# Patient Record
Sex: MALE | Race: WHITE | Employment: OTHER | URBAN - METROPOLITAN AREA
[De-identification: names, ages, dates, MRNs, and addresses within clinical notes are randomized per-mention and may not be internally consistent; named-entity substitution may affect disease eponyms.]

---

## 2017-04-25 DIAGNOSIS — C02.9 TONGUE CANCER (H): ICD-10-CM

## 2017-04-25 LAB
ALBUMIN SERPL-MCNC: 3.9 G/DL (ref 3.4–5)
ALP SERPL-CCNC: 59 U/L (ref 40–150)
ALT SERPL W P-5'-P-CCNC: 15 U/L (ref 0–70)
ANION GAP SERPL CALCULATED.3IONS-SCNC: 7 MMOL/L (ref 3–14)
AST SERPL W P-5'-P-CCNC: 9 U/L (ref 0–45)
BASOPHILS # BLD AUTO: 0 10E9/L (ref 0–0.2)
BASOPHILS NFR BLD AUTO: 0.6 %
BILIRUB SERPL-MCNC: 0.7 MG/DL (ref 0.2–1.3)
BUN SERPL-MCNC: 10 MG/DL (ref 7–30)
CALCIUM SERPL-MCNC: 9 MG/DL (ref 8.5–10.1)
CHLORIDE SERPL-SCNC: 107 MMOL/L (ref 94–109)
CO2 SERPL-SCNC: 30 MMOL/L (ref 20–32)
CREAT SERPL-MCNC: 0.95 MG/DL (ref 0.66–1.25)
DIFFERENTIAL METHOD BLD: ABNORMAL
EOSINOPHIL # BLD AUTO: 0.2 10E9/L (ref 0–0.7)
EOSINOPHIL NFR BLD AUTO: 3.4 %
ERYTHROCYTE [DISTWIDTH] IN BLOOD BY AUTOMATED COUNT: 14.8 % (ref 10–15)
GFR SERPL CREATININE-BSD FRML MDRD: 76 ML/MIN/1.7M2
GLUCOSE SERPL-MCNC: 99 MG/DL (ref 70–99)
HCT VFR BLD AUTO: 43.3 % (ref 40–53)
HGB BLD-MCNC: 14.7 G/DL (ref 13.3–17.7)
LYMPHOCYTES # BLD AUTO: 0.9 10E9/L (ref 0.8–5.3)
LYMPHOCYTES NFR BLD AUTO: 13.3 %
MAGNESIUM SERPL-MCNC: 1.6 MG/DL (ref 1.6–2.3)
MCH RBC QN AUTO: 36.7 PG (ref 26.5–33)
MCHC RBC AUTO-ENTMCNC: 33.9 G/DL (ref 31.5–36.5)
MCV RBC AUTO: 108 FL (ref 78–100)
MONOCYTES # BLD AUTO: 0.7 10E9/L (ref 0–1.3)
MONOCYTES NFR BLD AUTO: 10.6 %
NEUTROPHILS # BLD AUTO: 5 10E9/L (ref 1.6–8.3)
NEUTROPHILS NFR BLD AUTO: 72.1 %
PLATELET # BLD AUTO: 148 10E9/L (ref 150–450)
POTASSIUM SERPL-SCNC: 4.5 MMOL/L (ref 3.4–5.3)
PROT SERPL-MCNC: 7.5 G/DL (ref 6.8–8.8)
RBC # BLD AUTO: 4.01 10E12/L (ref 4.4–5.9)
SODIUM SERPL-SCNC: 144 MMOL/L (ref 133–144)
TSH SERPL DL<=0.05 MIU/L-ACNC: 3.12 MU/L (ref 0.4–4)
WBC # BLD AUTO: 6.9 10E9/L (ref 4–11)

## 2017-04-25 PROCEDURE — 84443 ASSAY THYROID STIM HORMONE: CPT | Performed by: FAMILY MEDICINE

## 2017-04-25 PROCEDURE — 80053 COMPREHEN METABOLIC PANEL: CPT | Performed by: FAMILY MEDICINE

## 2017-04-25 PROCEDURE — 83735 ASSAY OF MAGNESIUM: CPT | Performed by: FAMILY MEDICINE

## 2017-04-25 PROCEDURE — 85025 COMPLETE CBC W/AUTO DIFF WBC: CPT | Performed by: FAMILY MEDICINE

## 2017-04-25 PROCEDURE — 36415 COLL VENOUS BLD VENIPUNCTURE: CPT | Performed by: FAMILY MEDICINE

## 2017-04-26 NOTE — PROGRESS NOTES
Please call.  TSH is normal indicating that thyroid function is normal.  The blood chemistries (Basic metabolic panel) are all normal including electrolytes (salt balances in the blood), blood glucose, liver and kidney tests.  Magnesium is normal.  Platelets in blood slightly low which has been present in the past, other blood counts are OK.

## 2017-04-28 ENCOUNTER — HOSPITAL ENCOUNTER (OUTPATIENT)
Dept: GENERAL RADIOLOGY | Facility: CLINIC | Age: 82
Discharge: HOME | End: 2017-04-28
Attending: INTERNAL MEDICINE | Admitting: INTERNAL MEDICINE
Payer: COMMERCIAL

## 2017-04-28 ENCOUNTER — ONCOLOGY VISIT (OUTPATIENT)
Dept: ONCOLOGY | Facility: CLINIC | Age: 82
End: 2017-04-28
Attending: INTERNAL MEDICINE
Payer: COMMERCIAL

## 2017-04-28 VITALS
WEIGHT: 178.3 LBS | HEART RATE: 86 BPM | SYSTOLIC BLOOD PRESSURE: 152 MMHG | BODY MASS INDEX: 27.51 KG/M2 | TEMPERATURE: 98.2 F | DIASTOLIC BLOOD PRESSURE: 87 MMHG

## 2017-04-28 DIAGNOSIS — I10 HYPERTENSION GOAL BP (BLOOD PRESSURE) < 140/90: ICD-10-CM

## 2017-04-28 DIAGNOSIS — E11.22 TYPE 2 DIABETES MELLITUS WITH STAGE 1 CHRONIC KIDNEY DISEASE, WITHOUT LONG-TERM CURRENT USE OF INSULIN (H): ICD-10-CM

## 2017-04-28 DIAGNOSIS — C02.9 TONGUE CANCER (H): ICD-10-CM

## 2017-04-28 DIAGNOSIS — N18.1 TYPE 2 DIABETES MELLITUS WITH STAGE 1 CHRONIC KIDNEY DISEASE, WITHOUT LONG-TERM CURRENT USE OF INSULIN (H): ICD-10-CM

## 2017-04-28 DIAGNOSIS — E78.5 HYPERLIPIDEMIA WITH TARGET LDL LESS THAN 100: ICD-10-CM

## 2017-04-28 DIAGNOSIS — N18.1 CKD (CHRONIC KIDNEY DISEASE) STAGE 1, GFR 90 ML/MIN OR GREATER: ICD-10-CM

## 2017-04-28 DIAGNOSIS — C18.9 MALIGNANT NEOPLASM OF COLON, UNSPECIFIED PART OF COLON (H): Primary | ICD-10-CM

## 2017-04-28 PROCEDURE — 71020 XR CHEST 2 VW: CPT

## 2017-04-28 PROCEDURE — 99211 OFF/OP EST MAY X REQ PHY/QHP: CPT

## 2017-04-28 PROCEDURE — 99214 OFFICE O/P EST MOD 30 MIN: CPT | Performed by: INTERNAL MEDICINE

## 2017-04-28 ASSESSMENT — PAIN SCALES - GENERAL: PAINLEVEL: NO PAIN (0)

## 2017-04-28 NOTE — PROGRESS NOTES
Hematology/ Oncology Follow-up Visit:  Apr 28, 2017    Reason for Visit:   Follow-up of tongue cancer.    Oncologic History:  Tongue cancer (H)  Patrick Doip was noted to have a mass in his right neck during routine physical exam. CT scanning was subsequently obtained which also showed a right-sided tongue base and oropharyngeal mass, which together are consistent with malignancy. The patient himself has denied any unexplained weight loss. He denies any odynophagia, dysphagia, hoarseness or otalgia. 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 PET scan done on October 21, 2015 showed a mass at the level of the tongue base located along the anterior wall of the oropharynx and extending to the right lateral wall near the tonsillar pillar. This demonstrates abnormal increased FDG activity and is consistent with known malignancy. Hypermetabolic lymph nodes in the right and left neck are also noted as described. These are consistent with metastatic lymph nodes. There is no evidence of metastatic disease in the chest, abdomen or pelvis. CT of the soft tissues of the neck on October 21, 2015 was done in conjunction with PET scanning showed a 2.8 cm mass at the base of the tongue on the right effacing the right vallecula. There is also a 2.3 cm lymph node in the right level II region that is strongly suspicious for metastatic disease. These lesions are PET positive. Additionally, there is a 1.3 cm lymph node in left level III that is PET positive. There is a lymph node in right level V, also PET positive. He started on concurrent Cisplatin with radiation therapy with cisplatin 100 mg/m to be given intravenously on days #1, #22 #43 of radiation therapy.    Interval History:  Patient is here today for follow-up. He has been feeling well without any recent complaints. He denies any weight loss or night sweats or fever or chills. He denies any nausea or vomiting or diarrhea. He denies  any shortness of breath or cough or wheezing.He still has some difficulty swallowing certain foods he denies any pain with swallowing.    Review Of Systems:  Constitutional: Negative for fever, chills, and night sweats.  Skin: negative.  Eyes: negative.  Ears/Nose/Throat: negative.  Respiratory: No shortness of breath, dyspnea on exertion, cough, or hemoptysis.  Cardiovascular: negative.  Gastrointestinal: negative.  Genitourinary: negative.  Musculoskeletal: negative.  Neurologic: negative.  Psychiatric: negative.  Hematologic/Lymphatic/Immunologic: negative.  Endocrine: negative.    All other ROS negative unless mentioned in interval history.    Past medical, social, surgical, and family histories reviewed.    Allergies:  Allergies as of 04/28/2017     (No Known Allergies)       Current Medications:  Current Outpatient Prescriptions   Medication Sig Dispense Refill     metFORMIN (GLUCOPHAGE) 1000 MG tablet Take 1,000 mg by mouth daily       lisinopril (PRINIVIL/ZESTRIL) 20 MG tablet Take 1 tablet (20 mg) by mouth daily 90 tablet 1     order for DME Equipment being ordered: neck compression with additional neck compression pad 1 each 0     multivitamin  with lutein (OCUVITE WITH LTEIN) CAPS Take 1 capsule by mouth daily       Cholecalciferol (VITAMIN D) 1000 UNITS capsule Take 1 capsule by mouth daily       lovastatin (MEVACOR) 40 MG tablet Take 1 tablet (40 mg) by mouth At Bedtime 90 tablet 3        Physical Exam:  /87 (BP Location: Right arm, Patient Position: Chair, Cuff Size: Adult Regular)  Pulse 86  Temp 98.2  F (36.8  C) (Tympanic)  Wt 80.9 kg (178 lb 4.8 oz)  BMI 27.51 kg/m2  Wt Readings from Last 12 Encounters:   04/28/17 80.9 kg (178 lb 4.8 oz)   11/28/16 76.7 kg (169 lb)   11/07/16 77.1 kg (170 lb)   10/28/16 76.2 kg (168 lb)   09/02/16 74.6 kg (164 lb 6.4 oz)   07/28/16 74 kg (163 lb 1.6 oz)   07/25/16 72.6 kg (160 lb 1.6 oz)   05/27/16 73.9 kg (163 lb)   04/27/16 75.3 kg (166 lb)   04/27/16  75.2 kg (165 lb 12.8 oz)   03/29/16 80.7 kg (178 lb)   02/25/16 79.1 kg (174 lb 6.4 oz)     ECOG performance status: 0  GENERAL APPEARANCE: Healthy, alert and in no acute distress.  HEENT: Sclerae anicteric. PERRLA. Oropharynx without ulcers, lesions, or thrush.  NECK: Supple. No asymmetry or masses.  LYMPHATICS: No palpable cervical, supraclavicular, axillary, or inguinal lymphadenopathy.  RESP: Lungs clear to auscultation bilaterally without rales, rhonchi or wheezes.  CARDIOVASCULAR: Regular rate and rhythm. Normal S1, S2; no S3 or S4. No murmur, gallop, or rub.  ABDOMEN: Soft, nontender. Bowel sounds normal. No palpable organomegaly or masses.  MUSCULOSKELETAL: Extremities without gross deformities noted. No edema of bilateral lower extremities.  SKIN: No suspicious lesions or rashes.  NEURO: Alert and oriented x 3. Cranial nerves II-XII grossly intact.  PSYCHIATRIC: Mentation and affect appear normal.    Laboratory/Imaging Studies:  Orders Only on 04/25/2017   Component Date Value Ref Range Status     WBC 04/25/2017 6.9  4.0 - 11.0 10e9/L Final     RBC Count 04/25/2017 4.01* 4.4 - 5.9 10e12/L Final     Hemoglobin 04/25/2017 14.7  13.3 - 17.7 g/dL Final     Hematocrit 04/25/2017 43.3  40.0 - 53.0 % Final     MCV 04/25/2017 108* 78 - 100 fl Final     MCH 04/25/2017 36.7* 26.5 - 33.0 pg Final     MCHC 04/25/2017 33.9  31.5 - 36.5 g/dL Final     RDW 04/25/2017 14.8  10.0 - 15.0 % Final     Platelet Count 04/25/2017 148* 150 - 450 10e9/L Final     Diff Method 04/25/2017 Automated Method   Final     % Neutrophils 04/25/2017 72.1  % Final     % Lymphocytes 04/25/2017 13.3  % Final     % Monocytes 04/25/2017 10.6  % Final     % Eosinophils 04/25/2017 3.4  % Final     % Basophils 04/25/2017 0.6  % Final     Absolute Neutrophil 04/25/2017 5.0  1.6 - 8.3 10e9/L Final     Absolute Lymphocytes 04/25/2017 0.9  0.8 - 5.3 10e9/L Final     Absolute Monocytes 04/25/2017 0.7  0.0 - 1.3 10e9/L Final     Absolute Eosinophils  04/25/2017 0.2  0.0 - 0.7 10e9/L Final     Absolute Basophils 04/25/2017 0.0  0.0 - 0.2 10e9/L Final     Sodium 04/25/2017 144  133 - 144 mmol/L Final     Potassium 04/25/2017 4.5  3.4 - 5.3 mmol/L Final     Chloride 04/25/2017 107  94 - 109 mmol/L Final     Carbon Dioxide 04/25/2017 30  20 - 32 mmol/L Final     Anion Gap 04/25/2017 7  3 - 14 mmol/L Final     Glucose 04/25/2017 99  70 - 99 mg/dL Final     Urea Nitrogen 04/25/2017 10  7 - 30 mg/dL Final     Creatinine 04/25/2017 0.95  0.66 - 1.25 mg/dL Final     GFR Estimate 04/25/2017 76  >60 mL/min/1.7m2 Final    Non  GFR Calc     GFR Estimate If Black 04/25/2017   >60 mL/min/1.7m2 Final                    Value:>90   GFR Calc       Calcium 04/25/2017 9.0  8.5 - 10.1 mg/dL Final     Bilirubin Total 04/25/2017 0.7  0.2 - 1.3 mg/dL Final     Albumin 04/25/2017 3.9  3.4 - 5.0 g/dL Final     Protein Total 04/25/2017 7.5  6.8 - 8.8 g/dL Final     Alkaline Phosphatase 04/25/2017 59  40 - 150 U/L Final     ALT 04/25/2017 15  0 - 70 U/L Final     AST 04/25/2017 9  0 - 45 U/L Final     Magnesium 04/25/2017 1.6  1.6 - 2.3 mg/dL Final     TSH 04/25/2017 3.12  0.40 - 4.00 mU/L Final        Recent Results (from the past 744 hour(s))   XR Chest 2 Views    Narrative    XR CHEST 2 VW 4/28/2017 9:17 AM    HISTORY: Malignant neoplasm of the tongue. Cough.    COMPARISON: None.      Impression    IMPRESSION: 2 views of the chest show no acute cardiopulmonary disease  or evidence of metastatic disease.     MARYJO HERRERA MD       Assessment and plan:    (C02.9) Tongue cancer (H)  i reviewed with the patient today most recent blood work, imaging studies was a chest x-ray which was normal. There is no clinical evidence of disease recurrence. I advised the patient to continue to follow up with  ENT. I will see the patient again in 6 months or sooner if there's new developments or concerns.    (C18.9) Malignant neoplasm of colon, unspecified part of colon (H)     Previous history of colon cancer. The patient is due for colonoscopy. He will talk to her primary care physician  To schedule his colonoscopy.    (I10) Hypertension goal BP (blood pressure) < 140/90  Patient blood pressure today is mildly elevated. He continues on lisinopril 20 mg daily..  Continue to monitor blood pressure through the primary care physician.    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    (E78.5) Hyperlipidemia with target LDL less than 100  Patient currently on lovastatin 40 mg daily..    (N18.1) CKD (chronic kidney disease) stage 1, GFR 90 ml/min or greater  Patient kidney function has been normal.    The patient is ready to learn, no apparent learning barriers were identified.  Diagnosis and treatment plans were explained to the patient. The patient expressed understanding of the content. The patient asked appropriate questions. The patient questions were answered to his satisfaction.    Chart documentation with Dragon Voice recognition Software. Although reviewed after completion, some words and grammatical errors may remain.

## 2017-04-28 NOTE — ASSESSMENT & PLAN NOTE
Patrick Diop was noted to have a mass in his right neck during routine physical exam. CT scanning was subsequently obtained which also showed a right-sided tongue base and oropharyngeal mass, which together are consistent with malignancy. The patient himself has denied any unexplained weight loss. He denies any odynophagia, dysphagia, hoarseness or otalgia. 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 PET scan done on October 21, 2015 showed a mass at the level of the tongue base located along the anterior wall of the oropharynx and extending to the right lateral wall near the tonsillar pillar. This demonstrates abnormal increased FDG activity and is consistent with known malignancy. Hypermetabolic lymph nodes in the right and left neck are also noted as described. These are consistent with metastatic lymph nodes. There is no evidence of metastatic disease in the chest, abdomen or pelvis. CT of the soft tissues of the neck on October 21, 2015 was done in conjunction with PET scanning showed a 2.8 cm mass at the base of the tongue on the right effacing the right vallecula. There is also a 2.3 cm lymph node in the right level II region that is strongly suspicious for metastatic disease. These lesions are PET positive. Additionally, there is a 1.3 cm lymph node in left level III that is PET positive. There is a lymph node in right level V, also PET positive. He started on concurrent Cisplatin with radiation therapy with cisplatin 100 mg/m to be given intravenously on days #1, #22 #43 of radiation therapy.

## 2017-04-28 NOTE — MR AVS SNAPSHOT
After Visit Summary   4/28/2017    Patrick Diop    MRN: [id number]           Patient Information     Date Of Birth          1935        Visit Information        Provider Department      4/28/2017 10:00 AM Lanny Neil MD [facility] ONCOLOGY      Today's Diagnoses     Malignant neoplasm of colon, unspecified part of colon (H)    -  1    Tongue cancer (H)        Hypertension goal BP (blood pressure) < 140/90        Type 2 diabetes mellitus with stage 1 chronic kidney disease, without long-term current use of insulin (H)        Hyperlipidemia with target LDL less than 100        CKD (chronic kidney disease) stage 1, GFR 90 ml/min or greater          Care Instructions    We would like to see you back in clinic with Dr. Neil in 6 months with labs prior.    Copy of appointments, and after visit summary (AVS) given to patient.    If you have any questions during business hours (M-F 8 AM- 4PM), please call Yolanda Eduardo RN, BSN, OCN Oncology Hematology /Breast Cancer Navigator at [facility] [phone].     For questions after business hours, or on holidays/weekends, please call our after hours Nurse Triage line [phone]. Thank you.          Follow-ups after your visit        Follow-up notes from your care team     Return in about 6 months (around 10/28/2017) for Blood work before next appointment.      Your next 10 appointments already scheduled     Sep 01, 2017  8:30 AM CDT   Return Visit with Rosa Paul MD   [facility])    [address]  [facility] [address]   [phone]            Oct 25, 2017  9:30 AM CDT   LAB with NB LAB   [facility]    [address] [place] [id number] [phone]           Patient must bring picture ID.  Patient should be prepared to give a urine specimen  Please do not eat 10-12 hours  "before your appointment if you are coming in fasting for labs on lipids, cholesterol, or glucose (sugar).  Pregnant women should follow their Care Team instructions. Water with medications is okay. Do not drink coffee or other fluids.   If you have concerns about taking  your medications, please ask at office or if scheduling via [employer], send a message by clicking on Secure Messaging, Message Your Care Team.            Nov 01, 2017 10:00 AM CDT   Return Visit with Lanny Neil MD   [facility] ([facility])    [facility]  [address]  [facility] [address]   [phone]              Future tests that were ordered for you today     Open Future Orders        Priority Expected Expires Ordered    CBC with platelets differential Routine 10/28/2017 4/28/2018 4/28/2017    Comprehensive metabolic panel Routine 10/28/2017 4/28/2018 4/28/2017    TSH Routine 10/28/2017 4/28/2018 4/28/2017            Who to contact     If you have questions or need follow up information about today's clinic visit or your schedule please contact [facility] directly at [phone].  Normal or non-critical lab and imaging results will be communicated to you by MyChart, letter or phone within 4 business days after the clinic has received the results. If you do not hear from us within 7 days, please contact the clinic through [facility] or phone. If you have a critical or abnormal lab result, we will notify you by phone as soon as possible.  Submit refill requests through [employer] or call your pharmacy and they will forward the refill request to us. Please allow 3 business days for your refill to be completed.          Additional Information About Your Visit        [facility] Information     [employer] lets you send messages to your doctor, view your test results, renew your prescriptions, schedule appointments and more. To sign up, go to [email or web address] . Click on \"Log in\" " "on the left side of the screen, which will take you to the Welcome page. Then click on \"Sign up Now\" on the right side of the page.     You will be asked to enter the access code listed below, as well as some personal information. Please follow the directions to create your username and password.     Your access code is: [email or web address]  Expires: 2017 10:02 AM     Your access code will  in 90 days. If you need help or a new code, please call your [facility] or [phone].        Care EveryWhere ID     This is your Care EveryWhere ID. This could be used by other organizations to access your [place] medical records  [id number]        Your Vitals Were     Pulse Temperature BMI (Body Mass Index)             86 98.2  F (36.8  C) (Tympanic) 27.51 kg/m2          Blood Pressure from Last 3 Encounters:   17 152/87   16 138/78   16 160/82    Weight from Last 3 Encounters:   17 80.9 kg (178 lb 4.8 oz)   16 76.7 kg (169 lb)   16 77.1 kg (170 lb)               Primary Care Provider Office Phone # Fax #    Joe Mandel [phone]       [address] [address]        Thank you!     Thank you for choosing [facility]  for your care. Our goal is always to provide you with excellent care. Hearing back from our patients is one way we can continue to improve our services. Please take a few minutes to complete the written survey that you may receive in the mail after your visit with us. Thank you!             Your Updated Medication List - Protect others around you: Learn how to safely use, store and throw away your medicines at www.disposemymeds.org.          This list is accurate as of: 17 10:02 AM.  Always use your most recent med list.                   Brand Name Dispense Instructions for use    lisinopril 20 MG tablet    PRINIVIL/ZESTRIL    90 tablet    Take 1 tablet (20 mg) by mouth daily       lovastatin " 40 MG tablet    MEVACOR    90 tablet    Take 1 tablet (40 mg) by mouth At Bedtime       metFORMIN 1000 MG tablet    GLUCOPHAGE     Take 1,000 mg by mouth daily       multivitamin  with lutein Caps per capsule      Take 1 capsule by mouth daily       order for DME     1 each    Equipment being ordered: neck compression with additional neck compression pad       vitamin D 1000 UNITS capsule      Take 1 capsule by mouth daily

## 2017-04-28 NOTE — PATIENT INSTRUCTIONS
We would like to see you back in clinic with Dr. Neil in 6 months with labs prior.    Copy of appointments, and after visit summary (AVS) given to patient.    If you have any questions during business hours (M-F 8 AM- 4PM), please call Yolanda Eduardo RN, BSN, OCN Oncology Hematology /Breast Cancer Navigator at [facility] [phone].     For questions after business hours, or on holidays/weekends, please call our after hours Nurse Triage line [phone]. Thank you.

## 2017-04-28 NOTE — NURSING NOTE
"Oncology Rooming Note    April 28, 2017 9:46 AM   Patrick Diop is a 81 year old male who presents for: No chief complaint on file.    Initial Vitals: /87 (BP Location: Right arm, Patient Position: Chair, Cuff Size: Adult Regular)  Pulse 86  Temp 98.2  F (36.8  C) (Tympanic)  Wt 80.9 kg (178 lb 4.8 oz)  BMI 27.51 kg/m2 Estimated body mass index is 27.51 kg/(m^2) as calculated from the following:    Height as of 11/28/16: 1.715 m (5' 7.5\").    Weight as of this encounter: 80.9 kg (178 lb 4.8 oz). Body surface area is 1.96 meters squared.  No Pain (0) Comment: Data Unavailable   No LMP for male patient.  Allergies reviewed: Yes  Medications reviewed: Yes    Medications: Medication refills not needed today.  Pharmacy name entered into [employer]:    [id number] [address] [facility]    Clinical concerns: No new concerns.       6 minutes for nursing intake (face to face time)     Oliva Wyatt RN                "

## 2017-06-08 DIAGNOSIS — I10 HYPERTENSION GOAL BP (BLOOD PRESSURE) < 140/90: ICD-10-CM

## 2017-06-08 RX ORDER — LISINOPRIL 20 MG/1
20 TABLET ORAL DAILY
Qty: 30 TABLET | Refills: 0 | Status: SHIPPED | OUTPATIENT
Start: 2017-06-08 | End: 2017-06-27

## 2017-06-08 NOTE — TELEPHONE ENCOUNTER
Lisinopril 20 mg      Last Written Prescription Date: 12/27/16  Last Fill Quantity: 90, # refills: 1  Last Office Visit with G, [facility] or [facility] prescribing provider: 11/28/16       Potassium   Date Value Ref Range Status   04/25/2017 4.5 3.4 - 5.3 mmol/L Final     Creatinine   Date Value Ref Range Status   04/25/2017 0.95 0.66 - 1.25 mg/dL Final     BP Readings from Last 3 Encounters:   04/28/17 152/87   12/27/16 138/78   12/13/16 160/82

## 2017-06-27 DIAGNOSIS — I10 HYPERTENSION GOAL BP (BLOOD PRESSURE) < 140/90: ICD-10-CM

## 2017-06-27 RX ORDER — LISINOPRIL 20 MG/1
20 TABLET ORAL DAILY
Qty: 30 TABLET | Refills: 0 | Status: SHIPPED | OUTPATIENT
Start: 2017-06-27 | End: 2017-07-03

## 2017-06-27 NOTE — TELEPHONE ENCOUNTER
Lisinopril 20 mg      Last Written Prescription Date: 6/8/17  Last Fill Quantity: 30, # refills: 0  Last Office Visit with G, P or [facility] prescribing provider:11 /28/16  Next 5 appointments (look out 90 days)     Jul 03, 2017  9:40 AM CDT   [facility]    [address] [place] [id number] [phone]                   Potassium   Date Value Ref Range Status   04/25/2017 4.5 3.4 - 5.3 mmol/L Final     Creatinine   Date Value Ref Range Status   04/25/2017 0.95 0.66 - 1.25 mg/dL Final     BP Readings from Last 3 Encounters:   04/28/17 152/87   12/27/16 138/78   12/13/16 160/82

## 2017-07-03 ENCOUNTER — OFFICE VISIT (OUTPATIENT)
Dept: FAMILY MEDICINE | Facility: CLINIC | Age: 82
End: 2017-07-03
Payer: COMMERCIAL

## 2017-07-03 VITALS
HEART RATE: 76 BPM | TEMPERATURE: 97.4 F | SYSTOLIC BLOOD PRESSURE: 125 MMHG | BODY MASS INDEX: 27 KG/M2 | RESPIRATION RATE: 21 BRPM | DIASTOLIC BLOOD PRESSURE: 74 MMHG | WEIGHT: 175 LBS

## 2017-07-03 DIAGNOSIS — C18.9 MALIGNANT NEOPLASM OF COLON, UNSPECIFIED PART OF COLON (H): ICD-10-CM

## 2017-07-03 DIAGNOSIS — N18.1 TYPE 2 DIABETES MELLITUS WITH STAGE 1 CHRONIC KIDNEY DISEASE, WITHOUT LONG-TERM CURRENT USE OF INSULIN (H): Primary | ICD-10-CM

## 2017-07-03 DIAGNOSIS — E11.22 TYPE 2 DIABETES MELLITUS WITH STAGE 1 CHRONIC KIDNEY DISEASE, WITHOUT LONG-TERM CURRENT USE OF INSULIN (H): Primary | ICD-10-CM

## 2017-07-03 DIAGNOSIS — I10 HYPERTENSION GOAL BP (BLOOD PRESSURE) < 140/90: ICD-10-CM

## 2017-07-03 LAB
CREAT UR-MCNC: 120 MG/DL
HBA1C MFR BLD: 5.4 % (ref 4.3–6)
MICROALBUMIN UR-MCNC: 19 MG/L
MICROALBUMIN/CREAT UR: 16 MG/G CR (ref 0–17)

## 2017-07-03 PROCEDURE — 99207 C FOOT EXAM  NO CHARGE: CPT | Performed by: FAMILY MEDICINE

## 2017-07-03 PROCEDURE — 83036 HEMOGLOBIN GLYCOSYLATED A1C: CPT | Performed by: FAMILY MEDICINE

## 2017-07-03 PROCEDURE — 36415 COLL VENOUS BLD VENIPUNCTURE: CPT | Performed by: FAMILY MEDICINE

## 2017-07-03 PROCEDURE — 82043 UR ALBUMIN QUANTITATIVE: CPT | Performed by: FAMILY MEDICINE

## 2017-07-03 PROCEDURE — 99214 OFFICE O/P EST MOD 30 MIN: CPT | Performed by: FAMILY MEDICINE

## 2017-07-03 RX ORDER — LISINOPRIL 20 MG/1
20 TABLET ORAL DAILY
Qty: 90 TABLET | Refills: 3 | Status: SHIPPED | OUTPATIENT
Start: 2017-07-03 | End: 2018-05-29

## 2017-07-03 NOTE — PATIENT INSTRUCTIONS
ASSESSMENT/PLAN:                                                      (E11.22,  N18.1) Type 2 diabetes mellitus with stage 1 chronic kidney disease, without long-term current use of insulin (H)  (primary encounter diagnosis)  Comment: doing great  Plan: Hemoglobin A1c, Albumin Random Urine         Quantitative, FOOT EXAM, EYE EXAM         (SIMPLE-NONBILLABLE), metFORMIN (GLUCOPHAGE)         1000 MG tablet        Cut back on metformin to 500mg (1/2 pill)_ with supper.   Recheck in 6 months.     (I10) Hypertension goal BP (blood pressure) < 140/90  Comment: doing well  Plan: lisinopril (PRINIVIL/ZESTRIL) 20 MG tablet        No change in current treatment plan.     (C18.9) Malignant neoplasm of colon, unspecified part of colon (H)  Comment:   Plan: GASTROENTEROLOGY ADULT REF PROCEDURE ONLY        Schedule an appointment for colonoscopies and gastroscopies with surgery scheduling.  Call [phone] to schedule the procedures.

## 2017-07-03 NOTE — PROGRESS NOTES
SUBJECTIVE:                                                    Patrick Diop is a 81 year old male who presents to clinic today for the following health issues:  Chief Complaint   Patient presents with     Diabetes      Last colonoscopy in [place] he thinks 5 years ago.   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  ?Some side effects from radiation.  Dry throat, phlegm in AM.  Some lymphedema in neck.      Diabetes Follow-up      Patient is checking blood sugars: No- only checked maybe three times since last visit on 11/28/2016    Diabetic concerns: None     Symptoms of hypoglycemia (low blood sugar): none     Paresthesias (numbness or burning in feet) or sores: No     Date of last diabetic eye exam: 06/26/2017 at [facility]     Hypertension Follow-up      Outpatient blood pressures are being checked at home.  Results are average 125/70. Checking 2-3 times a week.    Low Salt Diet: not monitoring salt      Amount of exercise or physical activity: 6-7 days/week for an average of 15-30 minutes    Problems taking medications regularly: No    Medication side effects: none    Diet: regular (no restrictions)    Last clinic visit:11/28/2017  Medication or other changes at last visit:none  Weight since last visit?  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  He had cancer at the base of his tongue.  Treated with radiation for 7 weeks and three chemo treatments.   ENT notes 4/4/2016:now three months out from  completion of chemoradiation therapy for a T3, N2c right-sided tongue base tumor.  He had a PET scan done over the weekend, and it was essentially negative so he has had a complete response.    5/27/2016:Specialists treating his cancer; Dr. Hernandez, ENT.    Dr. Neil, Hematology/Oncology.   Dr. Paul, Oncology radiation.   He had lymphedema in neck as complication.       CKD (chronic kidney disease) stage 1, GFR 90 ml/min or greater 09/05/2014     Priority: Medium     9/5/2014:MAC positive twice.        Colon cancer (H) 03/14/2014     Priority: Medium     9/5/2014:2001 HAD SURGERY AND CURE-treated in [place].   Transverse colectomy.  He had chemo for 6 months.  Cancer treated in 2001.  Last colonoscopy 3 years ago.  He was told to recheck in 4 years-2015.         Type 2 diabetes mellitus with stage 1 chronic kidney disease (H) 11/06/2013     Priority: Medium     Diagnosed about 2008       Hyperlipidemia with target LDL less than 100 11/06/2013     Priority: Medium     Diagnosis updated by automated process. Provider to review and confirm.       Hypertension goal BP (blood pressure) < 140/90 11/06/2013     Priority: Medium     Current Outpatient Prescriptions   Medication Sig Dispense Refill     lisinopril (PRINIVIL/ZESTRIL) 20 MG tablet Take 1 tablet (20 mg) by mouth daily 30 tablet 0     metFORMIN (GLUCOPHAGE) 1000 MG tablet Take 1,000 mg by mouth daily       lovastatin (MEVACOR) 40 MG tablet Take 1 tablet (40 mg) by mouth At Bedtime 90 tablet 3     multivitamin  with lutein (OCUVITE WITH LTEIN) CAPS Take 1 capsule by mouth daily       Cholecalciferol (VITAMIN D) 1000 UNITS capsule Take 1 capsule by mouth daily       order for DME Equipment being ordered: neck compression with additional neck compression pad (Patient not taking: Reported on 7/3/2017) 1 each 0     ROS:General: POSITIVE for:, poor appetite  Resp: No coughing, wheezing or shortness of breath  CV: No chest pains or palpitations  GI: No nausea,  vomiting,  heartburn, abdominal pain, diarrhea, constipation or change in bowel habits  Some diarrhea after tongue cancer treatment.   : POSITIVE for:, nocturia x 4-5.  NO daytime frequency.  Musculoskeletal: No significant muscle or joint pains  Psychiatric: No problems with anxiety, depression or mental health    OBJECTIVE:Blood pressure 125/74, pulse 76, temperature 97.4  F (36.3  C), temperature source Tympanic, resp. rate 21, weight 175 lb (79.4 kg). BMI=Body mass index is 27 kg/(m^2).  GENERAL APPEARANCE ADULT: Alert, no acute distress  HENT: oral mucous membranes moist, oropharynx clear  NECK: No adenopathy,masses or thyromegaly.  He has swelling around neck.   RESP: lungs clear to auscultation   CV: normal rate, regular rhythm, no murmur or gallop  ABDOMEN: soft, no organomegaly, masses or tenderness  MS: extremities normal, no peripheral edema   Foot exam:not done.     ASSESSMENT/PLAN:                                                      (E11.22,  N18.1) Type 2 diabetes mellitus with stage 1 chronic kidney disease, without long-term current use of insulin (H)  (primary encounter diagnosis)  Comment: doing great  Plan: Hemoglobin A1c, Albumin Random Urine         Quantitative, FOOT EXAM, EYE EXAM         (SIMPLE-NONBILLABLE), metFORMIN (GLUCOPHAGE)         1000 MG tablet        Cut back on metformin to 500mg (1/2 pill)_ with supper.   Recheck in 6 months.     (I10) Hypertension goal BP (blood pressure) < 140/90  Comment: doing well  Plan: lisinopril (PRINIVIL/ZESTRIL) 20 MG tablet        No change in current treatment plan.     (C18.9) Malignant neoplasm of colon, unspecified part of colon (H)  Comment:   Plan: GASTROENTEROLOGY ADULT REF PROCEDURE ONLY        Schedule an appointment for colonoscopies and gastroscopies with surgery scheduling.  Call [phone] to schedule the procedures.         Diabetes Type II, A1c Controlled, non-insulin dependent   Associated with the following complications    Renal  Complications:  CKD

## 2017-07-03 NOTE — MR AVS SNAPSHOT
After Visit Summary   7/3/2017    Patrick Diop    MRN: [id number]           Patient Information     Date Of Birth          1935        Visit Information        Provider Department      7/3/2017 9:40 AM Joe Mandel MD [facility]        Today's Diagnoses     Type 2 diabetes mellitus with stage 1 chronic kidney disease, without long-term current use of insulin (H)    -  1    Hypertension goal BP (blood pressure) < 140/90        Malignant neoplasm of colon, unspecified part of colon (H)          Care Instructions    ASSESSMENT/PLAN:                                                      (E11.22,  N18.1) Type 2 diabetes mellitus with stage 1 chronic kidney disease, without long-term current use of insulin (H)  (primary encounter diagnosis)  Comment: doing great  Plan: Hemoglobin A1c, Albumin Random Urine         Quantitative, FOOT EXAM, EYE EXAM         (SIMPLE-NONBILLABLE), metFORMIN (GLUCOPHAGE)         1000 MG tablet        Cut back on metformin to 500mg (1/2 pill)_ with supper.   Recheck in 6 months.     (I10) Hypertension goal BP (blood pressure) < 140/90  Comment: doing well  Plan: lisinopril (PRINIVIL/ZESTRIL) 20 MG tablet        No change in current treatment plan.     (C18.9) Malignant neoplasm of colon, unspecified part of colon (H)  Comment:   Plan: GASTROENTEROLOGY ADULT REF PROCEDURE ONLY        Schedule an appointment for colonoscopies and gastroscopies with surgery scheduling.  Call [phone] to schedule the procedures.          Follow-ups after your visit        Additional Services     GASTROENTEROLOGY ADULT REF PROCEDURE ONLY       Last Lab Result: Creatinine (mg/dL)       Date                     Value                 04/25/2017               0.95             ----------  Body mass index is 27 kg/(m^2).      Patient will be contacted to schedule procedure.     Please be aware that coverage of these services is subject to the terms and limitations of your  health insurance plan.  Call member services at your health plan with any benefit or coverage questions.  Any procedures must be performed at a [facility] facility OR coordinated by your clinic's referral office.    Please bring the following with you to your appointment:    (1) Any X-Rays, CTs or MRIs which have been performed.  Contact the facility where they were done to arrange for  prior to your scheduled appointment.    (2) List of current medications   (3) This referral request   (4) Any documents/labs given to you for this referral                  Your next 10 appointments already scheduled     Sep 01, 2017  8:30 AM CDT   Return Visit with Rosa Paul MD   [facility]    [address], [address]  [place] [address]   [phone]            Oct 25, 2017  9:30 AM CDT   LAB with [facility] LAB   [facility]    [address] [place] [id number] [phone]           Patient must bring picture ID.  Patient should be prepared to give a urine specimen  Please do not eat 10-12 hours before your appointment if you are coming in fasting for labs on lipids, cholesterol, or glucose (sugar).  Pregnant women should follow their Care Team instructions. Water with medications is okay. Do not drink coffee or other fluids.   If you have concerns about taking  your medications, please ask at office or if scheduling via Reqluthart, send a message by clicking on Secure Messaging, Message Your Care Team.            Nov 01, 2017 10:00 AM CDT   Return Visit with Lanny Neil MD   [facility] ([facility])    [facility]  [address]  [place] [address] [phone]              Who to contact     If you have questions or need follow up information about today's clinic visit or your schedule please contact [facility] directly at  "[phone].  Normal or non-critical lab and imaging results will be communicated to you by MyChart, letter or phone within 4 business days after the clinic has received the results. If you do not hear from us within 7 days, please contact the clinic through Acompl[employer] or phone. If you have a critical or abnormal lab result, we will notify you by phone as soon as possible.  Submit refill requests through [email or web address] or call your pharmacy and they will forward the refill request to us. Please allow 3 business days for your refill to be completed.          Additional Information About Your Visit        Acomplihart Information     [email or web address] lets you send messages to your doctor, view your test results, renew your prescriptions, schedule appointments and more. To sign up, go to [email or web address] . Click on \"Log in\" on the left side of the screen, which will take you to the Welcome page. Then click on \"Sign up Now\" on the right side of the page.     You will be asked to enter the access code listed below, as well as some personal information. Please follow the directions to create your username and password.     Your access code is: [email or web address]  Expires: 2017 10:02 AM     Your access code will  in 90 days. If you need help or a new code, please call your [facility] clinic or [phone].        Care EveryWhere ID     This is your Care EveryWhere ID. This could be used by other organizations to access your [facility] medical records  [id number]        Your Vitals Were     Pulse Temperature Respirations BMI (Body Mass Index)          76 97.4  F (36.3  C) (Tympanic) 21 27 kg/m2         Blood Pressure from Last 3 Encounters:   17 125/74   17 152/87   16 138/78    Weight from Last 3 Encounters:   17 175 lb (79.4 kg)   17 178 lb 4.8 oz (80.9 kg)   16 169 lb (76.7 kg)              We Performed the Following     Albumin Random Urine Quantitative     EYE EXAM (SIMPLE-NONBILLABLE)     FOOT " EXAM     GASTROENTEROLOGY ADULT REF PROCEDURE ONLY     Hemoglobin A1c          Today's Medication Changes          These changes are accurate as of: 7/3/17 10:27 AM.  If you have any questions, ask your nurse or doctor.               These medicines have changed or have updated prescriptions.        Dose/Directions    metFORMIN 1000 MG tablet   Commonly known as:  GLUCOPHAGE   This may have changed:    - how much to take  - when to take this   Used for:  Type 2 diabetes mellitus with stage 1 chronic kidney disease, without long-term current use of insulin (H)   Changed by:  Joe Mandel MD        Dose:  500 mg   Take 0.5 tablets (500 mg) by mouth daily (with dinner)   Quantity:  45 tablet   Refills:  3            Where to get your medicines      These medications were sent to [id number] [address]  [address] [address]    Hours:  Closed 10-16-08 business to [facility] Phone:  [phone]     lisinopril 20 MG tablet         Some of these will need a paper prescription and others can be bought over the counter.  Ask your nurse if you have questions.     You don't need a prescription for these medications     metFORMIN 1000 MG tablet                Primary Care Provider Office Phone # Fax #    Joe Mandel [phone]       [facility] [address] [facility] [address]        Equal Access to Services     RAINA SOLANO AH: Norma luzo Soomaali, waaxda luqadaha, qaybta kaalmada adeegyada, elizabeth dias. So [facility] [phone].    ATENCIÓN: Si habla español, tiene a taylor disposición servicios gratuitos de asistencia lingüística. Llame al [phone].    We comply with applicable federal civil rights laws and [place] laws. We do not discriminate on the basis of race, color, national origin, age, disability sex, sexual orientation or gender identity.            Thank you!     Thank you  for choosing [facility]  for your care. Our goal is always to provide you with excellent care. Hearing back from our patients is one way we can continue to improve our services. Please take a few minutes to complete the written survey that you may receive in the mail after your visit with us. Thank you!             Your Updated Medication List - Protect others around you: Learn how to safely use, store and throw away your medicines at www.disposemymeds.org.          This list is accurate as of: 7/3/17 10:27 AM.  Always use your most recent med list.                   Brand Name Dispense Instructions for use Diagnosis    lisinopril 20 MG tablet    PRINIVIL/ZESTRIL    90 tablet    Take 1 tablet (20 mg) by mouth daily    Hypertension goal BP (blood pressure) < 140/90       lovastatin 40 MG tablet    MEVACOR    90 tablet    Take 1 tablet (40 mg) by mouth At Bedtime    Hyperlipidemia with target LDL less than 100       metFORMIN 1000 MG tablet    GLUCOPHAGE    45 tablet    Take 0.5 tablets (500 mg) by mouth daily (with dinner)    Type 2 diabetes mellitus with stage 1 chronic kidney disease, without long-term current use of insulin (H)       multivitamin  with lutein Caps per capsule      Take 1 capsule by mouth daily        order for DME     1 each    Equipment being ordered: neck compression with additional neck compression pad    Lymphedema, Lymphedema of face       vitamin D 1000 UNITS capsule      Take 1 capsule by mouth daily

## 2017-07-03 NOTE — NURSING NOTE
"Chief Complaint   Patient presents with     Diabetes       Initial /74 (BP Location: Right arm, Patient Position: Chair, Cuff Size: Adult Regular)  Pulse 76  Temp 97.4  F (36.3  C) (Tympanic)  Resp 21  Wt 175 lb (79.4 kg)  BMI 27 kg/m2 Estimated body mass index is 27 kg/(m^2) as calculated from the following:    Height as of 11/28/16: 5' 7.5\" (1.715 m).    Weight as of this encounter: 175 lb (79.4 kg).  Medication Reconciliation: complete    Health Maintenance that is potentially due pending provider review:  NONE    N/a    Shell Hernandez sma        "

## 2017-07-04 NOTE — PROGRESS NOTES
Please call.  Microalbumin urine test is normal indicating no increased amount of protein in urine. This is a measure of kidney function.  We have discussed good Hgb A1C earlier.

## 2017-10-24 ENCOUNTER — DOCUMENTATION ONLY (OUTPATIENT)
Dept: LAB | Facility: CLINIC | Age: 82
End: 2017-10-24

## 2017-10-24 DIAGNOSIS — E78.5 HYPERLIPIDEMIA WITH TARGET LDL LESS THAN 100: Primary | ICD-10-CM

## 2017-10-30 ENCOUNTER — TELEPHONE (OUTPATIENT)
Dept: ONCOLOGY | Facility: CLINIC | Age: 82
End: 2017-10-30

## 2017-10-30 NOTE — TELEPHONE ENCOUNTER
----- Message from Dalila Olivera sent at 10/30/2017  8:25 AM CDT -----  Regarding: Out of state  Good morning,    Patrick is currently in [place] and will call us to r/s labs and MD visit when he gets back. He was originally scheduled to see Rashaad on 11/01/17.      Yuko

## 2017-11-24 DIAGNOSIS — E78.5 HYPERLIPIDEMIA WITH TARGET LDL LESS THAN 100: ICD-10-CM

## 2017-11-25 NOTE — TELEPHONE ENCOUNTER
Requested Prescriptions   Pending Prescriptions Disp Refills     lovastatin (MEVACOR) 40 MG tablet   Last Written Prescription Date: 11/28/16  Last Fill Quantity: 90,  # refills: 3   Last Office Visit with G, P or [facility] prescribing provider: 7/3/17                                         Next 5 appointments (look out 90 days)     Nov 29, 2017  8:40 AM CST   SHORT with Joe Mandel MD   [facility]    [address] [place] [id number]   [phone]                 90 tablet 2     Sig: TAKE ONE TABLET BY MOUTH NIGHTLY AT BEDTIME    Statins Protocol Failed    11/24/2017  5:56 PM       Failed - LDL on file in past 12 months    Recent Labs   Lab Test  10/21/16   0821   LDL  59            Passed - No abnormal creatine kinase in past 12 months    No lab results found.         Passed - Recent or future visit with authorizing provider    Patient had office visit in the last year or has a visit in the next 30 days with authorizing provider.  See chart review.              Passed - Patient is age 18 or older

## 2017-11-27 RX ORDER — LOVASTATIN 40 MG
TABLET ORAL
Qty: 90 TABLET | Refills: 0 | Status: SHIPPED | OUTPATIENT
Start: 2017-11-27 | End: 2017-11-29

## 2017-11-29 ENCOUNTER — OFFICE VISIT (OUTPATIENT)
Dept: FAMILY MEDICINE | Facility: CLINIC | Age: 82
End: 2017-11-29
Payer: COMMERCIAL

## 2017-11-29 VITALS
WEIGHT: 172.2 LBS | HEART RATE: 82 BPM | RESPIRATION RATE: 18 BRPM | BODY MASS INDEX: 26.1 KG/M2 | TEMPERATURE: 97.7 F | HEIGHT: 68 IN | DIASTOLIC BLOOD PRESSURE: 80 MMHG | SYSTOLIC BLOOD PRESSURE: 138 MMHG

## 2017-11-29 DIAGNOSIS — N18.1 TYPE 2 DIABETES MELLITUS WITH STAGE 1 CHRONIC KIDNEY DISEASE, WITHOUT LONG-TERM CURRENT USE OF INSULIN (H): Primary | ICD-10-CM

## 2017-11-29 DIAGNOSIS — Z23 NEED FOR PROPHYLACTIC VACCINATION AND INOCULATION AGAINST INFLUENZA: ICD-10-CM

## 2017-11-29 DIAGNOSIS — E78.5 HYPERLIPIDEMIA WITH TARGET LDL LESS THAN 100: ICD-10-CM

## 2017-11-29 DIAGNOSIS — E11.22 TYPE 2 DIABETES MELLITUS WITH STAGE 1 CHRONIC KIDNEY DISEASE, WITHOUT LONG-TERM CURRENT USE OF INSULIN (H): Primary | ICD-10-CM

## 2017-11-29 LAB — HBA1C MFR BLD: 5.4 % (ref 4.3–6)

## 2017-11-29 PROCEDURE — G0008 ADMIN INFLUENZA VIRUS VAC: HCPCS | Performed by: FAMILY MEDICINE

## 2017-11-29 PROCEDURE — 83036 HEMOGLOBIN GLYCOSYLATED A1C: CPT | Performed by: FAMILY MEDICINE

## 2017-11-29 PROCEDURE — 99214 OFFICE O/P EST MOD 30 MIN: CPT | Mod: 25 | Performed by: FAMILY MEDICINE

## 2017-11-29 PROCEDURE — 90662 IIV NO PRSV INCREASED AG IM: CPT | Performed by: FAMILY MEDICINE

## 2017-11-29 PROCEDURE — 36415 COLL VENOUS BLD VENIPUNCTURE: CPT | Performed by: FAMILY MEDICINE

## 2017-11-29 RX ORDER — LOVASTATIN 40 MG
TABLET ORAL
Qty: 90 TABLET | Refills: 3 | Status: SHIPPED | OUTPATIENT
Start: 2017-11-29 | End: 2018-01-01

## 2017-11-29 ASSESSMENT — PAIN SCALES - GENERAL: PAINLEVEL: NO PAIN (0)

## 2017-11-29 NOTE — MR AVS SNAPSHOT
After Visit Summary   11/29/2017    Patrick Diop    MRN: [id number]           Patient Information     Date Of Birth          1935        Visit Information        Provider Department      11/29/2017 8:40 AM Joe Mandel MD [facility]        Today's Diagnoses     Type 2 diabetes mellitus with stage 1 chronic kidney disease, without long-term current use of insulin (H)    -  1    Hyperlipidemia with target LDL less than 100        Need for prophylactic vaccination and inoculation against influenza          Care Instructions    ASSESSMENT/PLAN:                                                    Lifestyle recommendations:regular exercise, at least 150 minutes per week (average 30 minutes 5 times a week)  good job on losing weight!  Diabetic recommendations:-return for follow-up visit in 3-4 month(s)    (E11.22,  N18.1) Type 2 diabetes mellitus with stage 1 chronic kidney disease, without long-term current use of insulin (H)  (primary encounter diagnosis)  Comment: doing very well.  Hgb A1C in range of a person without diabetes mellitus.   Plan: Hemoglobin A1c        Try stopping the metformin.  You may be able to get by without this medication as your Hgb A1C is low.    Check Hgb A1C in 3-4 months.  You can do a lab only visit or see me if any concerns.      (E78.5) Hyperlipidemia with target LDL less than 100  Comment: due for recheck  Plan: lovastatin (MEVACOR) 40 MG tablet        Fasting blood tests today.  REfills    (Z23) Need for prophylactic vaccination and inoculation against influenza  Comment:   Plan: FLU VACCINE, INCREASED ANTIGEN, PRESV FREE, AGE        65+ [[address]32], ADMIN INFLUENZA (For MEDICARE         Patients ONLY) []        Flu shot today.     You are due to see Dr. Neil for cancer follow-up.           Follow-ups after your visit        Who to contact     If you have questions or need follow up information about today's clinic visit or your schedule  "please contact [facility] directly at [phone].  Normal or non-critical lab and imaging results will be communicated to you by MyChart, letter or phone within 4 business days after the clinic has received the results. If you do not hear from us within 7 days, please contact the clinic through MyChart or phone. If you have a critical or abnormal lab result, we will notify you by phone as soon as possible.  Submit refill requests through [employer] or call your pharmacy and they will forward the refill request to us. Please allow 3 business days for your refill to be completed.          Additional Information About Your Visit        Care EveryWhere ID     This is your Care EveryWhere ID. This could be used by other organizations to access your [facility] medical records  [id number]        Your Vitals Were     Pulse Temperature Respirations Height BMI (Body Mass Index)       82 97.7  F (36.5  C) (Tympanic) 18 5' 7.5\" (1.715 m) 26.57 kg/m2        Blood Pressure from Last 3 Encounters:   11/29/17 138/80   07/03/17 125/74   04/28/17 152/87    Weight from Last 3 Encounters:   11/29/17 172 lb 3.2 oz (78.1 kg)   07/03/17 175 lb (79.4 kg)   04/28/17 178 lb 4.8 oz (80.9 kg)              We Performed the Following     ADMIN INFLUENZA (For MEDICARE Patients ONLY) []     FLU VACCINE, INCREASED ANTIGEN, PRESV FREE, AGE 65+ [[address]]     Hemoglobin A1c          Today's Medication Changes          These changes are accurate as of: 11/29/17  9:38 AM.  If you have any questions, ask your nurse or doctor.               These medicines have changed or have updated prescriptions.        Dose/Directions    lovastatin 40 MG tablet   Commonly known as:  MEVACOR   This may have changed:  See the new instructions.   Used for:  Hyperlipidemia with target LDL less than 100   Changed by:  Joe Mandel MD        TAKE ONE TABLET BY MOUTH NIGHTLY AT BEDTIME   Quantity:  90 tablet   Refills:  3         Stop taking these " medicines if you haven't already. Please contact your care team if you have questions.     metFORMIN 1000 MG tablet   Commonly known as:  GLUCOPHAGE   Stopped by:  Joe Mandel MD                Where to get your medicines      These medications were sent to [id number] - [place], [place] - [address]  [address] [address]    Hours:  Closed 10-16-08 business to [facility] Phone:  [phone]     lovastatin 40 MG tablet                Primary Care Provider Office Phone # Fax #    Joe Mandel [phone] [address] [facility] [address]        Equal Access to Services     : Hadii aad aldair hadasho Soomaali, waaxda luqadaha, qaybta kaalmada adeegyada, waxkyle gruber . So [facility] [phone].    ATENCIÓN: Si habla español, tiene a taylor disposición servicios gratuitos de asistencia lingüística. LlSt. [facility] [phone].    We comply with applicable federal civil rights laws and [place] laws. We do not discriminate on the basis of race, color, national origin, age, disability, sex, sexual orientation, or gender identity.            Thank you!     Thank you for choosing [facility]  for your care. Our goal is always to provide you with excellent care. Hearing back from our patients is one way we can continue to improve our services. Please take a few minutes to complete the written survey that you may receive in the mail after your visit with us. Thank you!             Your Updated Medication List - Protect others around you: Learn how to safely use, store and throw away your medicines at www.disposemymeds.org.          This list is accurate as of: 11/29/17  9:38 AM.  Always use your most recent med list.                   Brand Name Dispense Instructions for use Diagnosis    lisinopril 20 MG tablet    PRINIVIL/ZESTRIL    90 tablet    Take 1 tablet (20 mg) by mouth daily    Hypertension goal BP  (blood pressure) < 140/90       lovastatin 40 MG tablet    MEVACOR    90 tablet    TAKE ONE TABLET BY MOUTH NIGHTLY AT BEDTIME    Hyperlipidemia with target LDL less than 100       multivitamin  with lutein Caps per capsule      Take 1 capsule by mouth daily        vitamin D 1000 UNITS capsule      Take 1 capsule by mouth daily

## 2017-11-29 NOTE — PROGRESS NOTES
SUBJECTIVE:   Patrick Diop is a 81 year old male who presents to clinic today for the following health issues:  Chief Complaint   Patient presents with     Diabetes     Flu Shot      He has been feeling well.   Taking Vit D and MVI.   Tongue cancer has been doing well.   He does have decreased appetite.  Some lymphedema in neck.   Saw radiation oncology in September.  Hematology/Oncology in April.  Was advised 6 month follow-up.     Diabetes Follow-up      Patient is checking blood sugars: not at all     He has been taking 1/2 of a 1000mg metformin (500mg) twice daily.     Diabetic concerns: None     Symptoms of hypoglycemia (low blood sugar): none     Paresthesias (numbness or burning in feet) or sores: No     Date of last diabetic eye exam: 6/2017- macular degeneration stable    Hyperlipidemia Follow-Up      Rate your low fat/cholesterol diet?: limited because of radiation to throat- states eats soft foods    Taking statin?  Yes, no muscle aches from statin    Other lipid medications/supplements?:  none    Hypertension Follow-up      Outpatient blood pressures are being checked at home.  Results are usually less than 140/90.    Low Salt Diet: no added salt        Amount of exercise or physical activity: 6-7 days/week for an average of 15-30 minutes    Problems taking medications regularly: No    Medication side effects: none    Diet: soft foods because of hx of tongue cancer/radiation  Last clinic visit:7/3/2017  Medication or other changes at last visit:cut back on metformin to 500mg once daily.   Weight since last visit?   Wt Readings from Last 5 Encounters:   11/29/17 172 lb 3.2 oz (78.1 kg)   07/03/17 175 lb (79.4 kg)   04/28/17 178 lb 4.8 oz (80.9 kg)   11/28/16 169 lb (76.7 kg)   11/07/16 170 lb (77.1 kg)      History   Smoking Status     Former Smoker     Packs/day: 0.00     Years: 48.00     Types: Cigarettes     Quit date: 11/6/2001   Smokeless Tobacco     Never Used     Comment: started at age 17        Past medical history reviewed and updated    Patient Active Problem List    Diagnosis Date Noted     Tongue cancer (H) 10/28/2015     Priority: Medium     Neck mass found on 9/28/2015.  He had cancer at the base of his tongue.  Treated with radiation for 7 weeks and three chemo treatments.   ENT notes 4/4/2016:now three months out from completion of chemoradiation therapy for a T3, N2c right-sided tongue base tumor.  He had a PET scan done over the weekend, and it was essentially negative so he has had a complete response.    5/27/2016:Specialists treating his cancer; Dr. Hernandez, ENT.    Dr. Neil, Hematology/Oncology.   Dr. Paul, Oncology radiation.   He had lymphedema in neck as complication.       CKD (chronic kidney disease) stage 1, GFR 90 ml/min or greater 09/05/2014     Priority: Medium     9/5/2014:MAC positive twice.        Colon cancer (H) 03/14/2014     Priority: Medium     9/5/2014:2001 HAD SURGERY AND CURE-treated in [place].   Transverse colectomy.  He had chemo for 6 months.  Cancer treated in 2001.  Last colonoscopy 3 years ago.  He was told to recheck in 4 years-2015.         Type 2 diabetes mellitus with stage 1 chronic kidney disease (H) 11/06/2013     Priority: Medium     Diagnosed about 2008       Hyperlipidemia with target LDL less than 100 11/06/2013     Priority: Medium     Diagnosis updated by automated process. Provider to review and confirm.       Hypertension goal BP (blood pressure) < 140/90 11/06/2013     Priority: Medium     Current Outpatient Prescriptions   Medication Sig Dispense Refill     lovastatin (MEVACOR) 40 MG tablet TAKE ONE TABLET BY MOUTH NIGHTLY AT BEDTIME 90 tablet 0     lisinopril (PRINIVIL/ZESTRIL) 20 MG tablet Take 1 tablet (20 mg) by mouth daily 90 tablet 3     metFORMIN (GLUCOPHAGE) 1000 MG tablet Take 0.5 tablets (500 mg) by mouth daily (with dinner) 45 tablet 3     multivitamin  with lutein (OCUVITE WITH LTEIN) CAPS Take 1 capsule by mouth daily        "Cholecalciferol (VITAMIN D) 1000 UNITS capsule Take 1 capsule by mouth daily       ROS:General: POSITIVE for:, decreased appetite.  Energy OK.   Resp: No coughing, wheezing or shortness of breath  CV: POSITIVE for:, chest pain, at night sometimes which has been positional.  No exertional chest pains.   GI: No nausea, vomiting,  heartburn, abdominal pain, diarrhea, constipation or change in bowel habits  : No urinary frequency or dysuria, bladder or kidney problems  Musculoskeletal: No significant muscle or joint pains  Psychiatric: No problems with anxiety, depression or mental health    OBJECTIVE:Blood pressure 138/80, pulse 82, temperature 97.7  F (36.5  C), temperature source Tympanic, resp. rate 18, height 5' 7.5\" (1.715 m), weight 172 lb 3.2 oz (78.1 kg). BMI=Body mass index is 26.57 kg/(m^2).  GENERAL APPEARANCE ADULT: Alert, no acute distress  HENT: oral mucous membranes moist, oropharynx clear, edentulous, one broken tooth remains on mandible  NECK: No adenopathy,masses or thyromegaly, fullness anterior neck without adenopathy or masses.   RESP: lungs clear to auscultation   CV: normal rate, regular rhythm, no murmur or gallop  ABDOMEN: soft, no organomegaly, masses or tenderness  MS: 1+ edema left leg, no edema right leg.    Foot exam:not done    ASSESSMENT/PLAN:                                                    Lifestyle recommendations:regular exercise, at least 150 minutes per week (average 30 minutes 5 times a week)  good job on losing weight!  Diabetic recommendations:-return for follow-up visit in 3-4 month(s)    (E11.22,  N18.1) Type 2 diabetes mellitus with stage 1 chronic kidney disease, without long-term current use of insulin (H)  (primary encounter diagnosis)  Comment: doing very well.  Hgb A1C in range of a person without diabetes mellitus.   Plan: Hemoglobin A1c        Try stopping the metformin.  You may be able to get by without this medication as your Hgb A1C is low.    Check Hgb A1C in " 3-4 months.  You can do a lab only visit or see me if any concerns.      (E78.5) Hyperlipidemia with target LDL less than 100  Comment: due for recheck  Plan: lovastatin (MEVACOR) 40 MG tablet        Fasting blood tests today.  REfills    (Z23) Need for prophylactic vaccination and inoculation against influenza  Comment:   Plan: FLU VACCINE, INCREASED ANTIGEN, PRESV FREE, AGE        65+ [67773], ADMIN INFLUENZA (For MEDICARE         Patients ONLY) []        Flu shot today.     You are due to see Dr. Neil for cancer follow-up.        Diabetes Type II, A1c Controlled, non-insulin dependent   Associated with the following complications    Renal Complications:  CKD       ==================================================  Injectable Influenza Immunization Documentation    1.  Is the person to be vaccinated sick today?   No    2. Does the person to be vaccinated have an allergy to a component   of the vaccine?   No  Egg Allergy Algorithm Link    3. Has the person to be vaccinated ever had a serious reaction   to influenza vaccine in the past?   No    4. Has the person to be vaccinated ever had Guillain-Barré syndrome?   No    Form completed by France Gonzales CMA  Prior to injection verified patient identity using patient's name and date of birth.  Per orders of Dr. Mandel, injection of flu given by France Gonzales CMA. Patient instructed to remain in clinic for 15 minutes afterwards, and to report any adverse reaction to me immediately.

## 2017-11-29 NOTE — PATIENT INSTRUCTIONS
ASSESSMENT/PLAN:                                                    Lifestyle recommendations:regular exercise, at least 150 minutes per week (average 30 minutes 5 times a week)  good job on losing weight!  Diabetic recommendations:-return for follow-up visit in 3-4 month(s)    (E11.22,  N18.1) Type 2 diabetes mellitus with stage 1 chronic kidney disease, without long-term current use of insulin (H)  (primary encounter diagnosis)  Comment: doing very well.  Hgb A1C in range of a person without diabetes mellitus.   Plan: Hemoglobin A1c        Try stopping the metformin.  You may be able to get by without this medication as your Hgb A1C is low.    Check Hgb A1C in 3-4 months.  You can do a lab only visit or see me if any concerns.      (E78.5) Hyperlipidemia with target LDL less than 100  Comment: due for recheck  Plan: lovastatin (MEVACOR) 40 MG tablet        Fasting blood tests today.  REfills    (Z23) Need for prophylactic vaccination and inoculation against influenza  Comment:   Plan: FLU VACCINE, INCREASED ANTIGEN, PRESV FREE, AGE        65+ [97444], ADMIN INFLUENZA (For MEDICARE         Patients ONLY) []        Flu shot today.     You are due to see Dr. Neil for cancer follow-up.

## 2017-12-04 DIAGNOSIS — N18.1 TYPE 2 DIABETES MELLITUS WITH STAGE 1 CHRONIC KIDNEY DISEASE, WITHOUT LONG-TERM CURRENT USE OF INSULIN (H): Primary | ICD-10-CM

## 2017-12-04 DIAGNOSIS — E11.22 TYPE 2 DIABETES MELLITUS WITH STAGE 1 CHRONIC KIDNEY DISEASE, WITHOUT LONG-TERM CURRENT USE OF INSULIN (H): Primary | ICD-10-CM

## 2018-01-01 ENCOUNTER — INFUSION THERAPY VISIT (OUTPATIENT)
Dept: INFUSION THERAPY | Facility: CLINIC | Age: 83
End: 2018-01-01
Attending: INTERNAL MEDICINE
Payer: MEDICARE

## 2018-01-01 ENCOUNTER — HOSPITAL ENCOUNTER (OUTPATIENT)
Dept: LAB | Facility: CLINIC | Age: 83
Discharge: HOME | End: 2018-09-13
Attending: INTERNAL MEDICINE | Admitting: INTERNAL MEDICINE
Payer: MEDICARE

## 2018-01-01 ENCOUNTER — HOSPITAL ENCOUNTER (OUTPATIENT)
Dept: LAB | Facility: CLINIC | Age: 83
Discharge: HOME | End: 2018-10-04
Attending: INTERNAL MEDICINE | Admitting: INTERNAL MEDICINE
Payer: MEDICARE

## 2018-01-01 ENCOUNTER — ONCOLOGY VISIT (OUTPATIENT)
Dept: ONCOLOGY | Facility: CLINIC | Age: 83
End: 2018-01-01
Attending: INTERNAL MEDICINE
Payer: MEDICARE

## 2018-01-01 ENCOUNTER — HOSPITAL ENCOUNTER (OUTPATIENT)
Dept: CT IMAGING | Facility: CLINIC | Age: 83
Discharge: HOME | End: 2018-07-30
Attending: INTERNAL MEDICINE | Admitting: INTERNAL MEDICINE
Payer: MEDICARE

## 2018-01-01 ENCOUNTER — ONCOLOGY VISIT (OUTPATIENT)
Dept: ONCOLOGY | Facility: CLINIC | Age: 83
End: 2018-01-01
Attending: INTERNAL MEDICINE
Payer: COMMERCIAL

## 2018-01-01 ENCOUNTER — HOSPITAL ENCOUNTER (OUTPATIENT)
Dept: CARDIOLOGY | Facility: CLINIC | Age: 83
Discharge: HOME | End: 2018-12-26
Attending: INTERNAL MEDICINE | Admitting: INTERNAL MEDICINE
Payer: MEDICARE

## 2018-01-01 ENCOUNTER — OFFICE VISIT (OUTPATIENT)
Dept: FAMILY MEDICINE | Facility: CLINIC | Age: 83
End: 2018-01-01
Payer: COMMERCIAL

## 2018-01-01 ENCOUNTER — HOSPITAL ENCOUNTER (OUTPATIENT)
Dept: CT IMAGING | Facility: CLINIC | Age: 83
Discharge: HOME | End: 2018-11-13
Attending: INTERNAL MEDICINE | Admitting: INTERNAL MEDICINE
Payer: MEDICARE

## 2018-01-01 ENCOUNTER — HOSPITAL ENCOUNTER (OUTPATIENT)
Dept: CARDIOLOGY | Facility: CLINIC | Age: 83
Discharge: HOME | End: 2018-12-11
Attending: INTERNAL MEDICINE | Admitting: INTERNAL MEDICINE
Payer: MEDICARE

## 2018-01-01 ENCOUNTER — HOSPITAL ENCOUNTER (OUTPATIENT)
Dept: CARDIOLOGY | Facility: CLINIC | Age: 83
Discharge: HOME | End: 2018-12-17
Attending: INTERNAL MEDICINE | Admitting: INTERNAL MEDICINE
Payer: MEDICARE

## 2018-01-01 ENCOUNTER — HOSPITAL ENCOUNTER (OUTPATIENT)
Dept: LAB | Facility: CLINIC | Age: 83
Discharge: HOME | End: 2018-07-11
Attending: INTERNAL MEDICINE | Admitting: INTERNAL MEDICINE
Payer: MEDICARE

## 2018-01-01 ENCOUNTER — CARE COORDINATION (OUTPATIENT)
Dept: CARDIOLOGY | Facility: CLINIC | Age: 83
End: 2018-01-01

## 2018-01-01 ENCOUNTER — HOSPITAL ENCOUNTER (OUTPATIENT)
Dept: LAB | Facility: CLINIC | Age: 83
Discharge: HOME | End: 2018-10-25
Attending: INTERNAL MEDICINE | Admitting: INTERNAL MEDICINE
Payer: MEDICARE

## 2018-01-01 ENCOUNTER — OFFICE VISIT (OUTPATIENT)
Dept: CARDIOLOGY | Facility: CLINIC | Age: 83
End: 2018-01-01
Payer: COMMERCIAL

## 2018-01-01 ENCOUNTER — HOSPITAL ENCOUNTER (OUTPATIENT)
Dept: LAB | Facility: CLINIC | Age: 83
Discharge: HOME | End: 2018-08-23
Attending: INTERNAL MEDICINE | Admitting: INTERNAL MEDICINE
Payer: MEDICARE

## 2018-01-01 ENCOUNTER — HOSPITAL ENCOUNTER (OUTPATIENT)
Dept: LAB | Facility: CLINIC | Age: 83
Discharge: HOME | End: 2018-11-15
Attending: INTERNAL MEDICINE | Admitting: INTERNAL MEDICINE
Payer: MEDICARE

## 2018-01-01 ENCOUNTER — HOSPITAL ENCOUNTER (OUTPATIENT)
Dept: LAB | Facility: CLINIC | Age: 83
Discharge: HOME | End: 2018-08-01
Attending: INTERNAL MEDICINE | Admitting: INTERNAL MEDICINE
Payer: MEDICARE

## 2018-01-01 VITALS
WEIGHT: 180.8 LBS | BODY MASS INDEX: 27.92 KG/M2 | SYSTOLIC BLOOD PRESSURE: 124 MMHG | DIASTOLIC BLOOD PRESSURE: 88 MMHG | OXYGEN SATURATION: 93 % | TEMPERATURE: 96.9 F | HEART RATE: 96 BPM

## 2018-01-01 VITALS
BODY MASS INDEX: 27.42 KG/M2 | OXYGEN SATURATION: 93 % | WEIGHT: 177.6 LBS | TEMPERATURE: 97.5 F | DIASTOLIC BLOOD PRESSURE: 74 MMHG | RESPIRATION RATE: 20 BRPM | SYSTOLIC BLOOD PRESSURE: 110 MMHG | HEART RATE: 98 BPM

## 2018-01-01 VITALS
RESPIRATION RATE: 18 BRPM | WEIGHT: 183.1 LBS | DIASTOLIC BLOOD PRESSURE: 77 MMHG | TEMPERATURE: 97.5 F | HEIGHT: 67 IN | OXYGEN SATURATION: 91 % | BODY MASS INDEX: 28.74 KG/M2 | SYSTOLIC BLOOD PRESSURE: 115 MMHG | HEART RATE: 100 BPM

## 2018-01-01 VITALS
DIASTOLIC BLOOD PRESSURE: 71 MMHG | HEART RATE: 86 BPM | TEMPERATURE: 97.7 F | HEART RATE: 91 BPM | BODY MASS INDEX: 25.71 KG/M2 | RESPIRATION RATE: 16 BRPM | DIASTOLIC BLOOD PRESSURE: 76 MMHG | WEIGHT: 163.8 LBS | HEIGHT: 67 IN | SYSTOLIC BLOOD PRESSURE: 95 MMHG | OXYGEN SATURATION: 95 % | SYSTOLIC BLOOD PRESSURE: 139 MMHG

## 2018-01-01 VITALS
SYSTOLIC BLOOD PRESSURE: 110 MMHG | OXYGEN SATURATION: 94 % | HEART RATE: 105 BPM | WEIGHT: 176.8 LBS | BODY MASS INDEX: 27.3 KG/M2 | DIASTOLIC BLOOD PRESSURE: 79 MMHG

## 2018-01-01 VITALS
HEIGHT: 67 IN | WEIGHT: 178.6 LBS | HEART RATE: 90 BPM | TEMPERATURE: 98.1 F | DIASTOLIC BLOOD PRESSURE: 89 MMHG | OXYGEN SATURATION: 91 % | RESPIRATION RATE: 16 BRPM | BODY MASS INDEX: 28.03 KG/M2 | SYSTOLIC BLOOD PRESSURE: 142 MMHG

## 2018-01-01 VITALS — HEART RATE: 107 BPM | DIASTOLIC BLOOD PRESSURE: 84 MMHG | SYSTOLIC BLOOD PRESSURE: 132 MMHG

## 2018-01-01 VITALS — HEART RATE: 95 BPM | DIASTOLIC BLOOD PRESSURE: 78 MMHG | SYSTOLIC BLOOD PRESSURE: 120 MMHG

## 2018-01-01 VITALS
RESPIRATION RATE: 18 BRPM | BODY MASS INDEX: 28.17 KG/M2 | HEIGHT: 67 IN | DIASTOLIC BLOOD PRESSURE: 85 MMHG | TEMPERATURE: 97.7 F | HEART RATE: 92 BPM | WEIGHT: 179.5 LBS | OXYGEN SATURATION: 91 % | SYSTOLIC BLOOD PRESSURE: 121 MMHG

## 2018-01-01 VITALS
RESPIRATION RATE: 20 BRPM | DIASTOLIC BLOOD PRESSURE: 76 MMHG | HEIGHT: 67 IN | WEIGHT: 179.2 LBS | HEART RATE: 91 BPM | OXYGEN SATURATION: 93 % | TEMPERATURE: 97.9 F | BODY MASS INDEX: 28.12 KG/M2 | SYSTOLIC BLOOD PRESSURE: 139 MMHG

## 2018-01-01 VITALS
WEIGHT: 182.7 LBS | TEMPERATURE: 97.9 F | SYSTOLIC BLOOD PRESSURE: 132 MMHG | BODY MASS INDEX: 28.67 KG/M2 | DIASTOLIC BLOOD PRESSURE: 79 MMHG | RESPIRATION RATE: 16 BRPM | HEART RATE: 102 BPM | HEIGHT: 67 IN | OXYGEN SATURATION: 91 %

## 2018-01-01 VITALS
TEMPERATURE: 98.5 F | HEIGHT: 67 IN | DIASTOLIC BLOOD PRESSURE: 93 MMHG | BODY MASS INDEX: 27.29 KG/M2 | RESPIRATION RATE: 18 BRPM | WEIGHT: 173.9 LBS | HEART RATE: 105 BPM | OXYGEN SATURATION: 91 % | SYSTOLIC BLOOD PRESSURE: 144 MMHG

## 2018-01-01 VITALS
OXYGEN SATURATION: 96 % | SYSTOLIC BLOOD PRESSURE: 122 MMHG | HEIGHT: 67 IN | WEIGHT: 172.5 LBS | HEART RATE: 82 BPM | RESPIRATION RATE: 16 BRPM | TEMPERATURE: 98.1 F | BODY MASS INDEX: 27.07 KG/M2 | DIASTOLIC BLOOD PRESSURE: 75 MMHG

## 2018-01-01 VITALS — DIASTOLIC BLOOD PRESSURE: 85 MMHG | SYSTOLIC BLOOD PRESSURE: 130 MMHG | HEART RATE: 88 BPM

## 2018-01-01 VITALS — DIASTOLIC BLOOD PRESSURE: 75 MMHG | HEART RATE: 96 BPM | SYSTOLIC BLOOD PRESSURE: 113 MMHG

## 2018-01-01 DIAGNOSIS — C02.9 RECURRENT TONGUE CANCER (H): Primary | ICD-10-CM

## 2018-01-01 DIAGNOSIS — C18.9 MALIGNANT NEOPLASM OF COLON, UNSPECIFIED PART OF COLON (H): ICD-10-CM

## 2018-01-01 DIAGNOSIS — E78.5 HYPERLIPIDEMIA WITH TARGET LDL LESS THAN 100: ICD-10-CM

## 2018-01-01 DIAGNOSIS — E03.9 ACQUIRED HYPOTHYROIDISM: ICD-10-CM

## 2018-01-01 DIAGNOSIS — I42.9 SECONDARY CARDIOMYOPATHY (H): ICD-10-CM

## 2018-01-01 DIAGNOSIS — I10 HYPERTENSION GOAL BP (BLOOD PRESSURE) < 140/90: ICD-10-CM

## 2018-01-01 DIAGNOSIS — T45.1X5A CHEMOTHERAPY INDUCED NAUSEA AND VOMITING: ICD-10-CM

## 2018-01-01 DIAGNOSIS — N18.1 CKD (CHRONIC KIDNEY DISEASE) STAGE 1, GFR 90 ML/MIN OR GREATER: ICD-10-CM

## 2018-01-01 DIAGNOSIS — E83.42 HYPOMAGNESEMIA: ICD-10-CM

## 2018-01-01 DIAGNOSIS — I50.23 ACUTE ON CHRONIC SYSTOLIC CONGESTIVE HEART FAILURE (H): ICD-10-CM

## 2018-01-01 DIAGNOSIS — E11.22 TYPE 2 DIABETES MELLITUS WITH STAGE 1 CHRONIC KIDNEY DISEASE, WITHOUT LONG-TERM CURRENT USE OF INSULIN (H): ICD-10-CM

## 2018-01-01 DIAGNOSIS — I42.9 SECONDARY CARDIOMYOPATHY (H): Primary | ICD-10-CM

## 2018-01-01 DIAGNOSIS — N18.30 CKD (CHRONIC KIDNEY DISEASE) STAGE 3, GFR 30-59 ML/MIN (H): ICD-10-CM

## 2018-01-01 DIAGNOSIS — E03.9 ACQUIRED HYPOTHYROIDISM: Primary | ICD-10-CM

## 2018-01-01 DIAGNOSIS — C02.9 RECURRENT TONGUE CANCER (H): ICD-10-CM

## 2018-01-01 DIAGNOSIS — N18.1 TYPE 2 DIABETES MELLITUS WITH STAGE 1 CHRONIC KIDNEY DISEASE, WITHOUT LONG-TERM CURRENT USE OF INSULIN (H): ICD-10-CM

## 2018-01-01 DIAGNOSIS — D75.89 MACROCYTOSIS WITHOUT ANEMIA: ICD-10-CM

## 2018-01-01 DIAGNOSIS — I50.23 ACUTE ON CHRONIC SYSTOLIC HEART FAILURE (H): Primary | ICD-10-CM

## 2018-01-01 DIAGNOSIS — I50.32 CHRONIC DIASTOLIC CONGESTIVE HEART FAILURE (H): ICD-10-CM

## 2018-01-01 DIAGNOSIS — R11.2 CHEMOTHERAPY INDUCED NAUSEA AND VOMITING: ICD-10-CM

## 2018-01-01 DIAGNOSIS — E11.22 TYPE 2 DIABETES MELLITUS WITH STAGE 1 CHRONIC KIDNEY DISEASE, WITHOUT LONG-TERM CURRENT USE OF INSULIN (H): Primary | ICD-10-CM

## 2018-01-01 DIAGNOSIS — Z23 NEED FOR PROPHYLACTIC VACCINATION AND INOCULATION AGAINST INFLUENZA: ICD-10-CM

## 2018-01-01 DIAGNOSIS — C18.9 MALIGNANT NEOPLASM OF COLON, UNSPECIFIED PART OF COLON (H): Primary | ICD-10-CM

## 2018-01-01 DIAGNOSIS — N18.1 TYPE 2 DIABETES MELLITUS WITH STAGE 1 CHRONIC KIDNEY DISEASE, WITHOUT LONG-TERM CURRENT USE OF INSULIN (H): Primary | ICD-10-CM

## 2018-01-01 DIAGNOSIS — J90 PLEURAL EFFUSION: ICD-10-CM

## 2018-01-01 DIAGNOSIS — R60.0 PERIPHERAL EDEMA: ICD-10-CM

## 2018-01-01 LAB
ALBUMIN SERPL-MCNC: 3.6 G/DL (ref 3.4–5)
ALBUMIN SERPL-MCNC: 3.6 G/DL (ref 3.4–5)
ALBUMIN SERPL-MCNC: 3.7 G/DL (ref 3.4–5)
ALBUMIN SERPL-MCNC: 3.8 G/DL (ref 3.4–5)
ALBUMIN SERPL-MCNC: 3.9 G/DL (ref 3.4–5)
ALP SERPL-CCNC: 74 U/L (ref 40–150)
ALP SERPL-CCNC: 74 U/L (ref 40–150)
ALP SERPL-CCNC: 80 U/L (ref 40–150)
ALP SERPL-CCNC: 82 U/L (ref 40–150)
ALP SERPL-CCNC: 82 U/L (ref 40–150)
ALP SERPL-CCNC: 85 U/L (ref 40–150)
ALP SERPL-CCNC: 97 U/L (ref 40–150)
ALT SERPL W P-5'-P-CCNC: 10 U/L (ref 0–70)
ALT SERPL W P-5'-P-CCNC: 10 U/L (ref 0–70)
ALT SERPL W P-5'-P-CCNC: 15 U/L (ref 0–70)
ALT SERPL W P-5'-P-CCNC: 16 U/L (ref 0–70)
ALT SERPL W P-5'-P-CCNC: 17 U/L (ref 0–70)
ALT SERPL W P-5'-P-CCNC: 18 U/L (ref 0–70)
ALT SERPL W P-5'-P-CCNC: 21 U/L (ref 0–70)
ANION GAP SERPL CALCULATED.3IONS-SCNC: 11 MMOL/L (ref 3–14)
ANION GAP SERPL CALCULATED.3IONS-SCNC: 6 MMOL/L (ref 3–14)
ANION GAP SERPL CALCULATED.3IONS-SCNC: 7 MMOL/L (ref 3–14)
ANION GAP SERPL CALCULATED.3IONS-SCNC: 8 MMOL/L (ref 3–14)
ANION GAP SERPL CALCULATED.3IONS-SCNC: 9 MMOL/L (ref 3–14)
ANION GAP SERPL CALCULATED.3IONS-SCNC: 9 MMOL/L (ref 3–14)
AST SERPL W P-5'-P-CCNC: 10 U/L (ref 0–45)
AST SERPL W P-5'-P-CCNC: 10 U/L (ref 0–45)
AST SERPL W P-5'-P-CCNC: 12 U/L (ref 0–45)
AST SERPL W P-5'-P-CCNC: 13 U/L (ref 0–45)
AST SERPL W P-5'-P-CCNC: 15 U/L (ref 0–45)
AST SERPL W P-5'-P-CCNC: 16 U/L (ref 0–45)
AST SERPL W P-5'-P-CCNC: 16 U/L (ref 0–45)
BASOPHILS # BLD AUTO: 0.1 10E9/L (ref 0–0.2)
BASOPHILS NFR BLD AUTO: 1.2 %
BILIRUB SERPL-MCNC: 0.7 MG/DL (ref 0.2–1.3)
BILIRUB SERPL-MCNC: 0.8 MG/DL (ref 0.2–1.3)
BILIRUB SERPL-MCNC: 0.9 MG/DL (ref 0.2–1.3)
BILIRUB SERPL-MCNC: 1 MG/DL (ref 0.2–1.3)
BILIRUB SERPL-MCNC: 1 MG/DL (ref 0.2–1.3)
BUN SERPL-MCNC: 11 MG/DL (ref 7–30)
BUN SERPL-MCNC: 12 MG/DL (ref 7–30)
BUN SERPL-MCNC: 14 MG/DL (ref 7–30)
BUN SERPL-MCNC: 15 MG/DL (ref 7–30)
BUN SERPL-MCNC: 16 MG/DL (ref 7–30)
BUN SERPL-MCNC: 18 MG/DL (ref 7–30)
BUN SERPL-MCNC: 20 MG/DL (ref 7–30)
BUN SERPL-MCNC: 22 MG/DL (ref 7–30)
BUN SERPL-MCNC: 33 MG/DL (ref 7–30)
CALCIUM SERPL-MCNC: 8.6 MG/DL (ref 8.5–10.1)
CALCIUM SERPL-MCNC: 8.8 MG/DL (ref 8.5–10.1)
CALCIUM SERPL-MCNC: 8.9 MG/DL (ref 8.5–10.1)
CALCIUM SERPL-MCNC: 8.9 MG/DL (ref 8.5–10.1)
CALCIUM SERPL-MCNC: 9 MG/DL (ref 8.5–10.1)
CALCIUM SERPL-MCNC: 9.1 MG/DL (ref 8.5–10.1)
CALCIUM SERPL-MCNC: 9.1 MG/DL (ref 8.5–10.1)
CALCIUM SERPL-MCNC: 9.3 MG/DL (ref 8.5–10.1)
CALCIUM SERPL-MCNC: 9.3 MG/DL (ref 8.5–10.1)
CHLORIDE SERPL-SCNC: 103 MMOL/L (ref 94–109)
CHLORIDE SERPL-SCNC: 104 MMOL/L (ref 94–109)
CHLORIDE SERPL-SCNC: 105 MMOL/L (ref 94–109)
CHLORIDE SERPL-SCNC: 106 MMOL/L (ref 94–109)
CHLORIDE SERPL-SCNC: 107 MMOL/L (ref 94–109)
CHLORIDE SERPL-SCNC: 107 MMOL/L (ref 94–109)
CHLORIDE SERPL-SCNC: 110 MMOL/L (ref 94–109)
CHLORIDE SERPL-SCNC: 110 MMOL/L (ref 94–109)
CHLORIDE SERPL-SCNC: 97 MMOL/L (ref 94–109)
CO2 SERPL-SCNC: 26 MMOL/L (ref 20–32)
CO2 SERPL-SCNC: 27 MMOL/L (ref 20–32)
CO2 SERPL-SCNC: 27 MMOL/L (ref 20–32)
CO2 SERPL-SCNC: 28 MMOL/L (ref 20–32)
CREAT SERPL-MCNC: 1.03 MG/DL (ref 0.66–1.25)
CREAT SERPL-MCNC: 1.09 MG/DL (ref 0.66–1.25)
CREAT SERPL-MCNC: 1.12 MG/DL (ref 0.66–1.25)
CREAT SERPL-MCNC: 1.19 MG/DL (ref 0.66–1.25)
CREAT SERPL-MCNC: 1.2 MG/DL (ref 0.66–1.25)
CREAT SERPL-MCNC: 1.22 MG/DL (ref 0.66–1.25)
CREAT SERPL-MCNC: 1.3 MG/DL (ref 0.66–1.25)
CREAT SERPL-MCNC: 1.31 MG/DL (ref 0.66–1.25)
CREAT SERPL-MCNC: 1.53 MG/DL (ref 0.66–1.25)
CREAT UR-MCNC: 135 MG/DL
DIFFERENTIAL METHOD BLD: ABNORMAL
EOSINOPHIL # BLD AUTO: 0.1 10E9/L (ref 0–0.7)
EOSINOPHIL NFR BLD AUTO: 2.1 %
ERYTHROCYTE [DISTWIDTH] IN BLOOD BY AUTOMATED COUNT: 14.7 % (ref 10–15)
GFR SERPL CREATININE-BSD FRML MDRD: 42 ML/MIN/{1.73_M2}
GFR SERPL CREATININE-BSD FRML MDRD: 53 ML/MIN/1.7M2
GFR SERPL CREATININE-BSD FRML MDRD: 53 ML/MIN/1.7M2
GFR SERPL CREATININE-BSD FRML MDRD: 57 ML/MIN/1.7M2
GFR SERPL CREATININE-BSD FRML MDRD: 58 ML/MIN/1.7M2
GFR SERPL CREATININE-BSD FRML MDRD: 59 ML/MIN/1.7M2
GFR SERPL CREATININE-BSD FRML MDRD: 63 ML/MIN/1.7M2
GFR SERPL CREATININE-BSD FRML MDRD: 65 ML/MIN/1.7M2
GFR SERPL CREATININE-BSD FRML MDRD: 69 ML/MIN/1.7M2
GLUCOSE SERPL-MCNC: 101 MG/DL (ref 70–99)
GLUCOSE SERPL-MCNC: 104 MG/DL (ref 70–99)
GLUCOSE SERPL-MCNC: 105 MG/DL (ref 70–99)
GLUCOSE SERPL-MCNC: 108 MG/DL (ref 70–99)
GLUCOSE SERPL-MCNC: 112 MG/DL (ref 70–99)
GLUCOSE SERPL-MCNC: 120 MG/DL (ref 70–99)
GLUCOSE SERPL-MCNC: 89 MG/DL (ref 70–99)
GLUCOSE SERPL-MCNC: 97 MG/DL (ref 70–99)
GLUCOSE SERPL-MCNC: 98 MG/DL (ref 70–99)
HBA1C MFR BLD: 5.6 % (ref 0–5.6)
HCT VFR BLD AUTO: 39.7 % (ref 40–53)
HGB BLD-MCNC: 13.4 G/DL (ref 13.3–17.7)
LYMPHOCYTES # BLD AUTO: 0.7 10E9/L (ref 0.8–5.3)
LYMPHOCYTES NFR BLD AUTO: 11.2 %
MCH RBC QN AUTO: 34.6 PG (ref 26.5–33)
MCHC RBC AUTO-ENTMCNC: 33.8 G/DL (ref 31.5–36.5)
MCV RBC AUTO: 103 FL (ref 78–100)
MICROALBUMIN UR-MCNC: 370 MG/L
MICROALBUMIN/CREAT UR: 274.07 MG/G CR (ref 0–17)
MONOCYTES # BLD AUTO: 0.7 10E9/L (ref 0–1.3)
MONOCYTES NFR BLD AUTO: 10.1 %
NEUTROPHILS # BLD AUTO: 5 10E9/L (ref 1.6–8.3)
NEUTROPHILS NFR BLD AUTO: 75.4 %
NT-PROBNP SERPL-MCNC: ABNORMAL PG/ML (ref 0–450)
PLATELET # BLD AUTO: 148 10E9/L (ref 150–450)
POTASSIUM SERPL-SCNC: 3.9 MMOL/L (ref 3.4–5.3)
POTASSIUM SERPL-SCNC: 3.9 MMOL/L (ref 3.4–5.3)
POTASSIUM SERPL-SCNC: 4 MMOL/L (ref 3.4–5.3)
POTASSIUM SERPL-SCNC: 4.1 MMOL/L (ref 3.4–5.3)
POTASSIUM SERPL-SCNC: 4.1 MMOL/L (ref 3.4–5.3)
POTASSIUM SERPL-SCNC: 4.2 MMOL/L (ref 3.4–5.3)
POTASSIUM SERPL-SCNC: 4.6 MMOL/L (ref 3.4–5.3)
POTASSIUM SERPL-SCNC: 4.7 MMOL/L (ref 3.4–5.3)
POTASSIUM SERPL-SCNC: 4.8 MMOL/L (ref 3.4–5.3)
PROT SERPL-MCNC: 7.4 G/DL (ref 6.8–8.8)
PROT SERPL-MCNC: 7.7 G/DL (ref 6.8–8.8)
PROT SERPL-MCNC: 7.8 G/DL (ref 6.8–8.8)
PROT SERPL-MCNC: 7.9 G/DL (ref 6.8–8.8)
PROT SERPL-MCNC: 8 G/DL (ref 6.8–8.8)
RBC # BLD AUTO: 3.87 10E12/L (ref 4.4–5.9)
SODIUM SERPL-SCNC: 132 MMOL/L (ref 133–144)
SODIUM SERPL-SCNC: 140 MMOL/L (ref 133–144)
SODIUM SERPL-SCNC: 141 MMOL/L (ref 133–144)
SODIUM SERPL-SCNC: 141 MMOL/L (ref 133–144)
SODIUM SERPL-SCNC: 142 MMOL/L (ref 133–144)
SODIUM SERPL-SCNC: 145 MMOL/L (ref 133–144)
T4 FREE SERPL-MCNC: 0.64 NG/DL (ref 0.76–1.46)
T4 FREE SERPL-MCNC: 0.75 NG/DL (ref 0.76–1.46)
T4 FREE SERPL-MCNC: 0.76 NG/DL (ref 0.76–1.46)
T4 FREE SERPL-MCNC: 0.79 NG/DL (ref 0.76–1.46)
T4 FREE SERPL-MCNC: 1.28 NG/DL (ref 0.76–1.46)
TSH SERPL DL<=0.005 MIU/L-ACNC: 10.75 MU/L (ref 0.4–4)
TSH SERPL DL<=0.005 MIU/L-ACNC: 11.37 MU/L (ref 0.4–4)
TSH SERPL DL<=0.005 MIU/L-ACNC: 2.18 MU/L (ref 0.4–4)
TSH SERPL DL<=0.005 MIU/L-ACNC: 22.12 MU/L (ref 0.4–4)
TSH SERPL DL<=0.005 MIU/L-ACNC: 4.1 MU/L (ref 0.4–4)
TSH SERPL DL<=0.005 MIU/L-ACNC: 6.01 MU/L (ref 0.4–4)
WBC # BLD AUTO: 6.6 10E9/L (ref 4–11)

## 2018-01-01 PROCEDURE — 25000128 H RX IP 250 OP 636: Performed by: INTERNAL MEDICINE

## 2018-01-01 PROCEDURE — 80053 COMPREHEN METABOLIC PANEL: CPT | Performed by: INTERNAL MEDICINE

## 2018-01-01 PROCEDURE — 84439 ASSAY OF FREE THYROXINE: CPT | Performed by: INTERNAL MEDICINE

## 2018-01-01 PROCEDURE — 36415 COLL VENOUS BLD VENIPUNCTURE: CPT | Performed by: INTERNAL MEDICINE

## 2018-01-01 PROCEDURE — 84443 ASSAY THYROID STIM HORMONE: CPT | Performed by: INTERNAL MEDICINE

## 2018-01-01 PROCEDURE — 99214 OFFICE O/P EST MOD 30 MIN: CPT | Performed by: INTERNAL MEDICINE

## 2018-01-01 PROCEDURE — 90662 IIV NO PRSV INCREASED AG IM: CPT | Performed by: FAMILY MEDICINE

## 2018-01-01 PROCEDURE — 96413 CHEMO IV INFUSION 1 HR: CPT

## 2018-01-01 PROCEDURE — 25500064 ZZH RX 255 OP 636: Performed by: INTERNAL MEDICINE

## 2018-01-01 PROCEDURE — 99214 OFFICE O/P EST MOD 30 MIN: CPT | Performed by: FAMILY MEDICINE

## 2018-01-01 PROCEDURE — G0463 HOSPITAL OUTPT CLINIC VISIT: HCPCS | Mod: 25

## 2018-01-01 PROCEDURE — 25000125 ZZHC RX 250: Performed by: RADIOLOGY

## 2018-01-01 PROCEDURE — 99207 C FOOT EXAM  NO CHARGE: CPT | Performed by: FAMILY MEDICINE

## 2018-01-01 PROCEDURE — 85025 COMPLETE CBC W/AUTO DIFF WBC: CPT | Performed by: INTERNAL MEDICINE

## 2018-01-01 PROCEDURE — 36415 COLL VENOUS BLD VENIPUNCTURE: CPT | Performed by: FAMILY MEDICINE

## 2018-01-01 PROCEDURE — 93225 XTRNL ECG REC<48 HRS REC: CPT

## 2018-01-01 PROCEDURE — 71260 CT THORAX DX C+: CPT

## 2018-01-01 PROCEDURE — 99205 OFFICE O/P NEW HI 60 MIN: CPT | Mod: 25 | Performed by: INTERNAL MEDICINE

## 2018-01-01 PROCEDURE — 93227 XTRNL ECG REC<48 HR R&I: CPT | Performed by: INTERNAL MEDICINE

## 2018-01-01 PROCEDURE — G0008 ADMIN INFLUENZA VIRUS VAC: HCPCS | Performed by: FAMILY MEDICINE

## 2018-01-01 PROCEDURE — 25000128 H RX IP 250 OP 636: Performed by: RADIOLOGY

## 2018-01-01 PROCEDURE — 83036 HEMOGLOBIN GLYCOSYLATED A1C: CPT | Performed by: FAMILY MEDICINE

## 2018-01-01 PROCEDURE — G0463 HOSPITAL OUTPT CLINIC VISIT: HCPCS

## 2018-01-01 PROCEDURE — 93005 ELECTROCARDIOGRAM TRACING: CPT

## 2018-01-01 PROCEDURE — 99214 OFFICE O/P EST MOD 30 MIN: CPT | Mod: 25 | Performed by: FAMILY MEDICINE

## 2018-01-01 PROCEDURE — 80048 BASIC METABOLIC PNL TOTAL CA: CPT | Performed by: FAMILY MEDICINE

## 2018-01-01 PROCEDURE — 93306 TTE W/DOPPLER COMPLETE: CPT | Mod: 26 | Performed by: INTERNAL MEDICINE

## 2018-01-01 PROCEDURE — 80048 BASIC METABOLIC PNL TOTAL CA: CPT | Performed by: INTERNAL MEDICINE

## 2018-01-01 PROCEDURE — 83880 ASSAY OF NATRIURETIC PEPTIDE: CPT | Performed by: FAMILY MEDICINE

## 2018-01-01 PROCEDURE — 82043 UR ALBUMIN QUANTITATIVE: CPT | Performed by: FAMILY MEDICINE

## 2018-01-01 PROCEDURE — 93010 ELECTROCARDIOGRAM REPORT: CPT | Performed by: INTERNAL MEDICINE

## 2018-01-01 RX ORDER — MEPERIDINE HYDROCHLORIDE 50 MG/ML
25 INJECTION INTRAMUSCULAR; INTRAVENOUS; SUBCUTANEOUS EVERY 30 MIN PRN
Status: CANCELLED | OUTPATIENT
Start: 2018-01-01

## 2018-01-01 RX ORDER — DIPHENHYDRAMINE HYDROCHLORIDE 50 MG/ML
50 INJECTION INTRAMUSCULAR; INTRAVENOUS
Status: CANCELLED
Start: 2018-01-01

## 2018-01-01 RX ORDER — METHYLPREDNISOLONE SODIUM SUCCINATE 125 MG/2ML
125 INJECTION, POWDER, LYOPHILIZED, FOR SOLUTION INTRAMUSCULAR; INTRAVENOUS
Status: CANCELLED
Start: 2018-01-01

## 2018-01-01 RX ORDER — SODIUM CHLORIDE 9 MG/ML
1000 INJECTION, SOLUTION INTRAVENOUS CONTINUOUS PRN
Status: CANCELLED
Start: 2018-01-01

## 2018-01-01 RX ORDER — LORAZEPAM 2 MG/ML
0.5 INJECTION INTRAMUSCULAR EVERY 4 HOURS PRN
Status: CANCELLED
Start: 2018-01-01

## 2018-01-01 RX ORDER — EPINEPHRINE 0.3 MG/.3ML
0.3 INJECTION SUBCUTANEOUS EVERY 5 MIN PRN
Status: CANCELLED | OUTPATIENT
Start: 2018-01-01

## 2018-01-01 RX ORDER — IOPAMIDOL 755 MG/ML
80 INJECTION, SOLUTION INTRAVASCULAR ONCE
Status: COMPLETED | OUTPATIENT
Start: 2018-01-01 | End: 2018-01-01

## 2018-01-01 RX ORDER — ALBUTEROL SULFATE 0.83 MG/ML
2.5 SOLUTION RESPIRATORY (INHALATION)
Status: CANCELLED | OUTPATIENT
Start: 2018-01-01

## 2018-01-01 RX ORDER — POTASSIUM CHLORIDE 1500 MG/1
40 TABLET, EXTENDED RELEASE ORAL DAILY
Qty: 180 TABLET | Refills: 1 | Status: SHIPPED | OUTPATIENT
Start: 2018-01-01 | End: 2018-01-01

## 2018-01-01 RX ORDER — LOVASTATIN 40 MG
TABLET ORAL
Qty: 90 TABLET | Refills: 2 | Status: ON HOLD | OUTPATIENT
Start: 2018-01-01 | End: 2019-01-01

## 2018-01-01 RX ORDER — EPINEPHRINE 1 MG/ML
0.3 INJECTION, SOLUTION, CONCENTRATE INTRAVENOUS EVERY 5 MIN PRN
Status: CANCELLED | OUTPATIENT
Start: 2018-01-01

## 2018-01-01 RX ORDER — ALBUTEROL SULFATE 90 UG/1
1-2 AEROSOL, METERED RESPIRATORY (INHALATION)
Status: CANCELLED
Start: 2018-01-01

## 2018-01-01 RX ORDER — MEPERIDINE HYDROCHLORIDE 25 MG/ML
25 INJECTION INTRAMUSCULAR; INTRAVENOUS; SUBCUTANEOUS EVERY 30 MIN PRN
Status: CANCELLED | OUTPATIENT
Start: 2018-01-01

## 2018-01-01 RX ORDER — IOPAMIDOL 755 MG/ML
90 INJECTION, SOLUTION INTRAVASCULAR ONCE
Status: COMPLETED | OUTPATIENT
Start: 2018-01-01 | End: 2018-01-01

## 2018-01-01 RX ORDER — FUROSEMIDE 20 MG
40 TABLET ORAL 2 TIMES DAILY
Refills: 0 | COMMUNITY
Start: 2018-01-01 | End: 2018-01-01

## 2018-01-01 RX ORDER — LISINOPRIL 20 MG/1
TABLET ORAL
Qty: 90 TABLET | Refills: 1 | OUTPATIENT
Start: 2018-01-01

## 2018-01-01 RX ORDER — LEVOTHYROXINE SODIUM 125 UG/1
125 TABLET ORAL DAILY
Qty: 30 TABLET | Refills: 2 | Status: CANCELLED | OUTPATIENT
Start: 2018-01-01

## 2018-01-01 RX ORDER — LEVOTHYROXINE SODIUM 100 UG/1
100 TABLET ORAL DAILY
Qty: 30 TABLET | Refills: 1 | Status: SHIPPED | OUTPATIENT
Start: 2018-01-01 | End: 2018-01-01

## 2018-01-01 RX ORDER — LEVOTHYROXINE SODIUM 100 UG/1
100 TABLET ORAL DAILY
Qty: 90 TABLET | Refills: 1 | Status: SHIPPED | OUTPATIENT
Start: 2018-01-01 | End: 2019-01-01

## 2018-01-01 RX ORDER — FUROSEMIDE 20 MG
20 TABLET ORAL DAILY
Qty: 90 TABLET | Refills: 1 | Status: SHIPPED | OUTPATIENT
Start: 2018-01-01 | End: 2018-01-01

## 2018-01-01 RX ORDER — LEVOTHYROXINE SODIUM 25 UG/1
TABLET ORAL
Qty: 42 TABLET | Refills: 0 | Status: SHIPPED | OUTPATIENT
Start: 2018-01-01 | End: 2018-01-01

## 2018-01-01 RX ORDER — FUROSEMIDE 20 MG
40 TABLET ORAL DAILY
Refills: 0 | COMMUNITY
Start: 2018-01-01 | End: 2019-01-01

## 2018-01-01 RX ORDER — METOPROLOL SUCCINATE 25 MG/1
12.5 TABLET, EXTENDED RELEASE ORAL DAILY
Qty: 45 TABLET | Refills: 3 | Status: SHIPPED | OUTPATIENT
Start: 2018-01-01 | End: 2019-01-01

## 2018-01-01 RX ORDER — FUROSEMIDE 20 MG
20 TABLET ORAL DAILY
Qty: 30 TABLET | Refills: 1 | Status: SHIPPED | OUTPATIENT
Start: 2018-01-01 | End: 2018-01-01

## 2018-01-01 RX ADMIN — SODIUM CHLORIDE 250 ML: 9 INJECTION, SOLUTION INTRAVENOUS at 10:47

## 2018-01-01 RX ADMIN — SODIUM CHLORIDE 200 MG: 9 INJECTION, SOLUTION INTRAVENOUS at 10:33

## 2018-01-01 RX ADMIN — SODIUM CHLORIDE 250 ML: 9 INJECTION, SOLUTION INTRAVENOUS at 10:08

## 2018-01-01 RX ADMIN — SODIUM CHLORIDE 200 MG: 9 INJECTION, SOLUTION INTRAVENOUS at 10:48

## 2018-01-01 RX ADMIN — IOPAMIDOL 80 ML: 755 INJECTION, SOLUTION INTRAVENOUS at 09:41

## 2018-01-01 RX ADMIN — SODIUM CHLORIDE 200 MG: 9 INJECTION, SOLUTION INTRAVENOUS at 10:47

## 2018-01-01 RX ADMIN — SODIUM CHLORIDE 200 MG: 9 INJECTION, SOLUTION INTRAVENOUS at 11:31

## 2018-01-01 RX ADMIN — HUMAN ALBUMIN MICROSPHERES AND PERFLUTREN 1 ML: 10; .22 INJECTION, SOLUTION INTRAVENOUS at 15:38

## 2018-01-01 RX ADMIN — SODIUM CHLORIDE 250 ML: 9 INJECTION, SOLUTION INTRAVENOUS at 10:01

## 2018-01-01 RX ADMIN — SODIUM CHLORIDE 250 ML: 9 INJECTION, SOLUTION INTRAVENOUS at 10:51

## 2018-01-01 RX ADMIN — SODIUM CHLORIDE 200 MG: 9 INJECTION, SOLUTION INTRAVENOUS at 10:51

## 2018-01-01 RX ADMIN — SODIUM CHLORIDE 80 ML: 9 INJECTION, SOLUTION INTRAVENOUS at 09:42

## 2018-01-01 RX ADMIN — IOPAMIDOL 90 ML: 755 INJECTION, SOLUTION INTRAVENOUS at 09:31

## 2018-01-01 RX ADMIN — SODIUM CHLORIDE 200 MG: 9 INJECTION, SOLUTION INTRAVENOUS at 11:01

## 2018-01-01 RX ADMIN — SODIUM CHLORIDE 62 ML: 9 INJECTION, SOLUTION INTRAVENOUS at 09:31

## 2018-01-01 RX ADMIN — SODIUM CHLORIDE 250 ML: 9 INJECTION, SOLUTION INTRAVENOUS at 10:33

## 2018-01-01 ASSESSMENT — PAIN SCALES - GENERAL
PAINLEVEL: NO PAIN (0)

## 2018-01-01 ASSESSMENT — MIFFLIN-ST. JEOR: SCORE: 1414.24

## 2018-03-05 ENCOUNTER — OFFICE VISIT (OUTPATIENT)
Dept: RADIATION THERAPY | Facility: OUTPATIENT CENTER | Age: 83
End: 2018-03-05
Payer: COMMERCIAL

## 2018-03-05 VITALS
BODY MASS INDEX: 25.28 KG/M2 | SYSTOLIC BLOOD PRESSURE: 167 MMHG | WEIGHT: 163.8 LBS | RESPIRATION RATE: 16 BRPM | DIASTOLIC BLOOD PRESSURE: 79 MMHG | HEART RATE: 91 BPM

## 2018-03-05 DIAGNOSIS — C02.9 TONGUE CANCER (H): Primary | ICD-10-CM

## 2018-03-05 ASSESSMENT — PAIN SCALES - GENERAL: PAINLEVEL: NO PAIN (0)

## 2018-03-05 NOTE — NURSING NOTE
FOLLOW-UP VISIT    Patient Name: Patrick Diop      : 1935     Age: 82 year old        ______________________________________________________________________________     Chief Complaint   Patient presents with     Radiation Therapy     Follow up appointment, radiation therapy, head and neck cancer     /79 (Cuff Size: Adult Large)  Pulse 91  Resp 16  Wt 74.3 kg (163 lb 12.8 oz)  BMI 25.28 kg/m2     Pain  Denies    Meds  Current Med List Reviewed: Yes  Medication Note: working with PCP to change meds    Labs  See epic    Imaging  None recent    Skin:Warm  Dry  Oral Products: OTC  Mucositis: No  Dry mouth: at times  Dental evaluation: No teeth, does not see dentist, does not have dentures   PEG tube: No  Speech therapy: No  Lymphedema: Having ongoing issues, does do exercises. Has a sleeve to wear.  May need to have a f/u visit. Swelling no worse.   Energy Level:normal  Appetite: not great, but eats all day. Weight stable  Intake: can eat most things, except meat is difficult  Weight:  Wt Readings from Last 5 Encounters:   18 74.3 kg (163 lb 12.8 oz)   17 78.1 kg (172 lb 3.2 oz)   17 79.4 kg (175 lb)   17 80.9 kg (178 lb 4.8 oz)   16 76.7 kg (169 lb)       Appointments:     DATE  Oncologist: needs appointment    ENT:  Needs appointment    Primary:      Other Notes:

## 2018-03-05 NOTE — MR AVS SNAPSHOT
After Visit Summary   3/5/2018    Ptarick Diop    MRN: [id number]           Patient Information     Date Of Birth          1935        Visit Information        Provider Department      3/5/2018 8:30 AM Bharti Vogel APRN CNP [facility]        Today's Diagnoses     Tongue cancer (H)    -  1       Follow-ups after your visit        Your next 10 appointments already scheduled     Mar 08, 2018  9:45 AM CST   (Arrive by 9:30 AM)   CT SOFT TISSUE NECK W/O CONTRAST with WYCT1   [facility] ([facility])    [address] [facility] [address]   [phone]           Please bring any scans or X-rays taken at other hospitals, if similar tests were done. Also bring a list of your medicines, including vitamins, minerals and over-the-counter drugs. It is safest to leave personal items at home.  Be sure to tell your doctor:   If you have any allergies.   If there s any chance you are pregnant.   If you are breastfeeding.  You do not need to do anything special to prepare for this exam.  Please wear loose clothing, such as a sweat suit or jogging clothes. Avoid snaps, zippers and other metal. We may ask you to undress and put on a hospital gown.            Mar 08, 2018 10:00 AM CST   (Arrive by 9:45 AM)   CT CHEST W/O CONTRAST with WYCT1   [facility] CT ([facility])    [address] [facility] [address]   [phone]           Please bring any scans or X-rays taken at other hospitals, if similar tests were done. Also bring a list of your medicines, including vitamins, minerals and over-the-counter drugs. It is safest to leave personal items at home.  Be sure to tell your doctor:   If you have any allergies.   If there s any chance you are pregnant.   If you are breastfeeding.  You do not need to do anything special to prepare for this exam.  Please wear loose clothing, such as a sweat suit or jogging clothes. Avoid snaps,  zippers and other metal. We may ask you to undress and put on a hospital gown.            Sep 07, 2018  8:30 AM CDT   Return Visit with [facility] Provider   [facility])    [address], [address]  [facility] [address]   [phone]              Future tests that were ordered for you today     Open Future Orders        Priority Expected Expires Ordered    CT Soft Tissue Neck w/o Contrast Routine  3/5/2019 3/5/2018    CT CHEST W/O CONTRAST Routine  3/5/2019 3/5/2018            Who to contact     Please call your clinic at [phone] to:    Ask questions about your health    Make or cancel appointments    Discuss your medicines    Learn about your test results    Speak to your doctor            Additional Information About Your Visit        Care EveryWhere ID     This is your Care EveryWhere ID. This could be used by other organizations to access your [place] medical records  [id number]        Your Vitals Were     Pulse Respirations BMI (Body Mass Index)             91 16 25.28 kg/m2          Blood Pressure from Last 3 Encounters:   03/05/18 167/79   11/29/17 138/80   07/03/17 125/74    Weight from Last 3 Encounters:   03/05/18 74.3 kg (163 lb 12.8 oz)   11/29/17 78.1 kg (172 lb 3.2 oz)   07/03/17 79.4 kg (175 lb)               Primary Care Provider Office Phone # Fax #    Joe Mandel [phone] [address] [place] [address]        Equal Access to Services     RAINA SOLANO AH: Hadii aad ku hadasho Soomaali, waaxda luqadaha, qaybta kaalmada adeegyada, elizabeth gruber . So [facility] [phone].    ATENCIÓN: Si habla español, tiene a taylor disposición servicios gratuitos de asistencia lingüística. Llame al [phone].    We comply with applicable federal civil rights laws and [place] laws. We do not discriminate on the basis of race, color, national origin, age, disability, sex, sexual orientation, or gender  identity.            Thank you!     Thank you for choosing [facility]  for your care. Our goal is always to provide you with excellent care. Hearing back from our patients is one way we can continue to improve our services. Please take a few minutes to complete the written survey that you may receive in the mail after your visit with us. Thank you!             Your Updated Medication List - Protect others around you: Learn how to safely use, store and throw away your medicines at www.disposemymeds.org.          This list is accurate as of 3/5/18 11:53 AM.  Always use your most recent med list.                   Brand Name Dispense Instructions for use Diagnosis    lisinopril 20 MG tablet    PRINIVIL/ZESTRIL    90 tablet    Take 1 tablet (20 mg) by mouth daily    Hypertension goal BP (blood pressure) < 140/90       lovastatin 40 MG tablet    MEVACOR    90 tablet    TAKE ONE TABLET BY MOUTH NIGHTLY AT BEDTIME    Hyperlipidemia with target LDL less than 100       metFORMIN 1000 MG tablet    GLUCOPHAGE    90 tablet    TAKE 1/2 TABLET BY MOUTH TWICE DAILY WITH MEALS    Type 2 diabetes mellitus with stage 1 chronic kidney disease, without long-term current use of insulin (H)       multivitamin  with lutein Caps per capsule      Take 1 capsule by mouth daily        vitamin D 1000 UNITS capsule      Take 1 capsule by mouth daily

## 2018-03-05 NOTE — PROGRESS NOTES
RADIATION ONCOLOGY FOLLOW-UP VISIT  DATE: Mar 5, 2018    NAME: Patrick Diop  MRN: [id number]      DISEASE TREATED: Squamous Cell carcinoma of the base of tongue, [id number]    RADIATION THERAPY DELIVERED: 7000 cGy in 35 fractions    INTERVAL SINCE COMPLETION OF RT:  2+years since completion on 12/31/2015    SUBJECTIVE:  Mr. 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 He initially presented with a right-sided neck mass; CT showed a 3.6cm mass in the right base f tongue as well as bilateral cervical lymphadenopathy. He saw Dr. Hernandez, and biopsy of the right neck mass showed a p16 positive SCCa, consistent with base of tongue. PET/CT scan 10/21/15 showed SUVMax 23.4 in the base of tongue, and SUVMax of 11-23 in the bilateral cervical lymph nodes (3 right-sided, 1 left-sided).    He was referred for consideration of definitive chemoradiation, and we saw him on 11/3/15. We recommended concomitant chemotherapy to a dose of 7,000 cGy in 35 fractions, which he received along with high-dose cisplatin as detailed above.    He tolerated therapy quite well. His toxicities included grade 1 radiation dermatitis, grade 3 dysphagia requiring G-tube feedings, grade 2 dry mouth, grade 1 esophagitis, moderate-severe changes in taste, and grade 1 odynophagia. He reports that he never required any pain medication.    He now presents for routine followup exam. Since his last visit with us over 1 year ago (9/2/2016), he reports that he has continued to do quite well. His taste has slowly returned and he is able to swallow fairly well. His feeding tube has been removed. His energy level has improved as well. He still has some weight loss and therefore supplements with ensure. Denies any odynophagia/dysphagia although he can not eat some meats. He does note residual xerostomia, which is manageable with hydration throughout the day. He has not seen   "Mary in ENT in over a year and does not want to go back to see him as he doesn't like to drive downtown and \"they don't do anything anyway\".  He is agreeable to see ENT here although we discussed that ENT here is not a cancer specialist.  He also is not agreeable to see Dr. Neil in medical oncology and doesn't want to see anyone in medical oncology at this point.  He is agreeable to follow up here and will do scans.  Last restaging CT and PET were negative for evidence of residual disease but he has not had a scan for sometime.  His last scan was a chest x ray 4/2017 and was clear. He did see lymphedema therapy and has continued issues with lymphedema.  He has compression garments, but does not wear and he has also stopped doing any lymphedema massage therapy at home. He will be seeing a dentist at some point in the future for dentures.   He has not had a colonoscopy for sometime  We did discuss that a colonoscopy would be recommended and he will think about that.  He has had a previous colon cancer.  He doesn't like the prep that was used the last time as it was too expensive and more difficult to take.    His remaining ROS are otherwise unremarkable.     PHYSICAL EXAM:  VITALS: /79 (Cuff Size: Adult Large)  Pulse 91  Resp 16  Wt 74.3 kg (163 lb 12.8 oz)  BMI 25.28 kg/m2  GEN: Appears well, alert, oriented, and in NAD  HEENT: NCAT, EOMI, normal conjunctiva, tacky membranes, no thrush or mucositis, no lesions or masses visualized  NECK: Supple, full ROM, no cervical or clavicular lymphadenopathy, mild dewlap from lymphedema.  Mild fibrosis. Submental fullness and firm but can not appreciate tumor.  CV: RRR, no murmurs/rubs/gallops, warm and well-perfused  RESP: CTAB, no wheezes/rales/rhonchi, breathing comfortably on room air  SKIN: Normal color and turgor  NEURO: No focal deficits, CN 3-12 grossly intact, normal and symmetric motor and sensory exam in bilateral upper and lower extremities, normal " gait  PSYCH: Appropriate mood and affect    LABS AND IMAGING: Reviewed    Neck CT 03/30/2016  IMPRESSION:    1. Marked decrease in the size of the previously described right  tongue base mass. The mass is no longer identified.  2. Significant decrease in the cervical adenopathy. No enlarged  cervical lymph nodes are seen on the current study.  3. Post radiation changes.    PET 03/30/2016  IMPRESSION:  1. Resolution of the hypermetabolic mass at the base of the tongue and  hypermetabolic lymph nodes 10/21/2015.  2. Currently there is no evidence for hypermetabolic metastatic  disease in the chest, abdomen or pelvis.  3. There is a new very subtle groundglass nodule in the lateral right  apex of the lung approximately 0.4 cm in size. Recommend short-term  followup CT in 3-6 months with thin section imaging through this  nodule if it persists.    4. Extensive atherosclerotic vascular calcifications.  5. Since the prior CT, there are findings suggesting interval  percutaneous gastrostomy tube that has been removed. A couple surgical  clips in the left upper quadrant of the abdomen are redemonstrated.    CT CHEST W CONTRAST 7/25/2016  IMPRESSION:    1. No new or enlarging pulmonary nodules. Previously seen right apical  groundglass nodularity is smaller.  2. Sequela of chronic granulomatous disease.    IMPRESSION:   Mr. 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 He continues to have some symptom mild long term radiation side effects and has no evidence of recurrence of disease.     PLAN:  1. RTC in 6 months for routine follow up.  I will order CT scans of neck and chest for surveillance and call him with results.  He can also discuss results at ENT follow up.  2. He is refusing to return to medical oncology.    3. Would recommend f/u with Dr. Hernandez, but he refuses to make the  drive.  He will see ENT here.  4. Resume lymphedema treatments at home.  5.  Colonoscopy also recommended as he has had previous colon cancer.      3/8/18-CT neck shows likely recurrence at BOT.  CT chest with mets.  Will try to discuss at tumor board tomorrow.  PET scan ordered.  I tried to call Patrick today to notify him but did not get any answer.  Will need to see Dr. Hernandez again and have biopsy.  Will try to notify patient tomorrow.    Bharti Vogel NP  Radiation Oncology  [facility]   [facility]   Phone: [phone]

## 2018-03-07 RX ORDER — IOPAMIDOL 755 MG/ML
80 INJECTION, SOLUTION INTRAVASCULAR ONCE
Status: DISCONTINUED | OUTPATIENT
Start: 2018-03-08 | End: 2018-03-07

## 2018-03-07 RX ORDER — IOPAMIDOL 755 MG/ML
125 INJECTION, SOLUTION INTRAVASCULAR ONCE
Status: COMPLETED | OUTPATIENT
Start: 2018-03-08 | End: 2018-03-08

## 2018-03-08 ENCOUNTER — HOSPITAL ENCOUNTER (OUTPATIENT)
Dept: CT IMAGING | Facility: CLINIC | Age: 83
End: 2018-03-08
Attending: NURSE PRACTITIONER
Payer: MEDICARE

## 2018-03-08 ENCOUNTER — HOSPITAL ENCOUNTER (OUTPATIENT)
Dept: CT IMAGING | Facility: CLINIC | Age: 83
Discharge: HOME | End: 2018-03-08
Attending: NURSE PRACTITIONER | Admitting: NURSE PRACTITIONER
Payer: MEDICARE

## 2018-03-08 DIAGNOSIS — C02.9 TONGUE CANCER (H): ICD-10-CM

## 2018-03-08 LAB
CREAT BLD-MCNC: 1.3 MG/DL (ref 0.66–1.25)
GFR SERPL CREATININE-BSD FRML MDRD: 53 ML/MIN/1.7M2

## 2018-03-08 PROCEDURE — 71260 CT THORAX DX C+: CPT

## 2018-03-08 PROCEDURE — 25000128 H RX IP 250 OP 636: Performed by: RADIOLOGY

## 2018-03-08 PROCEDURE — 82565 ASSAY OF CREATININE: CPT

## 2018-03-08 PROCEDURE — 25000125 ZZHC RX 250: Performed by: RADIOLOGY

## 2018-03-08 PROCEDURE — 70491 CT SOFT TISSUE NECK W/DYE: CPT

## 2018-03-08 RX ADMIN — SODIUM CHLORIDE 100 ML: 9 INJECTION, SOLUTION INTRAVENOUS at 09:43

## 2018-03-08 RX ADMIN — IOPAMIDOL 125 ML: 755 INJECTION, SOLUTION INTRAVENOUS at 09:43

## 2018-03-09 ENCOUNTER — CARE COORDINATION (OUTPATIENT)
Dept: OTOLARYNGOLOGY | Facility: CLINIC | Age: 83
End: 2018-03-09

## 2018-03-09 NOTE — PROGRESS NOTES
I called Patrick to make an appointment with Dr. Hernandez.  He did not answer the phone so I left him a message and told him I scheduled him an appointment on Monday 3/12/18 at 12:30.  He is to call me back if that does not work

## 2018-03-14 ENCOUNTER — HOSPITAL ENCOUNTER (OUTPATIENT)
Dept: PET IMAGING | Facility: CLINIC | Age: 83
Discharge: HOME | End: 2018-03-14
Attending: NURSE PRACTITIONER | Admitting: NURSE PRACTITIONER
Payer: MEDICARE

## 2018-03-14 ENCOUNTER — HOSPITAL ENCOUNTER (OUTPATIENT)
Dept: PET IMAGING | Facility: CLINIC | Age: 83
End: 2018-03-14
Attending: NURSE PRACTITIONER
Payer: MEDICARE

## 2018-03-14 DIAGNOSIS — C02.9 TONGUE CANCER (H): ICD-10-CM

## 2018-03-14 PROCEDURE — 34300033 ZZH RX 343: Performed by: NURSE PRACTITIONER

## 2018-03-14 PROCEDURE — 70491 CT SOFT TISSUE NECK W/DYE: CPT

## 2018-03-14 PROCEDURE — 25000128 H RX IP 250 OP 636: Performed by: NURSE PRACTITIONER

## 2018-03-14 PROCEDURE — A9552 F18 FDG: HCPCS | Performed by: NURSE PRACTITIONER

## 2018-03-14 PROCEDURE — 78815 PET IMAGE W/CT SKULL-THIGH: CPT | Mod: PS

## 2018-03-14 RX ORDER — IOPAMIDOL 755 MG/ML
1-135 INJECTION, SOLUTION INTRAVASCULAR ONCE
Status: COMPLETED | OUTPATIENT
Start: 2018-03-14 | End: 2018-03-14

## 2018-03-14 RX ADMIN — FLUDEOXYGLUCOSE F-18 14.2 MCI.: 500 INJECTION, SOLUTION INTRAVENOUS at 13:32

## 2018-03-14 RX ADMIN — IOPAMIDOL 100 ML: 755 INJECTION, SOLUTION INTRAVENOUS at 13:32

## 2018-03-16 DIAGNOSIS — C02.9 TONGUE CANCER (H): Primary | ICD-10-CM

## 2018-03-19 ENCOUNTER — OFFICE VISIT (OUTPATIENT)
Dept: OTOLARYNGOLOGY | Facility: CLINIC | Age: 83
End: 2018-03-19
Payer: COMMERCIAL

## 2018-03-19 ENCOUNTER — DOCUMENTATION ONLY (OUTPATIENT)
Dept: OTOLARYNGOLOGY | Facility: CLINIC | Age: 83
End: 2018-03-19

## 2018-03-19 VITALS — WEIGHT: 170 LBS | HEIGHT: 68 IN | BODY MASS INDEX: 25.76 KG/M2

## 2018-03-19 DIAGNOSIS — C02.9 TONGUE CANCER (H): Primary | ICD-10-CM

## 2018-03-19 ASSESSMENT — PAIN SCALES - GENERAL: PAINLEVEL: NO PAIN (0)

## 2018-03-19 NOTE — NURSING NOTE
Relevant Diagnosis: hx tongue base cancer  ?recurrence  Teaching Topic:     Person(s) involved in teaching:  Patient     Teaching Concerns Addressed:  Pre op teaching included the need for an H&P, NPO status pre op, hospital routines, expected recovery, activity  restrictions, antimicrobial scrub, s/s of infection, pain control methods and the importance of follow up appointments.  The patient voiced an understanding of all instructions and will call with questions.     Motivation Level:  Asks Questions:   Yes  Eager to Learn:   Yes  Cooperative:   Yes  Receptive (willing/able to accept information):   Yes     Patient  demonstrates understanding of the following:  Reason for the appointment, diagnosis and treatment plan:   Yes  Knowledge of proper use of medications and conditions for which they are ordered (with special attention to potential side effects or drug interactions):   Yes  Which situations necessitate calling provider and whom to contact:   Yes        Proper use and care of  (medical equip, care aids, etc.):   NA  Nutritional needs and diet plan:   Yes  Pain management techniques:   Yes  Patient instructed on hand hygiene:  Yes  How and/when to access community resources:   NA     Infection Prevention:  Patient   demonstrates understanding of the following:  Surgical procedure site care taught   Signs and symptoms of infection taught Yes  Wound care taught Yes     Instructional Materials Used/Given: Pre op booklet.

## 2018-03-19 NOTE — PROGRESS NOTES
HISTORY OF PRESENT ILLNESS:  Mr. Diop returns. 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 Our last visit was 11/7/2016 as he is having oncologic surveillance closer to home in [place]. Dr. Rosa Paul MD and Bharti Vogel CNP have been managing his care. During a surveillance CT neck on 3/8/2018, a 2.5 cm soft tissue mass was noted at the right tongue base. Patient had a subsequent PET ordered which revealed uptake at the right tongue base measuring 2.6 cm. He is here today for evaluation of possible recurrence. He has had morning hoarseness for the past few days. He denies any change in swallowing, taste, and xerostomia from baseline. Patient eats most things without difficulty. Denies odynophagia, otalgia, bleeding, or significant change in weight.    PHYSICAL EXAMINATION:    His weight is 170 lbs today, 8 lbs down since his last visit with on 11/7/2016.  Constitutional:  The patient was accompanied, well-groomed, and in no acute distress.     Neurologic: Alert and oriented.  CN's III-XII grossly in tact.    Psychiatric: The patient's affect was calm, cooperative, and appropriate.     Communication:  Normal; communicates verbally, normal voice quality.   Respiratory: Breathing comfortably without stridor or exertion of accessory muscles.    Head/Face:  Normocephalic and atraumatic.  No lesions or scars. No sinus tenderness.     Oral Cavity: Normal floor of mouth, buccal mucosa, and palate.  No lesions on inspection. No uvula and tonsils +0/4. Palpation of the posterior tongue revealed possible induration.   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     PROCEDURE:   FIBEROPTIC ENDOSCOPY:  Consent for fiberoptic laryngoscopy was obtained, and we confirmed correctness of procedure and identity of patient.  Fiberoptic laryngoscopy was indicated due to uptake shown at right tongue base on  PET scan.  The nose was topically decongested and anesthetized.  The fiberoptic laryngoscope was passed under endoscopic vision.  The turbinates were normal.  The inferior and middle meati were clear bilaterally without purulence, masses, or polyps.  The nasopharynx was clear. The soft palate appeared normal with good mobility.  Typical radiation changes noted in the oropharynx. The epiglottis was edematous.  The larynx had mobile cords. The arytenoids were clear. White exudative growth noted at the right tongue base that is suspicious for harboring a tumor.      IMPRESSION AND PLAN:  1) Due to the patient's prior oncologic history, mass found on imaging, and what was seen with fiberoptic endoscopy, we will have the patient scheduled for a right tongue base biopsy.  2) Dr. Hernandez had a 20 minute discussion with Mr. Diop about his 2 options if the biopsy were to come back positive. First is palliative chemoradiation therapy, or second being a tongue base resection and possible laryngectomy. Dr. Hernandez informed the patient of what the procedure entailed and the possible risks and side effects such as difficulty swallowing, gross anatomical changes, possible trach placement, and change with phonation. Due to the possibility of changes to his activities of daily living as well as his quality of life, Dr. Diop would like to think about his options at this time. He has agreed to the biopsy and he will follow-up with us to discuss his further plan of care once we have the results of the biopsy.     Claudette Nunez PA-C  Otolaryngology - Head & Neck Surgery  [phone]    I saw the patient with Claudette Nunez PA-C. I also examined and interviewed the patient independently. I have edited Claudette's note to reflect my perspective on the findings, assessment, and plan.    I was present with the patient for the entire viewing portion of the endoscopy procedure (including scope insertion and withdrawal).      Jj  ALEX Hernandez M.D.        cc:                 Joe Mandel MD                         [facility]                         [address] [place] [address]   [id number]                             Rosa Paul MD                          [facility]                         [address] [facility], [address]                         [place]   [address]                           Bharti Vogel NP                        Radiation Oncology - [facility]                        [phone]

## 2018-03-19 NOTE — PROGRESS NOTES
Scheduled patient for surgery on 4/3/18 while in ENT Clinic.  Preop teaching done by nurse coordinator.

## 2018-03-19 NOTE — NURSING NOTE
Chief Complaint   Patient presents with     RECHECK     follow up per Kayla Mejia Medical Assistant

## 2018-03-19 NOTE — PATIENT INSTRUCTIONS
Surgery scheduled for 4/3/18  Follow instructions given  Call me if ?'s arise [phone]  Kayla Tran RN

## 2018-03-19 NOTE — MR AVS SNAPSHOT
"              After Visit Summary   3/19/2018    Patrick Diop    MRN: [id number]           Patient Information     Date Of Birth          1935        Visit Information        Provider Department      3/19/2018 10:15 AM Jj Hernandez MD M [facility]        Today's Diagnoses     Tongue cancer (H)    -  1      Care Instructions    Surgery scheduled for 4/3/18  Follow instructions given  Call me if ?'s arise [phone]  Kayla Tran RN           Follow-ups after your visit        Your next 10 appointments already scheduled     Apr 03, 2018   Procedure with Jj Hernandez MD   [facility], [place], Same Day Surgery (--)    [address] [facility] [id number] [phone]            Apr 09, 2018  1:00 PM CDT   (Arrive by 12:45 PM)   Return Visit with MD EMERY Cuenca [facility] ([facility])    [address] [facility]  [address] [facility] [address] [phone]            Sep 07, 2018  8:30 AM CDT   Return Visit with [facility] Provider   Radiation Therapy Center ([facility])    [address] [facility] [facility], [address]  [facility] [id number] [phone]              Who to contact     Please call your clinic at [phone] to:    Ask questions about your health    Make or cancel appointments    Discuss your medicines    Learn about your test results    Speak to your doctor            Additional Information About Your Visit        Care EveryWhere ID     This is your Care EveryWhere ID. This could be used by other organizations to access your [place] medical records  [id number]        Your Vitals Were     Height BMI (Body Mass Index)                1.715 m (5' 7.52\") 26.22 kg/m2           Blood Pressure from Last 3 Encounters:   03/23/18 124/70   03/05/18 167/79   11/29/17 138/80    Weight from Last 3 Encounters:   03/23/18 76.8 kg (169 lb 6.4 oz)   03/19/18 77.1 kg (170 lb)   03/05/18 74.3 kg (163 lb 12.8 oz)              We Performed " the Following     LARYNGOSCOPY FLEX FIBEROPTIC, DIAGNOSTIC        Primary Care Provider Office Phone # Fax #    Joe Mandel [phone] [address] [place] [address]        Equal Access to Services     RAINA SOLANO : Norma frances quinteros celso Brock, waaxda luqadaha, qaybta kaalmada aderohanda, elizabeth farah laWandadawson dias. So [facility] [phone].    ATENCIÓN: Si habla español, tiene a taylor disposición servicios gratuitos de asistencia lingüística. Llame al [phone].    We comply with applicable federal civil rights laws and [place] laws. We do not discriminate on the basis of race, color, national origin, age, disability, sex, sexual orientation, or gender identity.            Thank you!     Thank you for choosing [facility]  for your care. Our goal is always to provide you with excellent care. Hearing back from our patients is one way we can continue to improve our services. Please take a few minutes to complete the written survey that you may receive in the mail after your visit with us. Thank you!             Your Updated Medication List - Protect others around you: Learn how to safely use, store and throw away your medicines at www.disposemymeds.org.          This list is accurate as of 3/19/18 11:59 PM.  Always use your most recent med list.                   Brand Name Dispense Instructions for use Diagnosis    lisinopril 20 MG tablet    PRINIVIL/ZESTRIL    90 tablet    Take 1 tablet (20 mg) by mouth daily    Hypertension goal BP (blood pressure) < 140/90       lovastatin 40 MG tablet    MEVACOR    90 tablet    TAKE ONE TABLET BY MOUTH NIGHTLY AT BEDTIME    Hyperlipidemia with target LDL less than 100       metFORMIN 1000 MG tablet    GLUCOPHAGE    90 tablet    TAKE 1/2 TABLET BY MOUTH TWICE DAILY WITH MEALS    Type 2 diabetes mellitus with stage 1 chronic kidney disease, without long-term current use of insulin (H)       multivitamin  with  lutein Caps per capsule      Take 1 capsule by mouth daily        vitamin D 1000 UNITS capsule      Take 1 capsule by mouth daily

## 2018-03-19 NOTE — LETTER
3/19/2018       RE: Patrick Diop  [address]   [facility] [address]     Dear Colleague,    Thank you for referring your patient, Patrick Diop, to the [facility] at [facility]. Please see a copy of my visit note below.    HISTORY OF PRESENT ILLNESS:  Mr. Diop returns. 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 Our last visit was 11/7/2016 as he is having oncologic surveillance closer to home in [place]. Dr. Rosa Paul MD and Bharti Vogel CNP have been managing his care. During a surveillance CT neck on 3/8/2018, a 2.5 cm soft tissue mass was noted at the right tongue base. Patient had a subsequent PET ordered which revealed uptake at the right tongue base measuring 2.6 cm. He is here today for evaluation of possible recurrence. He has had morning hoarseness for the past few days. He denies any change in swallowing, taste, and xerostomia from baseline. Patient eats most things without difficulty. Denies odynophagia, otalgia, bleeding, or significant change in weight.    PHYSICAL EXAMINATION:    His weight is 170 lbs today, 8 lbs down since his last visit with on 11/7/2016.  Constitutional:  The patient was accompanied, well-groomed, and in no acute distress.     Neurologic: Alert and oriented.  CN's III-XII grossly in tact.    Psychiatric: The patient's affect was calm, cooperative, and appropriate.     Communication:  Normal; communicates verbally, normal voice quality.   Respiratory: Breathing comfortably without stridor or exertion of accessory muscles.    Head/Face:  Normocephalic and atraumatic.  No lesions or scars. No sinus tenderness.     Oral Cavity: Normal floor of mouth, buccal mucosa, and palate.  No lesions on inspection. No uvula and tonsils +0/4. Palpation of the posterior tongue revealed possible induration.   Neck: Lymphedema due  to radiation changes but no mass or lymphadenopathy noted.     PROCEDURE:   FIBEROPTIC ENDOSCOPY:  Consent for fiberoptic laryngoscopy was obtained, and we confirmed correctness of procedure and identity of patient.  Fiberoptic laryngoscopy was indicated due to uptake shown at right tongue base on PET scan.  The nose was topically decongested and anesthetized.  The fiberoptic laryngoscope was passed under endoscopic vision.  The turbinates were normal.  The inferior and middle meati were clear bilaterally without purulence, masses, or polyps.  The nasopharynx was clear. The soft palate appeared normal with good mobility.  Typical radiation changes noted in the oropharynx. The epiglottis was edematous.  The larynx had mobile cords. The arytenoids were clear. White exudative growth noted at the right tongue base that is suspicious for harboring a tumor.      IMPRESSION AND PLAN:  1) Due to the patient's prior oncologic history, mass found on imaging, and what was seen with fiberoptic endoscopy, we will have the patient scheduled for a right tongue base biopsy.  2) Dr. Hernandez had a 20 minute discussion with Mr. Diop about his 2 options if the biopsy were to come back positive. First is palliative chemoradiation therapy, or second being a tongue base resection and possible laryngectomy. Dr. Hernandez informed the patient of what the procedure entailed and the possible risks and side effects such as difficulty swallowing, gross anatomical changes, possible trach placement, and change with phonation. Due to the possibility of changes to his activities of daily living as well as his quality of life, Dr. Diop would like to think about his options at this time. He has agreed to the biopsy and he will follow-up with us to discuss his further plan of care once we have the results of the biopsy.     Claudette Nunez PA-C  Otolaryngology - Head & Neck Surgery  [phone]    I saw the patient with Claudette Nunez PA-C.  I also examined and interviewed the patient independently. I have edited Claudette's note to reflect my perspective on the findings, assessment, and plan.    I was present with the patient for the entire viewing portion of the endoscopy procedure (including scope insertion and withdrawal).      Jj Hernandez M.D.    cc:                 Joe Mandel MD                         [facility]                         [address] [place] [address]   [id number]                             Rosa Paul MD                          [facility]                         [address] [facility], [address]                         [place]   [address]                            Bharti Vogel NP                        Radiation Oncology - [facility]                        [phone]

## 2018-03-23 ENCOUNTER — OFFICE VISIT (OUTPATIENT)
Dept: FAMILY MEDICINE | Facility: CLINIC | Age: 83
End: 2018-03-23
Payer: COMMERCIAL

## 2018-03-23 VITALS
TEMPERATURE: 97.2 F | OXYGEN SATURATION: 99 % | BODY MASS INDEX: 25.67 KG/M2 | HEIGHT: 68 IN | WEIGHT: 169.4 LBS | DIASTOLIC BLOOD PRESSURE: 70 MMHG | HEART RATE: 83 BPM | RESPIRATION RATE: 20 BRPM | SYSTOLIC BLOOD PRESSURE: 124 MMHG

## 2018-03-23 DIAGNOSIS — E11.22 TYPE 2 DIABETES MELLITUS WITH STAGE 1 CHRONIC KIDNEY DISEASE, WITHOUT LONG-TERM CURRENT USE OF INSULIN (H): ICD-10-CM

## 2018-03-23 DIAGNOSIS — E78.5 HYPERLIPIDEMIA WITH TARGET LDL LESS THAN 100: ICD-10-CM

## 2018-03-23 DIAGNOSIS — C02.9 TONGUE CANCER (H): ICD-10-CM

## 2018-03-23 DIAGNOSIS — N18.1 CKD (CHRONIC KIDNEY DISEASE) STAGE 1, GFR 90 ML/MIN OR GREATER: ICD-10-CM

## 2018-03-23 DIAGNOSIS — N18.1 TYPE 2 DIABETES MELLITUS WITH STAGE 1 CHRONIC KIDNEY DISEASE, WITHOUT LONG-TERM CURRENT USE OF INSULIN (H): ICD-10-CM

## 2018-03-23 DIAGNOSIS — Z01.818 PREOP GENERAL PHYSICAL EXAM: Primary | ICD-10-CM

## 2018-03-23 LAB
ANION GAP SERPL CALCULATED.3IONS-SCNC: 12 MMOL/L (ref 3–14)
BUN SERPL-MCNC: 16 MG/DL (ref 7–30)
CALCIUM SERPL-MCNC: 9.3 MG/DL (ref 8.5–10.1)
CHLORIDE SERPL-SCNC: 106 MMOL/L (ref 94–109)
CHOLEST SERPL-MCNC: 145 MG/DL
CO2 SERPL-SCNC: 21 MMOL/L (ref 20–32)
CREAT SERPL-MCNC: 1.3 MG/DL (ref 0.66–1.25)
GFR SERPL CREATININE-BSD FRML MDRD: 53 ML/MIN/1.7M2
GLUCOSE SERPL-MCNC: 99 MG/DL (ref 70–99)
HBA1C MFR BLD: 5.2 % (ref 4.3–6)
HDLC SERPL-MCNC: 56 MG/DL
LDLC SERPL CALC-MCNC: 47 MG/DL
NONHDLC SERPL-MCNC: 89 MG/DL
POTASSIUM SERPL-SCNC: 4.9 MMOL/L (ref 3.4–5.3)
SODIUM SERPL-SCNC: 139 MMOL/L (ref 133–144)
TRIGL SERPL-MCNC: 212 MG/DL

## 2018-03-23 PROCEDURE — 93000 ELECTROCARDIOGRAM COMPLETE: CPT | Performed by: FAMILY MEDICINE

## 2018-03-23 PROCEDURE — 80061 LIPID PANEL: CPT | Performed by: FAMILY MEDICINE

## 2018-03-23 PROCEDURE — 99215 OFFICE O/P EST HI 40 MIN: CPT | Performed by: FAMILY MEDICINE

## 2018-03-23 PROCEDURE — 80048 BASIC METABOLIC PNL TOTAL CA: CPT | Performed by: FAMILY MEDICINE

## 2018-03-23 PROCEDURE — 83036 HEMOGLOBIN GLYCOSYLATED A1C: CPT | Performed by: FAMILY MEDICINE

## 2018-03-23 PROCEDURE — 36415 COLL VENOUS BLD VENIPUNCTURE: CPT | Performed by: FAMILY MEDICINE

## 2018-03-23 ASSESSMENT — PAIN SCALES - GENERAL: PAINLEVEL: NO PAIN (0)

## 2018-03-23 NOTE — PROGRESS NOTES
[facility]  [address] [place] [address]  [phone]  Dept: [phone]    PRE-OP EVALUATION:  Today's date: 3/23/2018    Patrick Diop (: 1935) presents for pre-operative evaluation assessment as requested by Dr. Tyler.  He requires evaluation and anesthesia risk assessment prior to undergoing surgery/procedure for treatment of laryngoscopy with biopsies .    Primary Physician: Joe Mandel  Type of Anesthesia Anticipated: General    Patient has a Health Care Directive or Living Will:  YES will bring in    Preop Questions 3/23/2018   Who is doing your surgery? Lainey   What are you having done? Biopsy   Date of Surgery/Procedure: [id number]   Facility or Hospital where procedure/surgery will be performed: [facility]    1.  Do you have a history of Heart attack, stroke, stent, coronary bypass surgery, or other heart surgery? No   2.  Do you ever have any pain or discomfort in your chest? No   3.  Do you have a history of  Heart Failure? No   4.   Are you troubled by shortness of breath when:  walking on a level surface, or up a slight hill, or at night? No   5.  Do you currently have a cold, bronchitis or other respiratory infection? No   6.  Do you have a cough, shortness of breath, or wheezing? No   7.  Do you sometimes get pains in the calves of your legs when you walk? No   8. Do you or anyone in your family have previous history of blood clots? No   9.  Do you or does anyone in your family have a serious bleeding problem such as prolonged bleeding following surgeries or cuts? No   10. Have you ever had problems with anemia or been told to take iron pills? No   11. Have you had any abnormal blood loss such as black, tarry or bloody stools? No   12. Have you ever had a blood transfusion? No   13. Have you or any of your relatives ever had problems with anesthesia? No   14. Do you have sleep apnea, excessive snoring or daytime drowsiness? No   15.  Do you have any prosthetic heart valves? No   16. Do you have prosthetic joints? No       HPI:   He has a history of tongue cancer treated with radiation in fall, 2015.    A recent CT scan suggested recurrence.  He has had a PET scan.  He saw ENT physician Dr. Retana who did endoscopy earlier this week.    He is now scheduled for a laryngoscopy with biopsies.     MEDICAL HISTORY:     Patient Active Problem List    Diagnosis Date Noted     Tongue cancer (H) 10/28/2015     Priority: Medium     Neck mass found on 9/28/2015.  He had cancer at the base of his tongue.  Treated with radiation for 7 weeks and three chemo treatments.   ENT notes 4/4/2016:now three months out from completion of chemoradiation therapy for a T3, N2c right-sided tongue base tumor.  He had a PET scan done over the weekend, and it was essentially negative so he has had a complete response.    5/27/2016:Specialists treating his cancer; Dr. Hernandez, ENT.    Dr. Neil, Hematology/Oncology.   Dr. Paul, Oncology radiation.   He had lymphedema in neck as complication.       CKD (chronic kidney disease) stage 1, GFR 90 ml/min or greater 09/05/2014     Priority: Medium     9/5/2014:MAC positive twice.        Colon cancer (H) 03/14/2014     Priority: Medium     9/5/2014:2001 HAD SURGERY AND CURE-treated in [place].   Transverse colectomy.  He had chemo for 6 months.  Cancer treated in 2001.  Last colonoscopy 3 years ago.  He was told to recheck in 4 years-2015.         Type 2 diabetes mellitus with stage 1 chronic kidney disease (H) 11/06/2013     Priority: Medium     Diagnosed about 2008       Hyperlipidemia with target LDL less than 100 11/06/2013     Priority: Medium     Hypertension goal BP (blood pressure) < 140/90 11/06/2013     Priority: Medium      Past Medical History:   Diagnosis Date     Hearing problem      History of radiation therapy      Past Surgical History:   Procedure Laterality Date     APPENDECTOMY  2001     COLECTOMY  Jan 2002     malignant polyp.  Colectomy and colostomy     COLONOSCOPY  Jan 2002    polyps     EYE SURGERY      B cataract     HC UGI ENDOSCOPY W PLACEMENT GASTROSTOMY TUBE PERCUT N/A 11/19/2015    Procedure: COMBINED ESOPHAGOSCOPY, GASTROSCOPY, DUODENOSCOPY (EGD), PLACE PERCUTANEOUS ENDOSCOPIC GASTROSTOMY TUBE;  Surgeon: Sergio Buenrostro MD;  Location: [facility]     TAKEDOWN COLOSTOMY       Current Outpatient Prescriptions   Medication Sig Dispense Refill     lovastatin (MEVACOR) 40 MG tablet TAKE ONE TABLET BY MOUTH NIGHTLY AT BEDTIME 90 tablet 3     lisinopril (PRINIVIL/ZESTRIL) 20 MG tablet Take 1 tablet (20 mg) by mouth daily 90 tablet 3     multivitamin  with lutein (OCUVITE WITH LTEIN) CAPS Take 1 capsule by mouth daily       Cholecalciferol (VITAMIN D) 1000 UNITS capsule Take 1 capsule by mouth daily       OTC products: None, except as noted above    No Known Allergies   Latex Allergy: NO    Social History   Substance Use Topics     Smoking status: Former Smoker     Packs/day: 0.00     Years: 48.00     Types: Cigarettes     Quit date: 11/6/2001     Smokeless tobacco: Never Used      Comment: started at age 17     Alcohol use No     History   Drug Use No     Family History :  ============  No family history of bleeding or anesthesia problems.     REVIEW OF SYSTEMS:   ROS:  General: POSITIVE for:, decreased appetite since radiation.   Eyes: POSITIVE for:, vision changes macular degeneration.    ENT: POSITIVE for:, dysphagia for certain foods.   Resp: No coughing, wheezing or shortness of breath  CV: No chest pains or palpitations  GI: No nausea, vomiting,  heartburn, abdominal pain, diarrhea, constipation or change in bowel habits  : No urinary frequency or dysuria, bladder or kidney problems  Musculoskeletal: No significant muscle or joint pains  Neurologic: No headaches, numbness, tingling, weakness, problems with balance or coordination  Psychiatric: No problems with anxiety, depression or mental health, POSITIVE  "for:, upset with cancer news.   Heme/immune/allergy: No history of bleeding or clotting problems or anemia.  No allergies or immune system problems  Endocrine: POSITIVE for:, diabetes mellitus, not currently on medication.   Lab Results   Component Value Date    A1C 5.2 03/23/2018     Skin: No rashes,worrisome lesions or skin problems     EXAM:   OBJECTIVE:Blood pressure 124/70, pulse 83, temperature 97.2  F (36.2  C), temperature source Tympanic, resp. rate 20, height 5' 7.52\" (1.715 m), weight 169 lb 6.4 oz (76.8 kg), SpO2 99 %. BMI=Body mass index is 26.12 kg/(m^2).  GENERAL APPEARANCE ADULT: Alert, no acute distress  EYES: PERRL, EOM normal, conjunctiva and lids normal, lens implant bilateral  HENT: Ears and TMs normal, oral mucosa and posterior oropharynx normal, edentulous, one partial tooth remains on left mandible.   NECK: No adenopathy,masses or thyromegaly, he has thickening of neck tissues both right and left side anterior neck from previous radiation.  No distinct nodules.   RESP: lungs clear to auscultation   CV: regular rhythm, rate-normal, grade 2/6 systolic murmur heard best over the apex  ABDOMEN: soft, no organomegaly, masses or tenderness, scar from previous surgery.    MS: extremities normal, no peripheral edema   He has resting tremor hands and head.     DIAGNOSTICS:   EKG: appears normal, NSR, normal axis, normal intervals, no acute ST/T changes c/w ischemia, no LVH by voltage criteria, Q waves in inferior leads, nonspecific ST-T changes.  NO previous readings for comparison.     Chem 8 pending.     Recent Labs   Lab Test  11/29/17   0847  07/03/17   0921  04/25/17   0827  12/27/16   0826   10/21/16   0821   HGB   --    --   14.7   --    --   16.6   PLT   --    --   148*   --    --   130*   NA   --    --   144   --    --   140   POTASSIUM   --    --   4.5  3.9   < >  4.0   CR   --    --   0.95  0.91   < >  0.87   A1C  5.4  5.4   --    --    < >   --     < > = values in this interval not " displayed.        IMPRESSION:   Reason for surgery/procedure: mass at base of tongue, history of tongue cancer    The proposed surgical procedure is considered INTERMEDIATE risk.    REVISED CARDIAC RISK INDEX  The patient has the following serious cardiovascular risks for perioperative complications such as (MI, PE, VFib and 3  AV Block):  No serious cardiac risks  INTERPRETATION: 0 risks: Class I (very low risk - 0.4% complication rate)    The patient has the following additional risks for perioperative complications:  No identified additional risks      ICD-10-CM    1. Preop general physical exam Z01.818    2. Tongue cancer (H) C02.9    3. Hyperlipidemia with target LDL less than 100 E78.5 Lipid panel reflex to direct LDL Fasting   4. Type 2 diabetes mellitus with stage 1 chronic kidney disease, without long-term current use of insulin (H) E11.22 Hemoglobin A1c    N18.1    5. CKD (chronic kidney disease) stage 1, GFR 90 ml/min or greater N18.1 Basic metabolic panel       RECOMMENDATIONS:     --Patient is to take all scheduled medications on the day of surgery EXCEPT for modifications listed below.    ACE Inhibitor or Angiotensin Receptor Blocker (ARB) Use  Ace inhibitor or Angiotensin Receptor Blocker (ARB) and should HOLD this medication for the 24 hours prior to surgery.  No lisinopril the morning of surgery.       APPROVAL GIVEN to proceed with proposed procedure, without further diagnostic evaluation       Signed Electronically by: Joe Mandel MD    Copy of this evaluation report is provided to requesting physician.    [facility] Preop Guidelines

## 2018-03-23 NOTE — PATIENT INSTRUCTIONS
Before Your Surgery      Call your surgeon if there is any change in your health. This includes signs of a cold or flu (such as a sore throat, runny nose, cough, rash or fever).    Do not smoke, drink alcohol or take over the counter medicine (unless your surgeon or primary care doctor tells you to) for the 24 hours before and after surgery.    If you take prescribed drugs: Follow your doctor s orders about which medicines to take and which to stop until after surgery.    Eating and drinking prior to surgery: follow the instructions from your surgeon    Take a shower or bath the night before surgery. Use the soap your surgeon gave you to gently clean your skin. If you do not have soap from your surgeon, use your regular soap. Do not shave or scrub the surgery site.  Wear clean pajamas and have clean sheets on your bed.     RECOMMENDATIONS:     --Patient is to take all scheduled medications on the day of surgery EXCEPT for modifications listed below.    ACE Inhibitor or Angiotensin Receptor Blocker (ARB) Use  Ace inhibitor or Angiotensin Receptor Blocker (ARB) and should HOLD this medication for the 24 hours prior to surgery.  No lisinopril the morning of surgery.       APPROVAL GIVEN to proceed with proposed procedure, without further diagnostic evaluation

## 2018-03-23 NOTE — NURSING NOTE
"Chief Complaint   Patient presents with     Pre-Op Exam     Diabetes     Metformin has been on hold since November 2017- A1C repeated today.       Initial /70 (BP Location: Right arm, Patient Position: Right side, Cuff Size: Adult Large)  Pulse 83  Temp 97.2  F (36.2  C) (Tympanic)  Resp 20  Ht 5' 7.52\" (1.715 m)  Wt 169 lb 6.4 oz (76.8 kg)  SpO2 99%  BMI 26.12 kg/m2 Estimated body mass index is 26.12 kg/(m^2) as calculated from the following:    Height as of this encounter: 5' 7.52\" (1.715 m).    Weight as of this encounter: 169 lb 6.4 oz (76.8 kg).      Health Maintenance that is potentially due pending provider review:  NONE    n/a    Is there anyone who you would like to be able to receive your results? Yes- daughter Florecita  If yes have patient fill out NABIL  France Gonzales CMA    "

## 2018-03-25 NOTE — PROGRESS NOTES
Please call. The creatinine, a blood test to measure kidney function is elevated indicating some decrease in kidney function..  This is new this month.  Other chemistries OK.  His triglycerides are high but other lipids all good.   Hgb A1C is good suggesting great control of diabetes mellitus.   PLAN: OK for surgery.  Drink lots of fluids.

## 2018-04-03 ENCOUNTER — ANESTHESIA EVENT (OUTPATIENT)
Dept: SURGERY | Facility: CLINIC | Age: 83
End: 2018-04-03
Payer: MEDICARE

## 2018-04-03 ENCOUNTER — HOSPITAL ENCOUNTER (OUTPATIENT)
Facility: CLINIC | Age: 83
Discharge: HOME | End: 2018-04-03
Attending: OTOLARYNGOLOGY | Admitting: OTOLARYNGOLOGY
Payer: MEDICARE

## 2018-04-03 ENCOUNTER — ANESTHESIA (OUTPATIENT)
Dept: SURGERY | Facility: CLINIC | Age: 83
End: 2018-04-03
Payer: MEDICARE

## 2018-04-03 VITALS
WEIGHT: 168.21 LBS | HEIGHT: 68 IN | SYSTOLIC BLOOD PRESSURE: 134 MMHG | DIASTOLIC BLOOD PRESSURE: 81 MMHG | RESPIRATION RATE: 16 BRPM | BODY MASS INDEX: 25.49 KG/M2 | OXYGEN SATURATION: 95 % | TEMPERATURE: 98.6 F

## 2018-04-03 DIAGNOSIS — C02.9 TONGUE CANCER (H): Primary | ICD-10-CM

## 2018-04-03 LAB — GLUCOSE BLDC GLUCOMTR-MCNC: 121 MG/DL (ref 70–99)

## 2018-04-03 PROCEDURE — 27210794 ZZH OR GENERAL SUPPLY STERILE: Performed by: OTOLARYNGOLOGY

## 2018-04-03 PROCEDURE — 71000014 ZZH RECOVERY PHASE 1 LEVEL 2 FIRST HR: Performed by: OTOLARYNGOLOGY

## 2018-04-03 PROCEDURE — 88305 TISSUE EXAM BY PATHOLOGIST: CPT | Performed by: OTOLARYNGOLOGY

## 2018-04-03 PROCEDURE — 40000170 ZZH STATISTIC PRE-PROCEDURE ASSESSMENT II: Performed by: OTOLARYNGOLOGY

## 2018-04-03 PROCEDURE — A9270 NON-COVERED ITEM OR SERVICE: HCPCS | Performed by: OTOLARYNGOLOGY

## 2018-04-03 PROCEDURE — 37000009 ZZH ANESTHESIA TECHNICAL FEE, EACH ADDTL 15 MIN: Performed by: OTOLARYNGOLOGY

## 2018-04-03 PROCEDURE — 25000128 H RX IP 250 OP 636: Performed by: NURSE ANESTHETIST, CERTIFIED REGISTERED

## 2018-04-03 PROCEDURE — 25000566 ZZH SEVOFLURANE, EA 15 MIN: Performed by: OTOLARYNGOLOGY

## 2018-04-03 PROCEDURE — 37000008 ZZH ANESTHESIA TECHNICAL FEE, 1ST 30 MIN: Performed by: OTOLARYNGOLOGY

## 2018-04-03 PROCEDURE — 82962 GLUCOSE BLOOD TEST: CPT

## 2018-04-03 PROCEDURE — 36000057 ZZH SURGERY LEVEL 3 1ST 30 MIN - UMMC: Performed by: OTOLARYNGOLOGY

## 2018-04-03 PROCEDURE — 25000125 ZZHC RX 250: Performed by: NURSE ANESTHETIST, CERTIFIED REGISTERED

## 2018-04-03 PROCEDURE — C9399 UNCLASSIFIED DRUGS OR BIOLOG: HCPCS | Performed by: NURSE ANESTHETIST, CERTIFIED REGISTERED

## 2018-04-03 PROCEDURE — 88331 PATH CONSLTJ SURG 1 BLK 1SPC: CPT | Performed by: OTOLARYNGOLOGY

## 2018-04-03 PROCEDURE — 25000125 ZZHC RX 250: Performed by: ANESTHESIOLOGY

## 2018-04-03 PROCEDURE — 25000125 ZZHC RX 250: Performed by: OTOLARYNGOLOGY

## 2018-04-03 PROCEDURE — 71000027 ZZH RECOVERY PHASE 2 EACH 15 MINS: Performed by: OTOLARYNGOLOGY

## 2018-04-03 PROCEDURE — 36000059 ZZH SURGERY LEVEL 3 EA 15 ADDTL MIN UMMC: Performed by: OTOLARYNGOLOGY

## 2018-04-03 PROCEDURE — 88342 IMHCHEM/IMCYTCHM 1ST ANTB: CPT | Performed by: OTOLARYNGOLOGY

## 2018-04-03 RX ORDER — NALOXONE HYDROCHLORIDE 0.4 MG/ML
.1-.4 INJECTION, SOLUTION INTRAMUSCULAR; INTRAVENOUS; SUBCUTANEOUS
Status: DISCONTINUED | OUTPATIENT
Start: 2018-04-03 | End: 2018-04-03 | Stop reason: HOSPADM

## 2018-04-03 RX ORDER — SODIUM CHLORIDE, SODIUM LACTATE, POTASSIUM CHLORIDE, CALCIUM CHLORIDE 600; 310; 30; 20 MG/100ML; MG/100ML; MG/100ML; MG/100ML
INJECTION, SOLUTION INTRAVENOUS CONTINUOUS
Status: DISCONTINUED | OUTPATIENT
Start: 2018-04-03 | End: 2018-04-03 | Stop reason: HOSPADM

## 2018-04-03 RX ORDER — SODIUM CHLORIDE 9 MG/ML
INJECTION, SOLUTION INTRAVENOUS CONTINUOUS PRN
Status: DISCONTINUED | OUTPATIENT
Start: 2018-04-03 | End: 2018-04-03

## 2018-04-03 RX ORDER — LIDOCAINE 40 MG/G
CREAM TOPICAL
Status: DISCONTINUED | OUTPATIENT
Start: 2018-04-03 | End: 2018-04-03 | Stop reason: HOSPADM

## 2018-04-03 RX ORDER — OXYMETAZOLINE HYDROCHLORIDE 0.05 G/100ML
SPRAY NASAL PRN
Status: DISCONTINUED | OUTPATIENT
Start: 2018-04-03 | End: 2018-04-03 | Stop reason: HOSPADM

## 2018-04-03 RX ORDER — LIDOCAINE HYDROCHLORIDE 20 MG/ML
INJECTION, SOLUTION INFILTRATION; PERINEURAL PRN
Status: DISCONTINUED | OUTPATIENT
Start: 2018-04-03 | End: 2018-04-03

## 2018-04-03 RX ORDER — OXYCODONE HCL 5 MG/5 ML
5 SOLUTION, ORAL ORAL EVERY 6 HOURS PRN
Qty: 100 ML | Refills: 0 | Status: SHIPPED | OUTPATIENT
Start: 2018-04-03 | End: 2018-05-09

## 2018-04-03 RX ORDER — ONDANSETRON 4 MG/1
4 TABLET, ORALLY DISINTEGRATING ORAL EVERY 30 MIN PRN
Status: DISCONTINUED | OUTPATIENT
Start: 2018-04-03 | End: 2018-04-03 | Stop reason: HOSPADM

## 2018-04-03 RX ORDER — ONDANSETRON 2 MG/ML
4 INJECTION INTRAMUSCULAR; INTRAVENOUS EVERY 30 MIN PRN
Status: DISCONTINUED | OUTPATIENT
Start: 2018-04-03 | End: 2018-04-03 | Stop reason: HOSPADM

## 2018-04-03 RX ORDER — ONDANSETRON 2 MG/ML
INJECTION INTRAMUSCULAR; INTRAVENOUS PRN
Status: DISCONTINUED | OUTPATIENT
Start: 2018-04-03 | End: 2018-04-03

## 2018-04-03 RX ORDER — FENTANYL CITRATE 50 UG/ML
25-50 INJECTION, SOLUTION INTRAMUSCULAR; INTRAVENOUS
Status: DISCONTINUED | OUTPATIENT
Start: 2018-04-03 | End: 2018-04-03 | Stop reason: HOSPADM

## 2018-04-03 RX ORDER — DEXAMETHASONE SODIUM PHOSPHATE 4 MG/ML
INJECTION, SOLUTION INTRA-ARTICULAR; INTRALESIONAL; INTRAMUSCULAR; INTRAVENOUS; SOFT TISSUE PRN
Status: DISCONTINUED | OUTPATIENT
Start: 2018-04-03 | End: 2018-04-03

## 2018-04-03 RX ORDER — FENTANYL CITRATE 50 UG/ML
INJECTION, SOLUTION INTRAMUSCULAR; INTRAVENOUS PRN
Status: DISCONTINUED | OUTPATIENT
Start: 2018-04-03 | End: 2018-04-03

## 2018-04-03 RX ORDER — PROPOFOL 10 MG/ML
INJECTION, EMULSION INTRAVENOUS PRN
Status: DISCONTINUED | OUTPATIENT
Start: 2018-04-03 | End: 2018-04-03

## 2018-04-03 RX ORDER — MEPERIDINE HYDROCHLORIDE 50 MG/ML
12.5 INJECTION INTRAMUSCULAR; INTRAVENOUS; SUBCUTANEOUS
Status: DISCONTINUED | OUTPATIENT
Start: 2018-04-03 | End: 2018-04-03 | Stop reason: HOSPADM

## 2018-04-03 RX ADMIN — ONDANSETRON 4 MG: 2 INJECTION INTRAMUSCULAR; INTRAVENOUS at 09:38

## 2018-04-03 RX ADMIN — PHENYLEPHRINE HYDROCHLORIDE 150 MCG: 10 INJECTION, SOLUTION INTRAMUSCULAR; INTRAVENOUS; SUBCUTANEOUS at 09:38

## 2018-04-03 RX ADMIN — SODIUM CHLORIDE: 9 INJECTION, SOLUTION INTRAVENOUS at 08:24

## 2018-04-03 RX ADMIN — LIDOCAINE HYDROCHLORIDE 100 MG: 20 INJECTION, SOLUTION INFILTRATION; PERINEURAL at 08:58

## 2018-04-03 RX ADMIN — ROCURONIUM BROMIDE 30 MG: 10 INJECTION INTRAVENOUS at 09:00

## 2018-04-03 RX ADMIN — DEXAMETHASONE SODIUM PHOSPHATE 10 MG: 4 INJECTION, SOLUTION INTRA-ARTICULAR; INTRALESIONAL; INTRAMUSCULAR; INTRAVENOUS; SOFT TISSUE at 09:05

## 2018-04-03 RX ADMIN — FENTANYL CITRATE 50 MCG: 50 INJECTION, SOLUTION INTRAMUSCULAR; INTRAVENOUS at 09:03

## 2018-04-03 RX ADMIN — LIDOCAINE 1 EACH: 40 CREAM TOPICAL at 08:58

## 2018-04-03 RX ADMIN — PHENYLEPHRINE HYDROCHLORIDE 100 MCG: 10 INJECTION, SOLUTION INTRAMUSCULAR; INTRAVENOUS; SUBCUTANEOUS at 09:09

## 2018-04-03 RX ADMIN — PHENYLEPHRINE HYDROCHLORIDE 150 MCG: 10 INJECTION, SOLUTION INTRAMUSCULAR; INTRAVENOUS; SUBCUTANEOUS at 09:35

## 2018-04-03 RX ADMIN — SUGAMMADEX 150 MG: 100 INJECTION, SOLUTION INTRAVENOUS at 09:38

## 2018-04-03 RX ADMIN — DEXMEDETOMIDINE HYDROCHLORIDE 0.4 MCG/KG/HR: 100 INJECTION, SOLUTION INTRAVENOUS at 08:41

## 2018-04-03 RX ADMIN — PROPOFOL 160 MG: 10 INJECTION, EMULSION INTRAVENOUS at 09:00

## 2018-04-03 ASSESSMENT — PAIN DESCRIPTION - DESCRIPTORS: DESCRIPTORS: SORE

## 2018-04-03 NOTE — OR NURSING
Pt and daughter are comfortable with discharge instructions. Daughter states that Dr. Hernandez did come to talk with her post op.  Pt. And daughter do not feel they have any need to speak with Dr. Wetzel prior to discharge.  Pt. And Daughter are declining to  ordered script for today.

## 2018-04-03 NOTE — IP AVS SNAPSHOT
MRN:[id number]                      After Visit Summary   4/3/2018    Patrick Diop    MRN: [id number]           Thank you!     Thank you for choosing [facility] for your care. Our goal is always to provide you with excellent care. Hearing back from our patients is one way we can continue to improve our services. Please take a few minutes to complete the written survey that you may receive in the mail after you visit with us. Thank you!        Patient Information     Date Of Birth          1935        About your hospital stay     You were admitted on:  April 3, 2018 You last received care in the:  Same Day [facility]    You were discharged on:  April 3, 2018       Who to Call     For medical emergencies, please call 911.  For non-urgent questions about your medical care, please call your primary care provider or clinic, [phone]  For questions related to your surgery, please call your surgery clinic        Attending Provider     Provider Jj Alfonso MD Otolaryngology       Primary Care Provider Office Phone # Fax #    Joe Mandel [phone]      After Care Instructions     Diet Instructions       No sharp foods for 1 week. Soft diet for comfort.            Discharge Instructions       Patient to follow up with surgeon on 4/9/2018 (already scheduled).            Discharge Instructions - Lifting restrictions       Do not lift >10 pounds for 1 week            No driving or operating machinery       While taking narcotic pain medication                  Your next 10 appointments already scheduled     Apr 09, 2018  1:00 PM CDT   (Arrive by 12:45 PM)   Return Visit with Jj Hernandez MD   [facility] ([facility])    [address] [place] [id number]   [phone]            Sep 07, 2018  8:30 AM CDT   Return Visit with [facility] Provider   [facility] ([facility]  [facility])    [address], [address]  [facility] [address]   [phone]              Further instructions from your care team       Same-Day Surgery   Adult Discharge Orders & Instructions     For 24 hours after surgery    1. Get plenty of rest.  A responsible adult must stay with you for at least 24 hours after you leave the hospital.   2. Do not drive or use heavy equipment.  If you have weakness or tingling, don't drive or use heavy equipment until this feeling goes away.  3. Do not drink alcohol.  4. Avoid strenuous or risky activities.  Ask for help when climbing stairs.   5. You may feel lightheaded.  IF so, sit for a few minutes before standing.  Have someone help you get up.   6. If you have nausea (feel sick to your stomach): Drink only clear liquids such as apple juice, ginger ale, broth or 7-Up.  Rest may also help.  Be sure to drink enough fluids.  Move to a regular diet as you feel able.  7. You may have a slight fever. Call the doctor if your fever is over 100 F (37.7 C) (taken under the tongue) or lasts longer than 24 hours.  8. You may have a dry mouth, a sore throat, muscle aches or trouble sleeping.  These should go away after 24 hours.  9. Do not make important or legal decisions.   Call your doctor for any of the followin.  Signs of infection (fever, growing tenderness at the surgery site, a large amount of drainage or bleeding, severe pain, foul-smelling drainage, redness, swelling).    2. It has been over 8 to 10 hours since surgery and you are still not able to urinate (pass water).    3.  Headache for over 24 hours.      To contact a doctor, call Dr Hernandez's office at [phone] or:    X   [phone] and ask for the resident on call for Otolaryngology (answered 24 hours a day)  X   Emergency Department:    [facility]: [phone]       (TTY for hearing impaired: [phone])         Tips for taking pain medications  To get the best pain relief  "possible , remember these points:      Take pain medications as directed, before pain becomes severe      Pain medication can upset your stomach: taking it with food may help      Constipation is a common side effect of pain medication. Drink plenty of  Fluids      Eat foods high in fiber. Take a stool softener  if recommended by your doctor or  Pharmacist.        Do not drink alcohol, drive or operate machinery while taking pain medications.      Ask about other ways to control pain, such as with heat, ice or relaxation.           Pending Results     Date and Time Order Name Status Description    4/3/2018 0913 Surgical pathology exam Preliminary             Admission Information     Date & Time Provider Department Dept. Phone    4/3/2018 Jj Hernandze MD Same Day Surgery [facility] [phone]      Your Vitals Were     Blood Pressure Temperature Respirations Height Weight Pulse Oximetry    119/77 98.2  F (36.8  C) (Oral) 14 1.715 m (5' 7.5\") 76.3 kg (168 lb 3.4 oz) 93%    BMI (Body Mass Index)                   25.96 kg/m2           Care EveryWhere ID     This is your Care EveryWhere ID. This could be used by other organizations to access your [facility] medical records  [id number]        Equal Access to Services     RAINA SOLANO AH: Hadii frances Brock, waaileenda sony, qaybta kaaldacia buckley, elizabeth dias. So [facility] [phone].    ATENCIÓN: Si habla español, tiene a taylor disposición servicios gratuitos de asistencia lingüística. Llame al [phone].    We comply with applicable federal civil rights laws and [place] laws. We do not discriminate on the basis of race, color, national origin, age, disability, sex, sexual orientation, or gender identity.               Review of your medicines      START taking        Dose / Directions    oxyCODONE 5 MG/5ML solution   Commonly known as:  ROXICODONE   Used for:  Tongue cancer (H)        Dose:  5 mg   Take 5 mLs (5 mg) " by mouth every 6 hours as needed for severe pain maximum 20 mL per day   Quantity:  100 mL   Refills:  0         CONTINUE these medicines which have NOT CHANGED        Dose / Directions    lisinopril 20 MG tablet   Commonly known as:  PRINIVIL/ZESTRIL   Used for:  Hypertension goal BP (blood pressure) < 140/90        Dose:  20 mg   Take 1 tablet (20 mg) by mouth daily   Quantity:  90 tablet   Refills:  3       lovastatin 40 MG tablet   Commonly known as:  MEVACOR   Used for:  Hyperlipidemia with target LDL less than 100        TAKE ONE TABLET BY MOUTH NIGHTLY AT BEDTIME   Quantity:  90 tablet   Refills:  3       multivitamin  with lutein Caps per capsule        Dose:  1 capsule   Take 1 capsule by mouth daily   Refills:  0       vitamin D 1000 UNITS capsule        Dose:  1 capsule   Take 1 capsule by mouth daily   Refills:  0            Where to get your medicines      Some of these will need a paper prescription and others can be bought over the counter. Ask your nurse if you have questions.     Bring a paper prescription for each of these medications     oxyCODONE 5 MG/5ML solution                Protect others around you: Learn how to safely use, store and throw away your medicines at www.disposemymeds.org.        Information about OPIOIDS     PRESCRIPTION OPIOIDS: WHAT YOU NEED TO KNOW    Prescription opioids can be used to help relieve moderate to severe pain and are often prescribed following a surgery or injury, or for certain health conditions. These medications can be an important part of treatment but also come with serious risks. It is important to work with your health care provider to make sure you are getting the safest, most effective care.    WHAT ARE THE RISKS AND SIDE EFFECTS OF OPIOID USE?  Prescription opioids carry serious risks of addiction and overdose, especially with prolonged use. An opioid overdose, often marked by slowed breathing can cause sudden death. The use of prescription opioids  can have a number of side effects as well, even when taken as directed:      Tolerance - meaning you might need to take more of a medication for the same pain relief    Physical dependence - meaning you have symptoms of withdrawal when a medication is stopped    Increased sensitivity to pain    Constipation    Nausea, vomiting, and dry mouth    Sleepiness and dizziness    Confusion    Depression    Low levels of testosterone that can result in lower sex drive, energy, and strength    Itching and sweating    RISKS ARE GREATER WITH:    History of drug misuse, substance use disorder, or overdose    Mental health conditions (such as depression or anxiety)    Sleep apnea    Older age (65 years or older)    Pregnancy    Avoid alcohol while taking prescription opioids.   Also, unless specifically advised by your health care provider, medications to avoid include:    Benzodiazepines (such as Xanax or Valium)    Muscle relaxants (such as Soma or Flexeril)    Hypnotics (such as Ambien or Lunesta)    Other prescription opioids    KNOW YOUR OPTIONS:  Talk to your health care provider about ways to manage your pain that do not involve prescription opioids. Some of these options may actually work better and have fewer risks and side effects:    Pain relievers such as acetaminophen, ibuprofen, and naproxen    Some medications that are also used for depression or seizures    Physical therapy and exercise    Cognitive behavioral therapy, a psychological, goal-directed approach, in which patients learn how to modify physical, behavioral, and emotional triggers of pain and stress    IF YOU ARE PRESCRIBED OPIOIDS FOR PAIN:    Never take opioids in greater amounts or more often than prescribed    Follow up with your primary health care provider and work together to create a plan on how to manage your pain.    Talk about ways to help manage your pain that do not involve prescription opioids    Talk about all concerns and side  effects    Help prevent misuse and abuse    Never sell or share prescription opioids    Never use another person's prescription opioids    Store prescription opioids in a secure place and out of reach of others (this may include visitors, children, friends, and family)    Visit www.cdc.gov/drugoverdose to learn about risks of opioid abuse and overdose    If you believe you may be struggling with addiction, tell your health care provider and ask for guidance or call [facility]'s National Helpline at 5-296-016-HELP    LEARN MORE / www.cdc.gov/drugoverdose/prescribing/guideline.html    Safely dispose of unused prescription opioids: Find your local drug take-back programs and more information about the importance of safe disposal at www.doseofreality.mn.gov             Medication List: This is a list of all your medications and when to take them. Check marks below indicate your daily home schedule. Keep this list as a reference.      Medications           Morning Afternoon Evening Bedtime As Needed    lisinopril 20 MG tablet   Commonly known as:  PRINIVIL/ZESTRIL   Take 1 tablet (20 mg) by mouth daily                                lovastatin 40 MG tablet   Commonly known as:  MEVACOR   TAKE ONE TABLET BY MOUTH NIGHTLY AT BEDTIME                                multivitamin  with lutein Caps per capsule   Take 1 capsule by mouth daily                                oxyCODONE 5 MG/5ML solution   Commonly known as:  ROXICODONE   Take 5 mLs (5 mg) by mouth every 6 hours as needed for severe pain maximum 20 mL per day                                vitamin D 1000 UNITS capsule   Take 1 capsule by mouth daily

## 2018-04-03 NOTE — OR NURSING
Dr. Gregoria Wetzel (Otolarynology) text paged to talk to patient and his daughter before discharge.

## 2018-04-03 NOTE — ANESTHESIA PREPROCEDURE EVALUATION
Anesthesia Evaluation     . Pt has had prior anesthetic.     No history of anesthetic complications          ROS/MED HX    ENT/Pulmonary:       Neurologic:  - neg neurologic ROS     Cardiovascular:     (+) hypertension----. : . . . :. . Previous cardiac testing Echodate:results:date: results:ECG reviewed date: results: date: results:          METS/Exercise Tolerance:     Hematologic:         Musculoskeletal:         GI/Hepatic:         Renal/Genitourinary:     (+) chronic renal disease, type: CRI,       Endo:     (+) type II DM .      Psychiatric:         Infectious Disease:         Malignancy:   (+) Malignancy           Other:                     Physical Exam      Airway   Mallampati: III  TM distance: >3 FB  Neck ROM: limited  Comment: Previous radiation to face/neck. Neck lymphadema    Dental   (+) upper dentures and lower dentures    Cardiovascular   Rhythm and rate: regular      Pulmonary    breath sounds clear to auscultation                    Anesthesia Plan      History & Physical Review  History and physical reviewed and following examination; no interval change.    ASA Status:  3 .    NPO Status:  > 8 hours    Plan for Awake Technique with Intravenous induction. Maintenance will be Balanced.    PONV prophylaxis:  Ondansetron (or other 5HT-3)       Postoperative Care  Postoperative pain management:  Multi-modal analgesia.      Consents  Anesthetic plan, risks, benefits and alternatives discussed with:  Patient..                          .

## 2018-04-03 NOTE — OR NURSING
Pt and daughter comfortable with status and discharge instructions.  Pt. Transported to [facility] in wheel chair by Pt. Transport staff.

## 2018-04-03 NOTE — OP NOTE
DATE OF PROCEDURE: 04/03/18  ?  SURGEON: Jj Hernandez MD  ?  ASSISTANT: Christianne Wetzel MD  ?  PREOPERATIVE DIAGNOSIS:  Right tongue base mass   ?  POSTOPERATIVE DIAGNOSIS:  Same  ?  NAME OF PROCEDURE:  1. Direct laryngoscopy  2. Biopsy of right tongue base  ?  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  He was recently found to have a concerning mass on the right tongue base on surveillance CT.  This was also seen on PET/CT and laryngoscopy. It was recommended he proceed to the operating room for the above procedure. After discussion of risks and benefits, the parents consented to the above procedure.  ?  ANESTHESIA: General.  ?  FINDINGS: The oral cavity was normal to inspection and palpation. This patient is edentulous.  A firm, indurated lesion was palpated in the right tongue base.  The right base of tongue had an ~ 2 cm indurated, friable lesion that appeared abnormal to inspection. The oral cavity, supraglottis, glottis, and hypopharynx showed normal post-radiation changes on inspection.  The airway was able to be exposed with the Dedo laryngoscope. Multiple biopsies were obtained of the right base of tongue and sent for frozen pathology.     DESCRIPTION OF PROCEDURE: After informed consent was obtained, the patient was brought down to the operating room, transferred to the operating table and laid in a supine position. General anesthesia was induced and the patient was orotracheally intubated. Monitoring lines were placed as appropriate. The head of the bed was then rotated 90 degrees counterclockwise, and a shoulder roll was placed. The patient was prepped and draped in normal sterile fashion. A time-out was performed to verify the correct patient, procedure and site. All present were in agreement. We began with palpation of the oral cavity, the oropharynx, the nasopharynx, and the supraglottis.  We noted induration in the right  base of tongue.  Next, we protected the upper and lower teeth with a thermal tooth protector.  We then introduced the Dedo laryngoscope and did a thorough upper aerodigestive examination. Findings as above. Biopsies of the right base of tongue were taken and sent for frozen pathology.  Hemostasis was achieved through application of afrin-soaked cotton swabs.  This concluded our procedure and the patient was turned back over to the care of the Anesthesia service.    The patient tolerated the procedure well.  No complications were noted. Estimated blood loss for the procedure was 5 mL. The patient tolerated the procedure and the anesthesia without difficulty.  Dr. Hernandez was present and available for all critical portions of this procedure.    Christianne Wetzel MD PGY-3  Otolaryngology-Head & Neck Surgery

## 2018-04-03 NOTE — ANESTHESIA CARE TRANSFER NOTE
Patient: Patrick Diop    Procedure(s):  Direct Laryngoscopy, Biopsy Base Of Tongue - Wound Class: II-Clean Contaminated    Diagnosis: Tongue Cancer   Diagnosis Additional Information: No value filed.    Anesthesia Type:   Awake Technique     Note:  Airway :Face Mask  Patient transferred to:PACU  Handoff Report: Identifed the Patient, Identified the Reponsible Provider, Reviewed the pertinent medical history, Discussed the surgical course, Reviewed Intra-OP anesthesia mangement and issues during anesthesia, Set expectations for post-procedure period and Allowed opportunity for questions and acknowledgement of understanding      Vitals: (Last set prior to Anesthesia Care Transfer)    CRNA VITALS  4/3/2018 0915 - 4/3/2018 0950      4/3/2018             Pulse: 90    SpO2: 100 %    Resp Rate (observed): 11    Resp Rate (set): 10                Electronically Signed By: Charles Patrick Schlatter, APRN CRNA  April 3, 2018  9:50 AM

## 2018-04-03 NOTE — OR NURSING
Dr. CHAITANYA De Leon here to check on Pt. In phase 2.  Daughter with Pt.  Pt. Continues to deny sore throat.  Waiting for Pt. Transport.

## 2018-04-03 NOTE — IP AVS SNAPSHOT
Same Day Surgery [address] [address]    Phone:  [phone]                                       After Visit Summary   4/3/2018    Patrick Diop    MRN: [id number]           After Visit Summary Signature Page     I have received my discharge instructions, and my questions have been answered. I have discussed any challenges I see with this plan with the nurse or doctor.    ..........................................................................................................................................  Patient/Patient Representative Signature      ..........................................................................................................................................  Patient Representative Print Name and Relationship to Patient    ..................................................               ................................................  Date                                            Time    ..........................................................................................................................................  Reviewed by Signature/Title    ...................................................              ..............................................  Date                                                            Time

## 2018-04-03 NOTE — ANESTHESIA POSTPROCEDURE EVALUATION
Patient: Patrick Diop    Procedure(s):  Direct Laryngoscopy, Biopsy Base Of Tongue - Wound Class: II-Clean Contaminated    Diagnosis:Tongue Cancer   Diagnosis Additional Information: No value filed.    Anesthesia Type:  Awake Technique    Note:  Anesthesia Post Evaluation    Patient location during evaluation: Phase 2  Patient participation: Able to fully participate in evaluation  Level of consciousness: awake  Pain management: adequate  Airway patency: patent  Cardiovascular status: acceptable  Respiratory status: acceptable  Hydration status: acceptable  PONV: none     Anesthetic complications: None          Last vitals:  Vitals:    04/03/18 1100 04/03/18 1115 04/03/18 1130   BP: 137/79 130/80 134/81   Resp: 16 16 16   Temp:   37  C (98.6  F)   SpO2: 95% 98% 95%         Electronically Signed By: Addison De Leon MD  April 3, 2018  12:45 PM

## 2018-04-03 NOTE — DISCHARGE INSTRUCTIONS
Same-Day Surgery   Adult Discharge Orders & Instructions     For 24 hours after surgery    1. Get plenty of rest.  A responsible adult must stay with you for at least 24 hours after you leave the hospital.   2. Do not drive or use heavy equipment.  If you have weakness or tingling, don't drive or use heavy equipment until this feeling goes away.  3. Do not drink alcohol.  4. Avoid strenuous or risky activities.  Ask for help when climbing stairs.   5. You may feel lightheaded.  IF so, sit for a few minutes before standing.  Have someone help you get up.   6. If you have nausea (feel sick to your stomach): Drink only clear liquids such as apple juice, ginger ale, broth or 7-Up.  Rest may also help.  Be sure to drink enough fluids.  Move to a regular diet as you feel able.  7. You may have a slight fever. Call the doctor if your fever is over 100 F (37.7 C) (taken under the tongue) or lasts longer than 24 hours.  8. You may have a dry mouth, a sore throat, muscle aches or trouble sleeping.  These should go away after 24 hours.  9. Do not make important or legal decisions.   Call your doctor for any of the followin.  Signs of infection (fever, growing tenderness at the surgery site, a large amount of drainage or bleeding, severe pain, foul-smelling drainage, redness, swelling).    2. It has been over 8 to 10 hours since surgery and you are still not able to urinate (pass water).    3.  Headache for over 24 hours.      To contact a doctor, call Dr Hernandez's office at [phone] or:    X   [phone] and ask for the resident on call for Otolaryngology (answered 24 hours a day)  X   Emergency Department:    [facility]: [phone]       (TTY for hearing impaired: [phone])         Tips for taking pain medications  To get the best pain relief possible , remember these points:      Take pain medications as directed, before pain becomes severe      Pain medication can upset your stomach: taking it  with food may help      Constipation is a common side effect of pain medication. Drink plenty of  Fluids      Eat foods high in fiber. Take a stool softener  if recommended by your doctor or  Pharmacist.        Do not drink alcohol, drive or operate machinery while taking pain medications.      Ask about other ways to control pain, such as with heat, ice or relaxation.

## 2018-04-04 LAB — COPATH REPORT: NORMAL

## 2018-04-06 ENCOUNTER — CARE COORDINATION (OUTPATIENT)
Dept: OTOLARYNGOLOGY | Facility: CLINIC | Age: 83
End: 2018-04-06

## 2018-04-06 NOTE — PROGRESS NOTES
Dr. Hernandez reviewed [facility] final pathology results.  Patrick was called and notified of the results  He and his family had discussed findings with Dr. Hernandez after surgery last week and Patrick was not interested in surgery . I asked Patrick if he wanted to come in on Monday to discuss surgery again with Dr. Hernandez and he decided he is not interested.  He will be set up for palliative chemotherapy instead.

## 2018-04-12 ENCOUNTER — ONCOLOGY VISIT (OUTPATIENT)
Dept: ONCOLOGY | Facility: CLINIC | Age: 83
End: 2018-04-12
Attending: INTERNAL MEDICINE
Payer: COMMERCIAL

## 2018-04-12 VITALS
HEIGHT: 68 IN | BODY MASS INDEX: 25.6 KG/M2 | HEART RATE: 86 BPM | TEMPERATURE: 97.6 F | WEIGHT: 168.9 LBS | OXYGEN SATURATION: 97 % | RESPIRATION RATE: 16 BRPM | SYSTOLIC BLOOD PRESSURE: 137 MMHG | DIASTOLIC BLOOD PRESSURE: 79 MMHG

## 2018-04-12 DIAGNOSIS — E78.5 HYPERLIPIDEMIA WITH TARGET LDL LESS THAN 100: ICD-10-CM

## 2018-04-12 DIAGNOSIS — E11.22 TYPE 2 DIABETES MELLITUS WITH STAGE 1 CHRONIC KIDNEY DISEASE, WITHOUT LONG-TERM CURRENT USE OF INSULIN (H): ICD-10-CM

## 2018-04-12 DIAGNOSIS — C02.9 RECURRENT TONGUE CANCER (H): ICD-10-CM

## 2018-04-12 DIAGNOSIS — I10 HYPERTENSION GOAL BP (BLOOD PRESSURE) < 140/90: ICD-10-CM

## 2018-04-12 DIAGNOSIS — C18.9 MALIGNANT NEOPLASM OF COLON, UNSPECIFIED PART OF COLON (H): ICD-10-CM

## 2018-04-12 DIAGNOSIS — C02.9 TONGUE CANCER (H): Primary | ICD-10-CM

## 2018-04-12 DIAGNOSIS — N18.1 TYPE 2 DIABETES MELLITUS WITH STAGE 1 CHRONIC KIDNEY DISEASE, WITHOUT LONG-TERM CURRENT USE OF INSULIN (H): ICD-10-CM

## 2018-04-12 DIAGNOSIS — N18.1 CKD (CHRONIC KIDNEY DISEASE) STAGE 1, GFR 90 ML/MIN OR GREATER: ICD-10-CM

## 2018-04-12 PROCEDURE — 99215 OFFICE O/P EST HI 40 MIN: CPT | Performed by: INTERNAL MEDICINE

## 2018-04-12 PROCEDURE — G0463 HOSPITAL OUTPT CLINIC VISIT: HCPCS

## 2018-04-12 RX ORDER — PROCHLORPERAZINE MALEATE 10 MG
10 TABLET ORAL EVERY 6 HOURS PRN
Qty: 30 TABLET | Refills: 2 | Status: SHIPPED | OUTPATIENT
Start: 2018-04-12 | End: 2019-01-01

## 2018-04-12 RX ORDER — LORAZEPAM 0.5 MG/1
0.5 TABLET ORAL EVERY 4 HOURS PRN
Qty: 30 TABLET | Refills: 2 | Status: ON HOLD | OUTPATIENT
Start: 2018-04-12 | End: 2019-01-01

## 2018-04-12 ASSESSMENT — PAIN SCALES - GENERAL: PAINLEVEL: NO PAIN (0)

## 2018-04-12 NOTE — MR AVS SNAPSHOT
After Visit Summary   4/12/2018    Patrick Diop    MRN: [id number]           Patient Information     Date Of Birth          1935        Visit Information        Provider Department      4/12/2018 2:15 PM Lanny Neil MD [facility] ONCOL[facility]Y      Today's Diagnoses     Tongue cancer (H)    -  1    Hypertension goal BP (blood pressure) < 140/90        Hyperlipidemia with target LDL less than 100        Type 2 diabetes mellitus with stage 1 chronic kidney disease, without long-term current use of insulin (H)        CKD (chronic kidney disease) stage 1, GFR 90 ml/min or greater           Follow-ups after your visit        Your next 10 appointments already scheduled     Apr 18, 2018  9:30 AM CDT   LAB with [facility] ([facility])    [address] [address]   [phone]           Please do not eat 10-12 hours before your appointment if you are coming in fasting for labs on lipids, cholesterol, or glucose (sugar). This does not apply to pregnant women. Water, hot tea and black coffee (with nothing added) are okay. Do not drink other fluids, diet soda or chew gum.            Apr 18, 2018 10:30 AM CDT   Level 2 with ROOM 8 [facility] ([facility])    [facility]  [address]  [facility] [address]   [phone]            May 09, 2018  9:30 AM CDT   LAB with [facility] ([facility])    [address] [address]   [phone]           Please do not eat 10-12 hours before your appointment if you are coming in fasting for labs on lipids, cholesterol, or glucose (sugar). This does not apply to pregnant women. Water, hot tea and black coffee (with nothing added) are okay. Do not drink other fluids, diet soda or chew gum.            May 09, 2018 10:30 AM CDT   Return Visit with Lanny Neil MD  "  [facility] ([facility])    [facility]  [address]  [facility] [address]   [phone]            May 09, 2018 11:00 AM CDT   Level 2 with ROOM 2 [facility] ([facility])    [facility]  [address]  [facility] [address]   [phone]            Sep 07, 2018  8:30 AM CDT   Return Visit with [facility] Provider   [facility] ([facility])    [address]  [facility] [place] [address]   [phone]              Who to contact     If you have questions or need follow up information about today's clinic visit or your schedule please contact [facility] directly at [phone].  Normal or non-critical lab and imaging results will be communicated to you by MyChart, letter or phone within 4 business days after the clinic has received the results. If you do not hear from us within 7 days, please contact the clinic through MyChart or phone. If you have a critical or abnormal lab result, we will notify you by phone as soon as possible.  Submit refill requests through [employer] or call your pharmacy and they will forward the refill request to us. Please allow 3 business days for your refill to be completed.          Additional Information About Your Visit        Care EveryWhere ID     This is your Care EveryWhere ID. This could be used by other organizations to access your [facility] medical records  [id number]        Your Vitals Were     Pulse Temperature Respirations Height Pulse Oximetry BMI (Body Mass Index)    86 97.6  F (36.4  C) (Tympanic) 16 1.715 m (5' 7.5\") 97% 26.06 kg/m2       Blood Pressure from Last 3 Encounters:   04/12/18 137/79   04/03/18 134/81   03/23/18 124/70    Weight from Last 3 Encounters:   04/12/18 76.6 kg (168 lb 14.4 oz)   04/03/18 76.3 kg (168 lb 3.4 oz)   03/23/18 76.8 kg (169 lb 6.4 oz)              Today, you had the following  "    No orders found for display       Primary Care Provider Office Phone # Fax #    Joe Mandel [phone] [address] [place] [id number]        Equal Access to Services     RAINA SOLANO : Norma frances quinteros nattyo Sozoë, waaileenda luqadaha, qaybta kaalmada marcio, elizabeth farah laWandadawson dias. So [facility] [phone].    ATENCIÓN: Si habla español, tiene a taylor disposición servicios gratuitos de asistencia lingüística. Llame al [phone].    We comply with applicable federal civil rights laws and [place] laws. We do not discriminate on the basis of race, color, national origin, age, disability, sex, sexual orientation, or gender identity.            Thank you!     Thank you for choosing [facility]  for your care. Our goal is always to provide you with excellent care. Hearing back from our patients is one way we can continue to improve our services. Please take a few minutes to complete the written survey that you may receive in the mail after your visit with us. Thank you!             Your Updated Medication List - Protect others around you: Learn how to safely use, store and throw away your medicines at www.disposemymeds.org.          This list is accurate as of 4/12/18  3:28 PM.  Always use your most recent med list.                   Brand Name Dispense Instructions for use Diagnosis    lisinopril 20 MG tablet    PRINIVIL/ZESTRIL    90 tablet    Take 1 tablet (20 mg) by mouth daily    Hypertension goal BP (blood pressure) < 140/90       lovastatin 40 MG tablet    MEVACOR    90 tablet    TAKE ONE TABLET BY MOUTH NIGHTLY AT BEDTIME    Hyperlipidemia with target LDL less than 100       multivitamin  with lutein Caps per capsule      Take 1 capsule by mouth daily        oxyCODONE 5 MG/5ML solution    ROXICODONE    100 mL    Take 5 mLs (5 mg) by mouth every 6 hours as needed for severe pain maximum 20 mL per day    Tongue cancer (H)       vitamin D 1000 units  capsule      Take 1 capsule by mouth daily

## 2018-04-12 NOTE — LETTER
"    4/12/2018         RE: Patrick Diop  [address]   [facility] [address]        Dear Colleague,    Thank you for referring your patient, Patrick Diop, to the [facility]. Please see a copy of my visit note below.    Chemotherapy Education    Patient is a 82 year old male here today for chemotherapy education, accompanied by daughter.  Pt has a cancer diagnosis of   Encounter Diagnoses   Name Primary?     Tongue cancer (H) Yes     Hypertension goal BP (blood pressure) < 140/90      Hyperlipidemia with target LDL less than 100      Type 2 diabetes mellitus with stage 1 chronic kidney disease, without long-term current use of insulin (H)      CKD (chronic kidney disease) stage 1, GFR 90 ml/min or greater      and their main concern is none at this time.  Their Oncologist is Dr. CONY Neil, and PCP is Joe Mandel.  Reviewed the following with the patient and their support person:  Treatment goal: Palliative  Treatment regimen & duration: Keytruda day 1 every 21 days until progression; and rationale for strict adherence to this schedule, including specific medication names including pre-treatment medications and at home scheduled or as needed medications, delivery methods,.  Potential side effects: Side effects and management; including skin changes/hand-foot syndrome, anemia, neutropenia, thrombocytopenia, diarrhea/constipation, hair loss syndrome, memory changes/ \"chemobrain\", mouth sores, taste changes, neuropathy, fatigue, myelosuppression, sexuality/infertility, and risk of extravasation or infiltration.  Infection prevention, and monitoring of lab values, what lab tests and what changes of these values meant, along with the possibility of hydration or blood product transfusion, or the need to defer or hold treatment.    Chemotherapy information, including ways it is excreted from the body and cleaning and containment of vomitus or other bodily fluid, use of the bathroom, " "sexual health and intimacy, what to do if needing to miss a treatment, when to call a provider and the need for staff to wear protective equipment.  Importance of Central line care (port) or IV site care.  Written information: Written information including the \"Getting Ready for Chemotherapy: What to Expect, Before, During, and After your Treatment\" booklet, specific drug information guides printed from [employer], [employer] booklets \"Eating Hints: Before, During, and After Cancer Treatment\" and \"Chemotherapy and You\".  Also, a folder with information on when to contact the provider, various programs offered at [facility], and our business card with contact information given; Oncology Clinic, RN Case Manager, and the after hours Nurse Advise Line.  General orientation to the Medical Oncology department, Infusion Services department, Huc/scheduling, bathrooms and usual flow of the treatment day provided as well as introduction to the Infusion nurses.  No barriers to learning identified. Patient and family verbalized understanding of all written and verbal information. All questions answered to patients satisfaction.   Other concerns: Reviewed take home medications  Pt instructed to call with further questions or concerns.  Patient states understanding and is in agreement with this plan.  Copy of appointments, and after visit summary (AVS) given to patient. Patient discharged ambulatory.    Face to Face time with patient: 20 min    Yolanda Eduardo RN, BSN, OCN  Oncology Hematology   Breast Cancer Navigator  [facility]  [email or web address]  Phone [phone]      Hematology/ Oncology Follow-up Visit:  Apr 12, 2018    Reason for Visit:   Chief Complaint   Patient presents with     Oncology Clinic Visit     Recheck due to progression of  Malignant neoplasm of colon, unspecified part of colon, review Pathology & CT's       Oncologic History:  Recurrent tongue cancer (H)  Patrick " BILL Diop was noted to have a mass in his right neck during routine physical exam. CT scanning was subsequently obtained which also showed a right-sided tongue base and oropharyngeal mass, which together are consistent with malignancy. The patient himself has denied any unexplained weight loss. He denies any odynophagia, dysphagia, hoarseness or otalgia. 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 PET scan done on October 21, 2015 showed a mass at the level of the tongue base located along the anterior wall of the oropharynx and extending to the right lateral wall near the tonsillar pillar. This demonstrates abnormal increased FDG activity and is consistent with known malignancy. Hypermetabolic lymph nodes in the right and left neck are also noted as described. These are consistent with metastatic lymph nodes. There is no evidence of metastatic disease in the chest, abdomen or pelvis. CT of the soft tissues of the neck on October 21, 2015 was done in conjunction with PET scanning showed a 2.8 cm mass at the base of the tongue on the right effacing the right vallecula. There is also a 2.3 cm lymph node in the right level II region that is strongly suspicious for metastatic disease. These lesions are PET positive. Additionally, there is a 1.3 cm lymph node in left level III that is PET positive. There is a lymph node in right level V, also PET positive. He started on concurrent Cisplatin with radiation therapy with cisplatin 100 mg/m to be given intravenously on days #1, #22 #43 of radiation therapy.      Interval History:  Patient returning today for follow-up.  He was recently diagnosed with a new recurrence of tongue cancer.  Biopsy came back positive.  Patient was offered surgery.  The patient declined surgery because of potential complications.  He is here today to discuss palliative therapy.    Review Of Systems:  Constitutional: Negative for fever, chills, and night  "sweats.  Skin: negative.  Eyes: negative.  Ears/Nose/Throat: negative.  Respiratory: No shortness of breath, dyspnea on exertion, cough, or hemoptysis.  Cardiovascular: negative.  Gastrointestinal: negative.  Genitourinary: negative.  Musculoskeletal: negative.  Neurologic: negative.  Psychiatric: negative.  Hematologic/Lymphatic/Immunologic: negative.  Endocrine: negative.    All other ROS negative unless mentioned in interval history.    Past medical, social, surgical, and family histories reviewed.    Allergies:  Allergies as of 04/12/2018     (No Known Allergies)       Current Medications:  Current Outpatient Prescriptions   Medication Sig Dispense Refill     oxyCODONE (ROXICODONE) 5 MG/5ML solution Take 5 mLs (5 mg) by mouth every 6 hours as needed for severe pain maximum 20 mL per day 100 mL 0     lovastatin (MEVACOR) 40 MG tablet TAKE ONE TABLET BY MOUTH NIGHTLY AT BEDTIME 90 tablet 3     lisinopril (PRINIVIL/ZESTRIL) 20 MG tablet Take 1 tablet (20 mg) by mouth daily 90 tablet 3     multivitamin  with lutein (OCUVITE WITH LTEIN) CAPS Take 1 capsule by mouth daily       Cholecalciferol (VITAMIN D) 1000 UNITS capsule Take 1 capsule by mouth daily       LORazepam (ATIVAN) 0.5 MG tablet Take 1 tablet (0.5 mg) by mouth every 4 hours as needed (Anxiety, Nausea/Vomiting or Sleep) 30 tablet 2     prochlorperazine (COMPAZINE) 10 MG tablet Take 1 tablet (10 mg) by mouth every 6 hours as needed (Nausea/Vomiting) 30 tablet 2        Physical Exam:  /79 (BP Location: Right arm, Patient Position: Chair, Cuff Size: Adult Regular)  Pulse 86  Temp 97.6  F (36.4  C) (Tympanic)  Resp 16  Ht 1.715 m (5' 7.5\")  Wt 76.6 kg (168 lb 14.4 oz)  SpO2 97%  BMI 26.06 kg/m2  Wt Readings from Last 12 Encounters:   04/12/18 76.6 kg (168 lb 14.4 oz)   04/03/18 76.3 kg (168 lb 3.4 oz)   03/23/18 76.8 kg (169 lb 6.4 oz)   03/19/18 77.1 kg (170 lb)   03/05/18 74.3 kg (163 lb 12.8 oz)   11/29/17 78.1 kg (172 lb 3.2 oz)   07/03/17 " 79.4 kg (175 lb)   04/28/17 80.9 kg (178 lb 4.8 oz)   11/28/16 76.7 kg (169 lb)   11/07/16 77.1 kg (170 lb)   10/28/16 76.2 kg (168 lb)   09/02/16 74.6 kg (164 lb 6.4 oz)     ECOG performance status: 1  GENERAL APPEARANCE: Healthy, alert and in no acute distress.  HEENT: Sclerae anicteric. PERRLA. Oropharynx without ulcers, lesions, or thrush.  NECK: Supple. No asymmetry or masses.  LYMPHATICS: No palpable cervical, supraclavicular, axillary, or inguinal lymphadenopathy.  RESP: Lungs clear to auscultation bilaterally without rales, rhonchi or wheezes.  CARDIOVASCULAR: Regular rate and rhythm. Normal S1, S2; no S3 or S4. No murmur, gallop, or rub.  ABDOMEN: Soft, nontender. Bowel sounds normal. No palpable organomegaly or masses.  MUSCULOSKELETAL: Extremities without gross deformities noted. No edema of bilateral lower extremities.  SKIN: No suspicious lesions or rashes.  NEURO: Alert and oriented x 3. Cranial nerves II-XII grossly intact.  PSYCHIATRIC: Mentation and affect appear normal.    Laboratory/Imaging Studies:  No visits with results within 2 Week(s) from this visit.  Latest known visit with results is:    Office Visit on 03/23/2018   Component Date Value Ref Range Status     Cholesterol 03/23/2018 145  <200 mg/dL Final     Triglycerides 03/23/2018 212* <150 mg/dL Final    Comment: Borderline high:  150-199 mg/dl  High:             200-499 mg/dl  Very high:       >499 mg/dl       HDL Cholesterol 03/23/2018 56  >39 mg/dL Final     LDL Cholesterol Calculated 03/23/2018 47  <100 mg/dL Final    Desirable:       <100 mg/dl     Non HDL Cholesterol 03/23/2018 89  <130 mg/dL Final     Hemoglobin A1C 03/23/2018 5.2  4.3 - 6.0 % Final     Sodium 03/23/2018 139  133 - 144 mmol/L Final     Potassium 03/23/2018 4.9  3.4 - 5.3 mmol/L Final     Chloride 03/23/2018 106  94 - 109 mmol/L Final     Carbon Dioxide 03/23/2018 21  20 - 32 mmol/L Final     Anion Gap 03/23/2018 12  3 - 14 mmol/L Final     Glucose 03/23/2018 99  70  - 99 mg/dL Final     Urea Nitrogen 03/23/2018 16  7 - 30 mg/dL Final     Creatinine 03/23/2018 1.30* 0.66 - 1.25 mg/dL Final     GFR Estimate 03/23/2018 53* >60 mL/min/1.7m2 Final    Non  GFR Calc     GFR Estimate If Black 03/23/2018 64  >60 mL/min/1.7m2 Final    African American GFR Calc     Calcium 03/23/2018 9.3  8.5 - 10.1 mg/dL Final        Recent Results (from the past 744 hour(s))   CT Soft Tissue Neck w Contrast    Narrative    CT SCAN OF THE NECK WITH CONTRAST  3/14/2018 3:29 PM     HISTORY: Tongue cancer (H).    TECHNIQUE: Axial images and coronal reformations. Radiation dose for  this scan was reduced using automated exposure control, adjustment of  the mA and/or kV according to patient size, or iterative  reconstruction technique. Dx CT w/, oral contrast: 50mL of Omnipaque,  IV contrast: 100mL of isovue-370, 14.2mCi FDG inj RAC, Glucose: 106,  Ht: 172cm, Wt: 74.3kg, uptake: 62 min IV.    COMPARISON: CT soft tissue neck 3/8/2018.    FINDINGS:  Visualized sinuses, nasopharynx and orbits: Normal.      Tongue, oral cavity and oropharynx: As shown on 3/8/2018, there is a  2.5 cm mass in the right tongue base that is shown to be metabolically  active on the PET portion of the exam. This is consistent with  carcinoma.      Hypopharynx: Normal.      Larynx and trachea: Normal.      Thyroid: Normal.    Submandibular glands: Normal.      Parotid glands: Normal.        Lymph nodes: Normal.      Vasculature: Normal.      Upper mediastinum and lungs: Normal.      Bones: Normal.      Impression    IMPRESSION: 2.5 cm right tongue base mass consistent with malignancy.  No interval change.    MARYJO GONZALEZ MD   PET Oncology (Eyes to Thighs)    Narrative    PET / CT WHOLE BODY 3/14/2018 3:30 PM   WITH DIAGNOSTIC CT OF CHEST, ABDOMEN AND PELVIS AND HIGH-RESOLUTION  PET/CT OF THE HEAD AND NECK    HISTORY: Tongue cancer.    COMPARISON EXAMS:  SUBURBAN IMAGING: None.  FA[place]: PET/CT performed 3/30/2016.  CT of the neck performed  3/8/2018.  OTHER: None.    TECHNIQUE: 14.2 mCi of F-18 FDG were administered intravenously.  Following the administration of 100 mL Isovue-370 intravenous contrast  and oral contrast, a PET/CT scan was performed from the skull base  through the mid thighs. A diagnostic contrast-enhanced CT scan was  performed of the chest, abdomen and pelvis. Coronal, sagittal and  axial images were created and fused with the contrast enhanced CT  examination. High-resolution PET/CT of the head and neck was also  performed. Blood glucose: 106 mg/dL. The CT, PET and fusion images are  then evaluated on a [employer] workstation. Radiation dose for this scan  was reduced using automated exposure control, adjustment of the mA  and/or kV according to patient size, or iterative reconstruction  technique.    FINDINGS: Normal physiologic uptake is identified within the salivary  glands, myocardium, kidneys, ureters and bladder. Scattered areas of  physiologic bowel uptake are also present.    NECK:  Lymph nodes: No pathologic activity.    Additional findings: There is a hypermetabolic right base of tongue  mass measuring 2.6 x 2.1 cm, SUV max 23.6, consistent with malignancy.    CHEST:  Lungs: No pathologic activity. A 1.8 cm partially calcified nodule in  the lingula is unchanged, and demonstrates no significant associated  hypermetabolic activity.    Lymph nodes: No pathologic activity.    Additional findings: Atherosclerotic calcification of the thoracic  aorta and coronary arteries. Mild bilateral gynecomastia.    ABDOMEN/PELVIS:  Hepatobiliary: No pathologic activity. Unremarkable.    Spleen: No pathologic activity. Unremarkable.    Pancreas: No pathologic activity. Unremarkable.    Kidneys: No pathologic activity. Unremarkable.    Adrenals: No pathologic activity. Unremarkable.    Reproductive: No pathologic activity. There is moderate prostatic  enlargement.    Gastrointestinal: No pathologic activity. There  is mild hazy stranding  about several nondilated loops of small bowel in the right lower  quadrant (for example series 3 image 300), with no significant  associated hypermetabolic activity.    Lymph nodes: No pathologic activity.    Additional findings: Moderate atherosclerotic aortoiliac  calcification. A 5.1 cm cystic lesion in the subcutaneous fat  posterior to the right gluteal region has increased in size since the  previous exam, with no significant associated hypermetabolic activity.      SKELETON:   No pathologic activity.      Impression    IMPRESSION:  1. There is a hypermetabolic base of tongue mass on the right  measuring 2.6 cm, consistent with malignancy.  2. No other convincing evidence for hypermetabolic malignancy in the  neck, chest, abdomen, or pelvis.  3. Mild hazy stranding about several loops of nondilated small bowel  in the left lower quadrant, new since the previous exam of 3/30/2016.  An infectious or inflammatory enteritis could have this appearance,  and clinical correlation is recommended.           FAUSTION DE JESUS MD       Assessment and plan:    (C02.9) Recurrent tongue cancer (H)  I reviewed with patient today the results from the PET scan as well as the pathology report.  We reviewed different treatment options.  Because of the patient age and comorbidities I would recommend to proceed with immunotherapy with Keytruda 200 mg intravenously every 3 weeks.  We give the patient an overview about the potential side effects of immunotherapy.  Patient will be starting his first dose of treatment next week or so.    (I10) Hypertension goal BP (blood pressure) < 140/90  Patient currently on lisinopril 20 mg orally daily.    (E78.5) Hyperlipidemia with target LDL less than 100  Patient currently on Mevacor 40 mg orally daily.    (E11.22,  N18.1) Type 2 diabetes mellitus with stage 1 chronic kidney disease, without long-term current use of insulin (H)  Patient diabetes has been under control  on diet    (N18.1) CKD (chronic kidney disease) stage 1, GFR 90 ml/min or greater  Patient creatinine has been stable.    (C18.9) Malignant neoplasm of colon, unspecified part of colon (H)  There is no evidence of disease recurrence.    The patient is ready to learn, no apparent learning barriers were identified.  Diagnosis and treatment plans were explained to the patient. The patient expressed understanding of the content. The patient asked appropriate questions. The patient questions were answered to his satisfaction.      Time spent 45 minutes more than 50% of the time in counseling coordination of care including discussion of management of recurrent tongue cancer, immune therapy potential side effects follow-up and prognosis    Chart documentation with Dragon Voice recognition Software. Although reviewed after completion, some words and grammatical errors may remain.      Again, thank you for allowing me to participate in the care of your patient.        Sincerely,        Lanny Neil MD

## 2018-04-12 NOTE — NURSING NOTE
"Oncology Rooming Note    April 12, 2018 2:34 PM   Patrick Diop is a 82 year old male who presents for:    Chief Complaint   Patient presents with     Oncology Clinic Visit     Recheck due to progression of  Malignant neoplasm of colon, unspecified part of colon, review Pathology & CT's     Initial Vitals: /79 (BP Location: Right arm, Patient Position: Chair, Cuff Size: Adult Regular)  Pulse 86  Temp 97.6  F (36.4  C) (Tympanic)  Resp 16  Ht 1.715 m (5' 7.5\")  Wt 76.6 kg (168 lb 14.4 oz)  SpO2 97%  BMI 26.06 kg/m2 Estimated body mass index is 26.06 kg/(m^2) as calculated from the following:    Height as of this encounter: 1.715 m (5' 7.5\").    Weight as of this encounter: 76.6 kg (168 lb 14.4 oz). Body surface area is 1.91 meters squared.  No Pain (0) Comment: Data Unavailable   No LMP for male patient.  Allergies reviewed: Yes  Medications reviewed: Yes    Medications: Medication refills not needed today.  Pharmacy name entered into Command Information:    [facility] - [place], [place] - [address] [id number] - [place], [place] - [address]    Clinical concerns: Patient presents today to discuss new findings, pt states, \"possible recurrence of tongue cancer\". Denies any pain nor discomfort. Is saddened to be back at our facility, but happy that Dr. Neil is still here to \"take care of me\".  Dr. CONY Neil was notified.    7 minutes for nursing intake (face to face time)     Yolanda Eduardo RN              "

## 2018-04-12 NOTE — PROGRESS NOTES
"Chemotherapy Education    Patient is a 82 year old male here today for chemotherapy education, accompanied by daughter.  Pt has a cancer diagnosis of   Encounter Diagnoses   Name Primary?     Tongue cancer (H) Yes     Hypertension goal BP (blood pressure) < 140/90      Hyperlipidemia with target LDL less than 100      Type 2 diabetes mellitus with stage 1 chronic kidney disease, without long-term current use of insulin (H)      CKD (chronic kidney disease) stage 1, GFR 90 ml/min or greater      and their main concern is none at this time.  Their Oncologist is Dr. CONY Neil, and PCP is Joe Mandel.  Reviewed the following with the patient and their support person:  Treatment goal: Palliative  Treatment regimen & duration: Keytruda day 1 every 21 days until progression; and rationale for strict adherence to this schedule, including specific medication names including pre-treatment medications and at home scheduled or as needed medications, delivery methods,.  Potential side effects: Side effects and management; including skin changes/hand-foot syndrome, anemia, neutropenia, thrombocytopenia, diarrhea/constipation, hair loss syndrome, memory changes/ \"chemobrain\", mouth sores, taste changes, neuropathy, fatigue, myelosuppression, sexuality/infertility, and risk of extravasation or infiltration.  Infection prevention, and monitoring of lab values, what lab tests and what changes of these values meant, along with the possibility of hydration or blood product transfusion, or the need to defer or hold treatment.    Chemotherapy information, including ways it is excreted from the body and cleaning and containment of vomitus or other bodily fluid, use of the bathroom, sexual health and intimacy, what to do if needing to miss a treatment, when to call a provider and the need for staff to wear protective equipment.  Importance of Central line care (port) or IV site care.  Written information: Written information " "including the \"Getting Ready for Chemotherapy: What to Expect, Before, During, and After your Treatment\" booklet, specific drug information guides printed from [employer], [employer] booklets \"Eating Hints: Before, During, and After Cancer Treatment\" and \"Chemotherapy and You\".  Also, a folder with information on when to contact the provider, various programs offered at [facility], and our business card with contact information given; Oncology Clinic, RN Case Manager, and the after hours Nurse Advise Line.  General orientation to the Medical Oncology department, Infusion Services department, Huc/scheduling, bathrooms and usual flow of the treatment day provided as well as introduction to the Infusion nurses.  No barriers to learning identified. Patient and family verbalized understanding of all written and verbal information. All questions answered to patients satisfaction.   Other concerns: Reviewed take home medications  Pt instructed to call with further questions or concerns.  Patient states understanding and is in agreement with this plan.  Copy of appointments, and after visit summary (AVS) given to patient. Patient discharged ambulatory.    Face to Face time with patient: 20 min    Yolanda Eduardo RN, BSN, OCN  Oncology Hematology   Breast Cancer Navigator  [facility]  [email or web address]  Phone [phone]    "

## 2018-04-13 NOTE — PROGRESS NOTES
Hematology/ Oncology Follow-up Visit:  Apr 12, 2018    Reason for Visit:   Chief Complaint   Patient presents with     Oncology Clinic Visit     Recheck due to progression of  Malignant neoplasm of colon, unspecified part of colon, review Pathology & CT's       Oncologic History:  Recurrent tongue cancer (H)  Patrick Diop was noted to have a mass in his right neck during routine physical exam. CT scanning was subsequently obtained which also showed a right-sided tongue base and oropharyngeal mass, which together are consistent with malignancy. The patient himself has denied any unexplained weight loss. He denies any odynophagia, dysphagia, hoarseness or otalgia. 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 PET scan done on October 21, 2015 showed a mass at the level of the tongue base located along the anterior wall of the oropharynx and extending to the right lateral wall near the tonsillar pillar. This demonstrates abnormal increased FDG activity and is consistent with known malignancy. Hypermetabolic lymph nodes in the right and left neck are also noted as described. These are consistent with metastatic lymph nodes. There is no evidence of metastatic disease in the chest, abdomen or pelvis. CT of the soft tissues of the neck on October 21, 2015 was done in conjunction with PET scanning showed a 2.8 cm mass at the base of the tongue on the right effacing the right vallecula. There is also a 2.3 cm lymph node in the right level II region that is strongly suspicious for metastatic disease. These lesions are PET positive. Additionally, there is a 1.3 cm lymph node in left level III that is PET positive. There is a lymph node in right level V, also PET positive. He started on concurrent Cisplatin with radiation therapy with cisplatin 100 mg/m to be given intravenously on days #1, #22 #43 of radiation therapy.      Interval History:  Patient returning today for follow-up.  He  "was recently diagnosed with a new recurrence of tongue cancer.  Biopsy came back positive.  Patient was offered surgery.  The patient declined surgery because of potential complications.  He is here today to discuss palliative therapy.    Review Of Systems:  Constitutional: Negative for fever, chills, and night sweats.  Skin: negative.  Eyes: negative.  Ears/Nose/Throat: negative.  Respiratory: No shortness of breath, dyspnea on exertion, cough, or hemoptysis.  Cardiovascular: negative.  Gastrointestinal: negative.  Genitourinary: negative.  Musculoskeletal: negative.  Neurologic: negative.  Psychiatric: negative.  Hematologic/Lymphatic/Immunologic: negative.  Endocrine: negative.    All other ROS negative unless mentioned in interval history.    Past medical, social, surgical, and family histories reviewed.    Allergies:  Allergies as of 04/12/2018     (No Known Allergies)       Current Medications:  Current Outpatient Prescriptions   Medication Sig Dispense Refill     oxyCODONE (ROXICODONE) 5 MG/5ML solution Take 5 mLs (5 mg) by mouth every 6 hours as needed for severe pain maximum 20 mL per day 100 mL 0     lovastatin (MEVACOR) 40 MG tablet TAKE ONE TABLET BY MOUTH NIGHTLY AT BEDTIME 90 tablet 3     lisinopril (PRINIVIL/ZESTRIL) 20 MG tablet Take 1 tablet (20 mg) by mouth daily 90 tablet 3     multivitamin  with lutein (OCUVITE WITH LTEIN) CAPS Take 1 capsule by mouth daily       Cholecalciferol (VITAMIN D) 1000 UNITS capsule Take 1 capsule by mouth daily       LORazepam (ATIVAN) 0.5 MG tablet Take 1 tablet (0.5 mg) by mouth every 4 hours as needed (Anxiety, Nausea/Vomiting or Sleep) 30 tablet 2     prochlorperazine (COMPAZINE) 10 MG tablet Take 1 tablet (10 mg) by mouth every 6 hours as needed (Nausea/Vomiting) 30 tablet 2        Physical Exam:  /79 (BP Location: Right arm, Patient Position: Chair, Cuff Size: Adult Regular)  Pulse 86  Temp 97.6  F (36.4  C) (Tympanic)  Resp 16  Ht 1.715 m (5' 7.5\")  " Wt 76.6 kg (168 lb 14.4 oz)  SpO2 97%  BMI 26.06 kg/m2  Wt Readings from Last 12 Encounters:   04/12/18 76.6 kg (168 lb 14.4 oz)   04/03/18 76.3 kg (168 lb 3.4 oz)   03/23/18 76.8 kg (169 lb 6.4 oz)   03/19/18 77.1 kg (170 lb)   03/05/18 74.3 kg (163 lb 12.8 oz)   11/29/17 78.1 kg (172 lb 3.2 oz)   07/03/17 79.4 kg (175 lb)   04/28/17 80.9 kg (178 lb 4.8 oz)   11/28/16 76.7 kg (169 lb)   11/07/16 77.1 kg (170 lb)   10/28/16 76.2 kg (168 lb)   09/02/16 74.6 kg (164 lb 6.4 oz)     ECOG performance status: 1  GENERAL APPEARANCE: Healthy, alert and in no acute distress.  HEENT: Sclerae anicteric. PERRLA. Oropharynx without ulcers, lesions, or thrush.  NECK: Supple. No asymmetry or masses.  LYMPHATICS: No palpable cervical, supraclavicular, axillary, or inguinal lymphadenopathy.  RESP: Lungs clear to auscultation bilaterally without rales, rhonchi or wheezes.  CARDIOVASCULAR: Regular rate and rhythm. Normal S1, S2; no S3 or S4. No murmur, gallop, or rub.  ABDOMEN: Soft, nontender. Bowel sounds normal. No palpable organomegaly or masses.  MUSCULOSKELETAL: Extremities without gross deformities noted. No edema of bilateral lower extremities.  SKIN: No suspicious lesions or rashes.  NEURO: Alert and oriented x 3. Cranial nerves II-XII grossly intact.  PSYCHIATRIC: Mentation and affect appear normal.    Laboratory/Imaging Studies:  No visits with results within 2 Week(s) from this visit.  Latest known visit with results is:    Office Visit on 03/23/2018   Component Date Value Ref Range Status     Cholesterol 03/23/2018 145  <200 mg/dL Final     Triglycerides 03/23/2018 212* <150 mg/dL Final    Comment: Borderline high:  150-199 mg/dl  High:             200-499 mg/dl  Very high:       >499 mg/dl       HDL Cholesterol 03/23/2018 56  >39 mg/dL Final     LDL Cholesterol Calculated 03/23/2018 47  <100 mg/dL Final    Desirable:       <100 mg/dl     Non HDL Cholesterol 03/23/2018 89  <130 mg/dL Final     Hemoglobin A1C 03/23/2018  5.2  4.3 - 6.0 % Final     Sodium 03/23/2018 139  133 - 144 mmol/L Final     Potassium 03/23/2018 4.9  3.4 - 5.3 mmol/L Final     Chloride 03/23/2018 106  94 - 109 mmol/L Final     Carbon Dioxide 03/23/2018 21  20 - 32 mmol/L Final     Anion Gap 03/23/2018 12  3 - 14 mmol/L Final     Glucose 03/23/2018 99  70 - 99 mg/dL Final     Urea Nitrogen 03/23/2018 16  7 - 30 mg/dL Final     Creatinine 03/23/2018 1.30* 0.66 - 1.25 mg/dL Final     GFR Estimate 03/23/2018 53* >60 mL/min/1.7m2 Final    Non  GFR Calc     GFR Estimate If Black 03/23/2018 64  >60 mL/min/1.7m2 Final    African American GFR Calc     Calcium 03/23/2018 9.3  8.5 - 10.1 mg/dL Final        Recent Results (from the past 744 hour(s))   CT Soft Tissue Neck w Contrast    Narrative    CT SCAN OF THE NECK WITH CONTRAST  3/14/2018 3:29 PM     HISTORY: Tongue cancer (H).    TECHNIQUE: Axial images and coronal reformations. Radiation dose for  this scan was reduced using automated exposure control, adjustment of  the mA and/or kV according to patient size, or iterative  reconstruction technique. Dx CT w/, oral contrast: 50mL of Omnipaque,  IV contrast: 100mL of isovue-370, 14.2mCi FDG inj RAC, Glucose: 106,  Ht: 172cm, Wt: 74.3kg, uptake: 62 min IV.    COMPARISON: CT soft tissue neck 3/8/2018.    FINDINGS:  Visualized sinuses, nasopharynx and orbits: Normal.      Tongue, oral cavity and oropharynx: As shown on 3/8/2018, there is a  2.5 cm mass in the right tongue base that is shown to be metabolically  active on the PET portion of the exam. This is consistent with  carcinoma.      Hypopharynx: Normal.      Larynx and trachea: Normal.      Thyroid: Normal.    Submandibular glands: Normal.      Parotid glands: Normal.        Lymph nodes: Normal.      Vasculature: Normal.      Upper mediastinum and lungs: Normal.      Bones: Normal.      Impression    IMPRESSION: 2.5 cm right tongue base mass consistent with malignancy.  No interval change.    MARYJO  MD CARLOS   PET Oncology (Eyes to Thighs)    Narrative    PET / CT WHOLE BODY 3/14/2018 3:30 PM   WITH DIAGNOSTIC CT OF CHEST, ABDOMEN AND PELVIS AND HIGH-RESOLUTION  PET/CT OF THE HEAD AND NECK    HISTORY: Tongue cancer.    COMPARISON EXAMS:  [facility]: None.  [facility]. CT of the neck performed  3/8/2018.  OTHER: None.    TECHNIQUE: 14.2 mCi of F-18 FDG were administered intravenously.  Following the administration of 100 mL Isovue-370 intravenous contrast  and oral contrast, a PET/CT scan was performed from the skull base  through the mid thighs. A diagnostic contrast-enhanced CT scan was  performed of the chest, abdomen and pelvis. Coronal, sagittal and  axial images were created and fused with the contrast enhanced CT  examination. High-resolution PET/CT of the head and neck was also  performed. Blood glucose: 106 mg/dL. The CT, PET and fusion images are  then evaluated on a [employer] workstation. Radiation dose for this scan  was reduced using automated exposure control, adjustment of the mA  and/or kV according to patient size, or iterative reconstruction  technique.    FINDINGS: Normal physiologic uptake is identified within the salivary  glands, myocardium, kidneys, ureters and bladder. Scattered areas of  physiologic bowel uptake are also present.    NECK:  Lymph nodes: No pathologic activity.    Additional findings: There is a hypermetabolic right base of tongue  mass measuring 2.6 x 2.1 cm, SUV max 23.6, consistent with malignancy.    CHEST:  Lungs: No pathologic activity. A 1.8 cm partially calcified nodule in  the lingula is unchanged, and demonstrates no significant associated  hypermetabolic activity.    Lymph nodes: No pathologic activity.    Additional findings: Atherosclerotic calcification of the thoracic  aorta and coronary arteries. Mild bilateral gynecomastia.    ABDOMEN/PELVIS:  Hepatobiliary: No pathologic activity. Unremarkable.    Spleen: No pathologic  activity. Unremarkable.    Pancreas: No pathologic activity. Unremarkable.    Kidneys: No pathologic activity. Unremarkable.    Adrenals: No pathologic activity. Unremarkable.    Reproductive: No pathologic activity. There is moderate prostatic  enlargement.    Gastrointestinal: No pathologic activity. There is mild hazy stranding  about several nondilated loops of small bowel in the right lower  quadrant (for example series 3 image 300), with no significant  associated hypermetabolic activity.    Lymph nodes: No pathologic activity.    Additional findings: Moderate atherosclerotic aortoiliac  calcification. A 5.1 cm cystic lesion in the subcutaneous fat  posterior to the right gluteal region has increased in size since the  previous exam, with no significant associated hypermetabolic activity.      SKELETON:   No pathologic activity.      Impression    IMPRESSION:  1. There is a hypermetabolic base of tongue mass on the right  measuring 2.6 cm, consistent with malignancy.  2. No other convincing evidence for hypermetabolic malignancy in the  neck, chest, abdomen, or pelvis.  3. Mild hazy stranding about several loops of nondilated small bowel  in the left lower quadrant, new since the previous exam of 3/30/2016.  An infectious or inflammatory enteritis could have this appearance,  and clinical correlation is recommended.           FAUSTINO DE JESUS MD       Assessment and plan:    (C02.9) Recurrent tongue cancer (H)  I reviewed with patient today the results from the PET scan as well as the pathology report.  We reviewed different treatment options.  Because of the patient age and comorbidities I would recommend to proceed with immunotherapy with Keytruda 200 mg intravenously every 3 weeks.  We give the patient an overview about the potential side effects of immunotherapy.  Patient will be starting his first dose of treatment next week or so.    (I10) Hypertension goal BP (blood pressure) < 140/90  Patient currently  on lisinopril 20 mg orally daily.    (E78.5) Hyperlipidemia with target LDL less than 100  Patient currently on Mevacor 40 mg orally daily.    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    (C18.9) Malignant neoplasm of colon, unspecified part of colon (H)  There is no evidence of disease recurrence.    The patient is ready to learn, no apparent learning barriers were identified.  Diagnosis and treatment plans were explained to the patient. The patient expressed understanding of the content. The patient asked appropriate questions. The patient questions were answered to his satisfaction.      Time spent 45 minutes more than 50% of the time in counseling coordination of care including discussion of management of recurrent tongue cancer, immune therapy potential side effects follow-up and prognosis    Chart documentation with Dragon Voice recognition Software. Although reviewed after completion, some words and grammatical errors may remain.

## 2018-04-18 ENCOUNTER — HOSPITAL ENCOUNTER (OUTPATIENT)
Dept: LAB | Facility: CLINIC | Age: 83
Discharge: HOME | End: 2018-04-18
Attending: INTERNAL MEDICINE | Admitting: INTERNAL MEDICINE
Payer: MEDICARE

## 2018-04-18 ENCOUNTER — INFUSION THERAPY VISIT (OUTPATIENT)
Dept: INFUSION THERAPY | Facility: CLINIC | Age: 83
End: 2018-04-18
Attending: INTERNAL MEDICINE
Payer: MEDICARE

## 2018-04-18 VITALS
HEART RATE: 84 BPM | DIASTOLIC BLOOD PRESSURE: 66 MMHG | SYSTOLIC BLOOD PRESSURE: 128 MMHG | TEMPERATURE: 98.1 F | RESPIRATION RATE: 16 BRPM

## 2018-04-18 DIAGNOSIS — C02.9 RECURRENT TONGUE CANCER (H): Primary | ICD-10-CM

## 2018-04-18 LAB
ALBUMIN SERPL-MCNC: 3.6 G/DL (ref 3.4–5)
ALP SERPL-CCNC: 80 U/L (ref 40–150)
ALT SERPL W P-5'-P-CCNC: 20 U/L (ref 0–70)
ANION GAP SERPL CALCULATED.3IONS-SCNC: 3 MMOL/L (ref 3–14)
AST SERPL W P-5'-P-CCNC: 18 U/L (ref 0–45)
BILIRUB SERPL-MCNC: 0.4 MG/DL (ref 0.2–1.3)
BUN SERPL-MCNC: 13 MG/DL (ref 7–30)
CALCIUM SERPL-MCNC: 8.6 MG/DL (ref 8.5–10.1)
CHLORIDE SERPL-SCNC: 107 MMOL/L (ref 94–109)
CO2 SERPL-SCNC: 30 MMOL/L (ref 20–32)
CREAT SERPL-MCNC: 1.17 MG/DL (ref 0.66–1.25)
GFR SERPL CREATININE-BSD FRML MDRD: 60 ML/MIN/1.7M2
GLUCOSE SERPL-MCNC: 103 MG/DL (ref 70–99)
POTASSIUM SERPL-SCNC: 4.7 MMOL/L (ref 3.4–5.3)
PROT SERPL-MCNC: 7.5 G/DL (ref 6.8–8.8)
SODIUM SERPL-SCNC: 140 MMOL/L (ref 133–144)
TSH SERPL DL<=0.005 MIU/L-ACNC: 2.66 MU/L (ref 0.4–4)

## 2018-04-18 PROCEDURE — 84443 ASSAY THYROID STIM HORMONE: CPT | Performed by: INTERNAL MEDICINE

## 2018-04-18 PROCEDURE — 80053 COMPREHEN METABOLIC PANEL: CPT | Performed by: INTERNAL MEDICINE

## 2018-04-18 PROCEDURE — 25000128 H RX IP 250 OP 636: Performed by: INTERNAL MEDICINE

## 2018-04-18 PROCEDURE — 36415 COLL VENOUS BLD VENIPUNCTURE: CPT | Performed by: INTERNAL MEDICINE

## 2018-04-18 PROCEDURE — 25000125 ZZHC RX 250: Performed by: INTERNAL MEDICINE

## 2018-04-18 PROCEDURE — 96413 CHEMO IV INFUSION 1 HR: CPT

## 2018-04-18 PROCEDURE — 96375 TX/PRO/DX INJ NEW DRUG ADDON: CPT

## 2018-04-18 RX ORDER — SODIUM CHLORIDE 9 MG/ML
1000 INJECTION, SOLUTION INTRAVENOUS CONTINUOUS PRN
Status: CANCELLED
Start: 2018-04-18

## 2018-04-18 RX ORDER — MEPERIDINE HYDROCHLORIDE 25 MG/ML
25 INJECTION INTRAMUSCULAR; INTRAVENOUS; SUBCUTANEOUS EVERY 30 MIN PRN
Status: CANCELLED | OUTPATIENT
Start: 2018-04-18

## 2018-04-18 RX ORDER — DIPHENHYDRAMINE HYDROCHLORIDE 50 MG/ML
50 INJECTION INTRAMUSCULAR; INTRAVENOUS
Status: CANCELLED
Start: 2018-04-18

## 2018-04-18 RX ORDER — LORAZEPAM 2 MG/ML
0.5 INJECTION INTRAMUSCULAR EVERY 4 HOURS PRN
Status: CANCELLED
Start: 2018-04-18

## 2018-04-18 RX ORDER — METHYLPREDNISOLONE SODIUM SUCCINATE 125 MG/2ML
125 INJECTION, POWDER, LYOPHILIZED, FOR SOLUTION INTRAMUSCULAR; INTRAVENOUS
Status: CANCELLED
Start: 2018-04-18

## 2018-04-18 RX ORDER — EPINEPHRINE 0.3 MG/.3ML
0.3 INJECTION SUBCUTANEOUS EVERY 5 MIN PRN
Status: CANCELLED | OUTPATIENT
Start: 2018-04-18

## 2018-04-18 RX ORDER — ALBUTEROL SULFATE 0.83 MG/ML
2.5 SOLUTION RESPIRATORY (INHALATION)
Status: CANCELLED | OUTPATIENT
Start: 2018-04-18

## 2018-04-18 RX ORDER — EPINEPHRINE 1 MG/ML
0.3 INJECTION, SOLUTION, CONCENTRATE INTRAVENOUS EVERY 5 MIN PRN
Status: CANCELLED | OUTPATIENT
Start: 2018-04-18

## 2018-04-18 RX ORDER — ALBUTEROL SULFATE 90 UG/1
1-2 AEROSOL, METERED RESPIRATORY (INHALATION)
Status: CANCELLED
Start: 2018-04-18

## 2018-04-18 RX ADMIN — FAMOTIDINE 20 MG: 20 INJECTION, SOLUTION INTRAVENOUS at 10:45

## 2018-04-18 RX ADMIN — SODIUM CHLORIDE 250 ML: 9 INJECTION, SOLUTION INTRAVENOUS at 10:43

## 2018-04-18 RX ADMIN — SODIUM CHLORIDE 200 MG: 9 INJECTION, SOLUTION INTRAVENOUS at 11:07

## 2018-04-18 NOTE — PROGRESS NOTES
Infusion Nursing Note:  Patrick Diop presents today for Keytruda.    Patient seen by provider today: No   present during visit today: Not Applicable.    Note: N/A.    Intravenous Access:  Peripheral IV placed.    Treatment Conditions:  Results reviewed, labs MET treatment parameters, ok to proceed with treatment.      Post Infusion Assessment:  Patient tolerated infusion without incident.    Discharge Plan:   Patient discharged in stable condition accompanied by: self. Scheduled to return in 3wks.    Jean-Pierre Costa RN

## 2018-04-18 NOTE — MR AVS SNAPSHOT
After Visit Summary   4/18/2018    Patrick Diop    MRN: [id number]           Patient Information     Date Of Birth          1935        Visit Information        Provider Department      4/18/2018 10:30 AM ROOM 8 [facility]        Today's Diagnoses     Recurrent tongue cancer (H)    -  1       Follow-ups after your visit        Your next 10 appointments already scheduled     May 09, 2018  9:30 AM CDT   LAB with [facility] ([facility])    [address] [address]   [phone]           Please do not eat 10-12 hours before your appointment if you are coming in fasting for labs on lipids, cholesterol, or glucose (sugar). This does not apply to pregnant women. Water, hot tea and black coffee (with nothing added) are okay. Do not drink other fluids, diet soda or chew gum.            May 09, 2018 10:30 AM CDT   Return Visit with Lanny Neil MD   [facility] ([facility])    [facility]  [address]  [facility] [address]   [phone]            May 09, 2018 11:00 AM CDT   Level 2 with ROOM 2 [facility] ([facility])    [facility]  [address]  [facility] [address]   [phone]            Sep 07, 2018  8:30 AM CDT   Return Visit with [facility] Provider   [facility] ([facility])    [address], [address]  [facility] [address]   [phone]              Who to contact     If you have questions or need follow up information about today's clinic visit or your schedule please contact [facility] directly at [phone].  Normal or non-critical lab and imaging results will be communicated to you by MyChart, letter or phone within 4 business days after the clinic has received the results. If you do not hear from us within 7 days, please contact the clinic  through [employer] or phone. If you have a critical or abnormal lab result, we will notify you by phone as soon as possible.  Submit refill requests through [employer] or call your pharmacy and they will forward the refill request to us. Please allow 3 business days for your refill to be completed.          Additional Information About Your Visit        Care EveryWhere ID     This is your Care EveryWhere ID. This could be used by other organizations to access your [facility] medical records  [id number]        Your Vitals Were     Pulse Temperature Respirations             84 98.1  F (36.7  C) 16          Blood Pressure from Last 3 Encounters:   04/18/18 128/66   04/12/18 137/79   04/03/18 134/81    Weight from Last 3 Encounters:   04/12/18 76.6 kg (168 lb 14.4 oz)   04/03/18 76.3 kg (168 lb 3.4 oz)   03/23/18 76.8 kg (169 lb 6.4 oz)              We Performed the Following     Comprehensive metabolic panel     TSH with free T4 reflex        Primary Care Provider Office Phone # Fax #    Joe Mandel [phone] [address] [place], [place] [id number]        Equal Access to Services     [facility] : Hadii frances Brock, waaxda sony, qaybta kaalmada marcio, elizabeth gruber . So [facility] [phone].    ATENCIÓN: Si habla español, tiene a taylor disposición servicios gratuitos de asistencia lingüística. Llame al [phone].    We comply with applicable federal civil rights laws and [place] laws. We do not discriminate on the basis of race, color, national origin, age, disability, sex, sexual orientation, or gender identity.            Thank you!     Thank you for choosing [facility]  for your care. Our goal is always to provide you with excellent care. Hearing back from our patients is one way we can continue to improve our services. Please take a few minutes to complete the written survey that you may receive in the mail after your visit with us.  Thank you!             Your Updated Medication List - Protect others around you: Learn how to safely use, store and throw away your medicines at www.disposemymeds.org.          This list is accurate as of 4/18/18 12:31 PM.  Always use your most recent med list.                   Brand Name Dispense Instructions for use Diagnosis    lisinopril 20 MG tablet    PRINIVIL/ZESTRIL    90 tablet    Take 1 tablet (20 mg) by mouth daily    Hypertension goal BP (blood pressure) < 140/90       LORazepam 0.5 MG tablet    ATIVAN    30 tablet    Take 1 tablet (0.5 mg) by mouth every 4 hours as needed (Anxiety, Nausea/Vomiting or Sleep)    Tongue cancer (H)       lovastatin 40 MG tablet    MEVACOR    90 tablet    TAKE ONE TABLET BY MOUTH NIGHTLY AT BEDTIME    Hyperlipidemia with target LDL less than 100       multivitamin  with lutein Caps per capsule      Take 1 capsule by mouth daily        oxyCODONE 5 MG/5ML solution    ROXICODONE    100 mL    Take 5 mLs (5 mg) by mouth every 6 hours as needed for severe pain maximum 20 mL per day    Tongue cancer (H)       prochlorperazine 10 MG tablet    COMPAZINE    30 tablet    Take 1 tablet (10 mg) by mouth every 6 hours as needed (Nausea/Vomiting)    Tongue cancer (H)       vitamin D 1000 units capsule      Take 1 capsule by mouth daily

## 2018-04-19 NOTE — PROGRESS NOTES
Please call.  TSH is normal indicating that thyroid function is normal.   Chemistries show borderline increased glucose and mildly decreased kidney function.  These have been present in the past.   PLAN: No new changes in treatment recommended.

## 2018-04-26 ENCOUNTER — TELEPHONE (OUTPATIENT)
Dept: ONCOLOGY | Facility: CLINIC | Age: 83
End: 2018-04-26

## 2018-04-26 DIAGNOSIS — C02.9 RECURRENT TONGUE CANCER (H): Primary | ICD-10-CM

## 2018-04-26 NOTE — TELEPHONE ENCOUNTER
Status Check:    Pt is a 82 year old male, recently in clinic for C1D1 Keytruda 04.18.18.  Call placed for status check. No answer, message left.    Primary care provider is: Joe Mandel    Diagnosis:   Encounter Diagnosis   Name Primary?     Recurrent tongue cancer (H) Yes     Oncology provider is:  Dr. CONY Neil    How are you doing/feeling?: unknown.  Any further issues?: unknown  Next Follow up/Recommendations: Message left requesting patient return call to clinic for status check.  Direct line provided.    Patient instructed to call with any questions or concerns.  Patient states understanding and is in agreement with this plan.    Approximately 0 minutes spent on telephone with patient reviewing assessment and symptom(s) and providing symptom management.    Yolanda Eduardo RN, BSN, OCN  Oncology Hematology   Breast Cancer Navigator  [facility]  [email or web address]  [phone]

## 2018-05-09 ENCOUNTER — INFUSION THERAPY VISIT (OUTPATIENT)
Dept: INFUSION THERAPY | Facility: CLINIC | Age: 83
End: 2018-05-09
Attending: INTERNAL MEDICINE
Payer: MEDICARE

## 2018-05-09 ENCOUNTER — ONCOLOGY VISIT (OUTPATIENT)
Dept: ONCOLOGY | Facility: CLINIC | Age: 83
End: 2018-05-09
Attending: INTERNAL MEDICINE
Payer: MEDICARE

## 2018-05-09 ENCOUNTER — HOSPITAL ENCOUNTER (OUTPATIENT)
Dept: LAB | Facility: CLINIC | Age: 83
Discharge: HOME | End: 2018-05-09
Attending: INTERNAL MEDICINE | Admitting: INTERNAL MEDICINE
Payer: MEDICARE

## 2018-05-09 VITALS
SYSTOLIC BLOOD PRESSURE: 150 MMHG | DIASTOLIC BLOOD PRESSURE: 80 MMHG | RESPIRATION RATE: 20 BRPM | HEART RATE: 79 BPM | HEIGHT: 67 IN | TEMPERATURE: 97.4 F | BODY MASS INDEX: 26.09 KG/M2 | OXYGEN SATURATION: 97 % | WEIGHT: 166.2 LBS

## 2018-05-09 VITALS — HEART RATE: 77 BPM | DIASTOLIC BLOOD PRESSURE: 83 MMHG | SYSTOLIC BLOOD PRESSURE: 149 MMHG

## 2018-05-09 DIAGNOSIS — C02.9 RECURRENT TONGUE CANCER (H): Primary | ICD-10-CM

## 2018-05-09 DIAGNOSIS — N18.1 CKD (CHRONIC KIDNEY DISEASE) STAGE 1, GFR 90 ML/MIN OR GREATER: ICD-10-CM

## 2018-05-09 DIAGNOSIS — C18.9 MALIGNANT NEOPLASM OF COLON, UNSPECIFIED PART OF COLON (H): ICD-10-CM

## 2018-05-09 DIAGNOSIS — E11.22 TYPE 2 DIABETES MELLITUS WITH STAGE 1 CHRONIC KIDNEY DISEASE, WITHOUT LONG-TERM CURRENT USE OF INSULIN (H): ICD-10-CM

## 2018-05-09 DIAGNOSIS — E78.5 HYPERLIPIDEMIA WITH TARGET LDL LESS THAN 100: ICD-10-CM

## 2018-05-09 DIAGNOSIS — N18.1 TYPE 2 DIABETES MELLITUS WITH STAGE 1 CHRONIC KIDNEY DISEASE, WITHOUT LONG-TERM CURRENT USE OF INSULIN (H): ICD-10-CM

## 2018-05-09 DIAGNOSIS — I10 HYPERTENSION GOAL BP (BLOOD PRESSURE) < 140/90: ICD-10-CM

## 2018-05-09 LAB
ALBUMIN SERPL-MCNC: 3.8 G/DL (ref 3.4–5)
ALP SERPL-CCNC: 79 U/L (ref 40–150)
ALT SERPL W P-5'-P-CCNC: 19 U/L (ref 0–70)
ANION GAP SERPL CALCULATED.3IONS-SCNC: 6 MMOL/L (ref 3–14)
AST SERPL W P-5'-P-CCNC: 12 U/L (ref 0–45)
BILIRUB SERPL-MCNC: 0.6 MG/DL (ref 0.2–1.3)
BUN SERPL-MCNC: 20 MG/DL (ref 7–30)
CALCIUM SERPL-MCNC: 8.9 MG/DL (ref 8.5–10.1)
CHLORIDE SERPL-SCNC: 109 MMOL/L (ref 94–109)
CO2 SERPL-SCNC: 26 MMOL/L (ref 20–32)
CREAT SERPL-MCNC: 1.16 MG/DL (ref 0.66–1.25)
GFR SERPL CREATININE-BSD FRML MDRD: 61 ML/MIN/1.7M2
GLUCOSE SERPL-MCNC: 96 MG/DL (ref 70–99)
POTASSIUM SERPL-SCNC: 4.7 MMOL/L (ref 3.4–5.3)
PROT SERPL-MCNC: 8.1 G/DL (ref 6.8–8.8)
SODIUM SERPL-SCNC: 141 MMOL/L (ref 133–144)
TSH SERPL DL<=0.005 MIU/L-ACNC: 3.06 MU/L (ref 0.4–4)

## 2018-05-09 PROCEDURE — G0463 HOSPITAL OUTPT CLINIC VISIT: HCPCS | Mod: 25

## 2018-05-09 PROCEDURE — 96413 CHEMO IV INFUSION 1 HR: CPT

## 2018-05-09 PROCEDURE — 36415 COLL VENOUS BLD VENIPUNCTURE: CPT | Performed by: INTERNAL MEDICINE

## 2018-05-09 PROCEDURE — 96375 TX/PRO/DX INJ NEW DRUG ADDON: CPT

## 2018-05-09 PROCEDURE — 25000125 ZZHC RX 250: Performed by: INTERNAL MEDICINE

## 2018-05-09 PROCEDURE — 25000128 H RX IP 250 OP 636: Performed by: INTERNAL MEDICINE

## 2018-05-09 PROCEDURE — 80053 COMPREHEN METABOLIC PANEL: CPT | Performed by: INTERNAL MEDICINE

## 2018-05-09 PROCEDURE — 84443 ASSAY THYROID STIM HORMONE: CPT | Performed by: INTERNAL MEDICINE

## 2018-05-09 PROCEDURE — 99215 OFFICE O/P EST HI 40 MIN: CPT | Performed by: INTERNAL MEDICINE

## 2018-05-09 RX ORDER — MEPERIDINE HYDROCHLORIDE 25 MG/ML
25 INJECTION INTRAMUSCULAR; INTRAVENOUS; SUBCUTANEOUS EVERY 30 MIN PRN
Status: CANCELLED | OUTPATIENT
Start: 2018-05-09

## 2018-05-09 RX ORDER — METHYLPREDNISOLONE SODIUM SUCCINATE 125 MG/2ML
125 INJECTION, POWDER, LYOPHILIZED, FOR SOLUTION INTRAMUSCULAR; INTRAVENOUS
Status: CANCELLED
Start: 2018-05-09

## 2018-05-09 RX ORDER — SODIUM CHLORIDE 9 MG/ML
1000 INJECTION, SOLUTION INTRAVENOUS CONTINUOUS PRN
Status: CANCELLED
Start: 2018-05-09

## 2018-05-09 RX ORDER — ALBUTEROL SULFATE 0.83 MG/ML
2.5 SOLUTION RESPIRATORY (INHALATION)
Status: CANCELLED | OUTPATIENT
Start: 2018-05-09

## 2018-05-09 RX ORDER — LORAZEPAM 2 MG/ML
0.5 INJECTION INTRAMUSCULAR EVERY 4 HOURS PRN
Status: CANCELLED
Start: 2018-05-09

## 2018-05-09 RX ORDER — DIPHENHYDRAMINE HYDROCHLORIDE 50 MG/ML
50 INJECTION INTRAMUSCULAR; INTRAVENOUS
Status: CANCELLED
Start: 2018-05-09

## 2018-05-09 RX ORDER — ALBUTEROL SULFATE 90 UG/1
1-2 AEROSOL, METERED RESPIRATORY (INHALATION)
Status: CANCELLED
Start: 2018-05-09

## 2018-05-09 RX ORDER — EPINEPHRINE 0.3 MG/.3ML
0.3 INJECTION SUBCUTANEOUS EVERY 5 MIN PRN
Status: CANCELLED | OUTPATIENT
Start: 2018-05-09

## 2018-05-09 RX ORDER — EPINEPHRINE 1 MG/ML
0.3 INJECTION, SOLUTION, CONCENTRATE INTRAVENOUS EVERY 5 MIN PRN
Status: CANCELLED | OUTPATIENT
Start: 2018-05-09

## 2018-05-09 RX ADMIN — FAMOTIDINE 20 MG: 20 INJECTION, SOLUTION INTRAVENOUS at 11:43

## 2018-05-09 RX ADMIN — SODIUM CHLORIDE 200 MG: 9 INJECTION, SOLUTION INTRAVENOUS at 12:03

## 2018-05-09 RX ADMIN — SODIUM CHLORIDE 250 ML: 9 INJECTION, SOLUTION INTRAVENOUS at 11:40

## 2018-05-09 ASSESSMENT — PAIN SCALES - GENERAL: PAINLEVEL: NO PAIN (0)

## 2018-05-09 NOTE — PATIENT INSTRUCTIONS
We would like to see you back in clinic with Dr. Neil in 3 weeks with infusion to be scheduled per treatment plan.      When you are in need of a refill, please call your pharmacy and they will send us a request.      Copy of appointments, and after visit summary (AVS) given to patient.      If you have any questions during business hours (M-F 8 AM- 4PM), please call Yolanda Eduardo RN, BSN, OCN Oncology Hematology /Breast Cancer Navigator at [facility] [phone].       For questions after business hours, or on holidays/weekends, please call our after hours Nurse Triage line [phone]. Thank you.

## 2018-05-09 NOTE — LETTER
5/9/2018         RE: Patrick Diop  [address]   [facility] [address]        Dear Colleague,    Thank you for referring your patient, Patrick Diop, to the [facility]. Please see a copy of my visit note below.    Hematology/ Oncology Follow-up Visit:  May 9, 2018    Reason for Visit:   Chief Complaint   Patient presents with     Oncology Clinic Visit     4 week f/u cancer of tongue post 1 cycle Keytruda       Oncologic History:  Recurrent tongue cancer (H)  Patrick Diop was noted to have a mass in his right neck during routine physical exam. CT scanning was subsequently obtained which also showed a right-sided tongue base and oropharyngeal mass, which together are consistent with malignancy. The patient himself has denied any unexplained weight loss. He denies any odynophagia, dysphagia, hoarseness or otalgia. 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 PET scan done on October 21, 2015 showed a mass at the level of the tongue base located along the anterior wall of the oropharynx and extending to the right lateral wall near the tonsillar pillar. This demonstrates abnormal increased FDG activity and is consistent with known malignancy. Hypermetabolic lymph nodes in the right and left neck are also noted as described. These are consistent with metastatic lymph nodes. There is no evidence of metastatic disease in the chest, abdomen or pelvis. CT of the soft tissues of the neck on October 21, 2015 was done in conjunction with PET scanning showed a 2.8 cm mass at the base of the tongue on the right effacing the right vallecula. There is also a 2.3 cm lymph node in the right level II region that is strongly suspicious for metastatic disease. These lesions are PET positive. Additionally, there is a 1.3 cm lymph node in left level III that is PET positive. There is a lymph node in right level V, also PET positive. He started on concurrent  Cisplatin with radiation therapy with cisplatin 100 mg/m to be given intravenously on days #1, #22 #43 of radiation therapy.      Interval History:  Patient is here today for follow-up.  He had 1 cycle of immunotherapy with Keytruda.  Tolerated treatment very well without any nausea or vomiting or diarrhea.  He notes fatigue for 3 days after his infusion.  He denies any fever or chills.  Denies any shortness of breath or cough or wheezing.    Review Of Systems:  Constitutional: Negative for fever, chills, and night sweats.  Skin: negative.  Eyes: negative.  Ears/Nose/Throat: negative.  Respiratory: No shortness of breath, dyspnea on exertion, cough, or hemoptysis.  Cardiovascular: negative.  Gastrointestinal: negative.  Genitourinary: negative.  Musculoskeletal: negative.  Neurologic: negative.  Psychiatric: negative.  Hematologic/Lymphatic/Immunologic: negative.  Endocrine: negative.    All other ROS negative unless mentioned in interval history.    Past medical, social, surgical, and family histories reviewed.    Allergies:  Allergies as of 05/09/2018     (No Known Allergies)       Current Medications:  Current Outpatient Prescriptions   Medication Sig Dispense Refill     Cholecalciferol (VITAMIN D) 1000 UNITS capsule Take 1 capsule by mouth daily       lisinopril (PRINIVIL/ZESTRIL) 20 MG tablet Take 1 tablet (20 mg) by mouth daily 90 tablet 3     lovastatin (MEVACOR) 40 MG tablet TAKE ONE TABLET BY MOUTH NIGHTLY AT BEDTIME 90 tablet 3     multivitamin  with lutein (OCUVITE WITH LTEIN) CAPS Take 1 capsule by mouth daily       LORazepam (ATIVAN) 0.5 MG tablet Take 1 tablet (0.5 mg) by mouth every 4 hours as needed (Anxiety, Nausea/Vomiting or Sleep) (Patient not taking: Reported on 5/9/2018) 30 tablet 2     prochlorperazine (COMPAZINE) 10 MG tablet Take 1 tablet (10 mg) by mouth every 6 hours as needed (Nausea/Vomiting) (Patient not taking: Reported on 5/9/2018) 30 tablet 2        Physical Exam:  /80 (BP  "Location: Right arm, Patient Position: Sitting, Cuff Size: Adult Regular)  Pulse 79  Temp 97.4  F (36.3  C) (Tympanic)  Resp 20  Ht 1.714 m (5' 7.48\")  Wt 75.4 kg (166 lb 3.2 oz)  SpO2 97%  BMI 25.66 kg/m2  Wt Readings from Last 12 Encounters:   05/09/18 75.4 kg (166 lb 3.2 oz)   04/12/18 76.6 kg (168 lb 14.4 oz)   04/03/18 76.3 kg (168 lb 3.4 oz)   03/23/18 76.8 kg (169 lb 6.4 oz)   03/19/18 77.1 kg (170 lb)   03/05/18 74.3 kg (163 lb 12.8 oz)   11/29/17 78.1 kg (172 lb 3.2 oz)   07/03/17 79.4 kg (175 lb)   04/28/17 80.9 kg (178 lb 4.8 oz)   11/28/16 76.7 kg (169 lb)   11/07/16 77.1 kg (170 lb)   10/28/16 76.2 kg (168 lb)     ECOG performance status: 1  GENERAL APPEARANCE: Healthy, alert and in no acute distress.  HEENT: Sclerae anicteric. PERRLA. Oropharynx without ulcers, lesions, or thrush.  NECK: Supple. No asymmetry or masses.  LYMPHATICS: No palpable cervical, supraclavicular, axillary, or inguinal lymphadenopathy.  RESP: Lungs clear to auscultation bilaterally without rales, rhonchi or wheezes.  CARDIOVASCULAR: Regular rate and rhythm. Normal S1, S2; no S3 or S4. No murmur, gallop, or rub.  ABDOMEN: Soft, nontender. Bowel sounds normal. No palpable organomegaly or masses.  MUSCULOSKELETAL: Extremities without gross deformities noted. No edema of bilateral lower extremities.  SKIN: No suspicious lesions or rashes.  NEURO: Alert and oriented x 3. Cranial nerves II-XII grossly intact.  PSYCHIATRIC: Mentation and affect appear normal.    Laboratory/Imaging Studies:  No visits with results within 2 Week(s) from this visit.  Latest known visit with results is:    Infusion Therapy Visit on 04/18/2018   Component Date Value Ref Range Status     Sodium 04/18/2018 140  133 - 144 mmol/L Final     Potassium 04/18/2018 4.7  3.4 - 5.3 mmol/L Final     Chloride 04/18/2018 107  94 - 109 mmol/L Final     Carbon Dioxide 04/18/2018 30  20 - 32 mmol/L Final     Anion Gap 04/18/2018 3  3 - 14 mmol/L Final     Glucose " 04/18/2018 103* 70 - 99 mg/dL Final     Urea Nitrogen 04/18/2018 13  7 - 30 mg/dL Final     Creatinine 04/18/2018 1.17  0.66 - 1.25 mg/dL Final     GFR Estimate 04/18/2018 60* >60 mL/min/1.7m2 Final    Non  GFR Calc     GFR Estimate If Black 04/18/2018 73  >60 mL/min/1.7m2 Final    African American GFR Calc     Calcium 04/18/2018 8.6  8.5 - 10.1 mg/dL Final     Bilirubin Total 04/18/2018 0.4  0.2 - 1.3 mg/dL Final     Albumin 04/18/2018 3.6  3.4 - 5.0 g/dL Final     Protein Total 04/18/2018 7.5  6.8 - 8.8 g/dL Final     Alkaline Phosphatase 04/18/2018 80  40 - 150 U/L Final     ALT 04/18/2018 20  0 - 70 U/L Final     AST 04/18/2018 18  0 - 45 U/L Final     TSH 04/18/2018 2.66  0.40 - 4.00 mU/L Final            Assessment and plan:    (C02.9) Recurrent tongue cancer (H)  (primary encounter diagnosis)  Patient tolerated his first cycle of immunotherapy.  We will proceed today with cycle #2 of Keytruda.  I will see the patient again in 3 weeks time or sooner if there are new developments or concerns.    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    (I10) Hypertension goal BP (blood pressure) < 140/90  Patient currently on lisinopril 20 mg orally daily.    (N18.1) CKD (chronic kidney disease) stage 1, GFR 90 ml/min or greater  .  Patient creatinine has been stable.  We will continue to monitor.    The patient is ready to learn, no apparent learning barriers were identified.  Diagnosis and treatment plans were explained to the patient. The patient expressed understanding of the content. The patient asked appropriate questions. The patient questions were  answered to his satisfaction.    Chart documentation with Dragon Voice recognition Software. Although reviewed after completion, some words and grammatical errors may remain.    Again, thank you for allowing me to participate in the care of your patient.        Sincerely,        Lanny Neil MD

## 2018-05-09 NOTE — NURSING NOTE
"Oncology Rooming Note    May 9, 2018 11:02 AM   Patrick Diop is a 82 year old male who presents for:    Chief Complaint   Patient presents with     Oncology Clinic Visit     4 week f/u cancer of tongue post 1 cycle Keytruda     Initial Vitals: /80 (BP Location: Right arm, Patient Position: Sitting, Cuff Size: Adult Regular)  Pulse 79  Temp 97.4  F (36.3  C) (Tympanic)  Resp 20  Ht 1.714 m (5' 7.48\")  Wt 75.4 kg (166 lb 3.2 oz)  SpO2 97%  BMI 25.66 kg/m2 Estimated body mass index is 25.66 kg/(m^2) as calculated from the following:    Height as of this encounter: 1.714 m (5' 7.48\").    Weight as of this encounter: 75.4 kg (166 lb 3.2 oz). Body surface area is 1.89 meters squared.  No Pain (0) Comment: Data Unavailable   No LMP for male patient.  Allergies reviewed: Yes  Medications reviewed: Yes    Medications: Medication refills not needed today.  Pharmacy name entered into [employer]:    [id number] - [facility] [address]    Clinical concerns: 4 week f/u cancer of tongue post 1 cycle Keytruda.     Patient reports a couple of times he was very tired.   Appetite is low.   Normal Bowel movements.     7 minutes for nursing intake (face to face time)     Glenys White CMA              "

## 2018-05-09 NOTE — MR AVS SNAPSHOT
After Visit Summary   5/9/2018    Patrick Diop    MRN: [id number]           Patient Information     Date Of Birth          1935        Visit Information        Provider Department      5/9/2018 11:00 AM ROOM 2 [facility]        Today's Diagnoses     Recurrent tongue cancer (H)    -  1       Follow-ups after your visit        Your next 10 appointments already scheduled     May 31, 2018  8:20 AM CDT   LAB with [facility] ([facility])    [address] [facility] [address]   [phone]           Please do not eat 10-12 hours before your appointment if you are coming in fasting for labs on lipids, cholesterol, or glucose (sugar). This does not apply to pregnant women. Water, hot tea and black coffee (with nothing added) are okay. Do not drink other fluids, diet soda or chew gum.            May 31, 2018  9:00 AM CDT   Return Visit with Lanny Neil MD   [facility] ([facility])    [facility]  [address]  [facility] [address]   [phone]            May 31, 2018  9:30 AM CDT   Level 2 with ROOM 5 [facility] ([facility])    [facility]  [address]  [facility] [address]   [phone]            Sep 07, 2018  8:30 AM CDT   Return Visit with [facility] Provider   [facility] ([facility])    [address], [address]  [facility] [address]   [phone]              Who to contact     If you have questions or need follow up information about today's clinic visit or your schedule please contact [facility] directly at [phone].  Normal or non-critical lab and imaging results will be communicated to you by MyChart, letter or phone within 4 business days after the clinic has received the results. If you do not hear from us within 7 days, please contact the clinic  through [employer] or phone. If you have a critical or abnormal lab result, we will notify you by phone as soon as possible.  Submit refill requests through [employer] or call your pharmacy and they will forward the refill request to us. Please allow 3 business days for your refill to be completed.          Additional Information About Your Visit        Care EveryWhere ID     This is your Care EveryWhere ID. This could be used by other organizations to access your [facility] medical records  [id number]        Your Vitals Were     Pulse                   77            Blood Pressure from Last 3 Encounters:   05/09/18 149/83   05/09/18 150/80   04/18/18 128/66    Weight from Last 3 Encounters:   05/09/18 75.4 kg (166 lb 3.2 oz)   04/12/18 76.6 kg (168 lb 14.4 oz)   04/03/18 76.3 kg (168 lb 3.4 oz)              We Performed the Following     Comprehensive metabolic panel     TSH with free T4 reflex        Primary Care Provider Office Phone # Fax #    Joe Mandel [phone] [address] [place], [place] [id number]        Equal Access to Services     [facility]: Hadii aad ku hadashmatthias Sozoë, waaxda luqviridiana, qaybta kaalmalala buckley, elizabeth dias. So [facility] [phone].    ATENCIÓN: Si habla español, tiene a taylor disposición servicios gratuitos de asistencia lingüística. [facility] [phone].    We comply with applicable federal civil rights laws and [place] laws. We do not discriminate on the basis of race, color, national origin, age, disability, sex, sexual orientation, or gender identity.            Thank you!     Thank you for choosing [facility]  for your care. Our goal is always to provide you with excellent care. Hearing back from our patients is one way we can continue to improve our services. Please take a few minutes to complete the written survey that you may receive in the mail after your visit with us. Thank you!             Your Updated  Medication List - Protect others around you: Learn how to safely use, store and throw away your medicines at www.disposemymeds.org.          This list is accurate as of 5/9/18  1:07 PM.  Always use your most recent med list.                   Brand Name Dispense Instructions for use Diagnosis    lisinopril 20 MG tablet    PRINIVIL/ZESTRIL    90 tablet    Take 1 tablet (20 mg) by mouth daily    Hypertension goal BP (blood pressure) < 140/90       LORazepam 0.5 MG tablet    ATIVAN    30 tablet    Take 1 tablet (0.5 mg) by mouth every 4 hours as needed (Anxiety, Nausea/Vomiting or Sleep)    Tongue cancer (H)       lovastatin 40 MG tablet    MEVACOR    90 tablet    TAKE ONE TABLET BY MOUTH NIGHTLY AT BEDTIME    Hyperlipidemia with target LDL less than 100       multivitamin  with lutein Caps per capsule      Take 1 capsule by mouth daily        prochlorperazine 10 MG tablet    COMPAZINE    30 tablet    Take 1 tablet (10 mg) by mouth every 6 hours as needed (Nausea/Vomiting)    Tongue cancer (H)       vitamin D 1000 units capsule      Take 1 capsule by mouth daily

## 2018-05-09 NOTE — PROGRESS NOTES
Infusion Nursing Note:  Patrick Diop presents today for peripheral labs, MD appt., and IV Keytruda.    Patient seen by provider today: Yes: Dr. Neil.   present during visit today: Not Applicable.    Note: Labs WNL's. Premed and IV Keytruda infused via a peripheral IV without complications. Pt. To return on 5/31/18 for labs, MD appt., and Keytruda.    Intravenous Access:  Peripheral IV placed.    Treatment Conditions:  Lab Results   Component Value Date    HGB 14.7 04/25/2017     Lab Results   Component Value Date    WBC 6.9 04/25/2017      Lab Results   Component Value Date    ANEU 5.0 04/25/2017     Lab Results   Component Value Date     04/25/2017      Lab Results   Component Value Date     05/09/2018                   Lab Results   Component Value Date    POTASSIUM 4.7 05/09/2018           Lab Results   Component Value Date    MAG 1.6 04/25/2017            Lab Results   Component Value Date    CR 1.16 05/09/2018                   Lab Results   Component Value Date    AMY 8.9 05/09/2018                Lab Results   Component Value Date    BILITOTAL 0.6 05/09/2018           Lab Results   Component Value Date    ALBUMIN 3.8 05/09/2018                    Lab Results   Component Value Date    ALT 19 05/09/2018           Lab Results   Component Value Date    AST 12 05/09/2018       Results reviewed, labs MET treatment parameters, ok to proceed with treatment.      Post Infusion Assessment:  Patient tolerated infusion without incident.  Blood return noted pre and post infusion.  Site patent and intact, free from redness, edema or discomfort.  No evidence of extravasations.  Access discontinued per protocol.    Discharge Plan:   Patient and/or family verbalized understanding of discharge instructions and all questions answered.  Patient discharged in stable condition accompanied by: wife.  Departure Mode: Ambulatory.    Naya Peters RN

## 2018-05-09 NOTE — PROGRESS NOTES
Hematology/ Oncology Follow-up Visit:  May 9, 2018    Reason for Visit:   Chief Complaint   Patient presents with     Oncology Clinic Visit     4 week f/u cancer of tongue post 1 cycle Keytruda       Oncologic History:  Recurrent tongue cancer (H)  Patrick Diop was noted to have a mass in his right neck during routine physical exam. CT scanning was subsequently obtained which also showed a right-sided tongue base and oropharyngeal mass, which together are consistent with malignancy. The patient himself has denied any unexplained weight loss. He denies any odynophagia, dysphagia, hoarseness or otalgia. 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 PET scan done on October 21, 2015 showed a mass at the level of the tongue base located along the anterior wall of the oropharynx and extending to the right lateral wall near the tonsillar pillar. This demonstrates abnormal increased FDG activity and is consistent with known malignancy. Hypermetabolic lymph nodes in the right and left neck are also noted as described. These are consistent with metastatic lymph nodes. There is no evidence of metastatic disease in the chest, abdomen or pelvis. CT of the soft tissues of the neck on October 21, 2015 was done in conjunction with PET scanning showed a 2.8 cm mass at the base of the tongue on the right effacing the right vallecula. There is also a 2.3 cm lymph node in the right level II region that is strongly suspicious for metastatic disease. These lesions are PET positive. Additionally, there is a 1.3 cm lymph node in left level III that is PET positive. There is a lymph node in right level V, also PET positive. He started on concurrent Cisplatin with radiation therapy with cisplatin 100 mg/m to be given intravenously on days #1, #22 #43 of radiation therapy.      Interval History:  Patient is here today for follow-up.  He had 1 cycle of immunotherapy with Keytruda.  Tolerated  "treatment very well without any nausea or vomiting or diarrhea.  He notes fatigue for 3 days after his infusion.  He denies any fever or chills.  Denies any shortness of breath or cough or wheezing.    Review Of Systems:  Constitutional: Negative for fever, chills, and night sweats.  Skin: negative.  Eyes: negative.  Ears/Nose/Throat: negative.  Respiratory: No shortness of breath, dyspnea on exertion, cough, or hemoptysis.  Cardiovascular: negative.  Gastrointestinal: negative.  Genitourinary: negative.  Musculoskeletal: negative.  Neurologic: negative.  Psychiatric: negative.  Hematologic/Lymphatic/Immunologic: negative.  Endocrine: negative.    All other ROS negative unless mentioned in interval history.    Past medical, social, surgical, and family histories reviewed.    Allergies:  Allergies as of 05/09/2018     (No Known Allergies)       Current Medications:  Current Outpatient Prescriptions   Medication Sig Dispense Refill     Cholecalciferol (VITAMIN D) 1000 UNITS capsule Take 1 capsule by mouth daily       lisinopril (PRINIVIL/ZESTRIL) 20 MG tablet Take 1 tablet (20 mg) by mouth daily 90 tablet 3     lovastatin (MEVACOR) 40 MG tablet TAKE ONE TABLET BY MOUTH NIGHTLY AT BEDTIME 90 tablet 3     multivitamin  with lutein (OCUVITE WITH LTEIN) CAPS Take 1 capsule by mouth daily       LORazepam (ATIVAN) 0.5 MG tablet Take 1 tablet (0.5 mg) by mouth every 4 hours as needed (Anxiety, Nausea/Vomiting or Sleep) (Patient not taking: Reported on 5/9/2018) 30 tablet 2     prochlorperazine (COMPAZINE) 10 MG tablet Take 1 tablet (10 mg) by mouth every 6 hours as needed (Nausea/Vomiting) (Patient not taking: Reported on 5/9/2018) 30 tablet 2        Physical Exam:  /80 (BP Location: Right arm, Patient Position: Sitting, Cuff Size: Adult Regular)  Pulse 79  Temp 97.4  F (36.3  C) (Tympanic)  Resp 20  Ht 1.714 m (5' 7.48\")  Wt 75.4 kg (166 lb 3.2 oz)  SpO2 97%  BMI 25.66 kg/m2  Wt Readings from Last 12 " Encounters:   05/09/18 75.4 kg (166 lb 3.2 oz)   04/12/18 76.6 kg (168 lb 14.4 oz)   04/03/18 76.3 kg (168 lb 3.4 oz)   03/23/18 76.8 kg (169 lb 6.4 oz)   03/19/18 77.1 kg (170 lb)   03/05/18 74.3 kg (163 lb 12.8 oz)   11/29/17 78.1 kg (172 lb 3.2 oz)   07/03/17 79.4 kg (175 lb)   04/28/17 80.9 kg (178 lb 4.8 oz)   11/28/16 76.7 kg (169 lb)   11/07/16 77.1 kg (170 lb)   10/28/16 76.2 kg (168 lb)     ECOG performance status: 1  GENERAL APPEARANCE: Healthy, alert and in no acute distress.  HEENT: Sclerae anicteric. PERRLA. Oropharynx without ulcers, lesions, or thrush.  NECK: Supple. No asymmetry or masses.  LYMPHATICS: No palpable cervical, supraclavicular, axillary, or inguinal lymphadenopathy.  RESP: Lungs clear to auscultation bilaterally without rales, rhonchi or wheezes.  CARDIOVASCULAR: Regular rate and rhythm. Normal S1, S2; no S3 or S4. No murmur, gallop, or rub.  ABDOMEN: Soft, nontender. Bowel sounds normal. No palpable organomegaly or masses.  MUSCULOSKELETAL: Extremities without gross deformities noted. No edema of bilateral lower extremities.  SKIN: No suspicious lesions or rashes.  NEURO: Alert and oriented x 3. Cranial nerves II-XII grossly intact.  PSYCHIATRIC: Mentation and affect appear normal.    Laboratory/Imaging Studies:  No visits with results within 2 Week(s) from this visit.  Latest known visit with results is:    Infusion Therapy Visit on 04/18/2018   Component Date Value Ref Range Status     Sodium 04/18/2018 140  133 - 144 mmol/L Final     Potassium 04/18/2018 4.7  3.4 - 5.3 mmol/L Final     Chloride 04/18/2018 107  94 - 109 mmol/L Final     Carbon Dioxide 04/18/2018 30  20 - 32 mmol/L Final     Anion Gap 04/18/2018 3  3 - 14 mmol/L Final     Glucose 04/18/2018 103* 70 - 99 mg/dL Final     Urea Nitrogen 04/18/2018 13  7 - 30 mg/dL Final     Creatinine 04/18/2018 1.17  0.66 - 1.25 mg/dL Final     GFR Estimate 04/18/2018 60* >60 mL/min/1.7m2 Final    Non  GFR Calc     GFR  Estimate If Black 04/18/2018 73  >60 mL/min/1.7m2 Final    African American GFR Calc     Calcium 04/18/2018 8.6  8.5 - 10.1 mg/dL Final     Bilirubin Total 04/18/2018 0.4  0.2 - 1.3 mg/dL Final     Albumin 04/18/2018 3.6  3.4 - 5.0 g/dL Final     Protein Total 04/18/2018 7.5  6.8 - 8.8 g/dL Final     Alkaline Phosphatase 04/18/2018 80  40 - 150 U/L Final     ALT 04/18/2018 20  0 - 70 U/L Final     AST 04/18/2018 18  0 - 45 U/L Final     TSH 04/18/2018 2.66  0.40 - 4.00 mU/L Final            Assessment and plan:    (C02.9) Recurrent tongue cancer (H)  (primary encounter diagnosis)  Patient tolerated his first cycle of immunotherapy.  We will proceed today with cycle #2 of Keytruda.  I will see the patient again in 3 weeks time or sooner if there are new developments or concerns.    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    (I10) Hypertension goal BP (blood pressure) < 140/90  Patient currently on lisinopril 20 mg orally daily.    (N18.1) CKD (chronic kidney disease) stage 1, GFR 90 ml/min or greater  .  Patient creatinine has been stable.  We will continue to monitor.    The patient is ready to learn, no apparent learning barriers were identified.  Diagnosis and treatment plans were explained to the patient. The patient expressed understanding of the content. The patient asked appropriate questions. The patient questions were answered to his satisfaction.    Chart documentation with Dragon Voice recognition Software. Although reviewed after completion, some words and grammatical errors may remain.

## 2018-05-09 NOTE — MR AVS SNAPSHOT
After Visit Summary   5/9/2018    Patrick Diop    MRN: [id number]           Patient Information     Date Of Birth          1935        Visit Information        Provider Department      5/9/2018 10:30 AM Lanny Neil MD [facility] ONCOLOGY      Today's Diagnoses     Recurrent tongue cancer (H)    -  1    Malignant neoplasm of colon, unspecified part of colon (H)        Type 2 diabetes mellitus with stage 1 chronic kidney disease, without long-term current use of insulin (H)        Hyperlipidemia with target LDL less than 100        Hypertension goal BP (blood pressure) < 140/90        CKD (chronic kidney disease) stage 1, GFR 90 ml/min or greater          Care Instructions    We would like to see you back in clinic with Dr. Neil in 3 weeks with infusion to be scheduled per treatment plan.      When you are in need of a refill, please call your pharmacy and they will send us a request.      Copy of appointments, and after visit summary (AVS) given to patient.      If you have any questions during business hours (M-F 8 AM- 4PM), please call Yolanda Eduardo RN, BSN, OCN Oncology Hematology /Breast Cancer Navigator at [facility] [phone].       For questions after business hours, or on holidays/weekends, please call our after hours Nurse Triage line [phone]. Thank you.            Follow-ups after your visit        Follow-up notes from your care team     Return in about 3 weeks (around 5/30/2018) for Schedule for chemotherapy as per treatment plan.      Your next 10 appointments already scheduled     May 31, 2018  8:20 AM CDT   LAB with [facility] ([facility])    [address] [address]   [phone]           Please do not eat 10-12 hours before your appointment if you are coming in fasting for labs on lipids, cholesterol, or glucose (sugar). This does not apply  "to pregnant women. Water, hot tea and black coffee (with nothing added) are okay. Do not drink other fluids, diet soda or chew gum.            May 31, 2018  9:00 AM CDT   Return Visit with Lanny Neil MD   [facility] ([facility])    [facility]  [address]  [facility] [address]   [phone]            May 31, 2018  9:30 AM CDT   Level 2 with ROOM 5 [facility] ([facility])    [facility]  [address]  [facility] [address]   [phone]            Sep 07, 2018  8:30 AM CDT   Return Visit with [facility] Provider   [facility] ([facility])    [address], [address]  [facility] [address]   [phone]              Who to contact     If you have questions or need follow up information about today's clinic visit or your schedule please contact [facility] directly at [phone].  Normal or non-critical lab and imaging results will be communicated to you by MyChart, letter or phone within 4 business days after the clinic has received the results. If you do not hear from us within 7 days, please contact the clinic through MyChart or phone. If you have a critical or abnormal lab result, we will notify you by phone as soon as possible.  Submit refill requests through [employer] or call your pharmacy and they will forward the refill request to us. Please allow 3 business days for your refill to be completed.          Additional Information About Your Visit        Care EveryWhere ID     This is your Care EveryWhere ID. This could be used by other organizations to access your [facility] medical records  [id number]        Your Vitals Were     Pulse Temperature Respirations Height Pulse Oximetry BMI (Body Mass Index)    79 97.4  F (36.3  C) (Tympanic) 20 1.714 m (5' 7.48\") 97% 25.66 kg/m2       Blood Pressure from Last 3 Encounters:   05/09/18 150/80 "   04/18/18 128/66   04/12/18 137/79    Weight from Last 3 Encounters:   05/09/18 75.4 kg (166 lb 3.2 oz)   04/12/18 76.6 kg (168 lb 14.4 oz)   04/03/18 76.3 kg (168 lb 3.4 oz)              Today, you had the following     No orders found for display       Primary Care Provider Office Phone # Fax #    Joe Mandel [phone] [address] [place] [address]        Equal Access to Services     [facility]: Hadii frances quinteros hadasho Soomaali, waaxda luqadaha, qaybta kaalmada marcio, elizabeth gruber . So [facility] [phone].    ATENCIÓN: Si habla español, tiene a taylor disposición servicios gratuitos de asistencia lingüística. [facility] [phone].    We comply with applicable federal civil rights laws and [place] laws. We do not discriminate on the basis of race, color, national origin, age, disability, sex, sexual orientation, or gender identity.            Thank you!     Thank you for choosing [facility]  for your care. Our goal is always to provide you with excellent care. Hearing back from our patients is one way we can continue to improve our services. Please take a few minutes to complete the written survey that you may receive in the mail after your visit with us. Thank you!             Your Updated Medication List - Protect others around you: Learn how to safely use, store and throw away your medicines at www.disposemymeds.org.          This list is accurate as of 5/9/18 12:13 PM.  Always use your most recent med list.                   Brand Name Dispense Instructions for use Diagnosis    lisinopril 20 MG tablet    PRINIVIL/ZESTRIL    90 tablet    Take 1 tablet (20 mg) by mouth daily    Hypertension goal BP (blood pressure) < 140/90       LORazepam 0.5 MG tablet    ATIVAN    30 tablet    Take 1 tablet (0.5 mg) by mouth every 4 hours as needed (Anxiety, Nausea/Vomiting or Sleep)    Tongue cancer (H)       lovastatin 40 MG tablet    MEVACOR    90  tablet    TAKE ONE TABLET BY MOUTH NIGHTLY AT BEDTIME    Hyperlipidemia with target LDL less than 100       multivitamin  with lutein Caps per capsule      Take 1 capsule by mouth daily        prochlorperazine 10 MG tablet    COMPAZINE    30 tablet    Take 1 tablet (10 mg) by mouth every 6 hours as needed (Nausea/Vomiting)    Tongue cancer (H)       vitamin D 1000 units capsule      Take 1 capsule by mouth daily

## 2018-05-29 DIAGNOSIS — I10 HYPERTENSION GOAL BP (BLOOD PRESSURE) < 140/90: ICD-10-CM

## 2018-05-29 RX ORDER — LISINOPRIL 20 MG/1
TABLET ORAL
Qty: 30 TABLET | Refills: 2 | Status: SHIPPED | OUTPATIENT
Start: 2018-05-29 | End: 2018-06-07

## 2018-05-29 NOTE — TELEPHONE ENCOUNTER
"Requested Prescriptions   Pending Prescriptions Disp Refills     lisinopril (PRINIVIL/ZESTRIL) 20 MG tablet [Pharmacy Med Name: LISINOPRIL 20 MG    TAB SOLC] 30 tablet 2     Sig: TAKE ONE TABLET BY MOUTH DAILY    ACE Inhibitors (Including Combos) Protocol Failed    5/29/2018  1:15 PM       Failed - Blood pressure under 140/90 in past 12 months    BP Readings from Last 3 Encounters:   05/09/18 149/83   05/09/18 150/80   04/18/18 128/66                Passed - Recent (12 mo) or future (30 days) visit within the authorizing provider's specialty    Patient had office visit in the last 12 months or has a visit in the next 30 days with authorizing provider or within the authorizing provider's specialty.  See \"Patient Info\" tab in inbasket, or \"Choose Columns\" in Meds & Orders section of the refill encounter.           Passed - Patient is age 18 or older       Passed - Normal serum creatinine on file in past 12 months    Recent Labs   Lab Test  05/09/18   0919   CR  1.16            Passed - Normal serum potassium on file in past 12 months    Recent Labs   Lab Test  05/09/18   0919   POTASSIUM  4.7             Last Written Prescription Date:  07/03/17  Last Fill Quantity: 90,  # refills: 3   Last office visit: 3/23/2018 with prescribing provider:  03/23/18   Future Office Visit:   Next 5 appointments (look out 90 days)     May 31, 2018  9:00 AM CDT   Return Visit with Lanny Neil MD   [facility] ([facility])    [facility]  [address] [address]   [phone]                   "

## 2018-05-31 ENCOUNTER — HOSPITAL ENCOUNTER (OUTPATIENT)
Dept: LAB | Facility: CLINIC | Age: 83
Discharge: HOME | End: 2018-05-31
Attending: INTERNAL MEDICINE | Admitting: INTERNAL MEDICINE
Payer: MEDICARE

## 2018-05-31 ENCOUNTER — ONCOLOGY VISIT (OUTPATIENT)
Dept: ONCOLOGY | Facility: CLINIC | Age: 83
End: 2018-05-31
Attending: INTERNAL MEDICINE
Payer: MEDICARE

## 2018-05-31 ENCOUNTER — INFUSION THERAPY VISIT (OUTPATIENT)
Dept: INFUSION THERAPY | Facility: CLINIC | Age: 83
End: 2018-05-31
Attending: INTERNAL MEDICINE
Payer: MEDICARE

## 2018-05-31 VITALS
OXYGEN SATURATION: 96 % | HEIGHT: 67 IN | SYSTOLIC BLOOD PRESSURE: 129 MMHG | HEART RATE: 83 BPM | RESPIRATION RATE: 20 BRPM | DIASTOLIC BLOOD PRESSURE: 65 MMHG | TEMPERATURE: 97.3 F | BODY MASS INDEX: 26.42 KG/M2 | WEIGHT: 168.3 LBS

## 2018-05-31 VITALS — DIASTOLIC BLOOD PRESSURE: 70 MMHG | SYSTOLIC BLOOD PRESSURE: 152 MMHG | HEART RATE: 83 BPM

## 2018-05-31 DIAGNOSIS — C18.9 MALIGNANT NEOPLASM OF COLON, UNSPECIFIED PART OF COLON (H): ICD-10-CM

## 2018-05-31 DIAGNOSIS — N18.1 TYPE 2 DIABETES MELLITUS WITH STAGE 1 CHRONIC KIDNEY DISEASE, WITHOUT LONG-TERM CURRENT USE OF INSULIN (H): ICD-10-CM

## 2018-05-31 DIAGNOSIS — E78.5 HYPERLIPIDEMIA WITH TARGET LDL LESS THAN 100: ICD-10-CM

## 2018-05-31 DIAGNOSIS — N18.1 CKD (CHRONIC KIDNEY DISEASE) STAGE 1, GFR 90 ML/MIN OR GREATER: ICD-10-CM

## 2018-05-31 DIAGNOSIS — E11.22 TYPE 2 DIABETES MELLITUS WITH STAGE 1 CHRONIC KIDNEY DISEASE, WITHOUT LONG-TERM CURRENT USE OF INSULIN (H): ICD-10-CM

## 2018-05-31 DIAGNOSIS — T45.1X5A CHEMOTHERAPY INDUCED NAUSEA AND VOMITING: ICD-10-CM

## 2018-05-31 DIAGNOSIS — C02.9 RECURRENT TONGUE CANCER (H): Primary | ICD-10-CM

## 2018-05-31 DIAGNOSIS — R11.2 CHEMOTHERAPY INDUCED NAUSEA AND VOMITING: ICD-10-CM

## 2018-05-31 DIAGNOSIS — I10 HYPERTENSION GOAL BP (BLOOD PRESSURE) < 140/90: ICD-10-CM

## 2018-05-31 PROBLEM — E83.42 HYPOMAGNESEMIA: Status: ACTIVE | Noted: 2018-05-31

## 2018-05-31 PROBLEM — E87.6 HYPOKALEMIA: Status: ACTIVE | Noted: 2018-05-31

## 2018-05-31 LAB
ALBUMIN SERPL-MCNC: 3.7 G/DL (ref 3.4–5)
ALP SERPL-CCNC: 73 U/L (ref 40–150)
ALT SERPL W P-5'-P-CCNC: 20 U/L (ref 0–70)
ANION GAP SERPL CALCULATED.3IONS-SCNC: 5 MMOL/L (ref 3–14)
AST SERPL W P-5'-P-CCNC: 13 U/L (ref 0–45)
BILIRUB SERPL-MCNC: 0.6 MG/DL (ref 0.2–1.3)
BUN SERPL-MCNC: 27 MG/DL (ref 7–30)
CALCIUM SERPL-MCNC: 8.9 MG/DL (ref 8.5–10.1)
CHLORIDE SERPL-SCNC: 110 MMOL/L (ref 94–109)
CO2 SERPL-SCNC: 27 MMOL/L (ref 20–32)
CREAT SERPL-MCNC: 1.26 MG/DL (ref 0.66–1.25)
GFR SERPL CREATININE-BSD FRML MDRD: 55 ML/MIN/1.7M2
GLUCOSE SERPL-MCNC: 107 MG/DL (ref 70–99)
POTASSIUM SERPL-SCNC: 5 MMOL/L (ref 3.4–5.3)
PROT SERPL-MCNC: 7.5 G/DL (ref 6.8–8.8)
SODIUM SERPL-SCNC: 142 MMOL/L (ref 133–144)
TSH SERPL DL<=0.005 MIU/L-ACNC: 3.25 MU/L (ref 0.4–4)

## 2018-05-31 PROCEDURE — 96413 CHEMO IV INFUSION 1 HR: CPT

## 2018-05-31 PROCEDURE — 99215 OFFICE O/P EST HI 40 MIN: CPT | Performed by: INTERNAL MEDICINE

## 2018-05-31 PROCEDURE — G0463 HOSPITAL OUTPT CLINIC VISIT: HCPCS | Mod: 25

## 2018-05-31 PROCEDURE — 84443 ASSAY THYROID STIM HORMONE: CPT | Performed by: INTERNAL MEDICINE

## 2018-05-31 PROCEDURE — 36415 COLL VENOUS BLD VENIPUNCTURE: CPT | Performed by: INTERNAL MEDICINE

## 2018-05-31 PROCEDURE — 25000128 H RX IP 250 OP 636: Performed by: INTERNAL MEDICINE

## 2018-05-31 PROCEDURE — 80053 COMPREHEN METABOLIC PANEL: CPT | Performed by: INTERNAL MEDICINE

## 2018-05-31 RX ORDER — MEPERIDINE HYDROCHLORIDE 25 MG/ML
25 INJECTION INTRAMUSCULAR; INTRAVENOUS; SUBCUTANEOUS EVERY 30 MIN PRN
Status: CANCELLED | OUTPATIENT
Start: 2018-05-31

## 2018-05-31 RX ORDER — METHYLPREDNISOLONE SODIUM SUCCINATE 125 MG/2ML
125 INJECTION, POWDER, LYOPHILIZED, FOR SOLUTION INTRAMUSCULAR; INTRAVENOUS
Status: CANCELLED
Start: 2018-05-31

## 2018-05-31 RX ORDER — DIPHENHYDRAMINE HYDROCHLORIDE 50 MG/ML
50 INJECTION INTRAMUSCULAR; INTRAVENOUS
Status: CANCELLED
Start: 2018-05-31

## 2018-05-31 RX ORDER — LORAZEPAM 2 MG/ML
0.5 INJECTION INTRAMUSCULAR EVERY 4 HOURS PRN
Status: CANCELLED
Start: 2018-05-31

## 2018-05-31 RX ORDER — ALBUTEROL SULFATE 90 UG/1
1-2 AEROSOL, METERED RESPIRATORY (INHALATION)
Status: CANCELLED
Start: 2018-05-31

## 2018-05-31 RX ORDER — EPINEPHRINE 1 MG/ML
0.3 INJECTION, SOLUTION, CONCENTRATE INTRAVENOUS EVERY 5 MIN PRN
Status: CANCELLED | OUTPATIENT
Start: 2018-05-31

## 2018-05-31 RX ORDER — SODIUM CHLORIDE 9 MG/ML
1000 INJECTION, SOLUTION INTRAVENOUS CONTINUOUS PRN
Status: CANCELLED
Start: 2018-05-31

## 2018-05-31 RX ORDER — EPINEPHRINE 0.3 MG/.3ML
0.3 INJECTION SUBCUTANEOUS EVERY 5 MIN PRN
Status: CANCELLED | OUTPATIENT
Start: 2018-05-31

## 2018-05-31 RX ORDER — ALBUTEROL SULFATE 0.83 MG/ML
2.5 SOLUTION RESPIRATORY (INHALATION)
Status: CANCELLED | OUTPATIENT
Start: 2018-05-31

## 2018-05-31 RX ADMIN — SODIUM CHLORIDE 200 MG: 9 INJECTION, SOLUTION INTRAVENOUS at 10:38

## 2018-05-31 ASSESSMENT — PAIN SCALES - GENERAL: PAINLEVEL: NO PAIN (0)

## 2018-05-31 NOTE — MR AVS SNAPSHOT
After Visit Summary   5/31/2018    Patrick Diop    MRN: [id number]           Patient Information     Date Of Birth          1935        Visit Information        Provider Department      5/31/2018 9:30 AM ROOM 5 [facility]        Today's Diagnoses     Recurrent tongue cancer (H)    -  1       Follow-ups after your visit        Your next 10 appointments already scheduled     Jun 21, 2018  8:30 AM CDT   LAB with [facility] ([facility])    [address] [address]   [phone]           Please do not eat 10-12 hours before your appointment if you are coming in fasting for labs on lipids, cholesterol, or glucose (sugar). This does not apply to pregnant women. Water, hot tea and black coffee (with nothing added) are okay. Do not drink other fluids, diet soda or chew gum.            Jun 21, 2018  9:00 AM CDT   Return Visit with Lanny Neil MD   [facility] ([facility])    [facility]  [address]  [facility] [address]   [phone]            Jun 21, 2018  9:30 AM CDT   Level 2 with ROOM 1 [facility] ([facility])    [facility]  [address]  [facility] [address]   [phone]            Jul 11, 2018  9:40 AM CDT   (Arrive by 9:30 AM)   LAB with [facility] ([facility])    [address] [address]   [phone]           Please do not eat 10-12 hours before your appointment if you are coming in fasting for labs on lipids, cholesterol, or glucose (sugar). This does not apply to pregnant women. Water, hot tea and black coffee (with nothing added) are okay. Do not drink other fluids, diet soda or chew gum.            Jul 11, 2018 10:00 AM CDT   Level 2 with ROOM 8 [facility] ([facility])    [facility]  [address]  [facility] [address]   [phone]            Sep 07, 2018  8:30 AM CDT   Return Visit with [facility] Provider   [facility] Center ([facility])    [address]  [facility] [address]   [phone]              Who to contact     If you have questions or need follow up information about today's clinic visit or your schedule please contact [facility] directly at [phone].  Normal or non-critical lab and imaging results will be communicated to you by MyChart, letter or phone within 4 business days after the clinic has received the results. If you do not hear from us within 7 days, please contact the clinic through MyChart or phone. If you have a critical or abnormal lab result, we will notify you by phone as soon as possible.  Submit refill requests through [employer] or call your pharmacy and they will forward the refill request to us. Please allow 3 business days for your refill to be completed.          Additional Information About Your Visit        Care EveryWhere ID     This is your Care EveryWhere ID. This could be used by other organizations to access your [facility] medical records  [id number]        Your Vitals Were     Pulse                   83            Blood Pressure from Last 3 Encounters:   05/31/18 152/70   05/31/18 129/65   05/09/18 149/83    Weight from Last 3 Encounters:   05/31/18 76.3 kg (168 lb 4.8 oz)   05/09/18 75.4 kg (166 lb 3.2 oz)   04/12/18 76.6 kg (168 lb 14.4 oz)              We Performed the Following     Comprehensive metabolic panel     TSH with free T4 reflex        Primary Care Provider Office Phone # Fax #    Joe Mandel [phone] [address] [place] [address]        Equal Access to Services     RAINA SOLANO : Norma Brock, daniel cardoza, elizabeth méndez . So [facility]  [phone].    ATENCIÓN: Si sharmila carrillo, tiene a taylor disposición servicios gratuitos de asistencia lingüística. Toribio pelayo [phone].    We comply with applicable federal civil rights laws and [place] laws. We do not discriminate on the basis of race, color, national origin, age, disability, sex, sexual orientation, or gender identity.            Thank you!     Thank you for choosing [facility]  for your care. Our goal is always to provide you with excellent care. Hearing back from our patients is one way we can continue to improve our services. Please take a few minutes to complete the written survey that you may receive in the mail after your visit with us. Thank you!             Your Updated Medication List - Protect others around you: Learn how to safely use, store and throw away your medicines at www.disposemymeds.org.          This list is accurate as of 5/31/18 11:52 AM.  Always use your most recent med list.                   Brand Name Dispense Instructions for use Diagnosis    lisinopril 20 MG tablet    PRINIVIL/ZESTRIL    30 tablet    TAKE ONE TABLET BY MOUTH DAILY    Hypertension goal BP (blood pressure) < 140/90       LORazepam 0.5 MG tablet    ATIVAN    30 tablet    Take 1 tablet (0.5 mg) by mouth every 4 hours as needed (Anxiety, Nausea/Vomiting or Sleep)    Tongue cancer (H)       lovastatin 40 MG tablet    MEVACOR    90 tablet    TAKE ONE TABLET BY MOUTH NIGHTLY AT BEDTIME    Hyperlipidemia with target LDL less than 100       multivitamin  with lutein Caps per capsule      Take 1 capsule by mouth daily        prochlorperazine 10 MG tablet    COMPAZINE    30 tablet    Take 1 tablet (10 mg) by mouth every 6 hours as needed (Nausea/Vomiting)    Tongue cancer (H)       vitamin D 1000 units capsule      Take 1 capsule by mouth daily

## 2018-05-31 NOTE — PATIENT INSTRUCTIONS
We would like to see you back in clinic with Dr. Neil in 3 weeks with infusion to be scheduled per treatment plan.      Copy of appointments, and after visit summary (AVS) given to patient.      If you have any questions during business hours (M-F 8 AM- 4PM), please call Yolanda Eduardo RN, BSN, OCN Oncology Hematology /Breast Cancer Navigator at [facility] [phone].       For questions after business hours, or on holidays/weekends, please call our after hours Nurse Triage line [phone]. Thank you.

## 2018-05-31 NOTE — PROGRESS NOTES
Hematology/ Oncology Follow-up Visit:  May 31, 2018    Reason for Visit:   Chief Complaint   Patient presents with     Oncology Clinic Visit     3 week return Recurrent tongue cancer, Labs & Keytruda today        Oncologic History:    Recurrent tongue cancer (H)  Patrick Diop was noted to have a mass in his right neck during routine physical exam. CT scanning was subsequently obtained which also showed a right-sided tongue base and oropharyngeal mass, which together are consistent with malignancy. The patient himself has denied any unexplained weight loss. He denies any odynophagia, dysphagia, hoarseness or otalgia. 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 PET scan done on October 21, 2015 showed a mass at the level of the tongue base located along the anterior wall of the oropharynx and extending to the right lateral wall near the tonsillar pillar. This demonstrates abnormal increased FDG activity and is consistent with known malignancy. Hypermetabolic lymph nodes in the right and left neck are also noted as described. These are consistent with metastatic lymph nodes. There is no evidence of metastatic disease in the chest, abdomen or pelvis. CT of the soft tissues of the neck on October 21, 2015 was done in conjunction with PET scanning showed a 2.8 cm mass at the base of the tongue on the right effacing the right vallecula. There is also a 2.3 cm lymph node in the right level II region that is strongly suspicious for metastatic disease. These lesions are PET positive. Additionally, there is a 1.3 cm lymph node in left level III that is PET positive. There is a lymph node in right level V, also PET positive. He started on concurrent Cisplatin with radiation therapy with cisplatin 100 mg/m to be given intravenously on days #1, #22 #43 of radiation therapy.    Interval History:  Patient is here today for follow-up.  Patient has been on immunotherapy was Keytruda.  He  "has been tolerating treatment very well without any significant side effects.  He is able to eat and drink without any dysphagia.  Denies any shortness of breath or cough or wheezing.  Denies any nausea vomiting or diarrhea.      Review Of Systems:  Constitutional: Negative for fever, chills, and night sweats.  Skin: negative.  Eyes: negative.  Ears/Nose/Throat: negative.  Respiratory: No shortness of breath, dyspnea on exertion, cough, or hemoptysis.  Cardiovascular: negative.  Gastrointestinal: negative.  Genitourinary: negative.  Musculoskeletal: negative.  Neurologic: negative.  Psychiatric: negative.  Hematologic/Lymphatic/Immunologic: negative.  Endocrine: negative.    All other ROS negative unless mentioned in interval history.    Past medical, social, surgical, and family histories reviewed.    Allergies:  Allergies as of 05/31/2018     (No Known Allergies)       Current Medications:  Current Outpatient Prescriptions   Medication Sig Dispense Refill     Cholecalciferol (VITAMIN D) 1000 UNITS capsule Take 1 capsule by mouth daily       lisinopril (PRINIVIL/ZESTRIL) 20 MG tablet TAKE ONE TABLET BY MOUTH DAILY 30 tablet 2     lovastatin (MEVACOR) 40 MG tablet TAKE ONE TABLET BY MOUTH NIGHTLY AT BEDTIME 90 tablet 3     multivitamin  with lutein (OCUVITE WITH LTEIN) CAPS Take 1 capsule by mouth daily       LORazepam (ATIVAN) 0.5 MG tablet Take 1 tablet (0.5 mg) by mouth every 4 hours as needed (Anxiety, Nausea/Vomiting or Sleep) (Patient not taking: Reported on 5/9/2018) 30 tablet 2     prochlorperazine (COMPAZINE) 10 MG tablet Take 1 tablet (10 mg) by mouth every 6 hours as needed (Nausea/Vomiting) (Patient not taking: Reported on 5/9/2018) 30 tablet 2        Physical Exam:  /65 (BP Location: Right arm, Patient Position: Sitting, Cuff Size: Adult Regular)  Pulse 83  Temp 97.3  F (36.3  C) (Tympanic)  Resp 20  Ht 1.714 m (5' 7.48\")  Wt 76.3 kg (168 lb 4.8 oz)  SpO2 96%  BMI 25.99 kg/m2  Wt Readings " from Last 12 Encounters:   05/31/18 76.3 kg (168 lb 4.8 oz)   05/09/18 75.4 kg (166 lb 3.2 oz)   04/12/18 76.6 kg (168 lb 14.4 oz)   04/03/18 76.3 kg (168 lb 3.4 oz)   03/23/18 76.8 kg (169 lb 6.4 oz)   03/19/18 77.1 kg (170 lb)   03/05/18 74.3 kg (163 lb 12.8 oz)   11/29/17 78.1 kg (172 lb 3.2 oz)   07/03/17 79.4 kg (175 lb)   04/28/17 80.9 kg (178 lb 4.8 oz)   11/28/16 76.7 kg (169 lb)   11/07/16 77.1 kg (170 lb)     ECOG performance status: 0  GENERAL APPEARANCE: Healthy, alert and in no acute distress.  HEENT: Sclerae anicteric. PERRLA. Oropharynx without ulcers, lesions, or thrush.  NECK: Supple. No asymmetry or masses.  LYMPHATICS: No palpable cervical, supraclavicular, axillary, or inguinal lymphadenopathy.  RESP: Lungs clear to auscultation bilaterally without rales, rhonchi or wheezes.  CARDIOVASCULAR: Regular rate and rhythm. Normal S1, S2; no S3 or S4. No murmur, gallop, or rub.  ABDOMEN: Soft, nontender. Bowel sounds normal. No palpable organomegaly or masses.  MUSCULOSKELETAL: Extremities without gross deformities noted. No edema of bilateral lower extremities.  SKIN: No suspicious lesions or rashes.  NEURO: Alert and oriented x 3. Cranial nerves II-XII grossly intact.  PSYCHIATRIC: Mentation and affect appear normal.    Laboratory/Imaging Studies:  No visits with results within 2 Week(s) from this visit.  Latest known visit with results is:    Infusion Therapy Visit on 05/09/2018   Component Date Value Ref Range Status     Sodium 05/09/2018 141  133 - 144 mmol/L Final     Potassium 05/09/2018 4.7  3.4 - 5.3 mmol/L Final     Chloride 05/09/2018 109  94 - 109 mmol/L Final     Carbon Dioxide 05/09/2018 26  20 - 32 mmol/L Final     Anion Gap 05/09/2018 6  3 - 14 mmol/L Final     Glucose 05/09/2018 96  70 - 99 mg/dL Final     Urea Nitrogen 05/09/2018 20  7 - 30 mg/dL Final     Creatinine 05/09/2018 1.16  0.66 - 1.25 mg/dL Final     GFR Estimate 05/09/2018 61  >60 mL/min/1.7m2 Final    Non   GFR Calc     GFR Estimate If Black 05/09/2018 73  >60 mL/min/1.7m2 Final    African American GFR Calc     Calcium 05/09/2018 8.9  8.5 - 10.1 mg/dL Final     Bilirubin Total 05/09/2018 0.6  0.2 - 1.3 mg/dL Final     Albumin 05/09/2018 3.8  3.4 - 5.0 g/dL Final     Protein Total 05/09/2018 8.1  6.8 - 8.8 g/dL Final     Alkaline Phosphatase 05/09/2018 79  40 - 150 U/L Final     ALT 05/09/2018 19  0 - 70 U/L Final     AST 05/09/2018 12  0 - 45 U/L Final     TSH 05/09/2018 3.06  0.40 - 4.00 mU/L Final          Assessment and plan:    (C02.9) Recurrent tongue cancer (H)  (primary encounter diagnosis)  We will continue with immunotherapy was Keytruda.  I will see the patient again in 3 weeks or sooner if there are new developments or concerns.    (N18.1) CKD (chronic kidney disease) stage 1, GFR 90 ml/min or greater  Phoebe has been stable    (E11.22,  N18.1) Type 2 diabetes mellitus with stage 1 chronic kidney disease, without long-term current use of insulin (H)  Diabetes has been well controlled with diet    (E78.5) Hyperlipidemia with target LDL less than 100  Currently on lovastatin 40 mg orally daily.    (I10) Hypertension goal BP (blood pressure) < 140/90  The patient currently on lisinopril 20 mg orally daily.    (C18.9) Malignant neoplasm of colon, unspecified part of colon (H)  There is no evidence of disease recurrence.    (R11.2,  T45.1X5A) Chemotherapy induced nausea and vomiting  Nausea has been well controlled.    The patient is ready to learn, no apparent learning barriers were identified.  Diagnosis and treatment plans were explained to the patient. The patient expressed understanding of the content. The patient asked appropriate questions. The patient questions were answered to his satisfaction.    Chart documentation with Dragon Voice recognition Software. Although reviewed after completion, some words and grammatical errors may remain.

## 2018-05-31 NOTE — PROGRESS NOTES
Infusion Nursing Note:  Patrick Diop presents today for Keytruda .    Patient seen by provider today: Yes, Dr. Neil.   present during visit today: Not Applicable.    Note: N/A.    Intravenous Access:  Peripheral IV placed.    Treatment Conditions:  Lab Results   Component Value Date     05/31/2018                   Lab Results   Component Value Date    POTASSIUM 5.0 05/31/2018           Lab Results   Component Value Date    MAG 1.6 04/25/2017            Lab Results   Component Value Date    CR 1.26 05/31/2018                   Lab Results   Component Value Date    AMY 8.9 05/31/2018                Lab Results   Component Value Date    BILITOTAL 0.6 05/31/2018           Lab Results   Component Value Date    ALBUMIN 3.7 05/31/2018                    Lab Results   Component Value Date    ALT 20 05/31/2018           Lab Results   Component Value Date    AST 13 05/31/2018       Results reviewed, labs MET treatment parameters, ok to proceed with treatment.      Post Infusion Assessment:  Patient tolerated infusion without incident.  Access discontinued per protocol.    Discharge Plan:   Patient discharged in stable condition accompanied by: self.  Departure Mode: Ambulatory.    Brenna Garcia RN

## 2018-05-31 NOTE — MR AVS SNAPSHOT
After Visit Summary   5/31/2018    Patrick Diop    MRN: [id number]           Patient Information     Date Of Birth          1935        Visit Information        Provider Department      5/31/2018 9:00 AM Lanny Neil MD [facility] ONCOLOGY      Today's Diagnoses     Recurrent tongue cancer (H)    -  1    CKD (chronic kidney disease) stage 1, GFR 90 ml/min or greater        Type 2 diabetes mellitus with stage 1 chronic kidney disease, without long-term current use of insulin (H)        Hyperlipidemia with target LDL less than 100        Hypertension goal BP (blood pressure) < 140/90        Malignant neoplasm of colon, unspecified part of colon (H)        Hypokalemia        Hypomagnesemia        Chemotherapy induced nausea and vomiting          Care Instructions    We would like to see you back in clinic with Dr. Neil in 3 weeks with infusion to be scheduled per treatment plan.      Copy of appointments, and after visit summary (AVS) given to patient.      If you have any questions during business hours (M-F 8 AM- 4PM), please call Yolanda Eduardo RN, BSN, OCN Oncology Hematology /Breast Cancer Navigator at [facility] [phone].       For questions after business hours, or on holidays/weekends, please call our after hours Nurse Triage line [phone]. Thank you.            Follow-ups after your visit        Follow-up notes from your care team     Return in about 3 weeks (around 6/21/2018) for Schedule for chemotherapy as per treatment plan.      Your next 10 appointments already scheduled     Jun 21, 2018  8:30 AM CDT   LAB with [facility] ([facility])    [address] [address]   [phone]           Please do not eat 10-12 hours before your appointment if you are coming in fasting for labs on lipids, cholesterol, or glucose (sugar). This does not apply to  pregnant women. Water, hot tea and black coffee (with nothing added) are okay. Do not drink other fluids, diet soda or chew gum.            Jun 21, 2018  9:00 AM CDT   Return Visit with Lanny Neil MD   [facility] ([facility])    [facility]  [address]  [facility] [address]   [phone]            Jun 21, 2018  9:30 AM CDT   Level 2 with ROOM 1 [facility] ([facility])    [facility]  [address]  [place] [place] [address]   [phone]            Jul 11, 2018  9:40 AM CDT   (Arrive by 9:30 AM)   LAB with [facility] ([facility])    [address] [address]   [phone]           Please do not eat 10-12 hours before your appointment if you are coming in fasting for labs on lipids, cholesterol, or glucose (sugar). This does not apply to pregnant women. Water, hot tea and black coffee (with nothing added) are okay. Do not drink other fluids, diet soda or chew gum.            Jul 11, 2018 10:00 AM CDT   Level 2 with ROOM 8 [facility] ([facility])    [facility]  [address]  [place] [place] [address]   [phone]            Sep 07, 2018  8:30 AM CDT   Return Visit with [facility] Provider   [facility] ([facility])    [address]  [place] [place] [facility] [place] [address]   [phone]              Who to contact     If you have questions or need follow up information about today's clinic visit or your schedule please contact [facility] directly at [phone].  Normal or non-critical lab and imaging results will be communicated to you by MyChart, letter or phone within 4 business days after the clinic has received the results. If you do not hear from us within 7 days, please contact the clinic through MyChart or phone. If you have a  "critical or abnormal lab result, we will notify you by phone as soon as possible.  Submit refill requests through [employer] or call your pharmacy and they will forward the refill request to us. Please allow 3 business days for your refill to be completed.          Additional Information About Your Visit        Care EveryWhere ID     This is your Care EveryWhere ID. This could be used by other organizations to access your [place] medical records  [id number]        Your Vitals Were     Pulse Temperature Respirations Height Pulse Oximetry BMI (Body Mass Index)    83 97.3  F (36.3  C) (Tympanic) 20 1.714 m (5' 7.48\") 96% 25.99 kg/m2       Blood Pressure from Last 3 Encounters:   05/31/18 129/65   05/09/18 149/83   05/09/18 150/80    Weight from Last 3 Encounters:   05/31/18 76.3 kg (168 lb 4.8 oz)   05/09/18 75.4 kg (166 lb 3.2 oz)   04/12/18 76.6 kg (168 lb 14.4 oz)              Today, you had the following     No orders found for display       Primary Care Provider Office Phone # Fax #    Joe Mandel [phone] [address] [place], [place] [address]        Equal Access to Services     RAINA SOLANO : Hadii frances ku hadasho Soomaali, waaxda luqadaha, qaybta kaalmada adeegyada, elizabeth gruber . So [facility] [phone].    ATENCIÓN: Si habla español, tiene a taylor disposición servicios gratuitos de asistencia lingüística. ame al [phone].    We comply with applicable federal civil rights laws and [place] laws. We do not discriminate on the basis of race, color, national origin, age, disability, sex, sexual orientation, or gender identity.            Thank you!     Thank you for choosing [facility]  for your care. Our goal is always to provide you with excellent care. Hearing back from our patients is one way we can continue to improve our services. Please take a few minutes to complete the written survey that you may receive in the mail after your visit with " us. Thank you!             Your Updated Medication List - Protect others around you: Learn how to safely use, store and throw away your medicines at www.disposemymeds.org.          This list is accurate as of 5/31/18 10:10 AM.  Always use your most recent med list.                   Brand Name Dispense Instructions for use Diagnosis    lisinopril 20 MG tablet    PRINIVIL/ZESTRIL    30 tablet    TAKE ONE TABLET BY MOUTH DAILY    Hypertension goal BP (blood pressure) < 140/90       LORazepam 0.5 MG tablet    ATIVAN    30 tablet    Take 1 tablet (0.5 mg) by mouth every 4 hours as needed (Anxiety, Nausea/Vomiting or Sleep)    Tongue cancer (H)       lovastatin 40 MG tablet    MEVACOR    90 tablet    TAKE ONE TABLET BY MOUTH NIGHTLY AT BEDTIME    Hyperlipidemia with target LDL less than 100       multivitamin  with lutein Caps per capsule      Take 1 capsule by mouth daily        prochlorperazine 10 MG tablet    COMPAZINE    30 tablet    Take 1 tablet (10 mg) by mouth every 6 hours as needed (Nausea/Vomiting)    Tongue cancer (H)       vitamin D 1000 units capsule      Take 1 capsule by mouth daily

## 2018-05-31 NOTE — NURSING NOTE
"Oncology Rooming Note    May 31, 2018 9:45 AM   Patrick Diop is a 82 year old male who presents for:    Chief Complaint   Patient presents with     Oncology Clinic Visit     3 week return Recurrent tongue cancer, Labs & Keytruda today      Initial Vitals: /65 (BP Location: Right arm, Patient Position: Sitting, Cuff Size: Adult Regular)  Pulse 83  Temp 97.3  F (36.3  C) (Tympanic)  Resp 20  Ht 1.714 m (5' 7.48\")  Wt 76.3 kg (168 lb 4.8 oz)  SpO2 96%  BMI 25.99 kg/m2 Estimated body mass index is 25.99 kg/(m^2) as calculated from the following:    Height as of this encounter: 1.714 m (5' 7.48\").    Weight as of this encounter: 76.3 kg (168 lb 4.8 oz). Body surface area is 1.91 meters squared.  No Pain (0) Comment: Data Unavailable   No LMP for male patient.  Allergies reviewed: Yes  Medications reviewed: Yes    Medications: Medication refills not needed today.  Pharmacy name entered into [employer]:    [facility] - [place], [place] - [address] [facility] [id number] - [place], [place] - [address]    Clinical concerns: 3 week return Recurrent tongue cancer, Labs & Keytruda today. C/o hearing has been down last few days. Since he started new treatment noticing an discomfort when pressing down on his left hand feels this intermittently.     8 minutes for nursing intake (face to face time)     Ivonne Del Rio, [facility]            "

## 2018-05-31 NOTE — LETTER
5/31/2018         RE: Patrick Diop  [address]  [facility] [address]        Dear Colleague,    Thank you for referring your patient, Patrick Diop, to the [facility]. Please see a copy of my visit note below.    Hematology/ Oncology Follow-up Visit:  May 31, 2018    Reason for Visit:   Chief Complaint   Patient presents with     Oncology Clinic Visit     3 week return Recurrent tongue cancer, Labs & Keytruda today        Oncologic History:    Recurrent tongue cancer (H)  Patrick Diop was noted to have a mass in his right neck during routine physical exam. CT scanning was subsequently obtained which also showed a right-sided tongue base and oropharyngeal mass, which together are consistent with malignancy. The patient himself has denied any unexplained weight loss. He denies any odynophagia, dysphagia, hoarseness or otalgia. 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 PET scan done on October 21, 2015 showed a mass at the level of the tongue base located along the anterior wall of the oropharynx and extending to the right lateral wall near the tonsillar pillar. This demonstrates abnormal increased FDG activity and is consistent with known malignancy. Hypermetabolic lymph nodes in the right and left neck are also noted as described. These are consistent with metastatic lymph nodes. There is no evidence of metastatic disease in the chest, abdomen or pelvis. CT of the soft tissues of the neck on October 21, 2015 was done in conjunction with PET scanning showed a 2.8 cm mass at the base of the tongue on the right effacing the right vallecula. There is also a 2.3 cm lymph node in the right level II region that is strongly suspicious for metastatic disease. These lesions are PET positive. Additionally, there is a 1.3 cm lymph node in left level III that is PET positive. There is a lymph node in right level V, also PET positive. He started on  concurrent Cisplatin with radiation therapy with cisplatin 100 mg/m to be given intravenously on days #1, #22 #43 of radiation therapy.    Interval History:  Patient is here today for follow-up.  Patient has been on immunotherapy was Keytruda.  He has been tolerating treatment very well without any significant side effects.  He is able to eat and drink without any dysphagia.  Denies any shortness of breath or cough or wheezing.  Denies any nausea vomiting or diarrhea.      Review Of Systems:  Constitutional: Negative for fever, chills, and night sweats.  Skin: negative.  Eyes: negative.  Ears/Nose/Throat: negative.  Respiratory: No shortness of breath, dyspnea on exertion, cough, or hemoptysis.  Cardiovascular: negative.  Gastrointestinal: negative.  Genitourinary: negative.  Musculoskeletal: negative.  Neurologic: negative.  Psychiatric: negative.  Hematologic/Lymphatic/Immunologic: negative.  Endocrine: negative.    All other ROS negative unless mentioned in interval history.    Past medical, social, surgical, and family histories reviewed.    Allergies:  Allergies as of 05/31/2018     (No Known Allergies)       Current Medications:  Current Outpatient Prescriptions   Medication Sig Dispense Refill     Cholecalciferol (VITAMIN D) 1000 UNITS capsule Take 1 capsule by mouth daily       lisinopril (PRINIVIL/ZESTRIL) 20 MG tablet TAKE ONE TABLET BY MOUTH DAILY 30 tablet 2     lovastatin (MEVACOR) 40 MG tablet TAKE ONE TABLET BY MOUTH NIGHTLY AT BEDTIME 90 tablet 3     multivitamin  with lutein (OCUVITE WITH LTEIN) CAPS Take 1 capsule by mouth daily       LORazepam (ATIVAN) 0.5 MG tablet Take 1 tablet (0.5 mg) by mouth every 4 hours as needed (Anxiety, Nausea/Vomiting or Sleep) (Patient not taking: Reported on 5/9/2018) 30 tablet 2     prochlorperazine (COMPAZINE) 10 MG tablet Take 1 tablet (10 mg) by mouth every 6 hours as needed (Nausea/Vomiting) (Patient not taking: Reported on 5/9/2018) 30 tablet 2        Physical  "Exam:  /65 (BP Location: Right arm, Patient Position: Sitting, Cuff Size: Adult Regular)  Pulse 83  Temp 97.3  F (36.3  C) (Tympanic)  Resp 20  Ht 1.714 m (5' 7.48\")  Wt 76.3 kg (168 lb 4.8 oz)  SpO2 96%  BMI 25.99 kg/m2  Wt Readings from Last 12 Encounters:   05/31/18 76.3 kg (168 lb 4.8 oz)   05/09/18 75.4 kg (166 lb 3.2 oz)   04/12/18 76.6 kg (168 lb 14.4 oz)   04/03/18 76.3 kg (168 lb 3.4 oz)   03/23/18 76.8 kg (169 lb 6.4 oz)   03/19/18 77.1 kg (170 lb)   03/05/18 74.3 kg (163 lb 12.8 oz)   11/29/17 78.1 kg (172 lb 3.2 oz)   07/03/17 79.4 kg (175 lb)   04/28/17 80.9 kg (178 lb 4.8 oz)   11/28/16 76.7 kg (169 lb)   11/07/16 77.1 kg (170 lb)     ECOG performance status: 0  GENERAL APPEARANCE: Healthy, alert and in no acute distress.  HEENT: Sclerae anicteric. PERRLA. Oropharynx without ulcers, lesions, or thrush.  NECK: Supple. No asymmetry or masses.  LYMPHATICS: No palpable cervical, supraclavicular, axillary, or inguinal lymphadenopathy.  RESP: Lungs clear to auscultation bilaterally without rales, rhonchi or wheezes.  CARDIOVASCULAR: Regular rate and rhythm. Normal S1, S2; no S3 or S4. No murmur, gallop, or rub.  ABDOMEN: Soft, nontender. Bowel sounds normal. No palpable organomegaly or masses.  MUSCULOSKELETAL: Extremities without gross deformities noted. No edema of bilateral lower extremities.  SKIN: No suspicious lesions or rashes.  NEURO: Alert and oriented x 3. Cranial nerves II-XII grossly intact.  PSYCHIATRIC: Mentation and affect appear normal.    Laboratory/Imaging Studies:  No visits with results within 2 Week(s) from this visit.  Latest known visit with results is:    Infusion Therapy Visit on 05/09/2018   Component Date Value Ref Range Status     Sodium 05/09/2018 141  133 - 144 mmol/L Final     Potassium 05/09/2018 4.7  3.4 - 5.3 mmol/L Final     Chloride 05/09/2018 109  94 - 109 mmol/L Final     Carbon Dioxide 05/09/2018 26  20 - 32 mmol/L Final     Anion Gap 05/09/2018 6  3 - 14 " mmol/L Final     Glucose 05/09/2018 96  70 - 99 mg/dL Final     Urea Nitrogen 05/09/2018 20  7 - 30 mg/dL Final     Creatinine 05/09/2018 1.16  0.66 - 1.25 mg/dL Final     GFR Estimate 05/09/2018 61  >60 mL/min/1.7m2 Final    Non  GFR Calc     GFR Estimate If Black 05/09/2018 73  >60 mL/min/1.7m2 Final    African American GFR Calc     Calcium 05/09/2018 8.9  8.5 - 10.1 mg/dL Final     Bilirubin Total 05/09/2018 0.6  0.2 - 1.3 mg/dL Final     Albumin 05/09/2018 3.8  3.4 - 5.0 g/dL Final     Protein Total 05/09/2018 8.1  6.8 - 8.8 g/dL Final     Alkaline Phosphatase 05/09/2018 79  40 - 150 U/L Final     ALT 05/09/2018 19  0 - 70 U/L Final     AST 05/09/2018 12  0 - 45 U/L Final     TSH 05/09/2018 3.06  0.40 - 4.00 mU/L Final          Assessment and plan:    (C02.9) Recurrent tongue cancer (H)  (primary encounter diagnosis)  We will continue with immunotherapy was Keytruda.  I will see the patient again in 3 weeks or sooner if there are new developments or concerns.    (N18.1) CKD (chronic kidney disease) stage 1, GFR 90 ml/min or greater  Phoebe has been stable    (E11.22,  N18.1) Type 2 diabetes mellitus with stage 1 chronic kidney disease, without long-term current use of insulin (H)  Diabetes has been well controlled with diet    (E78.5) Hyperlipidemia with target LDL less than 100  Currently on lovastatin 40 mg orally daily.    (I10) Hypertension goal BP (blood pressure) < 140/90  The patient currently on lisinopril 20 mg orally daily.    (C18.9) Malignant neoplasm of colon, unspecified part of colon (H)  There is no evidence of disease recurrence.    (R11.2,  T45.1X5A) Chemotherapy induced nausea and vomiting  Nausea has been well controlled.    The patient is ready to learn, no apparent learning barriers were identified.  Diagnosis and treatment plans were explained to the patient. The patient expressed understanding of the content. The patient asked appropriate questions. The patient  questions were answered to his satisfaction.    Chart documentation with Dragon Voice recognition Software. Although reviewed after completion, some words and grammatical errors may remain.    Again, thank you for allowing me to participate in the care of your patient.        Sincerely,        Lanny Neil MD

## 2018-05-31 NOTE — PROGRESS NOTES
Please call.  TSH is normal indicating that thyroid function is normal..   The creatinine, a blood test to measure kidney function is elevated indicating some decrease in kidney function.  This has been abnormal in March as well.  He has had chronic kidney disease, it appears a little worse.  It ould be related to Kaytruda medication for the cancer (11-35% chance of elevated creatinine with this medication in [facility]).  . No new changes in treatment recommended..   He has follow-up with Dr. Neil, Hematology/Oncology who has been monitoring kidney function.   Make sure he has been drinking plenty or liquids.

## 2018-06-04 ENCOUNTER — OFFICE VISIT (OUTPATIENT)
Dept: FAMILY MEDICINE | Facility: CLINIC | Age: 83
End: 2018-06-04
Payer: COMMERCIAL

## 2018-06-04 VITALS
SYSTOLIC BLOOD PRESSURE: 146 MMHG | TEMPERATURE: 98.5 F | WEIGHT: 169.8 LBS | BODY MASS INDEX: 26.22 KG/M2 | RESPIRATION RATE: 14 BRPM | HEART RATE: 65 BPM | DIASTOLIC BLOOD PRESSURE: 80 MMHG

## 2018-06-04 DIAGNOSIS — H61.23 BILATERAL IMPACTED CERUMEN: ICD-10-CM

## 2018-06-04 DIAGNOSIS — H91.93 DECREASED HEARING OF BOTH EARS: Primary | ICD-10-CM

## 2018-06-04 PROCEDURE — 99213 OFFICE O/P EST LOW 20 MIN: CPT | Performed by: NURSE PRACTITIONER

## 2018-06-04 ASSESSMENT — PAIN SCALES - GENERAL: PAINLEVEL: MILD PAIN (2)

## 2018-06-04 NOTE — NURSING NOTE
"Chief Complaint   Patient presents with     Ear Problem     Bilateral- ears feel plugged       Initial /80 (BP Location: Right arm, Patient Position: Chair, Cuff Size: Adult Large)  Pulse 65  Temp 98.5  F (36.9  C) (Tympanic)  Resp 14  Wt 169 lb 12.8 oz (77 kg)  BMI 26.22 kg/m2 Estimated body mass index is 26.22 kg/(m^2) as calculated from the following:    Height as of 5/31/18: 5' 7.48\" (1.714 m).    Weight as of this encounter: 169 lb 12.8 oz (77 kg).      Health Maintenance that is potentially due pending provider review:  NONE    n/a    Is there anyone who you would like to be able to receive your results? Yes Florecita daughter  If yes have patient fill out NABIL  France Gonzales CMA    "

## 2018-06-04 NOTE — MR AVS SNAPSHOT
After Visit Summary   6/4/2018    Patrick Diop    MRN: [id number]           Patient Information     Date Of Birth          1935        Visit Information        Provider Department      6/4/2018 9:40 AM Kay Carrasco APRN [facility]        Today's Diagnoses     Decreased hearing of both ears    -  1    Bilateral impacted cerumen           Follow-ups after your visit        Your next 10 appointments already scheduled     Jun 21, 2018  8:30 AM CDT   LAB with [facility] ([facility])    [address] [facility] [address]   [phone]           Please do not eat 10-12 hours before your appointment if you are coming in fasting for labs on lipids, cholesterol, or glucose (sugar). This does not apply to pregnant women. Water, hot tea and black coffee (with nothing added) are okay. Do not drink other fluids, diet soda or chew gum.            Jun 21, 2018  9:00 AM CDT   Return Visit with Lanny Neil MD   [facility] ([facility])    [facility]  [address]  [facility] [address]   [phone]            Jun 21, 2018  9:30 AM CDT   Level 2 with ROOM 1 [facility] ([facility])    [facility]  [address]  [facility] [address]   [phone]            Jul 11, 2018  9:40 AM CDT   (Arrive by 9:30 AM)   LAB with [facility] ([facility])    [address] [facility] [address]   [phone]           Please do not eat 10-12 hours before your appointment if you are coming in fasting for labs on lipids, cholesterol, or glucose (sugar). This does not apply to pregnant women. Water, hot tea and black coffee (with nothing added) are okay. Do not drink other fluids, diet soda or chew gum.            Jul 11, 2018 10:00 AM CDT   Level 2 with ROOM 8 [place]    [facility] ([facility])    [facility]  [address] [place]  [facility] [address] [phone]            Sep 07, 2018  8:30 AM CDT   Return Visit with [facility] Provider   [facility] ([facility])    [address], [address]  [facility] [address]   [phone]              Who to contact     If you have questions or need follow up information about today's clinic visit or your schedule please contact [facility] directly at [phone].  Normal or non-critical lab and imaging results will be communicated to you by MyChart, letter or phone within 4 business days after the clinic has received the results. If you do not hear from us within 7 days, please contact the clinic through MyChart or phone. If you have a critical or abnormal lab result, we will notify you by phone as soon as possible.  Submit refill requests through [employer] or call your pharmacy and they will forward the refill request to us. Please allow 3 business days for your refill to be completed.          Additional Information About Your Visit        Care EveryWhere ID     This is your Care EveryWhere ID. This could be used by other organizations to access your [place] medical records  [id number]        Your Vitals Were     Pulse Temperature Respirations BMI (Body Mass Index)          65 98.5  F (36.9  C) (Tympanic) 14 26.22 kg/m2         Blood Pressure from Last 3 Encounters:   06/04/18 146/80   05/31/18 152/70   05/31/18 129/65    Weight from Last 3 Encounters:   06/04/18 169 lb 12.8 oz (77 kg)   05/31/18 168 lb 4.8 oz (76.3 kg)   05/09/18 166 lb 3.2 oz (75.4 kg)              We Performed the Following     REMOVE IMPACTED CERUMEN        Primary Care Provider Office Phone # Fax #    Joe Mandel [phone] [address] [facility] [address]        Equal Access to Services     RAINA SOLANO AH: Norma houston  Delmy, waaileenda luqadaha, qaybta kaalmada marcio, elizabeth martinin hayaan sofiatheresa zoedanilo laWandadawson larissa. So [facility] [phone].    ATENCIÓN: Si sharmila carrillo, tiene a taylor disposición servicios gratuitos de asistencia lingüística. Toribio al [phone].    We comply with applicable federal civil rights laws and [place] laws. We do not discriminate on the basis of race, color, national origin, age, disability, sex, sexual orientation, or gender identity.            Thank you!     Thank you for choosing [facility]  for your care. Our goal is always to provide you with excellent care. Hearing back from our patients is one way we can continue to improve our services. Please take a few minutes to complete the written survey that you may receive in the mail after your visit with us. Thank you!             Your Updated Medication List - Protect others around you: Learn how to safely use, store and throw away your medicines at www.disposemymeds.org.          This list is accurate as of 6/4/18 10:46 AM.  Always use your most recent med list.                   Brand Name Dispense Instructions for use Diagnosis    lisinopril 20 MG tablet    PRINIVIL/ZESTRIL    30 tablet    TAKE ONE TABLET BY MOUTH DAILY    Hypertension goal BP (blood pressure) < 140/90       LORazepam 0.5 MG tablet    ATIVAN    30 tablet    Take 1 tablet (0.5 mg) by mouth every 4 hours as needed (Anxiety, Nausea/Vomiting or Sleep)    Tongue cancer (H)       lovastatin 40 MG tablet    MEVACOR    90 tablet    TAKE ONE TABLET BY MOUTH NIGHTLY AT BEDTIME    Hyperlipidemia with target LDL less than 100       multivitamin  with lutein Caps per capsule      Take 1 capsule by mouth daily        prochlorperazine 10 MG tablet    COMPAZINE    30 tablet    Take 1 tablet (10 mg) by mouth every 6 hours as needed (Nausea/Vomiting)    Tongue cancer (H)       vitamin D 1000 units capsule      Take 1 capsule by mouth daily

## 2018-06-04 NOTE — PROGRESS NOTES
SUBJECTIVE:   Patrick Diop is a 82 year old male who presents to clinic today for the following health issues:      Ear Problem       Duration: 2 months. Decreased hearing    Description (location/character/radiation): Left feels more plugged than the right    Intensity:  mild    Accompanying signs and symptoms: none    History (similar episodes/previous evaluation): problems with ear wax    Precipitating or alleviating factors: None    Therapies tried and outcome: None       Problem list and histories reviewed & adjusted, as indicated.  Additional history: as documented    Patient Active Problem List   Diagnosis     Type 2 diabetes mellitus with stage 1 chronic kidney disease (H)     Hyperlipidemia with target LDL less than 100     Hypertension goal BP (blood pressure) < 140/90     Colon cancer (H)     CKD (chronic kidney disease) stage 1, GFR 90 ml/min or greater     Recurrent tongue cancer (H)     Hypomagnesemia     Hypokalemia     Chemotherapy induced nausea and vomiting     Past Surgical History:   Procedure Laterality Date     APPENDECTOMY  2001     COLECTOMY  Jan 2002    malignant polyp.  Colectomy and colostomy     COLONOSCOPY  Jan 2002    polyps     EYE SURGERY      B cataract     [facility] ENDOSCOPY W PLACEMENT GASTROSTOMY TUBE PERCUT N/A 11/19/2015    Procedure: COMBINED ESOPHAGOSCOPY, GASTROSCOPY, DUODENOSCOPY (EGD), PLACE PERCUTANEOUS ENDOSCOPIC GASTROSTOMY TUBE;  Surgeon: Sergio Buenrostro MD;  Location: [facility]     LARYNGOSCOPY WITH BIOPSY(IES) N/A 4/3/2018    Procedure: LARYNGOSCOPY WITH BIOPSY(IES);  Direct Laryngoscopy, Biopsy Base Of Tongue;  Surgeon: Jj Hernandez MD;  Location: [facility]     TAKEDOWN COLOSTOMY         Social History   Substance Use Topics     Smoking status: Former Smoker     Packs/day: 0.00     Years: 48.00     Types: Cigarettes     Quit date: 11/6/2001     Smokeless tobacco: Never Used      Comment: started at age 17     Alcohol use No     Family History   Problem  Relation Age of Onset     Other Cancer Mother 57     lung ca, with brain mets     CANCER Mother      Brain tumor, lung cancer     CANCER Father      Lung cancer     CANCER Brother          Current Outpatient Prescriptions   Medication Sig Dispense Refill     Cholecalciferol (VITAMIN D) 1000 UNITS capsule Take 1 capsule by mouth daily       lisinopril (PRINIVIL/ZESTRIL) 20 MG tablet TAKE ONE TABLET BY MOUTH DAILY 30 tablet 2     lovastatin (MEVACOR) 40 MG tablet TAKE ONE TABLET BY MOUTH NIGHTLY AT BEDTIME 90 tablet 3     multivitamin  with lutein (OCUVITE WITH LTEIN) CAPS Take 1 capsule by mouth daily       LORazepam (ATIVAN) 0.5 MG tablet Take 1 tablet (0.5 mg) by mouth every 4 hours as needed (Anxiety, Nausea/Vomiting or Sleep) (Patient not taking: Reported on 5/9/2018) 30 tablet 2     prochlorperazine (COMPAZINE) 10 MG tablet Take 1 tablet (10 mg) by mouth every 6 hours as needed (Nausea/Vomiting) (Patient not taking: Reported on 5/9/2018) 30 tablet 2     No Known Allergies  Labs reviewed in EPIC    Reviewed and updated as needed this visit by clinical staff       Reviewed and updated as needed this visit by Provider         ROS:  Constitutional, HEENT, cardiovascular, pulmonary, gi and gu systems are negative, except as otherwise noted.    OBJECTIVE:     /80 (BP Location: Right arm, Patient Position: Chair, Cuff Size: Adult Large)  Pulse 65  Temp 98.5  F (36.9  C) (Tympanic)  Resp 14  Wt 169 lb 12.8 oz (77 kg)  BMI 26.22 kg/m2  Body mass index is 26.22 kg/(m^2).  GENERAL: healthy, alert and no distress  HENT: normal cephalic/atraumatic and both ears: occluded with wax  PSYCH: mentation appears normal, affect normal/bright    Diagnostic Test Results:  none     ASSESSMENT/PLAN:     1. Decreased hearing of both ears    Verbal consent for disimpaction received following discussion of risks vs benefits.  Ear then flushed with water by medical assistant.  Unable to remove impacted cerumen.  Plan to use ear  wax softening kit at home and return for repeat flush if needed.  Okay to do so in a nurse only visit.  If symptoms not improving with removal of cerumen impaction will refer to audiology.    - REMOVE IMPACTED CERUMEN    2. Bilateral impacted cerumen  Per above.  - REMOVE IMPACTED CERUMEN    Home care instructions were reviewed with the patient. The risks, benefits and treatment options of prescribed medications or other treatments have been discussed with the patient. The patient verbalized their understanding and should call or follow up if no improvement or if they develop further problems.    NATALY [facility]

## 2018-06-06 ENCOUNTER — ALLIED HEALTH/NURSE VISIT (OUTPATIENT)
Dept: FAMILY MEDICINE | Facility: CLINIC | Age: 83
End: 2018-06-06
Payer: COMMERCIAL

## 2018-06-06 DIAGNOSIS — H61.23 BILATERAL IMPACTED CERUMEN: Primary | ICD-10-CM

## 2018-06-06 PROCEDURE — 99207 ZZC NO CHARGE NURSE ONLY: CPT

## 2018-06-06 NOTE — MR AVS SNAPSHOT
After Visit Summary   6/6/2018    Patrick Diop    MRN: [id number]           Patient Information     Date Of Birth          1935        Visit Information        Provider Department      6/6/2018 1:30 PM FL NB CMA/LPN [facility]        Today's Diagnoses     Bilateral impacted cerumen    -  1       Follow-ups after your visit        Your next 10 appointments already scheduled     Jun 21, 2018  8:30 AM CDT   LAB with [facility] ([facility])    [address] [address]   [phone]           Please do not eat 10-12 hours before your appointment if you are coming in fasting for labs on lipids, cholesterol, or glucose (sugar). This does not apply to pregnant women. Water, hot tea and black coffee (with nothing added) are okay. Do not drink other fluids, diet soda or chew gum.            Jun 21, 2018  9:00 AM CDT   Return Visit with Lanny Neil MD   [facility] ([facility])    [facility]  [address]  [facility] [address]   [phone]            Jun 21, 2018  9:30 AM CDT   Level 2 with ROOM 1 [facility] ([facility])    [facility]  [address]  [facility] [address]   [phone]            Jul 11, 2018  9:40 AM CDT   (Arrive by 9:30 AM)   LAB with [facility] ([facility])    [address] [address]   [phone]           Please do not eat 10-12 hours before your appointment if you are coming in fasting for labs on lipids, cholesterol, or glucose (sugar). This does not apply to pregnant women. Water, hot tea and black coffee (with nothing added) are okay. Do not drink other fluids, diet soda or chew gum.            Jul 11, 2018 10:00 AM CDT   Level 2 with ROOM 8 [facility] ([facility])    [facility]  [address]  [facility] [address]   [phone]            Sep 07, 2018  8:30 AM CDT   Return Visit with [facility] Provider   [facility] ([facility])    [address], [address]  [facility] [address]   [phone]              Who to contact     If you have questions or need follow up information about today's clinic visit or your schedule please contact [facility] directly at [phone].  Normal or non-critical lab and imaging results will be communicated to you by MyChart, letter or phone within 4 business days after the clinic has received the results. If you do not hear from us within 7 days, please contact the clinic through MyChart or phone. If you have a critical or abnormal lab result, we will notify you by phone as soon as possible.  Submit refill requests through [employer] or call your pharmacy and they will forward the refill request to us. Please allow 3 business days for your refill to be completed.          Additional Information About Your Visit        Care EveryWhere ID     This is your Care EveryWhere ID. This could be used by other organizations to access your [place] medical records  [id number]         Blood Pressure from Last 3 Encounters:   06/04/18 146/80   05/31/18 152/70   05/31/18 129/65    Weight from Last 3 Encounters:   06/04/18 169 lb 12.8 oz (77 kg)   05/31/18 168 lb 4.8 oz (76.3 kg)   05/09/18 166 lb 3.2 oz (75.4 kg)              Today, you had the following     No orders found for display       Primary Care Provider Office Phone # Fax #    Joe Mandel [phone] [address] [address]        Equal Access to Services     RAINA SOLANO : Norma Brock, daniel cardoza, qabrandi kaaldacia buckley, elizabeth dias. So [facility] [phone].    ATENCIÓN: Si habla español, tiene a taylor disposición servicios  isabel de asistencia lingüística. Toribio pelayo [phone].    We comply with applicable federal civil rights laws and [place] laws. We do not discriminate on the basis of race, color, national origin, age, disability, sex, sexual orientation, or gender identity.            Thank you!     Thank you for choosing [facility]  for your care. Our goal is always to provide you with excellent care. Hearing back from our patients is one way we can continue to improve our services. Please take a few minutes to complete the written survey that you may receive in the mail after your visit with us. Thank you!             Your Updated Medication List - Protect others around you: Learn how to safely use, store and throw away your medicines at www.disposemymeds.org.          This list is accurate as of 6/6/18 11:59 PM.  Always use your most recent med list.                   Brand Name Dispense Instructions for use Diagnosis    LORazepam 0.5 MG tablet    ATIVAN    30 tablet    Take 1 tablet (0.5 mg) by mouth every 4 hours as needed (Anxiety, Nausea/Vomiting or Sleep)    Tongue cancer (H)       lovastatin 40 MG tablet    MEVACOR    90 tablet    TAKE ONE TABLET BY MOUTH NIGHTLY AT BEDTIME    Hyperlipidemia with target LDL less than 100       multivitamin  with lutein Caps per capsule      Take 1 capsule by mouth daily        prochlorperazine 10 MG tablet    COMPAZINE    30 tablet    Take 1 tablet (10 mg) by mouth every 6 hours as needed (Nausea/Vomiting)    Tongue cancer (H)       vitamin D 1000 units capsule      Take 1 capsule by mouth daily

## 2018-06-07 DIAGNOSIS — I10 HYPERTENSION GOAL BP (BLOOD PRESSURE) < 140/90: ICD-10-CM

## 2018-06-07 RX ORDER — LISINOPRIL 20 MG/1
TABLET ORAL
Qty: 90 TABLET | Refills: 2 | Status: ON HOLD | OUTPATIENT
Start: 2018-06-07 | End: 2019-01-01

## 2018-06-07 NOTE — TELEPHONE ENCOUNTER
"Requested Prescriptions   Pending Prescriptions Disp Refills     lisinopril (PRINIVIL/ZESTRIL) 20 MG tablet [Pharmacy Med Name: LISINOPRIL 20 MG    TAB SOLC] 90 tablet 2     Sig: TAKE ONE TABLET BY MOUTH DAILY    ACE Inhibitors (Including Combos) Protocol Failed    6/7/2018  8:29 AM       Failed - Blood pressure under 140/90 in past 12 months    BP Readings from Last 3 Encounters:   06/04/18 146/80   05/31/18 152/70   05/31/18 129/65                Failed - Normal serum creatinine on file in past 12 months    Recent Labs   Lab Test  05/31/18   0911   CR  1.26*            Passed - Recent (12 mo) or future (30 days) visit within the authorizing provider's specialty    Patient had office visit in the last 12 months or has a visit in the next 30 days with authorizing provider or within the authorizing provider's specialty.  See \"Patient Info\" tab in inbasket, or \"Choose Columns\" in Meds & Orders section of the refill encounter.           Passed - Patient is age 18 or older       Passed - Normal serum potassium on file in past 12 months    Recent Labs   Lab Test  05/31/18   0911   POTASSIUM  5.0             Last Written Prescription Date:  5/29/18  Last Fill Quantity: 30,  # refills: 2   Last office visit: No previous visit found with prescribing provider:  6/4/18   Future Office Visit:   Next 5 appointments (look out 90 days)     Jun 21, 2018  9:00 AM CDT   Return Visit with Lanny Neil MD   [facility] ([facility])    [facility]  [address] [address]   [phone]                   "

## 2018-06-18 NOTE — PROGRESS NOTES
Bilateral cerumen removed by irrigation. Ears checked by Florecita FUENTES before and after irrigation.. France Gonzales,MARY

## 2018-06-19 RX ORDER — EPINEPHRINE 0.3 MG/.3ML
0.3 INJECTION SUBCUTANEOUS EVERY 5 MIN PRN
Status: CANCELLED | OUTPATIENT
Start: 2018-01-01

## 2018-06-19 RX ORDER — EPINEPHRINE 0.3 MG/.3ML
0.3 INJECTION SUBCUTANEOUS EVERY 5 MIN PRN
Status: CANCELLED | OUTPATIENT
Start: 2018-06-21

## 2018-06-19 RX ORDER — LORAZEPAM 2 MG/ML
0.5 INJECTION INTRAMUSCULAR EVERY 4 HOURS PRN
Status: CANCELLED
Start: 2018-06-21

## 2018-06-19 RX ORDER — SODIUM CHLORIDE 9 MG/ML
1000 INJECTION, SOLUTION INTRAVENOUS CONTINUOUS PRN
Status: CANCELLED
Start: 2018-06-21

## 2018-06-19 RX ORDER — MEPERIDINE HYDROCHLORIDE 25 MG/ML
25 INJECTION INTRAMUSCULAR; INTRAVENOUS; SUBCUTANEOUS EVERY 30 MIN PRN
Status: CANCELLED | OUTPATIENT
Start: 2018-01-01

## 2018-06-19 RX ORDER — LORAZEPAM 2 MG/ML
0.5 INJECTION INTRAMUSCULAR EVERY 4 HOURS PRN
Status: CANCELLED
Start: 2018-01-01

## 2018-06-19 RX ORDER — METHYLPREDNISOLONE SODIUM SUCCINATE 125 MG/2ML
125 INJECTION, POWDER, LYOPHILIZED, FOR SOLUTION INTRAMUSCULAR; INTRAVENOUS
Status: CANCELLED
Start: 2018-01-01

## 2018-06-19 RX ORDER — ALBUTEROL SULFATE 0.83 MG/ML
2.5 SOLUTION RESPIRATORY (INHALATION)
Status: CANCELLED | OUTPATIENT
Start: 2018-01-01

## 2018-06-19 RX ORDER — DIPHENHYDRAMINE HYDROCHLORIDE 50 MG/ML
50 INJECTION INTRAMUSCULAR; INTRAVENOUS
Status: CANCELLED
Start: 2018-01-01

## 2018-06-19 RX ORDER — EPINEPHRINE 1 MG/ML
0.3 INJECTION, SOLUTION, CONCENTRATE INTRAVENOUS EVERY 5 MIN PRN
Status: CANCELLED | OUTPATIENT
Start: 2018-01-01

## 2018-06-19 RX ORDER — ALBUTEROL SULFATE 0.83 MG/ML
2.5 SOLUTION RESPIRATORY (INHALATION)
Status: CANCELLED | OUTPATIENT
Start: 2018-06-21

## 2018-06-19 RX ORDER — METHYLPREDNISOLONE SODIUM SUCCINATE 125 MG/2ML
125 INJECTION, POWDER, LYOPHILIZED, FOR SOLUTION INTRAMUSCULAR; INTRAVENOUS
Status: CANCELLED
Start: 2018-06-21

## 2018-06-19 RX ORDER — SODIUM CHLORIDE 9 MG/ML
1000 INJECTION, SOLUTION INTRAVENOUS CONTINUOUS PRN
Status: CANCELLED
Start: 2018-01-01

## 2018-06-19 RX ORDER — EPINEPHRINE 1 MG/ML
0.3 INJECTION, SOLUTION, CONCENTRATE INTRAVENOUS EVERY 5 MIN PRN
Status: CANCELLED | OUTPATIENT
Start: 2018-06-21

## 2018-06-19 RX ORDER — ALBUTEROL SULFATE 90 UG/1
1-2 AEROSOL, METERED RESPIRATORY (INHALATION)
Status: CANCELLED
Start: 2018-06-21

## 2018-06-19 RX ORDER — MEPERIDINE HYDROCHLORIDE 25 MG/ML
25 INJECTION INTRAMUSCULAR; INTRAVENOUS; SUBCUTANEOUS EVERY 30 MIN PRN
Status: CANCELLED | OUTPATIENT
Start: 2018-06-21

## 2018-06-19 RX ORDER — ALBUTEROL SULFATE 90 UG/1
1-2 AEROSOL, METERED RESPIRATORY (INHALATION)
Status: CANCELLED
Start: 2018-01-01

## 2018-06-19 RX ORDER — DIPHENHYDRAMINE HYDROCHLORIDE 50 MG/ML
50 INJECTION INTRAMUSCULAR; INTRAVENOUS
Status: CANCELLED
Start: 2018-06-21

## 2018-06-21 ENCOUNTER — ONCOLOGY VISIT (OUTPATIENT)
Dept: ONCOLOGY | Facility: CLINIC | Age: 83
End: 2018-06-21
Attending: INTERNAL MEDICINE
Payer: MEDICARE

## 2018-06-21 ENCOUNTER — INFUSION THERAPY VISIT (OUTPATIENT)
Dept: INFUSION THERAPY | Facility: CLINIC | Age: 83
End: 2018-06-21
Attending: INTERNAL MEDICINE
Payer: MEDICARE

## 2018-06-21 ENCOUNTER — HOSPITAL ENCOUNTER (OUTPATIENT)
Dept: LAB | Facility: CLINIC | Age: 83
Discharge: HOME | End: 2018-06-21
Attending: INTERNAL MEDICINE | Admitting: INTERNAL MEDICINE
Payer: MEDICARE

## 2018-06-21 VITALS
OXYGEN SATURATION: 97 % | BODY MASS INDEX: 26.68 KG/M2 | TEMPERATURE: 97.7 F | SYSTOLIC BLOOD PRESSURE: 135 MMHG | WEIGHT: 170 LBS | HEIGHT: 67 IN | RESPIRATION RATE: 16 BRPM | DIASTOLIC BLOOD PRESSURE: 75 MMHG | HEART RATE: 86 BPM

## 2018-06-21 DIAGNOSIS — I10 HYPERTENSION GOAL BP (BLOOD PRESSURE) < 140/90: ICD-10-CM

## 2018-06-21 DIAGNOSIS — C02.9 RECURRENT TONGUE CANCER (H): Primary | ICD-10-CM

## 2018-06-21 DIAGNOSIS — N18.1 CKD (CHRONIC KIDNEY DISEASE) STAGE 1, GFR 90 ML/MIN OR GREATER: ICD-10-CM

## 2018-06-21 DIAGNOSIS — N18.1 TYPE 2 DIABETES MELLITUS WITH STAGE 1 CHRONIC KIDNEY DISEASE, WITHOUT LONG-TERM CURRENT USE OF INSULIN (H): ICD-10-CM

## 2018-06-21 DIAGNOSIS — E83.42 HYPOMAGNESEMIA: ICD-10-CM

## 2018-06-21 DIAGNOSIS — E11.22 TYPE 2 DIABETES MELLITUS WITH STAGE 1 CHRONIC KIDNEY DISEASE, WITHOUT LONG-TERM CURRENT USE OF INSULIN (H): ICD-10-CM

## 2018-06-21 DIAGNOSIS — E78.5 HYPERLIPIDEMIA WITH TARGET LDL LESS THAN 100: ICD-10-CM

## 2018-06-21 LAB
ALBUMIN SERPL-MCNC: 3.6 G/DL (ref 3.4–5)
ALP SERPL-CCNC: 76 U/L (ref 40–150)
ALT SERPL W P-5'-P-CCNC: 17 U/L (ref 0–70)
ANION GAP SERPL CALCULATED.3IONS-SCNC: 8 MMOL/L (ref 3–14)
AST SERPL W P-5'-P-CCNC: 13 U/L (ref 0–45)
BILIRUB SERPL-MCNC: 0.7 MG/DL (ref 0.2–1.3)
BUN SERPL-MCNC: 19 MG/DL (ref 7–30)
CALCIUM SERPL-MCNC: 8.6 MG/DL (ref 8.5–10.1)
CHLORIDE SERPL-SCNC: 110 MMOL/L (ref 94–109)
CO2 SERPL-SCNC: 26 MMOL/L (ref 20–32)
CREAT SERPL-MCNC: 1.08 MG/DL (ref 0.66–1.25)
GFR SERPL CREATININE-BSD FRML MDRD: 66 ML/MIN/1.7M2
GLUCOSE SERPL-MCNC: 109 MG/DL (ref 70–99)
POTASSIUM SERPL-SCNC: 4.3 MMOL/L (ref 3.4–5.3)
PROT SERPL-MCNC: 7.7 G/DL (ref 6.8–8.8)
SODIUM SERPL-SCNC: 144 MMOL/L (ref 133–144)
TSH SERPL DL<=0.005 MIU/L-ACNC: 3.55 MU/L (ref 0.4–4)

## 2018-06-21 PROCEDURE — 99214 OFFICE O/P EST MOD 30 MIN: CPT | Performed by: INTERNAL MEDICINE

## 2018-06-21 PROCEDURE — 80053 COMPREHEN METABOLIC PANEL: CPT | Performed by: INTERNAL MEDICINE

## 2018-06-21 PROCEDURE — 96413 CHEMO IV INFUSION 1 HR: CPT

## 2018-06-21 PROCEDURE — 25000128 H RX IP 250 OP 636: Performed by: INTERNAL MEDICINE

## 2018-06-21 PROCEDURE — G0463 HOSPITAL OUTPT CLINIC VISIT: HCPCS | Mod: 25

## 2018-06-21 PROCEDURE — 36415 COLL VENOUS BLD VENIPUNCTURE: CPT | Performed by: INTERNAL MEDICINE

## 2018-06-21 PROCEDURE — 84443 ASSAY THYROID STIM HORMONE: CPT | Performed by: INTERNAL MEDICINE

## 2018-06-21 RX ADMIN — SODIUM CHLORIDE 200 MG: 9 INJECTION, SOLUTION INTRAVENOUS at 10:07

## 2018-06-21 RX ADMIN — SODIUM CHLORIDE 250 ML: 9 INJECTION, SOLUTION INTRAVENOUS at 09:58

## 2018-06-21 ASSESSMENT — PAIN SCALES - GENERAL: PAINLEVEL: NO PAIN (0)

## 2018-06-21 NOTE — MR AVS SNAPSHOT
After Visit Summary   6/21/2018    Patrick Diop    MRN: [id number]           Patient Information     Date Of Birth          1935        Visit Information        Provider Department      6/21/2018 9:30 AM ROOM 1 [facility]        Today's Diagnoses     Recurrent tongue cancer (H)    -  1       Follow-ups after your visit        Your next 10 appointments already scheduled     Jul 11, 2018  9:10 AM CDT   LAB with [facility] ([facility])    [address] [address]   [phone]           Please do not eat 10-12 hours before your appointment if you are coming in fasting for labs on lipids, cholesterol, or glucose (sugar). This does not apply to pregnant women. Water, hot tea and black coffee (with nothing added) are okay. Do not drink other fluids, diet soda or chew gum.            Jul 11, 2018  9:45 AM CDT   Return Visit with Lanny Neil MD   [facility] ([facility])    [facility]  [address]  [facility] [address]   [phone]            Jul 11, 2018 10:00 AM CDT   Level 2 with ROOM 8 [facility] ([facility])    [facility]  [address]  [facility] [address]   [phone]            Aug 01, 2018  9:30 AM CDT   LAB with [facility] ([facility])    [address] [address]   [phone]           Please do not eat 10-12 hours before your appointment if you are coming in fasting for labs on lipids, cholesterol, or glucose (sugar). This does not apply to pregnant women. Water, hot tea and black coffee (with nothing added) are okay. Do not drink other fluids, diet soda or chew gum.            Aug 01, 2018 10:00 AM CDT   Level 2 with ROOM 4 [facility] ([facility])    [facility]  [place]  [address]  [facility] [address]   [phone]            Sep 07, 2018  8:30 AM CDT   Return Visit with Lr [facility] Provider   [facility] ([facility])    [address]  [facility] [address]   [phone]              Who to contact     If you have questions or need follow up information about today's clinic visit or your schedule please contact [facility] directly at [phone].  Normal or non-critical lab and imaging results will be communicated to you by MyChart, letter or phone within 4 business days after the clinic has received the results. If you do not hear from us within 7 days, please contact the clinic through MyChart or phone. If you have a critical or abnormal lab result, we will notify you by phone as soon as possible.  Submit refill requests through [employer] or call your pharmacy and they will forward the refill request to us. Please allow 3 business days for your refill to be completed.          Additional Information About Your Visit        Care EveryWhere ID     This is your Care EveryWhere ID. This could be used by other organizations to access your [place] medical records  [id number]         Blood Pressure from Last 3 Encounters:   06/21/18 135/75   06/04/18 146/80   05/31/18 152/70    Weight from Last 3 Encounters:   06/21/18 77.1 kg (170 lb)   06/04/18 77 kg (169 lb 12.8 oz)   05/31/18 76.3 kg (168 lb 4.8 oz)              We Performed the Following     Comprehensive metabolic panel     TSH with free T4 reflex        Primary Care Provider Office Phone # Fax #    Joe Mandel [phone] [address] [place] [address]        Equal Access to Services     RAINA SOLANO AH: Norma Brock, waalfonso cardoza, sukh kaalmada marcio, elizabeth suarez So [facility] [phone].    ATENCIÓN: Si habla español, tiene a taylor disposición servicios gratuitos de  asistencia lingüística. Toribio al [phone].    We comply with applicable federal civil rights laws and [place] laws. We do not discriminate on the basis of race, color, national origin, age, disability, sex, sexual orientation, or gender identity.            Thank you!     Thank you for choosing [facility]  for your care. Our goal is always to provide you with excellent care. Hearing back from our patients is one way we can continue to improve our services. Please take a few minutes to complete the written survey that you may receive in the mail after your visit with us. Thank you!             Your Updated Medication List - Protect others around you: Learn how to safely use, store and throw away your medicines at www.disposemymeds.org.          This list is accurate as of 6/21/18 10:31 AM.  Always use your most recent med list.                   Brand Name Dispense Instructions for use Diagnosis    lisinopril 20 MG tablet    PRINIVIL/ZESTRIL    90 tablet    TAKE ONE TABLET BY MOUTH DAILY    Hypertension goal BP (blood pressure) < 140/90       LORazepam 0.5 MG tablet    ATIVAN    30 tablet    Take 1 tablet (0.5 mg) by mouth every 4 hours as needed (Anxiety, Nausea/Vomiting or Sleep)    Tongue cancer (H)       lovastatin 40 MG tablet    MEVACOR    90 tablet    TAKE ONE TABLET BY MOUTH NIGHTLY AT BEDTIME    Hyperlipidemia with target LDL less than 100       multivitamin  with lutein Caps per capsule      Take 1 capsule by mouth daily        prochlorperazine 10 MG tablet    COMPAZINE    30 tablet    Take 1 tablet (10 mg) by mouth every 6 hours as needed (Nausea/Vomiting)    Tongue cancer (H)       vitamin D 1000 units capsule      Take 1 capsule by mouth daily

## 2018-06-21 NOTE — PROGRESS NOTES
Hematology/ Oncology Follow-up Visit:  Jun 21, 2018    Reason for Visit:   Chief Complaint   Patient presents with     Oncology Clinic Visit     3 week recheck Recurrent tongue cancer, Labs & Chemo today        Oncologic History:  Recurrent tongue cancer (H)  Patrick Diop was noted to have a mass in his right neck during routine physical exam. CT scanning was subsequently obtained which also showed a right-sided tongue base and oropharyngeal mass, which together are consistent with malignancy. The patient himself has denied any unexplained weight loss. He denies any odynophagia, dysphagia, hoarseness or otalgia. 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 PET scan done on October 21, 2015 showed a mass at the level of the tongue base located along the anterior wall of the oropharynx and extending to the right lateral wall near the tonsillar pillar. This demonstrates abnormal increased FDG activity and is consistent with known malignancy. Hypermetabolic lymph nodes in the right and left neck are also noted as described. These are consistent with metastatic lymph nodes. There is no evidence of metastatic disease in the chest, abdomen or pelvis. CT of the soft tissues of the neck on October 21, 2015 was done in conjunction with PET scanning showed a 2.8 cm mass at the base of the tongue on the right effacing the right vallecula. There is also a 2.3 cm lymph node in the right level II region that is strongly suspicious for metastatic disease. These lesions are PET positive. Additionally, there is a 1.3 cm lymph node in left level III that is PET positive. There is a lymph node in right level V, also PET positive. He started on concurrent Cisplatin with radiation therapy with cisplatin 100 mg/m to be given intravenously on days #1, #22 #43 of radiation therapy.      Interval History:  Patient returning today for follow-up visit.  He has been feeling well without any recent  "complaints weight loss night sweats or fever or chills.  He denies any cough or wheezing.  He denies any difficulty with swallowing.  He is currently on immunotherapy was Keytruda.  He has been tolerating treatment very well.    Review Of Systems:  Constitutional: Negative for fever, chills, and night sweats.  Skin: negative.  Eyes: negative.  Ears/Nose/Throat: negative.  Respiratory: No shortness of breath, dyspnea on exertion, cough, or hemoptysis.  Cardiovascular: negative.  Gastrointestinal: negative.  Genitourinary: negative.  Musculoskeletal: negative.  Neurologic: negative.  Psychiatric: negative.  Hematologic/Lymphatic/Immunologic: negative.  Endocrine: negative.    All other ROS negative unless mentioned in interval history.    Past medical, social, surgical, and family histories reviewed.    Allergies:  Allergies as of 06/21/2018     (No Known Allergies)       Current Medications:  Current Outpatient Prescriptions   Medication Sig Dispense Refill     Cholecalciferol (VITAMIN D) 1000 UNITS capsule Take 1 capsule by mouth daily       lisinopril (PRINIVIL/ZESTRIL) 20 MG tablet TAKE ONE TABLET BY MOUTH DAILY 90 tablet 2     lovastatin (MEVACOR) 40 MG tablet TAKE ONE TABLET BY MOUTH NIGHTLY AT BEDTIME 90 tablet 3     multivitamin  with lutein (OCUVITE WITH LTEIN) CAPS Take 1 capsule by mouth daily       LORazepam (ATIVAN) 0.5 MG tablet Take 1 tablet (0.5 mg) by mouth every 4 hours as needed (Anxiety, Nausea/Vomiting or Sleep) (Patient not taking: Reported on 6/21/2018) 30 tablet 2     prochlorperazine (COMPAZINE) 10 MG tablet Take 1 tablet (10 mg) by mouth every 6 hours as needed (Nausea/Vomiting) (Patient not taking: Reported on 5/9/2018) 30 tablet 2        Physical Exam:  /75 (BP Location: Right arm, Patient Position: Sitting, Cuff Size: Adult Regular)  Pulse 86  Temp 97.7  F (36.5  C) (Tympanic)  Resp 16  Ht 1.714 m (5' 7.48\")  Wt 77.1 kg (170 lb)  SpO2 97%  BMI 26.25 kg/m2  Wt Readings from " Last 12 Encounters:   06/21/18 77.1 kg (170 lb)   06/04/18 77 kg (169 lb 12.8 oz)   05/31/18 76.3 kg (168 lb 4.8 oz)   05/09/18 75.4 kg (166 lb 3.2 oz)   04/12/18 76.6 kg (168 lb 14.4 oz)   04/03/18 76.3 kg (168 lb 3.4 oz)   03/23/18 76.8 kg (169 lb 6.4 oz)   03/19/18 77.1 kg (170 lb)   03/05/18 74.3 kg (163 lb 12.8 oz)   11/29/17 78.1 kg (172 lb 3.2 oz)   07/03/17 79.4 kg (175 lb)   04/28/17 80.9 kg (178 lb 4.8 oz)     ECOG performance status: 1  GENERAL APPEARANCE: Healthy, alert and in no acute distress.  HEENT: Sclerae anicteric. PERRLA. Oropharynx without ulcers, lesions, or thrush.  NECK: Supple. No asymmetry or masses.  LYMPHATICS: No palpable cervical, supraclavicular, axillary, or inguinal lymphadenopathy.  RESP: Lungs clear to auscultation bilaterally without rales, rhonchi or wheezes.  CARDIOVASCULAR: Regular rate and rhythm. Normal S1, S2; no S3 or S4. No murmur, gallop, or rub.  ABDOMEN: Soft, nontender. Bowel sounds normal. No palpable organomegaly or masses.  MUSCULOSKELETAL: Extremities without gross deformities noted. No edema of bilateral lower extremities.  SKIN: No suspicious lesions or rashes.  NEURO: Alert and oriented x 3. Cranial nerves II-XII grossly intact.  PSYCHIATRIC: Mentation and affect appear normal.    Laboratory/Imaging Studies:  No visits with results within 2 Week(s) from this visit.  Latest known visit with results is:    Infusion Therapy Visit on 05/31/2018   Component Date Value Ref Range Status     Sodium 05/31/2018 142  133 - 144 mmol/L Final     Potassium 05/31/2018 5.0  3.4 - 5.3 mmol/L Final     Chloride 05/31/2018 110* 94 - 109 mmol/L Final     Carbon Dioxide 05/31/2018 27  20 - 32 mmol/L Final     Anion Gap 05/31/2018 5  3 - 14 mmol/L Final     Glucose 05/31/2018 107* 70 - 99 mg/dL Final     Urea Nitrogen 05/31/2018 27  7 - 30 mg/dL Final     Creatinine 05/31/2018 1.26* 0.66 - 1.25 mg/dL Final     GFR Estimate 05/31/2018 55* >60 mL/min/1.7m2 Final    Non   GFR Calc     GFR Estimate If Black 05/31/2018 67  >60 mL/min/1.7m2 Final    African American GFR Calc     Calcium 05/31/2018 8.9  8.5 - 10.1 mg/dL Final     Bilirubin Total 05/31/2018 0.6  0.2 - 1.3 mg/dL Final     Albumin 05/31/2018 3.7  3.4 - 5.0 g/dL Final     Protein Total 05/31/2018 7.5  6.8 - 8.8 g/dL Final     Alkaline Phosphatase 05/31/2018 73  40 - 150 U/L Final     ALT 05/31/2018 20  0 - 70 U/L Final     AST 05/31/2018 13  0 - 45 U/L Final     TSH 05/31/2018 3.25  0.40 - 4.00 mU/L Final        That    Assessment and plan:    (C02.9) Recurrent tongue cancer (H)  (primary encounter diagnosis)  Patient has been tolerating immunotherapy well without any significant side effects.  We will proceed with chemotherapy according to the treatment plan.  I will see the patient again in 3 weeks or sooner if there are new developments or concerns.    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    (E78.5) Hyperlipidemia with target LDL less than 100  Patient currently on Mevacor 40 mg orally daily.    (N18.1) CKD (chronic kidney disease) stage 1, GFR 90 ml/min or greater  Creatinine has been stable.    (I10) Hypertension goal BP (blood pressure) < 140/90  Patient currently on lisinopril 20 mg orally daily.    The patient is ready to learn, no apparent learning barriers were identified.  Diagnosis and treatment plans were explained to the patient. The patient expressed understanding of the content. The patient asked appropriate questions. The patient questions were answered to his satisfaction.    Chart documentation with Dragon Voice recognition Software. Although reviewed after completion, some words and grammatical errors may remain.

## 2018-06-21 NOTE — MR AVS SNAPSHOT
After Visit Summary   6/21/2018    Patrick Diop    MRN: [id number]           Patient Information     Date Of Birth          1935        Visit Information        Provider Department      6/21/2018 9:00 AM Lanny Neil MD [facility] ONCOLOGY      Today's Diagnoses     Recurrent tongue cancer (H)    -  1    Type 2 diabetes mellitus with stage 1 chronic kidney disease, without long-term current use of insulin (H)        Hyperlipidemia with target LDL less than 100        CKD (chronic kidney disease) stage 1, GFR 90 ml/min or greater        Hypomagnesemia        Hypertension goal BP (blood pressure) < 140/90           Follow-ups after your visit        Follow-up notes from your care team     Return in about 3 weeks (around 7/12/2018) for Schedule for chemotherapy as per treatment plan.      Your next 10 appointments already scheduled     Jun 21, 2018  9:30 AM CDT   Level 2 with ROOM 1 [facility] ([facility])    [facility]  [address]  [facility] [address]   [phone]            Jul 11, 2018  9:10 AM CDT   LAB with [facility] ([facility])    [address] [facility] [address]   [phone]           Please do not eat 10-12 hours before your appointment if you are coming in fasting for labs on lipids, cholesterol, or glucose (sugar). This does not apply to pregnant women. Water, hot tea and black coffee (with nothing added) are okay. Do not drink other fluids, diet soda or chew gum.            Jul 11, 2018  9:45 AM CDT   Return Visit with Lanny Neil MD   [facility] ([facility])    [facility]  [address]  [facility] [address]   [phone]            Jul 11, 2018 10:00 AM CDT   Level 2 with ROOM 8 [facility] ([facility])    [facility]  [place]  [address]  [facility] [address]   [phone]            Aug 01, 2018  9:30 AM CDT   LAB with [facility] ([facility])    [address] [address]   [phone]           Please do not eat 10-12 hours before your appointment if you are coming in fasting for labs on lipids, cholesterol, or glucose (sugar). This does not apply to pregnant women. Water, hot tea and black coffee (with nothing added) are okay. Do not drink other fluids, diet soda or chew gum.            Aug 01, 2018 10:00 AM CDT   Level 2 with ROOM 4 [facility] ([facility])    [facility]  [address]  [facility] [address]   [phone]            Sep 07, 2018  8:30 AM CDT   Return Visit with [facility] Provider   [facility] ([facility])    [address], [address]  [facility] [address]   [phone]              Who to contact     If you have questions or need follow up information about today's clinic visit or your schedule please contact [facility] directly at [phone].  Normal or non-critical lab and imaging results will be communicated to you by MyChart, letter or phone within 4 business days after the clinic has received the results. If you do not hear from us within 7 days, please contact the clinic through MyChart or phone. If you have a critical or abnormal lab result, we will notify you by phone as soon as possible.  Submit refill requests through [employer] or call your pharmacy and they will forward the refill request to us. Please allow 3 business days for your refill to be completed.          Additional Information About Your Visit        Care EveryWhere ID     This is your Care EveryWhere ID. This could be used by other organizations to access your [place] medical records  [id number]        Your Vitals Were     Pulse Temperature Respirations  "Height Pulse Oximetry BMI (Body Mass Index)    86 97.7  F (36.5  C) (Tympanic) 16 1.714 m (5' 7.48\") 97% 26.25 kg/m2       Blood Pressure from Last 3 Encounters:   06/21/18 135/75   06/04/18 146/80   05/31/18 152/70    Weight from Last 3 Encounters:   06/21/18 77.1 kg (170 lb)   06/04/18 77 kg (169 lb 12.8 oz)   05/31/18 76.3 kg (168 lb 4.8 oz)              Today, you had the following     No orders found for display       Primary Care Provider Office Phone # Fax #    Joe Mandel [phone] [address] [place] [id number]        Equal Access to Services     [facility] XIOMARA : Norma Brock, waalfonso cardoza, qabrandi kaalmalala bcukley, elizabeth gruber . So [facility] [phone].    ATENCIÓN: Si habla español, tiene a taylor disposición servicios gratuitos de asistencia lingüística. Llame al [phone].    We comply with applicable federal civil rights laws and [place] laws. We do not discriminate on the basis of race, color, national origin, age, disability, sex, sexual orientation, or gender identity.            Thank you!     Thank you for choosing [facility] [place] [place]  for your care. Our goal is always to provide you with excellent care. Hearing back from our patients is one way we can continue to improve our services. Please take a few minutes to complete the written survey that you may receive in the mail after your visit with us. Thank you!             Your Updated Medication List - Protect others around you: Learn how to safely use, store and throw away your medicines at www.disposemymeds.org.          This list is accurate as of 6/21/18  9:23 AM.  Always use your most recent med list.                   Brand Name Dispense Instructions for use Diagnosis    lisinopril 20 MG tablet    PRINIVIL/ZESTRIL    90 tablet    TAKE ONE TABLET BY MOUTH DAILY    Hypertension goal BP (blood pressure) < 140/90       LORazepam 0.5 MG tablet    ATIVAN    30 " tablet    Take 1 tablet (0.5 mg) by mouth every 4 hours as needed (Anxiety, Nausea/Vomiting or Sleep)    Tongue cancer (H)       lovastatin 40 MG tablet    MEVACOR    90 tablet    TAKE ONE TABLET BY MOUTH NIGHTLY AT BEDTIME    Hyperlipidemia with target LDL less than 100       multivitamin  with lutein Caps per capsule      Take 1 capsule by mouth daily        prochlorperazine 10 MG tablet    COMPAZINE    30 tablet    Take 1 tablet (10 mg) by mouth every 6 hours as needed (Nausea/Vomiting)    Tongue cancer (H)       vitamin D 1000 units capsule      Take 1 capsule by mouth daily

## 2018-06-21 NOTE — PROGRESS NOTES
Please call.  The blood chemistries (Basic metabolic panel) are all normal including electrolytes (salt balances in the blood), blood glucose, liver and kidney tests.  Glucose mildly increased.   TSH is normal indicating that thyroid function is normal.   PLAN: No new changes in treatment recommended.

## 2018-06-21 NOTE — LETTER
June 25, 2018      Patrick Diop  [address]   [facility] [facility] [address]        Dear ,    We are writing to inform you of your test results.    Your test results fall within the expected range(s) or remain unchanged from previous results.  Please continue with current treatment plan.  The blood chemistries (Basic metabolic panel) are all normal including electrolytes (salt balances in the blood), blood glucose, liver and kidney tests.  Glucose mildly increased.   TSH is normal indicating that thyroid function is normal.   PLAN: No new changes in treatment recommended.    Resulted Orders   Comprehensive metabolic panel   Result Value Ref Range    Sodium 144 133 - 144 mmol/L    Potassium 4.3 3.4 - 5.3 mmol/L    Chloride 110 (H) 94 - 109 mmol/L    Carbon Dioxide 26 20 - 32 mmol/L    Anion Gap 8 3 - 14 mmol/L    Glucose 109 (H) 70 - 99 mg/dL    Urea Nitrogen 19 7 - 30 mg/dL    Creatinine 1.08 0.66 - 1.25 mg/dL    GFR Estimate 66 >60 mL/min/1.7m2      Comment:      Non  GFR Calc    GFR Estimate If Black 80 >60 mL/min/1.7m2      Comment:       GFR Calc    Calcium 8.6 8.5 - 10.1 mg/dL    Bilirubin Total 0.7 0.2 - 1.3 mg/dL    Albumin 3.6 3.4 - 5.0 g/dL    Protein Total 7.7 6.8 - 8.8 g/dL    Alkaline Phosphatase 76 40 - 150 U/L    ALT 17 0 - 70 U/L    AST 13 0 - 45 U/L   TSH with free T4 reflex   Result Value Ref Range    TSH 3.55 0.40 - 4.00 mU/L       If you have any questions or concerns, please call the clinic at the number listed above.       Sincerely,        Dr. Mandel/leodan

## 2018-06-21 NOTE — PROGRESS NOTES
Infusion Nursing Note:  Patrick Diop presents today for Keytruda.    Patient seen by provider today: Yes: Dr. Neil   present during visit today: Not Applicable.    Note: N/A.    Intravenous Access:  Peripheral IV placed.    Treatment Conditions:  Results reviewed, labs MET treatment parameters, ok to proceed with treatment.      Post Infusion Assessment:  Patient tolerated infusion without incident.    Discharge Plan:   Patient discharged in stable condition accompanied by: self.    Jean-Pierre Costa RN

## 2018-06-21 NOTE — NURSING NOTE
"Oncology Rooming Note    June 21, 2018 9:09 AM   Patrick Diop is a 82 year old male who presents for:    Chief Complaint   Patient presents with     Oncology Clinic Visit     3 week recheck Recurrent tongue cancer, Labs & Chemo today      Initial Vitals: /75 (BP Location: Right arm, Patient Position: Sitting, Cuff Size: Adult Regular)  Pulse 86  Temp 97.7  F (36.5  C) (Tympanic)  Resp 16  Ht 1.714 m (5' 7.48\")  Wt 77.1 kg (170 lb)  SpO2 97%  BMI 26.25 kg/m2 Estimated body mass index is 26.25 kg/(m^2) as calculated from the following:    Height as of this encounter: 1.714 m (5' 7.48\").    Weight as of this encounter: 77.1 kg (170 lb). Body surface area is 1.92 meters squared.  No Pain (0) Comment: Data Unavailable   No LMP for male patient.  Allergies reviewed: Yes  Medications reviewed: Yes    Medications: Medication refills not needed today.  Pharmacy name entered into [facility]:      [id number] [address] [facility]    Clinical concerns: 3 week recheck Recurrent tongue cancer, Labs & Chemo today.     7  minutes for nursing intake (face to face time)     Glenys White CMA              "

## 2018-06-21 NOTE — PATIENT INSTRUCTIONS
We would like to see you back in around July 12 with physician, lab work and chemotherapy.      When you are in need of a refill, please call your pharmacy and they will send us a request.      Copy of appointments, and after visit summary (AVS) given to patient.      If you have any questions during business hours (M-F 8 AM- 4PM), please call Yolanda Eduardo RN, BSN, OCN Oncology Hematology /Breast Cancer Navigator at [facility] [phone].       For questions after business hours, or on holidays/weekends, please call our after hours Nurse Triage line [phone]. Thank you.

## 2018-06-21 NOTE — LETTER
6/21/2018         RE: Patrick Diop  [address]  [place] [place] [address]        Dear Colleague,    Thank you for referring your patient, Patrick Diop, to the [facility]. Please see a copy of my visit note below.    Hematology/ Oncology Follow-up Visit:  Jun 21, 2018    Reason for Visit:   Chief Complaint   Patient presents with     Oncology Clinic Visit     3 week recheck Recurrent tongue cancer, Labs & Chemo today        Oncologic History:  Recurrent tongue cancer (H)  Patrick Diop was noted to have a mass in his right neck during routine physical exam. CT scanning was subsequently obtained which also showed a right-sided tongue base and oropharyngeal mass, which together are consistent with malignancy. The patient himself has denied any unexplained weight loss. He denies any odynophagia, dysphagia, hoarseness or otalgia. 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 PET scan done on October 21, 2015 showed a mass at the level of the tongue base located along the anterior wall of the oropharynx and extending to the right lateral wall near the tonsillar pillar. This demonstrates abnormal increased FDG activity and is consistent with known malignancy. Hypermetabolic lymph nodes in the right and left neck are also noted as described. These are consistent with metastatic lymph nodes. There is no evidence of metastatic disease in the chest, abdomen or pelvis. CT of the soft tissues of the neck on October 21, 2015 was done in conjunction with PET scanning showed a 2.8 cm mass at the base of the tongue on the right effacing the right vallecula. There is also a 2.3 cm lymph node in the right level II region that is strongly suspicious for metastatic disease. These lesions are PET positive. Additionally, there is a 1.3 cm lymph node in left level III that is PET positive. There is a lymph node in right level V, also PET positive. He started on  concurrent Cisplatin with radiation therapy with cisplatin 100 mg/m to be given intravenously on days #1, #22 #43 of radiation therapy.      Interval History:  Patient returning today for follow-up visit.  He has been feeling well without any recent complaints weight loss night sweats or fever or chills.  He denies any cough or wheezing.  He denies any difficulty with swallowing.  He is currently on immunotherapy was Keytruda.  He has been tolerating treatment very well.    Review Of Systems:  Constitutional: Negative for fever, chills, and night sweats.  Skin: negative.  Eyes: negative.  Ears/Nose/Throat: negative.  Respiratory: No shortness of breath, dyspnea on exertion, cough, or hemoptysis.  Cardiovascular: negative.  Gastrointestinal: negative.  Genitourinary: negative.  Musculoskeletal: negative.  Neurologic: negative.  Psychiatric: negative.  Hematologic/Lymphatic/Immunologic: negative.  Endocrine: negative.    All other ROS negative unless mentioned in interval history.    Past medical, social, surgical, and family histories reviewed.    Allergies:  Allergies as of 06/21/2018     (No Known Allergies)       Current Medications:  Current Outpatient Prescriptions   Medication Sig Dispense Refill     Cholecalciferol (VITAMIN D) 1000 UNITS capsule Take 1 capsule by mouth daily       lisinopril (PRINIVIL/ZESTRIL) 20 MG tablet TAKE ONE TABLET BY MOUTH DAILY 90 tablet 2     lovastatin (MEVACOR) 40 MG tablet TAKE ONE TABLET BY MOUTH NIGHTLY AT BEDTIME 90 tablet 3     multivitamin  with lutein (OCUVITE WITH LTEIN) CAPS Take 1 capsule by mouth daily       LORazepam (ATIVAN) 0.5 MG tablet Take 1 tablet (0.5 mg) by mouth every 4 hours as needed (Anxiety, Nausea/Vomiting or Sleep) (Patient not taking: Reported on 6/21/2018) 30 tablet 2     prochlorperazine (COMPAZINE) 10 MG tablet Take 1 tablet (10 mg) by mouth every 6 hours as needed (Nausea/Vomiting) (Patient not taking: Reported on 5/9/2018) 30 tablet 2     "    Physical Exam:  /75 (BP Location: Right arm, Patient Position: Sitting, Cuff Size: Adult Regular)  Pulse 86  Temp 97.7  F (36.5  C) (Tympanic)  Resp 16  Ht 1.714 m (5' 7.48\")  Wt 77.1 kg (170 lb)  SpO2 97%  BMI 26.25 kg/m2  Wt Readings from Last 12 Encounters:   06/21/18 77.1 kg (170 lb)   06/04/18 77 kg (169 lb 12.8 oz)   05/31/18 76.3 kg (168 lb 4.8 oz)   05/09/18 75.4 kg (166 lb 3.2 oz)   04/12/18 76.6 kg (168 lb 14.4 oz)   04/03/18 76.3 kg (168 lb 3.4 oz)   03/23/18 76.8 kg (169 lb 6.4 oz)   03/19/18 77.1 kg (170 lb)   03/05/18 74.3 kg (163 lb 12.8 oz)   11/29/17 78.1 kg (172 lb 3.2 oz)   07/03/17 79.4 kg (175 lb)   04/28/17 80.9 kg (178 lb 4.8 oz)     ECOG performance status: 1  GENERAL APPEARANCE: Healthy, alert and in no acute distress.  HEENT: Sclerae anicteric. PERRLA. Oropharynx without ulcers, lesions, or thrush.  NECK: Supple. No asymmetry or masses.  LYMPHATICS: No palpable cervical, supraclavicular, axillary, or inguinal lymphadenopathy.  RESP: Lungs clear to auscultation bilaterally without rales, rhonchi or wheezes.  CARDIOVASCULAR: Regular rate and rhythm. Normal S1, S2; no S3 or S4. No murmur, gallop, or rub.  ABDOMEN: Soft, nontender. Bowel sounds normal. No palpable organomegaly or masses.  MUSCULOSKELETAL: Extremities without gross deformities noted. No edema of bilateral lower extremities.  SKIN: No suspicious lesions or rashes.  NEURO: Alert and oriented x 3. Cranial nerves II-XII grossly intact.  PSYCHIATRIC: Mentation and affect appear normal.    Laboratory/Imaging Studies:  No visits with results within 2 Week(s) from this visit.  Latest known visit with results is:    Infusion Therapy Visit on 05/31/2018   Component Date Value Ref Range Status     Sodium 05/31/2018 142  133 - 144 mmol/L Final     Potassium 05/31/2018 5.0  3.4 - 5.3 mmol/L Final     Chloride 05/31/2018 110* 94 - 109 mmol/L Final     Carbon Dioxide 05/31/2018 27  20 - 32 mmol/L Final     Anion Gap 05/31/2018 " 5  3 - 14 mmol/L Final     Glucose 05/31/2018 107* 70 - 99 mg/dL Final     Urea Nitrogen 05/31/2018 27  7 - 30 mg/dL Final     Creatinine 05/31/2018 1.26* 0.66 - 1.25 mg/dL Final     GFR Estimate 05/31/2018 55* >60 mL/min/1.7m2 Final    Non  GFR Calc     GFR Estimate If Black 05/31/2018 67  >60 mL/min/1.7m2 Final    African American GFR Calc     Calcium 05/31/2018 8.9  8.5 - 10.1 mg/dL Final     Bilirubin Total 05/31/2018 0.6  0.2 - 1.3 mg/dL Final     Albumin 05/31/2018 3.7  3.4 - 5.0 g/dL Final     Protein Total 05/31/2018 7.5  6.8 - 8.8 g/dL Final     Alkaline Phosphatase 05/31/2018 73  40 - 150 U/L Final     ALT 05/31/2018 20  0 - 70 U/L Final     AST 05/31/2018 13  0 - 45 U/L Final     TSH 05/31/2018 3.25  0.40 - 4.00 mU/L Final        That    Assessment and plan:    (C02.9) Recurrent tongue cancer (H)  (primary encounter diagnosis)  Patient has been tolerating immunotherapy well without any significant side effects.  We will proceed with chemotherapy according to the treatment plan.  I will see the patient again in 3 weeks or sooner if there are new developments or concerns.    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    (E78.5) Hyperlipidemia with target LDL less than 100  Patient currently on Mevacor 40 mg orally daily.    (N18.1) CKD (chronic kidney disease) stage 1, GFR 90 ml/min or greater  Creatinine has been stable.    (I10) Hypertension goal BP (blood pressure) < 140/90  Patient currently on lisinopril 20 mg orally daily.    The patient is ready to learn, no apparent learning barriers were identified.  Diagnosis and treatment plans were explained to the patient. The patient expressed understanding of the content. The patient asked appropriate questions. The patient questions were answered to his satisfaction.    Chart documentation with Dragon Voice  recognition Software. Although reviewed after completion, some words and grammatical errors may remain.    Again, thank you for allowing me to participate in the care of your patient.        Sincerely,        Lanny Neil MD

## 2018-07-11 NOTE — PROGRESS NOTES
Hematology/ Oncology Follow-up Visit:  Jul 11, 2018    Reason for Visit:   Chief Complaint   Patient presents with     Oncology Clinic Visit     3 week recheck Recurrent tongue cancer, labs & Chemo today       Oncologic History:  Recurrent tongue cancer (H)  Patrick Diop was noted to have a mass in his right neck during routine physical exam. CT scanning was subsequently obtained which also showed a right-sided tongue base and oropharyngeal mass, which together are consistent with malignancy. The patient himself has denied any unexplained weight loss. He denies any odynophagia, dysphagia, hoarseness or otalgia. 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 PET scan done on October 21, 2015 showed a mass at the level of the tongue base located along the anterior wall of the oropharynx and extending to the right lateral wall near the tonsillar pillar. This demonstrates abnormal increased FDG activity and is consistent with known malignancy. Hypermetabolic lymph nodes in the right and left neck are also noted as described. These are consistent with metastatic lymph nodes. There is no evidence of metastatic disease in the chest, abdomen or pelvis. CT of the soft tissues of the neck on October 21, 2015 was done in conjunction with PET scanning showed a 2.8 cm mass at the base of the tongue on the right effacing the right vallecula. There is also a 2.3 cm lymph node in the right level II region that is strongly suspicious for metastatic disease. These lesions are PET positive. Additionally, there is a 1.3 cm lymph node in left level III that is PET positive. There is a lymph node in right level V, also PET positive. He started on concurrent Cisplatin with radiation therapy with cisplatin 100 mg/m to be given intravenously on days #1, #22 #43 of radiation therapy.      Interval History:  Patient is here today for follow-up.  He has been feeling well without any recent complaints  "weight loss or night sweats or fever or chills.  Denies any nausea vomiting or diarrhea.  He denies any shortness of breath or cough or wheezing.  Currently on therapy.  He has been tolerating treatment without significant side effects.    Review Of Systems:  Constitutional: Negative for fever, chills, and night sweats.  Skin: negative.  Eyes: negative.  Ears/Nose/Throat: negative.  Respiratory: No shortness of breath, dyspnea on exertion, cough, or hemoptysis.  Cardiovascular: negative.  Gastrointestinal: negative.  Genitourinary: negative.  Musculoskeletal: negative.  Neurologic: negative.  Psychiatric: negative.  Hematologic/Lymphatic/Immunologic: negative.  Endocrine: negative.    All other ROS negative unless mentioned in interval history.    Past medical, social, surgical, and family histories reviewed.    Allergies:  Allergies as of 07/11/2018     (Not on File)       Current Medications:  Current Outpatient Prescriptions   Medication Sig Dispense Refill     Cholecalciferol (VITAMIN D) 1000 UNITS capsule Take 1 capsule by mouth daily       lisinopril (PRINIVIL/ZESTRIL) 20 MG tablet TAKE ONE TABLET BY MOUTH DAILY 90 tablet 2     LORazepam (ATIVAN) 0.5 MG tablet Take 1 tablet (0.5 mg) by mouth every 4 hours as needed (Anxiety, Nausea/Vomiting or Sleep) (Patient not taking: Reported on 6/21/2018) 30 tablet 2     lovastatin (MEVACOR) 40 MG tablet TAKE ONE TABLET BY MOUTH NIGHTLY AT BEDTIME 90 tablet 3     multivitamin  with lutein (OCUVITE WITH LTEIN) CAPS Take 1 capsule by mouth daily       prochlorperazine (COMPAZINE) 10 MG tablet Take 1 tablet (10 mg) by mouth every 6 hours as needed (Nausea/Vomiting) (Patient not taking: Reported on 5/9/2018) 30 tablet 2        Physical Exam:  /75 (BP Location: Right arm, Patient Position: Sitting, Cuff Size: Adult Regular)  Pulse 82  Temp 98.1  F (36.7  C) (Tympanic)  Resp 16  Ht 1.714 m (5' 7.48\")  Wt 78.2 kg (172 lb 8 oz)  SpO2 96%  BMI 26.63 kg/m2  Wt Readings " from Last 12 Encounters:   07/11/18 78.2 kg (172 lb 8 oz)   06/21/18 77.1 kg (170 lb)   06/04/18 77 kg (169 lb 12.8 oz)   05/31/18 76.3 kg (168 lb 4.8 oz)   05/09/18 75.4 kg (166 lb 3.2 oz)   04/12/18 76.6 kg (168 lb 14.4 oz)   04/03/18 76.3 kg (168 lb 3.4 oz)   03/23/18 76.8 kg (169 lb 6.4 oz)   03/19/18 77.1 kg (170 lb)   03/05/18 74.3 kg (163 lb 12.8 oz)   11/29/17 78.1 kg (172 lb 3.2 oz)   07/03/17 79.4 kg (175 lb)     ECOG performance status: 0  GENERAL APPEARANCE: Healthy, alert and in no acute distress.  HEENT: Sclerae anicteric. PERRLA. Oropharynx without ulcers, lesions, or thrush.  NECK: Supple. No asymmetry or masses.  LYMPHATICS: No palpable cervical, supraclavicular, axillary, or inguinal lymphadenopathy.  RESP: Lungs clear to auscultation bilaterally without rales, rhonchi or wheezes.  CARDIOVASCULAR: Regular rate and rhythm. Normal S1, S2; no S3 or S4. No murmur, gallop, or rub.  ABDOMEN: Soft, nontender. Bowel sounds normal. No palpable organomegaly or masses.  MUSCULOSKELETAL: Extremities without gross deformities noted. No edema of bilateral lower extremities.  SKIN: No suspicious lesions or rashes.  NEURO: Alert and oriented x 3. Cranial nerves II-XII grossly intact.  PSYCHIATRIC: Mentation and affect appear normal.    Laboratory/Imaging Studies:  No visits with results within 2 Week(s) from this visit.  Latest known visit with results is:    Infusion Therapy Visit on 06/21/2018   Component Date Value Ref Range Status     Sodium 06/21/2018 144  133 - 144 mmol/L Final     Potassium 06/21/2018 4.3  3.4 - 5.3 mmol/L Final     Chloride 06/21/2018 110* 94 - 109 mmol/L Final     Carbon Dioxide 06/21/2018 26  20 - 32 mmol/L Final     Anion Gap 06/21/2018 8  3 - 14 mmol/L Final     Glucose 06/21/2018 109* 70 - 99 mg/dL Final     Urea Nitrogen 06/21/2018 19  7 - 30 mg/dL Final     Creatinine 06/21/2018 1.08  0.66 - 1.25 mg/dL Final     GFR Estimate 06/21/2018 66  >60 mL/min/1.7m2 Final    Non African  American GFR Calc     GFR Estimate If Black 06/21/2018 80  >60 mL/min/1.7m2 Final    African American GFR Calc     Calcium 06/21/2018 8.6  8.5 - 10.1 mg/dL Final     Bilirubin Total 06/21/2018 0.7  0.2 - 1.3 mg/dL Final     Albumin 06/21/2018 3.6  3.4 - 5.0 g/dL Final     Protein Total 06/21/2018 7.7  6.8 - 8.8 g/dL Final     Alkaline Phosphatase 06/21/2018 76  40 - 150 U/L Final     ALT 06/21/2018 17  0 - 70 U/L Final     AST 06/21/2018 13  0 - 45 U/L Final     TSH 06/21/2018 3.55  0.40 - 4.00 mU/L Final          Assessment and plan:    (C02.9) Recurrent tongue cancer (H)  (primary encounter diagnosis)  She has been doing well and been tolerating immunotherapy without significant side effects.  We will arrange for imaging studies to assess response to treatment.  I will see the patient again in 3 weeks or sooner if there are new developments or concerns.    (C18.9) Malignant neoplasm of colon, unspecified part of colon (H)  Is no evidence of colon cancer recurrence    (E11.22,  N18.1) Type 2 diabetes mellitus with stage 1 chronic kidney disease, without long-term current use of insulin (H)  Blood sugar has been well controlled on diet    (E78.5) Hyperlipidemia with target LDL less than 100  Currently on Mevacor 40 mg orally daily.    (I10) Hypertension goal BP (blood pressure) < 140/90  Patient blood pressure is currently well-controlled on lisinopril 20 mg orally daily.      The patient is ready to learn, no apparent learning barriers were identified.  Diagnosis and treatment plans were explained to the patient. The patient expressed understanding of the content. The patient asked appropriate questions. The patient questions were answered to his satisfaction.    Chart documentation with Dragon Voice recognition Software. Although reviewed after completion, some words and grammatical errors may remain.

## 2018-07-11 NOTE — NURSING NOTE
"Oncology Rooming Note    July 11, 2018 10:05 AM   Patrick Diop is a 82 year old male who presents for:    Chief Complaint   Patient presents with     Oncology Clinic Visit     3 week recheck Recurrent tongue cancer, labs & Chemo today     Initial Vitals: /75 (BP Location: Right arm, Patient Position: Sitting, Cuff Size: Adult Regular)  Pulse 82  Temp 98.1  F (36.7  C) (Tympanic)  Resp 16  Ht 1.714 m (5' 7.48\")  Wt 78.2 kg (172 lb 8 oz)  SpO2 96%  BMI 26.63 kg/m2 Estimated body mass index is 26.63 kg/(m^2) as calculated from the following:    Height as of this encounter: 1.714 m (5' 7.48\").    Weight as of this encounter: 78.2 kg (172 lb 8 oz). Body surface area is 1.93 meters squared.  No Pain (0) Comment: Data Unavailable   No LMP for male patient.  Allergies reviewed: Yes  Medications reviewed: Yes    Medications: Medication refills not needed today.  Pharmacy name entered into [employer]:      [id number] [address] [facility]    Clinical concerns: 3 week recheck Recurrent tongue cancer, labs & Chemo today.     7 minutes for nursing intake (face to face time)     Glenys White CMA                "

## 2018-07-11 NOTE — MR AVS SNAPSHOT
After Visit Summary   7/11/2018    Patrick Diop    MRN: [id number]           Patient Information     Date Of Birth          1935        Visit Information        Provider Department      7/11/2018 10:00 AM ROOM 8 [facility]fusion        Today's Diagnoses     Recurrent tongue cancer (H)    -  1       Follow-ups after your visit        Your next 10 appointments already scheduled     Jul 30, 2018  9:45 AM CDT   (Arrive by 9:30 AM)   CT CHEST W CONTRAST with [email or web address]   [facility] ([facility])    [address] [facility] [address]   [phone]           Please bring any scans or X-rays taken at other hospitals, if similar tests were done. Also bring a list of your medicines, including vitamins, minerals and over-the-counter drugs. It is safest to leave personal items at home.  Be sure to tell your doctor:   If you have any allergies.   If there s any chance you are pregnant.   If you are breastfeeding.    If you have diabetes as your medication may need to be adjusted for this exam.  You will have contrast for this exam. To prepare:   Do not eat or drink for 2 hours before your exam. If you need to take medicine, you may take it with small sips of water. (We may ask you to take liquid medicine as well.)   The day before your exam, drink extra fluids at least six 8-ounce glasses (unless your doctor tells you to restrict your fluids).  Patients over 70 or patients with diabetes or kidney problems:   If you haven t had a blood test (creatinine test) within the last 30 days, the Cardiologist/Radiologist may require you to get this test prior to your exam.  Please wear loose clothing, such as a sweat suit or jogging clothes. Avoid snaps, zippers and other metal. We may ask you to undress and put on a hospital gown.  If you have any questions, please call the Imaging Department where you will have your exam.            Aug 01, 2018  9:20 AM CDT   LAB with [place] [facility]OSPITAL   [facility] ([facility])    [address] [facility] [address]   [phone]           Please do not eat 10-12 hours before your appointment if you are coming in fasting for labs on lipids, cholesterol, or glucose (sugar). This does not apply to pregnant women. Water, hot tea and black coffee (with nothing added) are okay. Do not drink other fluids, diet soda or chew gum.            Aug 01, 2018  9:45 AM CDT   Return Visit with Lanny Neil MD   [facility] ([facility])    [facility]  [address]  [facility] [address]   [phone]            Aug 01, 2018 10:00 AM CDT   Level 2 with ROOM 4 [facility] ([facility])    [facility]  [address]  [facility] [place] [address]   [phone]            Sep 07, 2018  8:30 AM CDT   Return Visit with [facility] Provider   [facility] ([facility])    [address]  [facility] [place] [address]   [phone]              Future tests that were ordered for you today     Open Future Orders        Priority Expected Expires Ordered    CT Chest w Contrast Routine  8/25/2018 7/11/2018            Who to contact     If you have questions or need follow up information about today's clinic visit or your schedule please contact [facility] directly at [phone].  Normal or non-critical lab and imaging results will be communicated to you by MyChart, letter or phone within 4 business days after the clinic has received the results. If you do not hear from us within 7 days, please contact the clinic through MyChart or phone. If you have a critical or abnormal lab result, we will notify you by phone as soon as possible.  Submit refill requests through [employer] or call your pharmacy and they will forward the refill request to us. Please allow 3 business days for your refill to be  completed.          Additional Information About Your Visit        Care EveryWhere ID     This is your Care EveryWhere ID. This could be used by other organizations to access your [facility] medical records  [id number]         Blood Pressure from Last 3 Encounters:   07/11/18 122/75   06/21/18 135/75   06/04/18 146/80    Weight from Last 3 Encounters:   07/11/18 78.2 kg (172 lb 8 oz)   06/21/18 77.1 kg (170 lb)   06/04/18 77 kg (169 lb 12.8 oz)              We Performed the Following     Comprehensive metabolic panel     T4 free     TSH with free T4 reflex        Primary Care Provider Office Phone # Fax #    Joe Mandel [phone] [address] [place], [place] [id number]        Equal Access to Services     RAINA SOLANO : Norma houston Sozoë, waaxda luqadaha, qaybta kaalmada adetheresayalala, elizabeth gruber . So [facility] [phone].    ATENCIÓN: Si habla español, tiene a taylor disposición servicios gratuitos de asistencia lingüística. [facility] [phone].    We comply with applicable federal civil rights laws and [place] laws. We do not discriminate on the basis of race, color, national origin, age, disability, sex, sexual orientation, or gender identity.            Thank you!     Thank you for choosing [facility]  for your care. Our goal is always to provide you with excellent care. Hearing back from our patients is one way we can continue to improve our services. Please take a few minutes to complete the written survey that you may receive in the mail after your visit with us. Thank you!             Your Updated Medication List - Protect others around you: Learn how to safely use, store and throw away your medicines at www.disposemymeds.org.          This list is accurate as of 7/11/18 12:13 PM.  Always use your most recent med list.                   Brand Name Dispense Instructions for use Diagnosis    lisinopril 20 MG tablet    PRINIVIL/ZESTRIL     90 tablet    TAKE ONE TABLET BY MOUTH DAILY    Hypertension goal BP (blood pressure) < 140/90       LORazepam 0.5 MG tablet    ATIVAN    30 tablet    Take 1 tablet (0.5 mg) by mouth every 4 hours as needed (Anxiety, Nausea/Vomiting or Sleep)    Tongue cancer (H)       lovastatin 40 MG tablet    MEVACOR    90 tablet    TAKE ONE TABLET BY MOUTH NIGHTLY AT BEDTIME    Hyperlipidemia with target LDL less than 100       multivitamin  with lutein Caps per capsule      Take 1 capsule by mouth daily        prochlorperazine 10 MG tablet    COMPAZINE    30 tablet    Take 1 tablet (10 mg) by mouth every 6 hours as needed (Nausea/Vomiting)    Tongue cancer (H)       vitamin D 1000 units capsule      Take 1 capsule by mouth daily

## 2018-07-11 NOTE — PROGRESS NOTES
Please call.   Thyroid borderline-slightly low thyroid.   The blood chemistries (Basic metabolic panel) are all normal including electrolytes (salt balances in the blood), liver and kidney tests.  Blood glucose mildly increased.   PLAN: Recheck Hgb A1C in 6 months with TSH reflex.

## 2018-07-11 NOTE — MR AVS SNAPSHOT
After Visit Summary   7/11/2018    Patrick Diop    MRN: [id number]           Patient Information     Date Of Birth          1935        Visit Information        Provider Department      7/11/2018 9:45 AM Lanny Neil MD [facility] ONCOLOGY      Today's Diagnoses     Recurrent tongue cancer (H)    -  1    Malignant neoplasm of colon, unspecified part of colon (H)        Type 2 diabetes mellitus with stage 1 chronic kidney disease, without long-term current use of insulin (H)        Hyperlipidemia with target LDL less than 100        Hypertension goal BP (blood pressure) < 140/90        Chemotherapy induced nausea and vomiting           Follow-ups after your visit        Follow-up notes from your care team     Return in about 3 weeks (around 8/1/2018) for Imaging ordered before next appointment, Schedule for chemotherapy as per treatment plan.      Your next 10 appointments already scheduled     Jul 30, 2018  9:45 AM CDT   (Arrive by 9:30 AM)   CT CHEST W CONTRAST with [facility]CT1   [facility] CT ([facility])    [address] [facility] [address]   [phone]           Please bring any scans or X-rays taken at other hospitals, if similar tests were done. Also bring a list of your medicines, including vitamins, minerals and over-the-counter drugs. It is safest to leave personal items at home.  Be sure to tell your doctor:   If you have any allergies.   If there s any chance you are pregnant.   If you are breastfeeding.    If you have diabetes as your medication may need to be adjusted for this exam.  You will have contrast for this exam. To prepare:   Do not eat or drink for 2 hours before your exam. If you need to take medicine, you may take it with small sips of water. (We may ask you to take liquid medicine as well.)   The day before your exam, drink extra fluids at least six 8-ounce glasses (unless your doctor tells you to restrict your fluids).   Patients over 70 or patients with diabetes or kidney problems:   If you haven t had a blood test (creatinine test) within the last 30 days, the Cardiologist/Radiologist may require you to get this test prior to your exam.  Please wear loose clothing, such as a sweat suit or jogging clothes. Avoid snaps, zippers and other metal. We may ask you to undress and put on a hospital gown.  If you have any questions, please call the Imaging Department where you will have your exam.            Aug 01, 2018  9:20 AM CDT   LAB with [facility] ([facility])    [address] [facility] [address]   [phone]           Please do not eat 10-12 hours before your appointment if you are coming in fasting for labs on lipids, cholesterol, or glucose (sugar). This does not apply to pregnant women. Water, hot tea and black coffee (with nothing added) are okay. Do not drink other fluids, diet soda or chew gum.            Aug 01, 2018  9:45 AM CDT   Return Visit with Lanny Neil MD   [facility] ([facility])    [facility]  [address]  [facility] [address]   [phone]            Aug 01, 2018 10:00 AM CDT   Level 2 with ROOM 4 [facility] ([facility])    [facility]  [address]  [facility] [address]   [phone]            Sep 07, 2018  8:30 AM CDT   Return Visit with [facility] Provider   [facility] Center ([facility])    [address]  [facility] [address]   [phone]              Future tests that were ordered for you today     Open Future Orders        Priority Expected Expires Ordered    CT Chest w Contrast Routine  8/25/2018 7/11/2018            Who to contact     If you have questions or need follow up information about today's clinic visit or your schedule please contact [facility] directly at  "[phone].  Normal or non-critical lab and imaging results will be communicated to you by MyChart, letter or phone within 4 business days after the clinic has received the results. If you do not hear from us within 7 days, please contact the clinic through MyChart or phone. If you have a critical or abnormal lab result, we will notify you by phone as soon as possible.  Submit refill requests through [employer] or call your pharmacy and they will forward the refill request to us. Please allow 3 business days for your refill to be completed.          Additional Information About Your Visit        Care EveryWhere ID     This is your Care EveryWhere ID. This could be used by other organizations to access your [place] medical records  [id number]        Your Vitals Were     Pulse Temperature Respirations Height Pulse Oximetry BMI (Body Mass Index)    82 98.1  F (36.7  C) (Tympanic) 16 1.714 m (5' 7.48\") 96% 26.63 kg/m2       Blood Pressure from Last 3 Encounters:   07/11/18 122/75   06/21/18 135/75   06/04/18 146/80    Weight from Last 3 Encounters:   07/11/18 78.2 kg (172 lb 8 oz)   06/21/18 77.1 kg (170 lb)   06/04/18 77 kg (169 lb 12.8 oz)               Primary Care Provider Office Phone # Fax #    Joe Mandel [phone] [address] [place], [place] [address]        Equal Access to Services     RAINA SOLANO AH: Hadii frances luzo Sozoë, waaxda luqadaha, qaybta kaalmada marcio, elizabeth dias. So [facility] [phone].    ATENCIÓN: Si habla dialloañol, tiene a taylor disposición servicios gratuitos de asistencia lingüística. Llame al [phone].    We comply with applicable federal civil rights laws and [place] laws. We do not discriminate on the basis of race, color, national origin, age, disability, sex, sexual orientation, or gender identity.            Thank you!     Thank you for choosing [facility] CL[facility]  for your care. Our goal is always to provide you " with excellent care. Hearing back from our patients is one way we can continue to improve our services. Please take a few minutes to complete the written survey that you may receive in the mail after your visit with us. Thank you!             Your Updated Medication List - Protect others around you: Learn how to safely use, store and throw away your medicines at www.disposemymeds.org.          This list is accurate as of 7/11/18 10:26 AM.  Always use your most recent med list.                   Brand Name Dispense Instructions for use Diagnosis    lisinopril 20 MG tablet    PRINIVIL/ZESTRIL    90 tablet    TAKE ONE TABLET BY MOUTH DAILY    Hypertension goal BP (blood pressure) < 140/90       LORazepam 0.5 MG tablet    ATIVAN    30 tablet    Take 1 tablet (0.5 mg) by mouth every 4 hours as needed (Anxiety, Nausea/Vomiting or Sleep)    Tongue cancer (H)       lovastatin 40 MG tablet    MEVACOR    90 tablet    TAKE ONE TABLET BY MOUTH NIGHTLY AT BEDTIME    Hyperlipidemia with target LDL less than 100       multivitamin  with lutein Caps per capsule      Take 1 capsule by mouth daily        prochlorperazine 10 MG tablet    COMPAZINE    30 tablet    Take 1 tablet (10 mg) by mouth every 6 hours as needed (Nausea/Vomiting)    Tongue cancer (H)       vitamin D 1000 units capsule      Take 1 capsule by mouth daily

## 2018-07-11 NOTE — LETTER
7/11/2018         RE: Patrick Diop  [address]  [place] [address]        Dear Colleague,    Thank you for referring your patient, Patrick Diop, to the [facility]. Please see a copy of my visit note below.    Hematology/ Oncology Follow-up Visit:  Jul 11, 2018    Reason for Visit:   Chief Complaint   Patient presents with     Oncology Clinic Visit     3 week recheck Recurrent tongue cancer, labs & Chemo today       Oncologic History:  Recurrent tongue cancer (H)  Patrick Diop was noted to have a mass in his right neck during routine physical exam. CT scanning was subsequently obtained which also showed a right-sided tongue base and oropharyngeal mass, which together are consistent with malignancy. The patient himself has denied any unexplained weight loss. He denies any odynophagia, dysphagia, hoarseness or otalgia. 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 PET scan done on October 21, 2015 showed a mass at the level of the tongue base located along the anterior wall of the oropharynx and extending to the right lateral wall near the tonsillar pillar. This demonstrates abnormal increased FDG activity and is consistent with known malignancy. Hypermetabolic lymph nodes in the right and left neck are also noted as described. These are consistent with metastatic lymph nodes. There is no evidence of metastatic disease in the chest, abdomen or pelvis. CT of the soft tissues of the neck on October 21, 2015 was done in conjunction with PET scanning showed a 2.8 cm mass at the base of the tongue on the right effacing the right vallecula. There is also a 2.3 cm lymph node in the right level II region that is strongly suspicious for metastatic disease. These lesions are PET positive. Additionally, there is a 1.3 cm lymph node in left level III that is PET positive. There is a lymph node in right level V, also PET positive. He started on  concurrent Cisplatin with radiation therapy with cisplatin 100 mg/m to be given intravenously on days #1, #22 #43 of radiation therapy.      Interval History:  Patient is here today for follow-up.  He has been feeling well without any recent complaints weight loss or night sweats or fever or chills.  Denies any nausea vomiting or diarrhea.  He denies any shortness of breath or cough or wheezing.  Currently on therapy.  He has been tolerating treatment without significant side effects.    Review Of Systems:  Constitutional: Negative for fever, chills, and night sweats.  Skin: negative.  Eyes: negative.  Ears/Nose/Throat: negative.  Respiratory: No shortness of breath, dyspnea on exertion, cough, or hemoptysis.  Cardiovascular: negative.  Gastrointestinal: negative.  Genitourinary: negative.  Musculoskeletal: negative.  Neurologic: negative.  Psychiatric: negative.  Hematologic/Lymphatic/Immunologic: negative.  Endocrine: negative.    All other ROS negative unless mentioned in interval history.    Past medical, social, surgical, and family histories reviewed.    Allergies:  Allergies as of 07/11/2018     (Not on File)       Current Medications:  Current Outpatient Prescriptions   Medication Sig Dispense Refill     Cholecalciferol (VITAMIN D) 1000 UNITS capsule Take 1 capsule by mouth daily       lisinopril (PRINIVIL/ZESTRIL) 20 MG tablet TAKE ONE TABLET BY MOUTH DAILY 90 tablet 2     LORazepam (ATIVAN) 0.5 MG tablet Take 1 tablet (0.5 mg) by mouth every 4 hours as needed (Anxiety, Nausea/Vomiting or Sleep) (Patient not taking: Reported on 6/21/2018) 30 tablet 2     lovastatin (MEVACOR) 40 MG tablet TAKE ONE TABLET BY MOUTH NIGHTLY AT BEDTIME 90 tablet 3     multivitamin  with lutein (OCUVITE WITH LTEIN) CAPS Take 1 capsule by mouth daily       prochlorperazine (COMPAZINE) 10 MG tablet Take 1 tablet (10 mg) by mouth every 6 hours as needed (Nausea/Vomiting) (Patient not taking: Reported on 5/9/2018) 30 tablet 2     "    Physical Exam:  /75 (BP Location: Right arm, Patient Position: Sitting, Cuff Size: Adult Regular)  Pulse 82  Temp 98.1  F (36.7  C) (Tympanic)  Resp 16  Ht 1.714 m (5' 7.48\")  Wt 78.2 kg (172 lb 8 oz)  SpO2 96%  BMI 26.63 kg/m2  Wt Readings from Last 12 Encounters:   07/11/18 78.2 kg (172 lb 8 oz)   06/21/18 77.1 kg (170 lb)   06/04/18 77 kg (169 lb 12.8 oz)   05/31/18 76.3 kg (168 lb 4.8 oz)   05/09/18 75.4 kg (166 lb 3.2 oz)   04/12/18 76.6 kg (168 lb 14.4 oz)   04/03/18 76.3 kg (168 lb 3.4 oz)   03/23/18 76.8 kg (169 lb 6.4 oz)   03/19/18 77.1 kg (170 lb)   03/05/18 74.3 kg (163 lb 12.8 oz)   11/29/17 78.1 kg (172 lb 3.2 oz)   07/03/17 79.4 kg (175 lb)     ECOG performance status: 0  GENERAL APPEARANCE: Healthy, alert and in no acute distress.  HEENT: Sclerae anicteric. PERRLA. Oropharynx without ulcers, lesions, or thrush.  NECK: Supple. No asymmetry or masses.  LYMPHATICS: No palpable cervical, supraclavicular, axillary, or inguinal lymphadenopathy.  RESP: Lungs clear to auscultation bilaterally without rales, rhonchi or wheezes.  CARDIOVASCULAR: Regular rate and rhythm. Normal S1, S2; no S3 or S4. No murmur, gallop, or rub.  ABDOMEN: Soft, nontender. Bowel sounds normal. No palpable organomegaly or masses.  MUSCULOSKELETAL: Extremities without gross deformities noted. No edema of bilateral lower extremities.  SKIN: No suspicious lesions or rashes.  NEURO: Alert and oriented x 3. Cranial nerves II-XII grossly intact.  PSYCHIATRIC: Mentation and affect appear normal.    Laboratory/Imaging Studies:  No visits with results within 2 Week(s) from this visit.  Latest known visit with results is:    Infusion Therapy Visit on 06/21/2018   Component Date Value Ref Range Status     Sodium 06/21/2018 144  133 - 144 mmol/L Final     Potassium 06/21/2018 4.3  3.4 - 5.3 mmol/L Final     Chloride 06/21/2018 110* 94 - 109 mmol/L Final     Carbon Dioxide 06/21/2018 26  20 - 32 mmol/L Final     Anion Gap " 06/21/2018 8  3 - 14 mmol/L Final     Glucose 06/21/2018 109* 70 - 99 mg/dL Final     Urea Nitrogen 06/21/2018 19  7 - 30 mg/dL Final     Creatinine 06/21/2018 1.08  0.66 - 1.25 mg/dL Final     GFR Estimate 06/21/2018 66  >60 mL/min/1.7m2 Final    Non  GFR Calc     GFR Estimate If Black 06/21/2018 80  >60 mL/min/1.7m2 Final    African American GFR Calc     Calcium 06/21/2018 8.6  8.5 - 10.1 mg/dL Final     Bilirubin Total 06/21/2018 0.7  0.2 - 1.3 mg/dL Final     Albumin 06/21/2018 3.6  3.4 - 5.0 g/dL Final     Protein Total 06/21/2018 7.7  6.8 - 8.8 g/dL Final     Alkaline Phosphatase 06/21/2018 76  40 - 150 U/L Final     ALT 06/21/2018 17  0 - 70 U/L Final     AST 06/21/2018 13  0 - 45 U/L Final     TSH 06/21/2018 3.55  0.40 - 4.00 mU/L Final          Assessment and plan:    (C02.9) Recurrent tongue cancer (H)  (primary encounter diagnosis)  She has been doing well and been tolerating immunotherapy without significant side effects.  We will arrange for imaging studies to assess response to treatment.  I will see the patient again in 3 weeks or sooner if there are new developments or concerns.    (C18.9) Malignant neoplasm of colon, unspecified part of colon (H)  Is no evidence of colon cancer recurrence    (E11.22,  N18.1) Type 2 diabetes mellitus with stage 1 chronic kidney disease, without long-term current use of insulin (H)  Blood sugar has been well controlled on diet    (E78.5) Hyperlipidemia with target LDL less than 100  Currently on Mevacor 40 mg orally daily.    (I10) Hypertension goal BP (blood pressure) < 140/90  Patient blood pressure is currently well-controlled on lisinopril 20 mg orally daily.      The patient is ready to learn, no apparent learning barriers were identified.  Diagnosis and treatment plans were explained to the patient. The patient expressed understanding of the content. The patient asked appropriate questions. The patient questions were answered to his  satisfaction.    Chart documentation with Dragon Voice recognition Software. Although reviewed after completion, some words and grammatical errors may remain.    Again, thank you for allowing me to participate in the care of your patient.        Sincerely,        Lanny Neil MD

## 2018-07-11 NOTE — PATIENT INSTRUCTIONS
We would like to see you back in clinic with Dr. Neil in 3 weeks with CT scan prior and chemotherapy to be scheduled per treatment plan.      When you are in need of a refill, please call your pharmacy and they will send us a request.      Copy of appointments, and after visit summary (AVS) given to patient.      If you have any questions during business hours (M-F 8 AM- 4PM), please call Yolanda Eduardo RN, BSN, OCN Oncology Hematology /Breast Cancer Navigator at [facility] [phone].       For questions after business hours, or on holidays/weekends, please call our after hours Nurse Triage line [phone]. Thank you.

## 2018-08-01 NOTE — NURSING NOTE
"Oncology Rooming Note    August 1, 2018 9:42 AM   Patrick Dipo is a 82 year old male who presents for:    Chief Complaint   Patient presents with     Oncology Clinic Visit     3 week recheck Recurrent tongue cancer, review CT, Labs & Chemo today      Initial Vitals: /76 (BP Location: Right arm, Patient Position: Sitting, Cuff Size: Adult Regular)  Pulse 91  Temp 97.9  F (36.6  C) (Tympanic)  Resp 20  Ht 1.714 m (5' 7.48\")  Wt 81.3 kg (179 lb 3.2 oz)  SpO2 93%  BMI 27.67 kg/m2 Estimated body mass index is 27.67 kg/(m^2) as calculated from the following:    Height as of this encounter: 1.714 m (5' 7.48\").    Weight as of this encounter: 81.3 kg (179 lb 3.2 oz). Body surface area is 1.97 meters squared.  No Pain (0) Comment: Data Unavailable   No LMP for male patient.  Allergies reviewed: Yes  Medications reviewed: Yes    Medications: Medication refills not needed today.  Pharmacy name entered into [email or web address]:      [id number] - [facility] [address]    Clinical concerns 3 week recheck Recurrent tongue cancer, review CT, Labs & Chemo today     7  minutes for nursing intake (face to face time)     Glenys White CMA              "

## 2018-08-01 NOTE — PROGRESS NOTES
Infusion Nursing Note:  Patrick Diop presents today for labs and Keytruda.    Patient seen by provider today: Yes: Dr. Neil.   present during visit today: Not Applicable.    Note: Labs drawn peripherally. Labs WNL's. IV Keytruda given over 30 minutes without complications. Pt. To return on 8/23 for labs and chemotherapy.    Intravenous Access:  Peripheral IV placed.  Labs drawn peripherally.    Treatment Conditions:  Lab Results   Component Value Date     08/01/2018                   Lab Results   Component Value Date    POTASSIUM 4.1 08/01/2018           Lab Results   Component Value Date    MAG 1.6 04/25/2017            Lab Results   Component Value Date    CR 1.09 08/01/2018                   Lab Results   Component Value Date    AMY 8.9 08/01/2018                Lab Results   Component Value Date    BILITOTAL 0.9 08/01/2018           Lab Results   Component Value Date    ALBUMIN 3.8 08/01/2018                    Lab Results   Component Value Date    ALT 21 08/01/2018           Lab Results   Component Value Date    AST 13 08/01/2018       Results reviewed, labs MET treatment parameters, ok to proceed with treatment.      Post Infusion Assessment:  Patient tolerated infusion without incident.  Blood return noted pre and post infusion.  Site patent and intact, free from redness, edema or discomfort.  No evidence of extravasations.  Access discontinued per protocol.    Discharge Plan:   Patient and/or family verbalized understanding of discharge instructions and all questions answered.  Patient discharged in stable condition accompanied by: self.  Departure Mode: Ambulatory.    Naya Peters RN

## 2018-08-01 NOTE — MR AVS SNAPSHOT
After Visit Summary   8/1/2018    Patrick Diop    MRN: [id number]           Patient Information     Date Of Birth          1935        Visit Information        Provider Department      8/1/2018 9:45 AM Lanny Neil MD [facility] ONCOLOGY      Care Instructions    We would like you to have chemotherapy per treatment plan. Dr. Neil would like to see you back in 3 weeks for a follow up appointment. Thank you.      When you are in need of a refill, please call your pharmacy and they will send us a request.      Copy of appointments, and after visit summary (AVS) given to patient.      If you have any questions during business hours (M-F 8 AM- 4PM), please call Natividad Iyer RN Oncology Hematology  at [facility] [phone].       For questions after business hours, or on holidays/weekends, please call our after hours Nurse Triage line [phone]. Thank you.            Follow-ups after your visit        Follow-up notes from your care team     Return in about 3 weeks (around 8/22/2018) for Schedule for chemotherapy as per treatment plan.      Your next 10 appointments already scheduled     Aug 01, 2018 10:00 AM CDT   Level 2 with ROOM 4 [facility] ([facility])    [facility]  [address]  [facility] [address]   [phone]            Aug 23, 2018  9:10 AM CDT   LAB with [facility] ([facility])    [address]  [facility] [address]   [phone]           Please do not eat 10-12 hours before your appointment if you are coming in fasting for labs on lipids, cholesterol, or glucose (sugar). This does not apply to pregnant women. Water, hot tea and black coffee (with nothing added) are okay. Do not drink other fluids, diet soda or chew gum.            Aug 23, 2018  9:45 AM CDT   Return Visit with Lanny Neil,  "MD   [facility] ([facility])    [facility]  [address]  [facility] [address]   [phone]            Aug 23, 2018 10:00 AM CDT   Level 2 with ROOM 9 [facility] [place] [facility] Infusion ([facility])    [facility]  [address]  [facility] [address]   [phone]            Sep 07, 2018  8:30 AM CDT   Return Visit with [facility] Provider   [facility] ([facility])    [address], [address]  [facility] [address]   [phone]              Who to contact     If you have questions or need follow up information about today's clinic visit or your schedule please contact [facility] directly at [phone].  Normal or non-critical lab and imaging results will be communicated to you by MyChart, letter or phone within 4 business days after the clinic has received the results. If you do not hear from us within 7 days, please contact the clinic through MyChart or phone. If you have a critical or abnormal lab result, we will notify you by phone as soon as possible.  Submit refill requests through [employer] or call your pharmacy and they will forward the refill request to us. Please allow 3 business days for your refill to be completed.          Additional Information About Your Visit        Care EveryWhere ID     This is your Care EveryWhere ID. This could be used by other organizations to access your [place] medical records  [id number]        Your Vitals Were     Pulse Temperature Respirations Height Pulse Oximetry BMI (Body Mass Index)    91 97.9  F (36.6  C) (Tympanic) 20 1.714 m (5' 7.48\") 93% 27.67 kg/m2       Blood Pressure from Last 3 Encounters:   08/01/18 139/76   07/11/18 122/75   06/21/18 135/75    Weight from Last 3 Encounters:   08/01/18 81.3 kg (179 lb 3.2 oz)   07/11/18 78.2 kg (172 lb 8 oz)   06/21/18 77.1 kg (170 lb)              Today, you had the following     No " orders found for display       Primary Care Provider Office Phone # Fax #    Joe Mandel [phone] [address] [place] [address]        Equal Access to Services     RAINA SOLANO : Norma frances quinteros nattyo Delmy, waaileenda luqadaha, qaybta kaalmada marcio, elizabeth farah laWandadawson dias. So [facility] [phone].    ATENCIÓN: Si habla español, tiene a taylor disposición servicios gratuitos de asistencia lingüística. Llame al [phone].    We comply with applicable federal civil rights laws and [place] laws. We do not discriminate on the basis of race, color, national origin, age, disability, sex, sexual orientation, or gender identity.            Thank you!     Thank you for choosing [facility]ER [facility]  for your care. Our goal is always to provide you with excellent care. Hearing back from our patients is one way we can continue to improve our services. Please take a few minutes to complete the written survey that you may receive in the mail after your visit with us. Thank you!             Your Updated Medication List - Protect others around you: Learn how to safely use, store and throw away your medicines at www.disposemymeds.org.          This list is accurate as of 8/1/18  9:58 AM.  Always use your most recent med list.                   Brand Name Dispense Instructions for use Diagnosis    lisinopril 20 MG tablet    PRINIVIL/ZESTRIL    90 tablet    TAKE ONE TABLET BY MOUTH DAILY    Hypertension goal BP (blood pressure) < 140/90       LORazepam 0.5 MG tablet    ATIVAN    30 tablet    Take 1 tablet (0.5 mg) by mouth every 4 hours as needed (Anxiety, Nausea/Vomiting or Sleep)    Tongue cancer (H)       lovastatin 40 MG tablet    MEVACOR    90 tablet    TAKE ONE TABLET BY MOUTH NIGHTLY AT BEDTIME    Hyperlipidemia with target LDL less than 100       multivitamin  with lutein Caps per capsule      Take 1 capsule by mouth daily        prochlorperazine 10 MG tablet     COMPAZINE    30 tablet    Take 1 tablet (10 mg) by mouth every 6 hours as needed (Nausea/Vomiting)    Tongue cancer (H)       vitamin D 1000 units capsule      Take 1 capsule by mouth daily

## 2018-08-01 NOTE — PATIENT INSTRUCTIONS
We would like you to have chemotherapy per treatment plan. Dr. Neil would like to see you back in 3 weeks for a follow up appointment. Thank you.      When you are in need of a refill, please call your pharmacy and they will send us a request.      Copy of appointments, and after visit summary (AVS) given to patient.      If you have any questions during business hours (M-F 8 AM- 4PM), please call Natividad Iyer RN Oncology Hematology  at [facility] [phone].       For questions after business hours, or on holidays/weekends, please call our after hours Nurse Triage line [phone]. Thank you.

## 2018-08-01 NOTE — LETTER
8/1/2018         RE: Patrick Diop  [address]  [facility] [address]        Dear Colleague,    Thank you for referring your patient, Patrick Diop, to the [facility]. Please see a copy of my visit note below.    Hematology/ Oncology Follow-up Visit:  Aug 1, 2018    Reason for Visit:   Chief Complaint   Patient presents with     Oncology Clinic Visit     3 week recheck Recurrent tongue cancer, review CT, Labs & Chemo today        Oncologic History:  Recurrent tongue cancer (H)  Patrick Diop was noted to have a mass in his right neck during routine physical exam. CT scanning was subsequently obtained which also showed a right-sided tongue base and oropharyngeal mass, which together are consistent with malignancy. The patient himself has denied any unexplained weight loss. He denies any odynophagia, dysphagia, hoarseness or otalgia. 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 PET scan done on October 21, 2015 showed a mass at the level of the tongue base located along the anterior wall of the oropharynx and extending to the right lateral wall near the tonsillar pillar. This demonstrates abnormal increased FDG activity and is consistent with known malignancy. Hypermetabolic lymph nodes in the right and left neck are also noted as described. These are consistent with metastatic lymph nodes. There is no evidence of metastatic disease in the chest, abdomen or pelvis. CT of the soft tissues of the neck on October 21, 2015 was done in conjunction with PET scanning showed a 2.8 cm mass at the base of the tongue on the right effacing the right vallecula. There is also a 2.3 cm lymph node in the right level II region that is strongly suspicious for metastatic disease. These lesions are PET positive. Additionally, there is a 1.3 cm lymph node in left level III that is PET positive. There is a lymph node in right level V, also PET positive. He  started on concurrent Cisplatin with radiation therapy with cisplatin 100 mg/m to be given intravenously on days #1, #22 #43 of radiation therapy.      Interval History:  Patient returning today for follow-up.  His been feeling well without any recent complaints of weight loss or night sweats or fever or chills.  He denies any shortness of breath or cough or wheezing.  He continues to have some issues with swallowing since he finished radiation therapy.  He continues on immunotherapy with Keytruda.  His been tolerating treatment very well without significant side effects.    Review Of Systems:  Constitutional: Negative for fever, chills, and night sweats.  Skin: negative.  Eyes: negative.  Ears/Nose/Throat: negative.  Respiratory: No shortness of breath, dyspnea on exertion, cough, or hemoptysis.  Cardiovascular: negative.  Gastrointestinal: negative.  Genitourinary: negative.  Musculoskeletal: negative.  Neurologic: negative.  Psychiatric: negative.  Hematologic/Lymphatic/Immunologic: negative.  Endocrine: negative.    All other ROS negative unless mentioned in interval history.    Past medical, social, surgical, and family histories reviewed.    Allergies:  Allergies as of 08/01/2018     (Not on File)       Current Medications:  Current Outpatient Prescriptions   Medication Sig Dispense Refill     Cholecalciferol (VITAMIN D) 1000 UNITS capsule Take 1 capsule by mouth daily       lisinopril (PRINIVIL/ZESTRIL) 20 MG tablet TAKE ONE TABLET BY MOUTH DAILY 90 tablet 2     LORazepam (ATIVAN) 0.5 MG tablet Take 1 tablet (0.5 mg) by mouth every 4 hours as needed (Anxiety, Nausea/Vomiting or Sleep) 30 tablet 2     lovastatin (MEVACOR) 40 MG tablet TAKE ONE TABLET BY MOUTH NIGHTLY AT BEDTIME 90 tablet 3     multivitamin  with lutein (OCUVITE WITH LTEIN) CAPS Take 1 capsule by mouth daily       prochlorperazine (COMPAZINE) 10 MG tablet Take 1 tablet (10 mg) by mouth every 6 hours as needed (Nausea/Vomiting) 30 tablet 2     "    Physical Exam:  /76 (BP Location: Right arm, Patient Position: Sitting, Cuff Size: Adult Regular)  Pulse 91  Temp 97.9  F (36.6  C) (Tympanic)  Resp 20  Ht 1.714 m (5' 7.48\")  Wt 81.3 kg (179 lb 3.2 oz)  SpO2 93%  BMI 27.67 kg/m2  Wt Readings from Last 12 Encounters:   08/01/18 81.3 kg (179 lb 3.2 oz)   07/11/18 78.2 kg (172 lb 8 oz)   06/21/18 77.1 kg (170 lb)   06/04/18 77 kg (169 lb 12.8 oz)   05/31/18 76.3 kg (168 lb 4.8 oz)   05/09/18 75.4 kg (166 lb 3.2 oz)   04/12/18 76.6 kg (168 lb 14.4 oz)   04/03/18 76.3 kg (168 lb 3.4 oz)   03/23/18 76.8 kg (169 lb 6.4 oz)   03/19/18 77.1 kg (170 lb)   03/05/18 74.3 kg (163 lb 12.8 oz)   11/29/17 78.1 kg (172 lb 3.2 oz)     ECOG performance status: 0  GENERAL APPEARANCE: Healthy, alert and in no acute distress.  HEENT: Sclerae anicteric. PERRLA. Oropharynx without ulcers, lesions, or thrush.  NECK: Supple. No asymmetry or masses.  LYMPHATICS: No palpable cervical, supraclavicular, axillary, or inguinal lymphadenopathy.  RESP: Lungs clear to auscultation bilaterally without rales, rhonchi or wheezes.  CARDIOVASCULAR: Regular rate and rhythm. Normal S1, S2; no S3 or S4. No murmur, gallop, or rub.  ABDOMEN: Soft, nontender. Bowel sounds normal. No palpable organomegaly or masses.  MUSCULOSKELETAL: Extremities without gross deformities noted. No edema of bilateral lower extremities.  SKIN: No suspicious lesions or rashes.  NEURO: Alert and oriented x 3. Cranial nerves II-XII grossly intact.  PSYCHIATRIC: Mentation and affect appear normal.    Laboratory/Imaging Studies:  No visits with results within 2 Week(s) from this visit.  Latest known visit with results is:    Infusion Therapy Visit on 07/11/2018   Component Date Value Ref Range Status     Sodium 07/11/2018 142  133 - 144 mmol/L Final     Potassium 07/11/2018 4.0  3.4 - 5.3 mmol/L Final     Chloride 07/11/2018 110* 94 - 109 mmol/L Final     Carbon Dioxide 07/11/2018 26  20 - 32 mmol/L Final     Anion " Gap 07/11/2018 6  3 - 14 mmol/L Final     Glucose 07/11/2018 120* 70 - 99 mg/dL Final     Urea Nitrogen 07/11/2018 16  7 - 30 mg/dL Final     Creatinine 07/11/2018 1.12  0.66 - 1.25 mg/dL Final     GFR Estimate 07/11/2018 63  >60 mL/min/1.7m2 Final    Non  GFR Calc     GFR Estimate If Black 07/11/2018 76  >60 mL/min/1.7m2 Final    African American GFR Calc     Calcium 07/11/2018 9.0  8.5 - 10.1 mg/dL Final     Bilirubin Total 07/11/2018 0.7  0.2 - 1.3 mg/dL Final     Albumin 07/11/2018 3.7  3.4 - 5.0 g/dL Final     Protein Total 07/11/2018 7.4  6.8 - 8.8 g/dL Final     Alkaline Phosphatase 07/11/2018 74  40 - 150 U/L Final     ALT 07/11/2018 10  0 - 70 U/L Final     AST 07/11/2018 10  0 - 45 U/L Final     TSH 07/11/2018 4.10* 0.40 - 4.00 mU/L Final     T4 Free 07/11/2018 0.76  0.76 - 1.46 ng/dL Final        Recent Results (from the past 744 hour(s))   CT Chest w Contrast    Narrative    CT CHEST W CONTRAST 7/30/2018 9:54 AM    HISTORY: Tongue malignancy. Follow-up.    CONTRAST:  80 mL Isovue 370.    TECHNIQUE: Routine CT of the chest is performed with IV contrast.    Routine assessed structures include the lungs, mediastinal structures,  pleura, chest wall, and upper visualized abdomen.    Radiation dose for this scan is reduced using automated exposure  control, adjustment of the mA and/or kV according to patient size, or  iterative reconstructive technique.    COMPARISON: 3/8/2018.    FINDINGS: Centrilobular emphysema is again seen. A nodule with coarse  calcifications and possibly a small amount of fat density remains  stable in the lingula and is consistent with benign entity. There is a  new a small amount of adjacent atelectasis to this nodule today. There  are new bilateral pleural effusions. No enlarged lymph nodes are  demonstrated.      Impression    IMPRESSION:  1. No convincing metastatic disease.  2. New bilateral pleural effusions.    MARYJO HERRERA MD       Assessment and plan:  (C02.9)  Recurrent tongue cancer (H)  (primary encounter diagnosis)  I reviewed with the patient today the imaging studies.  There is no convincing evidence of disease progression.  Pleural effusions is asymptomatic.  We will continue to monitor the patient's symptoms.  Patient will continue on immunotherapy with Keytruda.  I will see the patient again in 3 weeks or sooner if there are new developments or concerns.    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    The patient is ready to learn, no apparent learning barriers were identified.  Diagnosis and treatment plans were explained to the patient. The patient expressed understanding of the content. The patient asked appropriate questions. The patient questions were answered to his satisfaction.    Chart documentation with Dragon Voice recognition Software. Although reviewed after completion, some words and grammatical errors may remain.    Again, thank you for allowing me to participate in the care of your patient.        Sincerely,        Lanny Neil MD

## 2018-08-01 NOTE — PROGRESS NOTES
Hematology/ Oncology Follow-up Visit:  Aug 1, 2018    Reason for Visit:   Chief Complaint   Patient presents with     Oncology Clinic Visit     3 week recheck Recurrent tongue cancer, review CT, Labs & Chemo today        Oncologic History:  Recurrent tongue cancer (H)  Patrick Diop was noted to have a mass in his right neck during routine physical exam. CT scanning was subsequently obtained which also showed a right-sided tongue base and oropharyngeal mass, which together are consistent with malignancy. The patient himself has denied any unexplained weight loss. He denies any odynophagia, dysphagia, hoarseness or otalgia. 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 PET scan done on October 21, 2015 showed a mass at the level of the tongue base located along the anterior wall of the oropharynx and extending to the right lateral wall near the tonsillar pillar. This demonstrates abnormal increased FDG activity and is consistent with known malignancy. Hypermetabolic lymph nodes in the right and left neck are also noted as described. These are consistent with metastatic lymph nodes. There is no evidence of metastatic disease in the chest, abdomen or pelvis. CT of the soft tissues of the neck on October 21, 2015 was done in conjunction with PET scanning showed a 2.8 cm mass at the base of the tongue on the right effacing the right vallecula. There is also a 2.3 cm lymph node in the right level II region that is strongly suspicious for metastatic disease. These lesions are PET positive. Additionally, there is a 1.3 cm lymph node in left level III that is PET positive. There is a lymph node in right level V, also PET positive. He started on concurrent Cisplatin with radiation therapy with cisplatin 100 mg/m to be given intravenously on days #1, #22 #43 of radiation therapy.      Interval History:  Patient returning today for follow-up.  His been feeling well without any recent  "complaints of weight loss or night sweats or fever or chills.  He denies any shortness of breath or cough or wheezing.  He continues to have some issues with swallowing since he finished radiation therapy.  He continues on immunotherapy with Keytruda.  His been tolerating treatment very well without significant side effects.    Review Of Systems:  Constitutional: Negative for fever, chills, and night sweats.  Skin: negative.  Eyes: negative.  Ears/Nose/Throat: negative.  Respiratory: No shortness of breath, dyspnea on exertion, cough, or hemoptysis.  Cardiovascular: negative.  Gastrointestinal: negative.  Genitourinary: negative.  Musculoskeletal: negative.  Neurologic: negative.  Psychiatric: negative.  Hematologic/Lymphatic/Immunologic: negative.  Endocrine: negative.    All other ROS negative unless mentioned in interval history.    Past medical, social, surgical, and family histories reviewed.    Allergies:  Allergies as of 08/01/2018     (Not on File)       Current Medications:  Current Outpatient Prescriptions   Medication Sig Dispense Refill     Cholecalciferol (VITAMIN D) 1000 UNITS capsule Take 1 capsule by mouth daily       lisinopril (PRINIVIL/ZESTRIL) 20 MG tablet TAKE ONE TABLET BY MOUTH DAILY 90 tablet 2     LORazepam (ATIVAN) 0.5 MG tablet Take 1 tablet (0.5 mg) by mouth every 4 hours as needed (Anxiety, Nausea/Vomiting or Sleep) 30 tablet 2     lovastatin (MEVACOR) 40 MG tablet TAKE ONE TABLET BY MOUTH NIGHTLY AT BEDTIME 90 tablet 3     multivitamin  with lutein (OCUVITE WITH LTEIN) CAPS Take 1 capsule by mouth daily       prochlorperazine (COMPAZINE) 10 MG tablet Take 1 tablet (10 mg) by mouth every 6 hours as needed (Nausea/Vomiting) 30 tablet 2        Physical Exam:  /76 (BP Location: Right arm, Patient Position: Sitting, Cuff Size: Adult Regular)  Pulse 91  Temp 97.9  F (36.6  C) (Tympanic)  Resp 20  Ht 1.714 m (5' 7.48\")  Wt 81.3 kg (179 lb 3.2 oz)  SpO2 93%  BMI 27.67 kg/m2  Wt " Readings from Last 12 Encounters:   08/01/18 81.3 kg (179 lb 3.2 oz)   07/11/18 78.2 kg (172 lb 8 oz)   06/21/18 77.1 kg (170 lb)   06/04/18 77 kg (169 lb 12.8 oz)   05/31/18 76.3 kg (168 lb 4.8 oz)   05/09/18 75.4 kg (166 lb 3.2 oz)   04/12/18 76.6 kg (168 lb 14.4 oz)   04/03/18 76.3 kg (168 lb 3.4 oz)   03/23/18 76.8 kg (169 lb 6.4 oz)   03/19/18 77.1 kg (170 lb)   03/05/18 74.3 kg (163 lb 12.8 oz)   11/29/17 78.1 kg (172 lb 3.2 oz)     ECOG performance status: 0  GENERAL APPEARANCE: Healthy, alert and in no acute distress.  HEENT: Sclerae anicteric. PERRLA. Oropharynx without ulcers, lesions, or thrush.  NECK: Supple. No asymmetry or masses.  LYMPHATICS: No palpable cervical, supraclavicular, axillary, or inguinal lymphadenopathy.  RESP: Lungs clear to auscultation bilaterally without rales, rhonchi or wheezes.  CARDIOVASCULAR: Regular rate and rhythm. Normal S1, S2; no S3 or S4. No murmur, gallop, or rub.  ABDOMEN: Soft, nontender. Bowel sounds normal. No palpable organomegaly or masses.  MUSCULOSKELETAL: Extremities without gross deformities noted. No edema of bilateral lower extremities.  SKIN: No suspicious lesions or rashes.  NEURO: Alert and oriented x 3. Cranial nerves II-XII grossly intact.  PSYCHIATRIC: Mentation and affect appear normal.    Laboratory/Imaging Studies:  No visits with results within 2 Week(s) from this visit.  Latest known visit with results is:    Infusion Therapy Visit on 07/11/2018   Component Date Value Ref Range Status     Sodium 07/11/2018 142  133 - 144 mmol/L Final     Potassium 07/11/2018 4.0  3.4 - 5.3 mmol/L Final     Chloride 07/11/2018 110* 94 - 109 mmol/L Final     Carbon Dioxide 07/11/2018 26  20 - 32 mmol/L Final     Anion Gap 07/11/2018 6  3 - 14 mmol/L Final     Glucose 07/11/2018 120* 70 - 99 mg/dL Final     Urea Nitrogen 07/11/2018 16  7 - 30 mg/dL Final     Creatinine 07/11/2018 1.12  0.66 - 1.25 mg/dL Final     GFR Estimate 07/11/2018 63  >60 mL/min/1.7m2 Final     Non  GFR Calc     GFR Estimate If Black 07/11/2018 76  >60 mL/min/1.7m2 Final    African American GFR Calc     Calcium 07/11/2018 9.0  8.5 - 10.1 mg/dL Final     Bilirubin Total 07/11/2018 0.7  0.2 - 1.3 mg/dL Final     Albumin 07/11/2018 3.7  3.4 - 5.0 g/dL Final     Protein Total 07/11/2018 7.4  6.8 - 8.8 g/dL Final     Alkaline Phosphatase 07/11/2018 74  40 - 150 U/L Final     ALT 07/11/2018 10  0 - 70 U/L Final     AST 07/11/2018 10  0 - 45 U/L Final     TSH 07/11/2018 4.10* 0.40 - 4.00 mU/L Final     T4 Free 07/11/2018 0.76  0.76 - 1.46 ng/dL Final        Recent Results (from the past 744 hour(s))   CT Chest w Contrast    Narrative    CT CHEST W CONTRAST 7/30/2018 9:54 AM    HISTORY: Tongue malignancy. Follow-up.    CONTRAST:  80 mL Isovue 370.    TECHNIQUE: Routine CT of the chest is performed with IV contrast.    Routine assessed structures include the lungs, mediastinal structures,  pleura, chest wall, and upper visualized abdomen.    Radiation dose for this scan is reduced using automated exposure  control, adjustment of the mA and/or kV according to patient size, or  iterative reconstructive technique.    COMPARISON: 3/8/2018.    FINDINGS: Centrilobular emphysema is again seen. A nodule with coarse  calcifications and possibly a small amount of fat density remains  stable in the lingula and is consistent with benign entity. There is a  new a small amount of adjacent atelectasis to this nodule today. There  are new bilateral pleural effusions. No enlarged lymph nodes are  demonstrated.      Impression    IMPRESSION:  1. No convincing metastatic disease.  2. New bilateral pleural effusions.    MARYJO HERRERA MD       Assessment and plan:  (C02.9) Recurrent tongue cancer (H)  (primary encounter diagnosis)  I reviewed with the patient today the imaging studies.  There is no convincing evidence of disease progression.  Pleural effusions is asymptomatic.  We will continue to monitor the patient's  symptoms.  Patient will continue on immunotherapy with Keytruda.  I will see the patient again in 3 weeks or sooner if there are new developments or concerns.    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    The patient is ready to learn, no apparent learning barriers were identified.  Diagnosis and treatment plans were explained to the patient. The patient expressed understanding of the content. The patient asked appropriate questions. The patient questions were answered to his satisfaction.    Chart documentation with Dragon Voice recognition Software. Although reviewed after completion, some words and grammatical errors may remain.

## 2018-08-01 NOTE — MR AVS SNAPSHOT
After Visit Summary   8/1/2018    Patrick Diop    MRN: [id number]           Patient Information     Date Of Birth          1935        Visit Information        Provider Department      8/1/2018 10:00 AM ROOM 4 [facility]        Today's Diagnoses     Recurrent tongue cancer (H)    -  1       Follow-ups after your visit        Your next 10 appointments already scheduled     Aug 23, 2018  9:10 AM CDT   LAB with [facility] ([facility])    [address] [facility] [address]   [phone]           Please do not eat 10-12 hours before your appointment if you are coming in fasting for labs on lipids, cholesterol, or glucose (sugar). This does not apply to pregnant women. Water, hot tea and black coffee (with nothing added) are okay. Do not drink other fluids, diet soda or chew gum.            Aug 23, 2018  9:45 AM CDT   Return Visit with Lanny Neil MD   [facility] ([facility])    [facility]  [address]  [facility] [address]   [phone]            Aug 23, 2018 10:00 AM CDT   Level 2 with ROOM 9 [facility] ([facility])    [facility]  [address]  [facility] [address]   [phone]            Sep 07, 2018  8:30 AM CDT   Return Visit with [facility] Provider   [facility] Therapy Center ([facility])    [address]  [facility] [address]   [phone]              Who to contact     If you have questions or need follow up information about today's clinic visit or your schedule please contact [facility] directly at [phone].  Normal or non-critical lab and imaging results will be communicated to you by MyChart, letter or phone within 4 business days after the clinic has received the results. If you do not hear from us within 7 days, please contact the clinic  through [employer] or phone. If you have a critical or abnormal lab result, we will notify you by phone as soon as possible.  Submit refill requests through [employer] or call your pharmacy and they will forward the refill request to us. Please allow 3 business days for your refill to be completed.          Additional Information About Your Visit        Care EveryWhere ID     This is your Care EveryWhere ID. This could be used by other organizations to access your [place] medical records  [id number]        Your Vitals Were     Pulse                   86            Blood Pressure from Last 3 Encounters:   08/01/18 139/76   08/01/18 139/76   07/11/18 122/75    Weight from Last 3 Encounters:   08/01/18 81.3 kg (179 lb 3.2 oz)   07/11/18 78.2 kg (172 lb 8 oz)   06/21/18 77.1 kg (170 lb)              We Performed the Following     Comprehensive metabolic panel     T4 free     TSH with free T4 reflex        Primary Care Provider Office Phone # Fax #    Joe Mandel [phone] [address] [place], [place] [address]        Equal Access to Services     [facility]: Hadii frances luzo Delmy, waaxda luqadaha, qaybta kaaldacia buckley, elizabeth gruber . So [facility] [phone].    ATENCIÓN: Si habla español, tiene a taylor disposición servicios gratPresbyterian Española Hospitalos de asistencia lingüística. [facility] [phone].    We comply with applicable federal civil rights laws and [place] laws. We do not discriminate on the basis of race, color, national origin, age, disability, sex, sexual orientation, or gender identity.            Thank you!     Thank you for choosing [facility]  for your care. Our goal is always to provide you with excellent care. Hearing back from our patients is one way we can continue to improve our services. Please take a few minutes to complete the written survey that you may receive in the mail after your visit with us. Thank you!             Your Updated  Medication List - Protect others around you: Learn how to safely use, store and throw away your medicines at www.disposemymeds.org.          This list is accurate as of 8/1/18  2:03 PM.  Always use your most recent med list.                   Brand Name Dispense Instructions for use Diagnosis    lisinopril 20 MG tablet    PRINIVIL/ZESTRIL    90 tablet    TAKE ONE TABLET BY MOUTH DAILY    Hypertension goal BP (blood pressure) < 140/90       LORazepam 0.5 MG tablet    ATIVAN    30 tablet    Take 1 tablet (0.5 mg) by mouth every 4 hours as needed (Anxiety, Nausea/Vomiting or Sleep)    Tongue cancer (H)       lovastatin 40 MG tablet    MEVACOR    90 tablet    TAKE ONE TABLET BY MOUTH NIGHTLY AT BEDTIME    Hyperlipidemia with target LDL less than 100       multivitamin  with lutein Caps per capsule      Take 1 capsule by mouth daily        prochlorperazine 10 MG tablet    COMPAZINE    30 tablet    Take 1 tablet (10 mg) by mouth every 6 hours as needed (Nausea/Vomiting)    Tongue cancer (H)       vitamin D 1000 units capsule      Take 1 capsule by mouth daily

## 2018-08-23 NOTE — PROGRESS NOTES
Hematology/ Oncology Follow-up Visit:  Aug 23, 2018    Reason for Visit:   Chief Complaint   Patient presents with     Oncology Clinic Visit     3 week recheck Recurrent tongue cancer, Labs & Chemo today       Oncologic History:  Recurrent tongue cancer (H)  Patrick Diop was noted to have a mass in his right neck during routine physical exam. CT scanning was subsequently obtained which also showed a right-sided tongue base and oropharyngeal mass, which together are consistent with malignancy. The patient himself has denied any unexplained weight loss. He denies any odynophagia, dysphagia, hoarseness or otalgia. 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 PET scan done on October 21, 2015 showed a mass at the level of the tongue base located along the anterior wall of the oropharynx and extending to the right lateral wall near the tonsillar pillar. This demonstrates abnormal increased FDG activity and is consistent with known malignancy. Hypermetabolic lymph nodes in the right and left neck are also noted as described. These are consistent with metastatic lymph nodes. There is no evidence of metastatic disease in the chest, abdomen or pelvis. CT of the soft tissues of the neck on October 21, 2015 was done in conjunction with PET scanning showed a 2.8 cm mass at the base of the tongue on the right effacing the right vallecula. There is also a 2.3 cm lymph node in the right level II region that is strongly suspicious for metastatic disease. These lesions are PET positive. Additionally, there is a 1.3 cm lymph node in left level III that is PET positive. There is a lymph node in right level V, also PET positive. He started on concurrent Cisplatin with radiation therapy with cisplatin 100 mg/m to be given intravenously on days #1, #22 #43 of radiation therapy.      Interval History:  Patient is here today for follow-up.  He has been feeling well without any recent complaints  "weight loss night sweats fever or chills.  He denies any nausea vomiting or diarrhea.  She currently on immunotherapy was Keytruda.  He has been tolerating treatment without significant side effects.    Review Of Systems:  Constitutional: Negative for fever, chills, and night sweats.  Skin: negative.  Eyes: negative.  Ears/Nose/Throat: negative.  Respiratory: No shortness of breath, dyspnea on exertion, cough, or hemoptysis.  Cardiovascular: negative.  Gastrointestinal: negative.  Genitourinary: negative.  Musculoskeletal: negative.  Neurologic: negative.  Psychiatric: negative.  Hematologic/Lymphatic/Immunologic: negative.  Endocrine: negative.    All other ROS negative unless mentioned in interval history.    Past medical, social, surgical, and family histories reviewed.    Allergies:  Allergies as of 08/23/2018     (No Known Allergies)       Current Medications:  Current Outpatient Prescriptions   Medication Sig Dispense Refill     Cholecalciferol (VITAMIN D) 1000 UNITS capsule Take 1 capsule by mouth daily       lisinopril (PRINIVIL/ZESTRIL) 20 MG tablet TAKE ONE TABLET BY MOUTH DAILY 90 tablet 2     lovastatin (MEVACOR) 40 MG tablet TAKE ONE TABLET BY MOUTH NIGHTLY AT BEDTIME 90 tablet 3     multivitamin  with lutein (OCUVITE WITH LTEIN) CAPS Take 1 capsule by mouth daily       LORazepam (ATIVAN) 0.5 MG tablet Take 1 tablet (0.5 mg) by mouth every 4 hours as needed (Anxiety, Nausea/Vomiting or Sleep) (Patient not taking: Reported on 8/23/2018) 30 tablet 2     prochlorperazine (COMPAZINE) 10 MG tablet Take 1 tablet (10 mg) by mouth every 6 hours as needed (Nausea/Vomiting) (Patient not taking: Reported on 8/23/2018) 30 tablet 2        Physical Exam:  BP (!) 144/93 (BP Location: Right arm, Patient Position: Sitting, Cuff Size: Adult Regular)  Pulse 105  Temp 98.5  F (36.9  C) (Tympanic)  Resp 18  Ht 1.714 m (5' 7.48\")  Wt 78.9 kg (173 lb 14.4 oz)  SpO2 91%  BMI 26.85 kg/m2  Wt Readings from Last 12 " Encounters:   08/23/18 78.9 kg (173 lb 14.4 oz)   08/01/18 81.3 kg (179 lb 3.2 oz)   07/11/18 78.2 kg (172 lb 8 oz)   06/21/18 77.1 kg (170 lb)   06/04/18 77 kg (169 lb 12.8 oz)   05/31/18 76.3 kg (168 lb 4.8 oz)   05/09/18 75.4 kg (166 lb 3.2 oz)   04/12/18 76.6 kg (168 lb 14.4 oz)   04/03/18 76.3 kg (168 lb 3.4 oz)   03/23/18 76.8 kg (169 lb 6.4 oz)   03/19/18 77.1 kg (170 lb)   03/05/18 74.3 kg (163 lb 12.8 oz)     ECOG performance status: 0  GENERAL APPEARANCE: Healthy, alert and in no acute distress.  HEENT: Sclerae anicteric. PERRLA. Oropharynx without ulcers, lesions, or thrush.  NECK: Supple. No asymmetry or masses.  LYMPHATICS: No palpable cervical, supraclavicular, axillary, or inguinal lymphadenopathy.  RESP: Lungs clear to auscultation bilaterally without rales, rhonchi or wheezes.  CARDIOVASCULAR: Regular rate and rhythm. Normal S1, S2; no S3 or S4. No murmur, gallop, or rub.  ABDOMEN: Soft, nontender. Bowel sounds normal. No palpable organomegaly or masses.  MUSCULOSKELETAL: Extremities without gross deformities noted. No edema of bilateral lower extremities.  SKIN: No suspicious lesions or rashes.  NEURO: Alert and oriented x 3. Cranial nerves II-XII grossly intact.  PSYCHIATRIC: Mentation and affect appear normal.    Laboratory/Imaging Studies:  No visits with results within 2 Week(s) from this visit.  Latest known visit with results is:    Infusion Therapy Visit on 08/01/2018   Component Date Value Ref Range Status     Sodium 08/01/2018 145* 133 - 144 mmol/L Final     Potassium 08/01/2018 4.1  3.4 - 5.3 mmol/L Final     Chloride 08/01/2018 110* 94 - 109 mmol/L Final     Carbon Dioxide 08/01/2018 27  20 - 32 mmol/L Final     Anion Gap 08/01/2018 8  3 - 14 mmol/L Final     Glucose 08/01/2018 108* 70 - 99 mg/dL Final     Urea Nitrogen 08/01/2018 12  7 - 30 mg/dL Final     Creatinine 08/01/2018 1.09  0.66 - 1.25 mg/dL Final     GFR Estimate 08/01/2018 65  >60 mL/min/1.7m2 Final    Non   GFR Calc     GFR Estimate If Black 08/01/2018 79  >60 mL/min/1.7m2 Final    African American GFR Calc     Calcium 08/01/2018 8.9  8.5 - 10.1 mg/dL Final     Bilirubin Total 08/01/2018 0.9  0.2 - 1.3 mg/dL Final     Albumin 08/01/2018 3.8  3.4 - 5.0 g/dL Final     Protein Total 08/01/2018 7.7  6.8 - 8.8 g/dL Final     Alkaline Phosphatase 08/01/2018 82  40 - 150 U/L Final     ALT 08/01/2018 21  0 - 70 U/L Final     AST 08/01/2018 13  0 - 45 U/L Final     TSH 08/01/2018 6.01* 0.40 - 4.00 mU/L Final     T4 Free 08/01/2018 0.79  0.76 - 1.46 ng/dL Final        Recent Results (from the past 744 hour(s))   CT Chest w Contrast    Narrative    CT CHEST W CONTRAST 7/30/2018 9:54 AM    HISTORY: Tongue malignancy. Follow-up.    CONTRAST:  80 mL Isovue 370.    TECHNIQUE: Routine CT of the chest is performed with IV contrast.    Routine assessed structures include the lungs, mediastinal structures,  pleura, chest wall, and upper visualized abdomen.    Radiation dose for this scan is reduced using automated exposure  control, adjustment of the mA and/or kV according to patient size, or  iterative reconstructive technique.    COMPARISON: 3/8/2018.    FINDINGS: Centrilobular emphysema is again seen. A nodule with coarse  calcifications and possibly a small amount of fat density remains  stable in the lingula and is consistent with benign entity. There is a  new a small amount of adjacent atelectasis to this nodule today. There  are new bilateral pleural effusions. No enlarged lymph nodes are  demonstrated.      Impression    IMPRESSION:  1. No convincing metastatic disease.  2. New bilateral pleural effusions.    MARYJO HERRERA MD       Assessment and plan:    (C02.9) Recurrent tongue cancer (H)  (primary encounter diagnosis)  She will continue to respond well to immunotherapy.  We will continue his Keytruda according to the treatment plan.  I will see the patient again in 3 weeks or sooner if there are new developments or  concerns.    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    (E78.5) Hyperlipidemia with target LDL less than 100  Currently on Mevacor 40 mg orally daily.    (I10) Hypertension goal BP (blood pressure) < 140/90  Patient currently on lisinopril 20 mg orally daily.    (C18.9) Malignant neoplasm of colon, unspecified part of colon (H)  There is no evidence of recurrence.    The patient is ready to learn, no apparent learning barriers were identified.  Diagnosis and treatment plans were explained to the patient. The patient expressed understanding of the content. The patient asked appropriate questions. The patient questions were answered to his satisfaction.    Chart documentation with Dragon Voice recognition Software. Although reviewed after completion, some words and grammatical errors may remain.

## 2018-08-23 NOTE — MR AVS SNAPSHOT
After Visit Summary   8/23/2018    Patrick Diop    MRN: [id number]           Patient Information     Date Of Birth          1935        Visit Information        Provider Department      8/23/2018 9:45 AM Lanny Neil MD [facility] ONCOLOGY      Today's Diagnoses     Type 2 diabetes mellitus with stage 1 chronic kidney disease, without long-term current use of insulin (H)    -  1    Recurrent tongue cancer (H)        Hyperlipidemia with target LDL less than 100        Hypertension goal BP (blood pressure) < 140/90        Malignant neoplasm of colon, unspecified part of colon (H)          Care Instructions    We would like you to have chemotherapy per treatment plan. Dr. Neil would like to see you back in 3 weeks for a follow up appointment.      When you are in need of a refill, please call your pharmacy and they will send us a request.      Copy of appointments, and after visit summary (AVS) given to patient.      If you have any questions during business hours (M-F 8 AM- 4PM), please call Natividad Iyer RN Oncology Hematology  at [facility] [phone].       For questions after business hours, or on holidays/weekends, please call our after hours Nurse Triage line [phone]. Thank you.            Follow-ups after your visit        Follow-up notes from your care team     Return in about 3 weeks (around 9/13/2018) for Schedule for chemotherapy as per treatment plan.      Your next 10 appointments already scheduled     Sep 07, 2018  8:30 AM CDT   Return Visit with Caleb Charles MD   Radiation Therapy Center ([facility])    [address], [address]  [facility] [address]   [phone]            Sep 13, 2018  9:10 AM CDT   LAB with [facility] ([facility])    [address] [address] [phone]           Please do not eat 10-12 hours before  "your appointment if you are coming in fasting for labs on lipids, cholesterol, or glucose (sugar). This does not apply to pregnant women. Water, hot tea and black coffee (with nothing added) are okay. Do not drink other fluids, diet soda or chew gum.            Sep 13, 2018  9:45 AM CDT   Return Visit with Lanny Neil MD   [facility] ([facility])    [facility]  [address] [place] [address]  [facility] [address]   [phone]            Sep 13, 2018 10:00 AM CDT   Level 2 with ROOM 9 [facility] ([facility])    [facility]  [address] [place] [address]  [facility] [address]   [phone]              Who to contact     If you have questions or need follow up information about today's clinic visit or your schedule please contact [facility] directly at [phone].  Normal or non-critical lab and imaging results will be communicated to you by MyChart, letter or phone within 4 business days after the clinic has received the results. If you do not hear from us within 7 days, please contact the clinic through [employer] or phone. If you have a critical or abnormal lab result, we will notify you by phone as soon as possible.  Submit refill requests through [facility] or call your pharmacy and they will forward the refill request to us. Please allow 3 business days for your refill to be completed.          Additional Information About Your Visit        Care EveryWhere ID     This is your Care EveryWhere ID. This could be used by other organizations to access your [place] medical records  [id number]        Your Vitals Were     Pulse Temperature Respirations Height Pulse Oximetry BMI (Body Mass Index)    105 98.5  F (36.9  C) (Tympanic) 18 1.714 m (5' 7.48\") 91% 26.85 kg/m2       Blood Pressure from Last 3 Encounters:   08/23/18 (!) 144/93   08/01/18 139/76   08/01/18 139/76    Weight from Last 3 Encounters:   08/23/18 " 78.9 kg (173 lb 14.4 oz)   08/01/18 81.3 kg (179 lb 3.2 oz)   07/11/18 78.2 kg (172 lb 8 oz)              Today, you had the following     No orders found for display       Primary Care Provider Office Phone # Fax #    Joe Mandel [phone] [address] [place] [address]        Equal Access to Services     RAINA SOLANO : Hadii aad ku hadasho Soomaali, waaxda luqadaha, qaybta kaalmada adeegyada, waxay martinin haytobyn adetheresa enriqueznaseemamelia gruber . So [facility] [phone].    ATENCIÓN: Si sharmila carrillo, tiene a taylor disposición servicios gratuitos de asistencia lingüística. Jairoame al [phone].    We comply with applicable federal civil rights laws and [place] laws. We do not discriminate on the basis of race, color, national origin, age, disability, sex, sexual orientation, or gender identity.            Thank you!     Thank you for choosing [facility]  for your care. Our goal is always to provide you with excellent care. Hearing back from our patients is one way we can continue to improve our services. Please take a few minutes to complete the written survey that you may receive in the mail after your visit with us. Thank you!             Your Updated Medication List - Protect others around you: Learn how to safely use, store and throw away your medicines at www.disposemymeds.org.          This list is accurate as of 8/23/18 10:23 AM.  Always use your most recent med list.                   Brand Name Dispense Instructions for use Diagnosis    lisinopril 20 MG tablet    PRINIVIL/ZESTRIL    90 tablet    TAKE ONE TABLET BY MOUTH DAILY    Hypertension goal BP (blood pressure) < 140/90       LORazepam 0.5 MG tablet    ATIVAN    30 tablet    Take 1 tablet (0.5 mg) by mouth every 4 hours as needed (Anxiety, Nausea/Vomiting or Sleep)    Tongue cancer (H)       lovastatin 40 MG tablet    MEVACOR    90 tablet    TAKE ONE TABLET BY MOUTH NIGHTLY AT BEDTIME    Hyperlipidemia with target LDL  less than 100       multivitamin  with lutein Caps per capsule      Take 1 capsule by mouth daily        prochlorperazine 10 MG tablet    COMPAZINE    30 tablet    Take 1 tablet (10 mg) by mouth every 6 hours as needed (Nausea/Vomiting)    Tongue cancer (H)       vitamin D 1000 units capsule      Take 1 capsule by mouth daily

## 2018-08-23 NOTE — NURSING NOTE
"Oncology Rooming Note    August 23, 2018 9:29 AM   Patrick Diop is a 82 year old male who presents for:    Chief Complaint   Patient presents with     Oncology Clinic Visit     3 week recheck Recurrent tongue cancer, Labs & Chemo today     Initial Vitals: BP (!) 144/93 (BP Location: Right arm, Patient Position: Sitting, Cuff Size: Adult Regular)  Pulse 105  Temp 98.5  F (36.9  C) (Tympanic)  Resp 18  Ht 1.714 m (5' 7.48\")  Wt 78.9 kg (173 lb 14.4 oz)  SpO2 91%  BMI 26.85 kg/m2 Estimated body mass index is 26.85 kg/(m^2) as calculated from the following:    Height as of this encounter: 1.714 m (5' 7.48\").    Weight as of this encounter: 78.9 kg (173 lb 14.4 oz). Body surface area is 1.94 meters squared.  No Pain (0) Comment: Data Unavailable   No LMP for male patient.  Allergies reviewed: Yes  Medications reviewed: Yes    Medications: Medication refills not needed today.  Pharmacy name entered into [facility]:    Pending sale to [facility] - [place], [place] - [address] [id number] - [place], [place] - [address]    Clinical concerns: 3 week recheck Recurrent tongue cancer, Labs & Chemo today.     8 minutes for nursing intake (face to face time)     Ivonne Del Rio, [facility]            "

## 2018-08-23 NOTE — MR AVS SNAPSHOT
After Visit Summary   8/23/2018    Patrick Diop    MRN: [id number]           Patient Information     Date Of Birth          1935        Visit Information        Provider Department      8/23/2018 10:00 AM ROOM 9 [facility]        Today's Diagnoses     Recurrent tongue cancer (H)    -  1       Follow-ups after your visit        Your next 10 appointments already scheduled     Sep 07, 2018  8:30 AM CDT   Return Visit with Caleb Charles MD   Radiation Therapy Center ([facility])    [address]  [facility] [place] [place] [address]   [phone]            Sep 13, 2018  9:10 AM CDT   LAB with [facility] ([facility])    [address] [address]   [phone]           Please do not eat 10-12 hours before your appointment if you are coming in fasting for labs on lipids, cholesterol, or glucose (sugar). This does not apply to pregnant women. Water, hot tea and black coffee (with nothing added) are okay. Do not drink other fluids, diet soda or chew gum.            Sep 13, 2018  9:45 AM CDT   Return Visit with Lanny Neil MD   [facility] ([facility])    [facility]  [address]  [facility] [place] [place] [address]   [phone]            Sep 13, 2018 10:00 AM CDT   Level 2 with ROOM 9 [facility] ([facility])    [facility]  [address]  [facility] [place] [place] [address]   [phone]              Who to contact     If you have questions or need follow up information about today's clinic visit or your schedule please contact [facility] directly at [phone].  Normal or non-critical lab and imaging results will be communicated to you by MyChart, letter or phone within 4 business days after the clinic has received the results. If you do not hear from us within 7 days, please contact the  clinic through [employer] or phone. If you have a critical or abnormal lab result, we will notify you by phone as soon as possible.  Submit refill requests through [employer] or call your pharmacy and they will forward the refill request to us. Please allow 3 business days for your refill to be completed.          Additional Information About Your Visit        Care EveryWhere ID     This is your Care EveryWhere ID. This could be used by other organizations to access your [place] medical records  [id number]        Your Vitals Were     Pulse                   107            Blood Pressure from Last 3 Encounters:   08/23/18 132/84   08/23/18 (!) 144/93   08/01/18 139/76    Weight from Last 3 Encounters:   08/23/18 78.9 kg (173 lb 14.4 oz)   08/01/18 81.3 kg (179 lb 3.2 oz)   07/11/18 78.2 kg (172 lb 8 oz)              We Performed the Following     Comprehensive metabolic panel     T4 free     TSH with free T4 reflex        Primary Care Provider Office Phone # Fax #    Joe Mandel [phone] [address] [place] [address]        Equal Access to Services     [facility] : Hadii frances Brock, waaileenda sony, qaybmarcelo buckley, elizabeth gruber . So [facility] [phone].    ATENCIÓN: Si habla español, tiene a taylor disposición servicios gratlqkos de asistencia lingüística. [facility] [phone].    We comply with applicable federal civil rights laws and [place] laws. We do not discriminate on the basis of race, color, national origin, age, disability, sex, sexual orientation, or gender identity.            Thank you!     Thank you for choosing [facility]  for your care. Our goal is always to provide you with excellent care. Hearing back from our patients is one way we can continue to improve our services. Please take a few minutes to complete the written survey that you may receive in the mail after your visit with us. Thank you!              Your Updated Medication List - Protect others around you: Learn how to safely use, store and throw away your medicines at www.disposemymeds.org.          This list is accurate as of 8/23/18 12:10 PM.  Always use your most recent med list.                   Brand Name Dispense Instructions for use Diagnosis    lisinopril 20 MG tablet    PRINIVIL/ZESTRIL    90 tablet    TAKE ONE TABLET BY MOUTH DAILY    Hypertension goal BP (blood pressure) < 140/90       LORazepam 0.5 MG tablet    ATIVAN    30 tablet    Take 1 tablet (0.5 mg) by mouth every 4 hours as needed (Anxiety, Nausea/Vomiting or Sleep)    Tongue cancer (H)       lovastatin 40 MG tablet    MEVACOR    90 tablet    TAKE ONE TABLET BY MOUTH NIGHTLY AT BEDTIME    Hyperlipidemia with target LDL less than 100       multivitamin  with lutein Caps per capsule      Take 1 capsule by mouth daily        prochlorperazine 10 MG tablet    COMPAZINE    30 tablet    Take 1 tablet (10 mg) by mouth every 6 hours as needed (Nausea/Vomiting)    Tongue cancer (H)       vitamin D 1000 units capsule      Take 1 capsule by mouth daily

## 2018-08-23 NOTE — PROGRESS NOTES
Infusion Nursing Note:  Patrick Diop presents today for Keytruda.    Patient seen by provider today: Yes: Dr. Neil   present during visit today: Not Applicable.    Note: N/A.    Intravenous Access:  Peripheral IV placed.    Treatment Conditions:  Lab Results   Component Value Date     08/23/2018                   Lab Results   Component Value Date    POTASSIUM 4.1 08/23/2018           Lab Results   Component Value Date    MAG 1.6 04/25/2017            Lab Results   Component Value Date    CR 1.03 08/23/2018                   Lab Results   Component Value Date    AMY 9.3 08/23/2018                Lab Results   Component Value Date    BILITOTAL 1.0 08/23/2018           Lab Results   Component Value Date    ALBUMIN 3.9 08/23/2018                    Lab Results   Component Value Date    ALT 10 08/23/2018           Lab Results   Component Value Date    AST 10 08/23/2018           Post Infusion Assessment:  Patient tolerated infusion without incident.  No evidence of extravasations.  Access discontinued per protocol.    Discharge Plan:   Patient discharged in stable condition accompanied by: self.  Departure Mode: Ambulatory.  Next dose scheduled for 9/13    Brenna Garcia RN

## 2018-08-23 NOTE — LETTER
8/23/2018         RE: Patrick Diop  [address]  [facility] [address]        Dear Colleague,    Thank you for referring your patient, Patrick Diop, to the [facility]. Please see a copy of my visit note below.    Hematology/ Oncology Follow-up Visit:  Aug 23, 2018    Reason for Visit:   Chief Complaint   Patient presents with     Oncology Clinic Visit     3 week recheck Recurrent tongue cancer, Labs & Chemo today       Oncologic History:  Recurrent tongue cancer (H)  Patrick Diop was noted to have a mass in his right neck during routine physical exam. CT scanning was subsequently obtained which also showed a right-sided tongue base and oropharyngeal mass, which together are consistent with malignancy. The patient himself has denied any unexplained weight loss. He denies any odynophagia, dysphagia, hoarseness or otalgia. 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 PET scan done on October 21, 2015 showed a mass at the level of the tongue base located along the anterior wall of the oropharynx and extending to the right lateral wall near the tonsillar pillar. This demonstrates abnormal increased FDG activity and is consistent with known malignancy. Hypermetabolic lymph nodes in the right and left neck are also noted as described. These are consistent with metastatic lymph nodes. There is no evidence of metastatic disease in the chest, abdomen or pelvis. CT of the soft tissues of the neck on October 21, 2015 was done in conjunction with PET scanning showed a 2.8 cm mass at the base of the tongue on the right effacing the right vallecula. There is also a 2.3 cm lymph node in the right level II region that is strongly suspicious for metastatic disease. These lesions are PET positive. Additionally, there is a 1.3 cm lymph node in left level III that is PET positive. There is a lymph node in right level V, also PET positive. He started on  concurrent Cisplatin with radiation therapy with cisplatin 100 mg/m to be given intravenously on days #1, #22 #43 of radiation therapy.      Interval History:  Patient is here today for follow-up.  He has been feeling well without any recent complaints weight loss night sweats fever or chills.  He denies any nausea vomiting or diarrhea.  She currently on immunotherapy was Keytruda.  He has been tolerating treatment without significant side effects.    Review Of Systems:  Constitutional: Negative for fever, chills, and night sweats.  Skin: negative.  Eyes: negative.  Ears/Nose/Throat: negative.  Respiratory: No shortness of breath, dyspnea on exertion, cough, or hemoptysis.  Cardiovascular: negative.  Gastrointestinal: negative.  Genitourinary: negative.  Musculoskeletal: negative.  Neurologic: negative.  Psychiatric: negative.  Hematologic/Lymphatic/Immunologic: negative.  Endocrine: negative.    All other ROS negative unless mentioned in interval history.    Past medical, social, surgical, and family histories reviewed.    Allergies:  Allergies as of 08/23/2018     (No Known Allergies)       Current Medications:  Current Outpatient Prescriptions   Medication Sig Dispense Refill     Cholecalciferol (VITAMIN D) 1000 UNITS capsule Take 1 capsule by mouth daily       lisinopril (PRINIVIL/ZESTRIL) 20 MG tablet TAKE ONE TABLET BY MOUTH DAILY 90 tablet 2     lovastatin (MEVACOR) 40 MG tablet TAKE ONE TABLET BY MOUTH NIGHTLY AT BEDTIME 90 tablet 3     multivitamin  with lutein (OCUVITE WITH LTEIN) CAPS Take 1 capsule by mouth daily       LORazepam (ATIVAN) 0.5 MG tablet Take 1 tablet (0.5 mg) by mouth every 4 hours as needed (Anxiety, Nausea/Vomiting or Sleep) (Patient not taking: Reported on 8/23/2018) 30 tablet 2     prochlorperazine (COMPAZINE) 10 MG tablet Take 1 tablet (10 mg) by mouth every 6 hours as needed (Nausea/Vomiting) (Patient not taking: Reported on 8/23/2018) 30 tablet 2        Physical Exam:  BP (!) 144/93  "(BP Location: Right arm, Patient Position: Sitting, Cuff Size: Adult Regular)  Pulse 105  Temp 98.5  F (36.9  C) (Tympanic)  Resp 18  Ht 1.714 m (5' 7.48\")  Wt 78.9 kg (173 lb 14.4 oz)  SpO2 91%  BMI 26.85 kg/m2  Wt Readings from Last 12 Encounters:   08/23/18 78.9 kg (173 lb 14.4 oz)   08/01/18 81.3 kg (179 lb 3.2 oz)   07/11/18 78.2 kg (172 lb 8 oz)   06/21/18 77.1 kg (170 lb)   06/04/18 77 kg (169 lb 12.8 oz)   05/31/18 76.3 kg (168 lb 4.8 oz)   05/09/18 75.4 kg (166 lb 3.2 oz)   04/12/18 76.6 kg (168 lb 14.4 oz)   04/03/18 76.3 kg (168 lb 3.4 oz)   03/23/18 76.8 kg (169 lb 6.4 oz)   03/19/18 77.1 kg (170 lb)   03/05/18 74.3 kg (163 lb 12.8 oz)     ECOG performance status: 0  GENERAL APPEARANCE: Healthy, alert and in no acute distress.  HEENT: Sclerae anicteric. PERRLA. Oropharynx without ulcers, lesions, or thrush.  NECK: Supple. No asymmetry or masses.  LYMPHATICS: No palpable cervical, supraclavicular, axillary, or inguinal lymphadenopathy.  RESP: Lungs clear to auscultation bilaterally without rales, rhonchi or wheezes.  CARDIOVASCULAR: Regular rate and rhythm. Normal S1, S2; no S3 or S4. No murmur, gallop, or rub.  ABDOMEN: Soft, nontender. Bowel sounds normal. No palpable organomegaly or masses.  MUSCULOSKELETAL: Extremities without gross deformities noted. No edema of bilateral lower extremities.  SKIN: No suspicious lesions or rashes.  NEURO: Alert and oriented x 3. Cranial nerves II-XII grossly intact.  PSYCHIATRIC: Mentation and affect appear normal.    Laboratory/Imaging Studies:  No visits with results within 2 Week(s) from this visit.  Latest known visit with results is:    Infusion Therapy Visit on 08/01/2018   Component Date Value Ref Range Status     Sodium 08/01/2018 145* 133 - 144 mmol/L Final     Potassium 08/01/2018 4.1  3.4 - 5.3 mmol/L Final     Chloride 08/01/2018 110* 94 - 109 mmol/L Final     Carbon Dioxide 08/01/2018 27  20 - 32 mmol/L Final     Anion Gap 08/01/2018 8  3 - 14 " mmol/L Final     Glucose 08/01/2018 108* 70 - 99 mg/dL Final     Urea Nitrogen 08/01/2018 12  7 - 30 mg/dL Final     Creatinine 08/01/2018 1.09  0.66 - 1.25 mg/dL Final     GFR Estimate 08/01/2018 65  >60 mL/min/1.7m2 Final    Non  GFR Calc     GFR Estimate If Black 08/01/2018 79  >60 mL/min/1.7m2 Final    African American GFR Calc     Calcium 08/01/2018 8.9  8.5 - 10.1 mg/dL Final     Bilirubin Total 08/01/2018 0.9  0.2 - 1.3 mg/dL Final     Albumin 08/01/2018 3.8  3.4 - 5.0 g/dL Final     Protein Total 08/01/2018 7.7  6.8 - 8.8 g/dL Final     Alkaline Phosphatase 08/01/2018 82  40 - 150 U/L Final     ALT 08/01/2018 21  0 - 70 U/L Final     AST 08/01/2018 13  0 - 45 U/L Final     TSH 08/01/2018 6.01* 0.40 - 4.00 mU/L Final     T4 Free 08/01/2018 0.79  0.76 - 1.46 ng/dL Final        Recent Results (from the past 744 hour(s))   CT Chest w Contrast    Narrative    CT CHEST W CONTRAST 7/30/2018 9:54 AM    HISTORY: Tongue malignancy. Follow-up.    CONTRAST:  80 mL Isovue 370.    TECHNIQUE: Routine CT of the chest is performed with IV contrast.    Routine assessed structures include the lungs, mediastinal structures,  pleura, chest wall, and upper visualized abdomen.    Radiation dose for this scan is reduced using automated exposure  control, adjustment of the mA and/or kV according to patient size, or  iterative reconstructive technique.    COMPARISON: 3/8/2018.    FINDINGS: Centrilobular emphysema is again seen. A nodule with coarse  calcifications and possibly a small amount of fat density remains  stable in the lingula and is consistent with benign entity. There is a  new a small amount of adjacent atelectasis to this nodule today. There  are new bilateral pleural effusions. No enlarged lymph nodes are  demonstrated.      Impression    IMPRESSION:  1. No convincing metastatic disease.  2. New bilateral pleural effusions.    MARYJO HERRERA MD       Assessment and plan:    (C02.9) Recurrent tongue cancer  (H)  (primary encounter diagnosis)  She will continue to respond well to immunotherapy.  We will continue his Keytruda according to the treatment plan.  I will see the patient again in 3 weeks or sooner if there are new developments or concerns.    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    (E78.5) Hyperlipidemia with target LDL less than 100  Currently on Mevacor 40 mg orally daily.    (I10) Hypertension goal BP (blood pressure) < 140/90  Patient currently on lisinopril 20 mg orally daily.    (C18.9) Malignant neoplasm of colon, unspecified part of colon (H)  There is no evidence of recurrence.    The patient is ready to learn, no apparent learning barriers were identified.  Diagnosis and treatment plans were explained to the patient. The patient expressed understanding of the content. The patient asked appropriate questions. The patient questions were answered to his satisfaction.    Chart documentation with Dragon Voice recognition Software. Although reviewed after completion, some words and grammatical errors may remain.    Again, thank you for allowing me to participate in the care of your patient.        Sincerely,        Lanny Neil MD

## 2018-08-27 NOTE — TELEPHONE ENCOUNTER
Notes Recorded by Joe Mandel MD on 8/26/2018 at 9:28 PM  Please call.  TSH has been steadily increasing in the past year.  He has low thyroid.   The blood chemistries (Basic metabolic panel) are all normal including electrolytes (salt balances in the blood), blood glucose, liver and kidney tests.   PLAN: I suggest taking thyroid replacement.   Start with 25mcg daily for a week, then 50mcg (two pills) daily for a week.  Then 75mcg (three pills) daily for a week.   Then switch to 100mcg pills and take those daily.   REcheck TSH in 2 months.  Please arrange for prescriptions and orders.     Results for orders placed or performed in visit on 08/23/18   Comprehensive metabolic panel   Result Value Ref Range    Sodium 141 133 - 144 mmol/L    Potassium 4.1 3.4 - 5.3 mmol/L    Chloride 107 94 - 109 mmol/L    Carbon Dioxide 26 20 - 32 mmol/L    Anion Gap 8 3 - 14 mmol/L    Glucose 98 70 - 99 mg/dL    Urea Nitrogen 11 7 - 30 mg/dL    Creatinine 1.03 0.66 - 1.25 mg/dL    GFR Estimate 69 >60 mL/min/1.7m2    GFR Estimate If Black 84 >60 mL/min/1.7m2    Calcium 9.3 8.5 - 10.1 mg/dL    Bilirubin Total 1.0 0.2 - 1.3 mg/dL    Albumin 3.9 3.4 - 5.0 g/dL    Protein Total 8.0 6.8 - 8.8 g/dL    Alkaline Phosphatase 97 40 - 150 U/L    ALT 10 0 - 70 U/L    AST 10 0 - 45 U/L   TSH with free T4 reflex   Result Value Ref Range    TSH 11.37 (H) 0.40 - 4.00 mU/L   T4 free   Result Value Ref Range    T4 Free 0.75 (L) 0.76 - 1.46 ng/dL

## 2018-08-27 NOTE — PROGRESS NOTES
Please call.  TSH has been steadily increasing in the past year.  He has low thyroid.   The blood chemistries (Basic metabolic panel) are all normal including electrolytes (salt balances in the blood), blood glucose, liver and kidney tests.   PLAN: I suggest taking thyroid replacement.   Start with 25mcg daily for a week, then 50mcg (two pills) daily for a week.  Then 75mcg (three pills) daily for a week.   Then switch to 100mcg pills and take those daily.   REcheck TSH in 2 months.  Please arrange for prescriptions and orders.

## 2018-08-28 NOTE — TELEPHONE ENCOUNTER
Duplicate.  Medication Detail      Disp Refills Start End BELLA   lisinopril (PRINIVIL/ZESTRIL) 20 MG tablet 90 tablet 2 6/7/2018  No   Sig: TAKE ONE TABLET BY MOUTH DAILY   Class: E-Prescribe   Order: [id number]   E-Prescribing Status: Receipt confirmed by pharmacy (6/7/2018  9:34 AM CDT)     Yandy HERNANDEZ RN

## 2018-08-28 NOTE — TELEPHONE ENCOUNTER
Spoke with patient regarding results. Patient voiced understanding. Says he will  medication from the pharmacy.   Dolores JIMENEZ CMA

## 2018-08-28 NOTE — TELEPHONE ENCOUNTER
"Requested Prescriptions   Pending Prescriptions Disp Refills     lisinopril (PRINIVIL/ZESTRIL) 20 MG tablet [Pharmacy Med Name: LISINOPRIL 20 MG    TAB ACTA] 90 tablet 1     Sig: TAKE ONE TABLET BY MOUTH DAILY    ACE Inhibitors (Including Combos) Protocol Passed    8/27/2018  7:13 PM       Passed - Blood pressure under 140/90 in past 12 months    BP Readings from Last 3 Encounters:   08/23/18 132/84   08/23/18 (!) 144/93   08/01/18 139/76                Passed - Recent (12 mo) or future (30 days) visit within the authorizing provider's specialty    Patient had office visit in the last 12 months or has a visit in the next 30 days with authorizing provider or within the authorizing provider's specialty.  See \"Patient Info\" tab in inbasket, or \"Choose Columns\" in Meds & Orders section of the refill encounter.           Passed - Patient is age 18 or older       Passed - Normal serum creatinine on file in past 12 months    Recent Labs   Lab Test  08/23/18   0910   03/08/18   0944   CR  1.03   < >   --    CREAT   --    --   1.3*    < > = values in this interval not displayed.            Passed - Normal serum potassium on file in past 12 months    Recent Labs   Lab Test  08/23/18   0910   POTASSIUM  4.1             Last Written Prescription Date:  6/8/18  Last Fill Quantity: 90,  # refills: 2   Last office visit: 6/6/2018 with prescribing provider:     Future Office Visit:   Next 5 appointments (look out 90 days)     Sep 13, 2018  9:45 AM CDT   Return Visit with Lanny Neil MD   [facility] ([facility])    [facility]  [address]  [facility] [place] [address]   [phone]                   "

## 2018-09-13 NOTE — LETTER
9/13/2018         RE: Patrick Diop  [address]  [facility] [address]        Dear Colleague,    Thank you for referring your patient, Patrick Diop, to the [facility] [place] [facility]. Please see a copy of my visit note below.    Hematology/ Oncology Follow-up Visit:  Sep 13, 2018    Reason for Visit:   Chief Complaint   Patient presents with     Oncology Clinic Visit     3 wek recheck Recurrent tongue cancer, Labs & chemo today        Oncologic History:  Recurrent tongue cancer (H)  Patrick Diop was noted to have a mass in his right neck during routine physical exam. CT scanning was subsequently obtained which also showed a right-sided tongue base and oropharyngeal mass, which together are consistent with malignancy. The patient himself has denied any unexplained weight loss. He denies any odynophagia, dysphagia, hoarseness or otalgia. 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 PET scan done on October 21, 2015 showed a mass at the level of the tongue base located along the anterior wall of the oropharynx and extending to the right lateral wall near the tonsillar pillar. This demonstrates abnormal increased FDG activity and is consistent with known malignancy. Hypermetabolic lymph nodes in the right and left neck are also noted as described. These are consistent with metastatic lymph nodes. There is no evidence of metastatic disease in the chest, abdomen or pelvis. CT of the soft tissues of the neck on October 21, 2015 was done in conjunction with PET scanning showed a 2.8 cm mass at the base of the tongue on the right effacing the right vallecula. There is also a 2.3 cm lymph node in the right level II region that is strongly suspicious for metastatic disease. These lesions are PET positive. Additionally, there is a 1.3 cm lymph node in left level III that is PET positive. There is a lymph node in right level V, also PET positive. He started on  concurrent Cisplatin with radiation therapy with cisplatin 100 mg/m to be given intravenously on days #1, #22 #43 of radiation therapy.      Interval History:  Patient returning today for follow-up visit.  He has been feeling well without recent complaints weight loss night sweats fever or chills.  He denies any nausea vomiting or diarrhea.  He denies any difficulty with swallowing.  Currently on immunotherapy without any significant side effects    Review Of Systems:  Constitutional: Negative for fever, chills, and night sweats.  Skin: negative.  Eyes: negative.  Ears/Nose/Throat: negative.  Respiratory: No shortness of breath, dyspnea on exertion, cough, or hemoptysis.  Cardiovascular: negative.  Gastrointestinal: negative.  Genitourinary: negative.  Musculoskeletal: negative.  Neurologic: negative.  Psychiatric: negative.  Hematologic/Lymphatic/Immunologic: negative.  Endocrine: negative.    All other ROS negative unless mentioned in interval history.    Past medical, social, surgical, and family histories reviewed.    Allergies:  Allergies as of 09/13/2018     (No Known Allergies)       Current Medications:  Current Outpatient Prescriptions   Medication Sig Dispense Refill     Cholecalciferol (VITAMIN D) 1000 UNITS capsule Take 1 capsule by mouth daily       lisinopril (PRINIVIL/ZESTRIL) 20 MG tablet TAKE ONE TABLET BY MOUTH DAILY 90 tablet 2     LORazepam (ATIVAN) 0.5 MG tablet Take 1 tablet (0.5 mg) by mouth every 4 hours as needed (Anxiety, Nausea/Vomiting or Sleep) 30 tablet 2     lovastatin (MEVACOR) 40 MG tablet TAKE ONE TABLET BY MOUTH NIGHTLY AT BEDTIME 90 tablet 3     multivitamin  with lutein (OCUVITE WITH LTEIN) CAPS Take 1 capsule by mouth daily       prochlorperazine (COMPAZINE) 10 MG tablet Take 1 tablet (10 mg) by mouth every 6 hours as needed (Nausea/Vomiting) 30 tablet 2     levothyroxine (SYNTHROID/LEVOTHROID) 100 MCG tablet Take 1 tablet (100 mcg) by mouth daily Recheck Lab test in 2 months.  "(Patient not taking: Reported on 9/13/2018) 30 tablet 1     levothyroxine (SYNTHROID/LEVOTHROID) 25 MCG tablet Take 25 mcg ( one tablet) x 1 week. Then  50 mcg ( two pills) x 1 week, then 75 mcg ( three pills).  Then switch to 100 mcg- One tablet daily (Patient not taking: Reported on 9/13/2018) 42 tablet 0        Physical Exam:  /89 (BP Location: Right arm, Patient Position: Sitting, Cuff Size: Adult Regular)  Pulse 90  Temp 98.1  F (36.7  C) (Tympanic)  Resp 16  Ht 1.714 m (5' 7.48\")  Wt 81 kg (178 lb 9.6 oz)  SpO2 91%  BMI 27.58 kg/m2  Wt Readings from Last 12 Encounters:   09/13/18 81 kg (178 lb 9.6 oz)   08/23/18 78.9 kg (173 lb 14.4 oz)   08/01/18 81.3 kg (179 lb 3.2 oz)   07/11/18 78.2 kg (172 lb 8 oz)   06/21/18 77.1 kg (170 lb)   06/04/18 77 kg (169 lb 12.8 oz)   05/31/18 76.3 kg (168 lb 4.8 oz)   05/09/18 75.4 kg (166 lb 3.2 oz)   04/12/18 76.6 kg (168 lb 14.4 oz)   04/03/18 76.3 kg (168 lb 3.4 oz)   03/23/18 76.8 kg (169 lb 6.4 oz)   03/19/18 77.1 kg (170 lb)     ECOG performance status: 0  GENERAL APPEARANCE: Healthy, alert and in no acute distress.  HEENT: Sclerae anicteric. PERRLA. Oropharynx without ulcers, lesions, or thrush.  NECK: Supple. No asymmetry or masses.  LYMPHATICS: No palpable cervical, supraclavicular, axillary, or inguinal lymphadenopathy.  RESP: Lungs clear to auscultation bilaterally without rales, rhonchi or wheezes.  CARDIOVASCULAR: Regular rate and rhythm. Normal S1, S2; no S3 or S4. No murmur, gallop, or rub.  ABDOMEN: Soft, nontender. Bowel sounds normal. No palpable organomegaly or masses.  MUSCULOSKELETAL: Extremities without gross deformities noted. No edema of bilateral lower extremities.  SKIN: No suspicious lesions or rashes.  NEURO: Alert and oriented x 3. Cranial nerves II-XII grossly intact.  PSYCHIATRIC: Mentation and affect appear normal.    Laboratory/Imaging Studies:  No visits with results within 2 Week(s) from this visit.  Latest known visit with " results is:    Infusion Therapy Visit on 08/23/2018   Component Date Value Ref Range Status     Sodium 08/23/2018 141  133 - 144 mmol/L Final     Potassium 08/23/2018 4.1  3.4 - 5.3 mmol/L Final     Chloride 08/23/2018 107  94 - 109 mmol/L Final     Carbon Dioxide 08/23/2018 26  20 - 32 mmol/L Final     Anion Gap 08/23/2018 8  3 - 14 mmol/L Final     Glucose 08/23/2018 98  70 - 99 mg/dL Final     Urea Nitrogen 08/23/2018 11  7 - 30 mg/dL Final     Creatinine 08/23/2018 1.03  0.66 - 1.25 mg/dL Final     GFR Estimate 08/23/2018 69  >60 mL/min/1.7m2 Final    Non  GFR Calc     GFR Estimate If Black 08/23/2018 84  >60 mL/min/1.7m2 Final    African American GFR Calc     Calcium 08/23/2018 9.3  8.5 - 10.1 mg/dL Final     Bilirubin Total 08/23/2018 1.0  0.2 - 1.3 mg/dL Final     Albumin 08/23/2018 3.9  3.4 - 5.0 g/dL Final     Protein Total 08/23/2018 8.0  6.8 - 8.8 g/dL Final     Alkaline Phosphatase 08/23/2018 97  40 - 150 U/L Final     ALT 08/23/2018 10  0 - 70 U/L Final     AST 08/23/2018 10  0 - 45 U/L Final     TSH 08/23/2018 11.37* 0.40 - 4.00 mU/L Final     T4 Free 08/23/2018 0.75* 0.76 - 1.46 ng/dL Final          Assessment and plan:    (C18.9) Malignant neoplasm of colon, unspecified part of colon (H)  (primary encounter diagnosis)  Patient has been doing well without any significant side effects.  He is currently on chemotherapy.  His been tolerating treatment without significant side effects.  We will see the patient again in 3 weeks or sooner if there are new developments or concerns.    (E03.9) Acquired hypothyroidism  The patient currently on Synthroid 100 mcg daily.  TSH is markedly elevated today.  I would recommend patient to return his primary care physician to discuss managing his hypothyroid issues.    (E78.5) Hyperlipidemia with target LDL less than 100  Patient currently on Mevacor 40 mg orally daily.    (I10) Hypertension goal BP (blood pressure) < 140/90  Patient currently on  lisinopril 20 mg orally daily.    The patient is ready to learn, no apparent learning barriers were identified.  Diagnosis and treatment plans were explained to the patient. The patient expressed understanding of the content. The patient asked appropriate questions. The patient questions were answered to his satisfaction.    Chart documentation with Dragon Voice recognition Software. Although reviewed after completion, some words and grammatical errors may remain.    Again, thank you for allowing me to participate in the care of your patient.        Sincerely,        Lanny Neil MD

## 2018-09-13 NOTE — MR AVS SNAPSHOT
After Visit Summary   9/13/2018    Patrick Diop    MRN: [id number]           Patient Information     Date Of Birth          1935        Visit Information        Provider Department      9/13/2018 9:45 AM Lanny Neil MD [facility]        Today's Diagnoses     Malignant neoplasm of colon, unspecified part of colon (H)    -  1    Acquired hypothyroidism        Hyperlipidemia with target LDL less than 100        Hypertension goal BP (blood pressure) < 140/90        Recurrent tongue cancer (H)          Care Instructions    We would like you to have chemotherapy today per treatment plan. Rashaad De La Garza would like to see you back in 3 weeks for a follow up appointment.      When you are in need of a refill, please call your pharmacy and they will send us a request.      Copy of appointments, and after visit summary (AVS) given to patient.      If you have any questions during business hours (M-F 8 AM- 4PM), please call Natividad Iyer RN Oncology Hematology  at [facility] [phone].       For questions after business hours, or on holidays/weekends, please call our after hours Nurse Triage line [phone]. Thank you.            Follow-ups after your visit        Follow-up notes from your care team     Return in about 3 weeks (around 10/4/2018) for Schedule for chemotherapy as per treatment plan.      Your next 10 appointments already scheduled     Sep 19, 2018  8:40 AM CDT   [facility]    [address] [place] [address] [phone]            Oct 04, 2018  9:20 AM CDT   (Arrive by 9:10 AM)   LAB with [facility] ([facility])    [address] [facility] [address] [phone]           Please do not eat 10-12 hours before your appointment if you are coming in fasting for labs on lipids,  cholesterol, or glucose (sugar). This does not apply to pregnant women. Water, hot tea and black coffee (with nothing added) are okay. Do not drink other fluids, diet soda or chew gum.            Oct 04, 2018  9:45 AM CDT   Return Visit with Lanny Neil MD   [facility] ([facility])    [facility]  [address]  [facility] [address]   [phone]            Oct 04, 2018 10:00 AM CDT   Level 2 with ROOM 9 [facility] ([facility])    [facility]  [address]  [facility] [address]   [phone]            Oct 25, 2018  9:30 AM CDT   LAB with [facility] ([facility])    [address] [facility] [address]   [phone]           Please do not eat 10-12 hours before your appointment if you are coming in fasting for labs on lipids, cholesterol, or glucose (sugar). This does not apply to pregnant women. Water, hot tea and black coffee (with nothing added) are okay. Do not drink other fluids, diet soda or chew gum.            Oct 25, 2018 10:00 AM CDT   Level 2 with ROOM 8 [facility] ([facility])    [facility]  [address] [place] [address] [address]  [facility] [address]   [phone]              Who to contact     If you have questions or need follow up information about today's clinic visit or your schedule please contact [facility] directly at [phone].  Normal or non-critical lab and imaging results will be communicated to you by MyChart, letter or phone within 4 business days after the clinic has received the results. If you do not hear from us within 7 days, please contact the clinic through MyChart or phone. If you have a critical or abnormal lab result, we will notify you by phone as soon as possible.  Submit refill requests through COLOURlovers or call your pharmacy and they will  "forward the refill request to us. Please allow 3 business days for your refill to be completed.          Additional Information About Your Visit        MyCharWindation Information     Sarbari lets you send messages to your doctor, view your test results, renew your prescriptions, schedule appointments and more. To sign up, go to [email or web address] . Click on \"Log in\" on the left side of the screen, which will take you to the Welcome page. Then click on \"Sign up Now\" on the right side of the page.     You will be asked to enter the access code listed below, as well as some personal information. Please follow the directions to create your username and password.     Your access code is: [email or web address]  Expires: 12/10/2018 10:28 AM     Your access code will  in 90 days. If you need help or a new code, please call your [place] clinic or [phone].        Care EveryWhere ID     This is your Care EveryWhere ID. This could be used by other organizations to access your [place] medical records  [id number]        Your Vitals Were     Pulse Temperature Respirations Height Pulse Oximetry BMI (Body Mass Index)    90 98.1  F (36.7  C) (Tympanic) 16 1.714 m (5' 7.48\") 91% 27.58 kg/m2       Blood Pressure from Last 3 Encounters:   18 142/89   18 132/84   18 (!) 144/93    Weight from Last 3 Encounters:   18 81 kg (178 lb 9.6 oz)   18 78.9 kg (173 lb 14.4 oz)   18 81.3 kg (179 lb 3.2 oz)              Today, you had the following     No orders found for display       Primary Care Provider Office Phone # Fax #    Joe Mandel [phone] [address] [place] [address]        Equal Access to Services     [facility] : Norma Brock, daniel cardoza, elizabeth méndez. So [facility] [phone].    ATENCIÓN: Si habla español, tiene a taylor disposición servicios gratuitos de asistencia lingüística. Llame " al [phone].    We comply with applicable federal civil rights laws and [place] laws. We do not discriminate on the basis of race, color, national origin, age, disability, sex, sexual orientation, or gender identity.            Thank you!     Thank you for choosing [facility]  for your care. Our goal is always to provide you with excellent care. Hearing back from our patients is one way we can continue to improve our services. Please take a few minutes to complete the written survey that you may receive in the mail after your visit with us. Thank you!             Your Updated Medication List - Protect others around you: Learn how to safely use, store and throw away your medicines at www.disposemymeds.org.          This list is accurate as of 9/13/18 11:13 AM.  Always use your most recent med list.                   Brand Name Dispense Instructions for use Diagnosis    * levothyroxine 25 MCG tablet    SYNTHROID/LEVOTHROID    42 tablet    Take 25 mcg ( one tablet) x 1 week. Then  50 mcg ( two pills) x 1 week, then 75 mcg ( three pills).  Then switch to 100 mcg- One tablet daily    Acquired hypothyroidism       * levothyroxine 100 MCG tablet    SYNTHROID/LEVOTHROID    30 tablet    Take 1 tablet (100 mcg) by mouth daily Recheck Lab test in 2 months.    Acquired hypothyroidism       lisinopril 20 MG tablet    PRINIVIL/ZESTRIL    90 tablet    TAKE ONE TABLET BY MOUTH DAILY    Hypertension goal BP (blood pressure) < 140/90       LORazepam 0.5 MG tablet    ATIVAN    30 tablet    Take 1 tablet (0.5 mg) by mouth every 4 hours as needed (Anxiety, Nausea/Vomiting or Sleep)    Tongue cancer (H)       lovastatin 40 MG tablet    MEVACOR    90 tablet    TAKE ONE TABLET BY MOUTH NIGHTLY AT BEDTIME    Hyperlipidemia with target LDL less than 100       multivitamin  with lutein Caps per capsule      Take 1 capsule by mouth daily        prochlorperazine 10 MG tablet    COMPAZINE    30 tablet    Take 1 tablet (10 mg) by  mouth every 6 hours as needed (Nausea/Vomiting)    Tongue cancer (H)       vitamin D 1000 units capsule      Take 1 capsule by mouth daily        * Notice:  This list has 2 medication(s) that are the same as other medications prescribed for you. Read the directions carefully, and ask your doctor or other care provider to review them with you.

## 2018-09-13 NOTE — MR AVS SNAPSHOT
After Visit Summary   9/13/2018    Patrick Diop    MRN: [id number]           Patient Information     Date Of Birth          1935        Visit Information        Provider Department      9/13/2018 10:00 AM ROOM 9 [facility]        Today's Diagnoses     Recurrent tongue cancer (H)    -  1       Follow-ups after your visit        Your next 10 appointments already scheduled     Sep 19, 2018  8:40 AM CDT   SHORT with Joe Mandel MD   [facility] ([facility])    [address] [place] [address]   [phone]            Oct 04, 2018  9:20 AM CDT   (Arrive by 9:10 AM)   LAB with [facility] ([facility])    [address] [facility] [address]   [phone]           Please do not eat 10-12 hours before your appointment if you are coming in fasting for labs on lipids, cholesterol, or glucose (sugar). This does not apply to pregnant women. Water, hot tea and black coffee (with nothing added) are okay. Do not drink other fluids, diet soda or chew gum.            Oct 04, 2018  9:45 AM CDT   Return Visit with Lanny Neil MD   [facility] ([facility])    [facility]  [address]  [facility] [address]   [phone]            Oct 04, 2018 10:00 AM CDT   Level 2 with ROOM 9 [facility] ([facility])    [facility]  [address]  [facility] [address]   [phone]            Oct 25, 2018  9:30 AM CDT   LAB with [facility] ([facility])    [address] [facility] [address]   [phone]           Please do not eat 10-12 hours before your appointment if you are coming in fasting for labs on lipids, cholesterol, or glucose (sugar). This does not apply to pregnant women. Water, hot tea and black coffee (with  "nothing added) are okay. Do not drink other fluids, diet soda or chew gum.            Oct 25, 2018 10:00 AM CDT   Level 2 with ROOM 8 [facility] ([facility])    n [facility]  [address] [facility] [address]  [facility] [address]   [phone]              Who to contact     If you have questions or need follow up information about today's clinic visit or your schedule please contact [facility] directly at [phone].  Normal or non-critical lab and imaging results will be communicated to you by Forsakehart, letter or phone within 4 business days after the clinic has received the results. If you do not hear from us within 7 days, please contact the clinic through Forsakehart or phone. If you have a critical or abnormal lab result, we will notify you by phone as soon as possible.  Submit refill requests through DancingAnchovy or call your pharmacy and they will forward the refill request to us. Please allow 3 business days for your refill to be completed.          Additional Information About Your Visit        DancingAnchovy Information     DancingAnchovy lets you send messages to your doctor, view your test results, renew your prescriptions, schedule appointments and more. To sign up, go to [email or web address] . Click on \"Log in\" on the left side of the screen, which will take you to the Welcome page. Then click on \"Sign up Now\" on the right side of the page.     You will be asked to enter the access code listed below, as well as some personal information. Please follow the directions to create your username and password.     Your access code is: [email or web address]  Expires: 12/10/2018 10:28 AM     Your access code will  in 90 days. If you need help or a new code, please call your [place] clinic or [phone].        Care EveryWhere ID     This is your Care EveryWhere ID. This could be used by other organizations to access your [place] medical records  [id number]        Your " Vitals Were     Pulse                   88            Blood Pressure from Last 3 Encounters:   09/13/18 130/85   09/13/18 142/89   08/23/18 132/84    Weight from Last 3 Encounters:   09/13/18 81 kg (178 lb 9.6 oz)   08/23/18 78.9 kg (173 lb 14.4 oz)   08/01/18 81.3 kg (179 lb 3.2 oz)              We Performed the Following     Comprehensive metabolic panel     T4 free     T4 free     TSH with free T4 reflex        Primary Care Provider Office Phone # Fax #    Joe Mnadel [phone] [address] [facility] [address]        Equal Access to Services     [facility] XIOMARA : Hadii frances Brock, waalfonso cardoza, sukh buckley, elizabeth gruber . So [facility] [phone].    ATENCIÓN: Si habla español, tiene a taylor disposición servicios gratuitos de asistencia lingüística. Ll[facility] [place] [phone].    We comply with applicable federal civil rights laws and [place] laws. We do not discriminate on the basis of race, color, national origin, age, disability, sex, sexual orientation, or gender identity.            Thank you!     Thank you for choosing [facility]  for your care. Our goal is always to provide you with excellent care. Hearing back from our patients is one way we can continue to improve our services. Please take a few minutes to complete the written survey that you may receive in the mail after your visit with us. Thank you!             Your Updated Medication List - Protect others around you: Learn how to safely use, store and throw away your medicines at www.disposemymeds.org.          This list is accurate as of 9/13/18  2:05 PM.  Always use your most recent med list.                   Brand Name Dispense Instructions for use Diagnosis    * levothyroxine 25 MCG tablet    SYNTHROID/LEVOTHROID    42 tablet    Take 25 mcg ( one tablet) x 1 week. Then  50 mcg ( two pills) x 1 week, then 75 mcg ( three pills).  Then switch to 100 mcg- One  tablet daily    Acquired hypothyroidism       * levothyroxine 100 MCG tablet    SYNTHROID/LEVOTHROID    30 tablet    Take 1 tablet (100 mcg) by mouth daily Recheck Lab test in 2 months.    Acquired hypothyroidism       lisinopril 20 MG tablet    PRINIVIL/ZESTRIL    90 tablet    TAKE ONE TABLET BY MOUTH DAILY    Hypertension goal BP (blood pressure) < 140/90       LORazepam 0.5 MG tablet    ATIVAN    30 tablet    Take 1 tablet (0.5 mg) by mouth every 4 hours as needed (Anxiety, Nausea/Vomiting or Sleep)    Tongue cancer (H)       lovastatin 40 MG tablet    MEVACOR    90 tablet    TAKE ONE TABLET BY MOUTH NIGHTLY AT BEDTIME    Hyperlipidemia with target LDL less than 100       multivitamin  with lutein Caps per capsule      Take 1 capsule by mouth daily        prochlorperazine 10 MG tablet    COMPAZINE    30 tablet    Take 1 tablet (10 mg) by mouth every 6 hours as needed (Nausea/Vomiting)    Tongue cancer (H)       vitamin D 1000 units capsule      Take 1 capsule by mouth daily        * Notice:  This list has 2 medication(s) that are the same as other medications prescribed for you. Read the directions carefully, and ask your doctor or other care provider to review them with you.

## 2018-09-13 NOTE — PROGRESS NOTES
Hematology/ Oncology Follow-up Visit:  Sep 13, 2018    Reason for Visit:   Chief Complaint   Patient presents with     Oncology Clinic Visit     3 sade mtzeck Recurrent tongue cancer, Labs & chemo today        Oncologic History:  Recurrent tongue cancer (H)  Patrick Diop was noted to have a mass in his right neck during routine physical exam. CT scanning was subsequently obtained which also showed a right-sided tongue base and oropharyngeal mass, which together are consistent with malignancy. The patient himself has denied any unexplained weight loss. He denies any odynophagia, dysphagia, hoarseness or otalgia. 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 PET scan done on October 21, 2015 showed a mass at the level of the tongue base located along the anterior wall of the oropharynx and extending to the right lateral wall near the tonsillar pillar. This demonstrates abnormal increased FDG activity and is consistent with known malignancy. Hypermetabolic lymph nodes in the right and left neck are also noted as described. These are consistent with metastatic lymph nodes. There is no evidence of metastatic disease in the chest, abdomen or pelvis. CT of the soft tissues of the neck on October 21, 2015 was done in conjunction with PET scanning showed a 2.8 cm mass at the base of the tongue on the right effacing the right vallecula. There is also a 2.3 cm lymph node in the right level II region that is strongly suspicious for metastatic disease. These lesions are PET positive. Additionally, there is a 1.3 cm lymph node in left level III that is PET positive. There is a lymph node in right level V, also PET positive. He started on concurrent Cisplatin with radiation therapy with cisplatin 100 mg/m to be given intravenously on days #1, #22 #43 of radiation therapy.      Interval History:  Patient returning today for follow-up visit.  He has been feeling well without recent  complaints weight loss night sweats fever or chills.  He denies any nausea vomiting or diarrhea.  He denies any difficulty with swallowing.  Currently on immunotherapy without any significant side effects    Review Of Systems:  Constitutional: Negative for fever, chills, and night sweats.  Skin: negative.  Eyes: negative.  Ears/Nose/Throat: negative.  Respiratory: No shortness of breath, dyspnea on exertion, cough, or hemoptysis.  Cardiovascular: negative.  Gastrointestinal: negative.  Genitourinary: negative.  Musculoskeletal: negative.  Neurologic: negative.  Psychiatric: negative.  Hematologic/Lymphatic/Immunologic: negative.  Endocrine: negative.    All other ROS negative unless mentioned in interval history.    Past medical, social, surgical, and family histories reviewed.    Allergies:  Allergies as of 09/13/2018     (No Known Allergies)       Current Medications:  Current Outpatient Prescriptions   Medication Sig Dispense Refill     Cholecalciferol (VITAMIN D) 1000 UNITS capsule Take 1 capsule by mouth daily       lisinopril (PRINIVIL/ZESTRIL) 20 MG tablet TAKE ONE TABLET BY MOUTH DAILY 90 tablet 2     LORazepam (ATIVAN) 0.5 MG tablet Take 1 tablet (0.5 mg) by mouth every 4 hours as needed (Anxiety, Nausea/Vomiting or Sleep) 30 tablet 2     lovastatin (MEVACOR) 40 MG tablet TAKE ONE TABLET BY MOUTH NIGHTLY AT BEDTIME 90 tablet 3     multivitamin  with lutein (OCUVITE WITH LTEIN) CAPS Take 1 capsule by mouth daily       prochlorperazine (COMPAZINE) 10 MG tablet Take 1 tablet (10 mg) by mouth every 6 hours as needed (Nausea/Vomiting) 30 tablet 2     levothyroxine (SYNTHROID/LEVOTHROID) 100 MCG tablet Take 1 tablet (100 mcg) by mouth daily Recheck Lab test in 2 months. (Patient not taking: Reported on 9/13/2018) 30 tablet 1     levothyroxine (SYNTHROID/LEVOTHROID) 25 MCG tablet Take 25 mcg ( one tablet) x 1 week. Then  50 mcg ( two pills) x 1 week, then 75 mcg ( three pills).  Then switch to 100 mcg- One tablet  "daily (Patient not taking: Reported on 9/13/2018) 42 tablet 0        Physical Exam:  /89 (BP Location: Right arm, Patient Position: Sitting, Cuff Size: Adult Regular)  Pulse 90  Temp 98.1  F (36.7  C) (Tympanic)  Resp 16  Ht 1.714 m (5' 7.48\")  Wt 81 kg (178 lb 9.6 oz)  SpO2 91%  BMI 27.58 kg/m2  Wt Readings from Last 12 Encounters:   09/13/18 81 kg (178 lb 9.6 oz)   08/23/18 78.9 kg (173 lb 14.4 oz)   08/01/18 81.3 kg (179 lb 3.2 oz)   07/11/18 78.2 kg (172 lb 8 oz)   06/21/18 77.1 kg (170 lb)   06/04/18 77 kg (169 lb 12.8 oz)   05/31/18 76.3 kg (168 lb 4.8 oz)   05/09/18 75.4 kg (166 lb 3.2 oz)   04/12/18 76.6 kg (168 lb 14.4 oz)   04/03/18 76.3 kg (168 lb 3.4 oz)   03/23/18 76.8 kg (169 lb 6.4 oz)   03/19/18 77.1 kg (170 lb)     ECOG performance status: 0  GENERAL APPEARANCE: Healthy, alert and in no acute distress.  HEENT: Sclerae anicteric. PERRLA. Oropharynx without ulcers, lesions, or thrush.  NECK: Supple. No asymmetry or masses.  LYMPHATICS: No palpable cervical, supraclavicular, axillary, or inguinal lymphadenopathy.  RESP: Lungs clear to auscultation bilaterally without rales, rhonchi or wheezes.  CARDIOVASCULAR: Regular rate and rhythm. Normal S1, S2; no S3 or S4. No murmur, gallop, or rub.  ABDOMEN: Soft, nontender. Bowel sounds normal. No palpable organomegaly or masses.  MUSCULOSKELETAL: Extremities without gross deformities noted. No edema of bilateral lower extremities.  SKIN: No suspicious lesions or rashes.  NEURO: Alert and oriented x 3. Cranial nerves II-XII grossly intact.  PSYCHIATRIC: Mentation and affect appear normal.    Laboratory/Imaging Studies:  No visits with results within 2 Week(s) from this visit.  Latest known visit with results is:    Infusion Therapy Visit on 08/23/2018   Component Date Value Ref Range Status     Sodium 08/23/2018 141  133 - 144 mmol/L Final     Potassium 08/23/2018 4.1  3.4 - 5.3 mmol/L Final     Chloride 08/23/2018 107  94 - 109 mmol/L Final     " Carbon Dioxide 08/23/2018 26  20 - 32 mmol/L Final     Anion Gap 08/23/2018 8  3 - 14 mmol/L Final     Glucose 08/23/2018 98  70 - 99 mg/dL Final     Urea Nitrogen 08/23/2018 11  7 - 30 mg/dL Final     Creatinine 08/23/2018 1.03  0.66 - 1.25 mg/dL Final     GFR Estimate 08/23/2018 69  >60 mL/min/1.7m2 Final    Non  GFR Calc     GFR Estimate If Black 08/23/2018 84  >60 mL/min/1.7m2 Final    African American GFR Calc     Calcium 08/23/2018 9.3  8.5 - 10.1 mg/dL Final     Bilirubin Total 08/23/2018 1.0  0.2 - 1.3 mg/dL Final     Albumin 08/23/2018 3.9  3.4 - 5.0 g/dL Final     Protein Total 08/23/2018 8.0  6.8 - 8.8 g/dL Final     Alkaline Phosphatase 08/23/2018 97  40 - 150 U/L Final     ALT 08/23/2018 10  0 - 70 U/L Final     AST 08/23/2018 10  0 - 45 U/L Final     TSH 08/23/2018 11.37* 0.40 - 4.00 mU/L Final     T4 Free 08/23/2018 0.75* 0.76 - 1.46 ng/dL Final          Assessment and plan:    (C18.9) Malignant neoplasm of colon, unspecified part of colon (H)  (primary encounter diagnosis)  Patient has been doing well without any significant side effects.  He is currently on chemotherapy.  His been tolerating treatment without significant side effects.  We will see the patient again in 3 weeks or sooner if there are new developments or concerns.    (E03.9) Acquired hypothyroidism  The patient currently on Synthroid 100 mcg daily.  TSH is markedly elevated today.  I would recommend patient to return his primary care physician to discuss managing his hypothyroid issues.    (E78.5) Hyperlipidemia with target LDL less than 100  Patient currently on Mevacor 40 mg orally daily.    (I10) Hypertension goal BP (blood pressure) < 140/90  Patient currently on lisinopril 20 mg orally daily.    The patient is ready to learn, no apparent learning barriers were identified.  Diagnosis and treatment plans were explained to the patient. The patient expressed understanding of the content. The patient asked appropriate  questions. The patient questions were answered to his satisfaction.    Chart documentation with Dragon Voice recognition Software. Although reviewed after completion, some words and grammatical errors may remain.

## 2018-09-13 NOTE — PROGRESS NOTES
Infusion Nursing Note:  Patrick Diop presents today for C8D1 Keytruda.    Patient seen by provider today: Yes: Dr. Neil    present during visit today: Not Applicable.    Note: N/A.    Intravenous Access:  Labs drawn without difficulty.  Peripheral IV placed.    Treatment Conditions:  Lab Results   Component Value Date     09/13/2018                   Lab Results   Component Value Date    POTASSIUM 4.2 09/13/2018           Lab Results   Component Value Date    MAG 1.6 04/25/2017            Lab Results   Component Value Date    CR 1.19 09/13/2018                   Lab Results   Component Value Date    AMY 9.1 09/13/2018                Lab Results   Component Value Date    BILITOTAL 0.7 09/13/2018           Lab Results   Component Value Date    ALBUMIN 3.8 09/13/2018                    Lab Results   Component Value Date    ALT 15 09/13/2018           Lab Results   Component Value Date    AST 12 09/13/2018       Results reviewed, labs MET treatment parameters, ok to proceed with treatment.      Post Infusion Assessment:  Patient tolerated Keytruda infusion without incident.  Blood return noted pre and post infusion.  Site patent and intact, free from redness, edema or discomfort.  No evidence of extravasations.  Access discontinued per protocol.    Discharge Plan:   Patient discharged in stable condition accompanied by: self.  Departure Mode: Ambulatory.  Pt to return on 10/4/18 at 9:10 am for labs, clinic appointment with Dr. Neil, and Jayne.     Marianna Duenas RN        8

## 2018-09-13 NOTE — NURSING NOTE
"Oncology Rooming Note    September 13, 2018 9:58 AM   Patrick Diop is a 82 year old male who presents for:    Chief Complaint   Patient presents with     Oncology Clinic Visit     3 wek recheck Recurrent tongue cancer, Labs & chemo today      Initial Vitals: /89 (BP Location: Right arm, Patient Position: Sitting, Cuff Size: Adult Regular)  Pulse 90  Temp 98.1  F (36.7  C) (Tympanic)  Resp 16  Ht 1.714 m (5' 7.48\")  Wt 81 kg (178 lb 9.6 oz)  SpO2 91%  BMI 27.58 kg/m2 Estimated body mass index is 27.58 kg/(m^2) as calculated from the following:    Height as of this encounter: 1.714 m (5' 7.48\").    Weight as of this encounter: 81 kg (178 lb 9.6 oz). Body surface area is 1.96 meters squared.  No Pain (0) Comment: Data Unavailable   No LMP for male patient.  Allergies reviewed: Yes  Medications reviewed: Yes    Medications: Medication refills not needed today.  Pharmacy name entered into iMap[employer]:    [id number] [address]    Clinical concerns: 3 wek recheck Recurrent tongue cancer, Labs & chemo today.     Patient reports he stopped Levothyroxine due to it was causing abdominal pain.  Seeing PCP on Sept 19 th to review .      7  minutes for nursing intake (face to face time)     Glenys White CMA              "

## 2018-09-13 NOTE — PATIENT INSTRUCTIONS
We would like you to have chemotherapy today per treatment plan. Rashaad De La Garza would like to see you back in 3 weeks for a follow up appointment.      When you are in need of a refill, please call your pharmacy and they will send us a request.      Copy of appointments, and after visit summary (AVS) given to patient.      If you have any questions during business hours (M-F 8 AM- 4PM), please call Natividad Iyer RN Oncology Hematology  at [facility] [phone].       For questions after business hours, or on holidays/weekends, please call our after hours Nurse Triage line [phone]. Thank you.

## 2018-09-19 NOTE — PROGRESS NOTES
Please call.  Urine tests show positive MAC (microalbumin/creatinine ratio) meaning protein leaking from the kidneys into the urine. This is a sign of some kidney damage from the diabetes mellitus. It has been present in the past and has not changed.   HgbA1C is OK indicating reasonable diabetic control.   PLAN: No new changes in treatment recommended.

## 2018-09-19 NOTE — PROGRESS NOTES

## 2018-09-19 NOTE — PROGRESS NOTES
SUBJECTIVE:   Patrick Diop is a 82 year old male who presents to clinic today for the following health issues:  Chief Complaint   Patient presents with     Diabetes     Flu Shot      He feels better some days than others.   He started thyroid medication.  It gave him stomach symptoms and quit the medication for three days.  He restarted the pills again and it has not been bothering him.  Currently on 50mcg daily.     He has seen Dr. Neil, Hematology/Oncology on 9/13.  Doing well on chemo so far.     Diabetes Follow-up      Patient is checking blood sugars: not at all    Diabetic concerns: None     Symptoms of hypoglycemia (low blood sugar): none     Paresthesias (numbness or burning in feet) or sores: No     Date of last diabetic eye exam: Due    Diabetes Management Resources    Hyperlipidemia Follow-Up      Rate your low fat/cholesterol diet?: fair    Taking statin?  Yes, no muscle aches from statin    Other lipid medications/supplements?:  none    Hypertension Follow-up      Outpatient blood pressures are not being checked.    Low Salt Diet: low salt    BP Readings from Last 2 Encounters:   09/13/18 130/85   09/13/18 142/89     Hemoglobin A1C (%)   Date Value   03/23/2018 5.2   11/29/2017 5.4     LDL Cholesterol Calculated (mg/dL)   Date Value   03/23/2018 47   10/21/2016 59       Amount of exercise or physical activity: 4-5 days-walking.  Lives at [facility].  Walks up the stairs and down the hallways in the winter.     Problems taking medications regularly: No    Medication side effects: none    Diet: regular (no restrictions)    Last clinic visit:3/23 for preop  Medication or other changes at last visit:none  Weight since last visit? Up 11#  Wt Readings from Last 5 Encounters:   09/19/18 180 lb 12.8 oz (82 kg)   09/13/18 178 lb 9.6 oz (81 kg)   08/23/18 173 lb 14.4 oz (78.9 kg)   08/01/18 179 lb 3.2 oz (81.3 kg)   07/11/18 172 lb 8 oz (78.2 kg)      History   Smoking Status     Former Smoker      Packs/day: 0.00     Years: 48.00     Types: Cigarettes     Quit date: 11/6/2001   Smokeless Tobacco     Never Used     Comment: started at age 17       Past medical history reviewed and updated    Patient Active Problem List    Diagnosis Date Noted     Acquired hypothyroidism 08/26/2018     Priority: Medium     Hypomagnesemia 05/31/2018     Priority: Medium     Hypokalemia 05/31/2018     Priority: Medium     Chemotherapy induced nausea and vomiting 05/31/2018     Priority: Medium     Recurrent tongue cancer (H) 10/28/2015     Priority: Medium     Neck mass found on 9/28/2015.  He had cancer at the base of his tongue.  Treated with radiation for 7 weeks and three chemo treatments.   ENT notes 4/4/2016:now three months out from completion of chemoradiation therapy for a T3, N2c right-sided tongue base tumor.  He had a PET scan done over the weekend, and it was essentially negative so he has had a complete response.    5/27/2016:Specialists treating his cancer; Dr. Hernandez, ENT.    Dr. Neil, Hematology/Oncology.   Dr. Paul, Oncology radiation.   He had lymphedema in neck as complication.       CKD (chronic kidney disease) stage 1, GFR 90 ml/min or greater 09/05/2014     Priority: Medium     9/5/2014:MAC positive twice.        Colon cancer (H) 03/14/2014     Priority: Medium     9/5/2014:2001 HAD SURGERY AND CURE-treated in [place].   Transverse colectomy.  He had chemo for 6 months.  Cancer treated in 2001.  Last colonoscopy 3 years ago.  He was told to recheck in 4 years-2015.         Type 2 diabetes mellitus with stage 1 chronic kidney disease (H) 11/06/2013     Priority: Medium     Diagnosed about 2008       Hyperlipidemia with target LDL less than 100 11/06/2013     Priority: Medium     Hypertension goal BP (blood pressure) < 140/90 11/06/2013     Priority: Medium     Current Outpatient Prescriptions   Medication Sig Dispense Refill     Cholecalciferol (VITAMIN D) 1000 UNITS capsule Take 1 capsule by mouth daily        levothyroxine (SYNTHROID/LEVOTHROID) 25 MCG tablet Take 25 mcg ( one tablet) x 1 week. Then  50 mcg ( two pills) x 1 week, then 75 mcg ( three pills).  Then switch to 100 mcg- One tablet daily 42 tablet 0     lisinopril (PRINIVIL/ZESTRIL) 20 MG tablet TAKE ONE TABLET BY MOUTH DAILY 90 tablet 2     lovastatin (MEVACOR) 40 MG tablet TAKE ONE TABLET BY MOUTH NIGHTLY AT BEDTIME 90 tablet 3     multivitamin  with lutein (OCUVITE WITH LTEIN) CAPS Take 1 capsule by mouth daily       levothyroxine (SYNTHROID/LEVOTHROID) 100 MCG tablet Take 1 tablet (100 mcg) by mouth daily Recheck Lab test in 2 months. (Patient not taking: Reported on 9/19/2018) 30 tablet 1     LORazepam (ATIVAN) 0.5 MG tablet Take 1 tablet (0.5 mg) by mouth every 4 hours as needed (Anxiety, Nausea/Vomiting or Sleep) (Patient not taking: Reported on 9/19/2018) 30 tablet 2     prochlorperazine (COMPAZINE) 10 MG tablet Take 1 tablet (10 mg) by mouth every 6 hours as needed (Nausea/Vomiting) (Patient not taking: Reported on 9/19/2018) 30 tablet 2     ROS:General: No change in weight, sleep or appetite.  Energy OK, a little low.   No fever or chills  Resp: No coughing, wheezing or shortness of breath  CV: No chest pains or palpitations  GI: No nausea, vomiting,  heartburn, abdominal pain, diarrhea, constipation or change in bowel habits  : No urinary frequency or dysuria, bladder or kidney problems  Musculoskeletal: No significant muscle or joint pains  Psychiatric: No problems with anxiety, depression or mental health    OBJECTIVE:Blood pressure 124/88, pulse 96, temperature 96.9  F (36.1  C), temperature source Tympanic, weight 180 lb 12.8 oz (82 kg), SpO2 93 %. BMI=Body mass index is 27.92 kg/(m^2).  GENERAL APPEARANCE ADULT: Alert, no acute distress  HENT: oral mucous membranes moist, oropharynx clear, one broken tooth remains on mandible  NECK: No adenopathy,masses or thyromegaly  RESP: lungs clear to auscultation   CV: normal rate, regular rhythm,  no murmur or gallop  ABDOMEN: soft, no organomegaly, masses or tenderness  MS: 2+ edema bilateral in lower extremities.  He reports unchanged for many years.   SKIN: 3cm mass mid left back.  Probable sebaceous cyst    Foot exam:normal DP and PT pulses, no trophic changes or ulcerative lesions, normal monofilament exam and hyperpronation bilateral     ASSESSMENT/PLAN:                                                    Lifestyle recommendations:continue current healthy lifestyle efforts including regular exercise and keeping a good weight  weight loss recommended (ideal BMI-body mass index is <25)  Diabetic recommendations:-No changes in current medications  eye exam is needed yearly  return for follow-up visit in 6 month(s)    (E11.22,  N18.1) Type 2 diabetes mellitus with stage 1 chronic kidney disease, without long-term current use of insulin (H)  (primary encounter diagnosis)  Comment: doing great  Plan: Hemoglobin A1c, Albumin Random Urine         Quantitative with Creat Ratio        No change in current treatment plan.     (C02.9) Recurrent tongue cancer (H)  Comment: has been stable  Plan: follow-up with the chemo and visits with Hematology/Oncology as you have been     (N18.1) CKD (chronic kidney disease) stage 1, GFR 90 ml/min or greater  Comment:   Plan: urine test today.     (I10) Hypertension goal BP (blood pressure) < 140/90  Comment: doing well  Plan: No change in current treatment plan.     (E03.9) Acquired hypothyroidism  Comment: thyroid low.    Plan: Continue with the thyroid medication s you have been.  REcheck blood test in 2 months.     Symptoms of too little thyroid hormone:  Fatigue  Weight gain  Fluid in extremities  Constipation  Irregular menses  Depression  Coarseness or loss of hair  Decreased concentration  Hoarseness       Diabetes Type II, A1c Controlled, non-insulin dependent   Associated with the following complications    Renal Complications:  CKD

## 2018-09-19 NOTE — PATIENT INSTRUCTIONS
ASSESSMENT/PLAN:                                                    Lifestyle recommendations:continue current healthy lifestyle efforts including regular exercise and keeping a good weight  weight loss recommended (ideal BMI-body mass index is <25)  Diabetic recommendations:-No changes in current medications  eye exam is needed yearly  return for follow-up visit in 6 month(s)    (E11.22,  N18.1) Type 2 diabetes mellitus with stage 1 chronic kidney disease, without long-term current use of insulin (H)  (primary encounter diagnosis)  Comment: doing great  Plan: Hemoglobin A1c, Albumin Random Urine         Quantitative with Creat Ratio        No change in current treatment plan.     (C02.9) Recurrent tongue cancer (H)  Comment: has been stable  Plan: follow-up with the chemo and visits with Hematology/Oncology as you have been     (N18.1) CKD (chronic kidney disease) stage 1, GFR 90 ml/min or greater  Comment:   Plan: urine test today.     (I10) Hypertension goal BP (blood pressure) < 140/90  Comment: doing well  Plan: No change in current treatment plan.     (E03.9) Acquired hypothyroidism  Comment: thyroid low.    Plan: Continue with the thyroid medication s you have been.  REcheck blood test in 2 months.     Symptoms of too little thyroid hormone:  Fatigue  Weight gain  Fluid in extremities  Constipation  Irregular menses  Depression  Coarseness or loss of hair  Decreased concentration  Hoarseness

## 2018-09-19 NOTE — LETTER
September 20, 2018      Patrick Diop  [address] [place] [id number]        Dear ,    We are writing to inform you of your test results.    Urine tests show positive MAC (microalbumin/creatinine ratio) meaning protein leaking from the kidneys into the urine. This is a sign of some kidney damage from the diabetes mellitus. It has been present in the past and has not changed.   HgbA1C is OK indicating reasonable diabetic control.   PLAN: No new changes in treatment recommended.    Resulted Orders   Hemoglobin A1c   Result Value Ref Range    Hemoglobin A1C 5.6 0 - 5.6 %      Comment:      Normal <5.7% Prediabetes 5.7-6.4%  Diabetes 6.5% or higher - adopted from ADA   consensus guidelines.     Albumin Random Urine Quantitative with Creat Ratio   Result Value Ref Range    Creatinine Urine 135 mg/dL    Albumin Urine mg/L 370 mg/L    Albumin Urine mg/g Cr 274.07 (H) 0 - 17 mg/g Cr       If you have any questions or concerns, please call the clinic at the number listed above.       Sincerely,        Joe Mandel MD/leodan

## 2018-09-19 NOTE — MR AVS SNAPSHOT
After Visit Summary   9/19/2018    Patrick Diop    MRN: [id number]           Patient Information     Date Of Birth          1935        Visit Information        Provider Department      9/19/2018 8:40 AM Joe Mandel MD [facility]        Today's Diagnoses     Type 2 diabetes mellitus with stage 1 chronic kidney disease, without long-term current use of insulin (H)    -  1    Recurrent tongue cancer (H)        CKD (chronic kidney disease) stage 1, GFR 90 ml/min or greater        Hypertension goal BP (blood pressure) < 140/90        Acquired hypothyroidism          Care Instructions    ASSESSMENT/PLAN:                                                    Lifestyle recommendations:continue current healthy lifestyle efforts including regular exercise and keeping a good weight  weight loss recommended (ideal BMI-body mass index is <25)  Diabetic recommendations:-No changes in current medications  eye exam is needed yearly  return for follow-up visit in 6 month(s)    (E11.22,  N18.1) Type 2 diabetes mellitus with stage 1 chronic kidney disease, without long-term current use of insulin (H)  (primary encounter diagnosis)  Comment: doing great  Plan: Hemoglobin A1c, Albumin Random Urine         Quantitative with Creat Ratio        No change in current treatment plan.     (C02.9) Recurrent tongue cancer (H)  Comment: has been stable  Plan: follow-up with the chemo and visits with Hematology/Oncology as you have been     (N18.1) CKD (chronic kidney disease) stage 1, GFR 90 ml/min or greater  Comment:   Plan: urine test today.     (I10) Hypertension goal BP (blood pressure) < 140/90  Comment: doing well  Plan: No change in current treatment plan.     (E03.9) Acquired hypothyroidism  Comment: thyroid low.    Plan: Continue with the thyroid medication s you have been.  REcheck blood test in 2 months.     Symptoms of too little thyroid hormone:  Fatigue  Weight gain  Fluid in  extremities  Constipation  Irregular menses  Depression  Coarseness or loss of hair  Decreased concentration  Hoarseness          Follow-ups after your visit        Your next 10 appointments already scheduled     Oct 04, 2018  9:20 AM CDT   (Arrive by 9:10 AM)   LAB with [facility] ([facility])    [address] [facility] [address]   [phone]           Please do not eat 10-12 hours before your appointment if you are coming in fasting for labs on lipids, cholesterol, or glucose (sugar). This does not apply to pregnant women. Water, hot tea and black coffee (with nothing added) are okay. Do not drink other fluids, diet soda or chew gum.            Oct 04, 2018  9:45 AM CDT   Return Visit with Lanny Neil MD   [facility] ([facility])    [facility]  [address]  [facility] [address]   [phone]            Oct 04, 2018 10:00 AM CDT   Level 2 with ROOM 9 [facility] ([facility])    [facility]  [address]  [facility] [address]   [phone]            Oct 25, 2018  9:30 AM CDT   LAB with [facility] ([facility])    [address] [facility] [address]   [phone]           Please do not eat 10-12 hours before your appointment if you are coming in fasting for labs on lipids, cholesterol, or glucose (sugar). This does not apply to pregnant women. Water, hot tea and black coffee (with nothing added) are okay. Do not drink other fluids, diet soda or chew gum.            Oct 25, 2018 10:00 AM CDT   Level 2 with ROOM 8 [facility] ([facility])    [facility]  [address]  [facility] [address]   [phone]              Who to contact     If you have questions or need follow up information about today's clinic visit or  "your schedule please contact [facility] directly at [phone].  Normal or non-critical lab and imaging results will be communicated to you by MyChart, letter or phone within 4 business days after the clinic has received the results. If you do not hear from us within 7 days, please contact the clinic through MyChart or phone. If you have a critical or abnormal lab result, we will notify you by phone as soon as possible.  Submit refill requests through [employer] or call your pharmacy and they will forward the refill request to us. Please allow 3 business days for your refill to be completed.          Additional Information About Your Visit        CorventisharImageProtect Information     [employer] lets you send messages to your doctor, view your test results, renew your prescriptions, schedule appointments and more. To sign up, go to [email or web address] . Click on \"Log in\" on the left side of the screen, which will take you to the Welcome page. Then click on \"Sign up Now\" on the right side of the page.     You will be asked to enter the access code listed below, as well as some personal information. Please follow the directions to create your username and password.     Your access code is: [email or web address]  Expires: 12/10/2018 10:28 AM     Your access code will  in 90 days. If you need help or a new code, please call your [facility] clinic or [phone].        Care EveryWhere ID     This is your Care EveryWhere ID. This could be used by other organizations to access your [facility] medical records  [id number]        Your Vitals Were     Pulse Temperature Pulse Oximetry BMI (Body Mass Index)          96 96.9  F (36.1  C) (Tympanic) 93% 27.92 kg/m2         Blood Pressure from Last 3 Encounters:   18 124/88   18 130/85   18 142/89    Weight from Last 3 Encounters:   18 180 lb 12.8 oz (82 kg)   18 178 lb 9.6 oz (81 kg)   18 173 lb 14.4 oz (78.9 kg)              We Performed the " Following     Albumin Random Urine Quantitative with Creat Ratio     FOOT EXAM     Hemoglobin A1c        Primary Care Provider Office Phone # Fax #    Joe Mandel [phone] [address] [place] [id number]        Equal Access to Services     RAINA SOLANO : Hadalysa frances quinteros nattyo Somisaelali, waaxda luqadaha, qaybta kaalmada marcio, elizabeth avinasubhash blacktheresa farah laWandadawson dias. So [facility] [phone].    ATENCIÓN: Si habla español, tiene a taylor disposición servicios gratuitos de asistencia lingüística. Llame al [phone].    We comply with applicable federal civil rights laws and [place] laws. We do not discriminate on the basis of race, color, national origin, age, disability, sex, sexual orientation, or gender identity.            Thank you!     Thank you for choosing [facility]  for your care. Our goal is always to provide you with excellent care. Hearing back from our patients is one way we can continue to improve our services. Please take a few minutes to complete the written survey that you may receive in the mail after your visit with us. Thank you!             Your Updated Medication List - Protect others around you: Learn how to safely use, store and throw away your medicines at www.disposemymeds.org.          This list is accurate as of 9/19/18  9:31 AM.  Always use your most recent med list.                   Brand Name Dispense Instructions for use Diagnosis    * levothyroxine 25 MCG tablet    SYNTHROID/LEVOTHROID    42 tablet    Take 25 mcg ( one tablet) x 1 week. Then  50 mcg ( two pills) x 1 week, then 75 mcg ( three pills).  Then switch to 100 mcg- One tablet daily    Acquired hypothyroidism       * levothyroxine 100 MCG tablet    SYNTHROID/LEVOTHROID    30 tablet    Take 1 tablet (100 mcg) by mouth daily Recheck Lab test in 2 months.    Acquired hypothyroidism       lisinopril 20 MG tablet    PRINIVIL/ZESTRIL    90 tablet    TAKE ONE TABLET BY MOUTH  DAILY    Hypertension goal BP (blood pressure) < 140/90       LORazepam 0.5 MG tablet    ATIVAN    30 tablet    Take 1 tablet (0.5 mg) by mouth every 4 hours as needed (Anxiety, Nausea/Vomiting or Sleep)    Tongue cancer (H)       lovastatin 40 MG tablet    MEVACOR    90 tablet    TAKE ONE TABLET BY MOUTH NIGHTLY AT BEDTIME    Hyperlipidemia with target LDL less than 100       multivitamin  with lutein Caps per capsule      Take 1 capsule by mouth daily        prochlorperazine 10 MG tablet    COMPAZINE    30 tablet    Take 1 tablet (10 mg) by mouth every 6 hours as needed (Nausea/Vomiting)    Tongue cancer (H)       vitamin D 1000 units capsule      Take 1 capsule by mouth daily        * Notice:  This list has 2 medication(s) that are the same as other medications prescribed for you. Read the directions carefully, and ask your doctor or other care provider to review them with you.

## 2018-09-24 NOTE — TELEPHONE ENCOUNTER
Spoke with patient.   Please sign pended medication.Future Lab order placed    See 's note below.  Dolores JIMENEZ CMA    Please call.  TSH is high.  This indicates that thyroid level is low.   Chemistries OK except mildly increased glucose and borderline kidney function.   PLAN: Change levothyroxine from current 100mcg to 125mcg daily.  Recheck TSH in 2 months.   Please arrange for prescriptions and orders.   Rx levothyroxine #30 125mcg with 2 refills and future TSH.

## 2018-10-04 NOTE — MR AVS SNAPSHOT
After Visit Summary   10/4/2018    Patrick Diop    MRN: [id number]           Patient Information     Date Of Birth          1935        Visit Information        Provider Department      10/4/2018 9:40 AM Lanny Neil MD 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      Your next 10 appointments already scheduled     Oct 25, 2018  9:20 AM CDT   (Arrive by 9:10 AM)   LAB with [facility] ([facility])    [address] [facility] [address]   [phone]           Please do not eat 10-12 hours before your appointment if you are coming in fasting for labs on lipids, cholesterol, or glucose (sugar). This does not apply to pregnant women. Water, hot tea and black coffee (with nothing added) are okay. Do not drink other fluids, diet soda or chew gum.            Oct 25, 2018  9:40 AM CDT   Return Visit with Lanny Neil MD   [facility] ([facility])    [facility]  [address]  [facility] [address]   [phone]            Oct 25, 2018 10:00 AM CDT   Level 2 with ROOM 8 [facility] ([facility])    [facility]  [address]  [facility] [address]   [phone]            Nov 15, 2018  9:20 AM CST   [facility] with [place]  [facility]   [facility] ([facility])    [address]  [facility] [address]   [phone]           Please do not eat 10-12 hours before your appointment if you are coming in fasting for labs on lipids, cholesterol, or glucose (sugar). This does not apply to pregnant women. Water, hot tea and black coffee (with nothing added) are okay. Do not drink other fluids, diet soda or chew gum.            Nov 15, 2018 10:00 AM CST   Level 2 with ROOM 5 [facility] ([facility])    [facility]  [address]  [facility] [address]   [phone]              Who to contact     If you have questions or need follow up information about today's clinic visit or your schedule please contact [facility] directly at [phone].  Normal or non-critical lab and imaging results will be communicated to you by MyChart, letter or phone within 4 business days after the clinic has received the results. If you do not hear from us within 7 days, please contact the clinic through [employer] or phone. If you have a critical or abnormal lab result, we will notify you by phone as soon as possible.  Submit refill requests through [employer] or call your pharmacy and they will forward the refill request to us. Please allow 3 business days for your refill to be completed.          Additional Information About Your Visit        [employer] Information     [employer] lets you send messages to your doctor, view your test results, renew your prescriptions, schedule appointments and more. To sign up, go to [email or web address] . Click on \"Log in\" on the left side of the screen, which will take you to the Welcome page. Then click on \"Sign up Now\" on the right side of the page.     You will be asked to enter the access code listed below, as well as some personal information. Please follow the directions to create your username and password.     Your access " "code is: [email or web address]  Expires: 12/10/2018 10:28 AM     Your access code will  in 90 days. If you need help or a new code, please call your [facility] clinic or [phone].        Care EveryWhere ID     This is your Care EveryWhere ID. This could be used by other organizations to access your [facility] medical records  [id number]        Your Vitals Were     Pulse Temperature Respirations Height Pulse Oximetry BMI (Body Mass Index)    102 97.9  F (36.6  C) (Tympanic) 16 1.714 m (5' 7.48\") 91% 28.21 kg/m2       Blood Pressure from Last 3 Encounters:   10/04/18 132/79   18 124/88   18 130/85    Weight from Last 3 Encounters:   10/04/18 82.9 kg (182 lb 11.2 oz)   18 82 kg (180 lb 12.8 oz)   18 81 kg (178 lb 9.6 oz)              Today, you had the following     No orders found for display       Primary Care Provider Office Phone # Fax #    Joe Mandel [phone] [address] [place], [place] [address]        Equal Access to Services     RAINA SOLANO AH: Hadii frances luzo Somisaelali, waaxda luqadaha, qaybta kaalmada adeegyada, elizabeth dias. So [facility] [phone].    ATENCIÓN: Si habla español, tiene a taylor disposición servicios gratuitos de asistencia lingüística. Llame al [phone].    We comply with applicable federal civil rights laws and [place] laws. We do not discriminate on the basis of race, color, national origin, age, disability, sex, sexual orientation, or gender identity.            Thank you!     Thank you for choosing [facility]  for your care. Our goal is always to provide you with excellent care. Hearing back from our patients is one way we can continue to improve our services. Please take a few minutes to complete the written survey that you may receive in the mail after your visit with us. Thank you!             Your Updated Medication List - Protect others around you: Learn how to safely use, store and throw " away your medicines at www.disposemymeds.org.          This list is accurate as of 10/4/18 10:16 AM.  Always use your most recent med list.                   Brand Name Dispense Instructions for use Diagnosis    * levothyroxine 25 MCG tablet    SYNTHROID/LEVOTHROID    42 tablet    Take 25 mcg ( one tablet) x 1 week. Then  50 mcg ( two pills) x 1 week, then 75 mcg ( three pills).  Then switch to 100 mcg- One tablet daily    Acquired hypothyroidism       * levothyroxine 100 MCG tablet    SYNTHROID/LEVOTHROID    30 tablet    Take 1 tablet (100 mcg) by mouth daily Recheck Lab test in 2 months.    Acquired hypothyroidism       lisinopril 20 MG tablet    PRINIVIL/ZESTRIL    90 tablet    TAKE ONE TABLET BY MOUTH DAILY    Hypertension goal BP (blood pressure) < 140/90       LORazepam 0.5 MG tablet    ATIVAN    30 tablet    Take 1 tablet (0.5 mg) by mouth every 4 hours as needed (Anxiety, Nausea/Vomiting or Sleep)    Tongue cancer (H)       lovastatin 40 MG tablet    MEVACOR    90 tablet    TAKE ONE TABLET BY MOUTH NIGHTLY AT BEDTIME    Hyperlipidemia with target LDL less than 100       multivitamin  with lutein Caps per capsule      Take 1 capsule by mouth daily        prochlorperazine 10 MG tablet    COMPAZINE    30 tablet    Take 1 tablet (10 mg) by mouth every 6 hours as needed (Nausea/Vomiting)    Tongue cancer (H)       vitamin D 1000 units capsule      Take 1 capsule by mouth daily        * Notice:  This list has 2 medication(s) that are the same as other medications prescribed for you. Read the directions carefully, and ask your doctor or other care provider to review them with you.

## 2018-10-04 NOTE — PROGRESS NOTES
Hematology/ Oncology Follow-up Visit:  Oct 4, 2018    Reason for Visit:   Chief Complaint   Patient presents with     Oncology Clinic Visit     3 wk f/u tongue cancer        Oncologic History:  Recurrent tongue cancer (H)  Patrick Diop was noted to have a mass in his right neck during routine physical exam. CT scanning was subsequently obtained which also showed a right-sided tongue base and oropharyngeal mass, which together are consistent with malignancy. The patient himself has denied any unexplained weight loss. He denies any odynophagia, dysphagia, hoarseness or otalgia. 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 PET scan done on October 21, 2015 showed a mass at the level of the tongue base located along the anterior wall of the oropharynx and extending to the right lateral wall near the tonsillar pillar. This demonstrates abnormal increased FDG activity and is consistent with known malignancy. Hypermetabolic lymph nodes in the right and left neck are also noted as described. These are consistent with metastatic lymph nodes. There is no evidence of metastatic disease in the chest, abdomen or pelvis. CT of the soft tissues of the neck on October 21, 2015 was done in conjunction with PET scanning showed a 2.8 cm mass at the base of the tongue on the right effacing the right vallecula. There is also a 2.3 cm lymph node in the right level II region that is strongly suspicious for metastatic disease. These lesions are PET positive. Additionally, there is a 1.3 cm lymph node in left level III that is PET positive. There is a lymph node in right level V, also PET positive. He started on concurrent Cisplatin with radiation therapy with cisplatin 100 mg/m to be given intravenously on days #1, #22 #43 of radiation therapy.      Interval History:  Patient is here today for follow-up.  Been feeling well without any recent complaints weight loss night sweats fever or chills.   He denies any diarrhea he denies any shortness of breath or cough or wheezing    Review Of Systems:  Constitutional: Negative for fever, chills, and night sweats.  Skin: negative.  Eyes: negative.  Ears/Nose/Throat: negative.  Respiratory: No shortness of breath, dyspnea on exertion, cough, or hemoptysis.  Cardiovascular: negative.  Gastrointestinal: negative.  Genitourinary: negative.  Musculoskeletal: negative.  Neurologic: negative.  Psychiatric: negative.  Hematologic/Lymphatic/Immunologic: negative.  Endocrine: negative.    All other ROS negative unless mentioned in interval history.    Past medical, social, surgical, and family histories reviewed.    Allergies:  Allergies as of 10/04/2018     (No Known Allergies)       Current Medications:  Current Outpatient Prescriptions   Medication Sig Dispense Refill     Cholecalciferol (VITAMIN D) 1000 UNITS capsule Take 1 capsule by mouth daily       levothyroxine (SYNTHROID/LEVOTHROID) 100 MCG tablet Take 1 tablet (100 mcg) by mouth daily Recheck Lab test in 2 months. 30 tablet 1     levothyroxine (SYNTHROID/LEVOTHROID) 25 MCG tablet Take 25 mcg ( one tablet) x 1 week. Then  50 mcg ( two pills) x 1 week, then 75 mcg ( three pills).  Then switch to 100 mcg- One tablet daily 42 tablet 0     lisinopril (PRINIVIL/ZESTRIL) 20 MG tablet TAKE ONE TABLET BY MOUTH DAILY 90 tablet 2     lovastatin (MEVACOR) 40 MG tablet TAKE ONE TABLET BY MOUTH NIGHTLY AT BEDTIME 90 tablet 3     multivitamin  with lutein (OCUVITE WITH LTEIN) CAPS Take 1 capsule by mouth daily       LORazepam (ATIVAN) 0.5 MG tablet Take 1 tablet (0.5 mg) by mouth every 4 hours as needed (Anxiety, Nausea/Vomiting or Sleep) (Patient not taking: Reported on 9/19/2018) 30 tablet 2     prochlorperazine (COMPAZINE) 10 MG tablet Take 1 tablet (10 mg) by mouth every 6 hours as needed (Nausea/Vomiting) (Patient not taking: Reported on 9/19/2018) 30 tablet 2        Physical Exam:  /79 (BP Location: Right arm, Patient  "Position: Sitting, Cuff Size: Adult Regular)  Pulse 102  Temp 97.9  F (36.6  C) (Tympanic)  Resp 16  Ht 1.714 m (5' 7.48\")  Wt 82.9 kg (182 lb 11.2 oz)  SpO2 91%  BMI 28.21 kg/m2  Wt Readings from Last 12 Encounters:   10/04/18 82.9 kg (182 lb 11.2 oz)   09/19/18 82 kg (180 lb 12.8 oz)   09/13/18 81 kg (178 lb 9.6 oz)   08/23/18 78.9 kg (173 lb 14.4 oz)   08/01/18 81.3 kg (179 lb 3.2 oz)   07/11/18 78.2 kg (172 lb 8 oz)   06/21/18 77.1 kg (170 lb)   06/04/18 77 kg (169 lb 12.8 oz)   05/31/18 76.3 kg (168 lb 4.8 oz)   05/09/18 75.4 kg (166 lb 3.2 oz)   04/12/18 76.6 kg (168 lb 14.4 oz)   04/03/18 76.3 kg (168 lb 3.4 oz)     ECOG performance status: 1  GENERAL APPEARANCE: Healthy, alert and in no acute distress.  HEENT: Sclerae anicteric. PERRLA. Oropharynx without ulcers, lesions, or thrush.  NECK: Supple. No asymmetry or masses.  LYMPHATICS: No palpable cervical, supraclavicular, axillary, or inguinal lymphadenopathy.  RESP: Lungs clear to auscultation bilaterally without rales, rhonchi or wheezes.  CARDIOVASCULAR: Regular rate and rhythm. Normal S1, S2; no S3 or S4. No murmur, gallop, or rub.  ABDOMEN: Soft, nontender. Bowel sounds normal. No palpable organomegaly or masses.  MUSCULOSKELETAL: Extremities without gross deformities noted. No edema of bilateral lower extremities.  SKIN: No suspicious lesions or rashes.  NEURO: Alert and oriented x 3. Cranial nerves II-XII grossly intact.  PSYCHIATRIC: Mentation and affect appear normal.    Laboratory/Imaging Studies:  No visits with results within 2 Week(s) from this visit.  Latest known visit with results is:    Office Visit on 09/19/2018   Component Date Value Ref Range Status     Hemoglobin A1C 09/19/2018 5.6  0 - 5.6 % Final    Comment: Normal <5.7% Prediabetes 5.7-6.4%  Diabetes 6.5% or higher - adopted from ADA   consensus guidelines.       Creatinine Urine 09/19/2018 135  mg/dL Final     Albumin Urine mg/L 09/19/2018 370  mg/L Final     Albumin Urine " mg/g Cr 09/19/2018 274.07* 0 - 17 mg/g Cr Final          Assessment and plan:    (C02.9) Recurrent tongue cancer (H)  (primary encounter diagnosis)  Patient has been tolerating immunotherapy well with Keytruda.  We will continue on the current regimen.  I will see the patient again in 3 weeks or sooner if there are new developments or concerns.    (C18.9) Malignant neoplasm of colon, unspecified part of colon (H)  No evidence of colon cancer recurrence.    (E03.9) Acquired hypothyroidism  Patient currently on Synthroid 100 mcg daily.    (E78.5) Hyperlipidemia with target LDL less than 100  Patient currently on Mevacor 40 mg orally daily per    (I10) Hypertension goal BP (blood pressure) < 140/90  Blood pressure is well controlled on lisinopril 20 mg orally daily.    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  Diagnosis and treatment plans were explained to the patient. The patient expressed understanding of the content. The patient asked appropriate questions. The patient questions were answered to his satisfaction.    Chart documentation with Dragon Voice recognition Software. Although reviewed after completion, some words and grammatical errors may remain.

## 2018-10-04 NOTE — PATIENT INSTRUCTIONS
We would like you to have chemotherapy today per treatment plan. Dr. Neil would like to see you back in 3 weeks for a follow up appointment.      When you are in need of a refill, please call your pharmacy and they will send us a request.      Copy of appointments, and after visit summary (AVS) given to patient.      If you have any questions during business hours (M-F 8 AM- 4PM), please call Natividad Iyer RN Oncology Hematology  at [facility] [phone].       For questions after business hours, or on holidays/weekends, please call our after hours Nurse Triage line [phone]. Thank you.

## 2018-10-04 NOTE — NURSING NOTE
"Oncology Rooming Note    October 4, 2018 9:52 AM   Patrick Diop is a 82 year old male who presents for:    Chief Complaint   Patient presents with     Oncology Clinic Visit     3 wk f/u tongue cancer      Initial Vitals: /79 (BP Location: Right arm, Patient Position: Sitting, Cuff Size: Adult Regular)  Pulse 102  Temp 97.9  F (36.6  C) (Tympanic)  Resp 16  Ht 1.714 m (5' 7.48\")  Wt 82.9 kg (182 lb 11.2 oz)  SpO2 91%  BMI 28.21 kg/m2 Estimated body mass index is 28.21 kg/(m^2) as calculated from the following:    Height as of this encounter: 1.714 m (5' 7.48\").    Weight as of this encounter: 82.9 kg (182 lb 11.2 oz). Body surface area is 1.99 meters squared.  No Pain (0) Comment: Data Unavailable   No LMP for male patient.  Allergies reviewed: Yes  Medications reviewed: Yes    Medications: Medication refills not needed today.  Pharmacy name entered into [facility]:    [facility] - [place], [place] - [address] [id number] - [place], [place] - [address]    Clinical concerns: 3 wk f/u tongue cancer.      8 minutes for nursing intake (face to face time)     Ivonne Del Rio, [facility]            "

## 2018-10-04 NOTE — MR AVS SNAPSHOT
After Visit Summary   10/4/2018    Patrick Diop    MRN: [id number]           Patient Information     Date Of Birth          1935        Visit Information        Provider Department      10/4/2018 10:00 AM ROOM 9 [facility]        Today's Diagnoses     Recurrent tongue cancer (H)    -  1       Follow-ups after your visit        Your next 10 appointments already scheduled     Oct 25, 2018  9:20 AM CDT   (Arrive by 9:10 AM)   LAB with [facility] ([facility])    [address] [address]   [phone]           Please do not eat 10-12 hours before your appointment if you are coming in fasting for labs on lipids, cholesterol, or glucose (sugar). This does not apply to pregnant women. Water, hot tea and black coffee (with nothing added) are okay. Do not drink other fluids, diet soda or chew gum.            Oct 25, 2018  9:40 AM CDT   Return Visit with Lanny Neil MD   [facility] ([facility])    [facility]  [address]  [facility] [address]   [phone]            Oct 25, 2018 10:00 AM CDT   Level 2 with ROOM 8 [facility] ([facility])    [facility]  [address]  [facility] [address]   [phone]            Nov 15, 2018  9:20 AM CST   LAB with [facility] ([facility])    [address] [address]   [phone]           Please do not eat 10-12 hours before your appointment if you are coming in fasting for labs on lipids, cholesterol, or glucose (sugar). This does not apply to pregnant women. Water, hot tea and black coffee (with nothing added) are okay. Do not drink other fluids, diet soda or chew gum.            Nov 15, 2018 10:00 AM CST   Level 2 with ROOM 5 [facility] ([facility])    [facility]  [address]  [facility] [address]   [phone]              Who to contact     If you have questions or need follow up information about today's clinic visit or your schedule please contact [facility] directly at [phone].  Normal or non-critical lab and imaging results will be communicated to you by MyChart, letter or phone within 4 business days after the clinic has received the results. If you do not hear from us within 7 days, please contact the clinic through MyChart or phone. If you have a critical or abnormal lab result, we will notify you by phone as soon as possible.  Submit refill requests through [employer] or call your pharmacy and they will forward the refill request to us. Please allow 3 business days for your refill to be completed.          Additional Information About Your Visit        MyChart Information     [employer] lets you send messages to your doctor, view your test results, renew your prescriptions, schedule appointments and more. To sign up, go to [email or web address] . Click on \"Log in\" on the left side of the screen, which will take you to the Welcome page. Then click on \"Sign up Now\" on the right side of the page.     You will be asked to enter the access code listed below, as well as some personal information. Please follow the directions to create your username and password.     Your access code is: [email or web address]  Expires: 12/10/2018 10:28 AM     Your access code will  in 90 days. If you need help or a new code, please call your [place] clinic or [phone].        Care EveryWhere ID     This is your Care EveryWhere ID. This could be used by other organizations to access your [place] medical records  [id number]        Your Vitals Were     Pulse                   95            Blood Pressure from Last 3 Encounters:   10/04/18 120/78   10/04/18 132/79   18 124/88    Weight from Last 3 Encounters:   10/04/18 82.9 kg " (182 lb 11.2 oz)   09/19/18 82 kg (180 lb 12.8 oz)   09/13/18 81 kg (178 lb 9.6 oz)              We Performed the Following     Comprehensive metabolic panel     T4 free     T4 free     TSH with free T4 reflex        Primary Care Provider Office Phone # Fax #    Joe Mandel [phone] [address] [place] [address]        Equal Access to Services     RAINA SOLANO : Hadii aad ku hadasho Soomaali, waaxda luqadaha, qaybta kaalmada adeegyada, waxkyle lackey fabricen pablo merleamelia gruber . So [facility] [phone].    ATENCIÓN: Si sharmila carrillo, tiene a taylor disposición servicios gratuitos de asistencia lingüística. Llame al [phone].    We comply with applicable federal civil rights laws and [place] laws. We do not discriminate on the basis of race, color, national origin, age, disability, sex, sexual orientation, or gender identity.            Thank you!     Thank you for choosing [facility]  for your care. Our goal is always to provide you with excellent care. Hearing back from our patients is one way we can continue to improve our services. Please take a few minutes to complete the written survey that you may receive in the mail after your visit with us. Thank you!             Your Updated Medication List - Protect others around you: Learn how to safely use, store and throw away your medicines at www.disposemymeds.org.          This list is accurate as of 10/4/18  2:51 PM.  Always use your most recent med list.                   Brand Name Dispense Instructions for use Diagnosis    * levothyroxine 25 MCG tablet    SYNTHROID/LEVOTHROID    42 tablet    Take 25 mcg ( one tablet) x 1 week. Then  50 mcg ( two pills) x 1 week, then 75 mcg ( three pills).  Then switch to 100 mcg- One tablet daily    Acquired hypothyroidism       * levothyroxine 100 MCG tablet    SYNTHROID/LEVOTHROID    30 tablet    Take 1 tablet (100 mcg) by mouth daily Recheck Lab test in 2 months.    Acquired  hypothyroidism       lisinopril 20 MG tablet    PRINIVIL/ZESTRIL    90 tablet    TAKE ONE TABLET BY MOUTH DAILY    Hypertension goal BP (blood pressure) < 140/90       LORazepam 0.5 MG tablet    ATIVAN    30 tablet    Take 1 tablet (0.5 mg) by mouth every 4 hours as needed (Anxiety, Nausea/Vomiting or Sleep)    Tongue cancer (H)       lovastatin 40 MG tablet    MEVACOR    90 tablet    TAKE ONE TABLET BY MOUTH NIGHTLY AT BEDTIME    Hyperlipidemia with target LDL less than 100       multivitamin  with lutein Caps per capsule      Take 1 capsule by mouth daily        prochlorperazine 10 MG tablet    COMPAZINE    30 tablet    Take 1 tablet (10 mg) by mouth every 6 hours as needed (Nausea/Vomiting)    Tongue cancer (H)       vitamin D 1000 units capsule      Take 1 capsule by mouth daily        * Notice:  This list has 2 medication(s) that are the same as other medications prescribed for you. Read the directions carefully, and ask your doctor or other care provider to review them with you.

## 2018-10-04 NOTE — PROGRESS NOTES
Infusion Nursing Note:  Patrick Diop presents today for C9D1 Keytruda    Patient seen by provider today: Yes: Dr. Neil    present during visit today: Not Applicable.    Note:  Pt given copy of lab results from today.     Intravenous Access:  Labs drawn without difficulty.  Peripheral IV placed.    Treatment Conditions:  Lab Results   Component Value Date     10/04/2018                   Lab Results   Component Value Date    POTASSIUM 4.7 10/04/2018           Lab Results   Component Value Date    MAG 1.6 04/25/2017            Lab Results   Component Value Date    CR 1.20 10/04/2018                   Lab Results   Component Value Date    AMY 8.8 10/04/2018                Lab Results   Component Value Date    BILITOTAL 0.7 10/04/2018           Lab Results   Component Value Date    ALBUMIN 3.6 10/04/2018                    Lab Results   Component Value Date    ALT 18 10/04/2018           Lab Results   Component Value Date    AST 15 10/04/2018       Results reviewed, labs MET treatment parameters, ok to proceed with treatment.      Post Infusion Assessment:  Patient tolerated infusion without incident.  Blood return noted pre and post infusion.  Site patent and intact, free from redness, edema or discomfort.  No evidence of extravasations.  Access discontinued per protocol.    Discharge Plan:   Patient discharged in stable condition accompanied by: self.  Departure Mode: Ambulatory.  Pt to return on 10/25/18 at 9:10 am for labs, MD visit, and C10D1 Keytruda.    Marianna Duenas RN

## 2018-10-04 NOTE — LETTER
10/4/2018         RE: Patrick Diop  [address]  [facility] [facility] [address]        Dear Colleague,    Thank you for referring your patient, Patrick Diop, to the [facility]. Please see a copy of my visit note below.    Hematology/ Oncology Follow-up Visit:  Oct 4, 2018    Reason for Visit:   Chief Complaint   Patient presents with     Oncology Clinic Visit     3 wk f/u tongue cancer        Oncologic History:  Recurrent tongue cancer (H)  Patrick Diop was noted to have a mass in his right neck during routine physical exam. CT scanning was subsequently obtained which also showed a right-sided tongue base and oropharyngeal mass, which together are consistent with malignancy. The patient himself has denied any unexplained weight loss. He denies any odynophagia, dysphagia, hoarseness or otalgia. 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 PET scan done on October 21, 2015 showed a mass at the level of the tongue base located along the anterior wall of the oropharynx and extending to the right lateral wall near the tonsillar pillar. This demonstrates abnormal increased FDG activity and is consistent with known malignancy. Hypermetabolic lymph nodes in the right and left neck are also noted as described. These are consistent with metastatic lymph nodes. There is no evidence of metastatic disease in the chest, abdomen or pelvis. CT of the soft tissues of the neck on October 21, 2015 was done in conjunction with PET scanning showed a 2.8 cm mass at the base of the tongue on the right effacing the right vallecula. There is also a 2.3 cm lymph node in the right level II region that is strongly suspicious for metastatic disease. These lesions are PET positive. Additionally, there is a 1.3 cm lymph node in left level III that is PET positive. There is a lymph node in right level V, also PET positive. He started on concurrent Cisplatin with radiation  therapy with cisplatin 100 mg/m to be given intravenously on days #1, #22 #43 of radiation therapy.      Interval History:  Patient is here today for follow-up.  Been feeling well without any recent complaints weight loss night sweats fever or chills.  He denies any diarrhea he denies any shortness of breath or cough or wheezing    Review Of Systems:  Constitutional: Negative for fever, chills, and night sweats.  Skin: negative.  Eyes: negative.  Ears/Nose/Throat: negative.  Respiratory: No shortness of breath, dyspnea on exertion, cough, or hemoptysis.  Cardiovascular: negative.  Gastrointestinal: negative.  Genitourinary: negative.  Musculoskeletal: negative.  Neurologic: negative.  Psychiatric: negative.  Hematologic/Lymphatic/Immunologic: negative.  Endocrine: negative.    All other ROS negative unless mentioned in interval history.    Past medical, social, surgical, and family histories reviewed.    Allergies:  Allergies as of 10/04/2018     (No Known Allergies)       Current Medications:  Current Outpatient Prescriptions   Medication Sig Dispense Refill     Cholecalciferol (VITAMIN D) 1000 UNITS capsule Take 1 capsule by mouth daily       levothyroxine (SYNTHROID/LEVOTHROID) 100 MCG tablet Take 1 tablet (100 mcg) by mouth daily Recheck Lab test in 2 months. 30 tablet 1     levothyroxine (SYNTHROID/LEVOTHROID) 25 MCG tablet Take 25 mcg ( one tablet) x 1 week. Then  50 mcg ( two pills) x 1 week, then 75 mcg ( three pills).  Then switch to 100 mcg- One tablet daily 42 tablet 0     lisinopril (PRINIVIL/ZESTRIL) 20 MG tablet TAKE ONE TABLET BY MOUTH DAILY 90 tablet 2     lovastatin (MEVACOR) 40 MG tablet TAKE ONE TABLET BY MOUTH NIGHTLY AT BEDTIME 90 tablet 3     multivitamin  with lutein (OCUVITE WITH LTEIN) CAPS Take 1 capsule by mouth daily       LORazepam (ATIVAN) 0.5 MG tablet Take 1 tablet (0.5 mg) by mouth every 4 hours as needed (Anxiety, Nausea/Vomiting or Sleep) (Patient not taking: Reported on 9/19/2018)  "30 tablet 2     prochlorperazine (COMPAZINE) 10 MG tablet Take 1 tablet (10 mg) by mouth every 6 hours as needed (Nausea/Vomiting) (Patient not taking: Reported on 9/19/2018) 30 tablet 2        Physical Exam:  /79 (BP Location: Right arm, Patient Position: Sitting, Cuff Size: Adult Regular)  Pulse 102  Temp 97.9  F (36.6  C) (Tympanic)  Resp 16  Ht 1.714 m (5' 7.48\")  Wt 82.9 kg (182 lb 11.2 oz)  SpO2 91%  BMI 28.21 kg/m2  Wt Readings from Last 12 Encounters:   10/04/18 82.9 kg (182 lb 11.2 oz)   09/19/18 82 kg (180 lb 12.8 oz)   09/13/18 81 kg (178 lb 9.6 oz)   08/23/18 78.9 kg (173 lb 14.4 oz)   08/01/18 81.3 kg (179 lb 3.2 oz)   07/11/18 78.2 kg (172 lb 8 oz)   06/21/18 77.1 kg (170 lb)   06/04/18 77 kg (169 lb 12.8 oz)   05/31/18 76.3 kg (168 lb 4.8 oz)   05/09/18 75.4 kg (166 lb 3.2 oz)   04/12/18 76.6 kg (168 lb 14.4 oz)   04/03/18 76.3 kg (168 lb 3.4 oz)     ECOG performance status: 1  GENERAL APPEARANCE: Healthy, alert and in no acute distress.  HEENT: Sclerae anicteric. PERRLA. Oropharynx without ulcers, lesions, or thrush.  NECK: Supple. No asymmetry or masses.  LYMPHATICS: No palpable cervical, supraclavicular, axillary, or inguinal lymphadenopathy.  RESP: Lungs clear to auscultation bilaterally without rales, rhonchi or wheezes.  CARDIOVASCULAR: Regular rate and rhythm. Normal S1, S2; no S3 or S4. No murmur, gallop, or rub.  ABDOMEN: Soft, nontender. Bowel sounds normal. No palpable organomegaly or masses.  MUSCULOSKELETAL: Extremities without gross deformities noted. No edema of bilateral lower extremities.  SKIN: No suspicious lesions or rashes.  NEURO: Alert and oriented x 3. Cranial nerves II-XII grossly intact.  PSYCHIATRIC: Mentation and affect appear normal.    Laboratory/Imaging Studies:  No visits with results within 2 Week(s) from this visit.  Latest known visit with results is:    Office Visit on 09/19/2018   Component Date Value Ref Range Status     Hemoglobin A1C 09/19/2018 5.6  " 0 - 5.6 % Final    Comment: Normal <5.7% Prediabetes 5.7-6.4%  Diabetes 6.5% or higher - adopted from ADA   consensus guidelines.       Creatinine Urine 09/19/2018 135  mg/dL Final     Albumin Urine mg/L 09/19/2018 370  mg/L Final     Albumin Urine mg/g Cr 09/19/2018 274.07* 0 - 17 mg/g Cr Final          Assessment and plan:    (C02.9) Recurrent tongue cancer (H)  (primary encounter diagnosis)  Patient has been tolerating immunotherapy well with Keytruda.  We will continue on the current regimen.  I will see the patient again in 3 weeks or sooner if there are new developments or concerns.    (C18.9) Malignant neoplasm of colon, unspecified part of colon (H)  No evidence of colon cancer recurrence.    (E03.9) Acquired hypothyroidism  Patient currently on Synthroid 100 mcg daily.    (E78.5) Hyperlipidemia with target LDL less than 100  Patient currently on Mevacor 40 mg orally daily per    (I10) Hypertension goal BP (blood pressure) < 140/90  Blood pressure is well controlled on lisinopril 20 mg orally daily.    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  Diagnosis and treatment plans were explained to the patient. The patient expressed understanding of the content. The patient asked appropriate questions. The patient questions were answered to his satisfaction.    Chart documentation with Dragon Voice recognition Software. Although reviewed after completion, some words and grammatical errors may remain.    Again, thank you for allowing me to participate in the care of your patient.        Sincerely,        Lanny Neil MD

## 2018-10-25 PROBLEM — E83.42 HYPOMAGNESEMIA: Status: RESOLVED | Noted: 2018-05-31 | Resolved: 2018-01-01

## 2018-10-25 PROBLEM — E87.6 HYPOKALEMIA: Status: RESOLVED | Noted: 2018-05-31 | Resolved: 2018-01-01

## 2018-10-25 NOTE — MR AVS SNAPSHOT
After Visit Summary   10/25/2018    Patrick Diop    MRN: [id number]           Patient Information     Date Of Birth          1935        Visit Information        Provider Department      10/25/2018 9:40 AM Lanny Neil MD [facility] ONCOLOGY      Today's Diagnoses     Recurrent tongue cancer (H)    -  1    Malignant neoplasm of colon, unspecified part of colon (H)        Acquired hypothyroidism        Chemotherapy induced nausea and vomiting        Hyperlipidemia with target LDL less than 100        Hypertension goal BP (blood pressure) < 140/90        Macrocytosis without anemia        Type 2 diabetes mellitus with stage 1 chronic kidney disease, without long-term current use of insulin (H)           Follow-ups after your visit        Follow-up notes from your care team     Return in about 3 weeks (around 11/15/2018) for Blood work before next appointment, Imaging ordered before next appointment, Schedule for chemotherapy as per treatment plan.      Your next 10 appointments already scheduled     Nov 13, 2018  9:30 AM CST   (Arrive by 9:00 AM)   CT CHEST/ABDOMEN/PELVIS W CONTRAST with [facility]CT1   [facility] CT ([facility])    [address] [address]   [phone]           How do I prepare for my exam? (Food and drink instructions) To prepare: Do not eat or drink for 2 hours before your exam. If you need to take medicine, you may take it with small sips of water. (We may ask you to take liquid medicine as well.)  How do I prepare for my exam? (Other instructions) Please arrive 30 minutes early for your CT.  Once in the department you might be asked to drink water 15-20 minutes prior to your exam.  If indicated you may be asked to drink an oral contrast in advance of your CT.  If this is the case, the imaging team will let you know or be in contact with you prior to your appointment  Patients over 70 or patients with diabetes or kidney  problems: If you haven t had a blood test (creatinine test) within the last 30 days, the Cardiologist/Radiologist may require you to get this test prior to your exam.  If you have diabetes:  Continue to take your metformin medication on the day of your exam  What should I wear: Please wear loose clothing, such as a sweat suit or jogging clothes. Avoid snaps, zippers and other metal. We may ask you to undress and put on a hospital gown.  How long does the exam take: Most scans take less than 20 minutes.  What should I bring: Please bring any scans or X-rays taken at other hospitals, if similar tests were done. Also bring a list of your medicines, including vitamins, minerals and over-the-counter drugs. It is safest to leave personal items at home.  Do I need a : No  is needed.  What do I need to tell my doctor? Be sure to tell your doctor: * If you have any allergies. * If there s any chance you are pregnant. * If you are breastfeeding.  What should I do after the exam: No restrictions, You may resume normal activities.  What is this test: A CT (computed tomography) scan is a series of pictures that allows us to look inside your body. The scanner creates images of the body in cross sections, much like slices of bread. This helps us see any problems more clearly. You may receive contrast (X-ray dye) before or during your scan. You will be asked to drink the contrast.  Who should I call with questions: If you have any questions, please call the Imaging Department where you will have your exam. Directions, parking instructions, and other information is available on our website, [email or web address].            Nov 15, 2018  9:10 AM CST   LAB with [facility] ([facility])    [address] [address] [phone]           Please do not eat 10-12 hours before your appointment if you are coming in fasting for labs on lipids, cholesterol, or glucose  (sugar). This does not apply to pregnant women. Water, hot tea and black coffee (with nothing added) are okay. Do not drink other fluids, diet soda or chew gum.            Nov 15, 2018  9:40 AM CST   Return Visit with Lanny Neil MD   [facility] ([facility])    [facility]  [address]  [facility] [address]   [phone]            Nov 15, 2018 10:00 AM CST   Level 2 with ROOM 5 [facility] ([facility])    [facility]  [address]  [facility] [address]   [phone]            Dec 05, 2018  9:40 AM CST   (Arrive by 9:30 AM)   LAB with [facility] ([facility])    [address] [address]   [phone]           Please do not eat 10-12 hours before your appointment if you are coming in fasting for labs on lipids, cholesterol, or glucose (sugar). This does not apply to pregnant women. Water, hot tea and black coffee (with nothing added) are okay. Do not drink other fluids, diet soda or chew gum.            Dec 05, 2018 10:00 AM CST   Level 2 with ROOM 4 [facility] ([facility])    [facility]  [address] [place], [place] [address]  [facility] [address]   [phone]              Future tests that were ordered for you today     Open Future Orders        Priority Expected Expires Ordered    CBC with platelets differential Routine 10/25/2018 10/25/2019 10/25/2018    Comprehensive metabolic panel Routine 10/25/2018 10/25/2019 10/25/2018    CT Chest/Abdomen/Pelvis w Contrast Routine 11/13/2018 10/26/2019 10/25/2018            Who to contact     If you have questions or need follow up information about today's clinic visit or your schedule please contact [facility] directly at [phone].  Normal or non-critical lab and imaging results will be communicated to you by K[employer],  "letter or phone within 4 business days after the clinic has received the results. If you do not hear from us within 7 days, please contact the clinic through [employer] or phone. If you have a critical or abnormal lab result, we will notify you by phone as soon as possible.  Submit refill requests through [email or web address] or call your pharmacy and they will forward the refill request to us. Please allow 3 business days for your refill to be completed.          Additional Information About Your Visit        Care EveryWhere ID     This is your Care EveryWhere ID. This could be used by other organizations to access your [place] medical records  [id number]        Your Vitals Were     Pulse Temperature Respirations Height Pulse Oximetry BMI (Body Mass Index)    100 97.5  F (36.4  C) (Tympanic) 18 1.714 m (5' 7.48\") 91% 28.27 kg/m2       Blood Pressure from Last 3 Encounters:   10/25/18 115/77   10/04/18 120/78   10/04/18 132/79    Weight from Last 3 Encounters:   10/25/18 83.1 kg (183 lb 1.6 oz)   10/04/18 82.9 kg (182 lb 11.2 oz)   09/19/18 82 kg (180 lb 12.8 oz)                 Today's Medication Changes          These changes are accurate as of 10/25/18 10:05 AM.  If you have any questions, ask your nurse or doctor.               Start taking these medicines.        Dose/Directions    furosemide 20 MG tablet   Commonly known as:  LASIX   Used for:  Recurrent tongue cancer (H)   Started by:  Lanny Neil MD        Dose:  20 mg   Take 1 tablet (20 mg) by mouth daily   Quantity:  30 tablet   Refills:  1            Where to get your medicines      These medications were sent to [id number] [place], [place] - [address]  [address] [address]    Hours:  Closed 10-16-08 business to [facility] Phone:  [phone]     furosemide 20 MG tablet                Primary Care Provider Office Phone # Fax #    Joe Mandel [phone] [address] [facility] [address] [address]        Equal Access to Services     [facility] : Hadii aad ku hadcelestematthias Brock, waaileenda luleejaha, qaybmarcelo andradeselvinelizabeth enriquez. So [facility] [phone].    ATENCIÓN: Si habla español, tiene a taylor disposición servicios gratuitos de asistencia lingüística. Toribio al [phone].    We comply with applicable federal civil rights laws and [place] laws. We do not discriminate on the basis of race, color, national origin, age, disability, sex, sexual orientation, or gender identity.            Thank you!     Thank you for choosing [facility]  for your care. Our goal is always to provide you with excellent care. Hearing back from our patients is one way we can continue to improve our services. Please take a few minutes to complete the written survey that you may receive in the mail after your visit with us. Thank you!             Your Updated Medication List - Protect others around you: Learn how to safely use, store and throw away your medicines at www.disposemymeds.org.          This list is accurate as of 10/25/18 10:05 AM.  Always use your most recent med list.                   Brand Name Dispense Instructions for use Diagnosis    furosemide 20 MG tablet    LASIX    30 tablet    Take 1 tablet (20 mg) by mouth daily    Recurrent tongue cancer (H)       levothyroxine 100 MCG tablet    SYNTHROID/LEVOTHROID    30 tablet    Take 1 tablet (100 mcg) by mouth daily Recheck Lab test in 2 months.    Acquired hypothyroidism       lisinopril 20 MG tablet    PRINIVIL/ZESTRIL    90 tablet    TAKE ONE TABLET BY MOUTH DAILY    Hypertension goal BP (blood pressure) < 140/90       LORazepam 0.5 MG tablet    ATIVAN    30 tablet    Take 1 tablet (0.5 mg) by mouth every 4 hours as needed (Anxiety, Nausea/Vomiting or Sleep)    Tongue cancer (H)       lovastatin 40 MG tablet    MEVACOR    90 tablet    TAKE ONE TABLET BY MOUTH NIGHTLY AT BEDTIME    Hyperlipidemia with target LDL less than  100       multivitamin  with lutein Caps per capsule      Take 1 capsule by mouth daily        prochlorperazine 10 MG tablet    COMPAZINE    30 tablet    Take 1 tablet (10 mg) by mouth every 6 hours as needed (Nausea/Vomiting)    Tongue cancer (H)       vitamin D 1000 units capsule      Take 1 capsule by mouth daily

## 2018-10-25 NOTE — NURSING NOTE
"Oncology Rooming Note    October 25, 2018 9:46 AM   Patrick Diop is a 82 year old male who presents for:    Chief Complaint   Patient presents with     Oncology Clinic Visit     3 week follow up recurrent Cancer of tongue base.      Initial Vitals: /77 (BP Location: Right arm, Patient Position: Sitting, Cuff Size: Adult Regular)  Pulse 100  Temp 97.5  F (36.4  C) (Tympanic)  Resp 18  Ht 1.714 m (5' 7.48\")  Wt 83.1 kg (183 lb 1.6 oz)  SpO2 91%  BMI 28.27 kg/m2 Estimated body mass index is 28.27 kg/(m^2) as calculated from the following:    Height as of this encounter: 1.714 m (5' 7.48\").    Weight as of this encounter: 83.1 kg (183 lb 1.6 oz). Body surface area is 1.99 meters squared.  No Pain (0) Comment: Data Unavailable   No LMP for male patient.  Allergies reviewed: Yes  Medications reviewed: Yes    Medications: Medication refills not needed today.  Pharmacy name entered into [facility]:    [facility] [place], [place] - [address] [id number] - [place], [place] - [address]    Clinical concerns: 3 week follow up recurrent CA of tongue base.     8 minutes for nursing intake (face to face time)     Ivonne Del Rio, [facility]            "

## 2018-10-25 NOTE — PROGRESS NOTES
Infusion Nursing Note:  Patrick Diop presents today for C10D1 Keytruda   Patient seen by provider today: Yes: Dr. Neil   present during visit today: Not Applicable.    Note:Paper copy of lab results from today given to pt.     Intravenous Access:  Labs drawn without difficulty.  Peripheral IV placed.    Treatment Conditions:  Not Applicable.      Post Infusion Assessment:  Patient tolerated infusion without incident.  Blood return noted pre and post infusion.  Site patent and intact, free from redness, edema or discomfort.  No evidence of extravasations.  Access discontinued per protocol.    Discharge Plan:   Patient discharged in stable condition accompanied by: self.  Departure Mode: Ambulatory.  Pt to return on 11/10/18 at 9:10 am for C11D1 and MD visit.    Marianna Duenas RN

## 2018-10-25 NOTE — LETTER
10/25/2018         RE: Patrick Diop  [address]  [facility] [address]        Dear Colleague,    Thank you for referring your patient, Patrick Diop, to the [facility]. Please see a copy of my visit note below.    Hematology/ Oncology Follow-up Visit:  Oct 25, 2018    Reason for Visit:   Chief Complaint   Patient presents with     Oncology Clinic Visit     3 week follow up recurrent Cancer of tongue base.        Oncologic History:    Recurrent tongue cancer (H)  Patrick Diop was noted to have a mass in his right neck during routine physical exam. CT scanning was subsequently obtained which also showed a right-sided tongue base and oropharyngeal mass, which together are consistent with malignancy. The patient himself has denied any unexplained weight loss. He denies any odynophagia, dysphagia, hoarseness or otalgia. 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 PET scan done on October 21, 2015 showed a mass at the level of the tongue base located along the anterior wall of the oropharynx and extending to the right lateral wall near the tonsillar pillar. This demonstrates abnormal increased FDG activity and is consistent with known malignancy. Hypermetabolic lymph nodes in the right and left neck are also noted as described. These are consistent with metastatic lymph nodes. There is no evidence of metastatic disease in the chest, abdomen or pelvis. CT of the soft tissues of the neck on October 21, 2015 was done in conjunction with PET scanning showed a 2.8 cm mass at the base of the tongue on the right effacing the right vallecula. There is also a 2.3 cm lymph node in the right level II region that is strongly suspicious for metastatic disease. These lesions are PET positive. Additionally, there is a 1.3 cm lymph node in left level III that is PET positive. There is a lymph node in right level V, also PET positive. He started on concurrent  Cisplatin with radiation therapy with cisplatin 100 mg/m to be given intravenously on days #1, #22 #43 of radiation therapy.      Interval History:  Patient returning today for follow-up visit.  Has been feeling well without any recent complaints weight loss night sweats fever or chills.  He has been tolerating immunotherapy well without significant side effects.    Review Of Systems:  Constitutional: Negative for fever, chills, and night sweats.  Skin: negative.  Eyes: negative.  Ears/Nose/Throat: negative.  Respiratory: No shortness of breath, dyspnea on exertion, cough, or hemoptysis.  Cardiovascular: negative.  Gastrointestinal: negative.  Genitourinary: negative.  Musculoskeletal: negative.  Neurologic: negative.  Psychiatric: negative.  Hematologic/Lymphatic/Immunologic: negative.  Endocrine: negative.    All other ROS negative unless mentioned in interval history.    Past medical, social, surgical, and family histories reviewed.    Allergies:  Allergies as of 10/25/2018     (No Known Allergies)       Current Medications:  Current Outpatient Prescriptions   Medication Sig Dispense Refill     Cholecalciferol (VITAMIN D) 1000 UNITS capsule Take 1 capsule by mouth daily       furosemide (LASIX) 20 MG tablet Take 1 tablet (20 mg) by mouth daily 30 tablet 1     levothyroxine (SYNTHROID/LEVOTHROID) 100 MCG tablet Take 1 tablet (100 mcg) by mouth daily Recheck Lab test in 2 months. 30 tablet 1     lisinopril (PRINIVIL/ZESTRIL) 20 MG tablet TAKE ONE TABLET BY MOUTH DAILY 90 tablet 2     lovastatin (MEVACOR) 40 MG tablet TAKE ONE TABLET BY MOUTH NIGHTLY AT BEDTIME 90 tablet 3     multivitamin  with lutein (OCUVITE WITH LTEIN) CAPS Take 1 capsule by mouth daily       LORazepam (ATIVAN) 0.5 MG tablet Take 1 tablet (0.5 mg) by mouth every 4 hours as needed (Anxiety, Nausea/Vomiting or Sleep) (Patient not taking: Reported on 9/19/2018) 30 tablet 2     prochlorperazine (COMPAZINE) 10 MG tablet Take 1 tablet (10 mg) by mouth  "every 6 hours as needed (Nausea/Vomiting) (Patient not taking: Reported on 9/19/2018) 30 tablet 2        Physical Exam:  /77 (BP Location: Right arm, Patient Position: Sitting, Cuff Size: Adult Regular)  Pulse 100  Temp 97.5  F (36.4  C) (Tympanic)  Resp 18  Ht 1.714 m (5' 7.48\")  Wt 83.1 kg (183 lb 1.6 oz)  SpO2 91%  BMI 28.27 kg/m2  Wt Readings from Last 12 Encounters:   10/25/18 83.1 kg (183 lb 1.6 oz)   10/04/18 82.9 kg (182 lb 11.2 oz)   09/19/18 82 kg (180 lb 12.8 oz)   09/13/18 81 kg (178 lb 9.6 oz)   08/23/18 78.9 kg (173 lb 14.4 oz)   08/01/18 81.3 kg (179 lb 3.2 oz)   07/11/18 78.2 kg (172 lb 8 oz)   06/21/18 77.1 kg (170 lb)   06/04/18 77 kg (169 lb 12.8 oz)   05/31/18 76.3 kg (168 lb 4.8 oz)   05/09/18 75.4 kg (166 lb 3.2 oz)   04/12/18 76.6 kg (168 lb 14.4 oz)     ECOG performance status: 0  GENERAL APPEARANCE: Healthy, alert and in no acute distress.  HEENT: Sclerae anicteric. PERRLA. Oropharynx without ulcers, lesions, or thrush.  NECK: Supple. No asymmetry or masses.  LYMPHATICS: No palpable cervical, supraclavicular, axillary, or inguinal lymphadenopathy.  RESP: Lungs clear to auscultation bilaterally without rales, rhonchi or wheezes.  CARDIOVASCULAR: Regular rate and rhythm. Normal S1, S2; no S3 or S4. No murmur, gallop, or rub.  ABDOMEN: Soft, nontender. Bowel sounds normal. No palpable organomegaly or masses.  MUSCULOSKELETAL: Extremities without gross deformities noted. No edema of bilateral lower extremities.  SKIN: No suspicious lesions or rashes.  NEURO: Alert and oriented x 3. Cranial nerves II-XII grossly intact.  PSYCHIATRIC: Mentation and affect appear normal.    Laboratory/Imaging Studies:  No visits with results within 2 Week(s) from this visit.  Latest known visit with results is:    Infusion Therapy Visit on 10/04/2018   Component Date Value Ref Range Status     Sodium 10/04/2018 142  133 - 144 mmol/L Final     Potassium 10/04/2018 4.7  3.4 - 5.3 mmol/L Final     " Chloride 10/04/2018 106  94 - 109 mmol/L Final     Carbon Dioxide 10/04/2018 28  20 - 32 mmol/L Final     Anion Gap 10/04/2018 8  3 - 14 mmol/L Final     Glucose 10/04/2018 89  70 - 99 mg/dL Final     Urea Nitrogen 10/04/2018 15  7 - 30 mg/dL Final     Creatinine 10/04/2018 1.20  0.66 - 1.25 mg/dL Final     GFR Estimate 10/04/2018 58* >60 mL/min/1.7m2 Final    Non  GFR Calc     GFR Estimate If Black 10/04/2018 70  >60 mL/min/1.7m2 Final    African American GFR Calc     Calcium 10/04/2018 8.8  8.5 - 10.1 mg/dL Final     Bilirubin Total 10/04/2018 0.7  0.2 - 1.3 mg/dL Final     Albumin 10/04/2018 3.6  3.4 - 5.0 g/dL Final     Protein Total 10/04/2018 7.7  6.8 - 8.8 g/dL Final     Alkaline Phosphatase 10/04/2018 85  40 - 150 U/L Final     ALT 10/04/2018 18  0 - 70 U/L Final     AST 10/04/2018 15  0 - 45 U/L Final     TSH 10/04/2018 10.75* 0.40 - 4.00 mU/L Final     T4 Free 10/04/2018 1.28  0.76 - 1.46 ng/dL Final          Assessment and plan:    (C02.9) Recurrent tongue cancer (H)  (primary encounter diagnosis)  Patient has been tolerating immunotherapy without any significant side effects.  I would recommend to continue on the current management plan.  I will see the patient again following imaging studies to assess response to treatment.    (C18.9) Malignant neoplasm of colon, unspecified part of colon (H)  Is no evidence of colon cancer recurrence per    (E03.9) Acquired hypothyroidism  Patient currently on Synthroid 100 mcg orally daily per    (R11.2,  T45.1X5A) Chemotherapy induced nausea and vomiting  Nausea is currently well-controlled.    (E78.5) Hyperlipidemia with target LDL less than 100  Patient currently on Mevacor 40 mg orally daily.    (I10) Hypertension goal BP (blood pressure) < 140/90  Patient currently on lisinopril 20 mg orally daily.  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  Diagnosis and treatment plans were explained to the patient. The patient expressed understanding of the content. The patient asked appropriate questions. The patient questions were answered to his satisfaction.    Chart documentation with Dragon Voice recognition Software. Although reviewed after completion, some words and grammatical errors may remain.    Again, thank you for allowing me to participate in the care of your patient.        Sincerely,        Lanny Neil MD

## 2018-10-25 NOTE — PROGRESS NOTES
Please call.  TSH is normal indicating that the level of thyroid hormone is in the normal range.   The blood chemistries (Basic metabolic panel) are all normal including electrolytes (salt balances in the blood), blood glucose and liver tests.  Kidney tests borderline-similar to the past.   PLAN: No new changes in treatment recommended..

## 2018-10-25 NOTE — MR AVS SNAPSHOT
After Visit Summary   10/25/2018    Patrick Diop    MRN: [id number]           Patient Information     Date Of Birth          1935        Visit Information        Provider Department      10/25/2018 10:00 AM ROOM 8 [facility]        Today's Diagnoses     Recurrent tongue cancer (H)    -  1       Follow-ups after your visit        Your next 10 appointments already scheduled     Nov 13, 2018  9:30 AM CST   (Arrive by 9:00 AM)   CT CHEST/ABDOMEN/PELVIS W CONTRAST with [facility]   [facility] CT ([facility])    [address] [address]   [phone]           How do I prepare for my exam? (Food and drink instructions) To prepare: Do not eat or drink for 2 hours before your exam. If you need to take medicine, you may take it with small sips of water. (We may ask you to take liquid medicine as well.)  How do I prepare for my exam? (Other instructions) Please arrive 30 minutes early for your CT.  Once in the department you might be asked to drink water 15-20 minutes prior to your exam.  If indicated you may be asked to drink an oral contrast in advance of your CT.  If this is the case, the imaging team will let you know or be in contact with you prior to your appointment  Patients over 70 or patients with diabetes or kidney problems: If you haven t had a blood test (creatinine test) within the last 30 days, the Cardiologist/Radiologist may require you to get this test prior to your exam.  If you have diabetes:  Continue to take your metformin medication on the day of your exam  What should I wear: Please wear loose clothing, such as a sweat suit or jogging clothes. Avoid snaps, zippers and other metal. We may ask you to undress and put on a hospital gown.  How long does the exam take: Most scans take less than 20 minutes.  What should I bring: Please bring any scans or X-rays taken at other hospitals, if similar tests were done. Also bring a list of  your medicines, including vitamins, minerals and over-the-counter drugs. It is safest to leave personal items at home.  Do I need a : No  is needed.  What do I need to tell my doctor? Be sure to tell your doctor: * If you have any allergies. * If there s any chance you are pregnant. * If you are breastfeeding.  What should I do after the exam: No restrictions, You may resume normal activities.  What is this test: A CT (computed tomography) scan is a series of pictures that allows us to look inside your body. The scanner creates images of the body in cross sections, much like slices of bread. This helps us see any problems more clearly. You may receive contrast (X-ray dye) before or during your scan. You will be asked to drink the contrast.  Who should I call with questions: If you have any questions, please call the Imaging Department where you will have your exam. Directions, parking instructions, and other information is available on our website, [email or web address].            Nov 15, 2018  9:10 AM CST   LAB with [facility] ([facility])    [address] [place] [address]   [phone]           Please do not eat 10-12 hours before your appointment if you are coming in fasting for labs on lipids, cholesterol, or glucose (sugar). This does not apply to pregnant women. Water, hot tea and black coffee (with nothing added) are okay. Do not drink other fluids, diet soda or chew gum.            Nov 15, 2018  9:40 AM CST   Return Visit with Lanny Neil MD   [facility] ([facility])    [facility]  [address]  [facility] [address]   [phone]            Nov 15, 2018 10:00 AM CST   Level 2 with ROOM 5 [facility] ([facility])    [address]  [facility] [address]   [phone]            Dec 05, 2018  9:40  AM CST   (Arrive by 9:30 AM)   LAB with [facility] ([facility])    [address] [place]  [facility] [address] [phone]           Please do not eat 10-12 hours before your appointment if you are coming in fasting for labs on lipids, cholesterol, or glucose (sugar). This does not apply to pregnant women. Water, hot tea and black coffee (with nothing added) are okay. Do not drink other fluids, diet soda or chew gum.            Dec 05, 2018 10:00 AM CST   Level 2 with ROOM 4 [facility] ([facility])    [facility]  [address]  [facility] [id number] [phone]              Future tests that were ordered for you today     Open Future Orders        Priority Expected Expires Ordered    CBC with platelets differential Routine 10/25/2018 10/25/2019 10/25/2018    Comprehensive metabolic panel Routine 10/25/2018 10/25/2019 10/25/2018    CT Chest/Abdomen/Pelvis w Contrast Routine 11/13/2018 10/26/2019 10/25/2018            Who to contact     If you have questions or need follow up information about today's clinic visit or your schedule please contact [facility] directly at [phone].  Normal or non-critical lab and imaging results will be communicated to you by MyChart, letter or phone within 4 business days after the clinic has received the results. If you do not hear from us within 7 days, please contact the clinic through MyChart or phone. If you have a critical or abnormal lab result, we will notify you by phone as soon as possible.  Submit refill requests through [employer] or call your pharmacy and they will forward the refill request to us. Please allow 3 business days for your refill to be completed.          Additional Information About Your Visit        Care EveryWhere ID     This is your Care EveryWhere ID. This could be used by other organizations to access your [facility]  records  [id number]        Your Vitals Were     Pulse                   96            Blood Pressure from Last 3 Encounters:   10/25/18 113/75   10/25/18 115/77   10/04/18 120/78    Weight from Last 3 Encounters:   10/25/18 83.1 kg (183 lb 1.6 oz)   10/04/18 82.9 kg (182 lb 11.2 oz)   09/19/18 82 kg (180 lb 12.8 oz)              We Performed the Following     Comprehensive metabolic panel     TSH with free T4 reflex          Today's Medication Changes          These changes are accurate as of 10/25/18  4:01 PM.  If you have any questions, ask your nurse or doctor.               Start taking these medicines.        Dose/Directions    furosemide 20 MG tablet   Commonly known as:  LASIX   Used for:  Recurrent tongue cancer (H)   Started by:  Lanny Neil MD        Dose:  20 mg   Take 1 tablet (20 mg) by mouth daily   Quantity:  30 tablet   Refills:  1            Where to get your medicines      These medications were sent to [id number] [address]  [address] [address]    Hours:  Closed 10-16-08 business to [facility] Phone:  [phone]     furosemide 20 MG tablet                Primary Care Provider Office Phone # Fax #    Joe Mandel [phone] [address] [facility] [address]        Equal Access to Services     RAINA SOLANO AH: Hadalysa houston Sozoë, waaxda luqviridiana, qaybta kaalelizabeth aguero. So [facility] [phone].    ATENCIÓN: Si habla español, tiene a taylor disposición servicios gratuitos de asistencia lingüística. Toribio al [phone].    We comply with applicable federal civil rights laws and [place] laws. We do not discriminate on the basis of race, color, national origin, age, disability, sex, sexual orientation, or gender identity.            Thank you!     Thank you for choosing [facility]  for your care. Our goal is always to provide you with  excellent care. Hearing back from our patients is one way we can continue to improve our services. Please take a few minutes to complete the written survey that you may receive in the mail after your visit with us. Thank you!             Your Updated Medication List - Protect others around you: Learn how to safely use, store and throw away your medicines at www.disposemymeds.org.          This list is accurate as of 10/25/18  4:01 PM.  Always use your most recent med list.                   Brand Name Dispense Instructions for use Diagnosis    furosemide 20 MG tablet    LASIX    30 tablet    Take 1 tablet (20 mg) by mouth daily    Recurrent tongue cancer (H)       levothyroxine 100 MCG tablet    SYNTHROID/LEVOTHROID    30 tablet    Take 1 tablet (100 mcg) by mouth daily Recheck Lab test in 2 months.    Acquired hypothyroidism       lisinopril 20 MG tablet    PRINIVIL/ZESTRIL    90 tablet    TAKE ONE TABLET BY MOUTH DAILY    Hypertension goal BP (blood pressure) < 140/90       LORazepam 0.5 MG tablet    ATIVAN    30 tablet    Take 1 tablet (0.5 mg) by mouth every 4 hours as needed (Anxiety, Nausea/Vomiting or Sleep)    Tongue cancer (H)       lovastatin 40 MG tablet    MEVACOR    90 tablet    TAKE ONE TABLET BY MOUTH NIGHTLY AT BEDTIME    Hyperlipidemia with target LDL less than 100       multivitamin  with lutein Caps per capsule      Take 1 capsule by mouth daily        prochlorperazine 10 MG tablet    COMPAZINE    30 tablet    Take 1 tablet (10 mg) by mouth every 6 hours as needed (Nausea/Vomiting)    Tongue cancer (H)       vitamin D 1000 units capsule      Take 1 capsule by mouth daily

## 2018-10-25 NOTE — PATIENT INSTRUCTIONS
Follow up in 3 weeks. Please get blood work and imaging prior to next visit. Continue with chemotherapy.

## 2018-10-29 NOTE — PROGRESS NOTES
Hematology/ Oncology Follow-up Visit:  Oct 25, 2018    Reason for Visit:   Chief Complaint   Patient presents with     Oncology Clinic Visit     3 week follow up recurrent Cancer of tongue base.        Oncologic History:    Recurrent tongue cancer (H)  Patrick Diop was noted to have a mass in his right neck during routine physical exam. CT scanning was subsequently obtained which also showed a right-sided tongue base and oropharyngeal mass, which together are consistent with malignancy. The patient himself has denied any unexplained weight loss. He denies any odynophagia, dysphagia, hoarseness or otalgia. 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 PET scan done on October 21, 2015 showed a mass at the level of the tongue base located along the anterior wall of the oropharynx and extending to the right lateral wall near the tonsillar pillar. This demonstrates abnormal increased FDG activity and is consistent with known malignancy. Hypermetabolic lymph nodes in the right and left neck are also noted as described. These are consistent with metastatic lymph nodes. There is no evidence of metastatic disease in the chest, abdomen or pelvis. CT of the soft tissues of the neck on October 21, 2015 was done in conjunction with PET scanning showed a 2.8 cm mass at the base of the tongue on the right effacing the right vallecula. There is also a 2.3 cm lymph node in the right level II region that is strongly suspicious for metastatic disease. These lesions are PET positive. Additionally, there is a 1.3 cm lymph node in left level III that is PET positive. There is a lymph node in right level V, also PET positive. He started on concurrent Cisplatin with radiation therapy with cisplatin 100 mg/m to be given intravenously on days #1, #22 #43 of radiation therapy.      Interval History:  Patient returning today for follow-up visit.  Has been feeling well without any recent complaints  weight loss night sweats fever or chills.  He has been tolerating immunotherapy well without significant side effects.    Review Of Systems:  Constitutional: Negative for fever, chills, and night sweats.  Skin: negative.  Eyes: negative.  Ears/Nose/Throat: negative.  Respiratory: No shortness of breath, dyspnea on exertion, cough, or hemoptysis.  Cardiovascular: negative.  Gastrointestinal: negative.  Genitourinary: negative.  Musculoskeletal: negative.  Neurologic: negative.  Psychiatric: negative.  Hematologic/Lymphatic/Immunologic: negative.  Endocrine: negative.    All other ROS negative unless mentioned in interval history.    Past medical, social, surgical, and family histories reviewed.    Allergies:  Allergies as of 10/25/2018     (No Known Allergies)       Current Medications:  Current Outpatient Prescriptions   Medication Sig Dispense Refill     Cholecalciferol (VITAMIN D) 1000 UNITS capsule Take 1 capsule by mouth daily       furosemide (LASIX) 20 MG tablet Take 1 tablet (20 mg) by mouth daily 30 tablet 1     levothyroxine (SYNTHROID/LEVOTHROID) 100 MCG tablet Take 1 tablet (100 mcg) by mouth daily Recheck Lab test in 2 months. 30 tablet 1     lisinopril (PRINIVIL/ZESTRIL) 20 MG tablet TAKE ONE TABLET BY MOUTH DAILY 90 tablet 2     lovastatin (MEVACOR) 40 MG tablet TAKE ONE TABLET BY MOUTH NIGHTLY AT BEDTIME 90 tablet 3     multivitamin  with lutein (OCUVITE WITH LTEIN) CAPS Take 1 capsule by mouth daily       LORazepam (ATIVAN) 0.5 MG tablet Take 1 tablet (0.5 mg) by mouth every 4 hours as needed (Anxiety, Nausea/Vomiting or Sleep) (Patient not taking: Reported on 9/19/2018) 30 tablet 2     prochlorperazine (COMPAZINE) 10 MG tablet Take 1 tablet (10 mg) by mouth every 6 hours as needed (Nausea/Vomiting) (Patient not taking: Reported on 9/19/2018) 30 tablet 2        Physical Exam:  /77 (BP Location: Right arm, Patient Position: Sitting, Cuff Size: Adult Regular)  Pulse 100  Temp 97.5  F (36.4  C)  "(Tympanic)  Resp 18  Ht 1.714 m (5' 7.48\")  Wt 83.1 kg (183 lb 1.6 oz)  SpO2 91%  BMI 28.27 kg/m2  Wt Readings from Last 12 Encounters:   10/25/18 83.1 kg (183 lb 1.6 oz)   10/04/18 82.9 kg (182 lb 11.2 oz)   09/19/18 82 kg (180 lb 12.8 oz)   09/13/18 81 kg (178 lb 9.6 oz)   08/23/18 78.9 kg (173 lb 14.4 oz)   08/01/18 81.3 kg (179 lb 3.2 oz)   07/11/18 78.2 kg (172 lb 8 oz)   06/21/18 77.1 kg (170 lb)   06/04/18 77 kg (169 lb 12.8 oz)   05/31/18 76.3 kg (168 lb 4.8 oz)   05/09/18 75.4 kg (166 lb 3.2 oz)   04/12/18 76.6 kg (168 lb 14.4 oz)     ECOG performance status: 0  GENERAL APPEARANCE: Healthy, alert and in no acute distress.  HEENT: Sclerae anicteric. PERRLA. Oropharynx without ulcers, lesions, or thrush.  NECK: Supple. No asymmetry or masses.  LYMPHATICS: No palpable cervical, supraclavicular, axillary, or inguinal lymphadenopathy.  RESP: Lungs clear to auscultation bilaterally without rales, rhonchi or wheezes.  CARDIOVASCULAR: Regular rate and rhythm. Normal S1, S2; no S3 or S4. No murmur, gallop, or rub.  ABDOMEN: Soft, nontender. Bowel sounds normal. No palpable organomegaly or masses.  MUSCULOSKELETAL: Extremities without gross deformities noted. No edema of bilateral lower extremities.  SKIN: No suspicious lesions or rashes.  NEURO: Alert and oriented x 3. Cranial nerves II-XII grossly intact.  PSYCHIATRIC: Mentation and affect appear normal.    Laboratory/Imaging Studies:  No visits with results within 2 Week(s) from this visit.  Latest known visit with results is:    Infusion Therapy Visit on 10/04/2018   Component Date Value Ref Range Status     Sodium 10/04/2018 142  133 - 144 mmol/L Final     Potassium 10/04/2018 4.7  3.4 - 5.3 mmol/L Final     Chloride 10/04/2018 106  94 - 109 mmol/L Final     Carbon Dioxide 10/04/2018 28  20 - 32 mmol/L Final     Anion Gap 10/04/2018 8  3 - 14 mmol/L Final     Glucose 10/04/2018 89  70 - 99 mg/dL Final     Urea Nitrogen 10/04/2018 15  7 - 30 mg/dL Final     " Creatinine 10/04/2018 1.20  0.66 - 1.25 mg/dL Final     GFR Estimate 10/04/2018 58* >60 mL/min/1.7m2 Final    Non  GFR Calc     GFR Estimate If Black 10/04/2018 70  >60 mL/min/1.7m2 Final    African American GFR Calc     Calcium 10/04/2018 8.8  8.5 - 10.1 mg/dL Final     Bilirubin Total 10/04/2018 0.7  0.2 - 1.3 mg/dL Final     Albumin 10/04/2018 3.6  3.4 - 5.0 g/dL Final     Protein Total 10/04/2018 7.7  6.8 - 8.8 g/dL Final     Alkaline Phosphatase 10/04/2018 85  40 - 150 U/L Final     ALT 10/04/2018 18  0 - 70 U/L Final     AST 10/04/2018 15  0 - 45 U/L Final     TSH 10/04/2018 10.75* 0.40 - 4.00 mU/L Final     T4 Free 10/04/2018 1.28  0.76 - 1.46 ng/dL Final          Assessment and plan:    (C02.9) Recurrent tongue cancer (H)  (primary encounter diagnosis)  Patient has been tolerating immunotherapy without any significant side effects.  I would recommend to continue on the current management plan.  I will see the patient again following imaging studies to assess response to treatment.    (C18.9) Malignant neoplasm of colon, unspecified part of colon (H)  Is no evidence of colon cancer recurrence per    (E03.9) Acquired hypothyroidism  Patient currently on Synthroid 100 mcg orally daily per    (R11.2,  T45.1X5A) Chemotherapy induced nausea and vomiting  Nausea is currently well-controlled.    (E78.5) Hyperlipidemia with target LDL less than 100  Patient currently on Mevacor 40 mg orally daily.    (I10) Hypertension goal BP (blood pressure) < 140/90  Patient currently on lisinopril 20 mg orally daily.  Is also on Lasix 20 mg once daily per    (E11.22,  N18.1) Type 2 diabetes mellitus with stage 1 chronic kidney disease, without long-term current use of insulin (H)  Patient diabetes has been well controlled on diet    The patient is ready to learn, no apparent learning barriers were identified.  Diagnosis and treatment plans were explained to the patient. The patient expressed understanding of the  content. The patient asked appropriate questions. The patient questions were answered to his satisfaction.    Chart documentation with Dragon Voice recognition Software. Although reviewed after completion, some words and grammatical errors may remain.

## 2018-11-15 NOTE — NURSING NOTE
"Oncology Rooming Note    November 15, 2018 9:40 AM   Patrick Diop is a 82 year old male who presents for:    Chief Complaint   Patient presents with     Oncology Clinic Visit     3 week follow up recurrent CA of tongue base. Review CT scan results.      Initial Vitals: /85 (BP Location: Right arm, Patient Position: Sitting, Cuff Size: Adult Small)  Pulse 92  Temp 97.7  F (36.5  C) (Tympanic)  Resp 18  Ht 1.714 m (5' 7.48\")  Wt 81.4 kg (179 lb 8 oz)  SpO2 91%  BMI 27.72 kg/m2 Estimated body mass index is 27.72 kg/(m^2) as calculated from the following:    Height as of this encounter: 1.714 m (5' 7.48\").    Weight as of this encounter: 81.4 kg (179 lb 8 oz). Body surface area is 1.97 meters squared.  No Pain (0) Comment: Data Unavailable   No LMP for male patient.  Allergies reviewed: Yes  Medications reviewed: Yes    Medications: Medication refills not needed today.  Pharmacy name entered into [employer]:    [facility] - [place], [place] - [address] [id number] - [place], [place] - [address]    Clinical concerns: 3 week follow up recurrent cancer of the tongue base. Review CT scan results. He feels his edema is better in his legs.     8 minutes for nursing intake (face to face time)     Ivonne Del Rio, [facility]            "

## 2018-11-15 NOTE — PROGRESS NOTES
Hematology/ Oncology Follow-up Visit:  Nov 15, 2018    Reason for Visit:   Chief Complaint   Patient presents with     Oncology Clinic Visit     3 week follow up recurrent CA of tongue base. Review CT scan results.        Oncologic History:    Recurrent tongue cancer (H)  Patrick Diop was noted to have a mass in his right neck during routine physical exam. CT scanning was subsequently obtained which also showed a right-sided tongue base and oropharyngeal mass, which together are consistent with malignancy. The patient himself has denied any unexplained weight loss. He denies any odynophagia, dysphagia, hoarseness or otalgia. 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 PET scan done on October 21, 2015 showed a mass at the level of the tongue base located along the anterior wall of the oropharynx and extending to the right lateral wall near the tonsillar pillar. This demonstrates abnormal increased FDG activity and is consistent with known malignancy. Hypermetabolic lymph nodes in the right and left neck are also noted as described. These are consistent with metastatic lymph nodes. There is no evidence of metastatic disease in the chest, abdomen or pelvis. CT of the soft tissues of the neck on October 21, 2015 was done in conjunction with PET scanning showed a 2.8 cm mass at the base of the tongue on the right effacing the right vallecula. There is also a 2.3 cm lymph node in the right level II region that is strongly suspicious for metastatic disease. These lesions are PET positive. Additionally, there is a 1.3 cm lymph node in left level III that is PET positive. There is a lymph node in right level V, also PET positive. He started on concurrent Cisplatin with radiation therapy with cisplatin 100 mg/m to be given intravenously on days #1, #22 #43 of radiation therapy.      Interval History:  Patient returning today for follow-up.  He has been having increasing swelling  both lower extremities as well as shortness of breath on exertion.  He has been on Lasix with some improvement of his lower extremity edema.  Patient denies any chest pain or cough or wheezing.  He denies any weight loss or fever or chills.    Review Of Systems:  Constitutional: Negative for fever, chills, and night sweats.  Skin: negative.  Eyes: negative.  Ears/Nose/Throat: negative.  Respiratory: No shortness of breath, dyspnea on exertion, cough, or hemoptysis.  Cardiovascular: negative.  Gastrointestinal: negative.  Genitourinary: negative.  Musculoskeletal: negative.  Neurologic: negative.  Psychiatric: negative.  Hematologic/Lymphatic/Immunologic: negative.  Endocrine: negative.    All other ROS negative unless mentioned in interval history.    Past medical, social, surgical, and family histories reviewed.    Allergies:  Allergies as of 11/15/2018     (No Known Allergies)       Current Medications:  Current Outpatient Prescriptions   Medication Sig Dispense Refill     Cholecalciferol (VITAMIN D) 1000 UNITS capsule Take 1 capsule by mouth daily       furosemide (LASIX) 20 MG tablet Take 1 tablet (20 mg) by mouth daily 30 tablet 1     levothyroxine (SYNTHROID/LEVOTHROID) 100 MCG tablet Take 1 tablet (100 mcg) by mouth daily Recheck Lab test in 2 months. 30 tablet 1     lisinopril (PRINIVIL/ZESTRIL) 20 MG tablet TAKE ONE TABLET BY MOUTH DAILY 90 tablet 2     lovastatin (MEVACOR) 40 MG tablet TAKE ONE TABLET BY MOUTH NIGHTLY AT BEDTIME 90 tablet 3     multivitamin  with lutein (OCUVITE WITH LTEIN) CAPS Take 1 capsule by mouth daily       LORazepam (ATIVAN) 0.5 MG tablet Take 1 tablet (0.5 mg) by mouth every 4 hours as needed (Anxiety, Nausea/Vomiting or Sleep) (Patient not taking: Reported on 9/19/2018) 30 tablet 2     prochlorperazine (COMPAZINE) 10 MG tablet Take 1 tablet (10 mg) by mouth every 6 hours as needed (Nausea/Vomiting) (Patient not taking: Reported on 9/19/2018) 30 tablet 2        Physical Exam:  BP  "121/85 (BP Location: Right arm, Patient Position: Sitting, Cuff Size: Adult Small)  Pulse 92  Temp 97.7  F (36.5  C) (Tympanic)  Resp 18  Ht 1.714 m (5' 7.48\")  Wt 81.4 kg (179 lb 8 oz)  SpO2 91%  BMI 27.72 kg/m2  Wt Readings from Last 12 Encounters:   11/15/18 81.4 kg (179 lb 8 oz)   10/25/18 83.1 kg (183 lb 1.6 oz)   10/04/18 82.9 kg (182 lb 11.2 oz)   09/19/18 82 kg (180 lb 12.8 oz)   09/13/18 81 kg (178 lb 9.6 oz)   08/23/18 78.9 kg (173 lb 14.4 oz)   08/01/18 81.3 kg (179 lb 3.2 oz)   07/11/18 78.2 kg (172 lb 8 oz)   06/21/18 77.1 kg (170 lb)   06/04/18 77 kg (169 lb 12.8 oz)   05/31/18 76.3 kg (168 lb 4.8 oz)   05/09/18 75.4 kg (166 lb 3.2 oz)     ECOG performance status: 1  GENERAL APPEARANCE: Healthy, alert and in no acute distress.  HEENT: Sclerae anicteric. PERRLA. Oropharynx without ulcers, lesions, or thrush.  NECK: Supple. No asymmetry or masses.  LYMPHATICS: No palpable cervical, supraclavicular, axillary, or inguinal lymphadenopathy.  RESP: Lungs clear to auscultation bilaterally without rales, rhonchi or wheezes.  CARDIOVASCULAR: Regular rate and rhythm. Normal S1, S2; no S3 or S4. No murmur, gallop, or rub.  ABDOMEN: Soft, nontender. Bowel sounds normal. No palpable organomegaly or masses.  MUSCULOSKELETAL: Extremities without gross deformities noted. No edema of bilateral lower extremities.  SKIN: No suspicious lesions or rashes.  NEURO: Alert and oriented x 3. Cranial nerves II-XII grossly intact.  PSYCHIATRIC: Mentation and affect appear normal.    Laboratory/Imaging Studies:  No visits with results within 2 Week(s) from this visit.  Latest known visit with results is:    Infusion Therapy Visit on 10/25/2018   Component Date Value Ref Range Status     Sodium 10/25/2018 141  133 - 144 mmol/L Final     Potassium 10/25/2018 4.6  3.4 - 5.3 mmol/L Final     Chloride 10/25/2018 104  94 - 109 mmol/L Final     Carbon Dioxide 10/25/2018 26  20 - 32 mmol/L Final     Anion Gap 10/25/2018 11  3 - 14 " mmol/L Final     Glucose 10/25/2018 97  70 - 99 mg/dL Final     Urea Nitrogen 10/25/2018 18  7 - 30 mg/dL Final     Creatinine 10/25/2018 1.22  0.66 - 1.25 mg/dL Final     GFR Estimate 10/25/2018 57* >60 mL/min/1.7m2 Final    Non  GFR Calc     GFR Estimate If Black 10/25/2018 69  >60 mL/min/1.7m2 Final    African American GFR Calc     Calcium 10/25/2018 8.9  8.5 - 10.1 mg/dL Final     Bilirubin Total 10/25/2018 0.8  0.2 - 1.3 mg/dL Final     Albumin 10/25/2018 3.6  3.4 - 5.0 g/dL Final     Protein Total 10/25/2018 7.7  6.8 - 8.8 g/dL Final     Alkaline Phosphatase 10/25/2018 80  40 - 150 U/L Final     ALT 10/25/2018 17  0 - 70 U/L Final     AST 10/25/2018 16  0 - 45 U/L Final     TSH 10/25/2018 2.18  0.40 - 4.00 mU/L Final        Recent Results (from the past 744 hour(s))   CT Chest/Abdomen/Pelvis w Contrast    Narrative    CT CHEST/ABDOMEN/PELVIS W CONTRAST 11/13/2018 9:44 AM    HISTORY: Tongue malignancy. Follow-up.    CONTRAST:  90 mL [id number].    TECHNIQUE: CT of the chest, abdomen, and pelvis is performed with IV  contrast.    Routine evaluated structures in the chest include the lungs,  mediastinal structures, pleura, and chest wall.    Routine assessed structures in the abdomen include the liver, spleen,  pancreas, adrenal glands, and kidneys. Also assessed are the  retroperitoneum, gastrointestinal tract, and the abdominal wall.    Intrapelvic anatomy is also assessed.    Radiation dose for this scan is reduced using automated exposure  control, adjustment of the mA and/or kV according to patient size, or  iterative reconstruction technique.    COMPARISON: Chest CT dated 7/30/2018.  PET CT dated 3/14/2018.    FINDINGS:    Chest: Compressive atelectasis is present at the right lung base and  there is less atelectasis at the left lung base. A nodular structure  measuring 1.6 cm with central coarse calcification is stable in the  lingula dating back to 2015. Its appearance and stability  are  consistent with a benign entity. A right pleural effusion is present  and larger compared to the prior study. A left pleural effusion is  stable. A borderline in size right paratracheal lymph node and  subcarinal lymph node are stable from 2015 and consistent with a  benign entity. 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 Cardiomegaly is again seen.    Abdomen: Fatty infiltration of the liver is present. A generalized  edematous state to a mild degree is seen involving the mesentery and  superficial soft tissues.    Pelvis: In the superficial soft tissues of the low right buttock area  on image #135 there is a cystic lesion that measures 5.3 cm. This may  relate to an unusually large sebaceous cyst. There is no inflammatory  change around it to support infection.      Impression    IMPRESSION:  1.  No evidence of metastatic disease.  2. Bilateral pleural effusions are again seen and the right one is  slightly larger compared to the prior study. These findings along with  the generalized edematous state may be cardiac in origin.  3. There is a superficial cystic lesion in lateral right buttock area,  as described above.    MARYJO HERRERA MD       Assessment and plan:    (C02.9) Recurrent tongue cancer (H)  (primary encounter diagnosis)  Reviewed with the patient today the results from most CT scan.  His bilateral pleural effusion probably secondary to congestive heart failure.  Immune therapy will be held at this time.  I would recommend patient to be evaluated by cardiology.  Today I will order echocardiogram as well.    (C18.9) Malignant neoplasm of colon, unspecified part of colon (H)  There is no evidence of disease recurrence.    (I10) Hypertension goal BP (blood pressure) < 140/90  Blood pressure is currently well controlled on lisinopril 20 mg orally daily.    (E78.5) Hyperlipidemia with target LDL less than 100  Patient currently on Mevacor 40 mg orally  daily.    (E03.9) Acquired hypothyroidism  Patient currently on lisinopril 20 mg orally daily.    (E11.22,  N18.1) Type 2 diabetes mellitus with stage 1 chronic kidney disease, without long-term current use of insulin (H)  Blood sugars currently well controlled on diet.    (N18.1) CKD (chronic kidney disease) stage 1, GFR 90 ml/min or greater  Creatinine 1.4.  Some increase of creatinine probably due to Lasix.  We will continue to monitor.    The patient is ready to learn, no apparent learning barriers were identified.  Diagnosis and treatment plans were explained to the patient. The patient expressed understanding of the content. The patient asked appropriate questions. The patient questions were answered to his satisfaction.    Chart documentation with Dragon Voice recognition Software. Although reviewed after completion, some words and grammatical errors may remain.

## 2018-11-15 NOTE — MR AVS SNAPSHOT
After Visit Summary   11/15/2018    Patrick Diop    MRN: [id number]           Patient Information     Date Of Birth          1935        Visit Information        Provider Department      11/15/2018 9:40 AM Lanny Neil MD [facility] ONCOLOGY      Today's Diagnoses     Recurrent tongue cancer (H)    -  1    Malignant neoplasm of colon, unspecified part of colon (H)        Hypertension goal BP (blood pressure) < 140/90        Hyperlipidemia with target LDL less than 100        Acquired hypothyroidism        Type 2 diabetes mellitus with stage 1 chronic kidney disease, without long-term current use of insulin (H)        CKD (chronic kidney disease) stage 1, GFR 90 ml/min or greater          Care Instructions    We are going to schedule you for an echocardiogram. We will hold chemotherapy for 1 month. Dr. Neil would like to see you back in 1 month for a follow up appointment.      When you are in need of a refill, please call your pharmacy and they will send us a request.      Copy of appointments, and after visit summary (AVS) given to patient.      If you have any questions during business hours (M-F 8 AM- 4PM), please call Natividad Iyer RN Oncology Hematology  at [facility] [phone].       For questions after business hours, or on holidays/weekends, please call our after hours Nurse Triage line [phone]. Thank you.            Follow-ups after your visit        Follow-up notes from your care team     Return in about 4 weeks (around 12/13/2018).      Your next 10 appointments already scheduled     Nov 21, 2018  9:40 AM CST   [facility] with Joe Mandel MD   [facility]    [address] [place] [id number]   [phone]            Dec 12, 2018  9:20 AM CST   Return Visit with Lanny Neil MD   [facility] ([facility])    [facility]  "Ctr [facility]  [address]  [facility] [address]   [phone]              Future tests that were ordered for you today     Open Future Orders        Priority Expected Expires Ordered    Echocardiogram Limited Routine  11/15/2019 11/15/2018            Who to contact     If you have questions or need follow up information about today's clinic visit or your schedule please contact [facility] directly at [phone].  Normal or non-critical lab and imaging results will be communicated to you by MyChart, letter or phone within 4 business days after the clinic has received the results. If you do not hear from us within 7 days, please contact the clinic through MyChart or phone. If you have a critical or abnormal lab result, we will notify you by phone as soon as possible.  Submit refill requests through [employer] or call your pharmacy and they will forward the refill request to us. Please allow 3 business days for your refill to be completed.          Additional Information About Your Visit        Care EveryWhere ID     This is your Care EveryWhere ID. This could be used by other organizations to access your [place] medical records  [id number]        Your Vitals Were     Pulse Temperature Respirations Height Pulse Oximetry BMI (Body Mass Index)    92 97.7  F (36.5  C) (Tympanic) 18 1.714 m (5' 7.48\") 91% 27.72 kg/m2       Blood Pressure from Last 3 Encounters:   11/15/18 121/85   10/25/18 113/75   10/25/18 115/77    Weight from Last 3 Encounters:   11/15/18 81.4 kg (179 lb 8 oz)   10/25/18 83.1 kg (183 lb 1.6 oz)   10/04/18 82.9 kg (182 lb 11.2 oz)               Primary Care Provider Office Phone # Fax #    Joe Mandel [phone] [address] [place] [address]        Equal Access to Services     RAINA SOLANO : Norma Brock, waaileenda luqadaha, qaybta kaalelizabeth aguero. So [facility] " [phone].    ATENCIÓN: Si sharmila carrillo, tiene a taylor disposición servicios gratuitos de asistencia lingüística. Toribio pelayo [phone].    We comply with applicable federal civil rights laws and [place] laws. We do not discriminate on the basis of race, color, national origin, age, disability, sex, sexual orientation, or gender identity.            Thank you!     Thank you for choosing [facility]  for your care. Our goal is always to provide you with excellent care. Hearing back from our patients is one way we can continue to improve our services. Please take a few minutes to complete the written survey that you may receive in the mail after your visit with us. Thank you!             Your Updated Medication List - Protect others around you: Learn how to safely use, store and throw away your medicines at www.disposemymeds.org.          This list is accurate as of 11/15/18 10:08 AM.  Always use your most recent med list.                   Brand Name Dispense Instructions for use Diagnosis    furosemide 20 MG tablet    LASIX    30 tablet    Take 1 tablet (20 mg) by mouth daily    Recurrent tongue cancer (H)       levothyroxine 100 MCG tablet    SYNTHROID/LEVOTHROID    30 tablet    Take 1 tablet (100 mcg) by mouth daily Recheck Lab test in 2 months.    Acquired hypothyroidism       lisinopril 20 MG tablet    PRINIVIL/ZESTRIL    90 tablet    TAKE ONE TABLET BY MOUTH DAILY    Hypertension goal BP (blood pressure) < 140/90       LORazepam 0.5 MG tablet    ATIVAN    30 tablet    Take 1 tablet (0.5 mg) by mouth every 4 hours as needed (Anxiety, Nausea/Vomiting or Sleep)    Tongue cancer (H)       lovastatin 40 MG tablet    MEVACOR    90 tablet    TAKE ONE TABLET BY MOUTH NIGHTLY AT BEDTIME    Hyperlipidemia with target LDL less than 100       multivitamin  with lutein Caps per capsule      Take 1 capsule by mouth daily        prochlorperazine 10 MG tablet    COMPAZINE    30 tablet    Take 1 tablet (10 mg) by  mouth every 6 hours as needed (Nausea/Vomiting)    Tongue cancer (H)       vitamin D 1000 units capsule      Take 1 capsule by mouth daily

## 2018-11-15 NOTE — PATIENT INSTRUCTIONS
We are going to schedule you for an echocardiogram. We will hold chemotherapy for 1 month. Dr. Neil would like to see you back in 1 month for a follow up appointment.      When you are in need of a refill, please call your pharmacy and they will send us a request.      Copy of appointments, and after visit summary (AVS) given to patient.      If you have any questions during business hours (M-F 8 AM- 4PM), please call Natividad Iyer RN Oncology Hematology  at [facility] [phone].       For questions after business hours, or on holidays/weekends, please call our after hours Nurse Triage line [phone]. Thank you.

## 2018-11-15 NOTE — LETTER
11/15/2018         RE: Patrick Diop  [address]  [facility] [address]        Dear Colleague,    Thank you for referring your patient, Patrick Diop, to the [facility]. Please see a copy of my visit note below.    Hematology/ Oncology Follow-up Visit:  Nov 15, 2018    Reason for Visit:   Chief Complaint   Patient presents with     Oncology Clinic Visit     3 week follow up recurrent CA of tongue base. Review CT scan results.        Oncologic History:    Recurrent tongue cancer (H)  Patrick Diop was noted to have a mass in his right neck during routine physical exam. CT scanning was subsequently obtained which also showed a right-sided tongue base and oropharyngeal mass, which together are consistent with malignancy. The patient himself has denied any unexplained weight loss. He denies any odynophagia, dysphagia, hoarseness or otalgia. 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 PET scan done on October 21, 2015 showed a mass at the level of the tongue base located along the anterior wall of the oropharynx and extending to the right lateral wall near the tonsillar pillar. This demonstrates abnormal increased FDG activity and is consistent with known malignancy. Hypermetabolic lymph nodes in the right and left neck are also noted as described. These are consistent with metastatic lymph nodes. There is no evidence of metastatic disease in the chest, abdomen or pelvis. CT of the soft tissues of the neck on October 21, 2015 was done in conjunction with PET scanning showed a 2.8 cm mass at the base of the tongue on the right effacing the right vallecula. There is also a 2.3 cm lymph node in the right level II region that is strongly suspicious for metastatic disease. These lesions are PET positive. Additionally, there is a 1.3 cm lymph node in left level III that is PET positive. There is a lymph node in right level V, also PET positive. He  started on concurrent Cisplatin with radiation therapy with cisplatin 100 mg/m to be given intravenously on days #1, #22 #43 of radiation therapy.      Interval History:  Patient returning today for follow-up.  He has been having increasing swelling both lower extremities as well as shortness of breath on exertion.  He has been on Lasix with some improvement of his lower extremity edema.  Patient denies any chest pain or cough or wheezing.  He denies any weight loss or fever or chills.    Review Of Systems:  Constitutional: Negative for fever, chills, and night sweats.  Skin: negative.  Eyes: negative.  Ears/Nose/Throat: negative.  Respiratory: No shortness of breath, dyspnea on exertion, cough, or hemoptysis.  Cardiovascular: negative.  Gastrointestinal: negative.  Genitourinary: negative.  Musculoskeletal: negative.  Neurologic: negative.  Psychiatric: negative.  Hematologic/Lymphatic/Immunologic: negative.  Endocrine: negative.    All other ROS negative unless mentioned in interval history.    Past medical, social, surgical, and family histories reviewed.    Allergies:  Allergies as of 11/15/2018     (No Known Allergies)       Current Medications:  Current Outpatient Prescriptions   Medication Sig Dispense Refill     Cholecalciferol (VITAMIN D) 1000 UNITS capsule Take 1 capsule by mouth daily       furosemide (LASIX) 20 MG tablet Take 1 tablet (20 mg) by mouth daily 30 tablet 1     levothyroxine (SYNTHROID/LEVOTHROID) 100 MCG tablet Take 1 tablet (100 mcg) by mouth daily Recheck Lab test in 2 months. 30 tablet 1     lisinopril (PRINIVIL/ZESTRIL) 20 MG tablet TAKE ONE TABLET BY MOUTH DAILY 90 tablet 2     lovastatin (MEVACOR) 40 MG tablet TAKE ONE TABLET BY MOUTH NIGHTLY AT BEDTIME 90 tablet 3     multivitamin  with lutein (OCUVITE WITH LTEIN) CAPS Take 1 capsule by mouth daily       LORazepam (ATIVAN) 0.5 MG tablet Take 1 tablet (0.5 mg) by mouth every 4 hours as needed (Anxiety, Nausea/Vomiting or Sleep)  "(Patient not taking: Reported on 9/19/2018) 30 tablet 2     prochlorperazine (COMPAZINE) 10 MG tablet Take 1 tablet (10 mg) by mouth every 6 hours as needed (Nausea/Vomiting) (Patient not taking: Reported on 9/19/2018) 30 tablet 2        Physical Exam:  /85 (BP Location: Right arm, Patient Position: Sitting, Cuff Size: Adult Small)  Pulse 92  Temp 97.7  F (36.5  C) (Tympanic)  Resp 18  Ht 1.714 m (5' 7.48\")  Wt 81.4 kg (179 lb 8 oz)  SpO2 91%  BMI 27.72 kg/m2  Wt Readings from Last 12 Encounters:   11/15/18 81.4 kg (179 lb 8 oz)   10/25/18 83.1 kg (183 lb 1.6 oz)   10/04/18 82.9 kg (182 lb 11.2 oz)   09/19/18 82 kg (180 lb 12.8 oz)   09/13/18 81 kg (178 lb 9.6 oz)   08/23/18 78.9 kg (173 lb 14.4 oz)   08/01/18 81.3 kg (179 lb 3.2 oz)   07/11/18 78.2 kg (172 lb 8 oz)   06/21/18 77.1 kg (170 lb)   06/04/18 77 kg (169 lb 12.8 oz)   05/31/18 76.3 kg (168 lb 4.8 oz)   05/09/18 75.4 kg (166 lb 3.2 oz)     ECOG performance status: 1  GENERAL APPEARANCE: Healthy, alert and in no acute distress.  HEENT: Sclerae anicteric. PERRLA. Oropharynx without ulcers, lesions, or thrush.  NECK: Supple. No asymmetry or masses.  LYMPHATICS: No palpable cervical, supraclavicular, axillary, or inguinal lymphadenopathy.  RESP: Lungs clear to auscultation bilaterally without rales, rhonchi or wheezes.  CARDIOVASCULAR: Regular rate and rhythm. Normal S1, S2; no S3 or S4. No murmur, gallop, or rub.  ABDOMEN: Soft, nontender. Bowel sounds normal. No palpable organomegaly or masses.  MUSCULOSKELETAL: Extremities without gross deformities noted. No edema of bilateral lower extremities.  SKIN: No suspicious lesions or rashes.  NEURO: Alert and oriented x 3. Cranial nerves II-XII grossly intact.  PSYCHIATRIC: Mentation and affect appear normal.    Laboratory/Imaging Studies:  No visits with results within 2 Week(s) from this visit.  Latest known visit with results is:    Infusion Therapy Visit on 10/25/2018   Component Date Value Ref " Range Status     Sodium 10/25/2018 141  133 - 144 mmol/L Final     Potassium 10/25/2018 4.6  3.4 - 5.3 mmol/L Final     Chloride 10/25/2018 104  94 - 109 mmol/L Final     Carbon Dioxide 10/25/2018 26  20 - 32 mmol/L Final     Anion Gap 10/25/2018 11  3 - 14 mmol/L Final     Glucose 10/25/2018 97  70 - 99 mg/dL Final     Urea Nitrogen 10/25/2018 18  7 - 30 mg/dL Final     Creatinine 10/25/2018 1.22  0.66 - 1.25 mg/dL Final     GFR Estimate 10/25/2018 57* >60 mL/min/1.7m2 Final    Non  GFR Calc     GFR Estimate If Black 10/25/2018 69  >60 mL/min/1.7m2 Final    African American GFR Calc     Calcium 10/25/2018 8.9  8.5 - 10.1 mg/dL Final     Bilirubin Total 10/25/2018 0.8  0.2 - 1.3 mg/dL Final     Albumin 10/25/2018 3.6  3.4 - 5.0 g/dL Final     Protein Total 10/25/2018 7.7  6.8 - 8.8 g/dL Final     Alkaline Phosphatase 10/25/2018 80  40 - 150 U/L Final     ALT 10/25/2018 17  0 - 70 U/L Final     AST 10/25/2018 16  0 - 45 U/L Final     TSH 10/25/2018 2.18  0.40 - 4.00 mU/L Final        Recent Results (from the past 744 hour(s))   CT Chest/Abdomen/Pelvis w Contrast    Narrative    CT CHEST/ABDOMEN/PELVIS W CONTRAST 11/13/2018 9:44 AM    HISTORY: Tongue malignancy. Follow-up.    CONTRAST:  90 mL [id number].    TECHNIQUE: CT of the chest, abdomen, and pelvis is performed with IV  contrast.    Routine evaluated structures in the chest include the lungs,  mediastinal structures, pleura, and chest wall.    Routine assessed structures in the abdomen include the liver, spleen,  pancreas, adrenal glands, and kidneys. Also assessed are the  retroperitoneum, gastrointestinal tract, and the abdominal wall.    Intrapelvic anatomy is also assessed.    Radiation dose for this scan is reduced using automated exposure  control, adjustment of the mA and/or kV according to patient size, or  iterative reconstruction technique.    COMPARISON: Chest CT dated 7/30/2018.  PET CT dated 3/14/2018.    FINDINGS:    Chest:  Compressive atelectasis is present at the right lung base and  there is less atelectasis at the left lung base. A nodular structure  measuring 1.6 cm with central coarse calcification is stable in the  lingula dating back to 2015. Its appearance and stability are  consistent with a benign entity. A right pleural effusion is present  and larger compared to the prior study. A left pleural effusion is  stable. A borderline in size right paratracheal lymph node and  subcarinal lymph node are stable from 2015 and consistent with a  benign entity. 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 Cardiomegaly is again seen.    Abdomen: Fatty infiltration of the liver is present. A generalized  edematous state to a mild degree is seen involving the mesentery and  superficial soft tissues.    Pelvis: In the superficial soft tissues of the low right buttock area  on image #135 there is a cystic lesion that measures 5.3 cm. This may  relate to an unusually large sebaceous cyst. There is no inflammatory  change around it to support infection.      Impression    IMPRESSION:  1.  No evidence of metastatic disease.  2. Bilateral pleural effusions are again seen and the right one is  slightly larger compared to the prior study. These findings along with  the generalized edematous state may be cardiac in origin.  3. There is a superficial cystic lesion in lateral right buttock area,  as described above.    MARYJO HERRERA MD       Assessment and plan:    (C02.9) Recurrent tongue cancer (H)  (primary encounter diagnosis)  Reviewed with the patient today the results from most CT scan.  His bilateral pleural effusion probably secondary to congestive heart failure.  Immune therapy will be held at this time.  I would recommend patient to be evaluated by cardiology.  Today I will order echocardiogram as well.    (C18.9) Malignant neoplasm of colon, unspecified part of colon (H)  There is no evidence of disease  recurrence.    (I10) Hypertension goal BP (blood pressure) < 140/90  Blood pressure is currently well controlled on lisinopril 20 mg orally daily.    (E78.5) Hyperlipidemia with target LDL less than 100  Patient currently on Mevacor 40 mg orally daily.    (E03.9) Acquired hypothyroidism  Patient currently on lisinopril 20 mg orally daily.    (E11.22,  N18.1) Type 2 diabetes mellitus with stage 1 chronic kidney disease, without long-term current use of insulin (H)  Blood sugars currently well controlled on diet.    (N18.1) CKD (chronic kidney disease) stage 1, GFR 90 ml/min or greater  Creatinine 1.4.  Some increase of creatinine probably due to Lasix.  We will continue to monitor.    The patient is ready to learn, no apparent learning barriers were identified.  Diagnosis and treatment plans were explained to the patient. The patient expressed understanding of the content. The patient asked appropriate questions. The patient questions were answered to his satisfaction.    Chart documentation with Dragon Voice recognition Software. Although reviewed after completion, some words and grammatical errors may remain.    Again, thank you for allowing me to participate in the care of your patient.        Sincerely,        Lanny Neil MD

## 2018-11-21 NOTE — MR AVS SNAPSHOT
After Visit Summary   11/21/2018    Patrick Diop    MRN: [id number]           Patient Information     Date Of Birth          1935        Visit Information        Provider Department      11/21/2018 9:40 AM Joe Mandel MD [facility]        Today's Diagnoses     Acquired hypothyroidism    -  1    Recurrent tongue cancer (H)        Peripheral edema        Pleural effusion        Type 2 diabetes mellitus with stage 1 chronic kidney disease, without long-term current use of insulin (H)        CKD (chronic kidney disease) stage 3, GFR 30-59 ml/min (H)          Care Instructions    ASSESSMENT:   (E03.9) Acquired hypothyroidism  (primary encounter diagnosis)  Comment: is currently doing well  Plan: levothyroxine (SYNTHROID/LEVOTHROID) 100 MCG         tablet        No change in current treatment plan.  Refills.  Recheck TSH in 6 months.     (C02.9) Recurrent tongue cancer (H)  Comment: has been stable.    Plan: furosemide (LASIX) 20 MG tablet          (R60.9) Peripheral edema  Comment: Fluid in lungs and legs could be from congestive heart failure.  Kaytruda can cause peripheral edema.   Plan: BNP-N terminal pro        Check BNP blood test.   Schedule an appointment with cardiology for an echocardiogram.  Call [phone] to schedule.  Continue the furosemide which seems to be helping.   Further treatment depending upon echo results.     (J90) Pleural effusion  Comment: see above   Plan: BNP-N terminal pro          (E11.22,  N18.1) Type 2 diabetes mellitus with stage 1 chronic kidney disease, without long-term current use of insulin (H)  Comment: has been doing well  Plan: No change in current treatment plan.     (N18.3) CKD (chronic kidney disease) stage 3, GFR 30-59 ml/min (H)  Comment: Increasing serum creatinine since March.  Possible chemo side effects.  May be a combination with the diabetes mellitus   Plan: Basic metabolic panel        Recheck kidney function again  today.  If continuing to decline, would consult nephrology.          Follow-ups after your visit        Follow-up notes from your care team     Return in about 1 month (around 12/21/2018).      Your next 10 appointments already scheduled     Dec 12, 2018  9:20 AM CST   Return Visit with Lanny Neil MD   [facility] ([facility])    [facility]  [address]  [facility] [place] [address] [phone]              Who to contact     If you have questions or need follow up information about today's clinic visit or your schedule please contact [facility] directly at [phone].  Normal or non-critical lab and imaging results will be communicated to you by MyChart, letter or phone within 4 business days after the clinic has received the results. If you do not hear from us within 7 days, please contact the clinic through MyChart or phone. If you have a critical or abnormal lab result, we will notify you by phone as soon as possible.  Submit refill requests through [employer] or call your pharmacy and they will forward the refill request to us. Please allow 3 business days for your refill to be completed.          Additional Information About Your Visit        Care EveryWhere ID     This is your Care EveryWhere ID. This could be used by other organizations to access your [facility] medical records  [id number]        Your Vitals Were     Pulse Temperature Respirations Pulse Oximetry BMI (Body Mass Index)       98 97.5  F (36.4  C) (Tympanic) 20 93% 27.42 kg/m2        Blood Pressure from Last 3 Encounters:   11/21/18 110/74   11/15/18 121/85   10/25/18 113/75    Weight from Last 3 Encounters:   11/21/18 177 lb 9.6 oz (80.6 kg)   11/15/18 179 lb 8 oz (81.4 kg)   10/25/18 183 lb 1.6 oz (83.1 kg)              We Performed the Following     Basic metabolic panel     BNP-N terminal pro          Where to get your medicines      These medications were sent  to [id number] - [place], [place] - [address] [address]    Hours:  Closed 10-16-08 business to [facility] Phone:  [phone]     furosemide 20 MG tablet    levothyroxine 100 MCG tablet          Primary Care Provider Office Phone # Fax #    Joe Mandel [phone] [address] [facility] [address]        Equal Access to Services     RAINA SOLANO : Hadii aad ku hadasho Soomaali, waaxda luqadaha, qaybta kaalmada adeegyada, waxay idiin hayaan adeeg gagan gruber . So [facility] [phone].    ATENCIÓN: Si habla español, tiene a taylor disposición servicios gratuitos de asistencia lingüística. [facility] [phone].    We comply with applicable federal civil rights laws and [place] laws. We do not discriminate on the basis of race, color, national origin, age, disability, sex, sexual orientation, or gender identity.            Thank you!     Thank you for choosing [facility]  for your care. Our goal is always to provide you with excellent care. Hearing back from our patients is one way we can continue to improve our services. Please take a few minutes to complete the written survey that you may receive in the mail after your visit with us. Thank you!             Your Updated Medication List - Protect others around you: Learn how to safely use, store and throw away your medicines at www.disposemymeds.org.          This list is accurate as of 11/21/18 10:40 AM.  Always use your most recent med list.                   Brand Name Dispense Instructions for use Diagnosis    furosemide 20 MG tablet    LASIX    90 tablet    Take 1 tablet (20 mg) by mouth daily    Recurrent tongue cancer (H)       levothyroxine 100 MCG tablet    SYNTHROID/LEVOTHROID    90 tablet    Take 1 tablet (100 mcg) by mouth daily Recheck Lab test in 2 months.    Acquired hypothyroidism       lisinopril 20 MG tablet    PRINIVIL/ZESTRIL    90 tablet    TAKE  ONE TABLET BY MOUTH DAILY    Hypertension goal BP (blood pressure) < 140/90       LORazepam 0.5 MG tablet    ATIVAN    30 tablet    Take 1 tablet (0.5 mg) by mouth every 4 hours as needed (Anxiety, Nausea/Vomiting or Sleep)    Tongue cancer (H)       lovastatin 40 MG tablet    MEVACOR    90 tablet    TAKE ONE TABLET BY MOUTH NIGHTLY AT BEDTIME    Hyperlipidemia with target LDL less than 100       multivitamin  with lutein Caps per capsule      Take 1 capsule by mouth daily        prochlorperazine 10 MG tablet    COMPAZINE    30 tablet    Take 1 tablet (10 mg) by mouth every 6 hours as needed (Nausea/Vomiting)    Tongue cancer (H)       vitamin D 1000 units capsule      Take 1 capsule by mouth daily

## 2018-11-21 NOTE — NURSING NOTE
"Chief Complaint   Patient presents with     Thyroid Disease     had TSH last week.- needs refill.     Edema     Bilateral leg edema improving.  See Dr. Neil note about possible CHF- bilateral pleural effusions.       Initial /74 (BP Location: Right arm, Patient Position: Chair, Cuff Size: Adult Regular)  Pulse 98  Temp 97.5  F (36.4  C) (Tympanic)  Resp 20  Wt 177 lb 9.6 oz (80.6 kg)  SpO2 93%  BMI 27.42 kg/m2 Estimated body mass index is 27.42 kg/(m^2) as calculated from the following:    Height as of 11/15/18: 5' 7.48\" (1.714 m).    Weight as of this encounter: 177 lb 9.6 oz (80.6 kg).    Patient presents to the clinic using No DME    Health Maintenance that is potentially due pending provider review:  Reminded eye exam    Pt will schedule eye appt.    Is there anyone who you would like to be able to receive your results? Yes  If yes have patient fill out NABIL  France Gonzales CMA    "

## 2018-11-21 NOTE — PROGRESS NOTES
SUBJECTIVE:   Patrick Diop is a 82 year old male who presents to clinic today for the following health issues:  Chief Complaint   Patient presents with     Thyroid Disease     had TSH last week.- needs refill.     Edema     Bilateral leg edema improving.  See Dr. Neil note about possible CHF- bilateral pleural effusions.        Hypothyroidism Follow-up      Since last visit, patient describes the following symptoms: Weight stable, no hair loss, no skin changes, no constipation, no loose stools      Amount of exercise or physical activity: tries to walk- currently on chemo for cancer    Problems taking medications regularly: No    Medication side effects: none    Diet: limited due to s/p tongue cancer and radiation  Check heart and discuss last visit with Dr Neil.  He saw Dr. Neil last week who stopped his chemo and recommended Echo and cardiology consultation.     Hematology/Oncology notes below.     His energy and fatigue have improved with thyroid replacement.  He has been on 100mcg.   Lab Results   Component Value Date    TSH 2.48 11/15/2018     He has shortness of breath once in a while.  He can walk 100' without stopping.  Has not tried stairs.   No orthopnea or PND.   No exertional or any chest pains.   Some swelling in both legs.  He was started on diuretic, furosemide on 10/25.  He notes losing a couple pounds and swelling improved.     Patient Active Problem List   Diagnosis     Type 2 diabetes mellitus with stage 1 chronic kidney disease (H)     Hyperlipidemia with target LDL less than 100     Hypertension goal BP (blood pressure) < 140/90     Colon cancer (H)     CKD (chronic kidney disease) stage 3, GFR 30-59 ml/min (H)     Recurrent tongue cancer (H)     Chemotherapy induced nausea and vomiting     Acquired hypothyroidism      ROS:General: sleeping oK.  Appetite decreased for the past 3 years.  Has lost appetite and taste after radiation.   Wt Readings from Last 5 Encounters:   11/21/18  177 lb 9.6 oz (80.6 kg)   11/15/18 179 lb 8 oz (81.4 kg)   10/25/18 183 lb 1.6 oz (83.1 kg)   10/04/18 182 lb 11.2 oz (82.9 kg)   09/19/18 180 lb 12.8 oz (82 kg)      GI: No nausea, vomiting,  heartburn, abdominal pain, diarrhea, constipation or change in bowel habits  : POSITIVE for:, nocturia x 3-4 since starting furosemide.  It has increased some.  Urine stream oK, empties oK.   Musculoskeletal: POSITIVE  for:, pain left ankle at night-does not bother him during the day.   Psychiatric: No problems with anxiety, depression or mental health.  Takes lorazepam infrequently.  Took a couple times.   No dizziness or lightheadedness.   Glucometers: not checking at home.   Lab Results   Component Value Date    A1C 5.6 09/19/2018       OBJECTIVE:Blood pressure 110/74, pulse 98, temperature 97.5  F (36.4  C), temperature source Tympanic, resp. rate 20, weight 177 lb 9.6 oz (80.6 kg), SpO2 93 %. BMI=Body mass index is 27.42 kg/(m^2).  GENERAL APPEARANCE ADULT: Alert, no acute distress  EYES: PERRL, EOM normal, conjunctiva and lids normal  HENT: oral mucous membranes moist, oropharynx clear, edentulous-one tooth mandible remains broken at gum line.   NECK: No adenopathy,masses or thyromegaly  RESP: lungs clear to auscultation   CV: regular rhythm, tachycardia ivqv=094, no murmur  ABDOMEN: soft, no organomegaly, masses or tenderness  MS: 1-2+ edema bilateral in lower extremities extending to posterior thighs.      ASSESSMENT:   (E03.9) Acquired hypothyroidism  (primary encounter diagnosis)  Comment: is currently doing well  Plan: levothyroxine (SYNTHROID/LEVOTHROID) 100 MCG         tablet        No change in current treatment plan.  Refills.  Recheck TSH in 6 months.     (C02.9) Recurrent tongue cancer (H)  Comment: has been stable.    Plan: furosemide (LASIX) 20 MG tablet          (R60.9) Peripheral edema  Comment: Fluid in lungs and legs could be from congestive heart failure.  Kaytruda can cause peripheral edema.   Plan:  BNP-N terminal pro        Check BNP blood test.   Schedule an appointment with cardiology for an echocardiogram.  Call [phone] to schedule.  Continue the furosemide which seems to be helping.   Further treatment depending upon echo results.     (J90) Pleural effusion  Comment: see above   Plan: BNP-N terminal pro          (E11.22,  N18.1) Type 2 diabetes mellitus with stage 1 chronic kidney disease, without long-term current use of insulin (H)  Comment: has been doing well  Plan: No change in current treatment plan.     (N18.3) CKD (chronic kidney disease) stage 3, GFR 30-59 ml/min (H)  Comment: Increasing serum creatinine since March.  Possible chemo side effects.  May be a combination with the diabetes mellitus   Plan: Basic metabolic panel        Recheck kidney function again today.  If continuing to decline, would consult nephrology.              ============================  Hemonc notes 11/15/2018:  Assessment and plan:     (C02.9) Recurrent tongue cancer (H)  (primary encounter diagnosis)  Reviewed with the patient today the results from most CT scan.  His bilateral pleural effusion probably secondary to congestive heart failure.  Immune therapy will be held at this time.  I would recommend patient to be evaluated by cardiology.  Today I will order echocardiogram as well.     (C18.9) Malignant neoplasm of colon, unspecified part of colon (H)  There is no evidence of disease recurrence.     (I10) Hypertension goal BP (blood pressure) < 140/90  Blood pressure is currently well controlled on lisinopril 20 mg orally daily.     (E78.5) Hyperlipidemia with target LDL less than 100  Patient currently on Mevacor 40 mg orally daily.     (E03.9) Acquired hypothyroidism  Patient currently on lisinopril 20 mg orally daily.     (E11.22,  N18.1) Type 2 diabetes mellitus with stage 1 chronic kidney disease, without long-term current use of insulin (H)  Blood sugars currently well controlled on diet.     (N18.1) CKD  (chronic kidney disease) stage 1, GFR 90 ml/min or greater  Creatinine 1.4.  Some increase of creatinine probably due to Lasix.  We will continue to monitor.    CT chest/abdomen and pelvis:  IMPRESSION:  1.  No evidence of metastatic disease.  2. Bilateral pleural effusions are again seen and the right one is  slightly larger compared to the prior study. These findings along with  the generalized edematous state may be cardiac in origin.  3. There is a superficial cystic lesion in lateral right buttock area,  as described above.

## 2018-11-21 NOTE — PATIENT INSTRUCTIONS
ASSESSMENT:   (E03.9) Acquired hypothyroidism  (primary encounter diagnosis)  Comment: is currently doing well  Plan: levothyroxine (SYNTHROID/LEVOTHROID) 100 MCG         tablet        No change in current treatment plan.  Refills.  Recheck TSH in 6 months.     (C02.9) Recurrent tongue cancer (H)  Comment: has been stable.    Plan: furosemide (LASIX) 20 MG tablet          (R60.9) Peripheral edema  Comment: Fluid in lungs and legs could be from congestive heart failure.  Kaytruda can cause peripheral edema.   Plan: BNP-N terminal pro        Check BNP blood test.   Schedule an appointment with cardiology for an echocardiogram.  Call [phone] to schedule.  Continue the furosemide which seems to be helping.   Further treatment depending upon echo results.     (J90) Pleural effusion  Comment: see above   Plan: BNP-N terminal pro          (E11.22,  N18.1) Type 2 diabetes mellitus with stage 1 chronic kidney disease, without long-term current use of insulin (H)  Comment: has been doing well  Plan: No change in current treatment plan.     (N18.3) CKD (chronic kidney disease) stage 3, GFR 30-59 ml/min (H)  Comment: Increasing serum creatinine since March.  Possible chemo side effects.  May be a combination with the diabetes mellitus   Plan: Basic metabolic panel        Recheck kidney function again today.  If continuing to decline, would consult nephrology.

## 2018-11-23 NOTE — PROGRESS NOTES
Please call.  BNP test suggests he has congestive heart failure.    His glucose is mildly increased.  The creatinine, a blood test to measure kidney function is elevated indicating some decrease in kidney function.  This has been present in the past.   PLAN: Schedule an appointment with cardiology for hte echocardiogram as planned.  Call [phone] to schedule.    follow-up with me in 2 weeks (after the Echo has been completed).

## 2018-11-27 NOTE — TELEPHONE ENCOUNTER
"Requested Prescriptions   Pending Prescriptions Disp Refills     lovastatin (MEVACOR) 40 MG tablet [Pharmacy Med Name: LOVASTATIN 40 MG    TAB TEVA] 90 tablet 2     Sig: TAKE ONE TABLET BY MOUTH NIGHTLY AT BEDTIME    Statins Protocol Passed    11/26/2018  7:43 PM       Passed - LDL on file in past 12 months    Recent Labs   Lab Test  03/23/18   0950   LDL  47            Passed - No abnormal creatine kinase in past 12 months    No lab results found.            Passed - Recent (12 mo) or future (30 days) visit within the authorizing provider's specialty    Patient had office visit in the last 12 months or has a visit in the next 30 days with authorizing provider or within the authorizing provider's specialty.  See \"Patient Info\" tab in inbasket, or \"Choose Columns\" in Meds & Orders section of the refill encounter.      Last Written Prescription Date:  11/29/17  Last Fill Quantity: 90,  # refills: 3   Last office visit: 11/21/2018 with prescribing provider:     Future Office Visit:   Next 5 appointments (look out 90 days)     Dec 12, 2018  9:20 AM CST   Return Visit with Lanny Neil MD   [facility] ([facility])    [facility]  [address] [address] [phone]                            Passed - Patient is age 18 or older          "

## 2018-12-11 NOTE — PROGRESS NOTES
Spoke w/Dr. Orr and if pt felt poorly, to go to the ER. Pt was not SOB during exam and spoke easily without getting short of breath. Did not seem dyspneic when walking to exam room from waiting room. Denied orthopnea and SOB at rest. Able to sleep flat at night. Stated lost 3 lb this week, and was feeling better. BP of 117/73, HR 95bpm.

## 2018-12-12 NOTE — RESULT ENCOUNTER NOTE
Please call.  His Echocardiogram is abnormal with new congestive heart failure and much reduced heart pumping function.   PLAN: Continue the furosemide as he has been.    I will contact cardiology.  He will need to see them fairly soon.  He might need additional tests  Or addition of medication after I discuss with cardiology.

## 2018-12-17 NOTE — NURSING NOTE
The After Visit Summary was reviewed with the patient and his daughter following their office visit with Dr. Calderón. Patient was educated about any medication changes, the importance of following a low sodium diet, importance of recording daily weights, and when to call [facility] clinic. Patient verbalized understanding of the information and agrees to call with any questions or concerns.     Labs: Patient to get labs on the way out today, we will call him with results.    Return appointment: Patient was given instructions on when and how to schedule their next office visit with the [facility] clinic. Patient to have 48 Hour holter and labs next week, and then see Dr. Calderón on 12/31/18.    Medication changes: Increase Lasix to 40mg BID, Start Potassium 40meq daily and Metoprolol 12.5mg daily    Patient sets up his own meds. Patient has macular degeneration, has a scale, but cannot read it. Daughter will help him weigh this afternoon when he gets home, and then he can have someone come on Wednesday and Friday morning this week to help him read weight. Talked about purchasing a scale with larger print or talking scale for future so he can weigh daily.     Messaged [facility] RN board to call pt on Friday and message Dr. Calderón with update per request.    Ivana Nixon, RN  [facility] RN Care Coordinator  [facility]  [phone]

## 2018-12-17 NOTE — PATIENT INSTRUCTIONS
Call [facility] nurse for any questions or concerns:  [phone]  *If you have concerns after hours, please call [phone], option 2 to speak with on call Cardiologist.    1. Medication changes and/or recommendations from today:      Increase Lasix to 40mg twice a day (2 tablets twice daily)    Start Potassium 40meq daily (2 tablets daily)    Start Metoprolol 12.5mg daily (1/2 tablet daily)    2. Follow up plan:     Have labs today on your way out    48 Hour Holter monitor and repeat labs next week    Follow up with Dr. Calderón in 2 weeks on 12/31/18     3. Weigh yourself daily and write it down.     4. Call [facility] nurse if your weight is up more than 2 pounds in one day or 5 pounds in one week.     5. Call [facility] nurse if you feel more short of breath, have more abdominal bloating, or leg swelling.     6. Continue low sodium diet (less than 2000 mg daily). If you eat less salt, you will retain less fluid.      *If you need to speak with [facility] Scheduling, please call:   Cardiology Scheduling~[phone]  Diagnostic Imaging Scheduling~[phone]  Lab Scheduling~[phone]     [facility]: Cardiomyopathy, Optimization, Rehabilitation, Education  The [facility] is a heart failure specialty clinic within the [facility] where you will work with specialized nurse practitioners, physician assistants, doctors, and registered nurses. They are dedicated to helping patients with heart failure to carefully adjust medications, receive education, and learn who and when to call if symptoms develop. They specialize in helping you better understand your condition, slow the progression of your disease, improve the length and quality of your life, help you detect future heart problems before they become life threatening, and avoid hospitalizations.

## 2018-12-17 NOTE — LETTER
12/17/2018    Joe Mandel MD  [address] [place] [id number]    RE: Patrick KHAN Jadon       Dear Colleague,    I had the pleasure of seeing Patrick Diop in the [facility].    CARDIOLOGY CLINIC CONSULTATION    REASON FOR CONSULT: New severe left ventricular dysfunction    PRIMARY CARE PHYSICIAN:  Joe Mandel    HISTORY OF PRESENT ILLNESS:  This is a very pleasant 83-year-old male patient who is here with his daughter Florecita.  The patient says that he has no prior cardiac history.  Patient has a history of colon cancer in 2000.  He has a history of type 2 diabetes, hypertension and hyperlipidemia.  He has a history of chronic kidney disease stage III.  The patient has a history of recurrent tongue cancer for which she is currently on chemotherapy.  He has been followed in the oncology clinic by Dr. Neil.     Since the last 1-1/2 months the patient has been complaining of fatigue and dyspnea on exertion with new and worsening lower extremity edema.  Symptoms have been getting worse which prompted an echocardiogram.  His echo showed severe biventricular dysfunction.  His left ventricle was mild to moderately dilated with an ejection fraction of less than 20%.  There were signs of pulmonary hypertension and IVC was dilated.  His RV was also mildly dilated and had severe systolic dysfunction.  No significant valvular heart disease besides moderate MR.  There is mild dilatation of the aorta.      Patient denies any chest pain, syncope or presyncope or palpitations.  He is complaining to [employer] [place] Heart Association functional class IIIb heart failure symptoms.  Denies any resting shortness of breath or orthopnea.  Primary care has appropriately started him on vasodilation that includes 20 mg of lisinopril and Lasix 20 mg daily.  He says he is feeling somewhat better after initiation of diuretics.  He is not on beta-blockers as yet.    PAST MEDICAL HISTORY:  Past Medical  History:   Diagnosis Date     Hearing problem      History of radiation therapy        MEDICATIONS:  Current Outpatient Medications   Medication     Cholecalciferol (VITAMIN D) 1000 UNITS capsule     furosemide (LASIX) 20 MG tablet     levothyroxine (SYNTHROID/LEVOTHROID) 100 MCG tablet     lisinopril (PRINIVIL/ZESTRIL) 20 MG tablet     lovastatin (MEVACOR) 40 MG tablet     metoprolol succinate ER (TOPROL-XL) 25 MG 24 hr tablet     multivitamin  with lutein (OCUVITE WITH LTEIN) CAPS     potassium chloride ER (K-DUR/KLOR-CON M) 20 MEQ CR tablet     LORazepam (ATIVAN) 0.5 MG tablet     prochlorperazine (COMPAZINE) 10 MG tablet     No current facility-administered medications for this visit.        ALLERGIES:  No Known Allergies    SOCIAL HISTORY:  I have reviewed this patient's social history and updated it with pertinent information if needed. Patrick Diop  reports that he quit smoking about 17 years ago. His smoking use included cigarettes. He smoked 0.00 packs per day for 48.00 years. he has never used smokeless tobacco. He reports that he does not drink alcohol or use drugs.    FAMILY HISTORY:  I have reviewed this patient's family history and updated it with pertinent information if needed.   Family History   Problem Relation Age of Onset     Other Cancer Mother 57        lung ca, with brain mets     Cancer Mother         Brain tumor, lung cancer     Cancer Father         Lung cancer     Cancer Brother        REVIEW OF SYSTEMS:  Skin:  Negative     Eyes:  Positive for    ENT:  Negative    Respiratory:  Positive for dyspnea on exertion  Cardiovascular:    Positive for;lower extremity symptoms;edema  Gastroenterology: Positive for excessive gas or bloating  Genitourinary:  Positive for urinary frequency;urgency  Musculoskeletal:  Negative    Neurologic:  Positive for tremors  Psychiatric:  Negative    Heme/Lymph/Imm:  Negative    Endocrine:  Positive for thyroid disorder      PHYSICAL EXAM:      BP: 110/79  Pulse: 105     SpO2: 94 %      Vital Signs with Ranges  Pulse:  [105] 105  BP: (110)/(79) 110/79  SpO2:  [94 %] 94 %  176 lbs 12.8 oz    Constitutional: awake, alert, no distress  Eyes: PERRL, sclera nonicteric  ENT: trachea midline  Respiratory: Breath sounds are reduced at both bases.  Significantly reduced on the right middle and lower lobes.  Basilar rales.  No wheezing.  Cardiovascular: Tachycardic.  Normal first and second heart sounds.  3 x 6 pansystolic murmur.  Soft S3.  No S4.  JVP is elevated to about 15 cm.  3+ bilateral pitting edema  GI: nondistended, nontender, bowel sounds present, there may be ascites  Skin: dry, no rash  Musculoskeletal: good muscle tone, strength 5/5 in upper and lower extremities  Neurologic: no focal deficits  Neuropsychiatric: appropriate affact    DATA:  Labs: Pertinent cardiac labs reviewed    Echocardiogram: As mentioned in HPI    ASSESSMENT:  1.  Recurrent tongue cancer status post chemotherapy  2.  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  Chronic kidney disease and diabetes    RECOMMENDATIONS:  1. At this point the patient is significantly volume overloaded on clinical exam.  He is taking 20 mg of Lasix.  His last potassium and creatinine were reasonable in November 2018.  His BNP is over 50,000.   2. I will increase his Lasix to 40 mg twice a day.  We will start potassium 40 mEq daily today.   3. We will continue lisinopril at current dose of 20 mg daily.  4. We will start him on a very low-dose of metoprolol XL 12.5 mg daily.  5. We will get a 48-hour Holter monitor to look at heart rate and to assess for any arrhythmias.  6. I will contact his oncologist to get his detailed chemotherapy history.    7. We went in details of patient's test findings and potential etiologies of his severe left ventricular systolic dysfunction.  Given that he has biventricular dysfunction my guess is that this is from chemotherapy or other non ischemic causes.   His echocardiogram in 2015 showed normal function.  In either case he does not have any angina at this point.  We will first diurese him adequately and once we have seen stability in renal function we will consider ischemic workup most likely with a stress MRI to assess for both ischemic and nonischemic causes.  8. I will have my nurse call him in the next 3-5 days to see his response with this increased dose of diuretics.  I have informed the patient that should he lose about 10 pounds of weight and or if he notices that his edema has gone away, he can cut down his Lasix dose to 20 mg daily.  9. I recommend repeat lab test in 7 days.  10. He will see me back on 31 December as a return core clinic patient at [facility].  11. I have informed the patient that should he have any worsening symptoms of shortness of breath, chest pain, palpitations or syncope or presyncope he should go to the emergency department.    Lionel Calderón MD, [facility]  Cardiology - [facility]  December 17, 2018          Thank you for allowing me to participate in the care of your patient.      Sincerely,     Lionel Calderón MD     [facility]

## 2018-12-17 NOTE — PROGRESS NOTES
CARDIOLOGY CLINIC CONSULTATION    REASON FOR CONSULT: New severe left ventricular dysfunction    PRIMARY CARE PHYSICIAN:  Joe Mandel    HISTORY OF PRESENT ILLNESS:  This is a very pleasant 83-year-old male patient who is here with his daughter Florecita.  The patient says that he has no prior cardiac history.  Patient has a history of colon cancer in 2000.  He has a history of type 2 diabetes, hypertension and hyperlipidemia.  He has a history of chronic kidney disease stage III.  The patient has a history of recurrent tongue cancer for which she is currently on chemotherapy.  He has been followed in the oncology clinic by Dr. Neil.     Since the last 1-1/2 months the patient has been complaining of fatigue and dyspnea on exertion with new and worsening lower extremity edema.  Symptoms have been getting worse which prompted an echocardiogram.  His echo showed severe biventricular dysfunction.  His left ventricle was mild to moderately dilated with an ejection fraction of less than 20%.  There were signs of pulmonary hypertension and IVC was dilated.  His RV was also mildly dilated and had severe systolic dysfunction.  No significant valvular heart disease besides moderate MR.  There is mild dilatation of the aorta.      Patient denies any chest pain, syncope or presyncope or palpitations.  He is complaining to [place] [employer] Association functional class IIIb heart failure symptoms.  Denies any resting shortness of breath or orthopnea.  Primary care has appropriately started him on vasodilation that includes 20 mg of lisinopril and Lasix 20 mg daily.  He says he is feeling somewhat better after initiation of diuretics.  He is not on beta-blockers as yet.    PAST MEDICAL HISTORY:  Past Medical History:   Diagnosis Date     Hearing problem      History of radiation therapy        MEDICATIONS:  Current Outpatient Medications   Medication     Cholecalciferol (VITAMIN D) 1000 UNITS capsule     furosemide (LASIX) 20  MG tablet     levothyroxine (SYNTHROID/LEVOTHROID) 100 MCG tablet     lisinopril (PRINIVIL/ZESTRIL) 20 MG tablet     lovastatin (MEVACOR) 40 MG tablet     metoprolol succinate ER (TOPROL-XL) 25 MG 24 hr tablet     multivitamin  with lutein (OCUVITE WITH LTEIN) CAPS     potassium chloride ER (K-DUR/KLOR-CON M) 20 MEQ CR tablet     LORazepam (ATIVAN) 0.5 MG tablet     prochlorperazine (COMPAZINE) 10 MG tablet     No current facility-administered medications for this visit.        ALLERGIES:  No Known Allergies    SOCIAL HISTORY:  I have reviewed this patient's social history and updated it with pertinent information if needed. Patrick Diop  reports that he quit smoking about 17 years ago. His smoking use included cigarettes. He smoked 0.00 packs per day for 48.00 years. he has never used smokeless tobacco. He reports that he does not drink alcohol or use drugs.    FAMILY HISTORY:  I have reviewed this patient's family history and updated it with pertinent information if needed.   Family History   Problem Relation Age of Onset     Other Cancer Mother 57        lung ca, with brain mets     Cancer Mother         Brain tumor, lung cancer     Cancer Father         Lung cancer     Cancer Brother        REVIEW OF SYSTEMS:  Skin:  Negative     Eyes:  Positive for    ENT:  Negative    Respiratory:  Positive for dyspnea on exertion  Cardiovascular:    Positive for;lower extremity symptoms;edema  Gastroenterology: Positive for excessive gas or bloating  Genitourinary:  Positive for urinary frequency;urgency  Musculoskeletal:  Negative    Neurologic:  Positive for tremors  Psychiatric:  Negative    Heme/Lymph/Imm:  Negative    Endocrine:  Positive for thyroid disorder      PHYSICAL EXAM:      BP: 110/79 Pulse: 105     SpO2: 94 %      Vital Signs with Ranges  Pulse:  [105] 105  BP: (110)/(79) 110/79  SpO2:  [94 %] 94 %  176 lbs 12.8 oz    Constitutional: awake, alert, no distress  Eyes: PERRL, sclera nonicteric  ENT: trachea  midline  Respiratory: Breath sounds are reduced at both bases.  Significantly reduced on the right middle and lower lobes.  Basilar rales.  No wheezing.  Cardiovascular: Tachycardic.  Normal first and second heart sounds.  3 x 6 pansystolic murmur.  Soft S3.  No S4.  JVP is elevated to about 15 cm.  3+ bilateral pitting edema  GI: nondistended, nontender, bowel sounds present, there may be ascites  Skin: dry, no rash  Musculoskeletal: good muscle tone, strength 5/5 in upper and lower extremities  Neurologic: no focal deficits  Neuropsychiatric: appropriate affact    DATA:  Labs: Pertinent cardiac labs reviewed    Echocardiogram: As mentioned in HPI    ASSESSMENT:  1.  Recurrent tongue cancer status post chemotherapy  2.  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  Chronic kidney disease and diabetes    RECOMMENDATIONS:  1. At this point the patient is significantly volume overloaded on clinical exam.  He is taking 20 mg of Lasix.  His last potassium and creatinine were reasonable in November 2018.  His BNP is over 50,000.   2. I will increase his Lasix to 40 mg twice a day.  We will start potassium 40 mEq daily today.   3. We will continue lisinopril at current dose of 20 mg daily.  4. We will start him on a very low-dose of metoprolol XL 12.5 mg daily.  5. We will get a 48-hour Holter monitor to look at heart rate and to assess for any arrhythmias.  6. I will contact his oncologist to get his detailed chemotherapy history.    7. We went in details of patient's test findings and potential etiologies of his severe left ventricular systolic dysfunction.  Given that he has biventricular dysfunction my guess is that this is from chemotherapy or other non ischemic causes.  His echocardiogram in 2015 showed normal function.  In either case he does not have any angina at this point.  We will first diurese him adequately and once we have seen stability in renal function we will consider ischemic  workup most likely with a stress MRI to assess for both ischemic and nonischemic causes.  8. I will have my nurse call him in the next 3-5 days to see his response with this increased dose of diuretics.  I have informed the patient that should he lose about 10 pounds of weight and or if he notices that his edema has gone away, he can cut down his Lasix dose to 20 mg daily.  9. I recommend repeat lab test in 7 days.  10. He will see me back on 31 December as a return core clinic patient at [facility].  11. I have informed the patient that should he have any worsening symptoms of shortness of breath, chest pain, palpitations or syncope or presyncope he should go to the emergency department.    Lionel Calderón MD, [facility]  Cardiology - [facility]  December 17, 2018

## 2018-12-21 NOTE — PROGRESS NOTES
I called pt to get an update on his weight and symptoms. Pt saw  on 12/17 for systolic CHF exacerbation. Pt's weight today is 171# on his home scale, and it was 175# on his home scale on 12/17. Pt said he still has some LE edema and shortness of breath, but symptoms do seem to be improving. He still has a lot of fatigue. Pt was instructed to take furosemide 40 mg BID until his LE edema has gone away and or he has lost 10 pounds of weight. At that point, he should decrease furosemide to 20 mg daily. Pt also takes KCL 40 meq daily. Pt scheduled for holter and lab on 12/26, and OV with  on 12/31. Will send update to  and call pt with any further recommendations he has at this time. Olivia FERNANDO December 21, 2018, 9:47 AM

## 2018-12-26 PROBLEM — I50.32 CHRONIC DIASTOLIC CONGESTIVE HEART FAILURE (H): Status: ACTIVE | Noted: 2018-01-01

## 2018-12-26 NOTE — PROGRESS NOTES
Hematology/ Oncology Follow-up Visit:  Dec 26, 2018    Reason for Visit:   Chief Complaint   Patient presents with     Oncology Clinic Visit     4 week recheck Recurrent tongue cancer, review Labs & Echo / Chemo today        Oncologic History:    Recurrent tongue cancer (H)  Patrick Diop was noted to have a mass in his right neck during routine physical exam. CT scanning was subsequently obtained which also showed a right-sided tongue base and oropharyngeal mass, which together are consistent with malignancy. The patient himself has denied any unexplained weight loss. He denies any odynophagia, dysphagia, hoarseness or otalgia. 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 PET scan done on October 21, 2015 showed a mass at the level of the tongue base located along the anterior wall of the oropharynx and extending to the right lateral wall near the tonsillar pillar. This demonstrates abnormal increased FDG activity and is consistent with known malignancy. Hypermetabolic lymph nodes in the right and left neck are also noted as described. These are consistent with metastatic lymph nodes. There is no evidence of metastatic disease in the chest, abdomen or pelvis. CT of the soft tissues of the neck on October 21, 2015 was done in conjunction with PET scanning showed a 2.8 cm mass at the base of the tongue on the right effacing the right vallecula. There is also a 2.3 cm lymph node in the right level II region that is strongly suspicious for metastatic disease. These lesions are PET positive. Additionally, there is a 1.3 cm lymph node in left level III that is PET positive. There is a lymph node in right level V, also PET positive. He started on concurrent Cisplatin with radiation therapy with cisplatin 100 mg/m to be given intravenously on days #1, #22 #43 of radiation therapy.      Interval History:  Patient returning today for follow-up visit.  He has been followed by  cardiology lately because of exacerbation of congestive heart failure.  His medication have changed and he is been feeling better and shortness of breath has been improving gradually.  The patient denies any cough or wheezing or hemoptysis.  The patient denies any recent abdominal pain or nausea or vomiting.  Patient has been on immune therapy without significant side effects.    Review Of Systems:  Constitutional: Negative for fever, chills, and night sweats.  Skin: negative.  Eyes: negative.  Ears/Nose/Throat: negative.  Respiratory: No shortness of breath, dyspnea on exertion, cough, or hemoptysis.  Cardiovascular: negative.  Gastrointestinal: negative.  Genitourinary: negative.  Musculoskeletal: negative.  Neurologic: negative.  Psychiatric: negative.  Hematologic/Lymphatic/Immunologic: negative.  Endocrine: negative.    All other ROS negative unless mentioned in interval history.    Past medical, social, surgical, and family histories reviewed.    Allergies:  Allergies as of 12/26/2018     (No Known Allergies)       Current Medications:  Current Outpatient Medications   Medication Sig Dispense Refill     Cholecalciferol (VITAMIN D) 1000 UNITS capsule Take 1 capsule by mouth daily       furosemide (LASIX) 20 MG tablet Take 2 tablets (40 mg) by mouth 2 times daily  0     levothyroxine (SYNTHROID/LEVOTHROID) 100 MCG tablet Take 1 tablet (100 mcg) by mouth daily Recheck Lab test in 2 months. 90 tablet 1     lisinopril (PRINIVIL/ZESTRIL) 20 MG tablet TAKE ONE TABLET BY MOUTH DAILY 90 tablet 2     lovastatin (MEVACOR) 40 MG tablet TAKE ONE TABLET BY MOUTH NIGHTLY AT BEDTIME 90 tablet 2     multivitamin  with lutein (OCUVITE WITH LTEIN) CAPS Take 1 capsule by mouth daily       potassium chloride ER (K-DUR/KLOR-CON M) 20 MEQ CR tablet Take 2 tablets (40 mEq) by mouth daily 180 tablet 1     LORazepam (ATIVAN) 0.5 MG tablet Take 1 tablet (0.5 mg) by mouth every 4 hours as needed (Anxiety, Nausea/Vomiting or Sleep)  "(Patient not taking: Reported on 12/26/2018) 30 tablet 2     metoprolol succinate ER (TOPROL-XL) 25 MG 24 hr tablet Take 0.5 tablets (12.5 mg) by mouth daily 45 tablet 3     prochlorperazine (COMPAZINE) 10 MG tablet Take 1 tablet (10 mg) by mouth every 6 hours as needed (Nausea/Vomiting) (Patient not taking: Reported on 12/17/2018) 30 tablet 2        Physical Exam:  BP 95/71 (BP Location: Right arm, Patient Position: Sitting, Cuff Size: Adult Regular)   Pulse 91   Temp 97.7  F (36.5  C) (Tympanic)   Resp 16   Ht 1.714 m (5' 7.48\")   Wt 74.3 kg (163 lb 12.8 oz)   SpO2 95%   BMI 25.29 kg/m    Wt Readings from Last 12 Encounters:   12/26/18 74.3 kg (163 lb 12.8 oz)   12/17/18 80.2 kg (176 lb 12.8 oz)   11/21/18 80.6 kg (177 lb 9.6 oz)   11/15/18 81.4 kg (179 lb 8 oz)   10/25/18 83.1 kg (183 lb 1.6 oz)   10/04/18 82.9 kg (182 lb 11.2 oz)   09/19/18 82 kg (180 lb 12.8 oz)   09/13/18 81 kg (178 lb 9.6 oz)   08/23/18 78.9 kg (173 lb 14.4 oz)   08/01/18 81.3 kg (179 lb 3.2 oz)   07/11/18 78.2 kg (172 lb 8 oz)   06/21/18 77.1 kg (170 lb)     ECOG performance status: 0  GENERAL APPEARANCE: Healthy, alert and in no acute distress.  HEENT: Sclerae anicteric. PERRLA. Oropharynx without ulcers, lesions, or thrush.  NECK: Supple. No asymmetry or masses.  LYMPHATICS: No palpable cervical, supraclavicular, axillary, or inguinal lymphadenopathy.  RESP: Lungs clear to auscultation bilaterally without rales, rhonchi or wheezes.  CARDIOVASCULAR: Regular rate and rhythm. Normal S1, S2; no S3 or S4. No murmur, gallop, or rub.  ABDOMEN: Soft, nontender. Bowel sounds normal. No palpable organomegaly or masses.  MUSCULOSKELETAL: Extremities without gross deformities noted. No edema of bilateral lower extremities.  SKIN: No suspicious lesions or rashes.  NEURO: Alert and oriented x 3. Cranial nerves II-XII grossly intact.  PSYCHIATRIC: Mentation and affect appear normal.    Laboratory/Imaging Studies:  Orders Only on 12/26/2018 "   Component Date Value Ref Range Status     Sodium 12/26/2018 132* 133 - 144 mmol/L Final     Potassium 12/26/2018 4.8  3.4 - 5.3 mmol/L Final     Chloride 12/26/2018 97  94 - 109 mmol/L Final     Carbon Dioxide 12/26/2018 28  20 - 32 mmol/L Final     Anion Gap 12/26/2018 7  3 - 14 mmol/L Final     Glucose 12/26/2018 112* 70 - 99 mg/dL Final     Urea Nitrogen 12/26/2018 33* 7 - 30 mg/dL Final     Creatinine 12/26/2018 1.53* 0.66 - 1.25 mg/dL Final     GFR Estimate 12/26/2018 42* >60 mL/min/[1.73_m2] Final    Comment: Non  GFR Calc  Starting 12/18/2018, serum creatinine based estimated GFR (eGFR) will be   calculated using the Chronic Kidney Disease Epidemiology Collaboration   (CKD-EPI) equation.       GFR Estimate If Black 12/26/2018 49* >60 mL/min/[1.73_m2] Final    Comment:  GFR Calc  Starting 12/18/2018, serum creatinine based estimated GFR (eGFR) will be   calculated using the Chronic Kidney Disease Epidemiology Collaboration   (CKD-EPI) equation.       Calcium 12/26/2018 9.3  8.5 - 10.1 mg/dL Final   Orders Only on 12/17/2018   Component Date Value Ref Range Status     Sodium 12/17/2018 140  133 - 144 mmol/L Final     Potassium 12/17/2018 3.9  3.4 - 5.3 mmol/L Final     Chloride 12/17/2018 103  94 - 109 mmol/L Final     Carbon Dioxide 12/17/2018 28  20 - 32 mmol/L Final     Anion Gap 12/17/2018 9  3 - 14 mmol/L Final     Glucose 12/17/2018 101* 70 - 99 mg/dL Final     Urea Nitrogen 12/17/2018 22  7 - 30 mg/dL Final     Creatinine 12/17/2018 1.30* 0.66 - 1.25 mg/dL Final     GFR Estimate 12/17/2018 53* >60 mL/min/1.7m2 Final    Non  GFR Calc     GFR Estimate If Black 12/17/2018 64  >60 mL/min/1.7m2 Final    African American GFR Calc     Calcium 12/17/2018 8.6  8.5 - 10.1 mg/dL Final     Bilirubin Total 12/17/2018 1.0  0.2 - 1.3 mg/dL Final     Albumin 12/17/2018 3.8  3.4 - 5.0 g/dL Final     Protein Total 12/17/2018 7.8  6.8 - 8.8 g/dL Final     Alkaline  Phosphatase 12/17/2018 74  40 - 150 U/L Final     ALT 12/17/2018 16  0 - 70 U/L Final     AST 12/17/2018 16  0 - 45 U/L Final     WBC 12/17/2018 6.6  4.0 - 11.0 10e9/L Final     RBC Count 12/17/2018 3.87* 4.4 - 5.9 10e12/L Final     Hemoglobin 12/17/2018 13.4  13.3 - 17.7 g/dL Final     Hematocrit 12/17/2018 39.7* 40.0 - 53.0 % Final     MCV 12/17/2018 103* 78 - 100 fl Final     MCH 12/17/2018 34.6* 26.5 - 33.0 pg Final     MCHC 12/17/2018 33.8  31.5 - 36.5 g/dL Final     RDW 12/17/2018 14.7  10.0 - 15.0 % Final     Platelet Count 12/17/2018 148* 150 - 450 10e9/L Final     % Neutrophils 12/17/2018 75.4  % Final     % Lymphocytes 12/17/2018 11.2  % Final     % Monocytes 12/17/2018 10.1  % Final     % Eosinophils 12/17/2018 2.1  % Final     % Basophils 12/17/2018 1.2  % Final     Absolute Neutrophil 12/17/2018 5.0  1.6 - 8.3 10e9/L Final     Absolute Lymphocytes 12/17/2018 0.7* 0.8 - 5.3 10e9/L Final     Absolute Monocytes 12/17/2018 0.7  0.0 - 1.3 10e9/L Final     Absolute Eosinophils 12/17/2018 0.1  0.0 - 0.7 10e9/L Final     Absolute Basophils 12/17/2018 0.1  0.0 - 0.2 10e9/L Final     Diff Method 12/17/2018 Automated Method   Final          Assessment and plan:    (C02.9) Recurrent tongue cancer (H)  (primary encounter diagnosis)  Because of the recent episode of her congestive heart failure, I would recommend to hold immune therapy with Keytruda at this point.  I am planning to arrange for a CT scan to assess response to treatment.  I will see the patient again in 1 month's time or sooner if there are new developments or concerns.    (I10) Hypertension goal BP (blood pressure) < 140/90  Patient currently on lisinopril 20 mg orally daily.    (N18.3) CKD (chronic kidney disease) stage 3, GFR 30-59 ml/min (H)  Creatinine has been stable.    (E03.9) Acquired hypothyroidism  Patient currently on Synthroid 100 mcg orally daily.    (I50.32) Chronic diastolic congestive heart failure (H)  Patient has been followed by  cardiology.    The patient is ready to learn, no apparent learning barriers were identified.  Diagnosis and treatment plans were explained to the patient. The patient expressed understanding of the content. The patient asked appropriate questions. The patient questions were answered to his satisfaction.    Chart documentation with Dragon Voice recognition Software. Although reviewed after completion, some words and grammatical errors may remain.

## 2018-12-26 NOTE — PATIENT INSTRUCTIONS
We will hold your immunotherapy Keytruda at this time. Dr. Neil would like to see you back in 6 weeks for a follow up appointment with a CT and labs prior.      When you are in need of a refill, please call your pharmacy and they will send us a request.      Copy of appointments, and after visit summary (AVS) given to patient.      If you have any questions during business hours (M-F 8 AM- 4PM), please call Natividad Iyer RN Oncology Hematology  at [facility] [phone].       For questions after business hours, or on holidays/weekends, please call our after hours Nurse Triage line [phone]. Thank you.

## 2018-12-26 NOTE — PROGRESS NOTES
I called pt and reviewed 's recommendation to decrease furosemide to 40 mg daily, and stop KCL. Pt to have repeat labs prior to 12/31 OV. Pt has OV @ 3:00 on 12/31 in [place], so he will come early at 2:10 for labs. Pt will call us if weight starts to trend up (2-3 # in a day), or if his symptoms worsen prior to 12/31 OV. Olivia FERNANDO December 26, 2018, 1:30 PM

## 2018-12-26 NOTE — LETTER
12/26/2018         RE: Patrick Diop  [address]  [facility] [address]        Dear Colleague,    Thank you for referring your patient, Patrick Diop, to the [facility]. Please see a copy of my visit note below.    Hematology/ Oncology Follow-up Visit:  Dec 26, 2018    Reason for Visit:   Chief Complaint   Patient presents with     Oncology Clinic Visit     4 week recheck Recurrent tongue cancer, review Labs & Echo / Chemo today        Oncologic History:    Recurrent tongue cancer (H)  Patrick Diop was noted to have a mass in his right neck during routine physical exam. CT scanning was subsequently obtained which also showed a right-sided tongue base and oropharyngeal mass, which together are consistent with malignancy. The patient himself has denied any unexplained weight loss. He denies any odynophagia, dysphagia, hoarseness or otalgia. 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 PET scan done on October 21, 2015 showed a mass at the level of the tongue base located along the anterior wall of the oropharynx and extending to the right lateral wall near the tonsillar pillar. This demonstrates abnormal increased FDG activity and is consistent with known malignancy. Hypermetabolic lymph nodes in the right and left neck are also noted as described. These are consistent with metastatic lymph nodes. There is no evidence of metastatic disease in the chest, abdomen or pelvis. CT of the soft tissues of the neck on October 21, 2015 was done in conjunction with PET scanning showed a 2.8 cm mass at the base of the tongue on the right effacing the right vallecula. There is also a 2.3 cm lymph node in the right level II region that is strongly suspicious for metastatic disease. These lesions are PET positive. Additionally, there is a 1.3 cm lymph node in left level III that is PET positive. There is a lymph node in right level V, also PET positive.  He started on concurrent Cisplatin with radiation therapy with cisplatin 100 mg/m to be given intravenously on days #1, #22 #43 of radiation therapy.      Interval History:  Patient returning today for follow-up visit.  He has been followed by cardiology lately because of exacerbation of congestive heart failure.  His medication have changed and he is been feeling better and shortness of breath has been improving gradually.  The patient denies any cough or wheezing or hemoptysis.  The patient denies any recent abdominal pain or nausea or vomiting.  Patient has been on immune therapy without significant side effects.    Review Of Systems:  Constitutional: Negative for fever, chills, and night sweats.  Skin: negative.  Eyes: negative.  Ears/Nose/Throat: negative.  Respiratory: No shortness of breath, dyspnea on exertion, cough, or hemoptysis.  Cardiovascular: negative.  Gastrointestinal: negative.  Genitourinary: negative.  Musculoskeletal: negative.  Neurologic: negative.  Psychiatric: negative.  Hematologic/Lymphatic/Immunologic: negative.  Endocrine: negative.    All other ROS negative unless mentioned in interval history.    Past medical, social, surgical, and family histories reviewed.    Allergies:  Allergies as of 12/26/2018     (No Known Allergies)       Current Medications:  Current Outpatient Medications   Medication Sig Dispense Refill     Cholecalciferol (VITAMIN D) 1000 UNITS capsule Take 1 capsule by mouth daily       furosemide (LASIX) 20 MG tablet Take 2 tablets (40 mg) by mouth 2 times daily  0     levothyroxine (SYNTHROID/LEVOTHROID) 100 MCG tablet Take 1 tablet (100 mcg) by mouth daily Recheck Lab test in 2 months. 90 tablet 1     lisinopril (PRINIVIL/ZESTRIL) 20 MG tablet TAKE ONE TABLET BY MOUTH DAILY 90 tablet 2     lovastatin (MEVACOR) 40 MG tablet TAKE ONE TABLET BY MOUTH NIGHTLY AT BEDTIME 90 tablet 2     multivitamin  with lutein (OCUVITE WITH LTEIN) CAPS Take 1 capsule by mouth daily        "potassium chloride ER (K-DUR/KLOR-CON M) 20 MEQ CR tablet Take 2 tablets (40 mEq) by mouth daily 180 tablet 1     LORazepam (ATIVAN) 0.5 MG tablet Take 1 tablet (0.5 mg) by mouth every 4 hours as needed (Anxiety, Nausea/Vomiting or Sleep) (Patient not taking: Reported on 12/26/2018) 30 tablet 2     metoprolol succinate ER (TOPROL-XL) 25 MG 24 hr tablet Take 0.5 tablets (12.5 mg) by mouth daily 45 tablet 3     prochlorperazine (COMPAZINE) 10 MG tablet Take 1 tablet (10 mg) by mouth every 6 hours as needed (Nausea/Vomiting) (Patient not taking: Reported on 12/17/2018) 30 tablet 2        Physical Exam:  BP 95/71 (BP Location: Right arm, Patient Position: Sitting, Cuff Size: Adult Regular)   Pulse 91   Temp 97.7  F (36.5  C) (Tympanic)   Resp 16   Ht 1.714 m (5' 7.48\")   Wt 74.3 kg (163 lb 12.8 oz)   SpO2 95%   BMI 25.29 kg/m     Wt Readings from Last 12 Encounters:   12/26/18 74.3 kg (163 lb 12.8 oz)   12/17/18 80.2 kg (176 lb 12.8 oz)   11/21/18 80.6 kg (177 lb 9.6 oz)   11/15/18 81.4 kg (179 lb 8 oz)   10/25/18 83.1 kg (183 lb 1.6 oz)   10/04/18 82.9 kg (182 lb 11.2 oz)   09/19/18 82 kg (180 lb 12.8 oz)   09/13/18 81 kg (178 lb 9.6 oz)   08/23/18 78.9 kg (173 lb 14.4 oz)   08/01/18 81.3 kg (179 lb 3.2 oz)   07/11/18 78.2 kg (172 lb 8 oz)   06/21/18 77.1 kg (170 lb)     ECOG performance status: 0  GENERAL APPEARANCE: Healthy, alert and in no acute distress.  HEENT: Sclerae anicteric. PERRLA. Oropharynx without ulcers, lesions, or thrush.  NECK: Supple. No asymmetry or masses.  LYMPHATICS: No palpable cervical, supraclavicular, axillary, or inguinal lymphadenopathy.  RESP: Lungs clear to auscultation bilaterally without rales, rhonchi or wheezes.  CARDIOVASCULAR: Regular rate and rhythm. Normal S1, S2; no S3 or S4. No murmur, gallop, or rub.  ABDOMEN: Soft, nontender. Bowel sounds normal. No palpable organomegaly or masses.  MUSCULOSKELETAL: Extremities without gross deformities noted. No edema of bilateral " lower extremities.  SKIN: No suspicious lesions or rashes.  NEURO: Alert and oriented x 3. Cranial nerves II-XII grossly intact.  PSYCHIATRIC: Mentation and affect appear normal.    Laboratory/Imaging Studies:  Orders Only on 12/26/2018   Component Date Value Ref Range Status     Sodium 12/26/2018 132* 133 - 144 mmol/L Final     Potassium 12/26/2018 4.8  3.4 - 5.3 mmol/L Final     Chloride 12/26/2018 97  94 - 109 mmol/L Final     Carbon Dioxide 12/26/2018 28  20 - 32 mmol/L Final     Anion Gap 12/26/2018 7  3 - 14 mmol/L Final     Glucose 12/26/2018 112* 70 - 99 mg/dL Final     Urea Nitrogen 12/26/2018 33* 7 - 30 mg/dL Final     Creatinine 12/26/2018 1.53* 0.66 - 1.25 mg/dL Final     GFR Estimate 12/26/2018 42* >60 mL/min/[1.73_m2] Final    Comment: Non  GFR Calc  Starting 12/18/2018, serum creatinine based estimated GFR (eGFR) will be   calculated using the Chronic Kidney Disease Epidemiology Collaboration   (CKD-EPI) equation.       GFR Estimate If Black 12/26/2018 49* >60 mL/min/[1.73_m2] Final    Comment:  GFR Calc  Starting 12/18/2018, serum creatinine based estimated GFR (eGFR) will be   calculated using the Chronic Kidney Disease Epidemiology Collaboration   (CKD-EPI) equation.       Calcium 12/26/2018 9.3  8.5 - 10.1 mg/dL Final   Orders Only on 12/17/2018   Component Date Value Ref Range Status     Sodium 12/17/2018 140  133 - 144 mmol/L Final     Potassium 12/17/2018 3.9  3.4 - 5.3 mmol/L Final     Chloride 12/17/2018 103  94 - 109 mmol/L Final     Carbon Dioxide 12/17/2018 28  20 - 32 mmol/L Final     Anion Gap 12/17/2018 9  3 - 14 mmol/L Final     Glucose 12/17/2018 101* 70 - 99 mg/dL Final     Urea Nitrogen 12/17/2018 22  7 - 30 mg/dL Final     Creatinine 12/17/2018 1.30* 0.66 - 1.25 mg/dL Final     GFR Estimate 12/17/2018 53* >60 mL/min/1.7m2 Final    Non  GFR Calc     GFR Estimate If Black 12/17/2018 64  >60 mL/min/1.7m2 Final    African American GFR  Calc     Calcium 12/17/2018 8.6  8.5 - 10.1 mg/dL Final     Bilirubin Total 12/17/2018 1.0  0.2 - 1.3 mg/dL Final     Albumin 12/17/2018 3.8  3.4 - 5.0 g/dL Final     Protein Total 12/17/2018 7.8  6.8 - 8.8 g/dL Final     Alkaline Phosphatase 12/17/2018 74  40 - 150 U/L Final     ALT 12/17/2018 16  0 - 70 U/L Final     AST 12/17/2018 16  0 - 45 U/L Final     WBC 12/17/2018 6.6  4.0 - 11.0 10e9/L Final     RBC Count 12/17/2018 3.87* 4.4 - 5.9 10e12/L Final     Hemoglobin 12/17/2018 13.4  13.3 - 17.7 g/dL Final     Hematocrit 12/17/2018 39.7* 40.0 - 53.0 % Final     MCV 12/17/2018 103* 78 - 100 fl Final     MCH 12/17/2018 34.6* 26.5 - 33.0 pg Final     MCHC 12/17/2018 33.8  31.5 - 36.5 g/dL Final     RDW 12/17/2018 14.7  10.0 - 15.0 % Final     Platelet Count 12/17/2018 148* 150 - 450 10e9/L Final     % Neutrophils 12/17/2018 75.4  % Final     % Lymphocytes 12/17/2018 11.2  % Final     % Monocytes 12/17/2018 10.1  % Final     % Eosinophils 12/17/2018 2.1  % Final     % Basophils 12/17/2018 1.2  % Final     Absolute Neutrophil 12/17/2018 5.0  1.6 - 8.3 10e9/L Final     Absolute Lymphocytes 12/17/2018 0.7* 0.8 - 5.3 10e9/L Final     Absolute Monocytes 12/17/2018 0.7  0.0 - 1.3 10e9/L Final     Absolute Eosinophils 12/17/2018 0.1  0.0 - 0.7 10e9/L Final     Absolute Basophils 12/17/2018 0.1  0.0 - 0.2 10e9/L Final     Diff Method 12/17/2018 Automated Method   Final          Assessment and plan:    (C02.9) Recurrent tongue cancer (H)  (primary encounter diagnosis)  Because of the recent episode of her congestive heart failure, I would recommend to hold immune therapy with Keytruda at this point.  I am planning to arrange for a CT scan to assess response to treatment.  I will see the patient again in 1 month's time or sooner if there are new developments or concerns.    (I10) Hypertension goal BP (blood pressure) < 140/90  Patient currently on lisinopril 20 mg orally daily.    (N18.3) CKD (chronic kidney disease) stage 3,  GFR 30-59 ml/min (H)  Creatinine has been stable.    (E03.9) Acquired hypothyroidism  Patient currently on Synthroid 100 mcg orally daily.    (I50.32) Chronic diastolic congestive heart failure (H)  Patient has been followed by cardiology.    The patient is ready to learn, no apparent learning barriers were identified.  Diagnosis and treatment plans were explained to the patient. The patient expressed understanding of the content. The patient asked appropriate questions. The patient questions were answered to his satisfaction.    Chart documentation with Dragon Voice recognition Software. Although reviewed after completion, some words and grammatical errors may remain.    Again, thank you for allowing me to participate in the care of your patient.        Sincerely,        Lanny Neil MD

## 2018-12-26 NOTE — PROGRESS NOTES
BMP results from 12/26 noted. I called pt to get an update on weight and symptoms. Pt said his weight this morning at his oncology visit was 163#, and that is what it was on his home scale yesterday, fully clothed. Pt said his LE edema has gone down considerably, but there is still some swelling. He is able to walk longer distances now before getting short of breath. Pt's furosemide dose was increased from 20 mg daily to 40 mg BID on 12/17. Pt also takes KCL 40 meq daily. Pt was to take increased dose of furosemide until weight down 10# and or his swelling is gone (weight was 176# at 12/17 OV). Pt's creatinine up from previous BMP. Pt has f/u with  12/31. Will send update to  and call pt back with further recommendations. Olivia FERNANDO December 26, 2018, 12:07 PM      Component      Latest Ref Rng & Units 12/17/2018 12/26/2018   Sodium      133 - 144 mmol/L 140 132 (L)   Potassium      3.4 - 5.3 mmol/L 3.9 4.8   Chloride      94 - 109 mmol/L 103 97   Carbon Dioxide      20 - 32 mmol/L 28 28   Anion Gap      3 - 14 mmol/L 9 7   Glucose      70 - 99 mg/dL 101 (H) 112 (H)   Urea Nitrogen      7 - 30 mg/dL 22 33 (H)   Creatinine      0.66 - 1.25 mg/dL 1.30 (H) 1.53 (H)   GFR Estimate      >60 mL/min/1.73:m2 53 (L) 42 (L)   GFR Estimate If Black      >60 mL/min/1.73:m2 64 49 (L)   Calcium      8.5 - 10.1 mg/dL 8.6 9.3   Bilirubin Total      0.2 - 1.3 mg/dL 1.0    Albumin      3.4 - 5.0 g/dL 3.8    Protein Total      6.8 - 8.8 g/dL 7.8    Alkaline Phosphatase      40 - 150 U/L 74    ALT      0 - 70 U/L 16    AST      0 - 45 U/L 16

## 2018-12-26 NOTE — LETTER
December 27, 2018      Patrick Diop  [address]   [facility] [address]        Dear ,    We are writing to inform you of your test results.    Sodium a little lower. .  Kidney test a little higher.   PLAN: Recheck chem 8 in a month. (by a lab only appointment, unless you have an office visit)  No other changes.    Resulted Orders   Basic metabolic panel   Result Value Ref Range    Sodium 132 (L) 133 - 144 mmol/L    Potassium 4.8 3.4 - 5.3 mmol/L    Chloride 97 94 - 109 mmol/L    Carbon Dioxide 28 20 - 32 mmol/L    Anion Gap 7 3 - 14 mmol/L    Glucose 112 (H) 70 - 99 mg/dL    Urea Nitrogen 33 (H) 7 - 30 mg/dL    Creatinine 1.53 (H) 0.66 - 1.25 mg/dL    GFR Estimate 42 (L) >60 mL/min/[1.73_m2]      Comment:      Non  GFR Calc  Starting 12/18/2018, serum creatinine based estimated GFR (eGFR) will be   calculated using the Chronic Kidney Disease Epidemiology Collaboration   (CKD-EPI) equation.      GFR Estimate If Black 49 (L) >60 mL/min/[1.73_m2]      Comment:       GFR Calc  Starting 12/18/2018, serum creatinine based estimated GFR (eGFR) will be   calculated using the Chronic Kidney Disease Epidemiology Collaboration   (CKD-EPI) equation.      Calcium 9.3 8.5 - 10.1 mg/dL         If you have any questions or concerns, please call the clinic at the number listed above.       Sincerely,        Dr. Mandel/leodan

## 2018-12-26 NOTE — NURSING NOTE
"Oncology Rooming Note    December 26, 2018 11:24 AM   Patrick Diop is a 83 year old male who presents for:    Chief Complaint   Patient presents with     Oncology Clinic Visit     4 week recheck Recurrent tongue cancer, review Labs & Echo / Chemo today      Initial Vitals: BP 95/71 (BP Location: Right arm, Patient Position: Sitting, Cuff Size: Adult Regular)   Pulse 91   Temp 97.7  F (36.5  C) (Tympanic)   Resp 16   Ht 1.714 m (5' 7.48\")   Wt 74.3 kg (163 lb 12.8 oz)   SpO2 95%   BMI 25.29 kg/m   Estimated body mass index is 25.29 kg/m  as calculated from the following:    Height as of this encounter: 1.714 m (5' 7.48\").    Weight as of this encounter: 74.3 kg (163 lb 12.8 oz). Body surface area is 1.88 meters squared.  No Pain (0) Comment: Data Unavailable   No LMP for male patient.  Allergies reviewed: No  Medications reviewed: No    Medications: Medication refills not needed today.  Pharmacy name entered into [employer]:    [id number] [address] [facility]    Clinical concerns: 4 week recheck Recurrent tongue cancer, review Labs & Echo / Chemo today . Urinating a lot.   7 minutes for nursing intake (face to face time)     Glenys White CMA              "

## 2018-12-26 NOTE — RESULT ENCOUNTER NOTE
Results noted:Na+ slightly low and creatinine elevated over previous. To be discussed at OV with Dr Calderón on 12/31/18

## 2018-12-27 NOTE — RESULT ENCOUNTER NOTE
Please call.  Sodium a little lower. .  Kidney test a little higher.   PLAN: Recheck chem 8 in a month.  No other changes.

## 2018-12-31 NOTE — PROGRESS NOTES
Pt's Ov with  had to be canceled today as  was pulled to [facility]. I called pt to get an update on his weight and symptoms. Pt said both his breathing and swelling a much better. He feels better than he has in awhile. Pt had oncology primo last week and weight was 163#. He can't read the dial on his scale at home so he has not weighed himself since last week. He has someone that will come to his house this afternoon to help him, and he can weigh at that time. Pt said he already rescheduled OV and labs to 1/3 with PRIMO Patrick. Pt is taking furosemide 40 mg daily and did stop his potassium supplement as instructed last week. I recommended pt try buying a digital scale because it will be important for him to be able to weigh himself daily, and pt said he has been planning on buying one. Olivia FERNANDO December 31, 2018, 11:08 AM

## 2019-01-01 ENCOUNTER — HOSPITAL ENCOUNTER (OUTPATIENT)
Dept: CT IMAGING | Facility: CLINIC | Age: 84
Discharge: HOME | DRG: 291 | End: 2019-02-11
Attending: INTERNAL MEDICINE | Admitting: INTERNAL MEDICINE
Payer: COMMERCIAL

## 2019-01-01 ENCOUNTER — PATIENT OUTREACH (OUTPATIENT)
Dept: CARE COORDINATION | Facility: CLINIC | Age: 84
End: 2019-01-01

## 2019-01-01 ENCOUNTER — CARE COORDINATION (OUTPATIENT)
Dept: GASTROENTEROLOGY | Facility: CLINIC | Age: 84
End: 2019-01-01

## 2019-01-01 ENCOUNTER — HOSPITAL ENCOUNTER (OUTPATIENT)
Dept: CARDIOLOGY | Facility: CLINIC | Age: 84
Discharge: HOME | End: 2019-02-26
Attending: INTERNAL MEDICINE | Admitting: INTERNAL MEDICINE
Payer: COMMERCIAL

## 2019-01-01 ENCOUNTER — TELEPHONE (OUTPATIENT)
Dept: CARDIOLOGY | Facility: CLINIC | Age: 84
End: 2019-01-01

## 2019-01-01 ENCOUNTER — MEDICAL CORRESPONDENCE (OUTPATIENT)
Dept: HEALTH INFORMATION MANAGEMENT | Facility: CLINIC | Age: 84
End: 2019-01-01

## 2019-01-01 ENCOUNTER — HOSPITAL ENCOUNTER (INPATIENT)
Facility: CLINIC | Age: 84
LOS: 11 days | Discharge: HOME HEALTH | DRG: 224 | End: 2019-02-22
Attending: INTERNAL MEDICINE | Admitting: STUDENT IN AN ORGANIZED HEALTH CARE EDUCATION/TRAINING PROGRAM
Payer: COMMERCIAL

## 2019-01-01 ENCOUNTER — APPOINTMENT (OUTPATIENT)
Dept: GENERAL RADIOLOGY | Facility: CLINIC | Age: 84
DRG: 291 | End: 2019-01-01
Attending: RADIOLOGY
Payer: COMMERCIAL

## 2019-01-01 ENCOUNTER — APPOINTMENT (OUTPATIENT)
Dept: PHYSICAL THERAPY | Facility: CLINIC | Age: 84
DRG: 224 | End: 2019-01-01
Attending: INTERNAL MEDICINE
Payer: COMMERCIAL

## 2019-01-01 ENCOUNTER — APPOINTMENT (OUTPATIENT)
Dept: CARDIOLOGY | Facility: CLINIC | Age: 84
DRG: 224 | End: 2019-01-01
Attending: INTERNAL MEDICINE
Payer: COMMERCIAL

## 2019-01-01 ENCOUNTER — APPOINTMENT (OUTPATIENT)
Dept: ULTRASOUND IMAGING | Facility: CLINIC | Age: 84
End: 2019-01-01
Attending: FAMILY MEDICINE
Payer: COMMERCIAL

## 2019-01-01 ENCOUNTER — OFFICE VISIT (OUTPATIENT)
Dept: FAMILY MEDICINE | Facility: CLINIC | Age: 84
End: 2019-01-01
Payer: COMMERCIAL

## 2019-01-01 ENCOUNTER — TELEPHONE (OUTPATIENT)
Dept: FAMILY MEDICINE | Facility: CLINIC | Age: 84
End: 2019-01-01

## 2019-01-01 ENCOUNTER — PRE VISIT (OUTPATIENT)
Dept: CARDIOLOGY | Facility: CLINIC | Age: 84
End: 2019-01-01

## 2019-01-01 ENCOUNTER — ANESTHESIA (OUTPATIENT)
Dept: SURGERY | Facility: CLINIC | Age: 84
DRG: 377 | End: 2019-01-01
Payer: COMMERCIAL

## 2019-01-01 ENCOUNTER — HOSPITAL ENCOUNTER (OUTPATIENT)
Facility: CLINIC | Age: 84
Setting detail: OBSERVATION
Discharge: HOME HEALTH | End: 2019-06-03
Attending: EMERGENCY MEDICINE | Admitting: FAMILY MEDICINE
Payer: COMMERCIAL

## 2019-01-01 ENCOUNTER — HOSPITAL ENCOUNTER (INPATIENT)
Facility: CLINIC | Age: 84
LOS: 1 days | Discharge: ANOTHER HOSPITAL | DRG: 291 | End: 2019-02-11
Attending: EMERGENCY MEDICINE
Payer: COMMERCIAL

## 2019-01-01 ENCOUNTER — APPOINTMENT (OUTPATIENT)
Dept: PHYSICAL THERAPY | Facility: CLINIC | Age: 84
DRG: 377 | End: 2019-01-01
Attending: NURSE PRACTITIONER
Payer: COMMERCIAL

## 2019-01-01 ENCOUNTER — APPOINTMENT (OUTPATIENT)
Dept: GENERAL RADIOLOGY | Facility: CLINIC | Age: 84
DRG: 224 | End: 2019-01-01
Attending: INTERNAL MEDICINE
Payer: COMMERCIAL

## 2019-01-01 ENCOUNTER — HOSPITAL ENCOUNTER (OUTPATIENT)
Dept: CARDIOLOGY | Facility: CLINIC | Age: 84
Discharge: HOME | End: 2019-04-16
Attending: INTERNAL MEDICINE | Admitting: INTERNAL MEDICINE
Payer: COMMERCIAL

## 2019-01-01 ENCOUNTER — CARE COORDINATION (OUTPATIENT)
Dept: CARDIOLOGY | Facility: CLINIC | Age: 84
End: 2019-01-01

## 2019-01-01 ENCOUNTER — OFFICE VISIT (OUTPATIENT)
Dept: CARDIOLOGY | Facility: CLINIC | Age: 84
End: 2019-01-01
Payer: COMMERCIAL

## 2019-01-01 ENCOUNTER — OFFICE VISIT (OUTPATIENT)
Dept: CARDIOLOGY | Facility: CLINIC | Age: 84
End: 2019-01-01
Attending: INTERNAL MEDICINE
Payer: COMMERCIAL

## 2019-01-01 ENCOUNTER — TELEPHONE (OUTPATIENT)
Dept: GASTROENTEROLOGY | Facility: CLINIC | Age: 84
End: 2019-01-01

## 2019-01-01 ENCOUNTER — APPOINTMENT (OUTPATIENT)
Dept: OCCUPATIONAL THERAPY | Facility: CLINIC | Age: 84
DRG: 224 | End: 2019-01-01
Attending: INTERNAL MEDICINE
Payer: COMMERCIAL

## 2019-01-01 ENCOUNTER — SURGERY (OUTPATIENT)
Age: 84
End: 2019-01-01
Payer: COMMERCIAL

## 2019-01-01 ENCOUNTER — ALLIED HEALTH/NURSE VISIT (OUTPATIENT)
Dept: FAMILY MEDICINE | Facility: CLINIC | Age: 84
End: 2019-01-01
Payer: COMMERCIAL

## 2019-01-01 ENCOUNTER — OFFICE VISIT (OUTPATIENT)
Dept: CARDIOLOGY | Facility: CLINIC | Age: 84
End: 2019-01-01
Attending: NURSE PRACTITIONER
Payer: COMMERCIAL

## 2019-01-01 ENCOUNTER — DOCUMENTATION ONLY (OUTPATIENT)
Dept: OTHER | Facility: CLINIC | Age: 84
End: 2019-01-01

## 2019-01-01 ENCOUNTER — OFFICE VISIT (OUTPATIENT)
Dept: CARDIOLOGY | Facility: CLINIC | Age: 84
End: 2019-01-01
Attending: PHYSICIAN ASSISTANT
Payer: COMMERCIAL

## 2019-01-01 ENCOUNTER — DOCUMENTATION ONLY (OUTPATIENT)
Dept: FAMILY MEDICINE | Facility: CLINIC | Age: 84
End: 2019-01-01

## 2019-01-01 ENCOUNTER — APPOINTMENT (OUTPATIENT)
Dept: PHYSICAL THERAPY | Facility: CLINIC | Age: 84
End: 2019-01-01
Attending: STUDENT IN AN ORGANIZED HEALTH CARE EDUCATION/TRAINING PROGRAM
Payer: COMMERCIAL

## 2019-01-01 ENCOUNTER — TELEPHONE (OUTPATIENT)
Dept: OTHER | Facility: CLINIC | Age: 84
End: 2019-01-01

## 2019-01-01 ENCOUNTER — APPOINTMENT (OUTPATIENT)
Dept: SPEECH THERAPY | Facility: CLINIC | Age: 84
DRG: 377 | End: 2019-01-01
Attending: INTERNAL MEDICINE
Payer: COMMERCIAL

## 2019-01-01 ENCOUNTER — PATIENT OUTREACH (OUTPATIENT)
Dept: ONCOLOGY | Facility: CLINIC | Age: 84
End: 2019-01-01

## 2019-01-01 ENCOUNTER — APPOINTMENT (OUTPATIENT)
Dept: GENERAL RADIOLOGY | Facility: CLINIC | Age: 84
DRG: 291 | End: 2019-01-01
Attending: EMERGENCY MEDICINE
Payer: COMMERCIAL

## 2019-01-01 ENCOUNTER — DOCUMENTATION ONLY (OUTPATIENT)
Dept: CARE COORDINATION | Facility: CLINIC | Age: 84
End: 2019-01-01

## 2019-01-01 ENCOUNTER — HOSPITAL ENCOUNTER (INPATIENT)
Facility: CLINIC | Age: 84
LOS: 3 days | Discharge: HOME HEALTH | DRG: 377 | End: 2019-06-10
Attending: HOSPITALIST | Admitting: INTERNAL MEDICINE
Payer: COMMERCIAL

## 2019-01-01 ENCOUNTER — HOSPITAL ENCOUNTER (OUTPATIENT)
Facility: CLINIC | Age: 84
Setting detail: OBSERVATION
Discharge: HOSPICE | End: 2019-06-26
Attending: HOSPITALIST | Admitting: STUDENT IN AN ORGANIZED HEALTH CARE EDUCATION/TRAINING PROGRAM
Payer: COMMERCIAL

## 2019-01-01 ENCOUNTER — HOSPITAL ENCOUNTER (EMERGENCY)
Facility: CLINIC | Age: 84
Discharge: ANOTHER HOSPITAL | End: 2019-06-07
Attending: EMERGENCY MEDICINE | Admitting: EMERGENCY MEDICINE
Payer: COMMERCIAL

## 2019-01-01 ENCOUNTER — APPOINTMENT (OUTPATIENT)
Dept: ULTRASOUND IMAGING | Facility: CLINIC | Age: 84
DRG: 291 | End: 2019-01-01
Attending: INTERNAL MEDICINE
Payer: COMMERCIAL

## 2019-01-01 ENCOUNTER — ANESTHESIA EVENT (OUTPATIENT)
Dept: SURGERY | Facility: CLINIC | Age: 84
DRG: 377 | End: 2019-01-01
Payer: COMMERCIAL

## 2019-01-01 ENCOUNTER — HOSPITAL ENCOUNTER (EMERGENCY)
Facility: CLINIC | Age: 84
Discharge: ANOTHER HOSPITAL | End: 2019-06-24
Attending: EMERGENCY MEDICINE | Admitting: EMERGENCY MEDICINE
Payer: COMMERCIAL

## 2019-01-01 ENCOUNTER — APPOINTMENT (OUTPATIENT)
Dept: GENERAL RADIOLOGY | Facility: CLINIC | Age: 84
End: 2019-01-01
Attending: INTERNAL MEDICINE
Payer: COMMERCIAL

## 2019-01-01 ENCOUNTER — ONCOLOGY VISIT (OUTPATIENT)
Dept: ONCOLOGY | Facility: CLINIC | Age: 84
End: 2019-01-01
Attending: INTERNAL MEDICINE
Payer: COMMERCIAL

## 2019-01-01 ENCOUNTER — APPOINTMENT (OUTPATIENT)
Dept: GENERAL RADIOLOGY | Facility: CLINIC | Age: 84
End: 2019-01-01
Attending: EMERGENCY MEDICINE
Payer: COMMERCIAL

## 2019-01-01 ENCOUNTER — OFFICE VISIT (OUTPATIENT)
Dept: URGENT CARE | Facility: URGENT CARE | Age: 84
End: 2019-01-01
Payer: COMMERCIAL

## 2019-01-01 VITALS
WEIGHT: 153.3 LBS | OXYGEN SATURATION: 95 % | BODY MASS INDEX: 24.06 KG/M2 | SYSTOLIC BLOOD PRESSURE: 98 MMHG | HEIGHT: 67 IN | TEMPERATURE: 97.2 F | RESPIRATION RATE: 18 BRPM | HEART RATE: 88 BPM | DIASTOLIC BLOOD PRESSURE: 54 MMHG

## 2019-01-01 VITALS
TEMPERATURE: 97.8 F | RESPIRATION RATE: 20 BRPM | HEART RATE: 91 BPM | OXYGEN SATURATION: 93 % | SYSTOLIC BLOOD PRESSURE: 93 MMHG | BODY MASS INDEX: 23.74 KG/M2 | HEIGHT: 67 IN | DIASTOLIC BLOOD PRESSURE: 62 MMHG | WEIGHT: 151.24 LBS

## 2019-01-01 VITALS
TEMPERATURE: 97.5 F | OXYGEN SATURATION: 95 % | RESPIRATION RATE: 16 BRPM | DIASTOLIC BLOOD PRESSURE: 44 MMHG | HEIGHT: 67 IN | BODY MASS INDEX: 25 KG/M2 | SYSTOLIC BLOOD PRESSURE: 74 MMHG | HEART RATE: 79 BPM | WEIGHT: 159.3 LBS

## 2019-01-01 VITALS
SYSTOLIC BLOOD PRESSURE: 93 MMHG | TEMPERATURE: 97.2 F | OXYGEN SATURATION: 97 % | HEART RATE: 90 BPM | DIASTOLIC BLOOD PRESSURE: 56 MMHG | RESPIRATION RATE: 22 BRPM

## 2019-01-01 VITALS
HEART RATE: 75 BPM | SYSTOLIC BLOOD PRESSURE: 89 MMHG | WEIGHT: 141 LBS | OXYGEN SATURATION: 97 % | DIASTOLIC BLOOD PRESSURE: 53 MMHG | BODY MASS INDEX: 22.08 KG/M2

## 2019-01-01 VITALS
TEMPERATURE: 97.4 F | SYSTOLIC BLOOD PRESSURE: 96 MMHG | OXYGEN SATURATION: 98 % | WEIGHT: 142 LBS | DIASTOLIC BLOOD PRESSURE: 50 MMHG | RESPIRATION RATE: 26 BRPM | HEART RATE: 80 BPM | BODY MASS INDEX: 22.24 KG/M2

## 2019-01-01 VITALS
TEMPERATURE: 97.5 F | HEART RATE: 75 BPM | OXYGEN SATURATION: 97 % | SYSTOLIC BLOOD PRESSURE: 80 MMHG | WEIGHT: 159 LBS | RESPIRATION RATE: 18 BRPM | DIASTOLIC BLOOD PRESSURE: 60 MMHG | BODY MASS INDEX: 24.96 KG/M2 | HEIGHT: 67 IN

## 2019-01-01 VITALS
DIASTOLIC BLOOD PRESSURE: 41 MMHG | BODY MASS INDEX: 26.69 KG/M2 | TEMPERATURE: 97.2 F | WEIGHT: 160.94 LBS | HEIGHT: 67 IN | RESPIRATION RATE: 18 BRPM | HEART RATE: 80 BPM | DIASTOLIC BLOOD PRESSURE: 46 MMHG | SYSTOLIC BLOOD PRESSURE: 81 MMHG | TEMPERATURE: 97.8 F | OXYGEN SATURATION: 96 % | WEIGHT: 170.4 LBS | SYSTOLIC BLOOD PRESSURE: 80 MMHG | BODY MASS INDEX: 25.26 KG/M2 | RESPIRATION RATE: 22 BRPM | OXYGEN SATURATION: 94 % | HEART RATE: 90 BPM

## 2019-01-01 VITALS
RESPIRATION RATE: 16 BRPM | WEIGHT: 165.4 LBS | OXYGEN SATURATION: 100 % | HEART RATE: 85 BPM | TEMPERATURE: 97.8 F | BODY MASS INDEX: 25.96 KG/M2 | SYSTOLIC BLOOD PRESSURE: 82 MMHG | HEIGHT: 67 IN | DIASTOLIC BLOOD PRESSURE: 48 MMHG

## 2019-01-01 VITALS
WEIGHT: 142.6 LBS | RESPIRATION RATE: 16 BRPM | TEMPERATURE: 98 F | BODY MASS INDEX: 22.38 KG/M2 | HEIGHT: 67 IN | OXYGEN SATURATION: 94 % | DIASTOLIC BLOOD PRESSURE: 42 MMHG | SYSTOLIC BLOOD PRESSURE: 91 MMHG | HEART RATE: 92 BPM

## 2019-01-01 VITALS
DIASTOLIC BLOOD PRESSURE: 39 MMHG | OXYGEN SATURATION: 100 % | BODY MASS INDEX: 25.81 KG/M2 | SYSTOLIC BLOOD PRESSURE: 66 MMHG | WEIGHT: 164.8 LBS | HEART RATE: 78 BPM

## 2019-01-01 VITALS
HEART RATE: 88 BPM | WEIGHT: 157 LBS | SYSTOLIC BLOOD PRESSURE: 101 MMHG | DIASTOLIC BLOOD PRESSURE: 68 MMHG | BODY MASS INDEX: 24.59 KG/M2

## 2019-01-01 VITALS
SYSTOLIC BLOOD PRESSURE: 89 MMHG | DIASTOLIC BLOOD PRESSURE: 58 MMHG | BODY MASS INDEX: 25.71 KG/M2 | WEIGHT: 163.8 LBS | OXYGEN SATURATION: 98 % | HEART RATE: 81 BPM | HEIGHT: 67 IN | RESPIRATION RATE: 14 BRPM | TEMPERATURE: 97.5 F

## 2019-01-01 VITALS
WEIGHT: 152 LBS | HEART RATE: 102 BPM | SYSTOLIC BLOOD PRESSURE: 98 MMHG | DIASTOLIC BLOOD PRESSURE: 61 MMHG | OXYGEN SATURATION: 96 % | BODY MASS INDEX: 23.81 KG/M2

## 2019-01-01 VITALS
HEART RATE: 98 BPM | DIASTOLIC BLOOD PRESSURE: 68 MMHG | WEIGHT: 160 LBS | BODY MASS INDEX: 24.7 KG/M2 | SYSTOLIC BLOOD PRESSURE: 104 MMHG | OXYGEN SATURATION: 98 %

## 2019-01-01 DIAGNOSIS — I73.9 PAD (PERIPHERAL ARTERY DISEASE) (H): ICD-10-CM

## 2019-01-01 DIAGNOSIS — I50.23 ACUTE ON CHRONIC SYSTOLIC CONGESTIVE HEART FAILURE (H): Primary | ICD-10-CM

## 2019-01-01 DIAGNOSIS — Z11.1 SCREENING EXAMINATION FOR PULMONARY TUBERCULOSIS: Primary | ICD-10-CM

## 2019-01-01 DIAGNOSIS — I50.23 ACUTE ON CHRONIC SYSTOLIC CONGESTIVE HEART FAILURE (H): ICD-10-CM

## 2019-01-01 DIAGNOSIS — I50.23 ACUTE ON CHRONIC SYSTOLIC HEART FAILURE (H): ICD-10-CM

## 2019-01-01 DIAGNOSIS — E03.9 HYPOTHYROIDISM, UNSPECIFIED TYPE: ICD-10-CM

## 2019-01-01 DIAGNOSIS — I87.2 VENOUS STASIS DERMATITIS OF BOTH LOWER EXTREMITIES: Primary | ICD-10-CM

## 2019-01-01 DIAGNOSIS — I42.9 SECONDARY CARDIOMYOPATHY (H): ICD-10-CM

## 2019-01-01 DIAGNOSIS — I50.9 CHRONIC CONGESTIVE HEART FAILURE, UNSPECIFIED HEART FAILURE TYPE (H): Primary | ICD-10-CM

## 2019-01-01 DIAGNOSIS — C18.9 MALIGNANT NEOPLASM OF COLON, UNSPECIFIED PART OF COLON (H): ICD-10-CM

## 2019-01-01 DIAGNOSIS — I95.9 HYPOTENSION, UNSPECIFIED HYPOTENSION TYPE: ICD-10-CM

## 2019-01-01 DIAGNOSIS — K92.2 ACUTE UPPER GI BLEEDING: ICD-10-CM

## 2019-01-01 DIAGNOSIS — I50.21 ACUTE SYSTOLIC HEART FAILURE (H): ICD-10-CM

## 2019-01-01 DIAGNOSIS — R26.81 UNSTEADY GAIT: ICD-10-CM

## 2019-01-01 DIAGNOSIS — I50.22 CHRONIC SYSTOLIC HEART FAILURE (H): Primary | ICD-10-CM

## 2019-01-01 DIAGNOSIS — K92.2 GI BLEED: Primary | ICD-10-CM

## 2019-01-01 DIAGNOSIS — N18.30 CKD (CHRONIC KIDNEY DISEASE) STAGE 3, GFR 30-59 ML/MIN (H): ICD-10-CM

## 2019-01-01 DIAGNOSIS — I50.9 CHF (CONGESTIVE HEART FAILURE) (H): ICD-10-CM

## 2019-01-01 DIAGNOSIS — Z95.810 ICD (IMPLANTABLE CARDIOVERTER-DEFIBRILLATOR) IN PLACE: ICD-10-CM

## 2019-01-01 DIAGNOSIS — J90 PLEURISY WITH EFFUSION: ICD-10-CM

## 2019-01-01 DIAGNOSIS — K92.2 ACUTE UPPER GI BLEED: Primary | ICD-10-CM

## 2019-01-01 DIAGNOSIS — I42.8 NON-ISCHEMIC CARDIOMYOPATHY (H): ICD-10-CM

## 2019-01-01 DIAGNOSIS — E03.9 ACQUIRED HYPOTHYROIDISM: ICD-10-CM

## 2019-01-01 DIAGNOSIS — I50.22 CHRONIC SYSTOLIC HEART FAILURE (H): ICD-10-CM

## 2019-01-01 DIAGNOSIS — C02.9 RECURRENT TONGUE CANCER (H): ICD-10-CM

## 2019-01-01 DIAGNOSIS — I25.10 CORONARY ARTERY DISEASE INVOLVING NATIVE CORONARY ARTERY OF NATIVE HEART WITHOUT ANGINA PECTORIS: ICD-10-CM

## 2019-01-01 DIAGNOSIS — I50.22 CHRONIC SYSTOLIC CONGESTIVE HEART FAILURE (H): Primary | ICD-10-CM

## 2019-01-01 DIAGNOSIS — D62 ANEMIA DUE TO BLOOD LOSS, ACUTE: ICD-10-CM

## 2019-01-01 DIAGNOSIS — M79.89 LEG SWELLING: Primary | ICD-10-CM

## 2019-01-01 DIAGNOSIS — Z95.810 ICD (IMPLANTABLE CARDIOVERTER-DEFIBRILLATOR) IN PLACE: Primary | ICD-10-CM

## 2019-01-01 DIAGNOSIS — I10 HYPERTENSION GOAL BP (BLOOD PRESSURE) < 140/90: ICD-10-CM

## 2019-01-01 DIAGNOSIS — J90 PLEURAL EFFUSION ON RIGHT: ICD-10-CM

## 2019-01-01 DIAGNOSIS — I87.2 VENOUS STASIS DERMATITIS OF BOTH LOWER EXTREMITIES: ICD-10-CM

## 2019-01-01 DIAGNOSIS — I50.9 CHF (CONGESTIVE HEART FAILURE) (H): Primary | ICD-10-CM

## 2019-01-01 DIAGNOSIS — K26.4 GASTROINTESTINAL HEMORRHAGE ASSOCIATED WITH DUODENAL ULCER: Primary | ICD-10-CM

## 2019-01-01 DIAGNOSIS — C02.9 RECURRENT TONGUE CANCER (H): Primary | ICD-10-CM

## 2019-01-01 DIAGNOSIS — Z87.891 PERSONAL HISTORY OF TOBACCO USE, PRESENTING HAZARDS TO HEALTH: ICD-10-CM

## 2019-01-01 DIAGNOSIS — I25.5 ISCHEMIC CARDIOMYOPATHY: Chronic | ICD-10-CM

## 2019-01-01 DIAGNOSIS — I50.21 ACUTE SYSTOLIC HEART FAILURE (H): Primary | ICD-10-CM

## 2019-01-01 DIAGNOSIS — R60.0 BILATERAL LOWER EXTREMITY EDEMA: ICD-10-CM

## 2019-01-01 DIAGNOSIS — E78.5 HYPERLIPIDEMIA WITH TARGET LDL LESS THAN 100: ICD-10-CM

## 2019-01-01 LAB
ABO + RH BLD: NORMAL
ABO + RH BLD: NORMAL
ALBUMIN SERPL-MCNC: 2.6 G/DL (ref 3.4–5)
ALBUMIN SERPL-MCNC: 2.7 G/DL (ref 3.4–5)
ALBUMIN SERPL-MCNC: 2.7 G/DL (ref 3.4–5)
ALBUMIN SERPL-MCNC: 3.1 G/DL (ref 3.4–5)
ALBUMIN SERPL-MCNC: 3.1 G/DL (ref 3.4–5)
ALBUMIN SERPL-MCNC: 3.3 G/DL (ref 3.4–5)
ALBUMIN SERPL-MCNC: 3.3 G/DL (ref 3.4–5)
ALBUMIN SERPL-MCNC: 3.5 G/DL (ref 3.4–5)
ALBUMIN UR-MCNC: NEGATIVE MG/DL
ALBUMIN UR-MCNC: NEGATIVE MG/DL
ALP SERPL-CCNC: 109 U/L (ref 40–150)
ALP SERPL-CCNC: 121 U/L (ref 40–150)
ALP SERPL-CCNC: 49 U/L (ref 40–150)
ALP SERPL-CCNC: 52 U/L (ref 40–150)
ALP SERPL-CCNC: 59 U/L (ref 40–150)
ALP SERPL-CCNC: 64 U/L (ref 40–150)
ALP SERPL-CCNC: 76 U/L (ref 40–150)
ALP SERPL-CCNC: 99 U/L (ref 40–150)
ALT SERPL W P-5'-P-CCNC: 17 U/L (ref 0–70)
ALT SERPL W P-5'-P-CCNC: 20 U/L (ref 0–70)
ALT SERPL W P-5'-P-CCNC: 26 U/L (ref 0–70)
ALT SERPL W P-5'-P-CCNC: 35 U/L (ref 0–70)
ALT SERPL W P-5'-P-CCNC: 41 U/L (ref 0–70)
ALT SERPL W P-5'-P-CCNC: 44 U/L (ref 0–70)
ALT SERPL W P-5'-P-CCNC: 58 U/L (ref 0–70)
ALT SERPL W P-5'-P-CCNC: 72 U/L (ref 0–70)
ANION GAP SERPL CALCULATED.3IONS-SCNC: 10 MMOL/L (ref 3–14)
ANION GAP SERPL CALCULATED.3IONS-SCNC: 11 MMOL/L (ref 3–14)
ANION GAP SERPL CALCULATED.3IONS-SCNC: 12 MMOL/L (ref 3–14)
ANION GAP SERPL CALCULATED.3IONS-SCNC: 6 MMOL/L (ref 3–14)
ANION GAP SERPL CALCULATED.3IONS-SCNC: 7 MMOL/L (ref 3–14)
ANION GAP SERPL CALCULATED.3IONS-SCNC: 8 MMOL/L (ref 3–14)
ANION GAP SERPL CALCULATED.3IONS-SCNC: 9 MMOL/L (ref 3–14)
APPEARANCE UR: ABNORMAL
APPEARANCE UR: CLEAR
APTT PPP: 31 SEC (ref 22–37)
AST SERPL W P-5'-P-CCNC: 12 U/L (ref 0–45)
AST SERPL W P-5'-P-CCNC: 14 U/L (ref 0–45)
AST SERPL W P-5'-P-CCNC: 14 U/L (ref 0–45)
AST SERPL W P-5'-P-CCNC: 17 U/L (ref 0–45)
AST SERPL W P-5'-P-CCNC: 18 U/L (ref 0–45)
AST SERPL W P-5'-P-CCNC: 30 U/L (ref 0–45)
AST SERPL W P-5'-P-CCNC: 38 U/L (ref 0–45)
AST SERPL W P-5'-P-CCNC: 61 U/L (ref 0–45)
BACTERIA SPEC CULT: ABNORMAL
BASOPHILS # BLD AUTO: 0 10E9/L (ref 0–0.2)
BASOPHILS # BLD AUTO: 0.1 10E9/L (ref 0–0.2)
BASOPHILS NFR BLD AUTO: 0.1 %
BASOPHILS NFR BLD AUTO: 0.2 %
BASOPHILS NFR BLD AUTO: 0.2 %
BASOPHILS NFR BLD AUTO: 0.6 %
BASOPHILS NFR BLD AUTO: 0.6 %
BASOPHILS NFR BLD AUTO: 0.7 %
BILIRUB SERPL-MCNC: 0.3 MG/DL (ref 0.2–1.3)
BILIRUB SERPL-MCNC: 0.4 MG/DL (ref 0.2–1.3)
BILIRUB SERPL-MCNC: 0.4 MG/DL (ref 0.2–1.3)
BILIRUB SERPL-MCNC: 0.5 MG/DL (ref 0.2–1.3)
BILIRUB SERPL-MCNC: 0.5 MG/DL (ref 0.2–1.3)
BILIRUB SERPL-MCNC: 0.7 MG/DL (ref 0.2–1.3)
BILIRUB SERPL-MCNC: 1 MG/DL (ref 0.2–1.3)
BILIRUB SERPL-MCNC: 1.1 MG/DL (ref 0.2–1.3)
BILIRUB UR QL STRIP: NEGATIVE
BILIRUB UR QL STRIP: NEGATIVE
BLD GP AB SCN SERPL QL: NORMAL
BLD PROD TYP BPU: NORMAL
BLD UNIT ID BPU: 0
BLD UNIT ID BPU: 0
BLOOD BANK CMNT PATIENT-IMP: NORMAL
BLOOD PRODUCT CODE: NORMAL
BLOOD PRODUCT CODE: NORMAL
BPU ID: NORMAL
BPU ID: NORMAL
BUN SERPL-MCNC: 105 MG/DL (ref 7–30)
BUN SERPL-MCNC: 114 MG/DL (ref 7–30)
BUN SERPL-MCNC: 114 MG/DL (ref 7–30)
BUN SERPL-MCNC: 116 MG/DL (ref 7–30)
BUN SERPL-MCNC: 125 MG/DL (ref 7–30)
BUN SERPL-MCNC: 21 MG/DL (ref 7–30)
BUN SERPL-MCNC: 27 MG/DL (ref 7–30)
BUN SERPL-MCNC: 29 MG/DL (ref 7–30)
BUN SERPL-MCNC: 33 MG/DL (ref 7–30)
BUN SERPL-MCNC: 36 MG/DL (ref 7–30)
BUN SERPL-MCNC: 37 MG/DL (ref 7–30)
BUN SERPL-MCNC: 38 MG/DL (ref 7–30)
BUN SERPL-MCNC: 39 MG/DL (ref 7–30)
BUN SERPL-MCNC: 39 MG/DL (ref 7–30)
BUN SERPL-MCNC: 40 MG/DL (ref 7–30)
BUN SERPL-MCNC: 41 MG/DL (ref 7–30)
BUN SERPL-MCNC: 43 MG/DL (ref 7–30)
BUN SERPL-MCNC: 44 MG/DL (ref 7–30)
BUN SERPL-MCNC: 44 MG/DL (ref 7–30)
BUN SERPL-MCNC: 47 MG/DL (ref 7–30)
BUN SERPL-MCNC: 48 MG/DL (ref 7–30)
BUN SERPL-MCNC: 67 MG/DL (ref 7–30)
BUN SERPL-MCNC: 94 MG/DL (ref 7–30)
BUN SERPL-MCNC: 98 MG/DL (ref 7–30)
CALCIUM SERPL-MCNC: 8.4 MG/DL (ref 8.5–10.1)
CALCIUM SERPL-MCNC: 8.5 MG/DL (ref 8.5–10.1)
CALCIUM SERPL-MCNC: 8.6 MG/DL (ref 8.5–10.1)
CALCIUM SERPL-MCNC: 8.7 MG/DL (ref 8.5–10.1)
CALCIUM SERPL-MCNC: 8.7 MG/DL (ref 8.5–10.1)
CALCIUM SERPL-MCNC: 8.8 MG/DL (ref 8.5–10.1)
CALCIUM SERPL-MCNC: 8.9 MG/DL (ref 8.5–10.1)
CALCIUM SERPL-MCNC: 9 MG/DL (ref 8.5–10.1)
CALCIUM SERPL-MCNC: 9.1 MG/DL (ref 8.5–10.1)
CALCIUM SERPL-MCNC: 9.4 MG/DL (ref 8.5–10.1)
CHLORIDE SERPL-SCNC: 100 MMOL/L (ref 94–109)
CHLORIDE SERPL-SCNC: 101 MMOL/L (ref 94–109)
CHLORIDE SERPL-SCNC: 102 MMOL/L (ref 94–109)
CHLORIDE SERPL-SCNC: 103 MMOL/L (ref 94–109)
CHLORIDE SERPL-SCNC: 104 MMOL/L (ref 94–109)
CHLORIDE SERPL-SCNC: 105 MMOL/L (ref 94–109)
CHLORIDE SERPL-SCNC: 106 MMOL/L (ref 94–109)
CHLORIDE SERPL-SCNC: 107 MMOL/L (ref 94–109)
CHLORIDE SERPL-SCNC: 108 MMOL/L (ref 94–109)
CHLORIDE SERPL-SCNC: 109 MMOL/L (ref 94–109)
CHLORIDE SERPL-SCNC: 98 MMOL/L (ref 94–109)
CHLORIDE SERPL-SCNC: 99 MMOL/L (ref 94–109)
CHOLEST SERPL-MCNC: 126 MG/DL
CO2 SERPL-SCNC: 23 MMOL/L (ref 20–32)
CO2 SERPL-SCNC: 24 MMOL/L (ref 20–32)
CO2 SERPL-SCNC: 25 MMOL/L (ref 20–32)
CO2 SERPL-SCNC: 26 MMOL/L (ref 20–32)
CO2 SERPL-SCNC: 27 MMOL/L (ref 20–32)
CO2 SERPL-SCNC: 28 MMOL/L (ref 20–32)
CO2 SERPL-SCNC: 29 MMOL/L (ref 20–32)
CO2 SERPL-SCNC: 30 MMOL/L (ref 20–32)
CO2 SERPL-SCNC: 31 MMOL/L (ref 20–32)
COLOR UR AUTO: YELLOW
COLOR UR AUTO: YELLOW
CREAT SERPL-MCNC: 1.08 MG/DL (ref 0.66–1.25)
CREAT SERPL-MCNC: 1.09 MG/DL (ref 0.66–1.25)
CREAT SERPL-MCNC: 1.11 MG/DL (ref 0.66–1.25)
CREAT SERPL-MCNC: 1.12 MG/DL (ref 0.66–1.25)
CREAT SERPL-MCNC: 1.14 MG/DL (ref 0.66–1.25)
CREAT SERPL-MCNC: 1.17 MG/DL (ref 0.66–1.25)
CREAT SERPL-MCNC: 1.21 MG/DL (ref 0.66–1.25)
CREAT SERPL-MCNC: 1.23 MG/DL (ref 0.66–1.25)
CREAT SERPL-MCNC: 1.24 MG/DL (ref 0.66–1.25)
CREAT SERPL-MCNC: 1.25 MG/DL (ref 0.66–1.25)
CREAT SERPL-MCNC: 1.29 MG/DL (ref 0.66–1.25)
CREAT SERPL-MCNC: 1.3 MG/DL (ref 0.66–1.25)
CREAT SERPL-MCNC: 1.32 MG/DL (ref 0.66–1.25)
CREAT SERPL-MCNC: 1.33 MG/DL (ref 0.66–1.25)
CREAT SERPL-MCNC: 1.33 MG/DL (ref 0.66–1.25)
CREAT SERPL-MCNC: 1.34 MG/DL (ref 0.66–1.25)
CREAT SERPL-MCNC: 1.35 MG/DL (ref 0.66–1.25)
CREAT SERPL-MCNC: 1.36 MG/DL (ref 0.66–1.25)
CREAT SERPL-MCNC: 1.37 MG/DL (ref 0.66–1.25)
CREAT SERPL-MCNC: 1.44 MG/DL (ref 0.66–1.25)
CREAT SERPL-MCNC: 1.55 MG/DL (ref 0.66–1.25)
CREAT SERPL-MCNC: 1.74 MG/DL (ref 0.66–1.25)
CREAT SERPL-MCNC: 1.83 MG/DL (ref 0.66–1.25)
CREAT SERPL-MCNC: 1.87 MG/DL (ref 0.66–1.25)
CREAT SERPL-MCNC: 1.88 MG/DL (ref 0.66–1.25)
CREAT SERPL-MCNC: 1.98 MG/DL (ref 0.66–1.25)
CREAT SERPL-MCNC: 2.37 MG/DL (ref 0.66–1.25)
CREAT SERPL-MCNC: 2.58 MG/DL (ref 0.66–1.25)
DIFFERENTIAL METHOD BLD: ABNORMAL
EOSINOPHIL # BLD AUTO: 0 10E9/L (ref 0–0.7)
EOSINOPHIL # BLD AUTO: 0 10E9/L (ref 0–0.7)
EOSINOPHIL # BLD AUTO: 0.1 10E9/L (ref 0–0.7)
EOSINOPHIL # BLD AUTO: 0.2 10E9/L (ref 0–0.7)
EOSINOPHIL # BLD AUTO: 0.4 10E9/L (ref 0–0.7)
EOSINOPHIL # BLD AUTO: 0.5 10E9/L (ref 0–0.7)
EOSINOPHIL NFR BLD AUTO: 0.3 %
EOSINOPHIL NFR BLD AUTO: 0.3 %
EOSINOPHIL NFR BLD AUTO: 1.5 %
EOSINOPHIL NFR BLD AUTO: 1.7 %
EOSINOPHIL NFR BLD AUTO: 1.8 %
EOSINOPHIL NFR BLD AUTO: 2.5 %
EOSINOPHIL NFR BLD AUTO: 5.6 %
EOSINOPHIL NFR BLD AUTO: 6.1 %
ERYTHROCYTE [DISTWIDTH] IN BLOOD BY AUTOMATED COUNT: 12.8 % (ref 10–15)
ERYTHROCYTE [DISTWIDTH] IN BLOOD BY AUTOMATED COUNT: 12.9 % (ref 10–15)
ERYTHROCYTE [DISTWIDTH] IN BLOOD BY AUTOMATED COUNT: 13.1 % (ref 10–15)
ERYTHROCYTE [DISTWIDTH] IN BLOOD BY AUTOMATED COUNT: 13.3 % (ref 10–15)
ERYTHROCYTE [DISTWIDTH] IN BLOOD BY AUTOMATED COUNT: 13.3 % (ref 10–15)
ERYTHROCYTE [DISTWIDTH] IN BLOOD BY AUTOMATED COUNT: 14.1 % (ref 10–15)
ERYTHROCYTE [DISTWIDTH] IN BLOOD BY AUTOMATED COUNT: 14.1 % (ref 10–15)
ERYTHROCYTE [DISTWIDTH] IN BLOOD BY AUTOMATED COUNT: 14.6 % (ref 10–15)
ERYTHROCYTE [DISTWIDTH] IN BLOOD BY AUTOMATED COUNT: 14.8 % (ref 10–15)
ERYTHROCYTE [DISTWIDTH] IN BLOOD BY AUTOMATED COUNT: 17 % (ref 10–15)
ERYTHROCYTE [DISTWIDTH] IN BLOOD BY AUTOMATED COUNT: 18.1 % (ref 10–15)
ERYTHROCYTE [DISTWIDTH] IN BLOOD BY AUTOMATED COUNT: 18.5 % (ref 10–15)
ERYTHROCYTE [DISTWIDTH] IN BLOOD BY AUTOMATED COUNT: 18.9 % (ref 10–15)
ERYTHROCYTE [DISTWIDTH] IN BLOOD BY AUTOMATED COUNT: 19 % (ref 10–15)
GFR SERPL CREATININE-BSD FRML MDRD: 22 ML/MIN/{1.73_M2}
GFR SERPL CREATININE-BSD FRML MDRD: 25 ML/MIN/{1.73_M2}
GFR SERPL CREATININE-BSD FRML MDRD: 31 ML/MIN/{1.73_M2}
GFR SERPL CREATININE-BSD FRML MDRD: 33 ML/MIN/{1.73_M2}
GFR SERPL CREATININE-BSD FRML MDRD: 33 ML/MIN/{1.73_M2}
GFR SERPL CREATININE-BSD FRML MDRD: 34 ML/MIN/{1.73_M2}
GFR SERPL CREATININE-BSD FRML MDRD: 36 ML/MIN/{1.73_M2}
GFR SERPL CREATININE-BSD FRML MDRD: 41 ML/MIN/{1.73_M2}
GFR SERPL CREATININE-BSD FRML MDRD: 45 ML/MIN/{1.73_M2}
GFR SERPL CREATININE-BSD FRML MDRD: 48 ML/MIN/{1.73_M2}
GFR SERPL CREATININE-BSD FRML MDRD: 48 ML/MIN/{1.73_M2}
GFR SERPL CREATININE-BSD FRML MDRD: 49 ML/MIN/{1.73_M2}
GFR SERPL CREATININE-BSD FRML MDRD: 49 ML/MIN/{1.73_M2}
GFR SERPL CREATININE-BSD FRML MDRD: 50 ML/MIN/{1.73_M2}
GFR SERPL CREATININE-BSD FRML MDRD: 51 ML/MIN/{1.73_M2}
GFR SERPL CREATININE-BSD FRML MDRD: 52 ML/MIN/{1.73_M2}
GFR SERPL CREATININE-BSD FRML MDRD: 54 ML/MIN/{1.73_M2}
GFR SERPL CREATININE-BSD FRML MDRD: 54 ML/MIN/{1.73_M2}
GFR SERPL CREATININE-BSD FRML MDRD: 55 ML/MIN/{1.73_M2}
GFR SERPL CREATININE-BSD FRML MDRD: 56 ML/MIN/{1.73_M2}
GFR SERPL CREATININE-BSD FRML MDRD: 58 ML/MIN/{1.73_M2}
GFR SERPL CREATININE-BSD FRML MDRD: 60 ML/MIN/{1.73_M2}
GFR SERPL CREATININE-BSD FRML MDRD: 61 ML/MIN/{1.73_M2}
GFR SERPL CREATININE-BSD FRML MDRD: 62 ML/MIN/{1.73_M2}
GFR SERPL CREATININE-BSD FRML MDRD: 63 ML/MIN/{1.73_M2}
GFR SERPL CREATININE-BSD FRML MDRD: 64 ML/MIN/{1.73_M2}
GLUCOSE BLDC GLUCOMTR-MCNC: 85 MG/DL (ref 70–99)
GLUCOSE SERPL-MCNC: 100 MG/DL (ref 70–99)
GLUCOSE SERPL-MCNC: 105 MG/DL (ref 70–99)
GLUCOSE SERPL-MCNC: 106 MG/DL (ref 70–99)
GLUCOSE SERPL-MCNC: 110 MG/DL (ref 70–99)
GLUCOSE SERPL-MCNC: 111 MG/DL (ref 70–99)
GLUCOSE SERPL-MCNC: 113 MG/DL (ref 70–99)
GLUCOSE SERPL-MCNC: 128 MG/DL (ref 70–99)
GLUCOSE SERPL-MCNC: 134 MG/DL (ref 70–99)
GLUCOSE SERPL-MCNC: 205 MG/DL (ref 70–99)
GLUCOSE SERPL-MCNC: 74 MG/DL (ref 70–99)
GLUCOSE SERPL-MCNC: 80 MG/DL (ref 70–99)
GLUCOSE SERPL-MCNC: 81 MG/DL (ref 70–99)
GLUCOSE SERPL-MCNC: 83 MG/DL (ref 70–99)
GLUCOSE SERPL-MCNC: 84 MG/DL (ref 70–99)
GLUCOSE SERPL-MCNC: 85 MG/DL (ref 70–99)
GLUCOSE SERPL-MCNC: 86 MG/DL (ref 70–99)
GLUCOSE SERPL-MCNC: 86 MG/DL (ref 70–99)
GLUCOSE SERPL-MCNC: 88 MG/DL (ref 70–99)
GLUCOSE SERPL-MCNC: 89 MG/DL (ref 70–99)
GLUCOSE SERPL-MCNC: 89 MG/DL (ref 70–99)
GLUCOSE SERPL-MCNC: 90 MG/DL (ref 70–99)
GLUCOSE SERPL-MCNC: 90 MG/DL (ref 70–99)
GLUCOSE SERPL-MCNC: 91 MG/DL (ref 70–99)
GLUCOSE SERPL-MCNC: 93 MG/DL (ref 70–99)
GLUCOSE SERPL-MCNC: 97 MG/DL (ref 70–99)
GLUCOSE SERPL-MCNC: 98 MG/DL (ref 70–99)
GLUCOSE UR STRIP-MCNC: NEGATIVE MG/DL
GLUCOSE UR STRIP-MCNC: NEGATIVE MG/DL
GRAM STN SPEC: ABNORMAL
HCT VFR BLD AUTO: 19.6 % (ref 40–53)
HCT VFR BLD AUTO: 21.7 % (ref 40–53)
HCT VFR BLD AUTO: 23 % (ref 40–53)
HCT VFR BLD AUTO: 23.7 % (ref 40–53)
HCT VFR BLD AUTO: 24.7 % (ref 40–53)
HCT VFR BLD AUTO: 25.3 % (ref 40–53)
HCT VFR BLD AUTO: 25.9 % (ref 40–53)
HCT VFR BLD AUTO: 26.4 % (ref 40–53)
HCT VFR BLD AUTO: 30.9 % (ref 40–53)
HCT VFR BLD AUTO: 32.6 % (ref 40–53)
HCT VFR BLD AUTO: 35 % (ref 40–53)
HCT VFR BLD AUTO: 36.6 % (ref 40–53)
HCT VFR BLD AUTO: 36.7 % (ref 40–53)
HCT VFR BLD AUTO: 39.9 % (ref 40–53)
HDLC SERPL-MCNC: 61 MG/DL
HEMOCCULT STL QL: POSITIVE
HGB BLD-MCNC: 10.1 G/DL (ref 13.3–17.7)
HGB BLD-MCNC: 10.3 G/DL (ref 13.3–17.7)
HGB BLD-MCNC: 11.4 G/DL (ref 13.3–17.7)
HGB BLD-MCNC: 11.9 G/DL (ref 13.3–17.7)
HGB BLD-MCNC: 12 G/DL (ref 13.3–17.7)
HGB BLD-MCNC: 12.8 G/DL (ref 13.3–17.7)
HGB BLD-MCNC: 6.3 G/DL (ref 13.3–17.7)
HGB BLD-MCNC: 6.9 G/DL (ref 13.3–17.7)
HGB BLD-MCNC: 7.1 G/DL (ref 13.3–17.7)
HGB BLD-MCNC: 7.2 G/DL (ref 13.3–17.7)
HGB BLD-MCNC: 7.3 G/DL (ref 13.3–17.7)
HGB BLD-MCNC: 7.3 G/DL (ref 13.3–17.7)
HGB BLD-MCNC: 7.6 G/DL (ref 13.3–17.7)
HGB BLD-MCNC: 7.7 G/DL (ref 13.3–17.7)
HGB BLD-MCNC: 7.8 G/DL (ref 13.3–17.7)
HGB BLD-MCNC: 7.8 G/DL (ref 13.3–17.7)
HGB BLD-MCNC: 8.3 G/DL (ref 13.3–17.7)
HGB BLD-MCNC: 8.4 G/DL (ref 13.3–17.7)
HGB BLD-MCNC: 9.8 G/DL (ref 13.3–17.7)
HGB UR QL STRIP: NEGATIVE
HGB UR QL STRIP: NEGATIVE
HYALINE CASTS #/AREA URNS LPF: 12 /LPF (ref 0–2)
HYALINE CASTS #/AREA URNS LPF: 12 /LPF (ref 0–2)
IMM GRANULOCYTES # BLD: 0 10E9/L (ref 0–0.4)
IMM GRANULOCYTES # BLD: 0.1 10E9/L (ref 0–0.4)
IMM GRANULOCYTES NFR BLD: 0.2 %
IMM GRANULOCYTES NFR BLD: 0.3 %
IMM GRANULOCYTES NFR BLD: 0.4 %
IMM GRANULOCYTES NFR BLD: 0.9 %
INR PPP: 1.32 (ref 0.86–1.14)
INR PPP: 1.59 (ref 0.86–1.14)
INTERNAL QC OK POCT: YES
INTERPRETATION ECG - MUSE: NORMAL
KETONES UR STRIP-MCNC: NEGATIVE MG/DL
KETONES UR STRIP-MCNC: NEGATIVE MG/DL
LACTATE BLD-SCNC: 0.9 MMOL/L (ref 0.7–2)
LACTATE BLD-SCNC: 1.5 MMOL/L (ref 0.7–2)
LDLC SERPL CALC-MCNC: 49 MG/DL
LEUKOCYTE ESTERASE UR QL STRIP: NEGATIVE
LEUKOCYTE ESTERASE UR QL STRIP: NEGATIVE
LYMPHOCYTES # BLD AUTO: 0.2 10E9/L (ref 0.8–5.3)
LYMPHOCYTES # BLD AUTO: 0.3 10E9/L (ref 0.8–5.3)
LYMPHOCYTES # BLD AUTO: 0.4 10E9/L (ref 0.8–5.3)
LYMPHOCYTES # BLD AUTO: 0.8 10E9/L (ref 0.8–5.3)
LYMPHOCYTES NFR BLD AUTO: 12 %
LYMPHOCYTES NFR BLD AUTO: 3.4 %
LYMPHOCYTES NFR BLD AUTO: 3.5 %
LYMPHOCYTES NFR BLD AUTO: 4.7 %
LYMPHOCYTES NFR BLD AUTO: 4.9 %
LYMPHOCYTES NFR BLD AUTO: 5.2 %
LYMPHOCYTES NFR BLD AUTO: 6 %
LYMPHOCYTES NFR BLD AUTO: 6.3 %
Lab: ABNORMAL
Lab: ABNORMAL
MCH RBC QN AUTO: 31.8 PG (ref 26.5–33)
MCH RBC QN AUTO: 32 PG (ref 26.5–33)
MCH RBC QN AUTO: 32.3 PG (ref 26.5–33)
MCH RBC QN AUTO: 32.4 PG (ref 26.5–33)
MCH RBC QN AUTO: 33 PG (ref 26.5–33)
MCH RBC QN AUTO: 33.2 PG (ref 26.5–33)
MCH RBC QN AUTO: 33.4 PG (ref 26.5–33)
MCH RBC QN AUTO: 33.4 PG (ref 26.5–33)
MCH RBC QN AUTO: 33.5 PG (ref 26.5–33)
MCH RBC QN AUTO: 33.6 PG (ref 26.5–33)
MCH RBC QN AUTO: 33.9 PG (ref 26.5–33)
MCH RBC QN AUTO: 34.1 PG (ref 26.5–33)
MCH RBC QN AUTO: 34.3 PG (ref 26.5–33)
MCH RBC QN AUTO: 34.4 PG (ref 26.5–33)
MCHC RBC AUTO-ENTMCNC: 30.8 G/DL (ref 31.5–36.5)
MCHC RBC AUTO-ENTMCNC: 31.3 G/DL (ref 31.5–36.5)
MCHC RBC AUTO-ENTMCNC: 31.6 G/DL (ref 31.5–36.5)
MCHC RBC AUTO-ENTMCNC: 31.7 G/DL (ref 31.5–36.5)
MCHC RBC AUTO-ENTMCNC: 31.8 G/DL (ref 31.5–36.5)
MCHC RBC AUTO-ENTMCNC: 31.8 G/DL (ref 31.5–36.5)
MCHC RBC AUTO-ENTMCNC: 32 G/DL (ref 31.5–36.5)
MCHC RBC AUTO-ENTMCNC: 32.1 G/DL (ref 31.5–36.5)
MCHC RBC AUTO-ENTMCNC: 32.1 G/DL (ref 31.5–36.5)
MCHC RBC AUTO-ENTMCNC: 32.4 G/DL (ref 31.5–36.5)
MCHC RBC AUTO-ENTMCNC: 32.6 G/DL (ref 31.5–36.5)
MCHC RBC AUTO-ENTMCNC: 32.8 G/DL (ref 31.5–36.5)
MCV RBC AUTO: 101 FL (ref 78–100)
MCV RBC AUTO: 102 FL (ref 78–100)
MCV RBC AUTO: 103 FL (ref 78–100)
MCV RBC AUTO: 104 FL (ref 78–100)
MCV RBC AUTO: 105 FL (ref 78–100)
MCV RBC AUTO: 106 FL (ref 78–100)
MCV RBC AUTO: 106 FL (ref 78–100)
MCV RBC AUTO: 107 FL (ref 78–100)
MCV RBC AUTO: 107 FL (ref 78–100)
MCV RBC AUTO: 108 FL (ref 78–100)
MDC_IDC_LEAD_IMPLANT_DT: NORMAL
MDC_IDC_LEAD_IMPLANT_DT: NORMAL
MDC_IDC_LEAD_LOCATION: NORMAL
MDC_IDC_LEAD_LOCATION: NORMAL
MDC_IDC_LEAD_LOCATION_DETAIL_1: NORMAL
MDC_IDC_LEAD_LOCATION_DETAIL_1: NORMAL
MDC_IDC_LEAD_MFG: NORMAL
MDC_IDC_LEAD_MFG: NORMAL
MDC_IDC_LEAD_MODEL: NORMAL
MDC_IDC_LEAD_MODEL: NORMAL
MDC_IDC_LEAD_POLARITY_TYPE: NORMAL
MDC_IDC_LEAD_POLARITY_TYPE: NORMAL
MDC_IDC_LEAD_SERIAL: NORMAL
MDC_IDC_LEAD_SERIAL: NORMAL
MDC_IDC_MSMT_BATTERY_DTM: NORMAL
MDC_IDC_MSMT_BATTERY_DTM: NORMAL
MDC_IDC_MSMT_BATTERY_REMAINING_LONGEVITY: 136 MO
MDC_IDC_MSMT_BATTERY_REMAINING_LONGEVITY: 137 MO
MDC_IDC_MSMT_BATTERY_RRT_TRIGGER: 2.73
MDC_IDC_MSMT_BATTERY_RRT_TRIGGER: 2.73
MDC_IDC_MSMT_BATTERY_STATUS: NORMAL
MDC_IDC_MSMT_BATTERY_STATUS: NORMAL
MDC_IDC_MSMT_BATTERY_VOLTAGE: 3.14 V
MDC_IDC_MSMT_BATTERY_VOLTAGE: 3.15 V
MDC_IDC_MSMT_CAP_CHARGE_DTM: NORMAL
MDC_IDC_MSMT_CAP_CHARGE_ENERGY: 18 J
MDC_IDC_MSMT_CAP_CHARGE_TIME: 3.8
MDC_IDC_MSMT_CAP_CHARGE_TYPE: NORMAL
MDC_IDC_MSMT_LEADCHNL_RV_IMPEDANCE_VALUE: 266 OHM
MDC_IDC_MSMT_LEADCHNL_RV_IMPEDANCE_VALUE: 304 OHM
MDC_IDC_MSMT_LEADCHNL_RV_IMPEDANCE_VALUE: 380 OHM
MDC_IDC_MSMT_LEADCHNL_RV_IMPEDANCE_VALUE: 399 OHM
MDC_IDC_MSMT_LEADCHNL_RV_PACING_THRESHOLD_AMPLITUDE: 0.75 V
MDC_IDC_MSMT_LEADCHNL_RV_PACING_THRESHOLD_AMPLITUDE: 1 V
MDC_IDC_MSMT_LEADCHNL_RV_PACING_THRESHOLD_PULSEWIDTH: 0.4 MS
MDC_IDC_MSMT_LEADCHNL_RV_PACING_THRESHOLD_PULSEWIDTH: 0.4 MS
MDC_IDC_MSMT_LEADCHNL_RV_SENSING_INTR_AMPL: 11.88 MV
MDC_IDC_MSMT_LEADCHNL_RV_SENSING_INTR_AMPL: 14 MV
MDC_IDC_MSMT_LEADCHNL_RV_SENSING_INTR_AMPL: 8.62 MV
MDC_IDC_MSMT_LEADCHNL_RV_SENSING_INTR_AMPL: 9.12 MV
MDC_IDC_PG_IMPLANT_DTM: NORMAL
MDC_IDC_PG_IMPLANT_DTM: NORMAL
MDC_IDC_PG_MFG: NORMAL
MDC_IDC_PG_MFG: NORMAL
MDC_IDC_PG_MODEL: NORMAL
MDC_IDC_PG_MODEL: NORMAL
MDC_IDC_PG_SERIAL: NORMAL
MDC_IDC_PG_SERIAL: NORMAL
MDC_IDC_PG_TYPE: NORMAL
MDC_IDC_PG_TYPE: NORMAL
MDC_IDC_SESS_CLINIC_NAME: NORMAL
MDC_IDC_SESS_CLINIC_NAME: NORMAL
MDC_IDC_SESS_DTM: NORMAL
MDC_IDC_SESS_DTM: NORMAL
MDC_IDC_SESS_TYPE: NORMAL
MDC_IDC_SESS_TYPE: NORMAL
MDC_IDC_SET_BRADY_HYSTRATE: NORMAL
MDC_IDC_SET_BRADY_HYSTRATE: NORMAL
MDC_IDC_SET_BRADY_LOWRATE: 40 {BEATS}/MIN
MDC_IDC_SET_BRADY_LOWRATE: 40 {BEATS}/MIN
MDC_IDC_SET_BRADY_MODE: NORMAL
MDC_IDC_SET_BRADY_MODE: NORMAL
MDC_IDC_SET_LEADCHNL_RV_PACING_AMPLITUDE: 2 V
MDC_IDC_SET_LEADCHNL_RV_PACING_AMPLITUDE: 2.25 V
MDC_IDC_SET_LEADCHNL_RV_PACING_ANODE_ELECTRODE_1: NORMAL
MDC_IDC_SET_LEADCHNL_RV_PACING_ANODE_ELECTRODE_1: NORMAL
MDC_IDC_SET_LEADCHNL_RV_PACING_ANODE_LOCATION_1: NORMAL
MDC_IDC_SET_LEADCHNL_RV_PACING_ANODE_LOCATION_1: NORMAL
MDC_IDC_SET_LEADCHNL_RV_PACING_CAPTURE_MODE: NORMAL
MDC_IDC_SET_LEADCHNL_RV_PACING_CAPTURE_MODE: NORMAL
MDC_IDC_SET_LEADCHNL_RV_PACING_CATHODE_ELECTRODE_1: NORMAL
MDC_IDC_SET_LEADCHNL_RV_PACING_CATHODE_ELECTRODE_1: NORMAL
MDC_IDC_SET_LEADCHNL_RV_PACING_CATHODE_LOCATION_1: NORMAL
MDC_IDC_SET_LEADCHNL_RV_PACING_CATHODE_LOCATION_1: NORMAL
MDC_IDC_SET_LEADCHNL_RV_PACING_POLARITY: NORMAL
MDC_IDC_SET_LEADCHNL_RV_PACING_POLARITY: NORMAL
MDC_IDC_SET_LEADCHNL_RV_PACING_PULSEWIDTH: 0.4 MS
MDC_IDC_SET_LEADCHNL_RV_PACING_PULSEWIDTH: 0.4 MS
MDC_IDC_SET_LEADCHNL_RV_SENSING_ANODE_ELECTRODE_1: NORMAL
MDC_IDC_SET_LEADCHNL_RV_SENSING_ANODE_ELECTRODE_1: NORMAL
MDC_IDC_SET_LEADCHNL_RV_SENSING_ANODE_LOCATION_1: NORMAL
MDC_IDC_SET_LEADCHNL_RV_SENSING_ANODE_LOCATION_1: NORMAL
MDC_IDC_SET_LEADCHNL_RV_SENSING_CATHODE_ELECTRODE_1: NORMAL
MDC_IDC_SET_LEADCHNL_RV_SENSING_CATHODE_ELECTRODE_1: NORMAL
MDC_IDC_SET_LEADCHNL_RV_SENSING_CATHODE_LOCATION_1: NORMAL
MDC_IDC_SET_LEADCHNL_RV_SENSING_CATHODE_LOCATION_1: NORMAL
MDC_IDC_SET_LEADCHNL_RV_SENSING_POLARITY: NORMAL
MDC_IDC_SET_LEADCHNL_RV_SENSING_POLARITY: NORMAL
MDC_IDC_SET_LEADCHNL_RV_SENSING_SENSITIVITY: 0.3 MV
MDC_IDC_SET_LEADCHNL_RV_SENSING_SENSITIVITY: 0.3 MV
MDC_IDC_SET_ZONE_DETECTION_BEATS_DENOMINATOR: 40 {BEATS}
MDC_IDC_SET_ZONE_DETECTION_BEATS_DENOMINATOR: 40 {BEATS}
MDC_IDC_SET_ZONE_DETECTION_BEATS_NUMERATOR: 30 {BEATS}
MDC_IDC_SET_ZONE_DETECTION_BEATS_NUMERATOR: 30 {BEATS}
MDC_IDC_SET_ZONE_DETECTION_INTERVAL: 300 MS
MDC_IDC_SET_ZONE_DETECTION_INTERVAL: 300 MS
MDC_IDC_SET_ZONE_DETECTION_INTERVAL: 360 MS
MDC_IDC_SET_ZONE_DETECTION_INTERVAL: NORMAL
MDC_IDC_SET_ZONE_DETECTION_INTERVAL: NORMAL
MDC_IDC_SET_ZONE_TYPE: NORMAL
MDC_IDC_STAT_AT_BURDEN_PERCENT: 0 %
MDC_IDC_STAT_AT_BURDEN_PERCENT: 0 %
MDC_IDC_STAT_AT_DTM_END: NORMAL
MDC_IDC_STAT_AT_DTM_END: NORMAL
MDC_IDC_STAT_AT_DTM_START: NORMAL
MDC_IDC_STAT_AT_DTM_START: NORMAL
MDC_IDC_STAT_BRADY_DTM_END: NORMAL
MDC_IDC_STAT_BRADY_DTM_END: NORMAL
MDC_IDC_STAT_BRADY_DTM_START: NORMAL
MDC_IDC_STAT_BRADY_DTM_START: NORMAL
MDC_IDC_STAT_BRADY_RV_PERCENT_PACED: 0 %
MDC_IDC_STAT_BRADY_RV_PERCENT_PACED: 0 %
MDC_IDC_STAT_EPISODE_RECENT_COUNT: 0
MDC_IDC_STAT_EPISODE_RECENT_COUNT_DTM_END: NORMAL
MDC_IDC_STAT_EPISODE_RECENT_COUNT_DTM_START: NORMAL
MDC_IDC_STAT_EPISODE_TOTAL_COUNT: 0
MDC_IDC_STAT_EPISODE_TOTAL_COUNT_DTM_END: NORMAL
MDC_IDC_STAT_EPISODE_TOTAL_COUNT_DTM_START: NORMAL
MDC_IDC_STAT_EPISODE_TYPE: NORMAL
MDC_IDC_STAT_TACHYTHERAPY_ATP_DELIVERED_RECENT: 0
MDC_IDC_STAT_TACHYTHERAPY_ATP_DELIVERED_RECENT: 0
MDC_IDC_STAT_TACHYTHERAPY_ATP_DELIVERED_TOTAL: 0
MDC_IDC_STAT_TACHYTHERAPY_ATP_DELIVERED_TOTAL: 0
MDC_IDC_STAT_TACHYTHERAPY_RECENT_DTM_END: NORMAL
MDC_IDC_STAT_TACHYTHERAPY_RECENT_DTM_END: NORMAL
MDC_IDC_STAT_TACHYTHERAPY_RECENT_DTM_START: NORMAL
MDC_IDC_STAT_TACHYTHERAPY_RECENT_DTM_START: NORMAL
MDC_IDC_STAT_TACHYTHERAPY_SHOCKS_ABORTED_RECENT: 0
MDC_IDC_STAT_TACHYTHERAPY_SHOCKS_ABORTED_RECENT: 0
MDC_IDC_STAT_TACHYTHERAPY_SHOCKS_ABORTED_TOTAL: 0
MDC_IDC_STAT_TACHYTHERAPY_SHOCKS_ABORTED_TOTAL: 0
MDC_IDC_STAT_TACHYTHERAPY_SHOCKS_DELIVERED_RECENT: 0
MDC_IDC_STAT_TACHYTHERAPY_SHOCKS_DELIVERED_RECENT: 0
MDC_IDC_STAT_TACHYTHERAPY_SHOCKS_DELIVERED_TOTAL: 0
MDC_IDC_STAT_TACHYTHERAPY_SHOCKS_DELIVERED_TOTAL: 0
MDC_IDC_STAT_TACHYTHERAPY_TOTAL_DTM_END: NORMAL
MDC_IDC_STAT_TACHYTHERAPY_TOTAL_DTM_END: NORMAL
MDC_IDC_STAT_TACHYTHERAPY_TOTAL_DTM_START: NORMAL
MDC_IDC_STAT_TACHYTHERAPY_TOTAL_DTM_START: NORMAL
MONOCYTES # BLD AUTO: 0.4 10E9/L (ref 0–1.3)
MONOCYTES # BLD AUTO: 0.5 10E9/L (ref 0–1.3)
MONOCYTES # BLD AUTO: 0.6 10E9/L (ref 0–1.3)
MONOCYTES # BLD AUTO: 0.7 10E9/L (ref 0–1.3)
MONOCYTES # BLD AUTO: 0.8 10E9/L (ref 0–1.3)
MONOCYTES # BLD AUTO: 0.9 10E9/L (ref 0–1.3)
MONOCYTES NFR BLD AUTO: 11.2 %
MONOCYTES NFR BLD AUTO: 6.6 %
MONOCYTES NFR BLD AUTO: 6.7 %
MONOCYTES NFR BLD AUTO: 7.6 %
MONOCYTES NFR BLD AUTO: 7.9 %
MONOCYTES NFR BLD AUTO: 8 %
MONOCYTES NFR BLD AUTO: 9.1 %
MONOCYTES NFR BLD AUTO: 9.9 %
MUCOUS THREADS #/AREA URNS LPF: PRESENT /LPF
MUCOUS THREADS #/AREA URNS LPF: PRESENT /LPF
NEUTROPHILS # BLD AUTO: 4.9 10E9/L (ref 1.6–8.3)
NEUTROPHILS # BLD AUTO: 5 10E9/L (ref 1.6–8.3)
NEUTROPHILS # BLD AUTO: 5.8 10E9/L (ref 1.6–8.3)
NEUTROPHILS # BLD AUTO: 5.8 10E9/L (ref 1.6–8.3)
NEUTROPHILS # BLD AUTO: 6.2 10E9/L (ref 1.6–8.3)
NEUTROPHILS # BLD AUTO: 6.5 10E9/L (ref 1.6–8.3)
NEUTROPHILS # BLD AUTO: 6.9 10E9/L (ref 1.6–8.3)
NEUTROPHILS # BLD AUTO: 7.7 10E9/L (ref 1.6–8.3)
NEUTROPHILS NFR BLD AUTO: 72.1 %
NEUTROPHILS NFR BLD AUTO: 78.1 %
NEUTROPHILS NFR BLD AUTO: 82.4 %
NEUTROPHILS NFR BLD AUTO: 82.6 %
NEUTROPHILS NFR BLD AUTO: 84.4 %
NEUTROPHILS NFR BLD AUTO: 85.2 %
NEUTROPHILS NFR BLD AUTO: 86.6 %
NEUTROPHILS NFR BLD AUTO: 89.4 %
NITRATE UR QL: NEGATIVE
NITRATE UR QL: NEGATIVE
NONHDLC SERPL-MCNC: 65 MG/DL
NRBC # BLD AUTO: 0 10*3/UL
NRBC BLD AUTO-RTO: 0 /100
NT-PROBNP SERPL-MCNC: ABNORMAL PG/ML (ref 0–1800)
NT-PROBNP SERPL-MCNC: ABNORMAL PG/ML (ref 0–450)
NUM BPU REQUESTED: 2
PH UR STRIP: 5 PH (ref 5–7)
PH UR STRIP: 5 PH (ref 5–7)
PLATELET # BLD AUTO: 155 10E9/L (ref 150–450)
PLATELET # BLD AUTO: 165 10E9/L (ref 150–450)
PLATELET # BLD AUTO: 170 10E9/L (ref 150–450)
PLATELET # BLD AUTO: 176 10E9/L (ref 150–450)
PLATELET # BLD AUTO: 176 10E9/L (ref 150–450)
PLATELET # BLD AUTO: 177 10E9/L (ref 150–450)
PLATELET # BLD AUTO: 178 10E9/L (ref 150–450)
PLATELET # BLD AUTO: 181 10E9/L (ref 150–450)
PLATELET # BLD AUTO: 182 10E9/L (ref 150–450)
PLATELET # BLD AUTO: 185 10E9/L (ref 150–450)
PLATELET # BLD AUTO: 188 10E9/L (ref 150–450)
PLATELET # BLD AUTO: 192 10E9/L (ref 150–450)
PLATELET # BLD AUTO: 195 10E9/L (ref 150–450)
PLATELET # BLD AUTO: 196 10E9/L (ref 150–450)
PLATELET # BLD AUTO: 201 10E9/L (ref 150–450)
PLATELET # BLD AUTO: 201 10E9/L (ref 150–450)
PLATELET # BLD AUTO: 206 10E9/L (ref 150–450)
PLATELET # BLD AUTO: 208 10E9/L (ref 150–450)
POTASSIUM SERPL-SCNC: 3.6 MMOL/L (ref 3.4–5.3)
POTASSIUM SERPL-SCNC: 3.6 MMOL/L (ref 3.4–5.3)
POTASSIUM SERPL-SCNC: 3.7 MMOL/L (ref 3.4–5.3)
POTASSIUM SERPL-SCNC: 3.8 MMOL/L (ref 3.4–5.3)
POTASSIUM SERPL-SCNC: 3.8 MMOL/L (ref 3.4–5.3)
POTASSIUM SERPL-SCNC: 3.9 MMOL/L (ref 3.4–5.3)
POTASSIUM SERPL-SCNC: 4 MMOL/L (ref 3.4–5.3)
POTASSIUM SERPL-SCNC: 4.1 MMOL/L (ref 3.4–5.3)
POTASSIUM SERPL-SCNC: 4.2 MMOL/L (ref 3.4–5.3)
POTASSIUM SERPL-SCNC: 4.2 MMOL/L (ref 3.4–5.3)
POTASSIUM SERPL-SCNC: 4.3 MMOL/L (ref 3.4–5.3)
POTASSIUM SERPL-SCNC: 4.4 MMOL/L (ref 3.4–5.3)
POTASSIUM SERPL-SCNC: 4.4 MMOL/L (ref 3.4–5.3)
POTASSIUM SERPL-SCNC: 4.6 MMOL/L (ref 3.4–5.3)
POTASSIUM SERPL-SCNC: 4.6 MMOL/L (ref 3.4–5.3)
POTASSIUM SERPL-SCNC: 4.7 MMOL/L (ref 3.4–5.3)
POTASSIUM SERPL-SCNC: 4.8 MMOL/L (ref 3.4–5.3)
POTASSIUM SERPL-SCNC: 4.8 MMOL/L (ref 3.4–5.3)
POTASSIUM SERPL-SCNC: 5 MMOL/L (ref 3.4–5.3)
POTASSIUM SERPL-SCNC: 5.1 MMOL/L (ref 3.4–5.3)
POTASSIUM SERPL-SCNC: 5.1 MMOL/L (ref 3.4–5.3)
POTASSIUM SERPL-SCNC: 5.4 MMOL/L (ref 3.4–5.3)
PROT SERPL-MCNC: 5.5 G/DL (ref 6.8–8.8)
PROT SERPL-MCNC: 5.5 G/DL (ref 6.8–8.8)
PROT SERPL-MCNC: 5.8 G/DL (ref 6.8–8.8)
PROT SERPL-MCNC: 6.5 G/DL (ref 6.8–8.8)
PROT SERPL-MCNC: 6.6 G/DL (ref 6.8–8.8)
PROT SERPL-MCNC: 7 G/DL (ref 6.8–8.8)
PROT SERPL-MCNC: 7.4 G/DL (ref 6.8–8.8)
PROT SERPL-MCNC: 8.1 G/DL (ref 6.8–8.8)
RBC # BLD AUTO: 1.83 10E12/L (ref 4.4–5.9)
RBC # BLD AUTO: 2.01 10E12/L (ref 4.4–5.9)
RBC # BLD AUTO: 2.21 10E12/L (ref 4.4–5.9)
RBC # BLD AUTO: 2.23 10E12/L (ref 4.4–5.9)
RBC # BLD AUTO: 2.39 10E12/L (ref 4.4–5.9)
RBC # BLD AUTO: 2.44 10E12/L (ref 4.4–5.9)
RBC # BLD AUTO: 2.51 10E12/L (ref 4.4–5.9)
RBC # BLD AUTO: 2.56 10E12/L (ref 4.4–5.9)
RBC # BLD AUTO: 2.92 10E12/L (ref 4.4–5.9)
RBC # BLD AUTO: 3.1 10E12/L (ref 4.4–5.9)
RBC # BLD AUTO: 3.41 10E12/L (ref 4.4–5.9)
RBC # BLD AUTO: 3.51 10E12/L (ref 4.4–5.9)
RBC # BLD AUTO: 3.59 10E12/L (ref 4.4–5.9)
RBC # BLD AUTO: 3.75 10E12/L (ref 4.4–5.9)
RBC #/AREA URNS AUTO: <1 /HPF (ref 0–2)
RBC #/AREA URNS AUTO: <1 /HPF (ref 0–2)
SODIUM SERPL-SCNC: 131 MMOL/L (ref 133–144)
SODIUM SERPL-SCNC: 135 MMOL/L (ref 133–144)
SODIUM SERPL-SCNC: 135 MMOL/L (ref 133–144)
SODIUM SERPL-SCNC: 136 MMOL/L (ref 133–144)
SODIUM SERPL-SCNC: 137 MMOL/L (ref 133–144)
SODIUM SERPL-SCNC: 137 MMOL/L (ref 133–144)
SODIUM SERPL-SCNC: 138 MMOL/L (ref 133–144)
SODIUM SERPL-SCNC: 140 MMOL/L (ref 133–144)
SODIUM SERPL-SCNC: 141 MMOL/L (ref 133–144)
SODIUM SERPL-SCNC: 143 MMOL/L (ref 133–144)
SODIUM SERPL-SCNC: 143 MMOL/L (ref 133–144)
SOURCE: ABNORMAL
SOURCE: ABNORMAL
SP GR UR STRIP: 1.01 (ref 1–1.03)
SP GR UR STRIP: 1.01 (ref 1–1.03)
SPECIMEN EXP DATE BLD: NORMAL
SPECIMEN SOURCE: ABNORMAL
SPECIMEN SOURCE: ABNORMAL
T4 FREE SERPL-MCNC: 1.41 NG/DL (ref 0.76–1.46)
TEST CARD LOT NUMBER: NORMAL
TRANSFUSION STATUS PATIENT QL: NORMAL
TRIGL SERPL-MCNC: 82 MG/DL
TROPONIN I SERPL-MCNC: 0.07 UG/L (ref 0–0.04)
TROPONIN I SERPL-MCNC: 0.5 UG/L (ref 0–0.04)
TROPONIN I SERPL-MCNC: 1.16 UG/L (ref 0–0.04)
TSH SERPL DL<=0.005 MIU/L-ACNC: 5.54 MU/L (ref 0.4–4)
UPPER GI ENDOSCOPY: NORMAL
UROBILINOGEN UR STRIP-MCNC: 0 MG/DL (ref 0–2)
UROBILINOGEN UR STRIP-MCNC: 0 MG/DL (ref 0–2)
VIT B12 SERPL-MCNC: 352 PG/ML (ref 193–986)
WBC # BLD AUTO: 5.5 10E9/L (ref 4–11)
WBC # BLD AUTO: 5.7 10E9/L (ref 4–11)
WBC # BLD AUTO: 5.8 10E9/L (ref 4–11)
WBC # BLD AUTO: 6.1 10E9/L (ref 4–11)
WBC # BLD AUTO: 6.2 10E9/L (ref 4–11)
WBC # BLD AUTO: 6.4 10E9/L (ref 4–11)
WBC # BLD AUTO: 6.8 10E9/L (ref 4–11)
WBC # BLD AUTO: 6.9 10E9/L (ref 4–11)
WBC # BLD AUTO: 7.2 10E9/L (ref 4–11)
WBC # BLD AUTO: 7.7 10E9/L (ref 4–11)
WBC # BLD AUTO: 8.4 10E9/L (ref 4–11)
WBC # BLD AUTO: 8.4 10E9/L (ref 4–11)
WBC # BLD AUTO: 8.6 10E9/L (ref 4–11)
WBC # BLD AUTO: 9 10E9/L (ref 4–11)
WBC #/AREA URNS AUTO: 1 /HPF (ref 0–5)
WBC #/AREA URNS AUTO: <1 /HPF (ref 0–5)

## 2019-01-01 PROCEDURE — 80061 LIPID PANEL: CPT | Performed by: INTERNAL MEDICINE

## 2019-01-01 PROCEDURE — 25000125 ZZHC RX 250: Performed by: INTERNAL MEDICINE

## 2019-01-01 PROCEDURE — G0463 HOSPITAL OUTPT CLINIC VISIT: HCPCS

## 2019-01-01 PROCEDURE — 97530 THERAPEUTIC ACTIVITIES: CPT | Mod: GO

## 2019-01-01 PROCEDURE — 25000132 ZZH RX MED GY IP 250 OP 250 PS 637: Performed by: PHYSICIAN ASSISTANT

## 2019-01-01 PROCEDURE — 80048 BASIC METABOLIC PNL TOTAL CA: CPT | Performed by: INTERNAL MEDICINE

## 2019-01-01 PROCEDURE — P9016 RBC LEUKOCYTES REDUCED: HCPCS | Performed by: EMERGENCY MEDICINE

## 2019-01-01 PROCEDURE — 99217 ZZC OBSERVATION CARE DISCHARGE: CPT | Performed by: STUDENT IN AN ORGANIZED HEALTH CARE EDUCATION/TRAINING PROGRAM

## 2019-01-01 PROCEDURE — 36415 COLL VENOUS BLD VENIPUNCTURE: CPT | Performed by: INTERNAL MEDICINE

## 2019-01-01 PROCEDURE — 93005 ELECTROCARDIOGRAM TRACING: CPT

## 2019-01-01 PROCEDURE — 99233 SBSQ HOSP IP/OBS HIGH 50: CPT | Mod: 25 | Performed by: INTERNAL MEDICINE

## 2019-01-01 PROCEDURE — 25000125 ZZHC RX 250: Performed by: PHYSICIAN ASSISTANT

## 2019-01-01 PROCEDURE — 85027 COMPLETE CBC AUTOMATED: CPT | Performed by: INTERNAL MEDICINE

## 2019-01-01 PROCEDURE — 84484 ASSAY OF TROPONIN QUANT: CPT | Performed by: EMERGENCY MEDICINE

## 2019-01-01 PROCEDURE — 80048 BASIC METABOLIC PNL TOTAL CA: CPT | Performed by: FAMILY MEDICINE

## 2019-01-01 PROCEDURE — 32555 ASPIRATE PLEURA W/ IMAGING: CPT

## 2019-01-01 PROCEDURE — 97530 THERAPEUTIC ACTIVITIES: CPT | Mod: GP

## 2019-01-01 PROCEDURE — 21000001 ZZH R&B HEART CARE

## 2019-01-01 PROCEDURE — 40000275 ZZH STATISTIC RCP TIME EA 10 MIN

## 2019-01-01 PROCEDURE — 83880 ASSAY OF NATRIURETIC PEPTIDE: CPT | Performed by: INTERNAL MEDICINE

## 2019-01-01 PROCEDURE — 97535 SELF CARE MNGMENT TRAINING: CPT | Mod: GO | Performed by: OCCUPATIONAL THERAPIST

## 2019-01-01 PROCEDURE — G0378 HOSPITAL OBSERVATION PER HR: HCPCS

## 2019-01-01 PROCEDURE — 99153 MOD SED SAME PHYS/QHP EA: CPT | Performed by: INTERNAL MEDICINE

## 2019-01-01 PROCEDURE — 85025 COMPLETE CBC W/AUTO DIFF WBC: CPT | Performed by: FAMILY MEDICINE

## 2019-01-01 PROCEDURE — 83880 ASSAY OF NATRIURETIC PEPTIDE: CPT | Performed by: EMERGENCY MEDICINE

## 2019-01-01 PROCEDURE — 25000132 ZZH RX MED GY IP 250 OP 250 PS 637: Performed by: NURSE PRACTITIONER

## 2019-01-01 PROCEDURE — 99214 OFFICE O/P EST MOD 30 MIN: CPT | Performed by: NURSE PRACTITIONER

## 2019-01-01 PROCEDURE — 93282 PRGRMG EVAL IMPLANTABLE DFB: CPT | Mod: 26 | Performed by: INTERNAL MEDICINE

## 2019-01-01 PROCEDURE — 82565 ASSAY OF CREATININE: CPT | Performed by: FAMILY MEDICINE

## 2019-01-01 PROCEDURE — 99207 ZZC APP CREDIT; MD BILLING SHARED VISIT: CPT | Performed by: NURSE PRACTITIONER

## 2019-01-01 PROCEDURE — 25000128 H RX IP 250 OP 636: Performed by: INTERNAL MEDICINE

## 2019-01-01 PROCEDURE — 93018 CV STRESS TEST I&R ONLY: CPT | Performed by: INTERNAL MEDICINE

## 2019-01-01 PROCEDURE — 25800029 ZZH RX IP 258 OP 250: Performed by: PHYSICIAN ASSISTANT

## 2019-01-01 PROCEDURE — 99207 ZZC APP CREDIT; MD BILLING SHARED VISIT: CPT | Performed by: PHYSICIAN ASSISTANT

## 2019-01-01 PROCEDURE — 85027 COMPLETE CBC AUTOMATED: CPT | Performed by: PHYSICIAN ASSISTANT

## 2019-01-01 PROCEDURE — 71046 X-RAY EXAM CHEST 2 VIEWS: CPT

## 2019-01-01 PROCEDURE — 25000132 ZZH RX MED GY IP 250 OP 250 PS 637: Performed by: INTERNAL MEDICINE

## 2019-01-01 PROCEDURE — C1722 AICD, SINGLE CHAMBER: HCPCS | Performed by: INTERNAL MEDICINE

## 2019-01-01 PROCEDURE — 93924 LWR XTR VASC STDY BILAT: CPT

## 2019-01-01 PROCEDURE — 99214 OFFICE O/P EST MOD 30 MIN: CPT | Performed by: INTERNAL MEDICINE

## 2019-01-01 PROCEDURE — 25800030 ZZH RX IP 258 OP 636: Performed by: INTERNAL MEDICINE

## 2019-01-01 PROCEDURE — 99214 OFFICE O/P EST MOD 30 MIN: CPT | Performed by: PHYSICIAN ASSISTANT

## 2019-01-01 PROCEDURE — 80053 COMPREHEN METABOLIC PANEL: CPT | Performed by: PHYSICIAN ASSISTANT

## 2019-01-01 PROCEDURE — 36415 COLL VENOUS BLD VENIPUNCTURE: CPT | Performed by: PHYSICIAN ASSISTANT

## 2019-01-01 PROCEDURE — 99232 SBSQ HOSP IP/OBS MODERATE 35: CPT | Performed by: INTERNAL MEDICINE

## 2019-01-01 PROCEDURE — 99207 ZZC NO CHARGE NURSE ONLY: CPT | Performed by: FAMILY MEDICINE

## 2019-01-01 PROCEDURE — 40000986 XR CHEST 2 VW

## 2019-01-01 PROCEDURE — 99207 ZZC CDG-CODE CATEGORY CHANGED: CPT | Performed by: INTERNAL MEDICINE

## 2019-01-01 PROCEDURE — 40000852 ZZH STATISTIC HEART CATH LAB OR EP LAB

## 2019-01-01 PROCEDURE — 25000128 H RX IP 250 OP 636: Performed by: NURSE ANESTHETIST, CERTIFIED REGISTERED

## 2019-01-01 PROCEDURE — 85025 COMPLETE CBC W/AUTO DIFF WBC: CPT | Performed by: EMERGENCY MEDICINE

## 2019-01-01 PROCEDURE — 25000128 H RX IP 250 OP 636

## 2019-01-01 PROCEDURE — 36415 COLL VENOUS BLD VENIPUNCTURE: CPT | Performed by: RADIOLOGY

## 2019-01-01 PROCEDURE — 85027 COMPLETE CBC AUTOMATED: CPT | Performed by: NURSE PRACTITIONER

## 2019-01-01 PROCEDURE — 12000000 ZZH R&B MED SURG/OB

## 2019-01-01 PROCEDURE — 99207 ZZC CDG-CODE CATEGORY CHANGED: CPT | Performed by: STUDENT IN AN ORGANIZED HEALTH CARE EDUCATION/TRAINING PROGRAM

## 2019-01-01 PROCEDURE — 94640 AIRWAY INHALATION TREATMENT: CPT | Mod: 76

## 2019-01-01 PROCEDURE — 97116 GAIT TRAINING THERAPY: CPT | Mod: GP

## 2019-01-01 PROCEDURE — 99223 1ST HOSP IP/OBS HIGH 75: CPT | Mod: AI | Performed by: STUDENT IN AN ORGANIZED HEALTH CARE EDUCATION/TRAINING PROGRAM

## 2019-01-01 PROCEDURE — 25000132 ZZH RX MED GY IP 250 OP 250 PS 637: Performed by: FAMILY MEDICINE

## 2019-01-01 PROCEDURE — 96374 THER/PROPH/DIAG INJ IV PUSH: CPT

## 2019-01-01 PROCEDURE — 99225 ZZC SUBSEQUENT OBSERVATION CARE,LEVEL II: CPT | Performed by: FAMILY MEDICINE

## 2019-01-01 PROCEDURE — 25000128 H RX IP 250 OP 636: Performed by: FAMILY MEDICINE

## 2019-01-01 PROCEDURE — C1779 LEAD, PMKR, TRANSVENOUS VDD: HCPCS | Performed by: INTERNAL MEDICINE

## 2019-01-01 PROCEDURE — C1769 GUIDE WIRE: HCPCS | Performed by: INTERNAL MEDICINE

## 2019-01-01 PROCEDURE — 99233 SBSQ HOSP IP/OBS HIGH 50: CPT | Performed by: INTERNAL MEDICINE

## 2019-01-01 PROCEDURE — 87186 SC STD MICRODIL/AGAR DIL: CPT | Performed by: FAMILY MEDICINE

## 2019-01-01 PROCEDURE — 99223 1ST HOSP IP/OBS HIGH 75: CPT | Mod: AI | Performed by: INTERNAL MEDICINE

## 2019-01-01 PROCEDURE — 40000169 ZZH STATISTIC PRE-PROCEDURE ASSESSMENT I: Performed by: INTERNAL MEDICINE

## 2019-01-01 PROCEDURE — 99232 SBSQ HOSP IP/OBS MODERATE 35: CPT | Performed by: STUDENT IN AN ORGANIZED HEALTH CARE EDUCATION/TRAINING PROGRAM

## 2019-01-01 PROCEDURE — 85049 AUTOMATED PLATELET COUNT: CPT | Performed by: FAMILY MEDICINE

## 2019-01-01 PROCEDURE — 85025 COMPLETE CBC W/AUTO DIFF WBC: CPT | Performed by: INTERNAL MEDICINE

## 2019-01-01 PROCEDURE — 36430 TRANSFUSION BLD/BLD COMPNT: CPT | Performed by: EMERGENCY MEDICINE

## 2019-01-01 PROCEDURE — 12000004 ZZH R&B IMCU UMMC

## 2019-01-01 PROCEDURE — 94640 AIRWAY INHALATION TREATMENT: CPT

## 2019-01-01 PROCEDURE — 82607 VITAMIN B-12: CPT | Performed by: FAMILY MEDICINE

## 2019-01-01 PROCEDURE — 0JH608Z INSERTION OF DEFIBRILLATOR GENERATOR INTO CHEST SUBCUTANEOUS TISSUE AND FASCIA, OPEN APPROACH: ICD-10-PCS | Performed by: INTERNAL MEDICINE

## 2019-01-01 PROCEDURE — 40000893 ZZH STATISTIC PT IP EVAL DEFER: Performed by: PHYSICAL THERAPIST

## 2019-01-01 PROCEDURE — 99152 MOD SED SAME PHYS/QHP 5/>YRS: CPT | Mod: GC | Performed by: INTERNAL MEDICINE

## 2019-01-01 PROCEDURE — 71045 X-RAY EXAM CHEST 1 VIEW: CPT

## 2019-01-01 PROCEDURE — 97161 PT EVAL LOW COMPLEX 20 MIN: CPT | Mod: GP

## 2019-01-01 PROCEDURE — 99232 SBSQ HOSP IP/OBS MODERATE 35: CPT | Mod: 24 | Performed by: INTERNAL MEDICINE

## 2019-01-01 PROCEDURE — 36415 COLL VENOUS BLD VENIPUNCTURE: CPT | Performed by: NURSE PRACTITIONER

## 2019-01-01 PROCEDURE — 80048 BASIC METABOLIC PNL TOTAL CA: CPT | Performed by: EMERGENCY MEDICINE

## 2019-01-01 PROCEDURE — 93325 DOPPLER ECHO COLOR FLOW MAPG: CPT | Mod: 26 | Performed by: INTERNAL MEDICINE

## 2019-01-01 PROCEDURE — 99223 1ST HOSP IP/OBS HIGH 75: CPT | Mod: 25 | Performed by: INTERNAL MEDICINE

## 2019-01-01 PROCEDURE — 84443 ASSAY THYROID STIM HORMONE: CPT | Performed by: INTERNAL MEDICINE

## 2019-01-01 PROCEDURE — 75563 CARD MRI W/STRESS IMG & DYE: CPT | Mod: 26 | Performed by: INTERNAL MEDICINE

## 2019-01-01 PROCEDURE — 25000132 ZZH RX MED GY IP 250 OP 250 PS 637: Performed by: HOSPITALIST

## 2019-01-01 PROCEDURE — 93010 ELECTROCARDIOGRAM REPORT: CPT | Performed by: INTERNAL MEDICINE

## 2019-01-01 PROCEDURE — 25000128 H RX IP 250 OP 636: Performed by: EMERGENCY MEDICINE

## 2019-01-01 PROCEDURE — 99495 TRANSJ CARE MGMT MOD F2F 14D: CPT | Performed by: FAMILY MEDICINE

## 2019-01-01 PROCEDURE — 86923 COMPATIBILITY TEST ELECTRIC: CPT | Performed by: EMERGENCY MEDICINE

## 2019-01-01 PROCEDURE — 37000008 ZZH ANESTHESIA TECHNICAL FEE, 1ST 30 MIN: Performed by: INTERNAL MEDICINE

## 2019-01-01 PROCEDURE — 40000133 ZZH STATISTIC OT WARD VISIT: Performed by: OCCUPATIONAL THERAPIST

## 2019-01-01 PROCEDURE — 27210794 ZZH OR GENERAL SUPPLY STERILE: Performed by: INTERNAL MEDICINE

## 2019-01-01 PROCEDURE — 36415 COLL VENOUS BLD VENIPUNCTURE: CPT | Performed by: FAMILY MEDICINE

## 2019-01-01 PROCEDURE — 25000132 ZZH RX MED GY IP 250 OP 250 PS 637

## 2019-01-01 PROCEDURE — 02HK3KZ INSERTION OF DEFIBRILLATOR LEAD INTO RIGHT VENTRICLE, PERCUTANEOUS APPROACH: ICD-10-PCS | Performed by: INTERNAL MEDICINE

## 2019-01-01 PROCEDURE — 81001 URINALYSIS AUTO W/SCOPE: CPT | Performed by: EMERGENCY MEDICINE

## 2019-01-01 PROCEDURE — 99207 ZZC NO CHARGE NURSE ONLY: CPT

## 2019-01-01 PROCEDURE — 99285 EMERGENCY DEPT VISIT HI MDM: CPT | Mod: 25 | Performed by: EMERGENCY MEDICINE

## 2019-01-01 PROCEDURE — 99285 EMERGENCY DEPT VISIT HI MDM: CPT | Mod: 25

## 2019-01-01 PROCEDURE — 93017 CV STRESS TEST TRACING ONLY: CPT

## 2019-01-01 PROCEDURE — 99223 1ST HOSP IP/OBS HIGH 75: CPT | Performed by: NURSE PRACTITIONER

## 2019-01-01 PROCEDURE — 99239 HOSP IP/OBS DSCHRG MGMT >30: CPT | Performed by: STUDENT IN AN ORGANIZED HEALTH CARE EDUCATION/TRAINING PROGRAM

## 2019-01-01 PROCEDURE — 80053 COMPREHEN METABOLIC PANEL: CPT | Performed by: INTERNAL MEDICINE

## 2019-01-01 PROCEDURE — 99225 ZZC SUBSEQUENT OBSERVATION CARE,LEVEL II: CPT | Performed by: STUDENT IN AN ORGANIZED HEALTH CARE EDUCATION/TRAINING PROGRAM

## 2019-01-01 PROCEDURE — 96376 TX/PRO/DX INJ SAME DRUG ADON: CPT

## 2019-01-01 PROCEDURE — 85018 HEMOGLOBIN: CPT | Performed by: NURSE PRACTITIONER

## 2019-01-01 PROCEDURE — A9585 GADOBUTROL INJECTION: HCPCS | Performed by: INTERNAL MEDICINE

## 2019-01-01 PROCEDURE — 93282 PRGRMG EVAL IMPLANTABLE DFB: CPT | Performed by: INTERNAL MEDICINE

## 2019-01-01 PROCEDURE — 40000193 ZZH STATISTIC PT WARD VISIT

## 2019-01-01 PROCEDURE — 99285 EMERGENCY DEPT VISIT HI MDM: CPT | Mod: Z6 | Performed by: EMERGENCY MEDICINE

## 2019-01-01 PROCEDURE — 85610 PROTHROMBIN TIME: CPT | Performed by: RADIOLOGY

## 2019-01-01 PROCEDURE — 93279 PRGRMG DEV EVAL PM/LDLS PM: CPT | Mod: 26 | Performed by: INTERNAL MEDICINE

## 2019-01-01 PROCEDURE — 71260 CT THORAX DX C+: CPT

## 2019-01-01 PROCEDURE — 96361 HYDRATE IV INFUSION ADD-ON: CPT | Performed by: EMERGENCY MEDICINE

## 2019-01-01 PROCEDURE — 93321 DOPPLER ECHO F-UP/LMTD STD: CPT | Mod: 26 | Performed by: INTERNAL MEDICINE

## 2019-01-01 PROCEDURE — C1894 INTRO/SHEATH, NON-LASER: HCPCS | Performed by: INTERNAL MEDICINE

## 2019-01-01 PROCEDURE — 80048 BASIC METABOLIC PNL TOTAL CA: CPT | Performed by: NURSE PRACTITIONER

## 2019-01-01 PROCEDURE — 80048 BASIC METABOLIC PNL TOTAL CA: CPT | Performed by: PHYSICIAN ASSISTANT

## 2019-01-01 PROCEDURE — 99222 1ST HOSP IP/OBS MODERATE 55: CPT | Mod: GC | Performed by: INTERNAL MEDICINE

## 2019-01-01 PROCEDURE — 93010 ELECTROCARDIOGRAM REPORT: CPT | Mod: Z6 | Performed by: EMERGENCY MEDICINE

## 2019-01-01 PROCEDURE — 99233 SBSQ HOSP IP/OBS HIGH 50: CPT | Mod: 24 | Performed by: INTERNAL MEDICINE

## 2019-01-01 PROCEDURE — 83605 ASSAY OF LACTIC ACID: CPT | Performed by: INTERNAL MEDICINE

## 2019-01-01 PROCEDURE — 80053 COMPREHEN METABOLIC PANEL: CPT | Performed by: FAMILY MEDICINE

## 2019-01-01 PROCEDURE — 85049 AUTOMATED PLATELET COUNT: CPT | Performed by: RADIOLOGY

## 2019-01-01 PROCEDURE — 99291 CRITICAL CARE FIRST HOUR: CPT | Mod: 25 | Performed by: EMERGENCY MEDICINE

## 2019-01-01 PROCEDURE — 86901 BLOOD TYPING SEROLOGIC RH(D): CPT | Performed by: EMERGENCY MEDICINE

## 2019-01-01 PROCEDURE — 86580 TB INTRADERMAL TEST: CPT

## 2019-01-01 PROCEDURE — B2111ZZ FLUOROSCOPY OF MULTIPLE CORONARY ARTERIES USING LOW OSMOLAR CONTRAST: ICD-10-PCS | Performed by: INTERNAL MEDICINE

## 2019-01-01 PROCEDURE — 00000146 ZZHCL STATISTIC GLUCOSE BY METER IP

## 2019-01-01 PROCEDURE — 33249 INSJ/RPLCMT DEFIB W/LEAD(S): CPT | Performed by: INTERNAL MEDICINE

## 2019-01-01 PROCEDURE — 93016 CV STRESS TEST SUPVJ ONLY: CPT | Performed by: INTERNAL MEDICINE

## 2019-01-01 PROCEDURE — 25800030 ZZH RX IP 258 OP 636: Performed by: EMERGENCY MEDICINE

## 2019-01-01 PROCEDURE — 99152 MOD SED SAME PHYS/QHP 5/>YRS: CPT | Performed by: INTERNAL MEDICINE

## 2019-01-01 PROCEDURE — C1887 CATHETER, GUIDING: HCPCS | Performed by: INTERNAL MEDICINE

## 2019-01-01 PROCEDURE — 0W3P8ZZ CONTROL BLEEDING IN GASTROINTESTINAL TRACT, VIA NATURAL OR ARTIFICIAL OPENING ENDOSCOPIC: ICD-10-PCS | Performed by: INTERNAL MEDICINE

## 2019-01-01 PROCEDURE — 97166 OT EVAL MOD COMPLEX 45 MIN: CPT | Mod: GO | Performed by: OCCUPATIONAL THERAPIST

## 2019-01-01 PROCEDURE — 99239 HOSP IP/OBS DSCHRG MGMT >30: CPT | Performed by: HOSPITALIST

## 2019-01-01 PROCEDURE — 86900 BLOOD TYPING SEROLOGIC ABO: CPT | Performed by: EMERGENCY MEDICINE

## 2019-01-01 PROCEDURE — 40000264 ECHOCARDIOGRAM LIMITED

## 2019-01-01 PROCEDURE — 85049 AUTOMATED PLATELET COUNT: CPT | Performed by: INTERNAL MEDICINE

## 2019-01-01 PROCEDURE — 96374 THER/PROPH/DIAG INJ IV PUSH: CPT | Performed by: EMERGENCY MEDICINE

## 2019-01-01 PROCEDURE — 97535 SELF CARE MNGMENT TRAINING: CPT | Mod: GO

## 2019-01-01 PROCEDURE — 71000014 ZZH RECOVERY PHASE 1 LEVEL 2 FIRST HR: Performed by: INTERNAL MEDICINE

## 2019-01-01 PROCEDURE — 25500064 ZZH RX 255 OP 636: Performed by: INTERNAL MEDICINE

## 2019-01-01 PROCEDURE — 25000128 H RX IP 250 OP 636: Performed by: PHYSICIAN ASSISTANT

## 2019-01-01 PROCEDURE — 82565 ASSAY OF CREATININE: CPT

## 2019-01-01 PROCEDURE — 93458 L HRT ARTERY/VENTRICLE ANGIO: CPT | Mod: 26 | Performed by: INTERNAL MEDICINE

## 2019-01-01 PROCEDURE — 93005 ELECTROCARDIOGRAM TRACING: CPT | Performed by: EMERGENCY MEDICINE

## 2019-01-01 PROCEDURE — 97110 THERAPEUTIC EXERCISES: CPT | Mod: GP

## 2019-01-01 PROCEDURE — 85025 COMPLETE CBC W/AUTO DIFF WBC: CPT | Performed by: NURSE PRACTITIONER

## 2019-01-01 PROCEDURE — 87077 CULTURE AEROBIC IDENTIFY: CPT | Performed by: FAMILY MEDICINE

## 2019-01-01 PROCEDURE — 36000051 ZZH SURGERY LEVEL 2 1ST 30 MIN - UMMC: Performed by: INTERNAL MEDICINE

## 2019-01-01 PROCEDURE — 99223 1ST HOSP IP/OBS HIGH 75: CPT | Mod: AI

## 2019-01-01 PROCEDURE — 99214 OFFICE O/P EST MOD 30 MIN: CPT | Mod: 24 | Performed by: INTERNAL MEDICINE

## 2019-01-01 PROCEDURE — 4A023N7 MEASUREMENT OF CARDIAC SAMPLING AND PRESSURE, LEFT HEART, PERCUTANEOUS APPROACH: ICD-10-PCS | Performed by: INTERNAL MEDICINE

## 2019-01-01 PROCEDURE — 99222 1ST HOSP IP/OBS MODERATE 55: CPT | Mod: 25 | Performed by: INTERNAL MEDICINE

## 2019-01-01 PROCEDURE — 93279 PRGRMG DEV EVAL PM/LDLS PM: CPT

## 2019-01-01 PROCEDURE — 97140 MANUAL THERAPY 1/> REGIONS: CPT | Mod: GP

## 2019-01-01 PROCEDURE — 25800030 ZZH RX IP 258 OP 636: Performed by: NURSE ANESTHETIST, CERTIFIED REGISTERED

## 2019-01-01 PROCEDURE — C9113 INJ PANTOPRAZOLE SODIUM, VIA: HCPCS | Performed by: EMERGENCY MEDICINE

## 2019-01-01 PROCEDURE — 82272 OCCULT BLD FECES 1-3 TESTS: CPT | Performed by: EMERGENCY MEDICINE

## 2019-01-01 PROCEDURE — 99207 ZZC MOONLIGHTING INDICATOR: CPT | Performed by: FAMILY MEDICINE

## 2019-01-01 PROCEDURE — 37000009 ZZH ANESTHESIA TECHNICAL FEE, EACH ADDTL 15 MIN: Performed by: INTERNAL MEDICINE

## 2019-01-01 PROCEDURE — 36415 COLL VENOUS BLD VENIPUNCTURE: CPT

## 2019-01-01 PROCEDURE — 99217 ZZC OBSERVATION CARE DISCHARGE: CPT | Performed by: INTERNAL MEDICINE

## 2019-01-01 PROCEDURE — 25000125 ZZHC RX 250: Performed by: RADIOLOGY

## 2019-01-01 PROCEDURE — 25000132 ZZH RX MED GY IP 250 OP 250 PS 637: Performed by: EMERGENCY MEDICINE

## 2019-01-01 PROCEDURE — 99204 OFFICE O/P NEW MOD 45 MIN: CPT | Performed by: INTERNAL MEDICINE

## 2019-01-01 PROCEDURE — 85018 HEMOGLOBIN: CPT | Performed by: INTERNAL MEDICINE

## 2019-01-01 PROCEDURE — 87205 SMEAR GRAM STAIN: CPT | Performed by: FAMILY MEDICINE

## 2019-01-01 PROCEDURE — 36415 COLL VENOUS BLD VENIPUNCTURE: CPT | Performed by: EMERGENCY MEDICINE

## 2019-01-01 PROCEDURE — 96372 THER/PROPH/DIAG INJ SC/IM: CPT

## 2019-01-01 PROCEDURE — 92610 EVALUATE SWALLOWING FUNCTION: CPT | Mod: GN

## 2019-01-01 PROCEDURE — 36000053 ZZH SURGERY LEVEL 2 EA 15 ADDTL MIN - UMMC: Performed by: INTERNAL MEDICINE

## 2019-01-01 PROCEDURE — 85610 PROTHROMBIN TIME: CPT | Performed by: EMERGENCY MEDICINE

## 2019-01-01 PROCEDURE — 99239 HOSP IP/OBS DSCHRG MGMT >30: CPT | Performed by: INTERNAL MEDICINE

## 2019-01-01 PROCEDURE — 25000128 H RX IP 250 OP 636: Performed by: RADIOLOGY

## 2019-01-01 PROCEDURE — 85049 AUTOMATED PLATELET COUNT: CPT

## 2019-01-01 PROCEDURE — 80053 COMPREHEN METABOLIC PANEL: CPT | Performed by: EMERGENCY MEDICINE

## 2019-01-01 PROCEDURE — 33249 INSJ/RPLCMT DEFIB W/LEAD(S): CPT | Mod: Q0 | Performed by: INTERNAL MEDICINE

## 2019-01-01 PROCEDURE — 93308 TTE F-UP OR LMTD: CPT | Mod: 26 | Performed by: INTERNAL MEDICINE

## 2019-01-01 PROCEDURE — 85018 HEMOGLOBIN: CPT | Performed by: PHYSICIAN ASSISTANT

## 2019-01-01 PROCEDURE — 86850 RBC ANTIBODY SCREEN: CPT | Performed by: EMERGENCY MEDICINE

## 2019-01-01 PROCEDURE — 99232 SBSQ HOSP IP/OBS MODERATE 35: CPT | Performed by: HOSPITALIST

## 2019-01-01 PROCEDURE — 83880 ASSAY OF NATRIURETIC PEPTIDE: CPT | Performed by: PHYSICIAN ASSISTANT

## 2019-01-01 PROCEDURE — 83605 ASSAY OF LACTIC ACID: CPT

## 2019-01-01 PROCEDURE — 85730 THROMBOPLASTIN TIME PARTIAL: CPT | Performed by: EMERGENCY MEDICINE

## 2019-01-01 PROCEDURE — 97110 THERAPEUTIC EXERCISES: CPT | Mod: GO | Performed by: OCCUPATIONAL THERAPIST

## 2019-01-01 PROCEDURE — 87070 CULTURE OTHR SPECIMN AEROBIC: CPT | Performed by: FAMILY MEDICINE

## 2019-01-01 PROCEDURE — 99207 ZZC CDG-CODE CATEGORY CHANGED: CPT | Performed by: FAMILY MEDICINE

## 2019-01-01 PROCEDURE — 99214 OFFICE O/P EST MOD 30 MIN: CPT | Mod: GV | Performed by: NURSE PRACTITIONER

## 2019-01-01 PROCEDURE — 0W993ZZ DRAINAGE OF RIGHT PLEURAL CAVITY, PERCUTANEOUS APPROACH: ICD-10-PCS | Performed by: RADIOLOGY

## 2019-01-01 PROCEDURE — 25000132 ZZH RX MED GY IP 250 OP 250 PS 637: Performed by: NURSE ANESTHETIST, CERTIFIED REGISTERED

## 2019-01-01 PROCEDURE — 99233 SBSQ HOSP IP/OBS HIGH 50: CPT | Performed by: HOSPITALIST

## 2019-01-01 PROCEDURE — 84439 ASSAY OF FREE THYROXINE: CPT | Performed by: INTERNAL MEDICINE

## 2019-01-01 PROCEDURE — 84484 ASSAY OF TROPONIN QUANT: CPT | Performed by: INTERNAL MEDICINE

## 2019-01-01 PROCEDURE — 99220 ZZC INITIAL OBSERVATION CARE,LEVL III: CPT | Performed by: FAMILY MEDICINE

## 2019-01-01 PROCEDURE — 40000986 XR CHEST 1 VW

## 2019-01-01 PROCEDURE — 93458 L HRT ARTERY/VENTRICLE ANGIO: CPT | Performed by: INTERNAL MEDICINE

## 2019-01-01 DEVICE — IMPLANTABLE DEVICE: Type: IMPLANTABLE DEVICE | Status: FUNCTIONAL

## 2019-01-01 DEVICE — ICD VISIA AF MRI VR 1D4 DVFB1D4: Type: IMPLANTABLE DEVICE | Status: FUNCTIONAL

## 2019-01-01 RX ORDER — NITROGLYCERIN 0.4 MG/1
0.4 TABLET SUBLINGUAL EVERY 5 MIN PRN
Status: DISCONTINUED | OUTPATIENT
Start: 2019-01-01 | End: 2019-01-01 | Stop reason: HOSPADM

## 2019-01-01 RX ORDER — ATROPINE SULFATE 0.1 MG/ML
.5-1 INJECTION INTRAVENOUS
Status: DISCONTINUED | OUTPATIENT
Start: 2019-01-01 | End: 2019-01-01 | Stop reason: HOSPADM

## 2019-01-01 RX ORDER — NITROGLYCERIN 5 MG/ML
100-200 VIAL (ML) INTRAVENOUS
Status: DISCONTINUED | OUTPATIENT
Start: 2019-01-01 | End: 2019-01-01 | Stop reason: HOSPADM

## 2019-01-01 RX ORDER — LIDOCAINE 40 MG/G
CREAM TOPICAL
Status: CANCELLED | OUTPATIENT
Start: 2019-01-01

## 2019-01-01 RX ORDER — BISACODYL 5 MG
15 TABLET, DELAYED RELEASE (ENTERIC COATED) ORAL DAILY PRN
Status: DISCONTINUED | OUTPATIENT
Start: 2019-01-01 | End: 2019-01-01 | Stop reason: HOSPADM

## 2019-01-01 RX ORDER — LEVOTHYROXINE SODIUM 100 UG/1
100 TABLET ORAL EVERY MORNING
Status: CANCELLED | OUTPATIENT
Start: 2019-01-01

## 2019-01-01 RX ORDER — PROPOFOL 10 MG/ML
INJECTION, EMULSION INTRAVENOUS CONTINUOUS PRN
Status: DISCONTINUED | OUTPATIENT
Start: 2019-01-01 | End: 2019-01-01

## 2019-01-01 RX ORDER — VIT C/E/ZN/COPPR/LUTEIN/ZEAXAN 60 MG-6 MG
1 CAPSULE ORAL DAILY
Status: CANCELLED | OUTPATIENT
Start: 2019-01-01

## 2019-01-01 RX ORDER — LISINOPRIL 2.5 MG/1
2.5 TABLET ORAL AT BEDTIME
COMMUNITY
End: 2019-01-01

## 2019-01-01 RX ORDER — LIDOCAINE HYDROCHLORIDE 10 MG/ML
1-10 INJECTION, SOLUTION EPIDURAL; INFILTRATION; INTRACAUDAL; PERINEURAL
Status: COMPLETED | OUTPATIENT
Start: 2019-01-01 | End: 2019-01-01

## 2019-01-01 RX ORDER — ACETAMINOPHEN 325 MG/1
650 TABLET ORAL EVERY 4 HOURS PRN
Status: DISCONTINUED | OUTPATIENT
Start: 2019-01-01 | End: 2019-01-01

## 2019-01-01 RX ORDER — EPINEPHRINE 1 MG/ML
0.3 INJECTION, SOLUTION, CONCENTRATE INTRAVENOUS
Status: DISCONTINUED | OUTPATIENT
Start: 2019-01-01 | End: 2019-01-01 | Stop reason: HOSPADM

## 2019-01-01 RX ORDER — PANTOPRAZOLE SODIUM 40 MG/1
40 TABLET, DELAYED RELEASE ORAL
Status: DISCONTINUED | OUTPATIENT
Start: 2019-01-01 | End: 2019-01-01 | Stop reason: HOSPADM

## 2019-01-01 RX ORDER — NITROGLYCERIN 20 MG/100ML
.07-2 INJECTION INTRAVENOUS CONTINUOUS PRN
Status: DISCONTINUED | OUTPATIENT
Start: 2019-01-01 | End: 2019-01-01 | Stop reason: HOSPADM

## 2019-01-01 RX ORDER — FENTANYL CITRATE 50 UG/ML
INJECTION, SOLUTION INTRAMUSCULAR; INTRAVENOUS
Status: DISCONTINUED | OUTPATIENT
Start: 2019-01-01 | End: 2019-01-01 | Stop reason: HOSPADM

## 2019-01-01 RX ORDER — AMOXICILLIN 250 MG
2 CAPSULE ORAL 2 TIMES DAILY
Status: DISCONTINUED | OUTPATIENT
Start: 2019-01-01 | End: 2019-01-01 | Stop reason: HOSPADM

## 2019-01-01 RX ORDER — NIFEDIPINE 10 MG/1
10 CAPSULE ORAL
Status: DISCONTINUED | OUTPATIENT
Start: 2019-01-01 | End: 2019-01-01 | Stop reason: HOSPADM

## 2019-01-01 RX ORDER — AMOXICILLIN 250 MG
2 CAPSULE ORAL 2 TIMES DAILY
Status: CANCELLED | OUTPATIENT
Start: 2019-01-01

## 2019-01-01 RX ORDER — SODIUM CHLORIDE 9 MG/ML
INJECTION, SOLUTION INTRAVENOUS CONTINUOUS
Status: DISCONTINUED | OUTPATIENT
Start: 2019-01-01 | End: 2019-01-01 | Stop reason: HOSPADM

## 2019-01-01 RX ORDER — DIAZEPAM 5 MG
5 TABLET ORAL
Status: DISCONTINUED | OUTPATIENT
Start: 2019-01-01 | End: 2019-01-01 | Stop reason: HOSPADM

## 2019-01-01 RX ORDER — IOPAMIDOL 755 MG/ML
80 INJECTION, SOLUTION INTRAVASCULAR ONCE
Status: DISCONTINUED | OUTPATIENT
Start: 2019-01-01 | End: 2019-01-01

## 2019-01-01 RX ORDER — ACETAMINOPHEN 325 MG/1
325-650 TABLET ORAL EVERY 4 HOURS PRN
Status: CANCELLED | OUTPATIENT
Start: 2019-01-01

## 2019-01-01 RX ORDER — SIMVASTATIN 20 MG
20 TABLET ORAL EVERY EVENING
Qty: 30 TABLET | Refills: 0 | Status: SHIPPED | OUTPATIENT
Start: 2019-01-01 | End: 2019-01-01

## 2019-01-01 RX ORDER — PROCHLORPERAZINE 25 MG
12.5 SUPPOSITORY, RECTAL RECTAL EVERY 12 HOURS PRN
Status: DISCONTINUED | OUTPATIENT
Start: 2019-01-01 | End: 2019-01-01 | Stop reason: HOSPADM

## 2019-01-01 RX ORDER — BUPIVACAINE HYDROCHLORIDE 2.5 MG/ML
1-10 INJECTION, SOLUTION EPIDURAL; INFILTRATION; INTRACAUDAL
Status: DISCONTINUED | OUTPATIENT
Start: 2019-01-01 | End: 2019-01-01 | Stop reason: HOSPADM

## 2019-01-01 RX ORDER — NITROGLYCERIN 0.4 MG/1
0.4 TABLET SUBLINGUAL EVERY 5 MIN PRN
Status: CANCELLED | OUTPATIENT
Start: 2019-01-01

## 2019-01-01 RX ORDER — FUROSEMIDE 10 MG/ML
40 INJECTION INTRAMUSCULAR; INTRAVENOUS EVERY 12 HOURS
Status: DISCONTINUED | OUTPATIENT
Start: 2019-01-01 | End: 2019-01-01

## 2019-01-01 RX ORDER — SODIUM CHLORIDE, SODIUM LACTATE, POTASSIUM CHLORIDE, CALCIUM CHLORIDE 600; 310; 30; 20 MG/100ML; MG/100ML; MG/100ML; MG/100ML
INJECTION, SOLUTION INTRAVENOUS CONTINUOUS PRN
Status: DISCONTINUED | OUTPATIENT
Start: 2019-01-01 | End: 2019-01-01

## 2019-01-01 RX ORDER — MULTIVITAMIN,THERAPEUTIC
1 TABLET ORAL DAILY
Status: DISCONTINUED | OUTPATIENT
Start: 2019-01-01 | End: 2019-01-01 | Stop reason: HOSPADM

## 2019-01-01 RX ORDER — ASPIRIN 81 MG/1
81 TABLET ORAL EVERY MORNING
Status: CANCELLED | OUTPATIENT
Start: 2019-01-01

## 2019-01-01 RX ORDER — ASPIRIN 325 MG
325 TABLET ORAL
Status: DISCONTINUED | OUTPATIENT
Start: 2019-01-01 | End: 2019-01-01 | Stop reason: HOSPADM

## 2019-01-01 RX ORDER — NALOXONE HYDROCHLORIDE 0.4 MG/ML
.2-.4 INJECTION, SOLUTION INTRAMUSCULAR; INTRAVENOUS; SUBCUTANEOUS
Status: CANCELLED | OUTPATIENT
Start: 2019-01-01 | End: 2019-01-01

## 2019-01-01 RX ORDER — NYSTATIN 100000 U/G
CREAM TOPICAL 2 TIMES DAILY
Status: CANCELLED | OUTPATIENT
Start: 2019-01-01

## 2019-01-01 RX ORDER — DIPHENHYDRAMINE HYDROCHLORIDE 50 MG/ML
25-50 INJECTION INTRAMUSCULAR; INTRAVENOUS
Status: DISCONTINUED | OUTPATIENT
Start: 2019-01-01 | End: 2019-01-01 | Stop reason: HOSPADM

## 2019-01-01 RX ORDER — ATORVASTATIN CALCIUM 40 MG/1
40 TABLET, FILM COATED ORAL DAILY
Status: DISCONTINUED | OUTPATIENT
Start: 2019-01-01 | End: 2019-01-01 | Stop reason: HOSPADM

## 2019-01-01 RX ORDER — POLYETHYLENE GLYCOL 3350 17 G/17G
17 POWDER, FOR SOLUTION ORAL DAILY PRN
Status: CANCELLED | OUTPATIENT
Start: 2019-01-01

## 2019-01-01 RX ORDER — ACETAMINOPHEN 650 MG/1
650 SUPPOSITORY RECTAL EVERY 4 HOURS PRN
Status: DISCONTINUED | OUTPATIENT
Start: 2019-01-01 | End: 2019-01-01 | Stop reason: HOSPADM

## 2019-01-01 RX ORDER — IOPAMIDOL 755 MG/ML
INJECTION, SOLUTION INTRAVASCULAR
Status: DISCONTINUED | OUTPATIENT
Start: 2019-01-01 | End: 2019-01-01 | Stop reason: HOSPADM

## 2019-01-01 RX ORDER — MAGNESIUM CARB/ALUMINUM HYDROX 105-160MG
148 TABLET,CHEWABLE ORAL
Status: CANCELLED | OUTPATIENT
Start: 2019-01-01

## 2019-01-01 RX ORDER — PROTAMINE SULFATE 10 MG/ML
25-100 INJECTION, SOLUTION INTRAVENOUS EVERY 5 MIN PRN
Status: DISCONTINUED | OUTPATIENT
Start: 2019-01-01 | End: 2019-01-01 | Stop reason: HOSPADM

## 2019-01-01 RX ORDER — MAGNESIUM CARB/ALUMINUM HYDROX 105-160MG
148 TABLET,CHEWABLE ORAL
Status: DISCONTINUED | OUTPATIENT
Start: 2019-01-01 | End: 2019-01-01 | Stop reason: HOSPADM

## 2019-01-01 RX ORDER — ACETAMINOPHEN 325 MG/1
325-650 TABLET ORAL EVERY 4 HOURS PRN
Status: DISCONTINUED | OUTPATIENT
Start: 2019-01-01 | End: 2019-01-01

## 2019-01-01 RX ORDER — ASPIRIN 81 MG/1
81 TABLET ORAL DAILY
Status: DISCONTINUED | OUTPATIENT
Start: 2019-01-01 | End: 2019-01-01 | Stop reason: HOSPADM

## 2019-01-01 RX ORDER — NYSTATIN 100000 U/G
CREAM TOPICAL 2 TIMES DAILY
COMMUNITY
End: 2019-01-01

## 2019-01-01 RX ORDER — ONDANSETRON 4 MG/1
4 TABLET, ORALLY DISINTEGRATING ORAL EVERY 6 HOURS PRN
Status: DISCONTINUED | OUTPATIENT
Start: 2019-01-01 | End: 2019-01-01 | Stop reason: HOSPADM

## 2019-01-01 RX ORDER — LISINOPRIL 5 MG/1
5 TABLET ORAL DAILY
Status: DISCONTINUED | OUTPATIENT
Start: 2019-01-01 | End: 2019-01-01

## 2019-01-01 RX ORDER — HEPARIN SODIUM 1000 [USP'U]/ML
1000-10000 INJECTION, SOLUTION INTRAVENOUS; SUBCUTANEOUS EVERY 5 MIN PRN
Status: DISCONTINUED | OUTPATIENT
Start: 2019-01-01 | End: 2019-01-01 | Stop reason: HOSPADM

## 2019-01-01 RX ORDER — NALOXONE HYDROCHLORIDE 0.4 MG/ML
.2-.4 INJECTION, SOLUTION INTRAMUSCULAR; INTRAVENOUS; SUBCUTANEOUS
Status: ACTIVE | OUTPATIENT
Start: 2019-01-01 | End: 2019-01-01

## 2019-01-01 RX ORDER — LIDOCAINE 40 MG/G
CREAM TOPICAL
Status: DISCONTINUED | OUTPATIENT
Start: 2019-01-01 | End: 2019-01-01 | Stop reason: HOSPADM

## 2019-01-01 RX ORDER — ATROPINE SULFATE 0.1 MG/ML
0.5 INJECTION INTRAVENOUS EVERY 5 MIN PRN
Status: CANCELLED | OUTPATIENT
Start: 2019-01-01 | End: 2019-01-01

## 2019-01-01 RX ORDER — FUROSEMIDE 10 MG/ML
20 INJECTION INTRAMUSCULAR; INTRAVENOUS ONCE
Status: COMPLETED | OUTPATIENT
Start: 2019-01-01 | End: 2019-01-01

## 2019-01-01 RX ORDER — LORAZEPAM 2 MG/ML
.5-2 INJECTION INTRAMUSCULAR EVERY 4 HOURS PRN
Status: DISCONTINUED | OUTPATIENT
Start: 2019-01-01 | End: 2019-01-01 | Stop reason: HOSPADM

## 2019-01-01 RX ORDER — MORPHINE SULFATE 2 MG/ML
1-2 INJECTION, SOLUTION INTRAMUSCULAR; INTRAVENOUS EVERY 5 MIN PRN
Status: DISCONTINUED | OUTPATIENT
Start: 2019-01-01 | End: 2019-01-01 | Stop reason: HOSPADM

## 2019-01-01 RX ORDER — LOVASTATIN 20 MG
40 TABLET ORAL AT BEDTIME
Status: DISCONTINUED | OUTPATIENT
Start: 2019-01-01 | End: 2019-01-01

## 2019-01-01 RX ORDER — OXYCODONE AND ACETAMINOPHEN 5; 325 MG/1; MG/1
1 TABLET ORAL EVERY 4 HOURS PRN
Status: DISCONTINUED | OUTPATIENT
Start: 2019-01-01 | End: 2019-01-01 | Stop reason: HOSPADM

## 2019-01-01 RX ORDER — ASPIRIN 81 MG/1
81 TABLET ORAL DAILY
Status: CANCELLED | OUTPATIENT
Start: 2019-01-01

## 2019-01-01 RX ORDER — AMOXICILLIN 250 MG
1 CAPSULE ORAL 2 TIMES DAILY
Status: CANCELLED | OUTPATIENT
Start: 2019-01-01

## 2019-01-01 RX ORDER — LIDOCAINE 40 MG/G
CREAM TOPICAL
Status: DISCONTINUED | OUTPATIENT
Start: 2019-01-01 | End: 2019-01-01

## 2019-01-01 RX ORDER — ACETAMINOPHEN 650 MG/1
650 SUPPOSITORY RECTAL EVERY 4 HOURS PRN
Status: CANCELLED | OUTPATIENT
Start: 2019-01-01

## 2019-01-01 RX ORDER — METOPROLOL SUCCINATE 25 MG/1
25 TABLET, EXTENDED RELEASE ORAL DAILY
Status: CANCELLED | OUTPATIENT
Start: 2019-01-01

## 2019-01-01 RX ORDER — HYDROCODONE BITARTRATE AND ACETAMINOPHEN 5; 325 MG/1; MG/1
1-2 TABLET ORAL EVERY 4 HOURS PRN
Status: CANCELLED | OUTPATIENT
Start: 2019-01-01

## 2019-01-01 RX ORDER — ATORVASTATIN CALCIUM 40 MG/1
40 TABLET, FILM COATED ORAL EVERY EVENING
Status: CANCELLED | OUTPATIENT
Start: 2019-01-01

## 2019-01-01 RX ORDER — CLOPIDOGREL BISULFATE 75 MG/1
75 TABLET ORAL DAILY
Status: DISCONTINUED | OUTPATIENT
Start: 2019-01-01 | End: 2019-01-01 | Stop reason: HOSPADM

## 2019-01-01 RX ORDER — ONDANSETRON 2 MG/ML
4 INJECTION INTRAMUSCULAR; INTRAVENOUS EVERY 6 HOURS PRN
Status: DISCONTINUED | OUTPATIENT
Start: 2019-01-01 | End: 2019-01-01 | Stop reason: HOSPADM

## 2019-01-01 RX ORDER — FUROSEMIDE 20 MG
20 TABLET ORAL ONCE
Status: DISCONTINUED | OUTPATIENT
Start: 2019-01-01 | End: 2019-01-01

## 2019-01-01 RX ORDER — PHENYLEPHRINE HCL IN 0.9% NACL 1 MG/10 ML
20-100 SYRINGE (ML) INTRAVENOUS
Status: DISCONTINUED | OUTPATIENT
Start: 2019-01-01 | End: 2019-01-01 | Stop reason: HOSPADM

## 2019-01-01 RX ORDER — AMOXICILLIN 250 MG
2 CAPSULE ORAL 2 TIMES DAILY PRN
Status: DISCONTINUED | OUTPATIENT
Start: 2019-01-01 | End: 2019-01-01 | Stop reason: HOSPADM

## 2019-01-01 RX ORDER — NALOXONE HYDROCHLORIDE 0.4 MG/ML
.1-.4 INJECTION, SOLUTION INTRAMUSCULAR; INTRAVENOUS; SUBCUTANEOUS
Status: DISCONTINUED | OUTPATIENT
Start: 2019-01-01 | End: 2019-01-01

## 2019-01-01 RX ORDER — HYDRALAZINE HYDROCHLORIDE 20 MG/ML
10-20 INJECTION INTRAMUSCULAR; INTRAVENOUS
Status: DISCONTINUED | OUTPATIENT
Start: 2019-01-01 | End: 2019-01-01 | Stop reason: HOSPADM

## 2019-01-01 RX ORDER — POTASSIUM CHLORIDE 29.8 MG/ML
20 INJECTION INTRAVENOUS
Status: DISCONTINUED | OUTPATIENT
Start: 2019-01-01 | End: 2019-01-01 | Stop reason: HOSPADM

## 2019-01-01 RX ORDER — HYDROCODONE BITARTRATE AND ACETAMINOPHEN 5; 325 MG/1; MG/1
1-2 TABLET ORAL EVERY 4 HOURS PRN
Status: DISCONTINUED | OUTPATIENT
Start: 2019-01-01 | End: 2019-01-01

## 2019-01-01 RX ORDER — CLOPIDOGREL 300 MG/1
300-600 TABLET, FILM COATED ORAL
Status: DISCONTINUED | OUTPATIENT
Start: 2019-01-01 | End: 2019-01-01 | Stop reason: HOSPADM

## 2019-01-01 RX ORDER — TORSEMIDE 20 MG/1
20 TABLET ORAL DAILY
Status: CANCELLED | OUTPATIENT
Start: 2019-01-01

## 2019-01-01 RX ORDER — LISINOPRIL 2.5 MG/1
2.5 TABLET ORAL AT BEDTIME
Qty: 90 TABLET | Refills: 0 | Status: CANCELLED | OUTPATIENT
Start: 2019-01-01

## 2019-01-01 RX ORDER — CEFAZOLIN SODIUM 2 G/100ML
2 INJECTION, SOLUTION INTRAVENOUS
Status: COMPLETED | OUTPATIENT
Start: 2019-01-01 | End: 2019-01-01

## 2019-01-01 RX ORDER — FUROSEMIDE 10 MG/ML
20 INJECTION INTRAMUSCULAR; INTRAVENOUS EVERY 6 HOURS
Status: DISCONTINUED | OUTPATIENT
Start: 2019-01-01 | End: 2019-01-01

## 2019-01-01 RX ORDER — LOVASTATIN 20 MG
40 TABLET ORAL AT BEDTIME
Status: CANCELLED | OUTPATIENT
Start: 2019-01-01

## 2019-01-01 RX ORDER — LEVOTHYROXINE SODIUM 100 UG/1
100 TABLET ORAL DAILY
Status: CANCELLED | OUTPATIENT
Start: 2019-01-01

## 2019-01-01 RX ORDER — FUROSEMIDE 20 MG
20 TABLET ORAL DAILY
Status: DISCONTINUED | OUTPATIENT
Start: 2019-01-01 | End: 2019-01-01

## 2019-01-01 RX ORDER — METOPROLOL SUCCINATE 25 MG/1
12.5 TABLET, EXTENDED RELEASE ORAL EVERY MORNING
Qty: 45 TABLET | Refills: 3 | COMMUNITY
Start: 2019-01-01 | End: 2019-01-01

## 2019-01-01 RX ORDER — BISACODYL 5 MG
15 TABLET, DELAYED RELEASE (ENTERIC COATED) ORAL DAILY PRN
Status: CANCELLED | OUTPATIENT
Start: 2019-01-01

## 2019-01-01 RX ORDER — LISINOPRIL 20 MG/1
20 TABLET ORAL DAILY
Status: CANCELLED | OUTPATIENT
Start: 2019-01-01

## 2019-01-01 RX ORDER — NITROGLYCERIN 0.4 MG/1
0.4 TABLET SUBLINGUAL EVERY 5 MIN PRN
Status: DISCONTINUED | OUTPATIENT
Start: 2019-01-01 | End: 2019-01-01

## 2019-01-01 RX ORDER — LIDOCAINE HYDROCHLORIDE 10 MG/ML
30 INJECTION, SOLUTION EPIDURAL; INFILTRATION; INTRACAUDAL; PERINEURAL
Status: DISCONTINUED | OUTPATIENT
Start: 2019-01-01 | End: 2019-01-01 | Stop reason: HOSPADM

## 2019-01-01 RX ORDER — LEVOTHYROXINE SODIUM 100 UG/1
TABLET ORAL
Qty: 90 TABLET | Refills: 0 | Status: SHIPPED | OUTPATIENT
Start: 2019-01-01 | End: 2019-01-01

## 2019-01-01 RX ORDER — POLYETHYLENE GLYCOL 3350 17 G/17G
17 POWDER, FOR SOLUTION ORAL DAILY PRN
Status: DISCONTINUED | OUTPATIENT
Start: 2019-01-01 | End: 2019-01-01 | Stop reason: HOSPADM

## 2019-01-01 RX ORDER — LORAZEPAM 0.5 MG/1
0.5 TABLET ORAL EVERY 4 HOURS PRN
Status: DISCONTINUED | OUTPATIENT
Start: 2019-01-01 | End: 2019-01-01 | Stop reason: HOSPADM

## 2019-01-01 RX ORDER — METOPROLOL SUCCINATE 25 MG/1
25 TABLET, EXTENDED RELEASE ORAL DAILY
Qty: 90 TABLET | Refills: 3 | Status: SHIPPED | OUTPATIENT
Start: 2019-01-01 | End: 2019-01-01

## 2019-01-01 RX ORDER — NYSTATIN 100000 U/G
OINTMENT TOPICAL 2 TIMES DAILY
Status: DISCONTINUED | OUTPATIENT
Start: 2019-01-01 | End: 2019-01-01 | Stop reason: HOSPADM

## 2019-01-01 RX ORDER — ALUMINA, MAGNESIA, AND SIMETHICONE 2400; 2400; 240 MG/30ML; MG/30ML; MG/30ML
30 SUSPENSION ORAL EVERY 4 HOURS PRN
Status: DISCONTINUED | OUTPATIENT
Start: 2019-01-01 | End: 2019-01-01

## 2019-01-01 RX ORDER — CLOPIDOGREL BISULFATE 75 MG/1
75 TABLET ORAL
Status: DISCONTINUED | OUTPATIENT
Start: 2019-01-01 | End: 2019-01-01 | Stop reason: HOSPADM

## 2019-01-01 RX ORDER — FUROSEMIDE 40 MG
40 TABLET ORAL DAILY
Status: DISCONTINUED | OUTPATIENT
Start: 2019-01-01 | End: 2019-01-01

## 2019-01-01 RX ORDER — ATROPINE SULFATE 0.1 MG/ML
0.5 INJECTION INTRAVENOUS EVERY 5 MIN PRN
Status: ACTIVE | OUTPATIENT
Start: 2019-01-01 | End: 2019-01-01

## 2019-01-01 RX ORDER — ASPIRIN 81 MG/1
81-324 TABLET, CHEWABLE ORAL
Status: DISCONTINUED | OUTPATIENT
Start: 2019-01-01 | End: 2019-01-01 | Stop reason: HOSPADM

## 2019-01-01 RX ORDER — AMOXICILLIN 250 MG
1 CAPSULE ORAL 2 TIMES DAILY
Status: DISCONTINUED | OUTPATIENT
Start: 2019-01-01 | End: 2019-01-01 | Stop reason: HOSPADM

## 2019-01-01 RX ORDER — LISINOPRIL 2.5 MG/1
2.5 TABLET ORAL AT BEDTIME
Qty: 90 TABLET | Refills: 0 | Status: SHIPPED | OUTPATIENT
Start: 2019-01-01 | End: 2019-01-01

## 2019-01-01 RX ORDER — TORSEMIDE 20 MG/1
TABLET ORAL
Qty: 60 TABLET | Refills: 0 | Status: CANCELLED | OUTPATIENT
Start: 2019-01-01

## 2019-01-01 RX ORDER — LISINOPRIL 2.5 MG/1
2.5 TABLET ORAL DAILY
Status: DISCONTINUED | OUTPATIENT
Start: 2019-01-01 | End: 2019-01-01

## 2019-01-01 RX ORDER — PROPOFOL 10 MG/ML
5-75 INJECTION, EMULSION INTRAVENOUS CONTINUOUS
Status: DISCONTINUED | OUTPATIENT
Start: 2019-01-01 | End: 2019-01-01 | Stop reason: HOSPADM

## 2019-01-01 RX ORDER — NITROGLYCERIN 5 MG/ML
100-500 VIAL (ML) INTRAVENOUS
Status: COMPLETED | OUTPATIENT
Start: 2019-01-01 | End: 2019-01-01

## 2019-01-01 RX ORDER — DEXTROSE MONOHYDRATE 25 G/50ML
25-50 INJECTION, SOLUTION INTRAVENOUS
Status: DISCONTINUED | OUTPATIENT
Start: 2019-01-01 | End: 2019-01-01 | Stop reason: HOSPADM

## 2019-01-01 RX ORDER — FLUMAZENIL 0.1 MG/ML
0.2 INJECTION, SOLUTION INTRAVENOUS
Status: DISCONTINUED | OUTPATIENT
Start: 2019-01-01 | End: 2019-01-01 | Stop reason: HOSPADM

## 2019-01-01 RX ORDER — PROCHLORPERAZINE MALEATE 5 MG
5 TABLET ORAL EVERY 6 HOURS PRN
Status: DISCONTINUED | OUTPATIENT
Start: 2019-01-01 | End: 2019-01-01 | Stop reason: HOSPADM

## 2019-01-01 RX ORDER — PROTAMINE SULFATE 10 MG/ML
1-5 INJECTION, SOLUTION INTRAVENOUS
Status: DISCONTINUED | OUTPATIENT
Start: 2019-01-01 | End: 2019-01-01 | Stop reason: HOSPADM

## 2019-01-01 RX ORDER — PANTOPRAZOLE SODIUM 40 MG/1
40 TABLET, DELAYED RELEASE ORAL
Qty: 90 TABLET | Refills: 0 | Status: SHIPPED | OUTPATIENT
Start: 2019-01-01

## 2019-01-01 RX ORDER — TORSEMIDE 20 MG/1
20 TABLET ORAL DAILY
DISCHARGE
Start: 2019-01-01

## 2019-01-01 RX ORDER — ACETAMINOPHEN 325 MG/1
650 TABLET ORAL EVERY 4 HOURS PRN
Status: DISCONTINUED | OUTPATIENT
Start: 2019-01-01 | End: 2019-01-01 | Stop reason: HOSPADM

## 2019-01-01 RX ORDER — DEXTROSE MONOHYDRATE 25 G/50ML
12.5-5 INJECTION, SOLUTION INTRAVENOUS EVERY 30 MIN PRN
Status: DISCONTINUED | OUTPATIENT
Start: 2019-01-01 | End: 2019-01-01 | Stop reason: HOSPADM

## 2019-01-01 RX ORDER — POTASSIUM CHLORIDE 1500 MG/1
20 TABLET, EXTENDED RELEASE ORAL
Status: DISCONTINUED | OUTPATIENT
Start: 2019-01-01 | End: 2019-01-01 | Stop reason: HOSPADM

## 2019-01-01 RX ORDER — REGADENOSON 0.08 MG/ML
0.4 INJECTION, SOLUTION INTRAVENOUS ONCE
Status: COMPLETED | OUTPATIENT
Start: 2019-01-01 | End: 2019-01-01

## 2019-01-01 RX ORDER — LISINOPRIL 2.5 MG/1
2.5 TABLET ORAL AT BEDTIME
Qty: 30 TABLET | Refills: 0 | Status: SHIPPED | OUTPATIENT
Start: 2019-01-01 | End: 2019-01-01

## 2019-01-01 RX ORDER — ONDANSETRON 4 MG/1
4 TABLET, ORALLY DISINTEGRATING ORAL EVERY 6 HOURS PRN
Status: DISCONTINUED | OUTPATIENT
Start: 2019-01-01 | End: 2019-01-01

## 2019-01-01 RX ORDER — VERAPAMIL HYDROCHLORIDE 2.5 MG/ML
1-2.5 INJECTION, SOLUTION INTRAVENOUS
Status: COMPLETED | OUTPATIENT
Start: 2019-01-01 | End: 2019-01-01

## 2019-01-01 RX ORDER — DIPHENHYDRAMINE HCL 25 MG
25 CAPSULE ORAL
Status: DISCONTINUED | OUTPATIENT
Start: 2019-01-01 | End: 2019-01-01 | Stop reason: HOSPADM

## 2019-01-01 RX ORDER — IPRATROPIUM BROMIDE AND ALBUTEROL SULFATE 2.5; .5 MG/3ML; MG/3ML
3 SOLUTION RESPIRATORY (INHALATION)
Status: DISCONTINUED | OUTPATIENT
Start: 2019-01-01 | End: 2019-01-01 | Stop reason: HOSPADM

## 2019-01-01 RX ORDER — BISACODYL 5 MG
10 TABLET, DELAYED RELEASE (ENTERIC COATED) ORAL DAILY PRN
Status: CANCELLED | OUTPATIENT
Start: 2019-01-01

## 2019-01-01 RX ORDER — TORSEMIDE 20 MG/1
TABLET ORAL
Qty: 60 TABLET | Refills: 0 | COMMUNITY
Start: 2019-01-01 | End: 2019-01-01

## 2019-01-01 RX ORDER — ADENOSINE 3 MG/ML
12-12000 INJECTION, SOLUTION INTRAVENOUS
Status: DISCONTINUED | OUTPATIENT
Start: 2019-01-01 | End: 2019-01-01 | Stop reason: HOSPADM

## 2019-01-01 RX ORDER — DIAZEPAM 5 MG
5 TABLET ORAL EVERY 30 MIN PRN
Status: DISCONTINUED | OUTPATIENT
Start: 2019-01-01 | End: 2019-01-01 | Stop reason: HOSPADM

## 2019-01-01 RX ORDER — NALOXONE HYDROCHLORIDE 0.4 MG/ML
.1-.4 INJECTION, SOLUTION INTRAMUSCULAR; INTRAVENOUS; SUBCUTANEOUS
Status: DISCONTINUED | OUTPATIENT
Start: 2019-01-01 | End: 2019-01-01 | Stop reason: HOSPADM

## 2019-01-01 RX ORDER — DOPAMINE HYDROCHLORIDE 160 MG/100ML
2-20 INJECTION, SOLUTION INTRAVENOUS CONTINUOUS PRN
Status: DISCONTINUED | OUTPATIENT
Start: 2019-01-01 | End: 2019-01-01 | Stop reason: HOSPADM

## 2019-01-01 RX ORDER — LORAZEPAM 2 MG/ML
0.5 INJECTION INTRAMUSCULAR
Status: DISCONTINUED | OUTPATIENT
Start: 2019-01-01 | End: 2019-01-01 | Stop reason: HOSPADM

## 2019-01-01 RX ORDER — GADOBUTROL 604.72 MG/ML
5-65 INJECTION INTRAVENOUS ONCE
Status: COMPLETED | OUTPATIENT
Start: 2019-01-01 | End: 2019-01-01

## 2019-01-01 RX ORDER — FLUMAZENIL 0.1 MG/ML
0.2 INJECTION, SOLUTION INTRAVENOUS
Status: CANCELLED | OUTPATIENT
Start: 2019-01-01 | End: 2019-01-01

## 2019-01-01 RX ORDER — BISACODYL 5 MG
10 TABLET, DELAYED RELEASE (ENTERIC COATED) ORAL DAILY PRN
Status: DISCONTINUED | OUTPATIENT
Start: 2019-01-01 | End: 2019-01-01 | Stop reason: HOSPADM

## 2019-01-01 RX ORDER — ONDANSETRON 2 MG/ML
4 INJECTION INTRAMUSCULAR; INTRAVENOUS EVERY 6 HOURS PRN
Status: DISCONTINUED | OUTPATIENT
Start: 2019-01-01 | End: 2019-01-01

## 2019-01-01 RX ORDER — LOVASTATIN 20 MG
40 TABLET ORAL AT BEDTIME
Status: DISCONTINUED | OUTPATIENT
Start: 2019-01-01 | End: 2019-01-01 | Stop reason: CLARIF

## 2019-01-01 RX ORDER — DOBUTAMINE HYDROCHLORIDE 200 MG/100ML
2-20 INJECTION INTRAVENOUS CONTINUOUS PRN
Status: DISCONTINUED | OUTPATIENT
Start: 2019-01-01 | End: 2019-01-01 | Stop reason: HOSPADM

## 2019-01-01 RX ORDER — LISINOPRIL 2.5 MG/1
2.5 TABLET ORAL DAILY
Qty: 30 TABLET | Refills: 0 | Status: SHIPPED | OUTPATIENT
Start: 2019-01-01 | End: 2019-01-01

## 2019-01-01 RX ORDER — METOPROLOL SUCCINATE 25 MG/1
25 TABLET, EXTENDED RELEASE ORAL DAILY
Status: DISCONTINUED | OUTPATIENT
Start: 2019-01-01 | End: 2019-01-01

## 2019-01-01 RX ORDER — SODIUM CHLORIDE 9 MG/ML
INJECTION, SOLUTION INTRAVENOUS CONTINUOUS
Status: DISCONTINUED | OUTPATIENT
Start: 2019-01-01 | End: 2019-01-01 | Stop reason: CLARIF

## 2019-01-01 RX ORDER — LEVOTHYROXINE SODIUM 100 UG/1
100 TABLET ORAL DAILY
Status: DISCONTINUED | OUTPATIENT
Start: 2019-01-01 | End: 2019-01-01 | Stop reason: HOSPADM

## 2019-01-01 RX ORDER — LORAZEPAM 0.5 MG/1
0.5 TABLET ORAL
Status: DISCONTINUED | OUTPATIENT
Start: 2019-01-01 | End: 2019-01-01 | Stop reason: HOSPADM

## 2019-01-01 RX ORDER — TORSEMIDE 20 MG/1
20 TABLET ORAL DAILY
Status: DISCONTINUED | OUTPATIENT
Start: 2019-01-01 | End: 2019-01-01 | Stop reason: HOSPADM

## 2019-01-01 RX ORDER — METOPROLOL SUCCINATE 25 MG/1
TABLET, EXTENDED RELEASE ORAL
Qty: 90 TABLET | Refills: 3 | COMMUNITY
Start: 2019-01-01 | End: 2019-01-01

## 2019-01-01 RX ORDER — BISACODYL 5 MG
5 TABLET, DELAYED RELEASE (ENTERIC COATED) ORAL DAILY PRN
Status: CANCELLED | OUTPATIENT
Start: 2019-01-01

## 2019-01-01 RX ORDER — ASPIRIN 81 MG/1
81 TABLET ORAL DAILY
Status: DISCONTINUED | OUTPATIENT
Start: 2019-01-01 | End: 2019-01-01

## 2019-01-01 RX ORDER — FUROSEMIDE 10 MG/ML
20-100 INJECTION INTRAMUSCULAR; INTRAVENOUS
Status: DISCONTINUED | OUTPATIENT
Start: 2019-01-01 | End: 2019-01-01 | Stop reason: HOSPADM

## 2019-01-01 RX ORDER — PRASUGREL 10 MG/1
10-60 TABLET, FILM COATED ORAL
Status: DISCONTINUED | OUTPATIENT
Start: 2019-01-01 | End: 2019-01-01 | Stop reason: HOSPADM

## 2019-01-01 RX ORDER — METOPROLOL SUCCINATE 25 MG/1
12.25 TABLET, EXTENDED RELEASE ORAL DAILY
Qty: 45 TABLET | Refills: 3 | Status: SHIPPED | OUTPATIENT
Start: 2019-01-01 | End: 2019-01-01

## 2019-01-01 RX ORDER — ACETAMINOPHEN 325 MG/1
650 TABLET ORAL EVERY 4 HOURS PRN
Status: CANCELLED | OUTPATIENT
Start: 2019-01-01

## 2019-01-01 RX ORDER — FLUMAZENIL 0.1 MG/ML
0.2 INJECTION, SOLUTION INTRAVENOUS
Status: ACTIVE | OUTPATIENT
Start: 2019-01-01 | End: 2019-01-01

## 2019-01-01 RX ORDER — CLOPIDOGREL BISULFATE 75 MG/1
75 TABLET ORAL DAILY
Status: DISCONTINUED | OUTPATIENT
Start: 2019-01-01 | End: 2019-01-01

## 2019-01-01 RX ORDER — ACYCLOVIR 200 MG/1
0-1 CAPSULE ORAL
Status: DISCONTINUED | OUTPATIENT
Start: 2019-01-01 | End: 2019-01-01 | Stop reason: HOSPADM

## 2019-01-01 RX ORDER — LEVOTHYROXINE SODIUM 100 UG/1
100 TABLET ORAL DAILY
Qty: 90 TABLET | Refills: 0 | Status: ON HOLD | OUTPATIENT
Start: 2019-01-01 | End: 2019-01-01

## 2019-01-01 RX ORDER — NICOTINE POLACRILEX 4 MG
15-30 LOZENGE BUCCAL
Status: DISCONTINUED | OUTPATIENT
Start: 2019-01-01 | End: 2019-01-01 | Stop reason: HOSPADM

## 2019-01-01 RX ORDER — ATORVASTATIN CALCIUM 40 MG/1
40 TABLET, FILM COATED ORAL DAILY
Qty: 30 TABLET | Refills: 0 | Status: SHIPPED | OUTPATIENT
Start: 2019-01-01 | End: 2019-01-01

## 2019-01-01 RX ORDER — NALOXONE HYDROCHLORIDE 0.4 MG/ML
.1-.4 INJECTION, SOLUTION INTRAMUSCULAR; INTRAVENOUS; SUBCUTANEOUS
Status: CANCELLED | OUTPATIENT
Start: 2019-01-01

## 2019-01-01 RX ORDER — FUROSEMIDE 10 MG/ML
40 INJECTION INTRAMUSCULAR; INTRAVENOUS ONCE
Status: DISCONTINUED | OUTPATIENT
Start: 2019-01-01 | End: 2019-01-01

## 2019-01-01 RX ORDER — TORSEMIDE 20 MG/1
20 TABLET ORAL
Qty: 60 TABLET | Refills: 0 | Status: SHIPPED | OUTPATIENT
Start: 2019-01-01 | End: 2019-01-01

## 2019-01-01 RX ORDER — AMOXICILLIN 250 MG
1 CAPSULE ORAL 2 TIMES DAILY PRN
Status: DISCONTINUED | OUTPATIENT
Start: 2019-01-01 | End: 2019-01-01 | Stop reason: HOSPADM

## 2019-01-01 RX ORDER — PROPOFOL 10 MG/ML
10-20 INJECTION, EMULSION INTRAVENOUS EVERY 30 MIN PRN
Status: DISCONTINUED | OUTPATIENT
Start: 2019-01-01 | End: 2019-01-01 | Stop reason: HOSPADM

## 2019-01-01 RX ORDER — ALBUTEROL SULFATE 5 MG/ML
2.5 SOLUTION RESPIRATORY (INHALATION)
Status: DISCONTINUED | OUTPATIENT
Start: 2019-01-01 | End: 2019-01-01 | Stop reason: HOSPADM

## 2019-01-01 RX ORDER — METHYLPREDNISOLONE SODIUM SUCCINATE 125 MG/2ML
125 INJECTION, POWDER, LYOPHILIZED, FOR SOLUTION INTRAMUSCULAR; INTRAVENOUS
Status: DISCONTINUED | OUTPATIENT
Start: 2019-01-01 | End: 2019-01-01 | Stop reason: HOSPADM

## 2019-01-01 RX ORDER — CLOPIDOGREL BISULFATE 75 MG/1
75 TABLET ORAL DAILY
Qty: 90 TABLET | Refills: 3 | Status: SHIPPED | OUTPATIENT
Start: 2019-01-01 | End: 2019-01-01

## 2019-01-01 RX ORDER — FUROSEMIDE 10 MG/ML
40 INJECTION INTRAMUSCULAR; INTRAVENOUS ONCE
Status: COMPLETED | OUTPATIENT
Start: 2019-01-01 | End: 2019-01-01

## 2019-01-01 RX ORDER — METOPROLOL TARTRATE 1 MG/ML
5 INJECTION, SOLUTION INTRAVENOUS EVERY 5 MIN PRN
Status: DISCONTINUED | OUTPATIENT
Start: 2019-01-01 | End: 2019-01-01 | Stop reason: HOSPADM

## 2019-01-01 RX ORDER — FUROSEMIDE 20 MG
20 TABLET ORAL DAILY
Status: DISCONTINUED | OUTPATIENT
Start: 2019-01-01 | End: 2019-01-01 | Stop reason: CLARIF

## 2019-01-01 RX ORDER — SODIUM CHLORIDE 450 MG/100ML
INJECTION, SOLUTION INTRAVENOUS CONTINUOUS
Status: DISCONTINUED | OUTPATIENT
Start: 2019-01-01 | End: 2019-01-01 | Stop reason: HOSPADM

## 2019-01-01 RX ORDER — NALOXONE HYDROCHLORIDE 0.4 MG/ML
0.4 INJECTION, SOLUTION INTRAMUSCULAR; INTRAVENOUS; SUBCUTANEOUS EVERY 5 MIN PRN
Status: DISCONTINUED | OUTPATIENT
Start: 2019-01-01 | End: 2019-01-01 | Stop reason: HOSPADM

## 2019-01-01 RX ORDER — MORPHINE SULFATE 2 MG/ML
1 INJECTION, SOLUTION INTRAMUSCULAR; INTRAVENOUS
Status: DISCONTINUED | OUTPATIENT
Start: 2019-01-01 | End: 2019-01-01 | Stop reason: HOSPADM

## 2019-01-01 RX ORDER — SODIUM CHLORIDE, SODIUM LACTATE, POTASSIUM CHLORIDE, CALCIUM CHLORIDE 600; 310; 30; 20 MG/100ML; MG/100ML; MG/100ML; MG/100ML
INJECTION, SOLUTION INTRAVENOUS CONTINUOUS
Status: DISCONTINUED | OUTPATIENT
Start: 2019-01-01 | End: 2019-01-01

## 2019-01-01 RX ORDER — METOPROLOL SUCCINATE 25 MG/1
25 TABLET, EXTENDED RELEASE ORAL AT BEDTIME
Status: DISCONTINUED | OUTPATIENT
Start: 2019-01-01 | End: 2019-01-01

## 2019-01-01 RX ORDER — LISINOPRIL 2.5 MG/1
2.5 TABLET ORAL AT BEDTIME
Status: DISCONTINUED | OUTPATIENT
Start: 2019-01-01 | End: 2019-01-01 | Stop reason: HOSPADM

## 2019-01-01 RX ORDER — LISINOPRIL 20 MG/1
20 TABLET ORAL DAILY
Status: DISCONTINUED | OUTPATIENT
Start: 2019-01-01 | End: 2019-01-01

## 2019-01-01 RX ORDER — METOPROLOL SUCCINATE 25 MG/1
12.5 TABLET, EXTENDED RELEASE ORAL DAILY
COMMUNITY
End: 2019-01-01

## 2019-01-01 RX ORDER — ACETAMINOPHEN 500 MG
1000 TABLET ORAL ONCE
Status: COMPLETED | OUTPATIENT
Start: 2019-01-01 | End: 2019-01-01

## 2019-01-01 RX ORDER — ATORVASTATIN CALCIUM 20 MG/1
40 TABLET, FILM COATED ORAL EVERY EVENING
Status: DISCONTINUED | OUTPATIENT
Start: 2019-01-01 | End: 2019-01-01 | Stop reason: HOSPADM

## 2019-01-01 RX ORDER — FENTANYL CITRATE 50 UG/ML
25-50 INJECTION, SOLUTION INTRAMUSCULAR; INTRAVENOUS
Status: DISCONTINUED | OUTPATIENT
Start: 2019-01-01 | End: 2019-01-01 | Stop reason: HOSPADM

## 2019-01-01 RX ORDER — PANTOPRAZOLE SODIUM 40 MG/1
40 TABLET, DELAYED RELEASE ORAL
Status: CANCELLED | OUTPATIENT
Start: 2019-01-01

## 2019-01-01 RX ORDER — ALBUTEROL SULFATE 90 UG/1
2 AEROSOL, METERED RESPIRATORY (INHALATION) EVERY 5 MIN PRN
Status: DISCONTINUED | OUTPATIENT
Start: 2019-01-01 | End: 2019-01-01 | Stop reason: HOSPADM

## 2019-01-01 RX ORDER — PROPOFOL 10 MG/ML
INJECTION, EMULSION INTRAVENOUS PRN
Status: DISCONTINUED | OUTPATIENT
Start: 2019-01-01 | End: 2019-01-01

## 2019-01-01 RX ORDER — FENTANYL CITRATE 50 UG/ML
25-50 INJECTION, SOLUTION INTRAMUSCULAR; INTRAVENOUS
Status: CANCELLED | OUTPATIENT
Start: 2019-01-01 | End: 2019-01-01

## 2019-01-01 RX ORDER — ALUMINA, MAGNESIA, AND SIMETHICONE 2400; 2400; 240 MG/30ML; MG/30ML; MG/30ML
15 SUSPENSION ORAL 2 TIMES DAILY PRN
Status: DISCONTINUED | OUTPATIENT
Start: 2019-01-01 | End: 2019-01-01 | Stop reason: HOSPADM

## 2019-01-01 RX ORDER — SODIUM NITROPRUSSIDE 25 MG/ML
100-200 INJECTION INTRAVENOUS
Status: DISCONTINUED | OUTPATIENT
Start: 2019-01-01 | End: 2019-01-01 | Stop reason: HOSPADM

## 2019-01-01 RX ORDER — FENTANYL CITRATE 50 UG/ML
25-50 INJECTION, SOLUTION INTRAMUSCULAR; INTRAVENOUS
Status: ACTIVE | OUTPATIENT
Start: 2019-01-01 | End: 2019-01-01

## 2019-01-01 RX ORDER — ASPIRIN 81 MG/1
81 TABLET, CHEWABLE ORAL DAILY
Status: DISCONTINUED | OUTPATIENT
Start: 2019-01-01 | End: 2019-01-01 | Stop reason: HOSPADM

## 2019-01-01 RX ORDER — SIMVASTATIN 20 MG
20 TABLET ORAL AT BEDTIME
Status: DISCONTINUED | OUTPATIENT
Start: 2019-01-01 | End: 2019-01-01 | Stop reason: HOSPADM

## 2019-01-01 RX ORDER — ALUMINA, MAGNESIA, AND SIMETHICONE 2400; 2400; 240 MG/30ML; MG/30ML; MG/30ML
30 SUSPENSION ORAL EVERY 4 HOURS PRN
Status: CANCELLED | OUTPATIENT
Start: 2019-01-01

## 2019-01-01 RX ORDER — ENALAPRILAT 1.25 MG/ML
1.25-2.5 INJECTION INTRAVENOUS
Status: DISCONTINUED | OUTPATIENT
Start: 2019-01-01 | End: 2019-01-01 | Stop reason: HOSPADM

## 2019-01-01 RX ORDER — SODIUM CHLORIDE 9 MG/ML
INJECTION, SOLUTION INTRAVENOUS CONTINUOUS
Status: CANCELLED | OUTPATIENT
Start: 2019-01-01

## 2019-01-01 RX ORDER — AMINOPHYLLINE 25 MG/ML
100 INJECTION, SOLUTION INTRAVENOUS ONCE
Status: COMPLETED | OUTPATIENT
Start: 2019-01-01 | End: 2019-01-01

## 2019-01-01 RX ORDER — NYSTATIN 100000 U/G
CREAM TOPICAL 2 TIMES DAILY
Qty: 85 G | Refills: 1 | Status: SHIPPED | OUTPATIENT
Start: 2019-01-01

## 2019-01-01 RX ORDER — NICARDIPINE HYDROCHLORIDE 2.5 MG/ML
100 INJECTION INTRAVENOUS
Status: DISCONTINUED | OUTPATIENT
Start: 2019-01-01 | End: 2019-01-01 | Stop reason: HOSPADM

## 2019-01-01 RX ORDER — BISACODYL 5 MG
5 TABLET, DELAYED RELEASE (ENTERIC COATED) ORAL DAILY PRN
Status: DISCONTINUED | OUTPATIENT
Start: 2019-01-01 | End: 2019-01-01 | Stop reason: HOSPADM

## 2019-01-01 RX ORDER — ONDANSETRON 2 MG/ML
4 INJECTION INTRAMUSCULAR; INTRAVENOUS
Status: DISCONTINUED | OUTPATIENT
Start: 2019-01-01 | End: 2019-01-01 | Stop reason: HOSPADM

## 2019-01-01 RX ORDER — I-VITE, TAB 1000-60-2MG (60/BT) 300MCG-200
1 TAB ORAL DAILY
Status: DISCONTINUED | OUTPATIENT
Start: 2019-01-01 | End: 2019-01-01 | Stop reason: HOSPADM

## 2019-01-01 RX ORDER — ONDANSETRON 2 MG/ML
INJECTION INTRAMUSCULAR; INTRAVENOUS PRN
Status: DISCONTINUED | OUTPATIENT
Start: 2019-01-01 | End: 2019-01-01

## 2019-01-01 RX ORDER — POTASSIUM CHLORIDE 7.45 MG/ML
10 INJECTION INTRAVENOUS
Status: DISCONTINUED | OUTPATIENT
Start: 2019-01-01 | End: 2019-01-01 | Stop reason: HOSPADM

## 2019-01-01 RX ORDER — SODIUM CHLORIDE 9 MG/ML
1000 INJECTION, SOLUTION INTRAVENOUS CONTINUOUS
Status: DISCONTINUED | OUTPATIENT
Start: 2019-01-01 | End: 2019-01-01 | Stop reason: HOSPADM

## 2019-01-01 RX ORDER — VIT C/E/ZN/COPPR/LUTEIN/ZEAXAN 60 MG-6 MG
1 CAPSULE ORAL DAILY
Status: DISCONTINUED | OUTPATIENT
Start: 2019-01-01 | End: 2019-01-01 | Stop reason: HOSPADM

## 2019-01-01 RX ORDER — SIMVASTATIN 20 MG
20 TABLET ORAL EVERY EVENING
Status: DISCONTINUED | OUTPATIENT
Start: 2019-01-01 | End: 2019-01-01

## 2019-01-01 RX ORDER — ONDANSETRON 2 MG/ML
4 INJECTION INTRAMUSCULAR; INTRAVENOUS EVERY 4 HOURS PRN
Status: DISCONTINUED | OUTPATIENT
Start: 2019-01-01 | End: 2019-01-01 | Stop reason: HOSPADM

## 2019-01-01 RX ORDER — ATORVASTATIN CALCIUM 40 MG/1
40 TABLET, FILM COATED ORAL DAILY
Qty: 90 TABLET | Refills: 2 | Status: ON HOLD | OUTPATIENT
Start: 2019-01-01 | End: 2019-01-01

## 2019-01-01 RX ORDER — TORSEMIDE 20 MG/1
20 TABLET ORAL DAILY PRN
Qty: 60 TABLET | Refills: 0 | COMMUNITY
Start: 2019-01-01 | End: 2019-01-01

## 2019-01-01 RX ORDER — LISINOPRIL 2.5 MG/1
2.5 TABLET ORAL DAILY
Status: DISCONTINUED | OUTPATIENT
Start: 2019-01-01 | End: 2019-01-01 | Stop reason: HOSPADM

## 2019-01-01 RX ORDER — IOPAMIDOL 755 MG/ML
80 INJECTION, SOLUTION INTRAVASCULAR ONCE
Status: COMPLETED | OUTPATIENT
Start: 2019-01-01 | End: 2019-01-01

## 2019-01-01 RX ORDER — TORSEMIDE 20 MG/1
20 TABLET ORAL
Status: DISCONTINUED | OUTPATIENT
Start: 2019-01-01 | End: 2019-01-01 | Stop reason: HOSPADM

## 2019-01-01 RX ORDER — TORSEMIDE 20 MG/1
20 TABLET ORAL DAILY PRN
Qty: 60 TABLET | Refills: 0 | Status: ON HOLD | OUTPATIENT
Start: 2019-01-01 | End: 2019-01-01

## 2019-01-01 RX ORDER — CEFTRIAXONE 1 G/1
1 INJECTION, POWDER, FOR SOLUTION INTRAMUSCULAR; INTRAVENOUS EVERY 24 HOURS
Status: DISCONTINUED | OUTPATIENT
Start: 2019-01-01 | End: 2019-01-01

## 2019-01-01 RX ADMIN — LEVOTHYROXINE SODIUM 100 MCG: 100 TABLET ORAL at 11:50

## 2019-01-01 RX ADMIN — LEVOTHYROXINE SODIUM 100 MCG: 100 TABLET ORAL at 06:28

## 2019-01-01 RX ADMIN — ASPIRIN 81 MG: 81 TABLET, COATED ORAL at 08:03

## 2019-01-01 RX ADMIN — LISINOPRIL 20 MG: 20 TABLET ORAL at 08:46

## 2019-01-01 RX ADMIN — IPRATROPIUM BROMIDE AND ALBUTEROL SULFATE 3 ML: .5; 2.5 SOLUTION RESPIRATORY (INHALATION) at 19:17

## 2019-01-01 RX ADMIN — GADOBUTROL 18 ML: 604.72 INJECTION INTRAVENOUS at 13:23

## 2019-01-01 RX ADMIN — LISINOPRIL 2.5 MG: 2.5 TABLET ORAL at 09:55

## 2019-01-01 RX ADMIN — ASPIRIN 81 MG: 81 TABLET, COATED ORAL at 09:53

## 2019-01-01 RX ADMIN — TORSEMIDE 20 MG: 20 TABLET ORAL at 08:02

## 2019-01-01 RX ADMIN — ATORVASTATIN CALCIUM 40 MG: 40 TABLET, FILM COATED ORAL at 09:04

## 2019-01-01 RX ADMIN — IOPAMIDOL 60 ML: 755 INJECTION, SOLUTION INTRAVENOUS at 14:14

## 2019-01-01 RX ADMIN — ASPIRIN 81 MG: 81 TABLET, COATED ORAL at 10:13

## 2019-01-01 RX ADMIN — CLOPIDOGREL BISULFATE 75 MG: 75 TABLET, FILM COATED ORAL at 09:04

## 2019-01-01 RX ADMIN — PHENYLEPHRINE HYDROCHLORIDE 150 MCG: 10 INJECTION INTRAVENOUS at 10:20

## 2019-01-01 RX ADMIN — LISINOPRIL 5 MG: 5 TABLET ORAL at 09:09

## 2019-01-01 RX ADMIN — ATORVASTATIN CALCIUM 40 MG: 40 TABLET, FILM COATED ORAL at 09:55

## 2019-01-01 RX ADMIN — FUROSEMIDE 20 MG: 20 TABLET ORAL at 07:52

## 2019-01-01 RX ADMIN — ASPIRIN 81 MG: 81 TABLET, COATED ORAL at 08:40

## 2019-01-01 RX ADMIN — ENOXAPARIN SODIUM 40 MG: 40 INJECTION SUBCUTANEOUS at 17:30

## 2019-01-01 RX ADMIN — SIMVASTATIN 20 MG: 20 TABLET, FILM COATED ORAL at 20:29

## 2019-01-01 RX ADMIN — IPRATROPIUM BROMIDE AND ALBUTEROL SULFATE 3 ML: .5; 2.5 SOLUTION RESPIRATORY (INHALATION) at 07:48

## 2019-01-01 RX ADMIN — FUROSEMIDE 20 MG: 20 TABLET ORAL at 08:11

## 2019-01-01 RX ADMIN — TORSEMIDE 20 MG: 20 TABLET ORAL at 09:40

## 2019-01-01 RX ADMIN — LIDOCAINE HYDROCHLORIDE 3 ML: 10 INJECTION, SOLUTION EPIDURAL; INFILTRATION; INTRACAUDAL; PERINEURAL at 15:50

## 2019-01-01 RX ADMIN — CLOPIDOGREL BISULFATE 75 MG: 75 TABLET, FILM COATED ORAL at 09:02

## 2019-01-01 RX ADMIN — LISINOPRIL 20 MG: 20 TABLET ORAL at 09:03

## 2019-01-01 RX ADMIN — ASPIRIN 81 MG: 81 TABLET, COATED ORAL at 09:09

## 2019-01-01 RX ADMIN — ATORVASTATIN CALCIUM 40 MG: 40 TABLET, FILM COATED ORAL at 08:02

## 2019-01-01 RX ADMIN — CEFTRIAXONE SODIUM 1 G: 1 INJECTION, POWDER, FOR SOLUTION INTRAMUSCULAR; INTRAVENOUS at 14:56

## 2019-01-01 RX ADMIN — TORSEMIDE 20 MG: 20 TABLET ORAL at 16:28

## 2019-01-01 RX ADMIN — PANTOPRAZOLE SODIUM 40 MG: 40 TABLET, DELAYED RELEASE ORAL at 08:26

## 2019-01-01 RX ADMIN — PHENYLEPHRINE HYDROCHLORIDE 200 MCG: 10 INJECTION INTRAVENOUS at 10:26

## 2019-01-01 RX ADMIN — Medication 1 CAPSULE: at 08:02

## 2019-01-01 RX ADMIN — BENZOCAINE 1 APPLICATOR: 200 GEL ORAL at 10:02

## 2019-01-01 RX ADMIN — PROPOFOL 50 MCG/KG/MIN: 10 INJECTION, EMULSION INTRAVENOUS at 10:03

## 2019-01-01 RX ADMIN — METOPROLOL SUCCINATE 12.5 MG: 25 TABLET, EXTENDED RELEASE ORAL at 08:11

## 2019-01-01 RX ADMIN — PANTOPRAZOLE SODIUM 40 MG: 40 TABLET, DELAYED RELEASE ORAL at 15:50

## 2019-01-01 RX ADMIN — FUROSEMIDE 40 MG: 10 INJECTION, SOLUTION INTRAVENOUS at 22:05

## 2019-01-01 RX ADMIN — MIDAZOLAM 0.5 MG: 1 INJECTION INTRAMUSCULAR; INTRAVENOUS at 11:00

## 2019-01-01 RX ADMIN — SODIUM CHLORIDE 500 MG: 9 INJECTION, SOLUTION INTRAVENOUS at 15:03

## 2019-01-01 RX ADMIN — LEVOTHYROXINE SODIUM 100 MCG: 100 TABLET ORAL at 08:26

## 2019-01-01 RX ADMIN — NITROGLYCERIN 200 MCG: 5 INJECTION, SOLUTION INTRAVENOUS at 13:53

## 2019-01-01 RX ADMIN — LEVOTHYROXINE SODIUM 100 MCG: 100 TABLET ORAL at 09:54

## 2019-01-01 RX ADMIN — IPRATROPIUM BROMIDE AND ALBUTEROL SULFATE 3 ML: .5; 2.5 SOLUTION RESPIRATORY (INHALATION) at 16:07

## 2019-01-01 RX ADMIN — HEPARIN SODIUM 5000 UNITS: 1000 INJECTION, SOLUTION INTRAVENOUS; SUBCUTANEOUS at 13:56

## 2019-01-01 RX ADMIN — ONDANSETRON 4 MG: 2 INJECTION INTRAMUSCULAR; INTRAVENOUS at 10:16

## 2019-01-01 RX ADMIN — TORSEMIDE 20 MG: 20 TABLET ORAL at 10:13

## 2019-01-01 RX ADMIN — LEVOTHYROXINE SODIUM 100 MCG: 100 TABLET ORAL at 06:37

## 2019-01-01 RX ADMIN — CLOPIDOGREL BISULFATE 75 MG: 75 TABLET, FILM COATED ORAL at 08:11

## 2019-01-01 RX ADMIN — SODIUM CHLORIDE 500 MG: 9 INJECTION, SOLUTION INTRAVENOUS at 16:22

## 2019-01-01 RX ADMIN — SODIUM CHLORIDE 500 ML: 9 INJECTION, SOLUTION INTRAVENOUS at 17:29

## 2019-01-01 RX ADMIN — THERA TABS 1 TABLET: TAB at 08:03

## 2019-01-01 RX ADMIN — LEVOTHYROXINE SODIUM 100 MCG: 100 TABLET ORAL at 06:31

## 2019-01-01 RX ADMIN — METOPROLOL SUCCINATE 25 MG: 25 TABLET, EXTENDED RELEASE ORAL at 09:09

## 2019-01-01 RX ADMIN — ATORVASTATIN CALCIUM 40 MG: 40 TABLET, FILM COATED ORAL at 10:13

## 2019-01-01 RX ADMIN — ATORVASTATIN CALCIUM 40 MG: 20 TABLET, FILM COATED ORAL at 18:22

## 2019-01-01 RX ADMIN — MELATONIN 1000 UNITS: at 08:11

## 2019-01-01 RX ADMIN — IPRATROPIUM BROMIDE AND ALBUTEROL SULFATE 3 ML: .5; 2.5 SOLUTION RESPIRATORY (INHALATION) at 06:19

## 2019-01-01 RX ADMIN — LEVOTHYROXINE SODIUM 100 MCG: 100 TABLET ORAL at 08:03

## 2019-01-01 RX ADMIN — LEVOTHYROXINE SODIUM 100 MCG: 100 TABLET ORAL at 07:51

## 2019-01-01 RX ADMIN — ATORVASTATIN CALCIUM 40 MG: 40 TABLET, FILM COATED ORAL at 08:25

## 2019-01-01 RX ADMIN — ASPIRIN 81 MG: 81 TABLET, COATED ORAL at 09:04

## 2019-01-01 RX ADMIN — SODIUM CHLORIDE: 4.5 INJECTION, SOLUTION INTRAVENOUS at 09:38

## 2019-01-01 RX ADMIN — ASPIRIN 81 MG: 81 TABLET, COATED ORAL at 08:11

## 2019-01-01 RX ADMIN — IPRATROPIUM BROMIDE AND ALBUTEROL SULFATE 3 ML: .5; 2.5 SOLUTION RESPIRATORY (INHALATION) at 20:09

## 2019-01-01 RX ADMIN — Medication 1 CAPSULE: at 08:46

## 2019-01-01 RX ADMIN — THERA TABS 1 TABLET: TAB at 08:26

## 2019-01-01 RX ADMIN — ASPIRIN 325 MG: 325 TABLET, DELAYED RELEASE ORAL at 11:48

## 2019-01-01 RX ADMIN — ENOXAPARIN SODIUM 40 MG: 40 INJECTION SUBCUTANEOUS at 12:45

## 2019-01-01 RX ADMIN — Medication 1 CAPSULE: at 09:56

## 2019-01-01 RX ADMIN — SODIUM CHLORIDE 500 ML: 9 INJECTION, SOLUTION INTRAVENOUS at 13:12

## 2019-01-01 RX ADMIN — SODIUM CHLORIDE 500 MG: 9 INJECTION, SOLUTION INTRAVENOUS at 17:20

## 2019-01-01 RX ADMIN — MIDAZOLAM 1 MG: 1 INJECTION INTRAMUSCULAR; INTRAVENOUS at 10:38

## 2019-01-01 RX ADMIN — SODIUM CHLORIDE 500 MG: 9 INJECTION, SOLUTION INTRAVENOUS at 15:05

## 2019-01-01 RX ADMIN — IPRATROPIUM BROMIDE AND ALBUTEROL SULFATE 3 ML: .5; 2.5 SOLUTION RESPIRATORY (INHALATION) at 07:40

## 2019-01-01 RX ADMIN — FUROSEMIDE 20 MG: 10 INJECTION, SOLUTION INTRAVENOUS at 12:27

## 2019-01-01 RX ADMIN — LISINOPRIL 20 MG: 20 TABLET ORAL at 07:51

## 2019-01-01 RX ADMIN — FUROSEMIDE 20 MG: 20 TABLET ORAL at 11:17

## 2019-01-01 RX ADMIN — TORSEMIDE 20 MG: 20 TABLET ORAL at 15:20

## 2019-01-01 RX ADMIN — CLOPIDOGREL BISULFATE 75 MG: 75 TABLET, FILM COATED ORAL at 13:31

## 2019-01-01 RX ADMIN — TORSEMIDE 20 MG: 20 TABLET ORAL at 08:26

## 2019-01-01 RX ADMIN — LEVOTHYROXINE SODIUM 100 MCG: 100 TABLET ORAL at 06:41

## 2019-01-01 RX ADMIN — Medication 1 CAPSULE: at 08:14

## 2019-01-01 RX ADMIN — TORSEMIDE 20 MG: 20 TABLET ORAL at 15:50

## 2019-01-01 RX ADMIN — METOPROLOL SUCCINATE 25 MG: 25 TABLET, EXTENDED RELEASE ORAL at 08:14

## 2019-01-01 RX ADMIN — ASPIRIN 81 MG: 81 TABLET, COATED ORAL at 13:06

## 2019-01-01 RX ADMIN — ATORVASTATIN CALCIUM 40 MG: 40 TABLET, FILM COATED ORAL at 22:52

## 2019-01-01 RX ADMIN — METOPROLOL SUCCINATE 25 MG: 25 TABLET, EXTENDED RELEASE ORAL at 08:47

## 2019-01-01 RX ADMIN — NYSTATIN: 100000 OINTMENT TOPICAL at 09:08

## 2019-01-01 RX ADMIN — REGADENOSON 0.4 MG: 0.08 INJECTION, SOLUTION INTRAVENOUS at 12:40

## 2019-01-01 RX ADMIN — SODIUM CHLORIDE, POTASSIUM CHLORIDE, SODIUM LACTATE AND CALCIUM CHLORIDE: 600; 310; 30; 20 INJECTION, SOLUTION INTRAVENOUS at 09:55

## 2019-01-01 RX ADMIN — ATORVASTATIN CALCIUM 40 MG: 40 TABLET, FILM COATED ORAL at 09:02

## 2019-01-01 RX ADMIN — Medication 1 CAPSULE: at 09:02

## 2019-01-01 RX ADMIN — CEFTRIAXONE SODIUM 1 G: 1 INJECTION, POWDER, FOR SOLUTION INTRAMUSCULAR; INTRAVENOUS at 15:53

## 2019-01-01 RX ADMIN — ASPIRIN 81 MG: 81 TABLET, COATED ORAL at 07:52

## 2019-01-01 RX ADMIN — SODIUM CHLORIDE 80 ML: 9 INJECTION, SOLUTION INTRAVENOUS at 10:01

## 2019-01-01 RX ADMIN — I-VITE, TAB 1000-60-2MG (60/BT) 1 TABLET: TAB at 08:11

## 2019-01-01 RX ADMIN — LIDOCAINE HYDROCHLORIDE 5 ML: 10 INJECTION, SOLUTION INFILTRATION; PERINEURAL at 11:07

## 2019-01-01 RX ADMIN — ENOXAPARIN SODIUM 40 MG: 40 INJECTION SUBCUTANEOUS at 18:33

## 2019-01-01 RX ADMIN — SIMVASTATIN 20 MG: 20 TABLET, FILM COATED ORAL at 19:53

## 2019-01-01 RX ADMIN — LEVOTHYROXINE SODIUM 100 MCG: 100 TABLET ORAL at 08:01

## 2019-01-01 RX ADMIN — PANTOPRAZOLE SODIUM 40 MG: 40 TABLET, DELAYED RELEASE ORAL at 06:48

## 2019-01-01 RX ADMIN — FUROSEMIDE 20 MG: 10 INJECTION, SOLUTION INTRAMUSCULAR; INTRAVENOUS at 12:50

## 2019-01-01 RX ADMIN — IPRATROPIUM BROMIDE AND ALBUTEROL SULFATE 3 ML: .5; 2.5 SOLUTION RESPIRATORY (INHALATION) at 15:30

## 2019-01-01 RX ADMIN — ASPIRIN 81 MG: 81 TABLET, COATED ORAL at 08:02

## 2019-01-01 RX ADMIN — ACETAMINOPHEN 650 MG: 325 TABLET, FILM COATED ORAL at 02:57

## 2019-01-01 RX ADMIN — LEVOTHYROXINE SODIUM 100 MCG: 100 TABLET ORAL at 07:07

## 2019-01-01 RX ADMIN — SODIUM CHLORIDE 500 MG: 9 INJECTION, SOLUTION INTRAVENOUS at 18:40

## 2019-01-01 RX ADMIN — LISINOPRIL 5 MG: 5 TABLET ORAL at 09:04

## 2019-01-01 RX ADMIN — TORSEMIDE 20 MG: 20 TABLET ORAL at 08:03

## 2019-01-01 RX ADMIN — ATORVASTATIN CALCIUM 40 MG: 20 TABLET, FILM COATED ORAL at 22:33

## 2019-01-01 RX ADMIN — ACETAMINOPHEN 650 MG: 325 TABLET, FILM COATED ORAL at 20:13

## 2019-01-01 RX ADMIN — FUROSEMIDE 7.5 MG/HR: 10 INJECTION, SOLUTION INTRAVENOUS at 10:04

## 2019-01-01 RX ADMIN — IPRATROPIUM BROMIDE AND ALBUTEROL SULFATE 3 ML: .5; 2.5 SOLUTION RESPIRATORY (INHALATION) at 15:24

## 2019-01-01 RX ADMIN — ACETAMINOPHEN 650 MG: 325 TABLET, FILM COATED ORAL at 06:48

## 2019-01-01 RX ADMIN — CLOPIDOGREL BISULFATE 75 MG: 75 TABLET, FILM COATED ORAL at 08:02

## 2019-01-01 RX ADMIN — LIDOCAINE HYDROCHLORIDE 2 ML: 10 INJECTION, SOLUTION EPIDURAL; INFILTRATION; INTRACAUDAL; PERINEURAL at 13:50

## 2019-01-01 RX ADMIN — CLOPIDOGREL BISULFATE 75 MG: 75 TABLET, FILM COATED ORAL at 09:09

## 2019-01-01 RX ADMIN — LIDOCAINE HYDROCHLORIDE 10 ML: 10 INJECTION, SOLUTION INFILTRATION; PERINEURAL at 10:46

## 2019-01-01 RX ADMIN — FENTANYL CITRATE 25 MCG: 50 INJECTION, SOLUTION INTRAMUSCULAR; INTRAVENOUS at 11:00

## 2019-01-01 RX ADMIN — AMINOPHYLLINE 100 MG: 25 INJECTION, SOLUTION INTRAVENOUS at 12:46

## 2019-01-01 RX ADMIN — CEFTRIAXONE SODIUM 1 G: 1 INJECTION, POWDER, FOR SOLUTION INTRAMUSCULAR; INTRAVENOUS at 13:51

## 2019-01-01 RX ADMIN — IPRATROPIUM BROMIDE AND ALBUTEROL SULFATE 3 ML: .5; 2.5 SOLUTION RESPIRATORY (INHALATION) at 19:31

## 2019-01-01 RX ADMIN — CLOPIDOGREL BISULFATE 75 MG: 75 TABLET ORAL at 08:03

## 2019-01-01 RX ADMIN — LEVOTHYROXINE SODIUM 100 MCG: 100 TABLET ORAL at 06:36

## 2019-01-01 RX ADMIN — ACETAMINOPHEN 650 MG: 325 TABLET, FILM COATED ORAL at 14:18

## 2019-01-01 RX ADMIN — FENTANYL CITRATE 25 MCG: 50 INJECTION, SOLUTION INTRAMUSCULAR; INTRAVENOUS at 11:20

## 2019-01-01 RX ADMIN — CLOPIDOGREL BISULFATE 75 MG: 75 TABLET ORAL at 13:06

## 2019-01-01 RX ADMIN — SODIUM CHLORIDE 250 ML: 9 INJECTION, SOLUTION INTRAVENOUS at 23:59

## 2019-01-01 RX ADMIN — FUROSEMIDE 20 MG: 20 TABLET ORAL at 08:46

## 2019-01-01 RX ADMIN — IPRATROPIUM BROMIDE AND ALBUTEROL SULFATE 3 ML: .5; 2.5 SOLUTION RESPIRATORY (INHALATION) at 19:20

## 2019-01-01 RX ADMIN — VERAPAMIL HYDROCHLORIDE 2.5 MG: 2.5 INJECTION, SOLUTION INTRAVENOUS at 13:55

## 2019-01-01 RX ADMIN — LEVOTHYROXINE SODIUM 100 MCG: 100 TABLET ORAL at 08:14

## 2019-01-01 RX ADMIN — ACETAMINOPHEN 650 MG: 325 TABLET, FILM COATED ORAL at 19:40

## 2019-01-01 RX ADMIN — LEVOTHYROXINE SODIUM 100 MCG: 100 TABLET ORAL at 09:03

## 2019-01-01 RX ADMIN — ASPIRIN 81 MG CHEWABLE TABLET 81 MG: 81 TABLET CHEWABLE at 12:20

## 2019-01-01 RX ADMIN — FUROSEMIDE 20 MG: 10 INJECTION, SOLUTION INTRAVENOUS at 00:35

## 2019-01-01 RX ADMIN — I-VITE, TAB 1000-60-2MG (60/BT) 1 TABLET: TAB at 09:07

## 2019-01-01 RX ADMIN — IOPAMIDOL 80 ML: 755 INJECTION, SOLUTION INTRAVENOUS at 10:01

## 2019-01-01 RX ADMIN — METOPROLOL SUCCINATE 12.5 MG: 25 TABLET, EXTENDED RELEASE ORAL at 09:52

## 2019-01-01 RX ADMIN — SENNOSIDES AND DOCUSATE SODIUM 1 TABLET: 8.6; 5 TABLET ORAL at 22:47

## 2019-01-01 RX ADMIN — FUROSEMIDE 20 MG: 10 INJECTION, SOLUTION INTRAVENOUS at 17:52

## 2019-01-01 RX ADMIN — NYSTATIN: 100000 OINTMENT TOPICAL at 08:16

## 2019-01-01 RX ADMIN — LEVOTHYROXINE SODIUM 100 MCG: 100 TABLET ORAL at 06:48

## 2019-01-01 RX ADMIN — FENTANYL CITRATE 50 MCG: 50 INJECTION, SOLUTION INTRAMUSCULAR; INTRAVENOUS at 10:42

## 2019-01-01 RX ADMIN — SIMVASTATIN 20 MG: 20 TABLET, FILM COATED ORAL at 20:42

## 2019-01-01 RX ADMIN — CEFTRIAXONE SODIUM 1 G: 1 INJECTION, POWDER, FOR SOLUTION INTRAMUSCULAR; INTRAVENOUS at 16:46

## 2019-01-01 RX ADMIN — LISINOPRIL 2.5 MG: 2.5 TABLET ORAL at 22:33

## 2019-01-01 RX ADMIN — PANTOPRAZOLE SODIUM 40 MG: 40 INJECTION, POWDER, FOR SOLUTION INTRAVENOUS at 13:13

## 2019-01-01 RX ADMIN — CLOPIDOGREL BISULFATE 75 MG: 75 TABLET, FILM COATED ORAL at 09:07

## 2019-01-01 RX ADMIN — LEVOTHYROXINE SODIUM 100 MCG: 100 TABLET ORAL at 08:46

## 2019-01-01 RX ADMIN — FUROSEMIDE 20 MG: 10 INJECTION, SOLUTION INTRAVENOUS at 05:45

## 2019-01-01 RX ADMIN — TORSEMIDE 20 MG: 20 TABLET ORAL at 09:02

## 2019-01-01 RX ADMIN — FUROSEMIDE 7.5 MG/HR: 10 INJECTION, SOLUTION INTRAVENOUS at 12:33

## 2019-01-01 RX ADMIN — SODIUM CHLORIDE, POTASSIUM CHLORIDE, SODIUM LACTATE AND CALCIUM CHLORIDE: 600; 310; 30; 20 INJECTION, SOLUTION INTRAVENOUS at 11:43

## 2019-01-01 RX ADMIN — CEFTRIAXONE SODIUM 1 G: 1 INJECTION, POWDER, FOR SOLUTION INTRAMUSCULAR; INTRAVENOUS at 17:33

## 2019-01-01 RX ADMIN — CLOPIDOGREL BISULFATE 75 MG: 75 TABLET, FILM COATED ORAL at 10:13

## 2019-01-01 RX ADMIN — CLOPIDOGREL BISULFATE 75 MG: 75 TABLET, FILM COATED ORAL at 09:56

## 2019-01-01 RX ADMIN — SIMVASTATIN 20 MG: 20 TABLET, FILM COATED ORAL at 22:04

## 2019-01-01 RX ADMIN — METOPROLOL SUCCINATE 12.5 MG: 25 TABLET, EXTENDED RELEASE ORAL at 09:08

## 2019-01-01 RX ADMIN — PHENYLEPHRINE HYDROCHLORIDE 100 MCG: 10 INJECTION INTRAVENOUS at 10:18

## 2019-01-01 RX ADMIN — PROPOFOL 20 MG: 10 INJECTION, EMULSION INTRAVENOUS at 10:14

## 2019-01-01 RX ADMIN — LISINOPRIL 2.5 MG: 2.5 TABLET ORAL at 15:20

## 2019-01-01 RX ADMIN — METOPROLOL SUCCINATE 25 MG: 25 TABLET, EXTENDED RELEASE ORAL at 09:03

## 2019-01-01 RX ADMIN — ACETAMINOPHEN 650 MG: 325 TABLET, FILM COATED ORAL at 23:34

## 2019-01-01 RX ADMIN — FUROSEMIDE 20 MG: 10 INJECTION, SOLUTION INTRAMUSCULAR; INTRAVENOUS at 15:22

## 2019-01-01 RX ADMIN — ASPIRIN 81 MG: 81 TABLET, COATED ORAL at 08:47

## 2019-01-01 RX ADMIN — FUROSEMIDE 40 MG: 10 INJECTION, SOLUTION INTRAVENOUS at 14:02

## 2019-01-01 RX ADMIN — IPRATROPIUM BROMIDE AND ALBUTEROL SULFATE 3 ML: .5; 2.5 SOLUTION RESPIRATORY (INHALATION) at 07:10

## 2019-01-01 RX ADMIN — LEVOTHYROXINE SODIUM 100 MCG: 100 TABLET ORAL at 06:15

## 2019-01-01 RX ADMIN — PANTOPRAZOLE SODIUM 40 MG: 40 TABLET, DELAYED RELEASE ORAL at 08:03

## 2019-01-01 RX ADMIN — CEFAZOLIN SODIUM 2 G: 2 INJECTION, SOLUTION INTRAVENOUS at 10:37

## 2019-01-01 RX ADMIN — FUROSEMIDE 5 MG/HR: 10 INJECTION, SOLUTION INTRAVENOUS at 13:01

## 2019-01-01 RX ADMIN — ATORVASTATIN CALCIUM 40 MG: 40 TABLET, FILM COATED ORAL at 20:13

## 2019-01-01 RX ADMIN — Medication 1 CAPSULE: at 07:51

## 2019-01-01 RX ADMIN — PANTOPRAZOLE SODIUM 40 MG: 40 TABLET, DELAYED RELEASE ORAL at 16:20

## 2019-01-01 RX ADMIN — Medication 1 CAPSULE: at 09:03

## 2019-01-01 RX ADMIN — Medication 1 CAPSULE: at 09:04

## 2019-01-01 RX ADMIN — Medication 1 CAPSULE: at 09:09

## 2019-01-01 RX ADMIN — METOPROLOL SUCCINATE 25 MG: 25 TABLET, EXTENDED RELEASE ORAL at 07:52

## 2019-01-01 RX ADMIN — MELATONIN 1000 UNITS: at 09:07

## 2019-01-01 RX ADMIN — HUMAN ALBUMIN MICROSPHERES AND PERFLUTREN 2 ML: 10; .22 INJECTION, SOLUTION INTRAVENOUS at 15:02

## 2019-01-01 RX ADMIN — MIDAZOLAM 0.5 MG: 1 INJECTION INTRAMUSCULAR; INTRAVENOUS at 11:20

## 2019-01-01 RX ADMIN — PANTOPRAZOLE SODIUM 40 MG: 40 TABLET, DELAYED RELEASE ORAL at 16:11

## 2019-01-01 RX ADMIN — ATORVASTATIN CALCIUM 40 MG: 40 TABLET, FILM COATED ORAL at 09:09

## 2019-01-01 RX ADMIN — ACETAMINOPHEN 1000 MG: 500 TABLET, FILM COATED ORAL at 15:31

## 2019-01-01 RX ADMIN — IPRATROPIUM BROMIDE AND ALBUTEROL SULFATE 3 ML: .5; 2.5 SOLUTION RESPIRATORY (INHALATION) at 11:50

## 2019-01-01 RX ADMIN — ASPIRIN 81 MG: 81 TABLET, COATED ORAL at 09:07

## 2019-01-01 RX ADMIN — ASPIRIN 81 MG: 81 TABLET, COATED ORAL at 09:03

## 2019-01-01 RX ADMIN — IPRATROPIUM BROMIDE AND ALBUTEROL SULFATE 3 ML: .5; 2.5 SOLUTION RESPIRATORY (INHALATION) at 07:43

## 2019-01-01 RX ADMIN — Medication 1 CAPSULE: at 10:13

## 2019-01-01 RX ADMIN — NYSTATIN: 100000 OINTMENT TOPICAL at 23:08

## 2019-01-01 RX ADMIN — PANTOPRAZOLE SODIUM 40 MG: 40 TABLET, DELAYED RELEASE ORAL at 06:36

## 2019-01-01 ASSESSMENT — ACTIVITIES OF DAILY LIVING (ADL)
ADLS_ACUITY_SCORE: 14
ADLS_ACUITY_SCORE: 16
DEPENDENT_IADLS:: TRANSPORTATION
ADLS_ACUITY_SCORE: 13
ADLS_ACUITY_SCORE: 14
ADLS_ACUITY_SCORE: 12
ADLS_ACUITY_SCORE: 20
ADLS_ACUITY_SCORE: 21
RETIRED_COMMUNICATION: 0-->UNDERSTANDS/COMMUNICATES WITHOUT DIFFICULTY
ADLS_ACUITY_SCORE: 12
ADLS_ACUITY_SCORE: 14
ADLS_ACUITY_SCORE: 12
ADLS_ACUITY_SCORE: 12
ADLS_ACUITY_SCORE: 16
ADLS_ACUITY_SCORE: 13
ADLS_ACUITY_SCORE: 16
ADLS_ACUITY_SCORE: 12
DEPENDENT_IADLS:: TRANSPORTATION;MEDICATION MANAGEMENT;CLEANING;COOKING;LAUNDRY;SHOPPING;MEAL PREPARATION
ADLS_ACUITY_SCORE: 14
ADLS_ACUITY_SCORE: 12
ADLS_ACUITY_SCORE: 19
ADLS_ACUITY_SCORE: 16
DEPENDENT_IADLS:: TRANSPORTATION;MEDICATION MANAGEMENT;CLEANING;COOKING;LAUNDRY;SHOPPING;MEAL PREPARATION
DEPENDENT_IADLS:: TRANSPORTATION
ADLS_ACUITY_SCORE: 15
ADLS_ACUITY_SCORE: 12
DEPENDENT_IADLS:: TRANSPORTATION
ADLS_ACUITY_SCORE: 17
ADLS_ACUITY_SCORE: 16
ADLS_ACUITY_SCORE: 16
DRESS: 1-->ASSISTIVE EQUIPMENT
ADLS_ACUITY_SCORE: 20
ADLS_ACUITY_SCORE: 21
RETIRED_EATING: 0-->INDEPENDENT
ADLS_ACUITY_SCORE: 16
ADLS_ACUITY_SCORE: 16
FALL_HISTORY_WITHIN_LAST_SIX_MONTHS: YES
ADLS_ACUITY_SCORE: 21
ADLS_ACUITY_SCORE: 14
ADLS_ACUITY_SCORE: 21
DEPENDENT_IADLS:: TRANSPORTATION;MEDICATION MANAGEMENT;CLEANING;COOKING;LAUNDRY;SHOPPING;MEAL PREPARATION
DEPENDENT_IADLS:: TRANSPORTATION
DEPENDENT_IADLS:: TRANSPORTATION
COGNITION: 0 - NO COGNITION ISSUES REPORTED
ADLS_ACUITY_SCORE: 19
ADLS_ACUITY_SCORE: 14
ADLS_ACUITY_SCORE: 14
ADLS_ACUITY_SCORE: 16
ADLS_ACUITY_SCORE: 21
ADLS_ACUITY_SCORE: 20
ADLS_ACUITY_SCORE: 21
PREVIOUS_RESPONSIBILITIES: MEAL PREP;HOUSEKEEPING;LAUNDRY;SHOPPING;MEDICATION MANAGEMENT;FINANCES
ADLS_ACUITY_SCORE: 21
ADLS_ACUITY_SCORE: 21
ADLS_ACUITY_SCORE: 16
ADLS_ACUITY_SCORE: 14
ADLS_ACUITY_SCORE: 13
ADLS_ACUITY_SCORE: 12
ADLS_ACUITY_SCORE: 20
ADLS_ACUITY_SCORE: 13
ADLS_ACUITY_SCORE: 19
TOILETING: 1-->ASSISTIVE EQUIPMENT
ADLS_ACUITY_SCORE: 17
ADLS_ACUITY_SCORE: 16
ADLS_ACUITY_SCORE: 21
ADLS_ACUITY_SCORE: 12
ADLS_ACUITY_SCORE: 16
ADLS_ACUITY_SCORE: 16
ADLS_ACUITY_SCORE: 21
ADLS_ACUITY_SCORE: 16
ADLS_ACUITY_SCORE: 16
ADLS_ACUITY_SCORE: 12
ADLS_ACUITY_SCORE: 19
ADLS_ACUITY_SCORE: 21
ADLS_ACUITY_SCORE: 20
ADLS_ACUITY_SCORE: 11
DEPENDENT_IADLS:: TRANSPORTATION
ADLS_ACUITY_SCORE: 16
ADLS_ACUITY_SCORE: 12
ADLS_ACUITY_SCORE: 12
ADLS_ACUITY_SCORE: 14
ADLS_ACUITY_SCORE: 12
ADLS_ACUITY_SCORE: 16
ADLS_ACUITY_SCORE: 21
ADLS_ACUITY_SCORE: 16
ADLS_ACUITY_SCORE: 21
ADLS_ACUITY_SCORE: 21
BATHING: 1-->ASSISTIVE EQUIPMENT
ADLS_ACUITY_SCORE: 21
ADLS_ACUITY_SCORE: 13
ADLS_ACUITY_SCORE: 14
ADLS_ACUITY_SCORE: 14
ADLS_ACUITY_SCORE: 12
ADLS_ACUITY_SCORE: 21
ADLS_ACUITY_SCORE: 16
ADLS_ACUITY_SCORE: 12
ADLS_ACUITY_SCORE: 12
ADLS_ACUITY_SCORE: 20
ADLS_ACUITY_SCORE: 12
NUMBER_OF_TIMES_PATIENT_HAS_FALLEN_WITHIN_LAST_SIX_MONTHS: 1
ADLS_ACUITY_SCORE: 12
ADLS_ACUITY_SCORE: 20
ADLS_ACUITY_SCORE: 11
SWALLOWING: 0-->SWALLOWS FOODS/LIQUIDS WITHOUT DIFFICULTY
ADLS_ACUITY_SCORE: 20
ADLS_ACUITY_SCORE: 21
ADLS_ACUITY_SCORE: 20

## 2019-01-01 ASSESSMENT — ENCOUNTER SYMPTOMS
FEVER: 0
VOMITING: 0
NERVOUS/ANXIOUS: 0
SEIZURES: 0
EYE DISCHARGE: 0
CONFUSION: 0
SHORTNESS OF BREATH: 1
DYSURIA: 0
LIGHT-HEADEDNESS: 0
SORE THROAT: 0
COUGH: 1
ABDOMINAL PAIN: 0
SHORTNESS OF BREATH: 0
EYE REDNESS: 0
DYSRHYTHMIAS: 1
NAUSEA: 0
CHILLS: 0
BACK PAIN: 0
FREQUENCY: 0
NECK STIFFNESS: 0
BLOOD IN STOOL: 0
DIARRHEA: 0
AGITATION: 0
MYALGIAS: 0
CHEST TIGHTNESS: 0
HEADACHES: 0
WEAKNESS: 0
FLANK PAIN: 0
PALPITATIONS: 0
NUMBNESS: 0

## 2019-01-01 ASSESSMENT — COLUMBIA-SUICIDE SEVERITY RATING SCALE - C-SSRS
1. IN THE PAST MONTH, HAVE YOU WISHED YOU WERE DEAD OR WISHED YOU COULD GO TO SLEEP AND NOT WAKE UP?: NO
1. IN THE PAST MONTH, HAVE YOU WISHED YOU WERE DEAD OR WISHED YOU COULD GO TO SLEEP AND NOT WAKE UP?: NO
2. HAVE YOU ACTUALLY HAD ANY THOUGHTS OF KILLING YOURSELF IN THE PAST MONTH?: NO
6. HAVE YOU EVER DONE ANYTHING, STARTED TO DO ANYTHING, OR PREPARED TO DO ANYTHING TO END YOUR LIFE?: NO
2. HAVE YOU ACTUALLY HAD ANY THOUGHTS OF KILLING YOURSELF IN THE PAST MONTH?: NO

## 2019-01-01 ASSESSMENT — MIFFLIN-ST. JEOR
SCORE: 1334.5
SCORE: 1352.19
SCORE: 1392.63
SCORE: 1410.7
SCORE: 1411.16
SCORE: 1383.49
SCORE: 1393.63
SCORE: 1384.85
SCORE: 1331.32
SCORE: 1328.6
SCORE: 1386.21
SCORE: 1361.26
SCORE: 1422.63
SCORE: 1413.88
SCORE: 1358.99
SCORE: 1356.27
SCORE: 1336.31
SCORE: 1368.07
SCORE: 1333.59
SCORE: 1389.39
SCORE: 1346.63
SCORE: 1387.12
SCORE: 1406.62
SCORE: 1377.14
SCORE: 1390.75
SCORE: 1388.93
SCORE: 1310.46
SCORE: 1349.63
SCORE: 1337.22
SCORE: 1342.21

## 2019-01-01 ASSESSMENT — PAIN SCALES - GENERAL
PAINLEVEL: NO PAIN (0)
PAINLEVEL: NO PAIN (0)

## 2019-01-01 ASSESSMENT — LIFESTYLE VARIABLES: TOBACCO_USE: 1

## 2019-01-03 NOTE — LETTER
1/3/2019    Joe Mandel MD  [address] [place] [id number]    RE: Patrick Michaelcollette       Dear Colleague,    I had the pleasure of seeing Patrick Diop in the [facility].    Cardiology Clinic Progress Note  Patrick Diop MRN# [id number]   YOB: 1935 Age: 83 year old     Reason For Visit: Cardiomyopathy follow-up   Primary Cardiologist:   Dr. Calderón          History of Presenting Illness:    Patrick Diop is a pleasant 83 year old patient with a past cardiac history significant for hypertension, hyperlipidemia, and cardiomyopathy.  Past medical history significant for type 2 diabetes, CKD, colon cancer and recurrent tongue cancer.    Patient had symptoms of fatigue and MILLER with worsening lower extremity edema.  Echocardiogram showed severe biventricular dysfunction with LVEF 20% and mild to moderately dilated, pulmonary hypertension and dilated IVC, RV was mildly dilated with severe systolic dysfunction, moderate MR, and mild aortic dilatation. His PCP started lisinopril and Lasix and was referred to cardiology.    Pt was last seen by Dr. Calderón on 12/17/2018. He continued with shortness of breath with exertion and lower extremity edema. His Lasix was increased from 20 mg daily to 40 mg b.i.d. and was started on potassium 40 mEq daily along with metoprolol 12.5 mg daily for cardiomyopathy. His BNP was over 50,000. He recommended Holter monitor to assess for any arrhythmias. He believed that the etiology was likely from his chemotherapy and was nonischemic. He recommended  diuresing the patient and once renal function is stable, consider ischemic workup with stress MRI.    BMP after increasing Lasix showed hyponatremia, normal potassium, and renal insufficiency with BUN of 33 creatinine 1.53 and GFR 42.  Creatinine at the time of Dr. Calderón's visit was 1.30 with GFR 53. He recommended decreasing the Lasix to 40 mg daily and discontinuing potassium, then  repeat labs on 12/31/2018. Unfortunately his appointment needed to be rescheduled.  One of the core RNs called the patient to discuss his symptoms.  He stated that his breathing and swelling were much better.  He did note that he was unable to read his scale at home and had not been doing daily weights.  He was going to be having somebody come to his house to help him with weights.    Pt presents today for two week follow-up up of cardiomyopathy. BMP today shows normal electrolytes and BUN, mildly elevated creatinine 1.34 and GFR 49.  N-terminal proBNP is 53,800 it was previously 52,000.   Holter monitor 12/26/2018 showed sinus rhythm with average heart rate 80, maximum 113, minimum 61, PVCs/PACs, and event correlated with sinus rhythm.  These results were reviewed with him today.      His weight today in the clinic is 160 pounds which is down 16 pounds from a couple weeks ago.  He has plans to buy a new scale when he is able to.  In the meantime, he has made a teresa on his scale so he knows if the weight is above that teresa to call the clinic. I discussed low-sodium diet 2000 mg daily with him today and using compression stockings. He tells me that it is difficult for him to eat due to his history of chemotherapy and tongue cancer as he has a low appetite and poor taste.  He continues with 1+ pitting edema to the knee bilaterally.  He continues with fatigue and shortness of breath which are significantly improved.  He is now able to walk to his laundry room in the living facility, without needing to stop and rest.  His shortness of breath is better on some days and I have recommended that he watch his sodium intake to see if this correlates.   Last Monday, he was able to go to the grocery store and walk around the store without stopping and resting. Overall, he is feeling much better. Patient reports no chest pain,PND, orthopnea, presyncope, syncope, heart racing, or palpitations.    Current Cardiac Medications    Lasix 40 mg daily  Lisinopril 20 mg daily  Lovastatin 40 mg daily  Metoprolol XL 12.5 mg daily                   Assessment and Plan:     Plan  1.  Increase metoprolol XL to 25 mg daily for cardiomyopathy  2.  Call the clinic if lightheaded after increasing metoprolol  3.  Low sodium 2000 mg daily   4.  Check daily weights and call the clinic if your weight has increased more than 2 lbs in one day or 5 lbs in one week.  5.  Follow-up with [facility] clinic in 2-4 weeks with BMP and BNP prior  6.  Compression stockings      1. Cardiomyopathy with acute systolic heart failure    likely nonischemic from chemotherapy, will assess with stress MRI when renal function is stable    class II heart failure symptoms with shortness of breath with exertion and lower extremity edema    Continue ACE inhibitor, beta blocker, Lasix      2. hypertension    controlled    Continue lisinopril, metoprolol      3. hyperlipidemia    Last LDL 47 on 3/2018    Continue lovastatin         Thank you for allowing me to participate in this delightful patient's care.      This note was completed in part using Dragon voice recognition software. Although reviewed after completion, some word and grammatical errors may occur.    Celina Gipson, APRN, CNP           Data:   All laboratory data reviewed      HPI and Plan:   See dictation    Orders Placed This Encounter   Procedures     Basic metabolic panel     N terminal pro BNP outpatient     Follow-Up with [facility]       Orders Placed This Encounter   Medications     metoprolol succinate ER (TOPROL-XL) 25 MG 24 hr tablet     Sig: Take 1 tablet (25 mg) by mouth daily     Dispense:  90 tablet     Refill:  3       Medications Discontinued During This Encounter   Medication Reason     metoprolol succinate ER (TOPROL-XL) 25 MG 24 hr tablet          Encounter Diagnoses   Name Primary?     Secondary cardiomyopathy (H)      Acute systolic heart failure (H) Yes       CURRENT  MEDICATIONS:  Current Outpatient Medications   Medication Sig Dispense Refill     Cholecalciferol (VITAMIN D) 1000 UNITS capsule Take 1 capsule by mouth daily       furosemide (LASIX) 20 MG tablet Take 2 tablets (40 mg) by mouth daily  0     levothyroxine (SYNTHROID/LEVOTHROID) 100 MCG tablet Take 1 tablet (100 mcg) by mouth daily Recheck Lab test in 2 months. 90 tablet 1     lisinopril (PRINIVIL/ZESTRIL) 20 MG tablet TAKE ONE TABLET BY MOUTH DAILY 90 tablet 2     LORazepam (ATIVAN) 0.5 MG tablet Take 1 tablet (0.5 mg) by mouth every 4 hours as needed (Anxiety, Nausea/Vomiting or Sleep) 30 tablet 2     lovastatin (MEVACOR) 40 MG tablet TAKE ONE TABLET BY MOUTH NIGHTLY AT BEDTIME 90 tablet 2     metoprolol succinate ER (TOPROL-XL) 25 MG 24 hr tablet Take 1 tablet (25 mg) by mouth daily 90 tablet 3     multivitamin  with lutein (OCUVITE WITH LTEIN) CAPS Take 1 capsule by mouth daily       prochlorperazine (COMPAZINE) 10 MG tablet Take 1 tablet (10 mg) by mouth every 6 hours as needed (Nausea/Vomiting) 30 tablet 2       ALLERGIES   No Known Allergies    PAST MEDICAL HISTORY:  Past Medical History:   Diagnosis Date     Hearing problem      History of radiation therapy        PAST SURGICAL HISTORY:  Past Surgical History:   Procedure Laterality Date     APPENDECTOMY  2001     COLECTOMY  Jan 2002    malignant polyp.  Colectomy and colostomy     COLONOSCOPY  Jan 2002    polyps     EYE SURGERY      B cataract     [facility] ENDOSCOPY W PLACEMENT GASTROSTOMY TUBE PERCUT N/A 11/19/2015    Procedure: COMBINED ESOPHAGOSCOPY, GASTROSCOPY, DUODENOSCOPY (EGD), PLACE PERCUTANEOUS ENDOSCOPIC GASTROSTOMY TUBE;  Surgeon: Sergio Buenrostro MD;  Location: [facility]     LARYNGOSCOPY WITH BIOPSY(IES) N/A 4/3/2018    Procedure: LARYNGOSCOPY WITH BIOPSY(IES);  Direct Laryngoscopy, Biopsy Base Of Tongue;  Surgeon: Jj Hernandez MD;  Location: [facility]     TAKEDOWN COLOSTOMY         FAMILY HISTORY:  Family History   Problem Relation Age of  Onset     Other Cancer Mother 57        lung ca, with brain mets     Cancer Mother         Brain tumor, lung cancer     Cancer Father         Lung cancer     Cancer Brother        SOCIAL HISTORY:  Social History     Socioeconomic History     Marital status: Single     Spouse name: None     Number of children: None     Years of education: None     Highest education level: None   Social Needs     Financial resource strain: None     Food insecurity - worry: None     Food insecurity - inability: None     Transportation needs - medical: None     Transportation needs - non-medical: None   Occupational History     None   Tobacco Use     Smoking status: Former Smoker     Packs/day: 0.00     Years: 48.00     Pack years: 0.00     Types: Cigarettes     Last attempt to quit: 2001     Years since quittin.1     Smokeless tobacco: Never Used     Tobacco comment: started at age 17   Substance and Sexual Activity     Alcohol use: No     Drug use: No     Sexual activity: No     Partners: Female   Other Topics Concern     Parent/sibling w/ CABG, MI or angioplasty before 65F 55M? No   Social History Narrative     None       Review of Systems:  Skin:  Negative       Eyes:  Positive for   Macular Degeneration  ENT:  Negative      Respiratory:  Positive for dyspnea on exertion     Cardiovascular:  Negative      Gastroenterology: Positive for excessive gas or bloating    Genitourinary:  Positive for urinary frequency;urgency    Musculoskeletal:  Negative      Neurologic:  Positive for tremors    Psychiatric:  Negative      Heme/Lymph/Imm:  Negative      Endocrine:  Positive for thyroid disorder      Physical Exam:  Vitals: /68   Pulse 98   Wt 72.6 kg (160 lb)   SpO2 98%   BMI 24.70 kg/m       Constitutional:  cooperative;in no acute distress        Skin:  warm and dry to the touch          Head:  normocephalic        Eyes:  sclera white        Lymph:      ENT:  no pallor or cyanosis        Neck:    JVP 10-12       Respiratory:    diminished breath sounds bilaterally       Cardiac: regular rhythm                pulses full and equal                                        GI:  abdomen soft        Extremities and Muscular Skeletal:      bilateral LE edema;1+;pitting          Neurological:  affect appropriate;no gross motor deficits        Psych:  Alert and Oriented x 3        CC  Lionel Calderón MD  [address]  [place] [address]                Thank you for allowing me to participate in the care of your patient.      Sincerely,     NATALY [facility]    cc:   Lionel Calderón MD  [address]  [place] [address]

## 2019-01-03 NOTE — PATIENT INSTRUCTIONS
Thank you for your [facility] visit today. Your provider has recommended the following:  Medication Changes:  INCREASE metoprolol to 25 mg (1 tablet) daily   Recommendations:  1. Call if you are lightheaded after increasing metoprolol   2. Check daily weights and call the clinic if your weight has increased more than 2 lbs in one day or 5 lbs in one week.  3. 2000 mg sodium/salt diet per day   4. Wear compression stocking daily. Remove every night before bed.  Follow-up:  See [facility] clinic for cardiology follow up in 2-4 weeks with nonfasting labwork (BMP and BNP) prior. I have made you an appointment with Amanda Mosquera PA-C on:  Monday, January 21st. Arrive at 8:00 am for labs and 9:00 am with Amanda Mosquera PA-C in cardiology.   Call [phone] two months prior to request date to schedule any future appointments.  Reminder:  1. Please bring in all current medications, over the counter supplements and vitamin bottles to your next appointment.               [facility]~[address]~[place], [place]~[address]  Questions about your visit today?   Call your Cardiology Clinic RN's-Rosalva Kidd and/or Florecita Jose at [phone].    Patient Education     Low-Salt Diet  This diet removes foods that are high in salt. It also limits the amount of salt you use when cooking. It is most often used for people with high blood pressure, edema (fluid retention), and kidney, liver, or heart disease.  Table salt contains the mineral sodium. Your body needs sodium to work normally. But too much sodium can make your health problems worse. Your healthcare provider is recommending a low-salt (also called low-sodium) diet for you. Your total daily allowance of salt is 1,500 to 2,300 milligrams (mg). It is less than 1 teaspoon of table salt. This means you can have only about 500 to 700 mg of sodium at each meal. People with certain health problems should limit salt intake  to the lower end of the recommended range.    When you cook, don t add much salt. If you can cook without using salt, even better. Don t add salt to your food at the table.  When shopping, read food labels. Salt is often called sodium on the label. Choose foods that are salt-free, low salt, or very low salt. Note that foods with reduced salt may not lower your salt intake enough.    Beans, potatoes, and pasta  Ok: Dry beans, split peas, lentils, potatoes, rice, macaroni, pasta, spaghetti without added salt  Avoid: Potato chips, tortilla chips, and similar products  Breads and cereals  Ok: Low-sodium breads, rolls, cereals, and cakes; low-salt crackers, matzo crackers  Avoid: Salted crackers, pretzels, popcorn, Portuguese toast, pancakes, muffins  Dairy  Ok: Milk, chocolate milk, hot chocolate mix, low-salt cheeses, and yogurt  Avoid: Processed cheese and cheese spreads; Roquefort, Camembert, and cottage cheese; buttermilk, instant breakfast drink  Desserts  Ok: Ice cream, frozen yogurt, juice bars, gelatin, cookies and pies, sugar, honey, jelly, hard candy  Avoid: Most pies, cakes and cookies prepared or processed with salt; instant pudding  Drinks  Ok: Tea, coffee, fizzy (carbonated) drinks, juices  Avoid: Flavored coffees, electrolyte replacement drinks, sports drinks  Meats  Ok: All fresh meat, fish, poultry, low-salt tuna, eggs, egg substitute  Avoid: Smoked, pickled, brine-cured, or salted meats and fish. This includes finch, chipped beef, corned beef, hot dogs, deli meats, ham, kosher meats, salt pork, sausage, canned tuna, salted codfish, smoked salmon, herring, sardines, or anchovies.  Seasonings and spices  Ok: Most seasonings are okay. Good substitutes for salt include: fresh herb blends, hot sauce, lemon, garlic, bronson, vinegar, dry mustard, parsley, cilantro, horseradish, tomato paste, regular margarine, mayonnaise, unsalted butter, cream cheese, vegetable oil, cream, low-salt salad dressing and  gravy.  Avoid: Regular ketchup, relishes, pickles, soy sauce, teriyaki sauce, Worcestershire sauce, BBQ sauce, tartar sauce, meat tenderizer, chili sauce, regular gravy, regular salad dressing, salted butter  Soups  Ok: Low-salt soups and broths made with allowed foods  Avoid: Bouillon cubes, soups with smoked or salted meats, regular soup and broth  Vegetables  Ok: Most vegetables are okay; also low-salt tomato and vegetable juices  Avoid: Sauerkraut and other brine-soaked vegetables; pickles and other pickled vegetables; tomato juice, olives  Date Last Reviewed: 8/1/2016 2000-2018 [employer]. [address] [place] [address]. All rights reserved. This information is not intended as a substitute for professional medical care. Always follow your healthcare professional's instructions.           Patient Education     Discharge Instructions: Eating a Low-Salt Diet  Your healthcare provider has prescribed a low-salt diet for you. Most people with heart problems need to eat less salt, which is full of sodium. Too much sodium is linked to high blood pressure, which is linked to a greater risk of heart disease, stroke, blindness, and kidney problems.  Home care    Learn ways to cut back on salt (sodium):    Eat less frozen, canned, dried, packaged, and fast foods. These often contain high amounts of sodium.    Season foods with herbs instead of salt when you cook.    Season with flavorings such as pepper, lemon, garlic, and onion.    Don t add salt to your food at the table.    Sprinkle salt-free herbal blends on meats and vegetables.  Learn to read food labels carefully:    Look for the total amount of sodium per serving.    Look for foods labeled low sodium, reduced sodium, or no added salt.    Beware. Salt goes by many names. Cut down on foods with these words (all forms of salt) listed as ingredients:  ? Salt  ? Sodium  ? Soy sauce  ? Baking soda  ? Baking powder  ? MSG (monosodium  glutamate)  ? Monosodium  ? Na (the chemical symbol for sodium)  Other ideas:    Use more fresh food. Buy more fruits and vegetables.    Select lean meats, fish, and poultry.    Find a cookbook with low-salt recipes. You ll find ideas for tasty meals that are healthy for your heart.    When eating out, ask questions about the menu. Tell the  you're on a low-salt diet.  ? If you order fish, chicken, beef, or pork, ask to have it broiled, baked, poached, or grilled without salt, butter, or breading.  ? Choose plain steamed rice, boiled noodles, and baked or boiled potatoes. Top potatoes with chives and a little sour cream instead of butter.    Avoid antacids that are high in salt. Check the label before you buy.  Follow-up  Make a follow-up appointment with your healthcare provider, or as advised. Your provider may refer you to a dietitian.   Date Last Reviewed: 6/1/2017 2000-2018 [employer]. [address] [place] [address], [place] [address]. All rights reserved. This information is not intended as a substitute for professional medical care. Always follow your healthcare professional's instructions.

## 2019-01-03 NOTE — PROGRESS NOTES
Cardiology Clinic Progress Note  Patrick Diop MRN# [id number]   YOB: 1935 Age: 83 year old     Reason For Visit: Cardiomyopathy follow-up   Primary Cardiologist:   Dr. Calderón          History of Presenting Illness:    Patrick Diop is a pleasant 83 year old patient with a past cardiac history significant for hypertension, hyperlipidemia, and cardiomyopathy.  Past medical history significant for type 2 diabetes, CKD, colon cancer and recurrent tongue cancer.    Patient had symptoms of fatigue and MILLER with worsening lower extremity edema.  Echocardiogram showed severe biventricular dysfunction with LVEF 20% and mild to moderately dilated, pulmonary hypertension and dilated IVC, RV was mildly dilated with severe systolic dysfunction, moderate MR, and mild aortic dilatation. His PCP started lisinopril and Lasix and was referred to cardiology.    Pt was last seen by Dr. Calderón on 12/17/2018. He continued with shortness of breath with exertion and lower extremity edema. His Lasix was increased from 20 mg daily to 40 mg b.i.d. and was started on potassium 40 mEq daily along with metoprolol 12.5 mg daily for cardiomyopathy. His BNP was over 50,000. He recommended Holter monitor to assess for any arrhythmias. He believed that the etiology was likely from his chemotherapy and was nonischemic. He recommended  diuresing the patient and once renal function is stable, consider ischemic workup with stress MRI.    BMP after increasing Lasix showed hyponatremia, normal potassium, and renal insufficiency with BUN of 33 creatinine 1.53 and GFR 42.  Creatinine at the time of Dr. Calderón's visit was 1.30 with GFR 53. He recommended decreasing the Lasix to 40 mg daily and discontinuing potassium, then repeat labs on 12/31/2018. Unfortunately his appointment needed to be rescheduled.  One of the core RNs called the patient to discuss his symptoms.  He stated that his breathing and swelling were much better.  He did note  that he was unable to read his scale at home and had not been doing daily weights.  He was going to be having somebody come to his house to help him with weights.    Pt presents today for two week follow-up up of cardiomyopathy. BMP today shows normal electrolytes and BUN, mildly elevated creatinine 1.34 and GFR 49.  N-terminal proBNP is 53,800 it was previously 52,000.   Holter monitor 12/26/2018 showed sinus rhythm with average heart rate 80, maximum 113, minimum 61, PVCs/PACs, and event correlated with sinus rhythm.  These results were reviewed with him today.      His weight today in the clinic is 160 pounds which is down 16 pounds from a couple weeks ago.  He has plans to buy a new scale when he is able to.  In the meantime, he has made a teresa on his scale so he knows if the weight is above that teresa to call the clinic. I discussed low-sodium diet 2000 mg daily with him today and using compression stockings. He tells me that it is difficult for him to eat due to his history of chemotherapy and tongue cancer as he has a low appetite and poor taste.  He continues with 1+ pitting edema to the knee bilaterally.  He continues with fatigue and shortness of breath which are significantly improved.  He is now able to walk to his laundry room in the living facility, without needing to stop and rest.  His shortness of breath is better on some days and I have recommended that he watch his sodium intake to see if this correlates.   Last Monday, he was able to go to the grocery store and walk around the store without stopping and resting. Overall, he is feeling much better. Patient reports no chest pain,PND, orthopnea, presyncope, syncope, heart racing, or palpitations.    Current Cardiac Medications   Lasix 40 mg daily  Lisinopril 20 mg daily  Lovastatin 40 mg daily  Metoprolol XL 12.5 mg daily                   Assessment and Plan:     Plan  1.  Increase metoprolol XL to 25 mg daily for cardiomyopathy  2.  Call the clinic  if lightheaded after increasing metoprolol  3.  Low sodium 2000 mg daily   4.  Check daily weights and call the clinic if your weight has increased more than 2 lbs in one day or 5 lbs in one week.  5.  Follow-up with [facility] clinic in 2-4 weeks with BMP and BNP prior  6.  Compression stockings      1. Cardiomyopathy with acute systolic heart failure    likely nonischemic from chemotherapy, will assess with stress MRI when renal function is stable    class II heart failure symptoms with shortness of breath with exertion and lower extremity edema    Continue ACE inhibitor, beta blocker, Lasix      2. hypertension    controlled    Continue lisinopril, metoprolol      3. hyperlipidemia    Last LDL 47 on 3/2018    Continue lovastatin         Thank you for allowing me to participate in this delightful patient's care.      This note was completed in part using Dragon voice recognition software. Although reviewed after completion, some word and grammatical errors may occur.    Celina Gipson, NATALY, CNP           Data:   All laboratory data reviewed

## 2019-01-03 NOTE — TELEPHONE ENCOUNTER
Please call Pt with his lab results from today BMP and BNP. I would like him to continue his current dose of lasix 20 mg BID unchanged, given his BNP is still significantly elevated. He has a recheck BMP in 2.5 weeks with his [facility] appt.     Thanks,   Celina Gipson, APRN, CNP  ADDENDUM:  Called patient with results/plan as above. States understanding. Brenna Kidd RN Cardiology at [facility] January 3, 2019, 1:33 PM

## 2019-01-03 NOTE — PROGRESS NOTES
HPI and Plan:   See dictation    Orders Placed This Encounter   Procedures     Basic metabolic panel     N terminal pro BNP outpatient     Follow[facility] with [facility]       Orders Placed This Encounter   Medications     metoprolol succinate ER (TOPROL-XL) 25 MG 24 hr tablet     Sig: Take 1 tablet (25 mg) by mouth daily     Dispense:  90 tablet     Refill:  3       Medications Discontinued During This Encounter   Medication Reason     metoprolol succinate ER (TOPROL-XL) 25 MG 24 hr tablet          Encounter Diagnoses   Name Primary?     Secondary cardiomyopathy (H)      Acute systolic heart failure (H) Yes       CURRENT MEDICATIONS:  Current Outpatient Medications   Medication Sig Dispense Refill     Cholecalciferol (VITAMIN D) 1000 UNITS capsule Take 1 capsule by mouth daily       furosemide (LASIX) 20 MG tablet Take 2 tablets (40 mg) by mouth daily  0     levothyroxine (SYNTHROID/LEVOTHROID) 100 MCG tablet Take 1 tablet (100 mcg) by mouth daily Recheck Lab test in 2 months. 90 tablet 1     lisinopril (PRINIVIL/ZESTRIL) 20 MG tablet TAKE ONE TABLET BY MOUTH DAILY 90 tablet 2     LORazepam (ATIVAN) 0.5 MG tablet Take 1 tablet (0.5 mg) by mouth every 4 hours as needed (Anxiety, Nausea/Vomiting or Sleep) 30 tablet 2     lovastatin (MEVACOR) 40 MG tablet TAKE ONE TABLET BY MOUTH NIGHTLY AT BEDTIME 90 tablet 2     metoprolol succinate ER (TOPROL-XL) 25 MG 24 hr tablet Take 1 tablet (25 mg) by mouth daily 90 tablet 3     multivitamin  with lutein (OCUVITE WITH LTEIN) CAPS Take 1 capsule by mouth daily       prochlorperazine (COMPAZINE) 10 MG tablet Take 1 tablet (10 mg) by mouth every 6 hours as needed (Nausea/Vomiting) 30 tablet 2       ALLERGIES   No Known Allergies    PAST MEDICAL HISTORY:  Past Medical History:   Diagnosis Date     Hearing problem      History of radiation therapy        PAST SURGICAL HISTORY:  Past Surgical History:   Procedure Laterality Date     APPENDECTOMY  2001     COLECTOMY  Jan 2002     malignant polyp.  Colectomy and colostomy     COLONOSCOPY  2002    polyps     EYE SURGERY      B cataract     [facility]GI ENDOSCOPY W PLACEMENT GASTROSTOMY TUBE PERCUT N/A 2015    Procedure: COMBINED ESOPHAGOSCOPY, GASTROSCOPY, DUODENOSCOPY (EGD), PLACE PERCUTANEOUS ENDOSCOPIC GASTROSTOMY TUBE;  Surgeon: Sergio Buenrostro MD;  Location: [facility]     LARYNGOSCOPY WITH BIOPSY(IES) N/A 4/3/2018    Procedure: LARYNGOSCOPY WITH BIOPSY(IES);  Direct Laryngoscopy, Biopsy Base Of Tongue;  Surgeon: Jj Hernandez MD;  Location: [facility]     TAKEDOWN COLOSTOMY         FAMILY HISTORY:  Family History   Problem Relation Age of Onset     Other Cancer Mother 57        lung ca, with brain mets     Cancer Mother         Brain tumor, lung cancer     Cancer Father         Lung cancer     Cancer Brother        SOCIAL HISTORY:  Social History     Socioeconomic History     Marital status: Single     Spouse name: None     Number of children: None     Years of education: None     Highest education level: None   Social Needs     Financial resource strain: None     Food insecurity - worry: None     Food insecurity - inability: None     Transportation needs - medical: None     Transportation needs - non-medical: None   Occupational History     None   Tobacco Use     Smoking status: Former Smoker     Packs/day: 0.00     Years: 48.00     Pack years: 0.00     Types: Cigarettes     Last attempt to quit: 2001     Years since quittin.1     Smokeless tobacco: Never Used     Tobacco comment: started at age 17   Substance and Sexual Activity     Alcohol use: No     Drug use: No     Sexual activity: No     Partners: Female   Other Topics Concern     Parent/sibling w/ CABG, MI or angioplasty before 65F 55M? No   Social History Narrative     None       Review of Systems:  Skin:  Negative       Eyes:  Positive for   Macular Degeneration  ENT:  Negative      Respiratory:  Positive for dyspnea on exertion     Cardiovascular:  Negative       Gastroenterology: Positive for excessive gas or bloating    Genitourinary:  Positive for urinary frequency;urgency    Musculoskeletal:  Negative      Neurologic:  Positive for tremors    Psychiatric:  Negative      Heme/Lymph/Imm:  Negative      Endocrine:  Positive for thyroid disorder      Physical Exam:  Vitals: /68   Pulse 98   Wt 72.6 kg (160 lb)   SpO2 98%   BMI 24.70 kg/m      Constitutional:  cooperative;in no acute distress        Skin:  warm and dry to the touch          Head:  normocephalic        Eyes:  sclera white        Lymph:      ENT:  no pallor or cyanosis        Neck:    JVP 10-12      Respiratory:    diminished breath sounds bilaterally       Cardiac: regular rhythm                pulses full and equal                                        GI:  abdomen soft        Extremities and Muscular Skeletal:      bilateral LE edema;1+;pitting          Neurological:  affect appropriate;no gross motor deficits        Psych:  Alert and Oriented x 3        CC  Lionel Calderón MD  [address]  [place] [id number]

## 2019-01-03 NOTE — LETTER
1/3/2019    Joe Mandel MD  [address] [place] [id number]    RE: Patrick Michaelcollette       Dear Colleague,    I had the pleasure of seeing Patrick Diop in the [facility].    Cardiology Clinic Progress Note  Patrick Diop MRN# [id number]   YOB: 1935 Age: 83 year old     Reason For Visit: Cardiomyopathy follow-up   Primary Cardiologist:   Dr. Calderón          History of Presenting Illness:    Patrick Diop is a pleasant 83 year old patient with a past cardiac history significant for hypertension, hyperlipidemia, and cardiomyopathy.  Past medical history significant for type 2 diabetes, CKD, colon cancer and recurrent tongue cancer.    Patient had symptoms of fatigue and MILLER with worsening lower extremity edema.  Echocardiogram showed severe biventricular dysfunction with LVEF 20% and mild to moderately dilated, pulmonary hypertension and dilated IVC, RV was mildly dilated with severe systolic dysfunction, moderate MR, and mild aortic dilatation. His PCP started lisinopril and Lasix and was referred to cardiology.    Pt was last seen by Dr. Calderón on 12/17/2018. He continued with shortness of breath with exertion and lower extremity edema. His Lasix was increased from 20 mg daily to 40 mg b.i.d. and was started on potassium 40 mEq daily along with metoprolol 12.5 mg daily for cardiomyopathy. His BNP was over 50,000. He recommended Holter monitor to assess for any arrhythmias. He believed that the etiology was likely from his chemotherapy and was nonischemic. He recommended  diuresing the patient and once renal function is stable, consider ischemic workup with stress MRI.    BMP after increasing Lasix showed hyponatremia, normal potassium, and renal insufficiency with BUN of 33 creatinine 1.53 and GFR 42.  Creatinine at the time of Dr. Calderón's visit was 1.30 with GFR 53. He recommended decreasing the Lasix to 40 mg daily and discontinuing potassium, then  repeat labs on 12/31/2018. Unfortunately his appointment needed to be rescheduled.  One of the [facility] RNs called the patient to discuss his symptoms.  He stated that his breathing and swelling were much better.  He did note that he was unable to read his scale at home and had not been doing daily weights.  He was going to be having somebody come to his house to help him with weights.    Pt presents today for two week follow-up up of cardiomyopathy. BMP today shows normal electrolytes and BUN, mildly elevated creatinine 1.34 and GFR 49.  N-terminal proBNP is 53,800 it was previously 52,000.   Holter monitor 12/26/2018 showed sinus rhythm with average heart rate 80, maximum 113, minimum 61, PVCs/PACs, and event correlated with sinus rhythm.  These results were reviewed with him today.      His weight today in the clinic is 160 pounds which is down 16 pounds from a couple weeks ago.  He has plans to buy a new scale when he is able to.  In the meantime, he has made a teresa on his scale so he knows if the weight is above that teresa to call the clinic. I discussed low-sodium diet 2000 mg daily with him today and using compression stockings. He tells me that it is difficult for him to eat due to his history of chemotherapy and tongue cancer as he has a low appetite and poor taste.  He continues with 1+ pitting edema to the knee bilaterally.  He continues with fatigue and shortness of breath which are significantly improved.  He is now able to walk to his laundry room in the living facility, without needing to stop and rest.  His shortness of breath is better on some days and I have recommended that he watch his sodium intake to see if this correlates.   Last Monday, he was able to go to the grocery store and walk around the store without stopping and resting. Overall, he is feeling much better. Patient reports no chest pain,PND, orthopnea, presyncope, syncope, heart racing, or palpitations.    Current Cardiac Medications    Lasix 40 mg daily  Lisinopril 20 mg daily  Lovastatin 40 mg daily  Metoprolol XL 12.5 mg daily                   Assessment and Plan:     Plan  1.  Increase metoprolol XL to 25 mg daily for cardiomyopathy  2.  Call the clinic if lightheaded after increasing metoprolol  3.  Low sodium 2000 mg daily   4.  Check daily weights and call the clinic if your weight has increased more than 2 lbs in one day or 5 lbs in one week.  5.  [facility]up [facility] in 2-4 weeks with BMP and BNP prior  6.  Compression stockings      1. Cardiomyopathy with acute systolic heart failure    likely nonischemic from chemotherapy, will assess with stress MRI when renal function is stable    class II heart failure symptoms with shortness of breath with exertion and lower extremity edema    Continue ACE inhibitor, beta blocker, Lasix      2. hypertension    controlled    Continue lisinopril, metoprolol      3. hyperlipidemia    Last LDL 47 on 3/2018    Continue lovastatin         Thank you for allowing me to participate in this delightful patient's care.      This note was completed in part using Dragon voice recognition software. Although reviewed after completion, some word and grammatical errors may occur.    Celina Gipson, APRN, CNP           Data:   All laboratory data reviewed      HPI and Plan:   See dictation    Orders Placed This Encounter   Procedures     Basic metabolic panel     N terminal pro BNP outpatient     [facility]       Orders Placed This Encounter   Medications     metoprolol succinate ER (TOPROL-XL) 25 MG 24 hr tablet     Sig: Take 1 tablet (25 mg) by mouth daily     Dispense:  90 tablet     Refill:  3       Medications Discontinued During This Encounter   Medication Reason     metoprolol succinate ER (TOPROL-XL) 25 MG 24 hr tablet          Encounter Diagnoses   Name Primary?     Secondary cardiomyopathy (H)      Acute systolic heart failure (H) Yes       CURRENT  MEDICATIONS:  Current Outpatient Medications   Medication Sig Dispense Refill     Cholecalciferol (VITAMIN D) 1000 UNITS capsule Take 1 capsule by mouth daily       furosemide (LASIX) 20 MG tablet Take 2 tablets (40 mg) by mouth daily  0     levothyroxine (SYNTHROID/LEVOTHROID) 100 MCG tablet Take 1 tablet (100 mcg) by mouth daily Recheck Lab test in 2 months. 90 tablet 1     lisinopril (PRINIVIL/ZESTRIL) 20 MG tablet TAKE ONE TABLET BY MOUTH DAILY 90 tablet 2     LORazepam (ATIVAN) 0.5 MG tablet Take 1 tablet (0.5 mg) by mouth every 4 hours as needed (Anxiety, Nausea/Vomiting or Sleep) 30 tablet 2     lovastatin (MEVACOR) 40 MG tablet TAKE ONE TABLET BY MOUTH NIGHTLY AT BEDTIME 90 tablet 2     metoprolol succinate ER (TOPROL-XL) 25 MG 24 hr tablet Take 1 tablet (25 mg) by mouth daily 90 tablet 3     multivitamin  with lutein (OCUVITE WITH LTEIN) CAPS Take 1 capsule by mouth daily       prochlorperazine (COMPAZINE) 10 MG tablet Take 1 tablet (10 mg) by mouth every 6 hours as needed (Nausea/Vomiting) 30 tablet 2       ALLERGIES   No Known Allergies    PAST MEDICAL HISTORY:  Past Medical History:   Diagnosis Date     Hearing problem      History of radiation therapy        PAST SURGICAL HISTORY:  Past Surgical History:   Procedure Laterality Date     APPENDECTOMY  2001     COLECTOMY  Jan 2002    malignant polyp.  Colectomy and colostomy     COLONOSCOPY  Jan 2002    polyps     EYE SURGERY      B cataract     [facility] UGI ENDOSCOPY W PLACEMENT GASTROSTOMY TUBE PERCUT N/A 11/19/2015    Procedure: COMBINED ESOPHAGOSCOPY, GASTROSCOPY, DUODENOSCOPY (EGD), PLACE PERCUTANEOUS ENDOSCOPIC GASTROSTOMY TUBE;  Surgeon: Sergio Buenrostro MD;  Location: [facility]     LARYNGOSCOPY WITH BIOPSY(IES) N/A 4/3/2018    Procedure: LARYNGOSCOPY WITH BIOPSY(IES);  Direct Laryngoscopy, Biopsy Base Of Tongue;  Surgeon: Jj Hernandez MD;  Location: [facility]     TAKEDOWN COLOSTOMY         FAMILY HISTORY:  Family History   Problem Relation Age of  Onset     Other Cancer Mother 57        lung ca, with brain mets     Cancer Mother         Brain tumor, lung cancer     Cancer Father         Lung cancer     Cancer Brother        SOCIAL HISTORY:  Social History     Socioeconomic History     Marital status: Single     Spouse name: None     Number of children: None     Years of education: None     Highest education level: None   Social Needs     Financial resource strain: None     Food insecurity - worry: None     Food insecurity - inability: None     Transportation needs - medical: None     Transportation needs - non-medical: None   Occupational History     None   Tobacco Use     Smoking status: Former Smoker     Packs/day: 0.00     Years: 48.00     Pack years: 0.00     Types: Cigarettes     Last attempt to quit: 2001     Years since quittin.1     Smokeless tobacco: Never Used     Tobacco comment: started at age 17   Substance and Sexual Activity     Alcohol use: No     Drug use: No     Sexual activity: No     Partners: Female   Other Topics Concern     Parent/sibling w/ CABG, MI or angioplasty before 65F 55M? No   Social History Narrative     None       Review of Systems:  Skin:  Negative       Eyes:  Positive for   Macular Degeneration  ENT:  Negative      Respiratory:  Positive for dyspnea on exertion     Cardiovascular:  Negative      Gastroenterology: Positive for excessive gas or bloating    Genitourinary:  Positive for urinary frequency;urgency    Musculoskeletal:  Negative      Neurologic:  Positive for tremors    Psychiatric:  Negative      Heme/Lymph/Imm:  Negative      Endocrine:  Positive for thyroid disorder      Physical Exam:  Vitals: /68   Pulse 98   Wt 72.6 kg (160 lb)   SpO2 98%   BMI 24.70 kg/m       Constitutional:  cooperative;in no acute distress        Skin:  warm and dry to the touch          Head:  normocephalic        Eyes:  sclera white        Lymph:      ENT:  no pallor or cyanosis        Neck:    JVP 10-12       Respiratory:    diminished breath sounds bilaterally       Cardiac: regular rhythm                pulses full and equal                                        GI:  abdomen soft        Extremities and Muscular Skeletal:      bilateral LE edema;1+;pitting          Neurological:  affect appropriate;no gross motor deficits        Psych:  Alert and Oriented x 3        Thank you for allowing me to participate in the care of your patient.    Sincerely,     NATALY Mancera [facility]

## 2019-02-10 PROBLEM — E03.9 ACQUIRED HYPOTHYROIDISM: Status: ACTIVE | Noted: 2018-01-01

## 2019-02-10 PROBLEM — I50.9 CHF (CONGESTIVE HEART FAILURE) (H): Status: ACTIVE | Noted: 2019-01-01

## 2019-02-10 PROBLEM — I50.32 CHRONIC DIASTOLIC CONGESTIVE HEART FAILURE (H): Status: RESOLVED | Noted: 2018-01-01 | Resolved: 2019-01-01

## 2019-02-10 PROBLEM — I50.23 ACUTE ON CHRONIC SYSTOLIC HEART FAILURE (H): Status: ACTIVE | Noted: 2019-01-01

## 2019-02-10 PROBLEM — R79.89 ELEVATED TROPONIN: Status: ACTIVE | Noted: 2019-01-01

## 2019-02-10 PROBLEM — I42.8 NON-ISCHEMIC CARDIOMYOPATHY (H): Status: ACTIVE | Noted: 2019-01-01

## 2019-02-10 PROBLEM — R55 SYNCOPE, UNSPECIFIED SYNCOPE TYPE: Status: ACTIVE | Noted: 2019-01-01

## 2019-02-10 PROBLEM — J96.01 ACUTE RESPIRATORY FAILURE WITH HYPOXIA (H): Status: ACTIVE | Noted: 2019-01-01

## 2019-02-10 PROBLEM — I50.23 ACUTE ON CHRONIC SYSTOLIC CONGESTIVE HEART FAILURE (H): Status: ACTIVE | Noted: 2019-01-01

## 2019-02-10 NOTE — PROGRESS NOTES
Baseline skin assessment completed with primary nurse Pam. Has a bruise right shoulder, gluteal crease red possibly from prolonged sitting. Barrier cream to  Gluteal crease.

## 2019-02-10 NOTE — H&P
[facility]    History and Physical  Hospital Medicine       Date of Admission:  2/10/2019  Date of Service: 2/10/2019     Assessment & Plan   Patrick Diop is a 83 year old male who presented on 2/10/2019 following a syncopal episode at home.    Principal Problem:    Acute on chronic systolic congestive heart failure (H) -the patient has known, very severe,   Non-ischemic cardiomyopathy (H).  Per his most recent echocardiogram December 2018, his ejection fraction was approximately 20%.  Subsequent cardiology clinic notes postulate the cardiomyopathy to be due to chemotherapy that he was receiving for tongue cancer, though the agent is not specified.  His presentation today is following a syncopal episode at home.  However, prior to syncope today, he had problems with dyspnea well beyond his baseline, generalized fatigue, and malaise.  His admission physical examination, chest x-ray findings, showing bilateral pulmonary vascular congestion and moderate bilateral effusions, and a proBNP of 83,000, are all compatible with CHF.  The etiology of this acute exacerbation is unclear.  The patient has not had any change in his diuretic doses or other medications within the last few weeks.  He has not had any chest pain.  Although he has an Elevated troponin on admission, I suspect that that represents demand ischemia rather than acute coronary syndrome, particularly given that his admission EKG is no different than his prior tracing of March 2018.  Thus far in the ED, he has received furosemide 40 mg IV x1.  No additional medications have yet been given.  -Furosemide 40 mg IV will be ordered for around 2300.  Unless the patient is in significant respiratory distress, we will probably wait until a morning GFR is drawn before giving additional diuretic doses.  -Continue home lisinopril as long as blood pressure allows.  -Consider addition of transdermal nitroglycerin if blood pressure allows for  preload reduction in the presence of severe cardiomyopathy.  -Repeat troponin in AM.    Active Problems:      Syncope, unspecified syncope type -cause of syncope is unclear.  He had no prodrome, no visual change, and no associated dizziness.  He was simply carrying a basket of laundry, and abruptly found himself on the floor.  He incurred no subjective injury during that event.  We have not seen any cardiac dysrhythmia since beginning telemetry monitoring here in the ED.  It may be that his syncope is due to poor forward flow as result of severe congestive heart failure.  -Continue telemetry monitoring of rhythm.  -Observe for additional syncope or presyncope.      Acute respiratory failure with hypoxemia - due to CHF exacerbation.  SpO2 88% on room on ED arrival.  SpO2 maintained > 90% on 3 L/min cannula.  - Continue nasal O2, monitor respiratory status.      Hypertension goal BP (blood pressure) < 140/90 -usual home medications include lisinopril, metoprolol, and furosemide.  Blood pressure has been marginal since ED arrival, with a systolic blood pressure of less than 100 for the past few hours.  This relative hypotension will make it difficult to restart his ACE inhibitor, and may complicate efforts at diuresis.  -We will need to hold his home antihypertensives for now.  -If blood pressure improves, will re-add his ACE inhibitor first given severe cardiomyopathy.      CKD (chronic kidney disease) stage 3, GFR 30-59 ml/min (H) -baseline GFR appears to be 42-53.  Admission GFR is 50.  -Monitor GFR closely during diuresis.      Recurrent tongue cancer (H) -diagnosed approximately 3 years ago, and treated with a combination of chemotherapy and radiotherapy.  Per the patient's report, treatment was initially successful.  However, there has been a recent recurrence.  The patient says that he is no longer therapy for radiation therapy, though is unsure whether he can receive more chemotherapy.  He is due to have a CT  scan of the head and neck tomorrow, 2/11/2019.  -We should be able to perform his CT scan during this hospital stay.      Acquired hypothyroidism -he appears to be clinically euthyroid.  TSH was normal most recently November 2018.  -Continue home levothyroxine.    DVT Prophylaxis: Enoxaparin (Lovenox) SQ  Code Status: Full Code    Lines: Peripheral.  Zamora catheter: Not present.  Restraints: None.    Disposition: Anticipate discharge in 3 to 5 day(s). Appropriate for Inpatient care.    Attestation: I spent 45 minutes on this admission.    Alejandro Parra MD          Primary Care Physician   [facility] [phone]    Past Medical History      Past Medical History:   Diagnosis Date     Hearing problem      History of radiation therapy      Non-ischemic cardiomyopathy (H) 2/10/2019    Postulated to be due to chemotherapy, though agent not specified.     Patient Active Problem List    Diagnosis Date Noted     Non-ischemic cardiomyopathy (H) 02/10/2019     Priority: Medium     Postulated to be due to chemotherapy, though agent not specified.       Acute on chronic systolic congestive heart failure (H) 02/10/2019     Priority: Medium     Elevated troponin 02/10/2019     Priority: Medium     Syncope, unspecified syncope type 02/10/2019     Priority: Medium     Acquired hypothyroidism 08/26/2018     Priority: Medium     Chemotherapy induced nausea and vomiting 05/31/2018     Priority: Medium     Recurrent tongue cancer (H) 10/28/2015     Priority: Medium     Neck mass found on 9/28/2015.  He had cancer at the base of his tongue.  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 He initially presented with a right-sided neck mass; CT showed a 3.6cm mass in the right base f tongue as well as bilateral cervical lymphadenopathy.  Treated with radiation for 7 weeks and three chemo treatments.   He had concomitant chemotherapy to a dose of 7,000 cGy in 35 fractions, which  he received along with high-dose cisplatin as detailed above.  He tolerated therapy quite well. His toxicities included grade 1 radiation dermatitis, grade 3 dysphagia requiring G-tube feedings, grade 2 dry mouth, grade 1 esophagitis, moderate-severe changes in taste, and grade 1 odynophagia.  ENT notes 4/4/2016:now three months out from completion of chemoradiation therapy for a T3, N2c right-sided tongue base tumor.  He had a PET scan done over the weekend, and it was essentially negative so he has had a complete response.    5/27/2016:Specialists treating his cancer; Dr. Hernandez, ENT.    Dr. Neil, Hematology/Oncology.   Dr. Paul, Oncology radiation.   He had lymphedema in neck as complication.  4/3/2018:NAME OF PROCEDURE:1. Direct laryngoscopy.  2. Biopsy of right tongue base.  Pathology returned with recurrent cancer.   April, 2018:Started on Kaytruda for his recurrent tongue cancer.   11/18/2018:Chemo:Treated initially with Cisplatin and later Kaytruda.        CKD (chronic kidney disease) stage 3, GFR 30-59 ml/min (H) 09/05/2014     Priority: Medium     9/5/2014:MAC positive twice.   11/18/2018:GFR <60 and has decreased in the past year.        Colon cancer (H) 03/14/2014     Priority: Medium     9/5/2014:2001 HAD SURGERY AND CURE-treated in [place].   Transverse colectomy.  He had chemo for 6 months.  Cancer treated in 2001.  Last colonoscopy 3 years ago.  He was told to recheck in 4 years-2015.         Hyperlipidemia with target LDL less than 100 11/06/2013     Priority: Medium     Hypertension goal BP (blood pressure) < 140/90 11/06/2013     Priority: Medium        Past Surgical History     Past Surgical History:   Procedure Laterality Date     APPENDECTOMY  2001     COLECTOMY  Jan 2002    malignant polyp.  Colectomy and colostomy     COLONOSCOPY  Jan 2002    polyps     EYE SURGERY      B cataract     [facility] ENDOSCOPY W PLACEMENT GASTROSTOMY TUBE PERCUT N/A 11/19/2015    Procedure: COMBINED ESOPHAGOSCOPY,  "GASTROSCOPY, DUODENOSCOPY (EGD), PLACE PERCUTANEOUS ENDOSCOPIC GASTROSTOMY TUBE;  Surgeon: Sergio Buenrostro MD;  Location: [facility]     LARYNGOSCOPY WITH BIOPSY(IES) N/A 4/3/2018    Procedure: LARYNGOSCOPY WITH BIOPSY(IES);  Direct Laryngoscopy, Biopsy Base Of Tongue;  Surgeon: Jj Hernandez MD;  Location: [facility]     TAKEDOWN COLOSTOMY          History is obtained from the patient and review of old records via the EMR.    History of Present Illness   Patrick Diop is a 83 year old male with the above past medical history now presents on 2/10/2019 following a syncopal event at home.    Mr. Diop has known, severe, nonischemic cardiomyopathy, ejection fraction 20% on most recent echocardiogram December 2018.  He is prone to exacerbation of congestive heart failure, and within the last 6 weeks, required a doubling of his home furosemide dose, from 10 mg p.o. twice daily to 20 mg p.o. twice daily, due to increasing body weight, increasing lower extremity edema, and increasing exertional dyspnea.  He seemed to respond well to this increased diuretic dose, and at his most recent cardiology clinic visit of a few weeks ago, his furosemide was again reduced to 10 mg p.o. twice daily.    The patient acknowledges exertional dyspnea on a day-to-day basis.  It does not occur every day, though he has some dyspnea most days.  He is also noted that he is increasingly tired and fatigued.  He felt a little worse upon awakening this morning than he usually does, particularly with regard to his dyspnea.  However, he carried on his activities of daily living as usual, including doing some laundry.  While he was carrying a laundry basket across the floor, he feels that he abruptly \"passed out\".  He came to on the floor, next to the laundry basket, uninjured.  He had no prodrome, no associated visual disturbance, and no associated dizziness.  After the syncopal event, he felt no different than he did prior to the event.  EMS " "was contacted, and the patient was brought to the [facility] emergency department for evaluation.    Workup thus far in the emergency department suggests that the patient is in severe congestive heart failure exacerbation, with a chest x-ray showing pulmonary vascular congestion and moderate bilateral pleural effusions, and a pro BNP value of approximately 23,000.  He was given furosemide 40 mg IV x1 in the emergency department.  He is now being admitted for further evaluation and treatment.    Review of Systems   ROS:  CONSTITUTIONAL: NEGATIVE for chills, fever, sweats.  He says his weight the past two days is 160 lbs, which is his \"dry\" weight.  EYES: NEGATIVE for visual changes, eye irritation.  ENT/MOUTH: NEGATIVE for nasal congestion, postnasal drainage or sinus pressure  RESP: Notable for dyspnea, bothersome but non-productive cough, mild wheeze, and pleuritic chest pain over the right lateral chest.   CV: NEGATIVE for anterior or exertional chest pain, palpitations, or orthopnea.  Says his lower extremity edema is at baseline.  GI: NEGATIVE for difficulties swallowing thin liquids.  Denies abdominal pain, diarrhea, nausea or vomiting.  Says he \"eats what he can\", though large solids are sometimes hard to get down.  : NEGATIVE for difficulties voiding, no dysuria.  MUSCULOSKELETAL: NEGATIVE for back pain, joint pain, or joint swelling  NEURO: NEGATIVE for focal numbness or weakness, stroke or seizure disorder.  PSYCHIATRIC: NEGATIVE for anxiety or depression, panic, or recent change in mood.      Prior to Admission Medications   Prior to Admission Medications   Prescriptions Last Dose Informant Patient Reported? Taking?   Cholecalciferol (VITAMIN D) 1000 UNITS capsule 2/10/2019 at AM Self Yes Yes   Sig: Take 1 capsule by mouth daily   LORazepam (ATIVAN) 0.5 MG tablet More than a month at Unknown time Self No No   Sig: Take 1 tablet (0.5 mg) by mouth every 4 hours as needed (Anxiety, Nausea/Vomiting or " Sleep)   furosemide (LASIX) 20 MG tablet 2/10/2019 at AM- 20 mg Self Yes Yes   Sig: Take 2 tablets (40 mg) by mouth daily   levothyroxine (SYNTHROID/LEVOTHROID) 100 MCG tablet 2/10/2019 at AM Self No Yes   Sig: Take 1 tablet (100 mcg) by mouth daily Recheck Lab test in 2 months.   lisinopril (PRINIVIL/ZESTRIL) 20 MG tablet 2/10/2019 at AM Self No Yes   Sig: TAKE ONE TABLET BY MOUTH DAILY   lovastatin (MEVACOR) 40 MG tablet 2019 at PM Self No Yes   Sig: TAKE ONE TABLET BY MOUTH NIGHTLY AT BEDTIME   metoprolol succinate ER (TOPROL-XL) 25 MG 24 hr tablet 2/10/2019 at AM Self No Yes   Sig: Take 1 tablet (25 mg) by mouth daily   multivitamin  with lutein (OCUVITE WITH LTEIN) CAPS  Self Yes No   Sig: Take 1 capsule by mouth daily      Facility-Administered Medications: None     Allergies   No Known Allergies    Family History    Family History   Problem Relation Age of Onset     Other Cancer Mother 57        lung ca, with brain mets     Cancer Mother         Brain tumor, lung cancer     Cancer Father         Lung cancer     Cancer Brother        Social History   Social History     Socioeconomic History     Marital status: Single     Spouse name: Not on file     Number of children: Not on file     Years of education: Not on file     Highest education level: Not on file   Social Needs     Financial resource strain: Not on file     Food insecurity - worry: Not on file     Food insecurity - inability: Not on file     Transportation needs - medical: Not on file     Transportation needs - non-medical: Not on file   Occupational History     Not on file   Tobacco Use     Smoking status: Former Smoker     Packs/day: 0.00     Years: 48.00     Pack years: 0.00     Types: Cigarettes     Last attempt to quit: 2001     Years since quittin.2     Smokeless tobacco: Never Used     Tobacco comment: started at age 17   Substance and Sexual Activity     Alcohol use: No     Drug use: No     Sexual activity: No     Partners:  "Female   Other Topics Concern     Parent/sibling w/ CABG, MI or angioplasty before 65F 55M? No   Social History Narrative     Not on file       Physical Exam   BP 93/69   Pulse 91   Temp 98.4  F (36.9  C) (Oral)   Resp 18   Ht 1.702 m (5' 7\")   Wt 72.6 kg (160 lb)   SpO2 92%   BMI 25.06 kg/m       Weight: 160 lbs 0 oz Body mass index is 25.06 kg/m .     GENERAL: Pleasant man, in no distress.  EYES: Eyes grossly normal to inspection, extraocular movements intact  HENT: Nares patent bilaterally.  Nasal mucosa normal, no discharge.    NECK: Trachea midline, no stridor. .  RESP: No accessory muscle use at rest.  Symmetrically diminished breath sounds at both dependent bases.  Prominent inspiratory crackles dependently on both sides on inspiration, mostly clear anteriorly.  Expiration not prolonged, no wheeze.  CV: Regular rate and rhythm, mildly tachycardic.  Normal S1 S2, no murmur heard, no extra sound.  Bilateral lower extremity edema 1+.  ABDOMEN: Soft, non-tender, no guarding.  Liver and spleen not enlarged, no masses palpable.  Bowel sounds positive.  MS: No bony deformities noted.  No red or inflamed joints.  SKIN: Warm and dry, no rashes.  NEURO: Alert, oriented, conversant.  Cranial nerves III - XII grossly intact.  No gross motor or sensory deficits.    PSYCH: Calm, alert, conversant.  Able to articulate logical thoughts, no tangential thoughts, no hallucinations or delusions.  Affect normal.        Data   Data reviewed today:   Recent Labs   Lab 02/10/19  1100   WBC 9.0   HGB 12.8*   *         POTASSIUM 3.8   CHLORIDE 101   CO2 26   BUN 44*   CR 1.33*   ANIONGAP 9   AMY 9.4   *   ALBUMIN 3.3*   PROTTOTAL 8.1   BILITOTAL 1.1   ALKPHOS 121   ALT 41   AST 61*   TROPI 0.066*       Recent Results (from the past 24 hour(s))   XR Chest 2 Views    Narrative    CHEST TWO VIEWS  2/10/2019 11:47 AM     COMPARISON: Two-view chest x-ray 4/28/2017    HISTORY: Shortness of air      " Impression    IMPRESSION: There is a new moderate-sized right pleural effusion.  Right base pneumonia cannot be excluded. There is a new tiny left  pleural effusion. There are no airspace opacities to suggest pneumonia  in the left lung. There is no pneumothorax. Heart size remains mildly  enlarged.    GUERRERO PRINGLE MD       I personally reviewed the admission chest x-ray image(s) showing pulmonary vascular congestion and bilateral effusions, moderate right, small left.  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    Alejandro Parra MD

## 2019-02-10 NOTE — ED PROVIDER NOTES
History     Chief Complaint   Patient presents with     Loss of Consciousness     brief today, while walking; unknown loc     HPI  Patrick Diop is a 83 year old male who presents after having a syncopal episode a short time ago.  Patient states that he was walking down the [place] with some clothes in his hands, and the next thing he remembers he was on the floor.  He denies any injury.  He does not know how long he was out.  He reports some increased shortness of breath today.  He has a nonproductive cough.  He has no fever or chills.  He has no nausea or vomiting.  He denies any headache.  He has some mild chest pain over the past couple days.  He notes no pedal edema.    Allergies:  No Known Allergies    Problem List:    Patient Active Problem List    Diagnosis Date Noted     Chronic diastolic congestive heart failure (H) 12/26/2018     Priority: Medium     Acquired hypothyroidism 08/26/2018     Priority: Medium     Chemotherapy induced nausea and vomiting 05/31/2018     Priority: Medium     Recurrent tongue cancer (H) 10/28/2015     Priority: Medium     Neck mass found on 9/28/2015.  He had cancer at the base of his tongue.  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 He initially presented with a right-sided neck mass; CT showed a 3.6cm mass in the right base f tongue as well as bilateral cervical lymphadenopathy.  Treated with radiation for 7 weeks and three chemo treatments.   He had concomitant chemotherapy to a dose of 7,000 cGy in 35 fractions, which he received along with high-dose cisplatin as detailed above.  He tolerated therapy quite well. His toxicities included grade 1 radiation dermatitis, grade 3 dysphagia requiring G-tube feedings, grade 2 dry mouth, grade 1 esophagitis, moderate-severe changes in taste, and grade 1 odynophagia.  ENT notes 4/4/2016:now three months out from completion of chemoradiation therapy for a T3, N2c right-sided  tongue base tumor.  He had a PET scan done over the weekend, and it was essentially negative so he has had a complete response.    5/27/2016:Specialists treating his cancer; Dr. Hernandez, ENT.    Dr. Neil, Hematology/Oncology.   Dr. Paul, Oncology radiation.   He had lymphedema in neck as complication.  4/3/2018:NAME OF PROCEDURE:1. Direct laryngoscopy.  2. Biopsy of right tongue base.  Pathology returned with recurrent cancer.   April, 2018:Started on Kaytruda for his recurrent tongue cancer.   11/18/2018:Chemo:Treated initially with Cisplatin and later Kaytruda.        CKD (chronic kidney disease) stage 3, GFR 30-59 ml/min (H) 09/05/2014     Priority: Medium     9/5/2014:MAC positive twice.   11/18/2018:GFR <60 and has decreased in the past year.        Colon cancer (H) 03/14/2014     Priority: Medium     9/5/2014:2001 HAD SURGERY AND CURE-treated in [place].   Transverse colectomy.  He had chemo for 6 months.  Cancer treated in 2001.  Last colonoscopy 3 years ago.  He was told to recheck in 4 years-2015.         Type 2 diabetes mellitus with stage 1 chronic kidney disease (H) 11/06/2013     Priority: Medium     Diagnosed about 2008       Hyperlipidemia with target LDL less than 100 11/06/2013     Priority: Medium     Hypertension goal BP (blood pressure) < 140/90 11/06/2013     Priority: Medium        Past Medical History:    Past Medical History:   Diagnosis Date     Hearing problem      History of radiation therapy        Past Surgical History:    Past Surgical History:   Procedure Laterality Date     APPENDECTOMY  2001     COLECTOMY  Jan 2002    malignant polyp.  Colectomy and colostomy     COLONOSCOPY  Jan 2002    polyps     EYE SURGERY      B cataract     HC UGI ENDOSCOPY W PLACEMENT GASTROSTOMY TUBE PERCUT N/A 11/19/2015    Procedure: COMBINED ESOPHAGOSCOPY, GASTROSCOPY, DUODENOSCOPY (EGD), PLACE PERCUTANEOUS ENDOSCOPIC GASTROSTOMY TUBE;  Surgeon: Sergio Buenrostro MD;  Location: [facility]      LARYNGOSCOPY WITH BIOPSY(IES) N/A 4/3/2018    Procedure: LARYNGOSCOPY WITH BIOPSY(IES);  Direct Laryngoscopy, Biopsy Base Of Tongue;  Surgeon: Jj Hernandez MD;  Location: [facility] OR     TAKEDOWN COLOSTOMY         Family History:    Family History   Problem Relation Age of Onset     Other Cancer Mother 57        lung ca, with brain mets     Cancer Mother         Brain tumor, lung cancer     Cancer Father         Lung cancer     Cancer Brother        Social History:  Marital Status:  Single [1]  Social History     Tobacco Use     Smoking status: Former Smoker     Packs/day: 0.00     Years: 48.00     Pack years: 0.00     Types: Cigarettes     Last attempt to quit: 2001     Years since quittin.2     Smokeless tobacco: Never Used     Tobacco comment: started at age 17   Substance Use Topics     Alcohol use: No     Drug use: No        Medications:      Cholecalciferol (VITAMIN D) 1000 UNITS capsule   furosemide (LASIX) 20 MG tablet   levothyroxine (SYNTHROID/LEVOTHROID) 100 MCG tablet   lisinopril (PRINIVIL/ZESTRIL) 20 MG tablet   lovastatin (MEVACOR) 40 MG tablet   metoprolol succinate ER (TOPROL-XL) 25 MG 24 hr tablet   LORazepam (ATIVAN) 0.5 MG tablet   multivitamin  with lutein (OCUVITE WITH LTEIN) CAPS         Review of Systems   Constitutional: Negative for chills and fever.   HENT: Negative for sore throat.    Eyes: Negative for discharge and redness.   Respiratory: Positive for cough and shortness of breath. Negative for chest tightness.    Cardiovascular: Positive for chest pain. Negative for palpitations and leg swelling.   Gastrointestinal: Negative for abdominal pain, blood in stool, diarrhea, nausea and vomiting.   Genitourinary: Negative for dysuria, flank pain and frequency.   Musculoskeletal: Negative for back pain, myalgias and neck stiffness.   Skin: Negative for pallor.   Neurological: Negative for seizures, weakness, light-headedness, numbness and headaches.   Psychiatric/Behavioral:  "Negative for agitation and confusion. The patient is not nervous/anxious.        Physical Exam   BP: 107/77  Pulse: 97  Heart Rate: 100  Temp: 98.4  F (36.9  C)  Resp: 18  Height: 170.2 cm (5' 7\")  Weight: 72.6 kg (160 lb)  SpO2: (!) 88 %      Physical Exam   Constitutional: He is oriented to person, place, and time. He appears well-developed and well-nourished. No distress.   HENT:   Head: Normocephalic and atraumatic.   Mouth/Throat: Oropharynx is clear and moist.   Eyes: Pupils are equal, round, and reactive to light. No scleral icterus.   Neck: Normal range of motion. Neck supple.   Cardiovascular: Normal rate, regular rhythm, normal heart sounds and intact distal pulses.   No murmur heard.  Pulmonary/Chest: No stridor. No respiratory distress. He has no wheezes. He has rales (both lung bases.  Decreased breath sounds right lung base).   Abdominal: Soft. Bowel sounds are normal. There is no tenderness.   Musculoskeletal: He exhibits no edema or tenderness.   Neurological: He is alert and oriented to person, place, and time. He has normal strength. No sensory deficit. Coordination normal.   Skin: Skin is warm and dry. No rash noted. He is not diaphoretic. No erythema. No pallor.   Psychiatric: He has a normal mood and affect.   Nursing note and vitals reviewed.      ED Course        Procedures          EKG was read by me at 11:02 AM.  It shows a sinus rhythm with a rate of 99.  There is some ST segment depression and T wave abnormalities which look very similar to an EKG from 2 months ago.  I do not see any acute EKG abnormalities.             Results for orders placed or performed during the hospital encounter of 02/10/19 (from the past 24 hour(s))   CBC with platelets differential   Result Value Ref Range    WBC 9.0 4.0 - 11.0 10e9/L    RBC Count 3.75 (L) 4.4 - 5.9 10e12/L    Hemoglobin 12.8 (L) 13.3 - 17.7 g/dL    Hematocrit 39.9 (L) 40.0 - 53.0 %     (H) 78 - 100 fl    MCH 34.1 (H) 26.5 - 33.0 pg    " MCHC 32.1 31.5 - 36.5 g/dL    RDW 12.8 10.0 - 15.0 %    Platelet Count 185 150 - 450 10e9/L    Diff Method Automated Method     % Neutrophils 85.2 %    % Lymphocytes 3.5 %    % Monocytes 8.0 %    % Eosinophils 1.7 %    % Basophils 0.7 %    % Immature Granulocytes 0.9 %    Nucleated RBCs 0 0 /100    Absolute Neutrophil 7.7 1.6 - 8.3 10e9/L    Absolute Lymphocytes 0.3 (L) 0.8 - 5.3 10e9/L    Absolute Monocytes 0.7 0.0 - 1.3 10e9/L    Absolute Eosinophils 0.2 0.0 - 0.7 10e9/L    Absolute Basophils 0.1 0.0 - 0.2 10e9/L    Abs Immature Granulocytes 0.1 0 - 0.4 10e9/L    Absolute Nucleated RBC 0.0    Comprehensive metabolic panel   Result Value Ref Range    Sodium 136 133 - 144 mmol/L    Potassium 3.8 3.4 - 5.3 mmol/L    Chloride 101 94 - 109 mmol/L    Carbon Dioxide 26 20 - 32 mmol/L    Anion Gap 9 3 - 14 mmol/L    Glucose 205 (H) 70 - 99 mg/dL    Urea Nitrogen 44 (H) 7 - 30 mg/dL    Creatinine 1.33 (H) 0.66 - 1.25 mg/dL    GFR Estimate 50 (L) >60 mL/min/[1.73_m2]    GFR Estimate If Black 58 (L) >60 mL/min/[1.73_m2]    Calcium 9.4 8.5 - 10.1 mg/dL    Bilirubin Total 1.1 0.2 - 1.3 mg/dL    Albumin 3.3 (L) 3.4 - 5.0 g/dL    Protein Total 8.1 6.8 - 8.8 g/dL    Alkaline Phosphatase 121 40 - 150 U/L    ALT 41 0 - 70 U/L    AST 61 (H) 0 - 45 U/L   Troponin I   Result Value Ref Range    Troponin I ES 0.066 (H) 0.000 - 0.045 ug/L   NT pro BNP   Result Value Ref Range    N-Terminal Pro BNP Inpatient 82,948 (H) 0 - 1,800 pg/mL   UA with Microscopic   Result Value Ref Range    Color Urine Yellow     Appearance Urine Clear     Glucose Urine Negative NEG^Negative mg/dL    Bilirubin Urine Negative NEG^Negative    Ketones Urine Negative NEG^Negative mg/dL    Specific Gravity Urine 1.011 1.003 - 1.035    Blood Urine Negative NEG^Negative    pH Urine 5.0 5.0 - 7.0 pH    Protein Albumin Urine Negative NEG^Negative mg/dL    Urobilinogen mg/dL 0.0 0.0 - 2.0 mg/dL    Nitrite Urine Negative NEG^Negative    Leukocyte Esterase Urine Negative  NEG^Negative    Source Midstream Urine     WBC Urine 1 0 - 5 /HPF    RBC Urine <1 0 - 2 /HPF    Mucous Urine Present (A) NEG^Negative /LPF    Hyaline Casts 12 (H) 0 - 2 /LPF   XR Chest 2 Views    Narrative    CHEST TWO VIEWS  2/10/2019 11:47 AM     COMPARISON: Two-view chest x-ray 4/28/2017    HISTORY: Shortness of air      Impression    IMPRESSION: There is a new moderate-sized right pleural effusion.  Right base pneumonia cannot be excluded. There is a new tiny left  pleural effusion. There are no airspace opacities to suggest pneumonia  in the left lung. There is no pneumothorax. Heart size remains mildly  enlarged.    GUERRERO PRINGLE MD       Medications   furosemide (LASIX) injection 40 mg (not administered)       Assessments & Plan (with Medical Decision Making)   Patient is an 83-year-old male who presented with a syncopal episode.  He also reports some shortness of breath and chest pain over the past few days.  He has a nonproductive cough.  He was in sinus rhythm when placed on cardiac monitor.  His oxygen saturation was 88% on arrival, and he was placed on oxygen with improved saturation as well as shortness of breath.  His hemoglobin is 12.8 and his white count is not suggestive of infection.  Patient's creatinine was elevated at 1.33 which is at his baseline.  His troponin is slightly elevated.  Chest x-ray shows a moderate new right sided pleural effusion.  Patient was started on Lasix IV.  He will continue diuresis and will possibly undergo diagnostic and therapeutic thoracentesis.  He will remain on telemetry to rule out tachycardia or bradycardia dysrhythmia.  I discussed this case with Dr. Parra who accepted him for admission.  Transitional orders were written.      I have reviewed the nursing notes.    I have reviewed the findings, diagnosis, plan and need for follow up with the patient.       Medication List      There are no discharge medications for this visit.         Final diagnoses:   Acute  systolic heart failure (H)   Pleural effusion on right       2/10/2019   [facility] EMERGENCY DEPARTMENT     Trevon Brady MD  02/10/19 [id number]

## 2019-02-10 NOTE — ED NOTES
Pt brought to ED by EMS after syncopal episode. States he was generally not feeling well all day; unable to give more details. He went to do laundry. He typically carries his laundry basket, but because he wasn't feeling well he pushed it in the cart. When walking back to his room he suddenly woke up on the floor. Pt denies having any pain. Feels ok now.

## 2019-02-10 NOTE — ED NOTES
Resting comfortably. Pt refused lunch tray. No needs currently. Call light within reach. Pt encouraged to call if anything changes or other needs arise.

## 2019-02-11 PROBLEM — I50.9 CHF EXACERBATION (H): Status: ACTIVE | Noted: 2019-01-01

## 2019-02-11 NOTE — PROGRESS NOTES
Right sided thoracentesis performed by radiologist.    1100 ml of clear yellow fluid removed. Pressure held at site after catheter removed.  Band aid applied. No bleeding noted. Post procedure CXR performed and read by radiologist.

## 2019-02-11 NOTE — PROGRESS NOTES
Updated B. Tonya MARTIN re: this am troponin of 1.158. Pt asymptomatic, denies CP. Order placed for EKG, RT notified.

## 2019-02-11 NOTE — DISCHARGE INSTRUCTIONS
Discharge Instructions for Thoracentesis    Thoracentesis is a procedure that removes extra fluid (pleural effusion) from the pleural space. This space is between the outside surface of the lungs (pleura) and the chest wall. The procedure may be done to take a sample of the fluid for testing to help find the cause. Or it may be done to drain the extra fluid if you are having trouble breathing.  Home care    You may have some pain after the procedure. Your doctor can prescribe or recommend pain medicine for you to take at home, if needed. Take these exactly as directed. If you stopped taking other medicines before the procedure, ask your doctor when you can start them again.    Take it easy for 48 hours after the procedure. Don't do anything active until your doctor says it s OK.    Don't do strenuous activities, such as lifting, until your doctor says it s OK.    You will have a small bandage over the puncture site. You may remove the bandage in 24 hours.    Check the puncture site for the signs of infection listed below.  Follow-up  Make a follow-up appointment with your doctor as directed. During your follow-up visit, your doctor will check your healing. Be sure to let your doctor know how you are feeling.  When to call your doctor  Call your doctor right away if you have any of the following:    Coughing up blood    Chest pain. If chest pain suddenly gets worse, it may be an emergency.    Shortness of breath    Fever of 100.4 F (38 C) or higher, or as directed by your healthcare provider    Pain that doesn't get better after taking pain medicine    Signs of infection at the puncture site. These include increased pain, redness, swelling, or warmth.    Fluid draining from the puncture site   Date Last Reviewed: 10/1/2016    [id number] [employer]. [address] [place] [address], [place] [address]. All rights reserved. This information is not intended as a substitute for professional medical care. Always  follow your healthcare professional's instructions.

## 2019-02-11 NOTE — DISCHARGE SUMMARY
[place] Hospitalist Discharge Summary    Patrick Diop MRN# [id number]   Age: 83 year old YOB: 1935     Date of Admission:  2/10/2019  Date of Discharge::  2/11/2019  Admitting Physician:  Alejandro Parra MD  Discharge Physician:  Della Penn MD  Primary Physician: Joe Mandel  Transferring Facility: N/A     Home clinic: [facility]          Admission Diagnoses:   Acute systolic heart failure (H) [I50.21]  Pleural effusion on right [J90]          Discharge Diagnosis:   Principle diagnosis: Acute on chronic systolic congestive heart failure  Secondary diagnoses:  Patient Active Problem List   Diagnosis     Hyperlipidemia with target LDL less than 100     Hypertension goal BP (blood pressure) < 140/90     Colon cancer (H)     CKD (chronic kidney disease) stage 3, GFR 30-59 ml/min (H)     Recurrent tongue cancer (H)     Chemotherapy induced nausea and vomiting     Acquired hypothyroidism     Non-ischemic cardiomyopathy (H)     Acute on chronic systolic congestive heart failure (H)     Elevated troponin     Syncope, unspecified syncope type     Acute respiratory failure with hypoxia (H)     CHF (congestive heart failure) (H)     Acute on chronic systolic heart failure (H)          Brief History of Presenting Illness:   As per admit hx  Patrick Diop is a 83 year old male with the above past medical history now presents on 2/10/2019 following a syncopal event at home.     Mr. Diop has known, severe, nonischemic cardiomyopathy, ejection fraction 20% on most recent echocardiogram December 2018.  He is prone to exacerbation of congestive heart failure, and within the last 6 weeks, required a doubling of his home furosemide dose, from 10 mg p.o. twice daily to 20 mg p.o. twice daily, due to increasing body weight, increasing lower extremity edema, and increasing exertional dyspnea.  He seemed to respond well to this increased diuretic dose, and at his most recent  "cardiology clinic visit of a few weeks ago, his furosemide was again reduced to 10 mg p.o. twice daily.     The patient acknowledges exertional dyspnea on a day-to-day basis.  It does not occur every day, though he has some dyspnea most days.  He is also noted that he is increasingly tired and fatigued.  He felt a little worse upon awakening this morning than he usually does, particularly with regard to his dyspnea.  However, he carried on his activities of daily living as usual, including doing some laundry.  While he was carrying a laundry basket across the floor, he feels that he abruptly \"passed out\".  He came to on the floor, next to the laundry basket, uninjured.  He had no prodrome, no associated visual disturbance, and no associated dizziness.  After the syncopal event, he felt no different than he did prior to the event.  EMS was contacted, and the patient was brought to the [facility] emergency department for evaluation.     Workup thus far in the emergency department suggests that the patient is in severe congestive heart failure exacerbation, with a chest x-ray showing pulmonary vascular congestion and moderate bilateral pleural effusions, and a pro BNP value of approximately 23,000.  He was given furosemide 40 mg IV x1 in the emergency department.  He is now being admitted for further evaluation and treatment      Recent Results (from the past 24 hour(s))   CT Chest w Contrast    Narrative    CT CHEST W CONTRAST 2/11/2019 10:12 AM    HISTORY: Non-small cell lung cancer.    COMPARISON: 11/13/2018    TECHNIQUE:  Chest CT was performed following intravenous  administration of 80 mL Isovue 370.  Radiation dose for this scan was  reduced using automated exposure control, adjustment of the mA and/or  kV according to patient size, or iterative reconstruction technique.    FINDINGS: Large right and moderate left pleural effusions, similar to  11/13/2018.    In comparison with the previous chest CT there is " now patchy  bronchiolitis in the upper lobes with more lili consolidation in the  right middle lobe.    No pneumothorax.    Stable heart size. Mediastinal lymph nodes are similar to previous.    No acute abnormality in the visualized upper abdomen.      Impression    IMPRESSION:   1. Patchy bronchiolitis throughout the upper lobes with more discrete  consolidation in the right middle lobe. The appearance suggests an  acute infectious process.  2. Bilateral pleural effusions, right greater than left, not  significantly changed from 11/13/2018.    SUMAYA MARTINEZ MD            Hospital Course:   Acute on chronic systolic congestive heart failure (H) -the patient has known, very severe,   Non-ischemic cardiomyopathy (H).  Per his most recent echocardiogram December 2018, his ejection fraction was approximately 20%.  Subsequent cardiology clinic notes postulate the cardiomyopathy to be due to chemotherapy that he was receiving for tongue cancer, though the agent is not specified.  His admission physical examination, chest x-ray findings, showing bilateral pulmonary vascular congestion and moderate bilateral effusions, and a proBNP of 83,000, are all compatible with CHF. Was on 40mg lasix  At home. Pt was supposed to f/u cards in 1/19 and never did.  Was given 40mg iv lasix 2/10. SBP around 80-90s. Discussed with cards-would need further w/u of CMP with cath/ possible defibrillator. Would transfer pt to [facility].       Syncope  Concerning for arrythmia due to severe CMP. Was on tele overnight-no changes seen  Needs EP eval.       Acute respiratory failure with hypoxemia - due to CHF exacerbation.  SpO2 88% on room on ED arrival.  SpO2 maintained > 90% on 3 L/min cannula.  - Continue nasal O2, monitor respiratory status.    R pleural effusion  Moderate size seen on CXR/ CT. Likely due to CHF  -will send down for thoracentesis       Hypertension goal BP (blood pressure) < 140/90 -usual home medications include lisinopril,  metoprolol, and furosemide.  Blood pressure has been marginal all morning.  -will hold all meds and have cards eval       CKD (chronic kidney disease) stage 3, GFR 30-59 ml/min (H) -baseline GFR appears to be 42-53.  Admission GFR is 50.  Stable. At baseline       Recurrent tongue cancer (H) -diagnosed approximately 3 years ago, and treated with a combination of chemotherapy and radiotherapy.  Per the patient's report, treatment was initially successful.  However, there has been a recent recurrence.  The patient says that he is no longer therapy for radiation therapy, though is unsure whether he can receive more chemotherapy.  He is due to have a CT scan of the head and neck , 2/11/2019.  -We should be able to perform his CT scan during this hospital stay.       Acquired hypothyroidism -he appears to be clinically euthyroid.  TSH was normal most recently November 2018.  -Continue home levothyroxine.                 Procedures:   No procedures performed during this admission         Allergies:    No Known Allergies          Medications Prior to Admission:     Medications Prior to Admission   Medication Sig Dispense Refill Last Dose     Cholecalciferol (VITAMIN D) 1000 UNITS capsule Take 1 capsule by mouth daily   2/10/2019 at AM     furosemide (LASIX) 20 MG tablet Take 2 tablets (40 mg) by mouth daily  0 2/10/2019 at AM- 20 mg     levothyroxine (SYNTHROID/LEVOTHROID) 100 MCG tablet Take 1 tablet (100 mcg) by mouth daily Recheck Lab test in 2 months. 90 tablet 1 2/10/2019 at AM     lisinopril (PRINIVIL/ZESTRIL) 20 MG tablet TAKE ONE TABLET BY MOUTH DAILY 90 tablet 2 2/10/2019 at AM     lovastatin (MEVACOR) 40 MG tablet TAKE ONE TABLET BY MOUTH NIGHTLY AT BEDTIME 90 tablet 2 2/9/2019 at PM     metoprolol succinate ER (TOPROL-XL) 25 MG 24 hr tablet Take 1 tablet (25 mg) by mouth daily 90 tablet 3 2/10/2019 at AM     LORazepam (ATIVAN) 0.5 MG tablet Take 1 tablet (0.5 mg) by mouth every 4 hours as needed (Anxiety,  "Nausea/Vomiting or Sleep) 30 tablet 2 More than a month at Unknown time     multivitamin  with lutein (OCUVITE WITH LTEIN) CAPS Take 1 capsule by mouth daily   Taking             Discharge Medications:     Current Facility-Administered Medications   Medication     acetaminophen (TYLENOL) tablet 650 mg     glucose gel 15-30 g    Or     dextrose 50 % injection 25-50 mL    Or     glucagon injection 1 mg     enoxaparin (LOVENOX) injection 40 mg     levothyroxine (SYNTHROID/LEVOTHROID) tablet 100 mcg     lidocaine (LMX4) kit     lidocaine 1 % 1 mL     LORazepam (ATIVAN) tablet 0.5 mg     May take regular AM medications except those listed below.     melatonin tablet 1 mg     naloxone (NARCAN) injection 0.1-0.4 mg     ondansetron (ZOFRAN-ODT) ODT tab 4 mg    Or     ondansetron (ZOFRAN) injection 4 mg     polyethylene glycol (MIRALAX/GLYCOLAX) Packet 17 g     senna-docusate (SENOKOT-S/PERICOLACE) 8.6-50 MG per tablet 1 tablet    Or     senna-docusate (SENOKOT-S/PERICOLACE) 8.6-50 MG per tablet 2 tablet     simvastatin (ZOCOR) tablet 20 mg     sodium chloride (PF) 0.9% PF flush 3 mL     sodium chloride (PF) 0.9% PF flush 3 mL     sodium chloride (PF) 0.9% PF flush 3 mL             Consultations:   No consultations were requested during this admission            Discharge Exam:   Blood pressure 94/67, pulse 93, temperature 97.8  F (36.6  C), temperature source Oral, resp. rate 18, height 1.702 m (5' 7\"), weight 68.6 kg (151 lb 3.8 oz), SpO2 97 %.  GENERAL APPEARANCE: healthy, alert and sob on minimal activity  EYES: conjunctiva clear, eyes grossly normal  HENT: external ears and nose normal   NECK: supple, no masses or adenopathy  RESP: lungs decreased BS R base - no rales, rhonchi or wheezes  CV: regular rate and rhythm, normal S1 S2, no S3 or S4 and no murmur, click or rub   ABDOMEN: soft, nontender, no HSM or masses and bowel sounds normal  MS: no clubbing, cyanosis; no edema  SKIN: clear without significant rashes or " lesions  NEURO: Normal strength and tone, sensory exam grossly normal, mentation intact and speech normal    Unresulted Labs Ordered in the Past 30 Days of this Admission     No orders found for last 61 day(s).          Recent Results (from the past 24 hour(s))   CT Chest w Contrast    Narrative    CT CHEST W CONTRAST 2/11/2019 10:12 AM    HISTORY: Non-small cell lung cancer.    COMPARISON: 11/13/2018    TECHNIQUE:  Chest CT was performed following intravenous  administration of 80 mL Isovue 370.  Radiation dose for this scan was  reduced using automated exposure control, adjustment of the mA and/or  kV according to patient size, or iterative reconstruction technique.    FINDINGS: Large right and moderate left pleural effusions, similar to  11/13/2018.    In comparison with the previous chest CT there is now patchy  bronchiolitis in the upper lobes with more lili consolidation in the  right middle lobe.    No pneumothorax.    Stable heart size. Mediastinal lymph nodes are similar to previous.    No acute abnormality in the visualized upper abdomen.      Impression    IMPRESSION:   1. Patchy bronchiolitis throughout the upper lobes with more discrete  consolidation in the right middle lobe. The appearance suggests an  acute infectious process.  2. Bilateral pleural effusions, right greater than left, not  significantly changed from 11/13/2018.    SUMAYA MARTINEZ MD            Pending Tests at Discharge:   None         Discharge Instructions and Follow-Up:   Discharge diet: Regular   Discharge activity: Activity as tolerated   Discharge follow-up: As per [facility]            Discharge Disposition:   Transferred to [facility]      Attestation:  I have reviewed today's vital signs, notes, medications, labs and imaging.    Time Spent on this Encounter   IDella, personally saw the patient today and spent greater than 30 minutes discharging this patient.    Della Penn MD

## 2019-02-11 NOTE — PROGRESS NOTES
Pt triggered sepsis BPA, lactate ordered. Pt WBC wnl. BP low, HR tachy. A & O. Will continue to monitor. Hawa Bush RN

## 2019-02-11 NOTE — PROGRESS NOTES
"[place] [facility] ADMISSION NOTE    Patient admitted to room 2401 at approximately 1605 via cart from emergency room. Patient was accompanied by transport tech.     Verbal SBAR report received from Sheila prior to patient arrival.     Patient ambulated to bed with one assist. Patient alert and oriented X 3. Pain is controlled without any medications.  . Admission vital signs: Blood pressure 101/65, pulse 96, temperature 99  F (37.2  C), temperature source Oral, resp. rate 16, height 1.702 m (5' 7\"), weight 68.3 kg (150 lb 9.2 oz), SpO2 92 %. Patient was oriented to plan of care, call light, bed controls, tv, telephone, bathroom and visiting hours.     Risk Assessment    The following safety risks were identified during admission: fall. Yellow risk band applied: YES.     Skin Initial Assessment    This writer admitted this patient and completed a full skin assessment and Ken score in the Adult PCS flowsheet. Appropriate interventions initiated as needed.     Secondary skin check completed by Claudia STEPHEN RN.    Ken Risk Assessment  Sensory Perception: 4-->no impairment  Moisture: 4-->rarely moist  Activity: 3-->walks occasionally  Mobility: 4-->no limitation  Nutrition: 3-->adequate  Friction and Shear: 2-->potential problem  Ken Score: 20  Ken Intervention(s) Implemented: heels suspended, patient /family education on pressure injury prevention, moisture barrier ointment applied    Pam Iverson"

## 2019-02-11 NOTE — LETTER
Transition Communication Hand-off for Care Transitions to Next Level of Care Provider    Name: Patrick Diop  : 1935  MRN #: [id number]  Primary Care Provider: Joe Mandel     Primary Clinic: [address] [place] [id number]     Reason for Hospitalization:  Acute on Chroic Systolic Heart Failure   CHF exacerbation (H)  Admit Date/Time: 2019 10:27 PM  Discharge Date: 2019  Payor Source: Payor: [employer] / Plan: [employer] MEDICARE ADVANTAGE / Product Type: Medicare /     Readmission Assessment Measure (KATTY) Risk Score/category: Elevated          Reason for Communication Hand-off Referral: Admission diagnoses: CHF  Avoidable readmission within 30 days    Discharge Plan: Home w/ HHC RN/PT/OT/HHA       Concern for non-adherence with plan of care:   Y/N NO  Discharge Needs Assessment:  Needs      Most Recent Value   Equipment Currently Used at Home  grab bar, toilet, grab bar, tub/shower, shower chair, raised toilet          Already enrolled in Tele-monitoring program and name of program:  no  Follow-up specialty is recommended: Yes    Follow-up plan:    Future Appointments   Date Time Provider Department Center   2019  3:45 PM [facility] DEVICE RN VIANEY [facility]OFF[facility]   3/1/2019  2:00 PM Joe Mandel MD [facility]   3/4/2019  7:55 AM [facility]   3/4/2019  9:00 AM Lionel Calderón MD [facility]   3/25/2019 11:30 AM [facility]   3/25/2019 12:40 PM Amanda Mosquera PA-C [facility]   2019  9:00 AM [facility] CARDIAC REHAB EVALUATIONS MELANI [facility]OFFMAN          Referrals     Future Labs/Procedures    Follow-Up with [facility]     Cardiac Rehab Referral     Process Instructions:    Advance to Wellness and Exercise for Life (WEL) Program or to another maintenance exercise program upon completion of supervised exercise therapy.    Weight mgt program is only offered at [facility].    *This therapy referral will be filtered to a centralized scheduling office  at [facility] and the patient will receive a call to schedule an appointment at a [facility] location most convenient for them. *        Comments:    If you have not heard from the scheduling office within 2 business days, please call [phone] for all locations, with the exception of [place], please call [phone] and [facility] [place], please call [phone].    Please be aware that coverage of these services is subject to the terms and limitations of your health insurance plan.  Call member services at your health plan with any benefit or coverage questions.    Home care nursing referral     Comments:    RN skilled nursing visit. RN to assess vital signs and weight and respiratory and cardiac status.  Home health aide to assist with ADLs    Your provider has ordered home care nursing services.   Discharge to home with [facility]. The staff will call to schedule the first visit. Their phone number is [phone].    Home Care OT Referral for Hospital Discharge     Comments:    OT to eval and treat    Your provider has ordered home care - occupational therapy. If you have not been contacted within 2 days of your discharge please call the department phone number listed on the top of this document.    Home Care PT Referral for Hospital Discharge     Comments:    PT to eval and treat    Your provider has ordered home care - physical therapy. If you have not been contacted within 2 days of your discharge please call the department phone number listed on the top of this document.            Key Recommendations:  Close follow up with PCP and Cardiology    Armida Moreau    AVS/Discharge Summary is the source of truth; this is a helpful guide for improved communication of patient story

## 2019-02-12 NOTE — PROVIDER NOTIFICATION
MD Notification    Notified Person: MD    Notified Person Name:Rina Diaz    Notification Date/Time:02/12/2019 17:21    Notification Interaction: Low BP    Purpose of Notification: Low BP    Orders Received: IV NS bolus 500 ml times one    Comments:

## 2019-02-12 NOTE — PLAN OF CARE
Pt A&O, VSS on 2 L 02. Tele SR. Pt denies CP, and dizziness. Pt MILLER when amb to BR w/ SBA. Pt NPO for eval for possible ICD placement. R [facility] site CDI. Will continue to monitor.

## 2019-02-12 NOTE — PROGRESS NOTES
[facility]    Hospitalist Progress Note    Assessment & Plan   Patrick Diop is a 83 year old male admitted on 2/11/2019. He presents with CHF exacerbation     Acute on chronic systolic congestive heart failure (H)  Non-ischemic cardiomyopathy (H).   Assessment: Per his most recent echocardiogram December 2018, his ejection fraction was approximately 20%.  Subsequent cardiology clinic notes postulate the cardiomyopathy to be due to chemotherapy that he was receiving for tongue cancer. Initial workup at OSH showed bilateral pulmonary vascular congestion and moderate bilateral effusions, and a proBNP of 83,000. Was on 40mg lasix  At home. Pt was supposed to f/u cards in 1/19 but never did. Was given 40mg IV lasix 2/10 x 2 at [facility]. Now admitted to [facility] of [facility] for further HF evaluation/diuresis, ICD placement and Cardiology/EP evaluation.   Plan:  -appreciate cardiology input   -continue on tele, echocardiogram ordered.  -  Troponin trending down   - Continue BB, hold lisinopril  - hold further diuresis due to low blood pressure      Syncope  Assessment: certainly in setting of his cardiomyopathy is Cconcerning for arrythmia due to severe CMP.   -appreciate cardiology input   -icd pending echocardiogram results      Hypoxia  Multifactorial , Congestive heart failure  And possible pneumonia   - start on abx and nebs for possible communicty acquired pneumonia       R pleural effusion  Moderate size seen on CXR/ CT. Likely due to CHF. S/p thoracentesis 02/11 with 1.1L removed.        Hypertension goal BP (blood pressure) < 140/90   Assessment: PTA usual home medications include lisinopril, metoprolol, and furosemide.  Blood pressure marginal on admission  Plan:  - Continue BB   - Hold ACEi        CKD (chronic kidney disease) stage 3, GFR 30-59 ml/min (H)   Assessment: baseline GFR appears to be 42-53.  Admission GFR is 50.  Stable. At baseline  -basic metabolic panel in am, diuretic and ace  inhibitor  on hold        Recurrent tongue cancer (H)   Assessment: diagnosed approximately 3 years ago, and treated with a combination of chemotherapy and radiotherapy.  Per the patient's report, treatment was initially successful.  However, there has been a recent recurrence.  The patient says that he is no longer therapy for radiation therapy, though is unsure whether he can receive more chemotherapy.  Plan:  - follow as outpatient       Acquired hypothyroidism   Assessment:  TSH was normal most recently November 2018.  -Continue home levothyroxine.        DVT Prophylaxis: Pneumatic Compression Devices    Code Status: Full Code     Disposition: Expected discharge in 2 days     Rina Diaz MD  Text Page   (7am to 6pm)    Interval History   He still has some shortness of breath  , lower extremity edema improved, he had a fall Saturday, no nausea, complaints of   increased urination.  -Data reviewed today: I reviewed all new labs and imaging results over the last 24 hours.    Physical Exam   Temp: 98  F (36.7  C) Temp src: Oral BP: (!) 88/62   Heart Rate: 82 Resp: 16 SpO2: 92 % O2 Device: None (Room air) Oxygen Delivery: 2 LPM  Vitals:    02/11/19 2235 02/11/19 2357 02/12/19 0500   Weight: 67 kg (147 lb 11.2 oz) 67.1 kg (147 lb 14.4 oz) 66.5 kg (146 lb 9.6 oz)     Vital Signs with Ranges  Temp:  [97.5  F (36.4  C)-98.4  F (36.9  C)] 98  F (36.7  C)  Pulse:  [91-97] 91  Heart Rate:  [82-95] 82  Resp:  [16-20] 16  BP: (85-99)/(59-68) 88/62  SpO2:  [90 %-97 %] 92 %  I/O last 3 completed shifts:  In: -   Out: 50 [Urine:50]    Constitutional: Awake, alert, cooperative, no apparent distress  Respiratory: bilateral basilar crackles+  Cardiovascular: Regular rate and rhythm, normal S1 and S2, and no murmur noted  GI: Normal bowel sounds, soft, non-distended, non-tender  Skin/Integumen: No rashes, no cyanosis, trace lower extremity  Edema+  Neuro : moving all 4 extremities, no focal deficit noted     Medications     -  MEDICATION INSTRUCTIONS -         sodium chloride (PF)  3 mL Intracatheter Q8H       Data   Recent Labs   Lab 02/12/19  0547 02/11/19  1657 02/11/19  1541 02/11/19  1510 02/11/19  0535 02/10/19  1710 02/10/19  1100   WBC 8.4  --   --   --  8.4  --  9.0   HGB 12.0*  --   --   --  11.9*  --  12.8*   *  --   --   --  105*  --  106*    196  --  201 178 201 185   INR  --   --  1.59*  --   --   --   --      --   --   --  141  --  136   POTASSIUM 3.6  --   --   --  3.6  --  3.8   CHLORIDE 108  --   --   --  104  --  101   CO2 26  --   --   --  28  --  26   BUN 44*  --   --   --  43*  --  44*   CR 1.17  --   --   --  1.32* 1.35* 1.33*   ANIONGAP 9  --   --   --  9  --  9   AMY 9.0  --   --   --  8.8  --  9.4   *  --   --   --  110*  --  205*   ALBUMIN  --   --   --   --   --   --  3.3*   PROTTOTAL  --   --   --   --   --   --  8.1   BILITOTAL  --   --   --   --   --   --  1.1   ALKPHOS  --   --   --   --   --   --  121   ALT  --   --   --   --   --   --  41   AST  --   --   --   --   --   --  61*   TROPI 0.504*  --   --   --  1.158*  --  0.066*     Recent Labs   Lab 02/12/19  0547 02/11/19  0535 02/10/19  1100   * 110* 205*       Imaging:   Recent Results (from the past 24 hour(s))   US Thoracentesis    Narrative    US THORACENTESIS 2/11/2019 4:14 PM    HISTORY: Short of breath.    COMPARISON: Chest CT, 2/11/2019    PROCEDURE:  Written informed consent is obtained from the patient  prior to the procedure. The risks and benefits including bleeding,  infection and pneumothorax are discussed and the patient wishes to  continue. Initial ultrasound images demonstrate a right pleural fluid  collection. The skin overlying this collection is marked, prepped,  draped and anesthetized in usual sterile fashion utilizing 3 mL of  lidocaine 1%. Thoracentesis catheter is then placed into the pleural  fluid collection with return of 1100 mL of and yellowish clear fluid.  Patient tolerated the procedure  well. Followup chest x-ray is ordered.      Impression    IMPRESSION:  Ultrasound-guided right thoracentesis.     SUMAYA MARTINEZ MD   XR Chest 1 View    Narrative    XR CHEST 1 VW 2/11/2019 4:39 PM    COMPARISON: 2/10/2019    HISTORY: Thoracentesis      Impression    IMPRESSION: Trace residual pleural effusions on both sides following  right-sided thoracentesis. No pneumothorax seen on either side.  Enlarged cardiac silhouette again seen.    MARIELY BEST MD

## 2019-02-12 NOTE — CONSULTS
Consult Date:  02/12/2019      CARDIOLOGY CONSULTATION      PRIMARY CARE PHYSICIAN:  Joe Mandel MD      REASON FOR CONSULTATION:  I have been asked by Dr. Tyler Ortiz to see Patrick Diop for evaluation of possible nonischemic cardiomyopathy, syncope, and possible ICD implantation.      HISTORY OF PRESENT ILLNESS:  Patrick Diop is a pleasant 83-year-old male who is admitted in transfer from [facility] for a syncopal episode and known cardiomyopathy.  The patient has a history of tongue cancer with recurrence around 11/2018.  Original diagnosis in 2015 and treated with cisplatin.  It is not clear if he is on other chemotherapeutic agents at that time.  With recurrence of his tongue cancer, he was placed on immunotherapy with Keytruda.  The patient developed rather significant peripheral edema and was placed on Lasix 10 mg per day and referred to Dr. Lionel Calderón for cardiology evaluation.  Echocardiogram on 12/11/2018 demonstrated a severely dysfunctional left ventricle with ejection fraction of 20%.  Left ventricle was mildly dilated.  There was severe global hypokinesis of the left ventricle.  There was severe decreased right ventricular systolic function as well.  The left atrium was moderately dilated.  There is moderate mitral regurgitation.  RVSP was 41 mmHg plus right atrial pressure.  There is mild aortic root and ascending aortic dilatation.        The patient was then seen by Dr. Calderón on 12/17.  It was unclear as to why the patient suffered his cardiomyopathy, but it was presumed due to chemotherapy.  The plan was to treat medically with diuretics.  Lisinopril and metoprolol were also added.  The patient was a previous smoker, quitting approximately 17 years ago.  He does not drink alcohol.  The plan was to perform a cardiac MRI, which has not been performed to date.  An echocardiogram in 2015 showed normal function.  This was not felt to be an ischemic cardiomyopathy but a stress MRI would be  helpful to exclude that.  The patient was to have followup at the end of December, and this failed, per the patient, because Dr. Calderón was unavailable.        The patient states that 2 days ago he was feeling fairly well.  He was carrying laundry down a hallway and had a syncopal event.  His neighbor found him on the ground and got him into his apartment, and he was then transferred to the [facility].  Right-sided thoracentesis was performed for pleural effusion, with 1.1 liters of fluid removed.  The patient was then transferred to [facility] for possible cardio-defibrillator implantation.  No subsequent echocardiography has been performed.  The patient denies lightheadedness, otherwise.  No palpitations.  No other episode of syncope.      ALLERGIES:  NONE KNOWN ABOUT.      MEDICATIONS ON ADMISSION:   1.  Vitamin D 1000 unit capsule daily.   2.  Furosemide 20 mg 2 tablets by mouth daily.   3.  Levothyroxine 100 mcg by mouth daily.   4.  Lisinopril 20 mg by mouth daily.   5.  Lovastatin 40 mg by mouth nightly at bedtime.   6.  Toprol-XL 25 mg by mouth daily.   7.  Ocuvite with Lutein 1 capsule by mouth daily.      PAST MEDICAL HISTORY:   1.  Tongue cancer in 2015, treated with cisplatin and radiation therapy.  He has not received any more treatment since around 2016.  Recurrence around November with initiation of Keytruda immunotherapy.  This is being held at this time.   2.  Possible nonischemic cardiomyopathy, as noted above.   3.  Status post appendectomy 2001.   4.  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   5.  Previous cataract extraction.   6.  Previous takedown of the colostomy.   7.  No history of peptic ulcer disease, tuberculosis, kidney stones, but possible mild stage III kidney failure.  No history of hypertension or diabetes.   8.  History of hypothyroidism, on replacement.      SOCIAL HISTORY:  The patient is a retired insurance  agent.  A previous smoker, quitting in 2001 after 48 years.  He does not drink alcohol.      FAMILY HISTORY:  Father with lung cancer.  Mother with brain tumor and lung cancer.  Brother with cancer as well.  No premature atherosclerosis.      REVIEW OF SYSTEMS:  A 12-point review of system is performed with pertinent positives and negatives listed in the HPI.  All other review of systems are asked and are negative.  Of note, previous PET scan in 2016 indicated diffuse atherosclerosis and calcification.      PHYSICAL EXAMINATION:   VITAL SIGNS:  Current blood pressure is 88/62, pulse 82 and regular.  Respiratory rate is 16.  The patient is afebrile.  The patient is 92% oxygen saturated on 2 liters per nasal cannula.  Current weight 147 pounds (67 kg).   GENERAL:  The patient is an alert white male appearing his stated age, in no acute distress.   HEENT:  Normocephalic, atraumatic without xanthelasma.  No arcus senilis.  No oropharyngeal lesions.  Mucous membranes are moist.   NECK:  Supple without thyromegaly.  Carotid upstroke is brisk without bruits.   CHEST:  Demonstrates slightly diminished breath sounds at the right base with a few scattered rales.  The lungs are otherwise clear.  There are no wheezes or rhonchi.  No kyphosis or scoliosis.   CARDIAC:  Regular rate and rhythm, normal S1 and S2 without audible gallop or murmur.  No heave, rub, or thrill.  No JVD or HJR.  Pulses were all intact without bruits.  No tenderness to palpation across the precordium.   ABDOMEN:  Soft, nontender, without organomegaly.  Healed surgical scars.  Bowel sounds are present.  No bruits.   EXTREMITIES:  Without cyanosis or clubbing.  There is 1+ woody pitting edema to the pretibial area bilaterally.   SKIN:  Warm, dry, and intact.   NEUROLOGIC:  Alert and oriented x 3.  Cranial nerves II through XII grossly intact.   PSYCHIATRIC:  Normal mood and affect.    DICTATION ENDS HERE       [id number]  ELIZABETH RODARTE MD, [facility]              D: 2019   T: 2019   MT: STACEY      Name:     ADAM ROGER   MRN:      [id number]        Account:       [id number]   :      1935           Consult Date:  2019      Document: [id number]       cc: Joe Mandel MD

## 2019-02-12 NOTE — H&P
[facility]    History and Physical - Hospitalist Service       Date of Admission:  2/11/2019    Assessment & Plan      Patrick Diop is a 83 year old male admitted on 2/11/2019. He presents with CHF exacerbation    Acute on chronic systolic congestive heart failure (H)  Non-ischemic cardiomyopathy (H).   Assessment: Per his most recent echocardiogram December 2018, his ejection fraction was approximately 20%.  Subsequent cardiology clinic notes postulate the cardiomyopathy to be due to chemotherapy that he was receiving for tongue cancer. Initial workup at OSH showed bilateral pulmonary vascular congestion and moderate bilateral effusions, and a proBNP of 83,000. Was on 40mg lasix  At home. Pt was supposed to f/u cards in 1/19 but never did. Was given 40mg IV lasix 2/10 x 2 at [facility]. Now admitted to [facility] for further HF evaluation/diuresis, ICD placement and Cardiology/EP evaluation.   Plan:  - Admit to inpatient  - Cardiology eval   - NPO  - Telemetry  - Continue to trend troponin  - Continue BB, hold lisinopril  - hold further diuresis until Cards eval     Syncope  Assessment: certainly in setting of his cardiomyopathy is Cconcerning for arrythmia due to severe CMP.   Plan:  - Telemetry  - Cardiology will eval for ICD     Hypoxia  Assessment: due to CHF exacerbation. SpO2 maintained > 90% on 2 L/min cannula on admission.   - Continue nasal O2 as needed, monitor respiratory status.     R pleural effusion  Moderate size seen on CXR/ CT. Likely due to CHF. S/p thoracentesis 02/11 with 1.1L removed.        Hypertension goal BP (blood pressure) < 140/90   Assessment: PTA usual home medications include lisinopril, metoprolol, and furosemide.  Blood pressure marginal on admission  Plan:  - Continue BB   - Hold ACEi until cards eval       CKD (chronic kidney disease) stage 3, GFR 30-59 ml/min (H)   Assessment: baseline GFR appears to be 42-53.  Admission GFR is 50.  Stable. At  baseline  Plan:  - Follow Cr/electrolytes  - Avoid nephrotoxins       Recurrent tongue cancer (H)   Assessment: diagnosed approximately 3 years ago, and treated with a combination of chemotherapy and radiotherapy.  Per the patient's report, treatment was initially successful.  However, there has been a recent recurrence.  The patient says that he is no longer therapy for radiation therapy, though is unsure whether he can receive more chemotherapy.  Plan:  - follow as outpatient       Acquired hypothyroidism   Assessment:  TSH was normal most recently November 2018.  -Continue home levothyroxine.        Diet: NPO  DVT Prophylaxis: Pneumatic Compression Devices  Zamora Catheter: not present  Code Status: FULL CODE    Disposition Plan   Expected discharge: 2 - 3 days, recommended to cardiology plans finalized once O2 use less than 2 liters/minute.  Entered: Jorje Luong MD 02/11/2019, 11:32 PM     The patient's care was discussed with the Bedside Nurse and Patient.    Jorje Luong MD  [facility]    ______________________________________________________________________    Chief Complaint   SOB    History is obtained from the patient    History of Present Illness   Patrick Diop is a 83 year old male with PMH of nonischemic cardiomyopathy, hypertension, hyperlipidemia who presents for further evaluation of shortness of breath.    Patient transferred to [facility] from [facility].  Patient reports that over the past few weeks he has been gaining weight, worsening lower extremity edema and increasing exertional dyspnea.  He does not endorse any fevers.  He does have a cough at baseline.  He reports that day prior to admission he was carrying a laundry basket across the floor when he thinks he had a syncopal episode.  He did not have any prodromal symptoms, no associated visual changes, no associated dizziness.  There was no confusion and when he regained consciousness.  He  does not have a history of syncope.    At bedside, does not endorse any centralized or substernal chest pain he did have some lower right-sided tenderness of the chest wall that improved today after his thoracentesis.  He does not endorse any nausea/vomiting, abdominal pain.  He has no recent urinary complaints, no blood in his stool, no night sweats.  He feels much improved since his admission to [facility] and he has no other complaints at this time.    Review of Systems    The 10 point Review of Systems is negative other than noted in the HPI or here.     Past Medical History    I have reviewed this patient's medical history and updated it with pertinent information if needed.   Past Medical History:   Diagnosis Date     Hearing problem      History of radiation therapy      Non-ischemic cardiomyopathy (H) 2/10/2019    Postulated to be due to chemotherapy, though agent not specified.       Past Surgical History   I have reviewed this patient's surgical history and updated it with pertinent information if needed.  Past Surgical History:   Procedure Laterality Date     APPENDECTOMY  2001     COLECTOMY  Jan 2002    malignant polyp.  Colectomy and colostomy     COLONOSCOPY  Jan 2002    polyps     EYE SURGERY      B cataract     [facility] ENDOSCOPY W PLACEMENT GASTROSTOMY TUBE PERCUT N/A 11/19/2015    Procedure: COMBINED ESOPHAGOSCOPY, GASTROSCOPY, DUODENOSCOPY (EGD), PLACE PERCUTANEOUS ENDOSCOPIC GASTROSTOMY TUBE;  Surgeon: Sergio Buenrostro MD;  Location: [facility]     LARYNGOSCOPY WITH BIOPSY(IES) N/A 4/3/2018    Procedure: LARYNGOSCOPY WITH BIOPSY(IES);  Direct Laryngoscopy, Biopsy Base Of Tongue;  Surgeon: Jj Hernandez MD;  Location: [facility] OR     TAKEDOWN COLOSTOMY         Social History   I have reviewed this patient's social history and updated it with pertinent information if needed.  Social History     Tobacco Use     Smoking status: Former Smoker     Packs/day: 0.00     Years: 48.00     Pack  years: 0.00     Types: Cigarettes     Last attempt to quit: 2001     Years since quittin.2     Smokeless tobacco: Never Used     Tobacco comment: started at age 17   Substance Use Topics     Alcohol use: No     Drug use: No       Family History   I have reviewed this patient's family history and updated it with pertinent information if needed.   Family History   Problem Relation Age of Onset     Other Cancer Mother 57        lung ca, with brain mets     Cancer Mother         Brain tumor, lung cancer     Cancer Father         Lung cancer     Cancer Brother        Prior to Admission Medications   Prior to Admission Medications   Prescriptions Last Dose Informant Patient Reported? Taking?   Cholecalciferol (VITAMIN D) 1000 UNITS capsule  Self Yes No   Sig: Take 1 capsule by mouth daily   LORazepam (ATIVAN) 0.5 MG tablet  Self No No   Sig: Take 1 tablet (0.5 mg) by mouth every 4 hours as needed (Anxiety, Nausea/Vomiting or Sleep)   furosemide (LASIX) 20 MG tablet  Self Yes No   Sig: Take 2 tablets (40 mg) by mouth daily   levothyroxine (SYNTHROID/LEVOTHROID) 100 MCG tablet  Self No No   Sig: Take 1 tablet (100 mcg) by mouth daily Recheck Lab test in 2 months.   lisinopril (PRINIVIL/ZESTRIL) 20 MG tablet  Self No No   Sig: TAKE ONE TABLET BY MOUTH DAILY   lovastatin (MEVACOR) 40 MG tablet  Self No No   Sig: TAKE ONE TABLET BY MOUTH NIGHTLY AT BEDTIME   metoprolol succinate ER (TOPROL-XL) 25 MG 24 hr tablet  Self No No   Sig: Take 1 tablet (25 mg) by mouth daily   multivitamin  with lutein (OCUVITE WITH LTEIN) CAPS  Self Yes No   Sig: Take 1 capsule by mouth daily      Facility-Administered Medications: None     Allergies   No Known Allergies    Physical Exam   Vital Signs: Temp: 98.4  F (36.9  C) Temp src: Oral BP: 99/68   Heart Rate: 93 Resp: 18 SpO2: 93 % O2 Device: Nasal cannula Oxygen Delivery: 2 LPM  Weight: 147 lbs 11.2 oz    GENERAL: Pleasant man, in no distress.  EYES: Eyes grossly normal to inspection,  extraocular movements intact  HENT: Nares patent bilaterally.  Nasal mucosa normal, no discharge.    NECK: Trachea midline, no stridor. .  RESP: Symmetrically diminished breath sounds at both dependent bases. Expiration not prolonged, no wheeze.  CV: Regular rate and rhythm, mildly tachycardic.  Normal S1 S2, no murmur heard, no extra sound.  Bilateral lower extremity edema 1+.  ABDOMEN: Soft, non-tender, no guarding.  Liver and spleen not enlarged, no masses palpable.  Bowel sounds positive.  MS: No bony deformities noted.  No red or inflamed joints.  SKIN: Warm and dry, no rashes.  NEURO: Alert, oriented, conversant.  Cranial nerves III - XII grossly intact.  No gross motor or sensory deficits.    PSYCH: normal mood and affect    Data   Data reviewed today: I reviewed all medications, new labs and imaging results over the last 24 hours. I personally reviewed the chest CT image(s) showing see below.    Most Recent 3 CBC's:  Recent Labs   Lab Test 02/11/19  1657 02/11/19  1510 02/11/19  0535  02/10/19  1100 12/17/18  1637   WBC  --   --  8.4  --  9.0 6.6   HGB  --   --  11.9*  --  12.8* 13.4   MCV  --   --  105*  --  106* 103*    201 178   < > 185 148*    < > = values in this interval not displayed.     Most Recent 3 BMP's:  Recent Labs   Lab Test 02/11/19  0535 02/10/19  1710 02/10/19  1100 01/03/19  1036     --  136 140   POTASSIUM 3.6  --  3.8 3.8   CHLORIDE 104  --  101 102   CO2 28  --  26 28   BUN 43*  --  44* 29   CR 1.32* 1.35* 1.33* 1.34*   ANIONGAP 9  --  9 10   AMY 8.8  --  9.4 8.4*   *  --  205* 134*     Most Recent 2 LFT's:  Recent Labs   Lab Test 02/10/19  1100 12/17/18  1637   AST 61* 16   ALT 41 16   ALKPHOS 121 74   BILITOTAL 1.1 1.0     Most Recent 3 INR's:  Recent Labs   Lab Test 02/11/19  1541   INR 1.59*     Most Recent 3 Troponin's:  Recent Labs   Lab Test 02/11/19  0535 02/10/19  1100   TROPI 1.158* 0.066*     Most Recent 3 BNP's:  Recent Labs   Lab Test 02/10/19  1100  01/03/19  1036 11/21/18  1041   NTBNPI 82,948*  --   --    NTBNP  --  53,820* 52,583*     Recent Results (from the past 24 hour(s))   CT Chest w Contrast    Narrative    CT CHEST W CONTRAST 2/11/2019 10:12 AM    HISTORY: Non-small cell lung cancer.    COMPARISON: 11/13/2018    TECHNIQUE:  Chest CT was performed following intravenous  administration of 80 mL Isovue 370.  Radiation dose for this scan was  reduced using automated exposure control, adjustment of the mA and/or  kV according to patient size, or iterative reconstruction technique.    FINDINGS: Large right and moderate left pleural effusions, similar to  11/13/2018.    In comparison with the previous chest CT there is now patchy  bronchiolitis in the upper lobes with more lili consolidation in the  right middle lobe.    No pneumothorax.    Stable heart size. Mediastinal lymph nodes are similar to previous.    No acute abnormality in the visualized upper abdomen.      Impression    IMPRESSION:   1. Patchy bronchiolitis throughout the upper lobes with more discrete  consolidation in the right middle lobe. The appearance suggests an  acute infectious process.  2. Bilateral pleural effusions, right greater than left, not  significantly changed from 11/13/2018.    SUMAYA MARTINEZ MD   US Thoracentesis    Narrative    US THORACENTESIS 2/11/2019 4:14 PM    HISTORY: Short of breath.    COMPARISON: Chest CT, 2/11/2019    PROCEDURE:  Written informed consent is obtained from the patient  prior to the procedure. The risks and benefits including bleeding,  infection and pneumothorax are discussed and the patient wishes to  continue. Initial ultrasound images demonstrate a right pleural fluid  collection. The skin overlying this collection is marked, prepped,  draped and anesthetized in usual sterile fashion utilizing 3 mL of  lidocaine 1%. Thoracentesis catheter is then placed into the pleural  fluid collection with return of 1100 mL of and yellowish clear  fluid.  Patient tolerated the procedure well. Followup chest x-ray is ordered.      Impression    IMPRESSION:  Ultrasound-guided right thoracentesis.     SUMAYA MARTINEZ MD   XR Chest 1 View    Narrative    XR CHEST 1 VW 2/11/2019 4:39 PM    COMPARISON: 2/10/2019    HISTORY: Thoracentesis      Impression    IMPRESSION: Trace residual pleural effusions on both sides following  right-sided thoracentesis. No pneumothorax seen on either side.  Enlarged cardiac silhouette again seen.    MARIELY BEST MD

## 2019-02-12 NOTE — PHARMACY-ADMISSION MEDICATION HISTORY
Admission medication history interview status for the 2/11/2019  admission is complete. See EPIC admission navigator for prior to admission medications     Medication history source reliability:Good    Actions taken by pharmacist (provider contacted, etc):None     Additional medication history information not noted on PTA med list :None    Medication reconciliation/reorder completed by provider prior to medication history? No    Time spent in this activity: 10    Prior to Admission medications    Medication Sig Last Dose Taking? Auth Provider   Cholecalciferol (VITAMIN D) 1000 UNITS capsule Take 1 capsule by mouth daily Past Week at Unknown time Yes Reported, Patient   furosemide (LASIX) 20 MG tablet Take 2 tablets (40 mg) by mouth daily Past Week at Unknown time Yes Lionel Calderón MD   levothyroxine (SYNTHROID/LEVOTHROID) 100 MCG tablet Take 1 tablet (100 mcg) by mouth daily Recheck Lab test in 2 months. Past Week at Unknown time Yes Joe Mandel MD   lisinopril (PRINIVIL/ZESTRIL) 20 MG tablet TAKE ONE TABLET BY MOUTH DAILY Past Week at Unknown time Yes Joe Mandel MD   lovastatin (MEVACOR) 40 MG tablet TAKE ONE TABLET BY MOUTH NIGHTLY AT BEDTIME Past Week at Unknown time Yes Joe Mandel MD   metoprolol succinate ER (TOPROL-XL) 25 MG 24 hr tablet Take 1 tablet (25 mg) by mouth daily Past Week at Unknown time Yes Celina Mcleod APRN CNP   multivitamin  with lutein (OCUVITE WITH LTEIN) CAPS Take 1 capsule by mouth daily Past Week at Unknown time Yes Reported, Patient

## 2019-02-12 NOTE — CONSULTS
Full Cardiology consultation dictated.  Likely non-ischemic cardiomyopathy.  Transferred for syncope and possible implantation of an ICD.  He currently is clinically dehydrated with elevated BUN and hypotensive.  Will check an echocardiogram to assess LV function.  We may need to follow with a cardiac MRI.  Implantation of ICD will depend on LVEF and cause of cardiomyopathy.  He had a recent Holter monitor without significant arrhythmias.  Zoltan Cerna MD, FACC  February 12, 2019 2:03 PM

## 2019-02-13 NOTE — PROGRESS NOTES
Ze Newman MD  P Cv Mri Tech             Stress MRI. T2* prior to contrast    Contrast administered per weight based protocol.

## 2019-02-13 NOTE — PROGRESS NOTES
[facility] Cardiology Progress Note             Assessment and Plan:   Assessment:   1.  Cardiomyopathy.  Likely idiopathic vs ischemic.  Will perform stress cardiac MRI today.  LVEF has persisted at 20-25%.  2.  Syncope.  Will likely require ICD implantation.  I cannot be sure he was not dehydrated with hypotension as the cause of his syncope.  However, with the persistently low EF, ICD is warranted anyway.  3.  Recurrent tongue cancer.  Treatment per oncology.  4.  Stage III chronic kidney disease.  Holding diuretic for now.  5.  Will increase his beta blocker as needed and decrease his lisinopril for BP.       Plan:   1.  Stress cardiac MRI today.  2.  EP consultation for possible ICD implantation.  3.Consider increasing his beta blocker after the ICD is implanted.  4.  Will adjust diuretic as we go.     Attestation:  I have reviewed today's vital signs, notes, medications, labs and imaging.  Care coordination / counseling time: 20 minutes  Face-to-face time: 25 minutes  Total time: 35 minutes    Zoltan Cerna MD, [facility]  February 13, 2019 9:24 AM       Interval History:   Pain free and denies shortness of breath or lightheadedness.           Review of Systems:   A comprehensive review of systems was performed and found to be negative except as described in this note          Medications:       azithromycin  500 mg Intravenous Q24H     cefTRIAXone  1 g Intravenous Q24H     ipratropium - albuterol 0.5 mg/2.5 mg/3 mL  3 mL Nebulization 4x daily     levothyroxine  100 mcg Oral Daily     lisinopril  20 mg Oral Daily     metoprolol succinate ER  25 mg Oral Daily     multivitamin  with lutein  1 capsule Oral Daily     simvastatin  20 mg Oral QPM     sodium chloride (PF)  3 mL Intracatheter Q8H     acetaminophen, acetaminophen, albuterol, alum & mag hydroxide-simethicone, lidocaine 4%, lidocaine (buffered or not buffered), - MEDICATION INSTRUCTIONS -, nitroGLYcerin, ondansetron **OR** ondansetron,  "polyethylene glycol, prochlorperazine **OR** prochlorperazine **OR** prochlorperazine, senna-docusate **OR** senna-docusate, sodium chloride (PF)             Physical Exam:   Blood pressure 98/56, pulse 97, temperature 97.9  F (36.6  C), temperature source Oral, resp. rate 18, height 1.702 m (5' 7\"), weight 67.3 kg (148 lb 4.8 oz), SpO2 93 %.    Intake/Output Summary (Last 24 hours) at 2/13/2019 0916  Last data filed at 2/13/2019 0340  Gross per 24 hour   Intake 240 ml   Output 400 ml   Net -160 ml     Rhythm:  NSR     Constitutional:   awake, alert, cooperative, no apparent distress, and appears stated age     Lungs:   no increased work of breathing and decreased breath sounds bilateral bases.  Few scattered bilateral rales otherwise.     Cardiovascular:   regular rate and rhythm, normal S1 and S2, no S3, no S4, 2/6 systolic murmur at the apex and trace bilateral lower extremity woody edema.     Abdomen:   No scars, normal bowel sounds, soft, non-distended, non-tender, no masses palpated, no hepatosplenomegally     Neurologic:   Mental Status Exam:  Level of Alertness:   awake  Orientation:   person, place, time  Cranial Nerves:  cranial nerves II-XII are grossly intact     Neuropsychiatric:   General: normal, calm and normal eye contact            Data:   Recent Labs   Lab 02/12/19  0547 02/11/19  1657 02/11/19  1510 02/11/19  0535  02/10/19  1100   WBC 8.4  --   --  8.4  --  9.0   HGB 12.0*  --   --  11.9*  --  12.8*   HCT 36.6*  --   --  36.7*  --  39.9*   *  --   --  105*  --  106*    196 201 178   < > 185    < > = values in this interval not displayed.     Recent Labs   Lab 02/13/19  0535 02/12/19  0547 02/11/19  0535    143 141   POTASSIUM 3.7 3.6 3.6   CHLORIDE 109 108 104   CO2 27 26 28   ANIONGAP 7 9 9   * 106* 110*   BUN 40* 44* 43*   CR 1.11 1.17 1.32*   GFRESTIMATED 62 58* 50*   GFRESTBLACK 72 67 58*   AMY 9.1 9.0 8.8             EKG results:   I have reviewed this patient's " EKG with the following interpretation:        Rate: 90  Normal sinus rhythm  Premature atrial contractions      Other cardiac studies:   Interpretation Summary     The visual ejection fraction is estimated at 20-25%.  There is severe anterior, septal, and apical wall hypokinesis.  There is no thrombus seen in the left ventricle.  Mildly decreased right ventricular systolic function  There is mild (1+) mitral regurgitation.  Mild (35-45mmHg) pulmonary hypertension is present.    Radiology:  CT CHEST W CONTRAST 2/11/2019 10:12 AM     HISTORY: Non-small cell lung cancer.     COMPARISON: 11/13/2018     TECHNIQUE:  Chest CT was performed following intravenous  administration of 80 mL Isovue 370.  Radiation dose for this scan was  reduced using automated exposure control, adjustment of the mA and/or  kV according to patient size, or iterative reconstruction technique.     FINDINGS: Large right and moderate left pleural effusions, similar to  11/13/2018.     In comparison with the previous chest CT there is now patchy  bronchiolitis in the upper lobes with more lili consolidation in the  right middle lobe.     No pneumothorax.     Stable heart size. Mediastinal lymph nodes are similar to previous.     No acute abnormality in the visualized upper abdomen.                                                                      IMPRESSION:   1. Patchy bronchiolitis throughout the upper lobes with more discrete  consolidation in the right middle lobe. The appearance suggests an  acute infectious process.  2. Bilateral pleural effusions, right greater than left, not  significantly changed from 11/13/2018.     MD Zoltan RAMIREZ MD, FACC 2/13/2019  9:16 AM

## 2019-02-13 NOTE — PROGRESS NOTES
[facility]    Hospitalist Progress Note    Assessment & Plan   Patrick Diop is a 83 year old male admitted on 2/11/2019. He presents with CHF exacerbation     Acute on chronic systolic congestive heart failure (H)  Non-ischemic cardiomyopathy (H).   Assessment: Per his most recent echocardiogram December 2018, his ejection fraction was approximately 20%.  Subsequent cardiology clinic notes postulate the cardiomyopathy to be due to chemotherapy that he was receiving for tongue cancer. Initial workup at OSH showed bilateral pulmonary vascular congestion and moderate bilateral effusions, and a proBNP of 83,000. Was on 40mg lasix  At home. Pt was supposed to f/u cards in 1/19 but never did. Was given 40mg IV lasix 2/10 x 2 at [facility]. Now admitted to [facility] for further HF evaluation/diuresis, ICD placement and Cardiology/EP evaluation.   Plan:  -appreciate cardiology input   -continue on tele, echocardiogram noted EF 20-25% with anteroinferior wall hypokinesis   -  Troponin trending down   - Continue BB, hold lisinopril  - hold further diuresis due to low blood pressure   -plan for stress mri today and possible ICD placement later.     Syncope  Assessment: certainly in setting of his cardiomyopathy is Cconcerning for arrythmia due to severe CMP.   -appreciate cardiology input   -icd pending stress MRI results      Hypoxia  Communicty acquired pneumonia    Multifactorial , Congestive heart failure  And possible pneumonia   - start on abx and nebs for possible communicty acquired pneumonia   -wean oxygen as possible       R pleural effusion  Moderate size seen on CXR/ CT. Likely due to CHF. S/p thoracentesis 02/11 with 1.1L removed.        Hypertension goal BP (blood pressure) < 140/90   Assessment: PTA usual home medications include lisinopril, metoprolol, and furosemide.  Blood pressure marginal on admission  Plan:  - Continue BB on lower dose   - Hold ACEi        CKD (chronic kidney  disease) stage 3, GFR 30-59 ml/min (H)   Assessment: baseline GFR appears to be 42-53.  Admission GFR is 50.  Stable. At baseline  -basic metabolic panel in am, diuretic and ace inhibitor  on hold        Recurrent tongue cancer (H)   Assessment: diagnosed approximately 3 years ago, and treated with a combination of chemotherapy and radiotherapy.  Per the patient's report, treatment was initially successful.  However, there has been a recent recurrence.  The patient says that he is no longer therapy for radiation therapy, though is unsure whether he can receive more chemotherapy.  Plan:  - follow as outpatient       Acquired hypothyroidism   Assessment:  TSH was normal most recently November 2018.  -Continue home levothyroxine.        DVT Prophylaxis: Pneumatic Compression Devices    Code Status: Full Code     Disposition: Expected discharge in 2 days     Rina Diaz MD  Text Page   (7am to 6pm)    Interval History   Feeling better than yesterday ,still has shortness of breath , plan for stress mri later today, no chest pain or syncope since admission.  -Data reviewed today: I reviewed all new labs and imaging results over the last 24 hours.    Physical Exam   Temp: 97.9  F (36.6  C) Temp src: Oral BP: 100/62 Pulse: 92 Heart Rate: 91 Resp: 16 SpO2: 91 % O2 Device: None (Room air) Oxygen Delivery: 4 LPM  Vitals:    02/11/19 2357 02/12/19 0500 02/13/19 0340   Weight: 67.1 kg (147 lb 14.4 oz) 66.5 kg (146 lb 9.6 oz) 67.3 kg (148 lb 4.8 oz)     Vital Signs with Ranges  Temp:  [97.8  F (36.6  C)-98  F (36.7  C)] 97.9  F (36.6  C)  Pulse:  [91-97] 92  Heart Rate:  [] 91  Resp:  [16-20] 16  BP: ()/(54-74) 100/62  SpO2:  [90 %-95 %] 91 %  I/O last 3 completed shifts:  In: 240 [P.O.:240]  Out: 575 [Urine:575]    Constitutional: Awake, alert, cooperative, no apparent distress  Respiratory: bilateral basilar crackles+  Cardiovascular: Regular rate and rhythm, normal S1 and S2, and no murmur noted  GI: Normal  bowel sounds, soft, non-distended, non-tender  Skin/Integumen: No rashes, no cyanosis, trace lower extremity  Edema+  Neuro : moving all 4 extremities, no focal deficit noted     Medications     - MEDICATION INSTRUCTIONS -         azithromycin  500 mg Intravenous Q24H     cefTRIAXone  1 g Intravenous Q24H     ipratropium - albuterol 0.5 mg/2.5 mg/3 mL  3 mL Nebulization 4x daily     levothyroxine  100 mcg Oral Daily     lisinopril  20 mg Oral Daily     metoprolol succinate ER  25 mg Oral Daily     multivitamin  with lutein  1 capsule Oral Daily     simvastatin  20 mg Oral QPM     sodium chloride (PF)  10 mL Intravenous Once     sodium chloride (PF)  3 mL Intracatheter Q8H       Data   Recent Labs   Lab 02/13/19  0535 02/12/19  0547 02/11/19  1657 02/11/19  1541 02/11/19  1510 02/11/19  0535  02/10/19  1100   WBC  --  8.4  --   --   --  8.4  --  9.0   HGB  --  12.0*  --   --   --  11.9*  --  12.8*   MCV  --  102*  --   --   --  105*  --  106*   PLT  --  181 196  --  201 178   < > 185   INR  --   --   --  1.59*  --   --   --   --     143  --   --   --  141  --  136   POTASSIUM 3.7 3.6  --   --   --  3.6  --  3.8   CHLORIDE 109 108  --   --   --  104  --  101   CO2 27 26  --   --   --  28  --  26   BUN 40* 44*  --   --   --  43*  --  44*   CR 1.11 1.17  --   --   --  1.32*   < > 1.33*   ANIONGAP 7 9  --   --   --  9  --  9   AMY 9.1 9.0  --   --   --  8.8  --  9.4   * 106*  --   --   --  110*  --  205*   ALBUMIN  --   --   --   --   --   --   --  3.3*   PROTTOTAL  --   --   --   --   --   --   --  8.1   BILITOTAL  --   --   --   --   --   --   --  1.1   ALKPHOS  --   --   --   --   --   --   --  121   ALT  --   --   --   --   --   --   --  41   AST  --   --   --   --   --   --   --  61*   TROPI  --  0.504*  --   --   --  1.158*  --  0.066*    < > = values in this interval not displayed.     Recent Labs   Lab 02/13/19  0535 02/12/19  0547 02/11/19  0535 02/10/19  1100   * 106* 110* 205*        Imaging:   No results found for this or any previous visit (from the past 24 hour(s)).

## 2019-02-13 NOTE — PLAN OF CARE
Pt A&O, VSS on 2L 02, BP's mildly soft 90's sys, BP greater on L arm than R arm. Tele SR. Pt denies CP, dizziness, and SOB. Right thoracentesis site CDI. Pt states mild right sided pain from previous syncopal event. Plan for possible ICD placement depending on echo results, possible cardiac MRI. Will continue to monitor.

## 2019-02-13 NOTE — PLAN OF CARE
A & O times four. Vitals stable except BP was low, see flow sheet. He had IV  ml per order. Then, SBP on the right arm still <90, but the left arm was >92. MD updated. No new order. Tele NSR. Denied any CP/dizziness. Lung diminished throughout. Thick Productive cough. Encourage Deep breathing and coughing. On IV ABX. Voids per urinal small amount frequently.SBA with activity. Continue to monitor.

## 2019-02-13 NOTE — PROGRESS NOTES
Pt remains on 2 lpm NC, SpO2 93%, BBS diminished, neb tx were given as ordered, RT will continue to follow the pt.     Tucker Trivedi, RT.

## 2019-02-13 NOTE — CONSULTS
Consult Date:  02/12/2019      CARDIOLOGY CONSULTATION      PRIMARY CARE PHYSICIAN:  Joe Mandel MD      REASON FOR CONSULTATION:  I have been asked by Dr. Tyler Ortiz to see Patrick Diop for evaluation of possible nonischemic cardiomyopathy, syncope, and possible ICD implantation.      HISTORY OF PRESENT ILLNESS:  Patrick Diop is a pleasant 83-year-old male who is admitted in transfer from [facility] for a syncopal episode and known cardiomyopathy.  The patient has a history of tongue cancer with recurrence around 11/2018.  Original diagnosis in 2015 and treated with cisplatin.  It is not clear if he is on other chemotherapeutic agents at that time.  With recurrence of his tongue cancer, he was placed on immunotherapy with Keytruda.  The patient developed rather significant peripheral edema and was placed on Lasix 10 mg per day and referred to Dr. Lionel Calderón for cardiology evaluation.  Echocardiogram on 12/11/2018 demonstrated a severely dysfunctional left ventricle with ejection fraction of 20%.  Left ventricle was mildly dilated.  There was severe global hypokinesis of the left ventricle.  There was severe decreased right ventricular systolic function as well.  The left atrium was moderately dilated.  There is moderate mitral regurgitation.  RVSP was 41 mmHg plus right atrial pressure.  There is mild aortic root and ascending aortic dilatation.        The patient was then seen by Dr. Calderón on 12/17.  It was unclear as to why the patient suffered his cardiomyopathy, but it was presumed due to chemotherapy.  The plan was to treat medically with diuretics.  Lisinopril and metoprolol were also added.  The patient was a previous smoker, quitting approximately 17 years ago.  He does not drink alcohol.  The plan was to perform a cardiac MRI, which has not been performed to date.  An echocardiogram in 2015 showed normal function.  This was not felt to be an ischemic cardiomyopathy but a stress MRI would be  helpful to exclude that.  The patient was to have followup at the end of December, and this failed, per the patient, because Dr. Calderón was unavailable.        The patient states that 2 days ago he was feeling fairly well.  He was carrying laundry down a hallway and had a syncopal event.  His neighbor found him on the ground and got him into his apartment, and he was then transferred to the [facility].  Right-sided thoracentesis was performed for pleural effusion, with 1.1 liters of fluid removed.  The patient was then transferred to [facility] for possible cardio-defibrillator implantation.  No subsequent echocardiography has been performed.  The patient denies lightheadedness, otherwise.  No palpitations.  No other episode of syncope.      ALLERGIES:  NONE KNOWN ABOUT.      MEDICATIONS ON ADMISSION:   1.  Vitamin D 1000 unit capsule daily.   2.  Furosemide 20 mg 2 tablets by mouth daily.   3.  Levothyroxine 100 mcg by mouth daily.   4.  Lisinopril 20 mg by mouth daily.   5.  Lovastatin 40 mg by mouth nightly at bedtime.   6.  Toprol-XL 25 mg by mouth daily.   7.  Ocuvite with Lutein 1 capsule by mouth daily.      PAST MEDICAL HISTORY:   1.  Tongue cancer in 2015, treated with cisplatin and radiation therapy.  He has not received any more treatment since around 2016.  Recurrence around November with initiation of Keytruda immunotherapy.  This is being held at this time.   2.  Possible nonischemic cardiomyopathy, as noted above.   3.  Status post appendectomy 2001.   4.  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   5.  Previous cataract extraction.   6.  Previous takedown of the colostomy.   7.  No history of peptic ulcer disease, tuberculosis, kidney stones, but possible mild stage III kidney failure.  No history of hypertension or diabetes.   8.  History of hypothyroidism, on replacement.      SOCIAL HISTORY:  The patient is a retired insurance  agent.  A previous smoker, quitting in 2001 after 48 years.  He does not drink alcohol.      FAMILY HISTORY:  Father with lung cancer.  Mother with brain tumor and lung cancer.  Brother with cancer as well.  No premature atherosclerosis.      REVIEW OF SYSTEMS:  A 12-point review of system is performed with pertinent positives and negatives listed in the HPI.  All other review of systems are asked and are negative.  Of note, previous PET scan in 2016 indicated diffuse atherosclerosis and calcification.      PHYSICAL EXAMINATION:   VITAL SIGNS:  Current blood pressure is 88/62, pulse 82 and regular.  Respiratory rate is 16.  The patient is afebrile.  The patient is 92% oxygen saturated on 2 liters per nasal cannula.  Current weight 147 pounds (67 kg).   GENERAL:  The patient is an alert white male appearing his stated age, in no acute distress.   HEENT:  Normocephalic, atraumatic without xanthelasma.  No arcus senilis.  No oropharyngeal lesions.  Mucous membranes are moist.   NECK:  Supple without thyromegaly.  Carotid upstroke is brisk without bruits.   CHEST:  Demonstrates slightly diminished breath sounds at the right base with a few scattered rales.  The lungs are otherwise clear.  There are no wheezes or rhonchi.  No kyphosis or scoliosis.   CARDIAC:  Regular rate and rhythm, normal S1 and S2 without audible gallop or murmur.  No heave, rub, or thrill.  No JVD or HJR.  Pulses were all intact without bruits.  No tenderness to palpation across the precordium.   ABDOMEN:  Soft, nontender, without organomegaly.  Healed surgical scars.  Bowel sounds are present.  No bruits.   EXTREMITIES:  Without cyanosis or clubbing.  There is 1+ woody pitting edema to the pretibial area bilaterally.   SKIN:  Warm, dry, and intact.   NEUROLOGIC:  Alert and oriented x 3.  Cranial nerves II through XII grossly intact.   PSYCHIATRIC:  Normal mood and affect.      Echocardiogram:  Ejection fraction remains at 20%-25% with severe  anteroseptal and apical wall hypokinesis.  There was apparently no thrombus and mildly decreased right ventricular systolic function.  There was 1+ mitral regurgitation and mild pulmonary hypertension with RVSP of 39 mmHg plus right atrial pressure.  Mild to moderate pulmonic valvular insufficiency as well.      EKG from 02/10/2019:  Poor R-wave across the anterior precordium.  There are subtle Q-waves in the inferior leads.  QRS duration is 112 milliseconds.  T-wave inversion in the lateral leads.      A 48-hour Holter monitor from 02/12 demonstrates no significant tachyarrhythmias with 68 ventricular ectopics and 105 supraventricular ectopics.  Most of these isolated.      LABORATORY DATA:  BUN 44, creatinine 1.17.  Troponin I ES 0.5.  Glucose 106.  White count 84,000, hemoglobin 12, platelet count 181,000.  Lactate on 02/11/2019 of 1.5.      ASSESSMENT:   1.  Patrick Diop is a delightful 83-year-old male with a likely nonischemic cardiomyopathy.  The patient was transferred to [facility] after a syncopal episode and for possible implantation of an ICD.  His repeat echocardiogram does not show improvement of left ventricular function since December.  He appears clinically dehydrated with elevated BUN and hypotension on presentation.  There is a strong likelihood that his syncopal episode, however, was due to ventricular tachyarrhythmias given his low ejection fraction.  We will ask the electrophysiologist to see this patient for possible ICD implantation.  We will first perform a stress cardiac MRI to assess possible ischemic cardiomyopathy vs idiopathic.  We will continue to support this patient with beta blocker and ACE inhibitor as possible given his mild hypotension and possible dehydration.  The patient does not show significant heart failure at this time and appears otherwise reasonably stable with resolved peripheral edema.   2.  Right pleural effusion, status post thoracentesis on 02/11  with 1.1 liter removed.  This may be secondary to high pulmonary pressures and heart failure.  We will monitor this.   3.  History of hypertension, currently borderline hypotensive.  We will resume his lisinopril, metoprolol and furosemide as able.   4.  Stage III chronic kidney disease.  This is currently stable.  We will decide diuretic use based on renal failure and clinical course.   5.  Recurrent tongue cancer previously treated with cisplatin and radiation.  More recently this was treated with immunotherapy with Keytruda.  I think this is on hold at this time and can be resumed by Oncology when they deem necessary.   6.  Acquired hypothyroidism.  TSH was normal in November and the patient will continue with levothyroxine supplementation.      PLAN:   1.  We will ask the electrophysiologist to see the patient for implantation of an ICD.  His cardiomyopathy has persisted longer than 6 weeks and is probably immutable.  2.  Resumption of medications as mentioned above.   3.  Monitoring of his renal function.  We will let this patient rehydrate slightly at this time.   4.  We will resume his beta blocker when the ICD is placed.     5.  Stress cardiac MRI today before ICD placement.  This was discussed with the patient at length.  All his questions were answered to his satisfaction.      Continuation #[id number] added D&T 19           ELIZABETH RODARTE MD, [facility]             D: 2019   T: 2019   MT: STACEY      Name:     ADAM ROGER   MRN:      [id number]        Account:       [id number]   :      1935           Consult Date:  2019      Document: [id number]       cc: Joe Mandel MD

## 2019-02-14 NOTE — PLAN OF CARE
Heart Failure Care Pathway  GOALS TO BE MET BEFORE DISCHARGE:    1. Decrease congestion and/or edema with diuretic therapy to achieve near      optimal volume status.            Dyspnea improved:  No, please explain: Requiring 2L O2, productive cough             Edema improved:     Yes        Net I/O and Weights since admission:          01/15 0700 - 02/14 0659  In: 900 [P.O.:900]  Out: 1400 [Urine:1400]  Net: -500            Vitals:    02/11/19 2235 02/11/19 2357 02/12/19 0500 02/13/19 0340   Weight: 67 kg (147 lb 11.2 oz) 67.1 kg (147 lb 14.4 oz) 66.5 kg (146 lb 9.6 oz) 67.3 kg (148 lb 4.8 oz)    02/14/19 0251   Weight: 68.9 kg (151 lb 12.8 oz)       2.  O2 sats > 92% on RA or at prior home O2 therapy level.          Current oxygenation status:       SpO2: 92 %         O2 Device: Nasal cannula,  Oxygen Delivery: 2 LPM         Able to wean O2 this shift to keep sats > 92%:  No, please explain: Uses 2L home O2        Does patient use Home O2? Yes-  2L     3.  Tolerates ambulation and mobility near baseline: Yes        How many times did the patient ambulate with nursing staff this shift? 1    Please review the Heart Failure Care Pathway for additional HF goal outcomes.    Ninoska Roberts RN  2/14/2019      Patient is alert and oriented, stand by assist in room. Denies chest pain and shortness of breath. Plan for EP consult for ICD placement-NPO since midnight, needs consent signed. Vital signs stable-soft blood pressures. LS diminished in bases, tele: SR

## 2019-02-14 NOTE — PLAN OF CARE
Alert and oriented x 4. VS stable, attempted to wean oxygen today but patient was 87% on RA at rest. Currently on 2L NC and maintaining sats >90%.  Tele SR with rates in the 80's. Right radial site has a slight unchanged hematoma. He was up in the chair with SBA for all his meals. Plan for Surgery to see him and possible complex angio tomorrow.

## 2019-02-14 NOTE — PRE-PROCEDURE
I have examined the patient, reviewed the history, medications and pre procedural tests. He suffered syncope in the setting of DCM/abnormal cardiac MRI and is being considered for ICD implantation.  I have explained to the patient the risks of death, MI, stroke, hematoma, possible urgent bypass surgery for failed PCI, use of stents, thienopyridine agents, possible peripheral vascular complications, arrhythmia, the use of FFR in clinical decision-making and alternative of medical therapy alone in regards to left heart catheterization, left ventriculography, coronary angiography, and possible percutaneous coronary intervention. The patient voiced understanding and wishes to proceed. The patient has a good right radial pulse, normal ulnar pulse and a normal Dion's sign.

## 2019-02-14 NOTE — CONSULTS
Electrophysiology/ Cardiology- Consult Note         H&P and Plan:     Reason for consult: Syncope and ischemic cardiomyopathy     History of present illness: 83-year-old gentleman with a history of colon cancer and tongue cancer (2015, treated with cisplatin and radiation therapy; recurrence in 11/2018 on Keytruda immunotherapy), who was recently found to have cardiomyopathy (EF around 20%) and was admitted with syncope.    Patient had a recurrence of tongue cancer in November 2018.  In December, he developed heart failure symptoms and was evaluated by my colleague .  Cardiomyopathy was thought to be related to previous chemoradiation therapy, and he was started on standard medical therapy for cardiomyopathy/CHF, including ACE inhibitors, beta blockers and diuretics.    Patient was admitted February 12 of episode of syncope.  Apparently, at the day of the episode, he was feeling well and was in his usual state of health.  He denies any symptoms prior to the episode.  He denies lightheadedness, nausea or diaphoresis.  He believes he was well-hydrated at that day.     He was admitted at [facility], where he was treated for heart failure and underwent a right-sided thoracentesis with removal of 1.1 liters of fluid.  He was transferred to [facility] for consideration for ICD implantation.      During this hospitalization, he underwent a stress cardiac MRI, which revealed a severely dilated left ventricle and severe LV dysfunction (EF around 15%).  There was an extensive area of scar involving the apical segments of the left ventricle as well as the mid anteroseptal and inferoseptal walls and the mid to basal inferior wall.  Cardiac MRI was consistent with prior extensive infarction in the LAD and the RCA territories with moderate ernestina-infarct ischemia in the LAD distribution.  Based on the transmural extent of late enhancement, there is low likelihood of cardiac function recovery.  "    EKG showed normal sinus rhythm with old inferior scar and poor R wave progression in precordial leads, suggestive of previous anterior MI.    Plan:  1.  Syncope in the setting of ischemic cardiomyopathy.  Cardiac MRI suggestive of previous LAD and RCA territory infarcts.  Based on cardiac MRI, there is minimal chances of LV function recovery.  However, there is a possible ischemia in the LAD territory.    Syncopal episodes was suggestive of arrhythmia associated with previous myocardial scar.  He most likely will need ICD ICD implantation for presumptive secondary prevention.  However, he has plans of undergo cath today.  We will plan to have ICD done or life vest placed prior to discharge.    I explained the procedure in details.  Patient understand there is a 1-2% risk in case associated procedure.      David Malik MD    Physical Exam:  Vitals: BP 93/64 (BP Location: Left arm)   Pulse 92   Temp 97.4  F (36.3  C) (Oral)   Resp 20   Ht 1.702 m (5' 7\")   Wt 68.9 kg (151 lb 12.8 oz)   SpO2 95%   BMI 23.78 kg/m        Intake/Output Summary (Last 24 hours) at 2/14/2019 0831  Last data filed at 2/14/2019 0547  Gross per 24 hour   Intake 360 ml   Output 775 ml   Net -415 ml     Vitals:    02/11/19 2235 02/11/19 2357 02/12/19 0500 02/13/19 0340   Weight: 67 kg (147 lb 11.2 oz) 67.1 kg (147 lb 14.4 oz) 66.5 kg (146 lb 9.6 oz) 67.3 kg (148 lb 4.8 oz)    02/14/19 0251   Weight: 68.9 kg (151 lb 12.8 oz)       Constitutional:  AAO x3.  Pt is in NAD.  HEAD: Normocephalic.  SKIN: Skin normal color, texture and turgor with no lesions or eruptions.  Eyes: PERRL, EOMI.  ENT:  Supple, normal JVP. No lymphadenopathy or thyroid enlargement.  Chest:  CTAB.  Cardiac:  RRR, normal  S1 and S2.  No murmurs rubs or gallop.    Abdomen:  Normal BS.  Soft, non-tender and non-distended.  No rebound or guarding.    Extremities:  Pedious pulses palpable B/L.  No LE edema noticed.   Neurological: Strength and sensation grossly " symmetric and intact throughout.         Review of Systems:  Complete review of system is otherwise negative with the exception of what was described above.     CURRENT MEDICATIONS:    azithromycin  500 mg Intravenous Q24H     cefTRIAXone  1 g Intravenous Q24H     ipratropium - albuterol 0.5 mg/2.5 mg/3 mL  3 mL Nebulization 4x daily     levothyroxine  100 mcg Oral Daily     lisinopril  20 mg Oral Daily     metoprolol succinate ER  25 mg Oral Daily     multivitamin  with lutein  1 capsule Oral Daily     simvastatin  20 mg Oral QPM     sodium chloride (PF)  10 mL Intravenous Once     sodium chloride (PF)  3 mL Intracatheter Q8H     PRN Meds: sodium chloride 0.9%, acetaminophen, acetaminophen, albuterol, albuterol, alum & mag hydroxide-simethicone, diazepam, diazepam, diphenhydrAMINE, diphenhydrAMINE, HOLD MEDICATION, lidocaine 4%, lidocaine (buffered or not buffered), - MEDICATION INSTRUCTIONS -, methylPREDNISolone, nitroGLYcerin, ondansetron, ondansetron **OR** ondansetron, polyethylene glycol, prochlorperazine **OR** prochlorperazine **OR** prochlorperazine, senna-docusate **OR** senna-docusate, sodium chloride (PF), sodium chloride (PF), sodium chloride (PF), sodium chloride (PF), sodium chloride bacteriostatic    ALLERGIES   No Known Allergies    PAST MEDICAL HISTORY:  Past Medical History:   Diagnosis Date     Hearing problem      History of radiation therapy      Non-ischemic cardiomyopathy (H) 2/10/2019    Postulated to be due to chemotherapy, though agent not specified.       PAST SURGICAL HISTORY:  Past Surgical History:   Procedure Laterality Date     APPENDECTOMY  2001     COLECTOMY  Jan 2002    malignant polyp.  Colectomy and colostomy     COLONOSCOPY  Jan 2002    polyps     EYE SURGERY      B cataract     [facility] UGI ENDOSCOPY W PLACEMENT GASTROSTOMY TUBE PERCUT N/A 11/19/2015    Procedure: COMBINED ESOPHAGOSCOPY, GASTROSCOPY, DUODENOSCOPY (EGD), PLACE PERCUTANEOUS ENDOSCOPIC GASTROSTOMY TUBE;  Surgeon:  Sergio Buenrostro MD;  Location: [facility]     LARYNGOSCOPY WITH BIOPSY(IES) N/A 4/3/2018    Procedure: LARYNGOSCOPY WITH BIOPSY(IES);  Direct Laryngoscopy, Biopsy Base Of Tongue;  Surgeon: Jj Hernandez MD;  Location: [facility] OR     TAKEDOWN COLOSTOMY         FAMILY HISTORY:  Family History   Problem Relation Age of Onset     Other Cancer Mother 57        lung ca, with brain mets     Cancer Mother         Brain tumor, lung cancer     Cancer Father         Lung cancer     Cancer Brother        SOCIAL HISTORY:  Social History     Socioeconomic History     Marital status: Single     Spouse name: Not on file     Number of children: Not on file     Years of education: Not on file     Highest education level: Not on file   Social Needs     Financial resource strain: Not on file     Food insecurity - worry: Not on file     Food insecurity - inability: Not on file     Transportation needs - medical: Not on file     Transportation needs - non-medical: Not on file   Occupational History     Not on file   Tobacco Use     Smoking status: Former Smoker     Packs/day: 0.00     Years: 48.00     Pack years: 0.00     Types: Cigarettes     Last attempt to quit: 2001     Years since quittin.2     Smokeless tobacco: Never Used     Tobacco comment: started at age 17   Substance and Sexual Activity     Alcohol use: No     Drug use: No     Sexual activity: No     Partners: Female   Other Topics Concern     Parent/sibling w/ CABG, MI or angioplasty before 65F 55M? No   Social History Narrative     Not on file         Recent Lab Results:  Recent Labs   Lab 19  0542 19  0535 19  0547 19  1657 19  1541  19  0535  02/10/19  1100   WBC 8.6  --  8.4  --   --   --  8.4  --  9.0   HGB 11.4*  --  12.0*  --   --   --  11.9*  --  12.8*   *  --  102*  --   --   --  105*  --  106*     --  181 196  --    < > 178   < > 185   INR  --   --   --   --  1.59*  --   --   --   --     143 143   --   --   --  141  --  136   POTASSIUM 4.1 3.7 3.6  --   --   --  3.6  --  3.8   CHLORIDE 106 109 108  --   --   --  104  --  101   CO2 26 27 26  --   --   --  28  --  26   BUN 40* 40* 44*  --   --   --  43*  --  44*   CR 1.08 1.11 1.17  --   --   --  1.32*   < > 1.33*   ANIONGAP 8 7 9  --   --   --  9  --  9   AMY 9.0 9.1 9.0  --   --   --  8.8  --  9.4   * 100* 106*  --   --   --  110*  --  205*   ALBUMIN  --   --   --   --   --   --   --   --  3.3*   PROTTOTAL  --   --   --   --   --   --   --   --  8.1   BILITOTAL  --   --   --   --   --   --   --   --  1.1   ALKPHOS  --   --   --   --   --   --   --   --  121   ALT  --   --   --   --   --   --   --   --  41   AST  --   --   --   --   --   --   --   --  61*   TROPI  --   --  0.504*  --   --   --  1.158*  --  0.066*    < > = values in this interval not displayed.

## 2019-02-14 NOTE — PROGRESS NOTES
[facility]  Cardiology Progress Note  Date of Service: 02/14/2019  Primary Cardiologist: Dr. Calderón    Assessment & Plan    Patrick Diop is a 83 year old male with past medical history significant for nonischemic cardiomyopathy, type 2 diabetes, chronic kidney disease stage III, hypertension, hyperlipidemia, previous tobacco use, and tongue cancer with recurrence around 11/2018 was admitted on 2/11/2019 as a transfer from [facility] for a syncopal episode and known cardiomyopathy.    Assessment and Plan:   1. Ischemic vs nonischemic cardiomyopathy    Echocardiogram 02/12 with an LVEF 20-25%. Severe anterior, septal and apical wall hypokinesis. Mild MR and mild pulmonary hypertension    Cardiac stress MRI 02/13 showed a severely dilated left ventricle with a reduced ejection fraction of 15%. Perfusion defect consistent with ischemia in the LAD distribution.  Stress perfusion and late enhancement finding are consistent with prior LAD and RCA infarctions with moderate ernestina-infarct ischemia in the LAD.    Troponin peak 1.158, down trending    NTproBNP 53,820 on 01/03/2019    I/O negative 500mL    Weight up 4 pounds since admission (151 # today, 147# on admission)    Toprol XL 25 mg daily and Lisinopril 20 mg daily     2. Syncope, likely in the setting of ischemic cardiomyopathy      Cardiac MRI suggests previous LAD and RCA infarcts and possible LAD ischemia.    Syncopal episodes my be suggestive of an arrhythmia related to previous myocardial scar and ischemia    3. Right pleural effusion    Thoracentesis 02/11/19 with 1.1 L removed    4. History of hypertension    Controlled on Lisinopril 20 mg daily and Toprol XL 25 mg daily    5. Stage III chronic kidney disease    Currently stable: Creatinine 1.08, GFR 64, BUN 40    6. Recurrent tongue cancer previously treated with cisplatin and radiation      PLAN:  1. Coronary angiogram today to evaluate LAD ischemia noted on stress MRI.  2. ICD  implantation prior to discharge or life vest at discharge per Dr. Malik   3. Continue current medications.       NATALY Neal CNP  Pager:  [phone]   Text Page  (7am - 5pm, M-F)      Interval History   Sitting in the chair reading the news paper. We discussed the findings on the stress MRI and the need for a coronary angiogram today. All risks and benefits for this procedure have been explained to this patient and accepted.  This includes but is not limited to death, heart attack, stroke, blood clots, bleeding including the need for blood transfusion and the risk thereof, allergic reaction to x-ray dye, arrhythmia necessitating cardioversion, dye nephropathy.  We talked about intracoronary stenting and the risks thereof and bypass surgery.      No history of bleeding problems or current bleeding, and no scheduled surgeries or procedures in the next year. Patient understands and wishes to proceed with it.        Physical Exam   Temp: 97.6  F (36.4  C) Temp src: Oral BP: 115/64 Pulse: 92 Heart Rate: 91 Resp: 15 SpO2: 94 % O2 Device: Nasal cannula Oxygen Delivery: 2 LPM  Vitals:    02/12/19 0500 02/13/19 0340 02/14/19 0251   Weight: 66.5 kg (146 lb 9.6 oz) 67.3 kg (148 lb 4.8 oz) 68.9 kg (151 lb 12.8 oz)       Constitutional:   NAD   Skin:   Warm and dry   Head:   Nontraumatic   Neck:   no JVD and supple, symmetrical, trachea midline   Lungs:   symmetric, clear to auscultation   Cardiovascular:   regular rate and rhythm, normal S1 and S2 and no murmur noted.    Abdomen:   Benign   Extremities and Back:   Trace ankle Edema    Neurological:   Grossly nonfocal       Medications     - MEDICATION INSTRUCTIONS -         azithromycin  500 mg Intravenous Q24H     cefTRIAXone  1 g Intravenous Q24H     ipratropium - albuterol 0.5 mg/2.5 mg/3 mL  3 mL Nebulization 4x daily     levothyroxine  100 mcg Oral Daily     lisinopril  20 mg Oral Daily     metoprolol succinate ER  25 mg Oral Daily     multivitamin  with lutein   1 capsule Oral Daily     simvastatin  20 mg Oral QPM     sodium chloride (PF)  10 mL Intravenous Once     sodium chloride (PF)  3 mL Intracatheter Q8H       Data     Most Recent 3 CBC's:  Recent Labs   Lab Test 02/14/19  0542 02/12/19  0547 02/11/19  1657  02/11/19  0535   WBC 8.6 8.4  --   --  8.4   HGB 11.4* 12.0*  --   --  11.9*   * 102*  --   --  105*    181 196   < > 178    < > = values in this interval not displayed.     Most Recent 3 BMP's:  Recent Labs   Lab Test 02/14/19  0542 02/13/19  0535 02/12/19  0547    143 143   POTASSIUM 4.1 3.7 3.6   CHLORIDE 106 109 108   CO2 26 27 26   BUN 40* 40* 44*   CR 1.08 1.11 1.17   ANIONGAP 8 7 9   AMY 9.0 9.1 9.0   * 100* 106*

## 2019-02-15 NOTE — PLAN OF CARE
Pt AO4.  SBA.  Low sat fat diet.  Tele NSR.  Denying pain.  IVF.  VSS on 2L O2.  R radial hematoma unchanged. Will continue to monitor.

## 2019-02-15 NOTE — PROGRESS NOTES
[facility]    Hospitalist Progress Note    Assessment & Plan   Patrick Diop is a 83 year old male admitted on 2/11/2019. He presents with CHF exacerbation     Acute on chronic systolic congestive heart failure (H)  Non-ischemic cardiomyopathy (H).   Assessment: Per his most recent echocardiogram December 2018, his ejection fraction was approximately 20%.  Subsequent cardiology clinic notes postulate the cardiomyopathy to be due to chemotherapy that he was receiving for tongue cancer. Initial workup at OSH showed bilateral pulmonary vascular congestion and moderate bilateral effusions, and a proBNP of 83,000. Was on 40mg lasix  At home. Pt was supposed to f/u cards in 1/19 but never did. Was given 40mg IV lasix 2/10 x 2 at [facility]. Now admitted to [facility] for further HF evaluation/diuresis, ICD placement and Cardiology/EP evaluation.   -continue on tele, echocardiogram noted EF 20-25% with anteroinferior wall hypokinesis   - cardiac mri results noted , had  angiogram 2/14and patient  Is not a candidate for cabg   - Continue BB,  Lisinopril restarted   - hold further diuresis due to low blood pressure   -plan for icd today   -appreciate EP and cardiology input    -Coronary angiography demonstrates high grade stenosis of the LAD with occlusion of the Ramus int. and RCA, patient  Prefers medical management   Syncope  Assessment: certainly in setting of his cardiomyopathy is concerning for arrythmia due to severe CMP.   -appreciate cardiology input   -icd planned for 2/15  -cardiac mri and angiogram  noted.     Hypoxia  Communicty acquired pneumonia    Multifactorial , Congestive heart failure  And possible pneumonia   - start on abx and nebs for possible communicty acquired pneumonia   -wean oxygen as possible       R pleural effusion  Moderate size seen on CXR/ CT. Likely due to CHF. S/p thoracentesis 02/11 with 1.1L removed.        Hypertension goal BP (blood pressure)  < 140/90   Assessment: PTA usual home medications include lisinopril, metoprolol, and furosemide.  Blood pressure marginal on admission  Plan:  - Continue BB on lower dose   - ACEi restarted        CKD (chronic kidney disease) stage 3, GFR 30-59 ml/min (H)   - baseline GFR appears to be 42-53.  Admission GFR is 50.  - creatinine at baseline        Recurrent tongue cancer (H)    diagnosed approximately 3 years ago, and treated with a combination of chemotherapy and radiotherapy.  Per the patient's report, treatment was initially successful.  However, there has been a recent recurrence.  The patient says that he is no longer therapy for radiation therapy, though is unsure whether he can receive more chemotherapy.  - follow as outpatient       Acquired hypothyroidism   -  TSH was normal most recently November 2018.  -Continue home levothyroxine.        DVT Prophylaxis: Pneumatic Compression Devices    Code Status: Full Code     Disposition: Expected discharge in 2 days after angiogram and icd placement     Rina Diaz MD  Text Page   (7am to 6pm)    Interval History   His shortness of breath  Improved, plan for icd placement later today, no syncope or chest pain since admission.  -Data reviewed today: I reviewed all new labs and imaging results over the last 24 hours.    Physical Exam   Temp: 97.5  F (36.4  C) Temp src: Oral BP: (P) 112/73   Heart Rate: (P) 92 Resp: (P) 22 SpO2: 92 % O2 Device: Nasal cannula Oxygen Delivery: 4 LPM  Vitals:    02/13/19 0340 02/14/19 0251 02/15/19 0637   Weight: 67.3 kg (148 lb 4.8 oz) 68.9 kg (151 lb 12.8 oz) 69.8 kg (153 lb 12.8 oz)     Vital Signs with Ranges  Temp:  [97.4  F (36.3  C)-97.9  F (36.6  C)] 97.5  F (36.4  C)  Heart Rate:  [75-92] (P) 92  Resp:  [14-22] (P) 22  BP: ()/(62-74) (P) 112/73  SpO2:  [85 %-96 %] 92 %  I/O last 3 completed shifts:  In: 1170 [P.O.:150; I.V.:1020]  Out: 900 [Urine:900]    Constitutional: Awake, alert, cooperative, no apparent  distress  Respiratory: bilateral basilar crackles+  Cardiovascular: Regular rate and rhythm, normal S1 and S2, and no murmur noted  GI: Normal bowel sounds, soft, non-distended, non-tender  Skin/Integumen: No rashes, no cyanosis, trace lower extremity  Edema+  Neuro : moving all 4 extremities, no focal deficit noted     Medications     - MEDICATION INSTRUCTIONS -       sodium chloride Stopped (02/15/19 0718)       aspirin  81 mg Oral Daily     azithromycin  500 mg Intravenous Q24H     cefTRIAXone  1 g Intravenous Q24H     furosemide  20 mg Oral Daily     ipratropium - albuterol 0.5 mg/2.5 mg/3 mL  3 mL Nebulization 4x daily     levothyroxine  100 mcg Oral Daily     lisinopril  20 mg Oral Daily     metoprolol succinate ER  25 mg Oral Daily     multivitamin  with lutein  1 capsule Oral Daily     simvastatin  20 mg Oral QPM     sodium chloride (PF)  3 mL Intracatheter Q8H     sodium chloride (PF)  3 mL Intracatheter Q8H       Data   Recent Labs   Lab 02/15/19  0518 02/14/19  0542 02/13/19  0535 02/12/19  0547 02/11/19  1657 02/11/19  1541  02/11/19  0535  02/10/19  1100   WBC  --  8.6  --  8.4  --   --   --  8.4  --  9.0   HGB  --  11.4*  --  12.0*  --   --   --  11.9*  --  12.8*   MCV  --  103*  --  102*  --   --   --  105*  --  106*   PLT  --  195  --  181 196  --    < > 178   < > 185   INR  --   --   --   --   --  1.59*  --   --   --   --     140 143 143  --   --   --  141  --  136   POTASSIUM 4.2 4.1 3.7 3.6  --   --   --  3.6  --  3.8   CHLORIDE 108 106 109 108  --   --   --  104  --  101   CO2 26 26 27 26  --   --   --  28  --  26   BUN 36* 40* 40* 44*  --   --   --  43*  --  44*   CR 1.09 1.08 1.11 1.17  --   --   --  1.32*   < > 1.33*   ANIONGAP 7 8 7 9  --   --   --  9  --  9   AMY 8.9 9.0 9.1 9.0  --   --   --  8.8  --  9.4   GLC 97 113* 100* 106*  --   --   --  110*  --  205*   ALBUMIN  --   --   --   --   --   --   --   --   --  3.3*   PROTTOTAL  --   --   --   --   --   --   --   --   --  8.1    BILITOTAL  --   --   --   --   --   --   --   --   --  1.1   ALKPHOS  --   --   --   --   --   --   --   --   --  121   ALT  --   --   --   --   --   --   --   --   --  41   AST  --   --   --   --   --   --   --   --   --  61*   TROPI  --   --   --  0.504*  --   --   --  1.158*  --  0.066*    < > = values in this interval not displayed.     Recent Labs   Lab 02/15/19  0518 02/14/19  0542 02/13/19  0535 02/12/19  0547 02/11/19  0535   GLC 97 113* 100* 106* 110*       Imaging:   No results found for this or any previous visit (from the past 24 hour(s)).

## 2019-02-15 NOTE — PROGRESS NOTES
[facility]  Cardiology Progress Note  Date of Service: 02/15/2019  Primary Cardiologist: Dr. Calderón    Assessment & Plan    Patrick Diop is a 83 year old male with past medical history significant for nonischemic cardiomyopathy, type 2 diabetes, chronic kidney disease stage III, hypertension, hyperlipidemia, previous tobacco use, and tongue cancer with recurrence around 11/2018 was admitted on 2/11/2019 as a transfer from [facility] for a syncopal episode and known cardiomyopathy.    Assessment and Plan:   1. Ischemic cardiomyopathy    Echocardiogram 02/12 with an LVEF 20-25%. Severe anterior, septal and apical wall hypokinesis. Mild MR and mild pulmonary hypertension    Cardiac stress MRI 02/13 showed a severely dilated left ventricle with a reduced ejection fraction of 15%. Perfusion defect consistent with ischemia in the LAD distribution.  Stress perfusion and late enhancement finding are consistent with prior LAD and RCA infarctions with moderate ernestina-infarct ischemia in the LAD.    Coronary angiogram 02/14 demonstrated three vessel CAD with  of the RCA and LCx and subtotal occlusio of the mid LAD.     Cardiothoracic surgery consult     Troponin peak 1.158, down trending    NTproBNP 53,820 on 01/03/2019    I/O negative 230 mL    Weight up 6 pounds since admission (153 # today, 147# on admission)    Toprol XL 25 mg daily and Lisinopril 20 mg daily     2. Syncope, likely in the setting of ischemic cardiomyopathy      Cardiac MRI suggests previous LAD and RCA infarcts and possible LAD ischemia.    Syncopal episodes my be suggestive of an arrhythmia related to previous myocardial scar and ischemia    3. Right pleural effusion    Thoracentesis 02/11/19 with 1.1 L removed    4. History of hypertension    Controlled on Lisinopril 20 mg daily and Toprol XL 25 mg daily    5. Stage III chronic kidney disease    Currently stable: Creatinine 1.09, GFR 63, BUN 36    6. Recurrent tongue cancer  previously treated with cisplatin and radiation      PLAN:  1. ICD placement today   2. Continue current medications.     CORE  Follow-up with Dr. Calderón 03/04 at 9:00 in [place].     NATALY Neal CNP  Pager:  [phone]   Text Page  (7am - 5pm, M-F)      Interval History   Sitting in the chair reading the news paper. We discussed the findings of the coronary angiogram in detail. Cardiothoracic surgery was consulted however, he does not wish to proceed with that option. We discussed optimizing medical management vs returning to the cath lab for an attempt to intervene on the mid LAD and  of the RCA. He would like to move forward with optimizing his medical management. He was also see by   Dr. Malik and discussed moving forward with an ICD.    Physical Exam   Temp: 97.9  F (36.6  C) Temp src: Oral BP: 91/62   Heart Rate: 80 Resp: 18 SpO2: 96 % O2 Device: Nasal cannula Oxygen Delivery: 1 LPM  Vitals:    02/13/19 0340 02/14/19 0251 02/15/19 0637   Weight: 67.3 kg (148 lb 4.8 oz) 68.9 kg (151 lb 12.8 oz) 69.8 kg (153 lb 12.8 oz)       Constitutional:   NAD   Skin:   Warm and dry   Head:   Nontraumatic   Neck:   no JVD and supple, symmetrical, trachea midline   Lungs:   symmetric, clear to auscultation   Cardiovascular:   regular rate and rhythm, normal S1 and S2 and no murmur noted.    Abdomen:   Benign   Extremities and Back:   Trace ankle Edema. Right radial hematoma and ecchymotic. No bruit noted.      Neurological:   Grossly nonfocal       Medications     - MEDICATION INSTRUCTIONS -       sodium chloride Stopped (02/15/19 0718)       aspirin  81 mg Oral Daily     azithromycin  500 mg Intravenous Q24H     cefTRIAXone  1 g Intravenous Q24H     furosemide  20 mg Oral Daily     ipratropium - albuterol 0.5 mg/2.5 mg/3 mL  3 mL Nebulization 4x daily     levothyroxine  100 mcg Oral Daily     lisinopril  20 mg Oral Daily     metoprolol succinate ER  25 mg Oral Daily     multivitamin  with lutein  1 capsule Oral  Daily     simvastatin  20 mg Oral QPM     sodium chloride (PF)  3 mL Intracatheter Q8H       Data     Most Recent 3 CBC's:  Recent Labs   Lab Test 02/14/19  0542 02/12/19  0547 02/11/19  1657  02/11/19  0535   WBC 8.6 8.4  --   --  8.4   HGB 11.4* 12.0*  --   --  11.9*   * 102*  --   --  105*    181 196   < > 178    < > = values in this interval not displayed.     Most Recent 3 BMP's:  Recent Labs   Lab Test 02/15/19  0518 02/14/19  0542 02/13/19  0535    140 143   POTASSIUM 4.2 4.1 3.7   CHLORIDE 108 106 109   CO2 26 26 27   BUN 36* 40* 40*   CR 1.09 1.08 1.11   ANIONGAP 7 8 7   AMY 8.9 9.0 9.1   GLC 97 113* 100*

## 2019-02-15 NOTE — PROGRESS NOTES
CV surgery consult seen, discussed with Dee Dee Bell. Patient declining CV surgery. Progressed with medical management and AICD.

## 2019-02-15 NOTE — PROGRESS NOTES
[facility]    EP Progress Note    Date of Service (when I saw the patient): 02/15/2019     Assessment & Plan   Patrick Diop is a 83 year old male with h/o cardiomyopathy (20%), colon cancer and tongue cancer (2015, treated with cisplatin and RTX with recurrence 11/2018 on Keytruda immunotherapy) who presented to [facility] 2/10 for syncope and increasing dyspnea. Treated with diuresis and R thoracentesis and transferred to [facility] 2/11 for consideration of ICD implantation. Dr. Malik consulted 2/14/2019.  Has subsequently been found to have severe multivessel CAD with consideration of CABG vs multivessel stenting with hemodynamic support.    1. Syncope in setting of severe CM -  * He was admitted at [facility] and treated for HF. Thoracentesis removed 1.1 L fluid and transferred to [facility] for consideration of ICD  * Further work up included stress cMRI 2/13/2019, showing severely dilated LV and severe LV dysfunction (EF ~15%).   * Angiogram done 2/14 showed severe multivessel disease for which CV Surgery was consulted vs consideration of percutaneous intervention with hemodynamic support. Based on cMRI, minimal change of LV function recovery but note possible ischemia in LAD territory    * Pt has subsequently declined CV Surgery or intervention and would prefer medical mgmt.     PLAN:   * Will plan implantation of dual chamber ICD. Dr. Malik discussed with patient and consent has been signed.   * No further questions    2. CAD -   * Per General Cardiology (Dr. Cerna's team)  * Troponin peak 0.158 on 2/12/2019  * LDL 47 mg/dL 3/2018     PLAN:   * Continue ASA, BB, ACE and statin therapy per CV Surgery    3. Cardiomyopathy and HF exacerbation -  * Dr. Calderón had thought CM was due to h/o chemo use; now known to have severe multivessel disease  * Had been requiring increase in outpatient diuretic therapy. On 1/3, was on Lasix 40 mg daily as an outpatient    * EF 15% on cMRI.  * NTproBNP 2/10 was  "82,948 and treated at [facility] with diuresis and R thoracentesis 2/11 with 1.1 L removed   * PTA on lisinopril 20 and metoprolol XL 25 mg daily    * Got IV doses of Lasix, now on Lasix 20 mg daily starting today. BMP wnl     PLAN:   * Per General Cardiology.  Remains on lisinopril 20 & Metoprolol XL 25 mg daily   * Sees Dr. Calderón in [facility] ([place]) 3/4/2019      Sarina Bell PA-C    Interval History    Feels \"a lot\" better than when he came in  No c/o SOB, orthopnea or PND    Physical Exam   Temp: 97.5  F (36.4  C) Temp src: Oral BP: 106/66   Heart Rate: 91 Resp: 18 SpO2: 91 % O2 Device: Nasal cannula Oxygen Delivery: 1 LPM  Vitals:    02/13/19 0340 02/14/19 0251 02/15/19 0637   Weight: 67.3 kg (148 lb 4.8 oz) 68.9 kg (151 lb 12.8 oz) 69.8 kg (153 lb 12.8 oz)     Vital Signs with Ranges  Temp:  [97.4  F (36.3  C)-97.9  F (36.6  C)] 97.5  F (36.4  C)  Heart Rate:  [75-91] 91  Resp:  [14-18] 18  BP: ()/(62-74) 106/66  SpO2:  [90 %-96 %] 91 %  I/O last 3 completed shifts:  In: 1170 [P.O.:150; I.V.:1020]  Out: 900 [Urine:900]    Telemetry:     Constitutional: awake, alert, cooperative, and appears stated age. Up in chair.   Respiratory: Appears tachypneic. Decreased BS bilateral bases.  Cardiovascular: Regular  Musculoskeletal: Trace LE edema    Medications     NaCl       - MEDICATION INSTRUCTIONS -       sodium chloride Stopped (02/15/19 0718)       aspirin  81 mg Oral Daily     azithromycin  500 mg Intravenous Q24H     ceFAZolin  2 g Intravenous Pre-Op/Pre-procedure x 1 dose     cefTRIAXone  1 g Intravenous Q24H     furosemide  20 mg Oral Daily     ipratropium - albuterol 0.5 mg/2.5 mg/3 mL  3 mL Nebulization 4x daily     levothyroxine  100 mcg Oral Daily     lisinopril  20 mg Oral Daily     metoprolol succinate ER  25 mg Oral Daily     multivitamin  with lutein  1 capsule Oral Daily     simvastatin  20 mg Oral QPM     sodium chloride (PF)  3 mL Intracatheter Q8H       Data   I personally reviewed no " images or EKG's today.  Results for orders placed or performed during the hospital encounter of 02/11/19 (from the past 24 hour(s))   EKG 12-lead, tracing only   Result Value Ref Range    Interpretation ECG Click View Image link to view waveform and result    Cardiac Catheterization    Narrative    Procedures:  Coronary Angiography  Left Heart Catheterization    PHYSICIANS:  Attending Physician: Jody  Interventional Cardiology Fellow: Dylon Gee    HPI/INDICATION:  83 year old gentleman with a history of tongue malignancy s/p  cisplatin presenting with syncope and found to have a new  cardiomyopathy with an EF of 20%.    DESCRIPTION:  1. Consent obtained with discussion of risks. All questions were  answered.  2. Sterile prep and procedure.  3. Location with Sheaths:   6 Cambodian right radial artery  4. Access: Local anesthetic with lidocaine. A micropuncture 21 gauge  needle  was used to establish vascular access using a modified  Seldinger technique.  5. Catheters:  5 Cambodian Ace  6. Estimated blood loss: < 25 ml    MEDICATIONS:  The procedure was performed under conscious sedation for 45?minutes  from 1340 to 1425.  The patient was assessed immediately before the first sedation  medication was administered. Midazolam 0.5mg and Fentanyl 25mcg?were  administered.  Heart rate, BP, respiration, oxygen saturation and patient responses  were monitored throughout the procedure with the assistance of the RN  under my supervision.  IV Heparin was administered to achieve anticoagulation.  Antiplatelet Therapy: ASA    Procedures:  Right subclavian angiography:  Due to difficulty advancing Bentson and angled Wolcott wires through the  subclavian and the notable calcification seen at its ostium on  fluoroscopy, 10 cc of contrast were injected via the Ace catheter  which showed a severe at least 90% stenosis at the ostium of the right  subclavian artery.    Coronary Angiogram:   -Both coronary arteries arise from their  respective cusps.  -Dominance: Right  -LM has 30% calcified disease.  -LAD: Type 3 [LAD supplies the entire apex]. The LAD gives rise to  septal perforators, single diagonal. The proximal LAD has mild 30-40%  disease, mid LAD has a 99% lesion which is heavily calcified just at  the bifurcation of the diagonal branch, and mid to distal LAD has TANYA  II flow with mild diffuse disease. The diagonal branch is  occluded  at its ostium  .  -LCX gives rise to OM1 and OM2. The LCx is  occluded after the  first OM branch, it fills with TANYA II flow and has at least mild  disease in the mid and distal segments and the second OM.   -RCA gives rise to PL branches and supplies PDA. The RCA is  and is  occluded at its proximal segment, before that there is at least  moderate 50-60% disease to the ostium, the distal RCA fills by  bridging collaterals as well as grade III left to right collaterals.  The  segment is calcified, roughly at least 20 mm in length, and  with an ambiguous proximal cap.    Left Heart Catheterization:  LVEDP 25 mmHg  There is no significant aortic gradient on pullback.    Sheath Removal:  Right radial sheath removed.    Contrast: Isovue, 60ml     Fluoroscopy Time: 6.3min    COMPLICATIONS:  None    SUMMARY:   Three vessel CAD with  of the RCA and LCx and subtotal occlusion of  the mid LAD.  Elevated left sided filling pressures.    PLAN:   Cardiothoracic surgical consult for consideration of CABG.  If declined by surgery, would consider intervening on mid LAD lesion  first with hemodynamic support (Impella or at least IABP) and  rotational atherectomy of the lesion.  If successful, he could potentially be a good candidate for   intervention of his RCA via a retrograde approach.    The attending interventional cardiologist, Dr Vera, was present and  supervised all critical aspects the procedure.    Dylon Gee  Interventional Cardiology Fellow    I was scrubbed in and personally  supervised this procedure and agree  with the report as described by .     I have personally reviewed the examination and initial interpretation  and I agree with the findings.    FAUSTINO DUFF MD   Basic metabolic panel   Result Value Ref Range    Sodium 141 133 - 144 mmol/L    Potassium 4.2 3.4 - 5.3 mmol/L    Chloride 108 94 - 109 mmol/L    Carbon Dioxide 26 20 - 32 mmol/L    Anion Gap 7 3 - 14 mmol/L    Glucose 97 70 - 99 mg/dL    Urea Nitrogen 36 (H) 7 - 30 mg/dL    Creatinine 1.09 0.66 - 1.25 mg/dL    GFR Estimate 63 >60 mL/min/[1.73_m2]    GFR Estimate If Black 73 >60 mL/min/[1.73_m2]    Calcium 8.9 8.5 - 10.1 mg/dL

## 2019-02-15 NOTE — PLAN OF CARE
[id number]:    No events this shift. VSS on 2L via nasal cannula. Denies dyspnea, chest pain, other discomfort. Reports some difficulty swallowing (and poor appetite/ minimal ability to taste). He reports finding foods with acceptable textures. He appeared to have a safe swallow when taking water with his medication.    Right radial hematoma unchanged. CMS intact. Up with minimal assist.

## 2019-02-16 NOTE — PLAN OF CARE
Heart Failure Care Pathway  GOALS TO BE MET BEFORE DISCHARGE:    1. Decrease congestion and/or edema with diuretic therapy to achieve near      optimal volume status.            Dyspnea improved:  No, please explain: worse today             Edema improved:     No, please explain: no change from yesterday, 1+ BLE         Net I/O and Weights since admission:          01/17 2300 - 02/16 2259  In: 3626.75 [P.O.:2010; I.V.:1616.75]  Out: 2850 [Urine:2850]  Net: 776.75            Vitals:    02/11/19 2235 02/11/19 2357 02/12/19 0500 02/13/19 0340   Weight: 67 kg (147 lb 11.2 oz) 67.1 kg (147 lb 14.4 oz) 66.5 kg (146 lb 9.6 oz) 67.3 kg (148 lb 4.8 oz)    02/14/19 0251 02/15/19 0637 02/16/19 0132   Weight: 68.9 kg (151 lb 12.8 oz) 69.8 kg (153 lb 12.8 oz) 71.4 kg (157 lb 4.8 oz)       2.  O2 sats > 92% on RA or at prior home O2 therapy level.          Current oxygenation status:       SpO2: 91 %         O2 Device: None (Room air),  Oxygen Delivery: 1 LPM         Able to wean O2 this shift to keep sats > 92%:  No, please explain: amb in ontiveros on RA, sats dropped to 80%        Does patient use Home O2? No    3.  Tolerates ambulation and mobility near baseline: No, please explain: MILLER         How many times did the patient ambulate with nursing staff this shift? 1 in ontiveros, to bathroom x2.    Please review the Heart Failure Care Pathway for additional HF goal outcomes.    Restarted IV Lasix 20mg Q6hrs, BP's soft 80's-100's, asymptomatic.  ACE dose decreased for 2/17.  Amb in ontiveros on RA, sats down to 80%, improved with rest to 88-93%.  Plavix started today.      Deisi Kennedy, RN  2/16/2019

## 2019-02-16 NOTE — PROGRESS NOTES
[facility]    Hospitalist Progress Note    Assessment & Plan   Patrick Diop is an 83 year old male with PMHx of cardiomyopathy, chronic systolic CHF with EF 20% per echo in 12/2018, hypertension, hyperlipidemia, hypothyroidism, recurrent tongue cancer and stage III CKD who was initially admitted at [facility] from 2/10/2019 - 2/11/2019 for evaluation of a syncopal spell and management of CHF exacerbation. Cardiology recommended transfer to [facility] for ongoing evaluation of his cardiomyopathy, thus he was transferred to [facility] on 2/11/19.     Ischemic Cardiomyopathy  Multivessel CAD  Hypertension, Hyperlipidemia  Noted to have EF 20% on echocardiogram in 12/2018 but unclear if cardiomyopathy was due to ischemia vs prior chemotherapy for tongue cancer. Initially presented to [facility] after a syncopal spell. Also endorsed shortness of breath and workup showed pulmonary vascular congestion with moderate bilateral pleural effusions with a proBMP of 83k. Initially diuresed with IV Lasix. As above, was transferred to [facility] on 2/11 for ongoing cardiac evaluation. Repeat echo on 2/12 showed EF 20-25% with areas of severe hypokinesis in the distribution of the LAD. Cardiac MRI on 2/13 showed a severely dilated LV with EF 15% and stress imaging showed a medium sized transmural infarct perfusion defects consistent with ischemia again in the distribution of the LAD. He ultimately underwent coronary angiography on 2/14 which showed 3V CAD with  of the RCA and LCx and subtotal occlusion of the mid LAD. He was not deemed to be a candidate for CABG given his tongue cancer. Patient stated he preferred medical therapy over PCI. Continued on Toprol XL 25mg daily and lisinopril 20mg daily. Continues on statin. Aspirin added to regimen. Initially responded well to diuresis though BPs were running soft during stay. Wts remained steadily elevated during hospitalization.  --  given he continues to desat with exertion and wts remain up -- discontinue oral Lasix and have instead placed on IV Lasix (20mg IV q6h)  -- given his hypotension, will need to decrease Lisinopril to allow for room to diurese (tentatively decreased to 5mg daily beginning 2/17)  -- cont metoprolol XL  at current dose of 25mg daily  -- conts on aspirin, Plavix added today per cardiology  -- cont statin -- changed to atorvastatin     Syncope, s/p ICD placement  Suspected to have occurred secondary to his underlying severe cardiomyopathy. Evaluated by EP this stay and underwent ICD placement on 2/15.      R Pleural Effusion s/p thoracentesis  Secondary to CHF. Underwent thoracentesis on 2/11 with removal of 1.1L of pleural fluid.      Stage III CKD  Baseline Cr seems to be around 1.3. Renal function has remained stable.  -- repeat BMP in AM    Recurrent Tongue Cancer  Follows with Dr. Neil of  Oncology. Initially diagnosed in 9/2015. Treated with chemotherapy and radiation. Per patient, treatments were initially successful but has had a recent recurrence in 4/2018 and was treated with Keytruda from 4/2018 - 11/2018.   -- no concerns at this time     Hypothyroidism  Chronic and stable on levothyroxine    FEN: no IVFs, lytes stable, cardiac diet  DVT Prophylaxis: PCDs  Code Status: Full Code    Disposition: Had hoped to discharge home today but now needs additional 1-2d of IV diuresis. PT/OT consulted to ensure safe discharge plan.    Rachele Caldera    Interval History   Seen this morning. Resting comfortably, denies complaints. Weaned off O2 at rest. Desats to 80s when ambulating in hallways.     -Data reviewed today: I reviewed all new labs and imaging results over the last 24 hours. I personally reviewed no images or EKG's today.    Physical Exam   Temp: 97.7  F (36.5  C) Temp src: Oral BP: 108/61   Heart Rate: 84 Resp: 18 SpO2: 91 % O2 Device: None (Room air) Oxygen Delivery: 1 LPM  Vitals:    02/14/19  0251 02/15/19 0637 02/16/19 0132   Weight: 68.9 kg (151 lb 12.8 oz) 69.8 kg (153 lb 12.8 oz) 71.4 kg (157 lb 4.8 oz)     Vital Signs with Ranges  Temp:  [97.5  F (36.4  C)-97.7  F (36.5  C)] 97.7  F (36.5  C)  Heart Rate:  [78-97] 84  Resp:  [18-20] 18  BP: ()/(54-69) 108/61  SpO2:  [80 %-97 %] 91 %  I/O last 3 completed shifts:  In: 1096.75 [P.O.:600; I.V.:496.75]  Out: 350 [Urine:350]    Constitutional: Resting comfortably, alert and answering questions appropriately, NAD  Respiratory: BS diminished at bilateral bases (R more so then L) but otherwise clear, no wheeze/rales/rhonchi  Cardiovascular: HRRR, no MGR, +bilateral LE edema to the knees  GI: S, NT, ND, +BS  Skin/Integumen: warm/dry  Other:      Medications     - MEDICATION INSTRUCTIONS -         aspirin  81 mg Oral Daily     azithromycin  500 mg Intravenous Q24H     cefTRIAXone  1 g Intravenous Q24H     furosemide  20 mg Intravenous Q6H     ipratropium - albuterol 0.5 mg/2.5 mg/3 mL  3 mL Nebulization 4x daily     levothyroxine  100 mcg Oral Daily     [START ON 2/17/2019] lisinopril  5 mg Oral Daily     metoprolol succinate ER  25 mg Oral Daily     multivitamin  with lutein  1 capsule Oral Daily     simvastatin  20 mg Oral QPM     sodium chloride (PF)  3 mL Intracatheter Q8H       Data   Recent Labs   Lab 02/15/19  0518 02/14/19  0542 02/13/19  0535 02/12/19  0547 02/11/19  1657 02/11/19  1541  02/11/19  0535  02/10/19  1100   WBC  --  8.6  --  8.4  --   --   --  8.4  --  9.0   HGB  --  11.4*  --  12.0*  --   --   --  11.9*  --  12.8*   MCV  --  103*  --  102*  --   --   --  105*  --  106*   PLT  --  195  --  181 196  --    < > 178   < > 185   INR  --   --   --   --   --  1.59*  --   --   --   --     140 143 143  --   --   --  141  --  136   POTASSIUM 4.2 4.1 3.7 3.6  --   --   --  3.6  --  3.8   CHLORIDE 108 106 109 108  --   --   --  104  --  101   CO2 26 26 27 26  --   --   --  28  --  26   BUN 36* 40* 40* 44*  --   --   --  43*  --  44*   CR  1.09 1.08 1.11 1.17  --   --   --  1.32*   < > 1.33*   ANIONGAP 7 8 7 9  --   --   --  9  --  9   AMY 8.9 9.0 9.1 9.0  --   --   --  8.8  --  9.4   GLC 97 113* 100* 106*  --   --   --  110*  --  205*   ALBUMIN  --   --   --   --   --   --   --   --   --  3.3*   PROTTOTAL  --   --   --   --   --   --   --   --   --  8.1   BILITOTAL  --   --   --   --   --   --   --   --   --  1.1   ALKPHOS  --   --   --   --   --   --   --   --   --  121   ALT  --   --   --   --   --   --   --   --   --  41   AST  --   --   --   --   --   --   --   --   --  61*   TROPI  --   --   --  0.504*  --   --   --  1.158*  --  0.066*    < > = values in this interval not displayed.       Recent Results (from the past 24 hour(s))   X-ray Chest 2 vws*    Narrative    XR CHEST 2 VW   2/16/2019 8:10 AM     HISTORY: post ICD    COMPARISON: Film dated 2/11/2019    FINDINGS: A left cardiac device is been inserted. The lead appears to  be in proper position. No pneumothorax is seen..  The heart is  enlarged. There are bibasilar opacities consistent with pleural  effusions and associated infiltrate or atelectasis.. Pulmonary  vascular congestion.  The bones and soft tissues are unremarkable.      Impression    IMPRESSION:   1. Left cardiac device appears to be in proper position. No  pneumothorax.  2. Probable congestive heart failure with cardiomegaly, bilateral  pleural effusions and pulmonary vascular congestion.

## 2019-02-16 NOTE — PROGRESS NOTES
Assessment and Plan:   This is a very pleasant 83-year-old male who I had met once at [facility] in clinic for new onset of LV dysfunction a couple of months ago.  At that time the patient was severely volume overloaded.  He had marked LV dysfunction and 2 diagnoses were entertained at that point one was chemotherapy-induced heart failure versus coronary disease.  Plan at that time was to diurese him and then do a stress MRI.  However it looks like patient missed 1 of the clinic appointments that was scheduled to discuss that subsequently had an episode of syncope and was admitted to the hospital.  He had an NSTEMI during that admission.  ER had called me at [facility] and I recommended transfer to [facility].  Here he underwent a coronary angiography which showed multivessel disease in mid LAD and the right coronary artery.  He underwent a cardiac MRI that showed poor viability and extensive scarring.  While bypass was offered patient declined that but again the MRI did show low likelihood of recovery either way.  As such medical management has been decided.  He denies angina.  Does have severe LV dysfunction and tolerating modest doses of heart failure medications at this time.  Given his syncope he underwent single-chamber secondary prevention ICD during this admission as well.    1. Severe ischemic cardiomyopathy with ejection fraction of 20%  2. 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 Secondary prevention ICD for syncope in the setting of severe LV dysfunction  4. Multivessel coronary artery disease with poor viability on MRI under medical management.  No angina  5. Right pleural effusion s/p thoracentesis 1.1 L this admission.    Patrick was awaiting discharge today however with walking his saturations dropped down to the 80s.  He is still not very steady on his feet.  His weight is increasing during this hospital stay.  Clinically he does appear volume overloaded  with congestion in his lungs, 1+ bilateral lower extremity edema and elevated neck veins.  He needs further augmentation of diuretic therapy.  I recommend IV Lasix at this time.  Given his blood pressure soft we will use 20 mg IV every 6 hours. Continue current doses of metoprolol and lisinopril.      I am anticipating likely discharge tomorrow or day after. Please have PT and OT see him to ensure safety at home to stay alone.    Outpatient cardiomems referral and very close follow-up in core clinic.      Patient lives in [place] and has significant issues traveling.  His daughter is very supportive but she has her own commitments which make it challenging for her to get patient to appointments.  The need to either consider a closer senior living facility or discussed with  to see if they can get outpatient support for transportation.    While at this time the patient does not need mechanical circulatory support (unsure if he is even a candidate for 1) he would be interested if progressive LV decline were to ensue in the future.  However his candidacy with cancer age and frailty make him a suboptimal candidate for this.  I will have him see 1 of my partners at the [facility] Dr. Guzman for an opinion.    Will change simvastatin to Atorvastatin 40 mg daily and add clopidogrel to his current CAD regimen given NSTEMI.    I spent significant time with him and his family. Explained CAD, viability, ICD rationale and utility and living with HF and prognosis.    Lionel Calderón MD        Interval History:   No overnight issues, desaturation with walking          Medications:       aspirin  81 mg Oral Daily     azithromycin  500 mg Intravenous Q24H     cefTRIAXone  1 g Intravenous Q24H     furosemide  20 mg Intravenous Q6H     ipratropium - albuterol 0.5 mg/2.5 mg/3 mL  3 mL Nebulization 4x daily     levothyroxine  100 mcg Oral Daily     [START ON 2/17/2019] lisinopril  5 mg Oral Daily      metoprolol succinate ER  25 mg Oral Daily     multivitamin  with lutein  1 capsule Oral Daily     simvastatin  20 mg Oral QPM     sodium chloride (PF)  3 mL Intracatheter Q8H            Physical Exam:     Patient Vitals for the past 24 hrs:   BP Temp Temp src Heart Rate Resp SpO2 Weight   02/16/19 1129 108/61 -- -- 84 18 91 % --   02/16/19 1029 (!) 84/54 -- -- 78 18 93 % --   02/16/19 1002 (!) 82/55 -- -- -- -- -- --   02/16/19 1000 94/60 -- -- -- -- -- --   02/16/19 0930 -- -- -- -- -- 90 % --   02/16/19 0915 -- -- -- -- 20 (!) 80 % --   02/16/19 0900 -- -- -- -- 18 90 % --   02/16/19 0749 105/62 97.7  F (36.5  C) Oral 84 18 94 % --   02/16/19 0438 112/69 -- -- 84 -- -- --   02/16/19 0132 -- -- -- -- -- -- 71.4 kg (157 lb 4.8 oz)   02/15/19 2355 95/67 97.5  F (36.4  C) Oral 97 18 91 % --   02/15/19 1920 -- -- -- -- -- 93 % --   02/15/19 1600 -- -- -- -- -- 92 % --   02/15/19 1530 -- -- -- -- -- 97 % --   02/15/19 1500 126/61 -- -- 91 18 90 % --   02/15/19 1344 94/58 -- -- 87 -- 93 % --   02/15/19 1315 105/67 -- -- 87 -- -- --     Vitals:    02/11/19 2235 02/11/19 2357 02/12/19 0500 02/13/19 0340   Weight: 67 kg (147 lb 11.2 oz) 67.1 kg (147 lb 14.4 oz) 66.5 kg (146 lb 9.6 oz) 67.3 kg (148 lb 4.8 oz)    02/14/19 0251 02/15/19 0637 02/16/19 0132   Weight: 68.9 kg (151 lb 12.8 oz) 69.8 kg (153 lb 12.8 oz) 71.4 kg (157 lb 4.8 oz)         Intake/Output Summary (Last 24 hours) at 2/16/2019 1301  Last data filed at 2/16/2019 1047  Gross per 24 hour   Intake 830 ml   Output 350 ml   Net 480 ml       02/11 0700 - 02/16 0659  In: 3166.75 [P.O.:1650; I.V.:1516.75]  Out: 2650 [Urine:2650]  Net: 516.75    Exam:  GENERAL APPEARANCE ADULT: Alert, in no acute distress  RESP: crackles bilateral bases  CV: regular rate and rhythm, no murmur, rub, or gallop  ABDOMEN: no guarding or rebound  EXTREMITIES: Edema 1+         Data:   LABS (Last four results)  CMP  Recent Labs   Lab 02/15/19  0518 02/14/19  0542 02/13/19  0535  02/12/19  0547  02/10/19  1100    140 143 143   < > 136   POTASSIUM 4.2 4.1 3.7 3.6   < > 3.8   CHLORIDE 108 106 109 108   < > 101   CO2 26 26 27 26   < > 26   ANIONGAP 7 8 7 9   < > 9   GLC 97 113* 100* 106*   < > 205*   BUN 36* 40* 40* 44*   < > 44*   CR 1.09 1.08 1.11 1.17   < > 1.33*   GFRESTIMATED 63 64 62 58*   < > 50*   GFRESTBLACK 73 74 72 67   < > 58*   AMY 8.9 9.0 9.1 9.0   < > 9.4   PROTTOTAL  --   --   --   --   --  8.1   ALBUMIN  --   --   --   --   --  3.3*   BILITOTAL  --   --   --   --   --  1.1   ALKPHOS  --   --   --   --   --  121   AST  --   --   --   --   --  61*   ALT  --   --   --   --   --  41    < > = values in this interval not displayed.     CBC  Recent Labs   Lab 02/14/19  0542 02/12/19  0547 02/11/19  1657 02/11/19  1510 02/11/19  0535  02/10/19  1100   WBC 8.6 8.4  --   --  8.4  --  9.0   RBC 3.41* 3.59*  --   --  3.51*  --  3.75*   HGB 11.4* 12.0*  --   --  11.9*  --  12.8*   HCT 35.0* 36.6*  --   --  36.7*  --  39.9*   * 102*  --   --  105*  --  106*   MCH 33.4* 33.4*  --   --  33.9*  --  34.1*   MCHC 32.6 32.8  --   --  32.4  --  32.1   RDW 13.3 13.3  --   --  12.9  --  12.8    181 196 201 178   < > 185    < > = values in this interval not displayed.     INR  Recent Labs   Lab 02/11/19  1541   INR 1.59*     TROPONINS   Lab Results   Component Value Date    TROPI 0.504 () 02/12/2019    TROPI 1.158 (HH) 02/11/2019    TROPI 0.066 (H) 02/10/2019                                                                                                               Lionel Calderón MD

## 2019-02-16 NOTE — PLAN OF CARE
Up SBA. A/o4. VSS BP soft 90-100s, on 1L O2. MILLER. ICD site CDI. R radial bruised; soft no change; coccyx has blanchable redness, ROSEANNA. Voiding. LUE restricted below shoulder, arm in sling to help remind patient. Plan to discharge to home 2/16.

## 2019-02-16 NOTE — PLAN OF CARE
Heart Failure Care Pathway  GOALS TO BE MET BEFORE DISCHARGE:    1. Decrease congestion and/or edema with diuretic therapy to achieve near      optimal volume status.            Dyspnea improved:  Yes            Edema improved:     Yes        Net I/O and Weights since admission:          01/16 2300 - 02/15 2259  In: 3166.75 [P.O.:1650; I.V.:1516.75]  Out: 2500 [Urine:2500]  Net: 666.75            Vitals:    02/11/19 2235 02/11/19 2357 02/12/19 0500 02/13/19 0340   Weight: 67 kg (147 lb 11.2 oz) 67.1 kg (147 lb 14.4 oz) 66.5 kg (146 lb 9.6 oz) 67.3 kg (148 lb 4.8 oz)    02/14/19 0251 02/15/19 0637   Weight: 68.9 kg (151 lb 12.8 oz) 69.8 kg (153 lb 12.8 oz)       2.  O2 sats > 92% on RA or at prior home O2 therapy level.          Current oxygenation status:       SpO2: 92 %         O2 Device: Nasal cannula,  Oxygen Delivery: 1 LPM         Able to wean O2 this shift to keep sats > 92%:  Yes, down to 1L NC       Does patient use Home O2? No    3.  Tolerates ambulation and mobility near baseline: NA, unable to assess, bedrest post ICD placement today        How many times did the patient ambulate with nursing staff this shift? 1, just to bathroom and back    Please review the Heart Failure Care Pathway for additional HF goal outcomes.    VS stable. Denies pain.  ICD placed today, incision C/D/I.  Possible discharge home 2/16.     Deisi Kennedy, RN  2/15/2019

## 2019-02-16 NOTE — PLAN OF CARE
A&Ox4. VSS on 1L per NC. Lung sounds diminished. Denies any chest pain. ICD site dressing CDI. R radial angio site soft with bruising. Voiding in urinal at bedside, SBA. Passing gas, bowel sounds active. Cardiac diet. Possible discharge today.

## 2019-02-16 NOTE — PROVIDER NOTIFICATION
Notified Person:  Hospitalist  Notified Persons Name: Dr. aCldera  Notification Date/Time: 2/16/19; 0935  Notification Interaction: face to face  Purpose of Notification: hypoxia with amb in halls on RA  Orders Received: 20mg PO Lasix once  Comments: updated MD pt did amb in halls on RA, sats dropped as low as 80%, improved to 90% when back to rest.  PTA Lasix 40mg Daily, has only been getting 20mg here.

## 2019-02-16 NOTE — DISCHARGE SUMMARY
[facility]    Discharge Summary  Hospitalist    Date of Admission:  2/11/2019  Date of Discharge:  2/16/2019  Discharging Provider: Rachele Caldera    Discharge Diagnoses   Ischemic Cardiomyopathy  Multivessel CAD  Hypertension, Hyperlipidemia  Syncope, s/p ICD placement  R Pleural Effusion s/p thoracentesis  Stage III CKD  Recurrent Tongue Cancer    History of Present Illness   Partick Diop is an 83 year old male with PMHx of cardiomyopathy, chronic systolic CHF with EF 20% per echo in 12/2018, hypertension, hyperlipidemia, hypothyroidism, recurrent tongue cancer and stage III CKD who was initially admitted at [facility] from 2/10/2019 - 2/11/2019 for evaluation of a syncopal spell and management of CHF exacerbation. Cardiology recommended transfer to [facility] for ongoing evaluation of his cardiomyopathy, thus he was transferred to [facility] on 2/11/19.     Hospital Course   Patrick Diop was admitted on 2/11/2019.  The following problems were addressed during his hospitalization:    Ischemic Cardiomyopathy  Multivessel CAD  Hypertension, Hyperlipidemia  Noted to have EF 20% on echocardiogram in 12/2018 but unclear if cardiomyopathy was due to ischemia vs prior chemotherapy for tongue cancer. Initially presented to [facility] after a syncopal spell. Also endorsed shortness of breath and workup showed pulmonary vascular congestion with moderate bilateral pleural effusions with a proBMP of 83k. Initially diuresed with IV Lasix. As above, was transferred to [facility] on 2/11 for ongoing cardiac evaluation. Repeat echo on 2/12 showed EF 20-25% with areas of severe hypokinesis in the distribution of the LAD. Cardiac MRI on 2/13 showed a severely dilated LV with EF 15% and stress imaging showed a medium sized transmural infarct perfusion defects consistent with ischemia again in the distribution of the LAD. He ultimately underwent coronary angiography on  2/14 which showed 3V CAD with  of the RCA and LCx and subtotal occlusion of the mid LAD. He was not deemed to be a candidate for CABG given his tongue cancer. Patient stated he preferred medical therapy over PCI. Continued on Toprol XL 25mg daily and lisinopril 20mg daily. Statin changed from lovastatin to simvastatin this stay. Aspirin added to regimen. Initially responded well to diuresis though BPs were running soft during stay. Wts remained steadily elevated during hospitalization, so at the time of discharge recommended to continue Lasix 40mg po daily (as he was taking prior to admission). Will monitor BPs at home and if trending low or if he becomes dizzy, will contact cardiology clinic to discuss adjustment of meds. Outpatient cardiac rehab ordered. Will follow up in cardiology clinic as advised.     Syncope, s/p ICD placement  Suspected to have occurred secondary to his underlying severe cardiomyopathy. Evaluated by EP this stay and underwent ICD placement on 2/15. Will follow up in device clinic after discharge    R Pleural Effusion s/p thoracentesis  Secondary to CHF. Underwent thoracentesis on 2/11 with removal of 1.1L of pleural fluid.     Stage III CKD  Baseline Cr seems to be around 1.3. Renal function has remained stable this stay.    Recurrent Tongue Cancer  Follows with Dr. Neil of  Oncology. Initially diagnosed in 9/2015. Treated with chemotherapy and radiation. Per patient, treatments were initially successful but has had a recent recurrence in 4/2018 and was treated with Keytruda from 4/2018 - 11/2018. Will need to follow up with his oncologist as scheduled (was due for head/neck CT, this was done as scheduled on 2/11).    Hypothyroidism  Chronic and stable on levothyroxine    Rachele Caldera    Significant Results and Procedures   2/12/19 Echocardiogram:  Interpretation Summary     The visual ejection fraction is estimated at 20-25%.  There is severe anterior, septal, and apical  wall hypokinesis.  There is no thrombus seen in the left ventricle.  Mildly decreased right ventricular systolic function  There is mild (1+) mitral regurgitation.  Mild (35-45mmHg) pulmonary hypertension is present.  _______________________________________________________     Left Ventricle  The visual ejection fraction is estimated at 20-25%. There is severe anterior,  septal, and apical wall hypokinesis. There is no thrombus seen in the left  ventricle.     Right Ventricle  Mildly decreased right ventricular systolic function.     Mitral Valve  There is moderate mitral annular calcification. There is mild (1+) mitral regurgitation.     Tricuspid Valve  There is mild (1+) tricuspid regurgitation. The right ventricular systolic  pressure is approximated at 38.8 mmHg plus the right atrial pressure. Mild  (35-45mmHg) pulmonary hypertension is present.      Aortic Valve  There is mild trileaflet aortic sclerosis. There is trace aortic  regurgitation. No hemodynamically significant valvular aortic stenosis.     Pulmonic Valve  There is mild to moderate (1-2+) pulmonic valvular regurgitation.        2/14/19 Angiogram:  Coronary Angiogram:   -Both coronary arteries arise from their respective cusps.  -Dominance: Right  -LM has 30% calcified disease.  -LAD: Type 3 [LAD supplies the entire apex]. The LAD gives rise to  septal perforators, single diagonal. The proximal LAD has mild 30-40%  disease, mid LAD has a 99% lesion which is heavily calcified just at  the bifurcation of the diagonal branch, and mid to distal LAD has TANYA  II flow with mild diffuse disease. The diagonal branch is  occluded  at its ostium    -LCX gives rise to OM1 and OM2. The LCx is  occluded after the  first OM branch, it fills with TANYA II flow and has at least mild  disease in the mid and distal segments and the second OM.   -RCA gives rise to PL branches and supplies PDA. The RCA is  and is  occluded at its proximal segment, before that  there is at least  moderate 50-60% disease to the ostium, the distal RCA fills by  bridging collaterals as well as grade III left to right collaterals.  The  segment is calcified, roughly at least 20 mm in length, and  with an ambiguous proximal cap.     Left Heart Catheterization:  LVEDP 25 mmHg  There is no significant aortic gradient on pullback.     SUMMARY:   Three vessel CAD with  of the RCA and LCx and subtotal occlusion of the mid LAD.  Elevated left sided filling pressures.        2/15/19 ICD Placement:  EP DEVICE 2/15/2019 10:46 AM     NAMES OF PROCEDURES:  Implantation of a single-chamber implantable cardiac defibrillator  system.     Implantable cardiac defibrillator information pulse generator  Medtronic model [id number] serial number   [id number]. Ventricular lead Medtronic model [id number] serial number  [id number]. R-wave 8.7 mV, ventricular pacing threshold 0.8 V,  ventricular pace impedance 462 ohms.      DUSTIN REGALADO MD     Code Status   Full Code       Primary Care Physician   Joe Mandel    Physical Exam   Temp: 97.5  F (36.4  C) Temp src: Oral BP: 112/69   Heart Rate: 84 Resp: 18 SpO2: 91 % O2 Device: Nasal cannula Oxygen Delivery: 1 LPM  Vitals:    02/14/19 0251 02/15/19 0637 02/16/19 0132   Weight: 68.9 kg (151 lb 12.8 oz) 69.8 kg (153 lb 12.8 oz) 71.4 kg (157 lb 4.8 oz)     Vital Signs with Ranges  Temp:  [97.5  F (36.4  C)] 97.5  F (36.4  C)  Heart Rate:  [84-97] 84  Resp:  [18-22] 18  BP: ()/(58-73) 112/69  SpO2:  [85 %-97 %] 91 %  I/O last 3 completed shifts:  In: 1096.75 [P.O.:600; I.V.:496.75]  Out: 350 [Urine:350]      General: Resting comfortably, alert, conversive, NAD  CVS: HRRR, no MGR, +bilateral LE edema to the knees  Respiratory: BS diminished towards bases (R moreso then L) but otherwise clear, no wheeze/rhonchi, no increased work of breathing  GI: S, NT, ND, +BS  Skin: Warm/dry    Discharge Disposition   Discharged to home  Condition at discharge:  Stable    Consultations This Hospital Stay   CARDIOLOGY IP CONSULT  ELECTROPHYSIOLOGY IP CONSULT  CARDIOVASCULAR SURGERY IP CONSULT    Time Spent on this Encounter   I, Rachele Caldera, personally saw the patient today and spent greater than 30 minutes discharging this patient.    Discharge Orders      Cardiac Rehab Referral      Discharge Instructions - Keep incision dry for 72 hours    Keep incision dry for 72 hours (unless Derma Ramirez was applied)     Discharge Instructions - Follow up with Device Check RN     Follow up with Device Check RN in 7-10 days.     Reason for your hospital stay    Evaluation of your heart failure and syncopal spell. You underwent several tests this stay, including an angiogram. You also had an ICD placed this stay.     Activity    Your activity upon discharge: as advised following recent ICD placement     Discharge Instructions    Check your blood pressure daily. If your pressures start to run low or if you begin feeling dizzy, call your cardiology clinic to discuss adjusting your other heart medications and diuretic (Lasix).     Follow-up and recommended labs and tests     1. Follow up with [facility] in the device clinic in 10 days.  2. Follow up with [facility] Cardiology (Dr. Calderón) as scheduled on 3/4/19 at 9:00am  3. Follow up with your PCP in the next 1-2 weeks.  4. Follow up with your oncologist as scheduled.     Diet    Follow this diet upon discharge: Cardiac     Discharge Medications   Current Discharge Medication List      START taking these medications    Details   aspirin (ASA) 81 MG EC tablet Take 1 tablet (81 mg) by mouth daily  Qty: 30 tablet, Refills: 0    Associated Diagnoses: Coronary artery disease involving native coronary artery of native heart without angina pectoris      simvastatin (ZOCOR) 20 MG tablet Take 1 tablet (20 mg) by mouth every evening  Qty: 30 tablet, Refills: 0    Comments: Future refills by PCP Dr. Joe Mandel with phone number  [phone] or per [facility]  Associated Diagnoses: Coronary artery disease involving native coronary artery of native heart without angina pectoris; Hyperlipidemia with target LDL less than 100         CONTINUE these medications which have NOT CHANGED    Details   Cholecalciferol (VITAMIN D) 1000 UNITS capsule Take 1 capsule by mouth daily      furosemide (LASIX) 20 MG tablet Take 2 tablets (40 mg) by mouth daily  Refills: 0    Associated Diagnoses: Recurrent tongue cancer (H)      levothyroxine (SYNTHROID/LEVOTHROID) 100 MCG tablet Take 1 tablet (100 mcg) by mouth daily Recheck Lab test in 2 months.  Qty: 90 tablet, Refills: 1    Associated Diagnoses: Acquired hypothyroidism      lisinopril (PRINIVIL/ZESTRIL) 20 MG tablet TAKE ONE TABLET BY MOUTH DAILY  Qty: 90 tablet, Refills: 2    Associated Diagnoses: Hypertension goal BP (blood pressure) < 140/90      metoprolol succinate ER (TOPROL-XL) 25 MG 24 hr tablet Take 1 tablet (25 mg) by mouth daily  Qty: 90 tablet, Refills: 3    Associated Diagnoses: Secondary cardiomyopathy (H)      multivitamin  with lutein (OCUVITE WITH LTEIN) CAPS Take 1 capsule by mouth daily         STOP taking these medications       lovastatin (MEVACOR) 40 MG tablet Comments:   Reason for Stopping:             Allergies   No Known Allergies  Data   Most Recent 3 CBC's:  Recent Labs   Lab Test 02/14/19  0542 02/12/19  0547 02/11/19  1657  02/11/19  0535   WBC 8.6 8.4  --   --  8.4   HGB 11.4* 12.0*  --   --  11.9*   * 102*  --   --  105*    181 196   < > 178    < > = values in this interval not displayed.      Most Recent 3 BMP's:  Recent Labs   Lab Test 02/15/19  0518 02/14/19  0542 02/13/19  0535    140 143   POTASSIUM 4.2 4.1 3.7   CHLORIDE 108 106 109   CO2 26 26 27   BUN 36* 40* 40*   CR 1.09 1.08 1.11   ANIONGAP 7 8 7   AMY 8.9 9.0 9.1   GLC 97 113* 100*     Most Recent 2 LFT's:  Recent Labs   Lab Test 02/10/19  1100 12/17/18  1637   AST 61* 16   ALT 41 16    ALKPHOS 121 74   BILITOTAL 1.1 1.0     Most Recent 3 Troponin's:  Recent Labs   Lab Test 19  0547 19  0535 02/10/19  1100   TROPI 0.504* 1.158* 0.066*     Most Recent Cholesterol Panel:  Recent Labs   Lab Test 18  0950   CHOL 145   LDL 47   HDL 56   TRIG 212*     Most Recent TSH, T4 and A1c Labs:  Recent Labs   Lab Test 11/15/18  0909  10/04/18  0946 18  0839   TSH 2.48   < > 10.75*  --    T4  --   --  1.28  --    A1C  --   --   --  5.6    < > = values in this interval not displayed.     Results for orders placed or performed during the hospital encounter of 19   MRI Cardiac w/contrast and stress    Narrative                                                  [facility]                                                     CMR Report      MRN:                  [id number]                                  Name:          ADAM ROGER BILL                                  :                  1935                                  Scan Date:   2019 11:40:50                                    Electronically signed by Ze Newman  14:40:16    SUMMARY   ==========================================================================================================    Clinical history: Cardiomyopathy, ischemic evaluation  Comparison CMR: None    1. The left ventricle is severely dilated. The global systolic function is severely reduced. The LVEF is  15%. There is thinning and severe hypokinesis to akinesis of essentially all the mid to apical segments of  the left ventricle.     2. The RV is moderately dilated. The global systolic function is moderately to severely reduced. The RVEF  is 27%.     3. There is moderate left atrial enlargement.     4.  There is no evidence of myocardial iron overload.     5. There is mild to moderate mitral regurgitation and moderate pulmonic regurgitation.     6. Regadenoson stress perfusion imaging demonstrates a medium sized transmural  perfusion defect involving  the mid to basal anteroseptal and inferoseptal walls and the apical septal wall, findings consistent with  ischemia in the LAD distribution.     6. Late gadolinium enhancement imaging demonstrates extensive subendocardial enhancement involving all  apical segments of the left ventricle as well as the mid anteroseptal and inferoseptal walls and the mid to  basal inferior wall. These findings are consistent with prior myocardial infarctions in the LAD and RCA  distributions.     7.  Moderate bilateral pleural effusions are noted.     CONCLUSIONS: Severely dilated left ventricle with severely reduced systolic function. Moderately dilated  right ventricle with moderately to severely reduced systolic function.   Regadenoson stress perfusion imaging demonstrates a medium sized transmural perfusion defect involving the  mid to basal anteroseptal and inferoseptal walls and the apical septal wall, findings consistent with  ischemia in the LAD distribution.   There is extensive late gadolinium enhancement involving the apical segments of the left ventricle as well  as the mid anteroseptal and inferoseptal walls and the mid to basal inferior wall.    Collectively, the stress perfusion and late enhancement findings are consistent with prior extensive  infarctions in the LAD and the RCA distributions with moderate ernestina-infarct ischemia in the LAD  distribution.    Based on the transmural extent of late enhancement, the myocardium in the LAD distribution demonstrates a  low likelihood of functional recovery. The myocardium in the RCA distribution demonstrates a low to  moderate likelihood of functional recovery. The myocardium in the circumflex distribution appears  predominantly viable.     CORE EXAM   ==========================================================================================================    MEASUREMENTS    ----------------------------------------------------------------------------------------      VOLUMETRIC ANALYSIS       ----------------------------------------------  .----------------------------------------------------------.                    LV     Reference  RV     Reference   +------+-----------+-------+-----------+-------+-----------+   EDV   ml           389               260                     ml/m^2     218.5             146.1               ESV   ml           332               190                     ml/m^2     186.5             106.7               CO    L/min       5.42              6.65                     L/min/m^2    3.0               3.7               MASS                                                   SV    ml            57                70                     ml/m^2      32.0              39.3               EF    %             15                27              '------+-----------+-------+-----------+-------+-----------'            CARDIAC OUTPUT HR:  95 BPM      LV DIMENSIONS       ----------------------------------------------          WALL THICKNESS - ANTEROSEPTAL:  0.7 cm          WALL THICKNESS - INFEROLATERAL:  0.6 cm        IRON QUANTIFICATION       ----------------------------------------------          MYOCARDIAL T2*:  42 msec      ANATOMY   ----------------------------------------------------------------------------------------      LEFT ATRIUM       ----------------------------------------------          CAVITY SIZE:  MODERATELY ENLARGED         RIGHT ATRIUM       ----------------------------------------------          CAVITY SIZE:  Normal         PERICARDIUM       ----------------------------------------------          Normal           EFFUSION:  None       VALVES   ----------------------------------------------------------------------------------------      AORTIC VALVE        ----------------------------------------------          AORTIC VALVE LEAFLETS:  Trileaflet           AORTIC REGURGITATION:  None           AORTIC STENOSIS:  None         MITRAL VALVE       ----------------------------------------------          MITRAL REGURGITATION:  MILD-MODERATE         TRICUSPID VALVE       ----------------------------------------------          TRICUSPID REGURGITATION:  MILD         PULMONIC VALVE       ----------------------------------------------          PULMONIC REGURGITATION:  MODERATE           PULMONIC STENOSIS:  None       17 SEGMENT   ----------------------------------------------------------------------------------------  .-----------------------------------------------------------------------------------------.   Segments            Wall Motion  Hyperenhancement  Stress Perfusion  Interpretation   +--------------------+-------------+------------------+------------------+----------------+   Base Anterior                                                                          Base Anteroseptal                                                                      Base Inferoseptal                                                                      Base Inferior                    51-75%                                                Base Inferolateral                                                                     Base Anterolateral                                                                     Mid Anterior                                                                           Mid Anteroseptal                 %                                               Mid Inferoseptal                 %                                               Mid Inferior                     51-75%                                                Mid Inferolateral                                                                       Mid Anterolateral                                                                      Apical Anterior                  51-75%                                                Apical Septal                    %                                               Apical Inferior                  51-75%                                                Apical Lateral                   51-75%                                                [place]                             %                                              +--------------------+-------------+------------------+------------------+----------------+   RV Segments         Wall Motion  Hyperenhancement  Stress Perfusion  Interpretation   +--------------------+-------------+------------------+------------------+----------------+   RV Basal Anterior                                                                      RV Basal Inferior                                                                      RV Mid                                                                                 RV Apical                                                                             '--------------------+-------------+------------------+------------------+----------------'        FINDINGS       ----------------------------------------------          INFARCT/SCAR SIZE:  39 %      SCAN INFO   ==========================================================================================================    GENERAL   ----------------------------------------------------------------------------------------      CONTRAST AGENT       ----------------------------------------------          TYPE:  Gadavist           VOLUME ADMINISTERED:  18 ml          DOSAGE FOR 0.5M:  0.13 mmol/kg        VITALS       ----------------------------------------------          HEIGHT:  67.00 in          WEIGHT:  148.00 lbs           BSA:  1.78 m^2        PULSE SEQUENCES       ----------------------------------------------          Single-Shot SSFP         SETUP       ----------------------------------------------          TYPE:  Clinical           INPATIENT:  Yes           INCOMPLETE SCAN:  No           REASON(S) FOR SCAN:  Cardiomyopathy, Ischemia Evaluation           REFERRING PHYSICIAN:  ELIZABETH RODARTE           TECHNOLOGIST:  Luz Elena Morrison       Report generated by [employer], a product of [employer]

## 2019-02-17 NOTE — PROGRESS NOTES
02/17/19 1415   Quick Adds   Type of Visit Initial PT Evaluation   Living Environment   Lives With alone   Living Arrangements independent living facility   Home Accessibility no concerns  (elevator access)   Transportation Anticipated family or friend will provide   Living Environment Comment reports 100 ft distance is the farthest he would have to walker    Self-Care   Usual Activity Tolerance good   Regular Exercise No   Equipment Currently Used at Home grab bar, toilet;grab bar, tub/shower;shower chair;raised toilet   Activity/Exercise/Self-Care Comment walks within [facility]   Functional Level Prior   Ambulation 0-->independent   Transferring 0-->independent   Toileting 1-->assistive equipment   Bathing 1-->assistive equipment   Communication 0-->understands/communicates without difficulty   Swallowing 0-->swallows foods/liquids without difficulty   Cognition 0 - no cognition issues reported   Fall history within last six months yes   Number of times patient has fallen within last six months 1   Which of the above functional risks had a recent onset or change? fall history   Prior Functional Level Comment reports [facility] delivered a walker to him at home, however he is unsure of what kind it is    General Information   Onset of Illness/Injury or Date of Surgery - Date 02/11/19   Referring Physician Rachele Caldera, DO   Patient/Family Goals Statement to return to independence    Pertinent History of Current Problem (include personal factors and/or comorbidities that impact the POC) 83 year old male with PMHx of cardiomyopathy, chronic systolic CHF with EF 20% per echo in 12/2018, hypertension, hyperlipidemia, hypothyroidism, recurrent tongue cancer and stage III CKD who was initially admitted at [facility] from 2/10/2019 - 2/11/2019 for evaluation of a syncopal spell and management of CHF exacerbation. Cardiology recommended transfer to [facility] for ongoing  evaluation of his cardiomyopathy, thus he was transferred to [facility] on 19.   Cardiology recommended transfer to [facility] for ongoing evaluation of his cardiomyopathy, thus he was transferred to [facility] on 19.s/p angio indicating 2 vessel CAD, declined bypass or stent and is medical mgmt and s/p ICD.    Precautions/Limitations fall precautions;other (see comments);oxygen therapy device and L/min  (ICD left )   General Observations sitting up in chair, NAD, family present at bedside, NAD, 1 L NC   Cognitive Status Examination   Orientation orientation to person, place and time   Level of Consciousness alert   Follows Commands and Answers Questions 100% of the time   Pain Assessment   Patient Currently in Pain No   Posture    Posture Protracted shoulders;Forward head position;Kyphosis   Range of Motion (ROM)   ROM Quick Adds No deficits were identified   ROM Comment left shoulder motion not tested above shoulder level due to new ICD   Strength   Strength Comments bilateral hip: 3+/5, bilateral quad: 5/5, bilateral hamstrin/5, ankle pumps: 4+/5, bilateral hip abd/add: 4/5   Bed Mobility   Bed Mobility Comments not tested as pt wanting to stay in the chair, supine to sit-pt reports needs assist from [facility]   Transfer Skills   Transfer Comments sit to stand with CGA with FWW   Gait   Gait Comments amb 10 ft with CGA with FWW, decreased bilateral step length, step height and and decreased bilateral heel toe   Balance   Balance Comments SBA with sitting balance, CGA with static standing balance   Sensory Examination   Sensory Perception Comments pt denies numbness/tingling UE/LE   Muscle Tone   Muscle Tone no deficits were identified   General Therapy Interventions   Planned Therapy Interventions balance training;bed mobility training;gait training;strengthening;transfer training   Clinical Impression   Criteria for Skilled Therapeutic Intervention yes, treatment indicated   PT Diagnosis  "impaired gait   Influenced by the following impairments impaired balance, weakness, impaired activity tolerance   Functional limitations due to impairments impaired independence with gait/transfers   Clinical Presentation Stable/Uncomplicated   Clinical Presentation Rationale based on clinical presentation   Clinical Decision Making (Complexity) Low complexity   Therapy Frequency` daily   Predicted Duration of Therapy Intervention (days/wks) 4 days   Anticipated Equipment Needs at Discharge front wheeled walker  (pt reports walker was given to him by the indep living )   Anticipated Discharge Disposition Transitional Care Facility   Risk & Benefits of therapy have been explained Yes   Patient, Family & other staff in agreement with plan of care Yes   [facility]-PAC TM \"6 Clicks\"   2016, Trustees of [facility], under license to [employer].  All rights reserved.   6 Clicks Short Forms Basic Mobility Inpatient Short Form   [facility] AM-PAC  \"6 Clicks\" V.2 Basic Mobility Inpatient Short Form   1. Turning from your back to your side while in a flat bed without using bedrails? 3 - A Little   2. Moving from lying on your back to sitting on the side of a flat bed without using bedrails? 3 - A Little   3. Moving to and from a bed to a chair (including a wheelchair)? 3 - A Little   4. Standing up from a chair using your arms (e.g., wheelchair, or bedside chair)? 3 - A Little   5. To walk in hospital room? 3 - A Little   6. Climbing 3-5 steps with a railing? 3 - A Little   Basic Mobility Raw Score (Score out of 24.Lower scores equate to lower levels of function) 18   Total Evaluation Time   Total Evaluation Time (Minutes) 15     "

## 2019-02-17 NOTE — PLAN OF CARE
"Discharge Planner PT   Patient plan for discharge: home  Current status: PT consult received. Chart reviewed. PT evaluation and treatment initiated. Pt lives in a sr apt alone and reports I with ADL/IADl's and functional mobility prior to admit without assistive device. Pt I with med mgmt, laundry, cleaning and cooking. Pt does not drive but takes local bus. Pt has macular degeneration. Pt admitted due to for evaluation of a syncopal spell and management of CHF exacerbation. Cardiology recommended transfer to [facility] for ongoing evaluation of his cardiomyopathy, thus he was transferred to [facility] on 2/11/19.s/p angio indicating 2 vessel CAD, declined bypass or stent and is medical mgmt and s/p ICD.     Currently: Pt demos decreased bilateral LE strength, impaired balance, impaired activity tolerance, currently requires CGA with transfers and amb 60ft with FWW with decreased step length and decreased heel toe, no overt LOB, however slight unsteadiness noted. Low BP with mobility (see flowsheet for details), however no reports of lightheadedness, pt stating, \"I feel great\". Pt amb on 1 L NC, SPO2 at 90% post amb, pre-amb=95%. Pt was able to recall ICD precautions that he was instructed with earlier by staff.   Barriers to return to prior living situation: lives alone, impaired balance, decreased strength, PPM precautions.   Recommendations for discharge: TCU  Rationale for recommendations: Pt currently below baseline level of mobility. Recommend TCU to maximize pt's level of IND and safety prior to return home. Pt currently wishes to go home. If pt continues to decline TCU, recommend home PT, OT, RN and use of walker for all mobility. Discussed recommendations with pt and pt's daughter.        Entered by: Rosa Cruz 02/17/2019 4:57 PM       "

## 2019-02-17 NOTE — PROGRESS NOTES
[facility]    Hospitalist Progress Note    Assessment & Plan   Patrick Diop is an 83 year old male with PMHx of cardiomyopathy, chronic systolic CHF with EF 20% per echo in 12/2018, hypertension, hyperlipidemia, hypothyroidism, recurrent tongue cancer and stage III CKD who was initially admitted at [facility] from 2/10/2019 - 2/11/2019 for evaluation of a syncopal spell and management of CHF exacerbation. Cardiology recommended transfer to [facility] for ongoing evaluation of his cardiomyopathy, thus [facility] was [facility].     Ischemic Cardiomyopathy  Multivessel CAD  Hypertension, Hyperlipidemia  Noted to have EF 20% on echocardiogram in 12/2018 but unclear if cardiomyopathy was due to ischemia vs prior chemotherapy for tongue cancer. Initially presented to [facility] after a syncopal spell. Also endorsed shortness of breath and workup showed pulmonary vascular congestion with moderate bilateral pleural effusions with a proBMP of 83k. Initially diuresed with IV Lasix. As above, was [facility] for ongoing cardiac evaluation. Repeat echo on 2/12 showed EF 20-25% with areas of severe hypokinesis in the distribution of the LAD. Cardiac MRI on 2/13 showed a severely dilated LV with EF 15% and stress imaging showed a medium sized transmural infarct perfusion defects consistent with ischemia again in the distribution of the LAD. He ultimately underwent coronary angiography on 2/14 which showed 3V CAD with  of the RCA and LCx and subtotal occlusion of the mid LAD. He was not deemed to be a candidate for CABG given his tongue cancer and patient declined surgery. Patient stated he preferred medical therapy over PCI. Was initially continued on Toprol XL 25mg daily and lisinopril 20mg daily. Continues on statin. Aspirin added to regimen. Initially responded well to diuresis though BPs were running soft during stay. Wts remained  steadily elevated during hospitalization.  -- given he continued to desat with exertion and wts remained up 10 lbs this stay -- oral Lasix was stopped on 2/16 and he was placed on Lasix 20mg IV q6h  -- wts remain unchanged, Is/Os without significant change and Cr slightly up -- was placed on Lasix gtt on 2/17  -- repeat BMP this afternoon and if Cr conts trending up, will need to stop Lasix gtt  -- given his hx of hypotension, decreased Lisinopril to 5mg daily to allow for room to diurese  -- cont metoprolol XL at current dose of 25mg daily  -- conts on aspirin, Plavix added today per cardiology  -- cont statin -- changed to atorvastatin     Syncope, s/p ICD placement on 2/15  Suspected to have occurred secondary to his underlying severe cardiomyopathy.   Evaluated by EP this stay and underwent ICD placement on 2/15.      R Pleural Effusion s/p thoracentesis on 2/11  Secondary to CHF. Underwent thoracentesis on 2/11 with removal of 1.1L of pleural fluid.   -- using 1L NC at rest -- wean off O2 as able     Stage III CKD  Baseline Cr seems to be around 1.3. Renal function has remained stable.  -- Cr had been around 1.1 most of this hospitalization, now trending up -- 1.3 today, did receive contrast with procedures this stay  -- repeat labs this afternoon, if trending up this afternoon may need to stop Lasix gtt    Recurrent Tongue Cancer  Follows with Dr. Neil of  Oncology. Initially diagnosed in 9/2015. Treated with chemotherapy and radiation. Per patient, treatments were initially successful but has had a recent recurrence in 4/2018 and was treated with Keytruda from 4/2018 - 11/2018. Had head/neck CT on 2/11 at [facility] (was scheduled to have done per his oncologist)  -- no concerns at this time, can follow up with oncology after discharge     Hypothyroidism  Chronic and stable on levothyroxine    FEN: no IVFs, lytes stable, cardiac diet  DVT Prophylaxis: PCDs  Code Status: Full Code    Disposition: Plan per  cardiology is to continue diuresis today, changed from bolus dosing to Lasix gtt. Monitor response. Anticipate discharge in 1-2d pending adequate diuresis. PT/OT consulted to ensure safe discharge plan -- initial recommendation is for TCU (though patient prefers to discharge home)    Rachele Caldera    Interval History   Seen this morning. Resting comfortably eating breakfast. Says he's feeling better today -- breathing okay, feels stronger then yesterday. Still requiring 1L NC. Worked with therapy earlier his morning. Denies cp/sob/cough, abd pain/n/v. Appetite okay.     -Data reviewed today: I reviewed all new labs and imaging results over the last 24 hours. I personally reviewed no images or EKG's today.    Physical Exam   Temp: 97.4  F (36.3  C) Temp src: Oral BP: 103/69   Heart Rate: 87 Resp: 20 SpO2: (!) 88 %(rebounds to 90-91% with restPLB O2 1 L) O2 Device: None (Room air) Oxygen Delivery: 1 LPM  Vitals:    02/15/19 0637 02/16/19 0132 02/17/19 0245   Weight: 69.8 kg (153 lb 12.8 oz) 71.4 kg (157 lb 4.8 oz) 72 kg (158 lb 11.2 oz)     Vital Signs with Ranges  Temp:  [97.4  F (36.3  C)-97.7  F (36.5  C)] 97.4  F (36.3  C)  Heart Rate:  [78-87] 87  Resp:  [18-20] 20  BP: ()/(54-69) 103/69  SpO2:  [87 %-94 %] 88 %  I/O last 3 completed shifts:  In: 860 [P.O.:510; I.V.:350]  Out: 1050 [Urine:1050]    Constitutional: Resting comfortably, alert and answering questions appropriately, NAD  Respiratory: BS diminished at bilateral bases (R more so then L) but otherwise clear, no wheeze/rales/rhonchi  Cardiovascular: HRRR, no MGR, +bilateral LE edema to the knees  GI: S, NT, ND, +BS  Skin/Integumen: warm/dry  Other:      Medications     furosemide       - MEDICATION INSTRUCTIONS -         aspirin  81 mg Oral Daily     atorvastatin  40 mg Oral Daily     clopidogrel  75 mg Oral Daily     ipratropium - albuterol 0.5 mg/2.5 mg/3 mL  3 mL Nebulization 4x daily     levothyroxine  100 mcg Oral Daily     lisinopril   5 mg Oral Daily     metoprolol succinate ER  25 mg Oral Daily     multivitamin  with lutein  1 capsule Oral Daily     sodium chloride (PF)  3 mL Intracatheter Q8H       Data   Recent Labs   Lab 02/17/19  0523 02/15/19  0518 02/14/19  0542  02/12/19  0547 02/11/19  1657 02/11/19  1541  02/11/19  0535  02/10/19  1100   WBC  --   --  8.6  --  8.4  --   --   --  8.4  --  9.0   HGB  --   --  11.4*  --  12.0*  --   --   --  11.9*  --  12.8*   MCV  --   --  103*  --  102*  --   --   --  105*  --  106*   PLT  --   --  195  --  181 196  --    < > 178   < > 185   INR  --   --   --   --   --   --  1.59*  --   --   --   --     141 140   < > 143  --   --   --  141  --  136   POTASSIUM 4.4 4.2 4.1   < > 3.6  --   --   --  3.6  --  3.8   CHLORIDE 105 108 106   < > 108  --   --   --  104  --  101   CO2 25 26 26   < > 26  --   --   --  28  --  26   BUN 41* 36* 40*   < > 44*  --   --   --  43*  --  44*   CR 1.30* 1.09 1.08   < > 1.17  --   --   --  1.32*   < > 1.33*   ANIONGAP 8 7 8   < > 9  --   --   --  9  --  9   AMY 8.7 8.9 9.0   < > 9.0  --   --   --  8.8  --  9.4   GLC 90 97 113*   < > 106*  --   --   --  110*  --  205*   ALBUMIN  --   --   --   --   --   --   --   --   --   --  3.3*   PROTTOTAL  --   --   --   --   --   --   --   --   --   --  8.1   BILITOTAL  --   --   --   --   --   --   --   --   --   --  1.1   ALKPHOS  --   --   --   --   --   --   --   --   --   --  121   ALT  --   --   --   --   --   --   --   --   --   --  41   AST  --   --   --   --   --   --   --   --   --   --  61*   TROPI  --   --   --   --  0.504*  --   --   --  1.158*  --  0.066*    < > = values in this interval not displayed.       No results found for this or any previous visit (from the past 24 hour(s)).

## 2019-02-17 NOTE — PROGRESS NOTES
02/17/19 0803   Quick Adds   Type of Visit Initial Occupational Therapy Evaluation   Living Environment   Lives With alone   Living Arrangements independent living facility   Home Accessibility (elevator access)   Transportation Anticipated family or friend will provide   Self-Care   Regular Exercise (walks in apt building. active does not sit all day)   Equipment Currently Used at Home grab bar, toilet;grab bar, tub/shower;shower chair;raised toilet  (doesn't use a walker but will have one when goes home. )   Activity/Exercise/Self-Care Comment walk in shower with grab bars and shower chair, higher toielt with grab bars.    Functional Level   Ambulation 0-->independent   Transferring 0-->independent   Toileting 1-->assistive equipment   Bathing 1-->assistive equipment   Dressing 0-->independent   Eating 0-->independent   Communication 0-->understands/communicates without difficulty   Swallowing 0-->swallows foods/liquids without difficulty   Cognition 0 - no cognition issues reported   Fall history within last six months yes   Number of times patient has fallen within last six months 1   Prior Functional Level Comment uses [employer] for shopping and appts. but won;t be able to use this when return home. pt does own cleaning, laundry and cooking and manages own meds.    General Information   Onset of Illness/Injury or Date of Surgery - Date 02/11/19   Referring Physician Rachele Caldera, DO   Patient/Family Goals Statement home   Additional Occupational Profile Info/Pertinent History of Current Problem Patrick Diop is an 83 year old male with PMHx of cardiomyopathy, chronic systolic CHF with EF 20% per echo in 12/2018, hypertension, hyperlipidemia, hypothyroidism, recurrent tongue cancer and stage III CKD who was initially admitted at [facility] from 2/10/2019 - 2/11/2019 for evaluation of a syncopal spell and management of CHF exacerbation. Cardiology recommended transfer to  [facility] for ongoing evaluation of his cardiomyopathy, thus he was transferred to [facility] on 2/11/19.    Precautions/Limitations fall precautions  (ICD precautions)   Heart Disease Risk Factors High blood pressure;Dislipidemia;Lack of physical activity;Gender;Age   Cognitive Status Examination   Orientation orientation to person, place and time   Level of Consciousness alert   Follows Commands (Cognition) WNL   Memory intact   Attention No deficits were identified   Visual Perception   Visual Perception Wears glasses  (reading, has macular degeneration, unable to read smallprint)   Sensory Examination   Sensory Quick Adds No deficits were identified   Pain Assessment   Patient Currently in Pain No   Range of Motion (ROM)   ROM Comment B UE AROM WFL, within ICD precautions   Strength   Strength Comments R UE 4/5, L NT due to ICD   Transfer Skill: Bed to Chair/Chair to Bed   Level of [place]: Bed to Chair contact guard   Assistive Device - Transfer Skill Bed to Chair Chair to Bed Rehab Eval rolling walker   Transfer Skill: Sit to Stand   Level of [place]: Sit/Stand contact guard   Assistive Device for Transfer: Sit/Stand rolling walker   Lower Body Dressing   Level of [place]: Dress Lower Body contact guard   Instrumental Activities of Daily Living (IADL)   Previous Responsibilities meal prep;housekeeping;laundry;shopping;medication management;finances   Activities of Daily Living Analysis   Impairments Contributing to Impaired Activities of Daily Living balance impaired;post surgical precautions;strength decreased  (decreased activity tolernace)   General Therapy Interventions   Planned Therapy Interventions ADL retraining;transfer training;home program guidelines;progressive activity/exercise;risk factor education   Clinical Impression   Criteria for Skilled Therapeutic Interventions Met yes, treatment indicated   OT Diagnosis decreased ADL/IADL's and functional mobiltiy   Influenced by  "the following impairments impaired balance, activity tolerance, strength, post angio/MI precautoins   Assessment of Occupational Performance 3-5 Performance Deficits   Identified Performance Deficits impaired I with dressing, toilet and shower transfer, laudnry, cleaning and shopping.    Clinical Decision Making (Complexity) Moderate complexity   Therapy Frequency daily   Predicted Duration of Therapy Intervention (days/wks) 4 days   Anticipated Discharge Disposition Transitional Care Facility;Home with Home Therapy;Home with Assist  (pending progress)   Risks and Benefits of Treatment have been explained. Yes   Patient, Family & other staff in agreement with plan of care Yes   [facility] AM-PAC  \"6 Clicks\" Daily Activity Inpatient Short Form   1. Putting on and taking off regular lower body clothing? 3 - A Little   2. Bathing (including washing, rinsing, drying)? 3 - A Little   3. Toileting, which includes using toilet, bedpan or urinal? 3 - A Little   4. Putting on and taking off regular upper body clothing? 4 - None   5. Taking care of personal grooming such as brushing teeth? 3 - A Little   6. Eating meals? 4 - None   Daily Activity Raw Score (Score out of 24.Lower scores equate to lower levels of function) 20   Total Evaluation Time   Total Evaluation Time (Minutes) 10     "

## 2019-02-17 NOTE — PROGRESS NOTES
Paged Dr Calderón regarding starting IV lasix. Took some time to get med and now BP 91/56. MD said start drip, but check BP periodically and report if it should drop more.

## 2019-02-17 NOTE — PLAN OF CARE
Hypotensive. Dyspnea with walking. BM today. IV lasix drip initiated at 7.5mg/hr. Lungs diminished. New pacer last week; VVI low rate at 40; BPM 68-97 this shift. A+Ox4. Neuros intact. Eating at 75%. Voiding small amounts frequently. Ambulates with walker. Stands to use urinal in room. Advise cardiology if BP drops significantly; no range given.

## 2019-02-17 NOTE — PROGRESS NOTES
Assessment and Plan:   This is a very pleasant 83-year-old male who I had met once at [facility] in clinic for new onset of LV dysfunction a couple of months ago.  At that time the patient was severely volume overloaded.  He had marked LV dysfunction and 2 diagnoses were entertained at that point one was chemotherapy-induced heart failure versus coronary disease.  Plan at that time was to diurese him and then do a stress MRI.  However it looks like patient missed 1 of the clinic appointments that was scheduled to discuss that subsequently had an episode of syncope and was admitted to the hospital.  He had an NSTEMI during that admission.  ER had called me at [facility] and I recommended transfer to [facility].  Here he underwent a coronary angiography which showed multivessel disease in mid LAD and the right coronary artery.  He underwent a cardiac MRI that showed poor viability and extensive scarring.  While bypass was offered patient declined that but again the MRI did show low likelihood of recovery either way.  As such medical management has been decided.  He denies angina.  Does have severe LV dysfunction and tolerating modest doses of heart failure medications at this time.  Given his syncope he underwent single-chamber secondary prevention ICD during this admission as well.    1. Severe ischemic cardiomyopathy with ejection fraction of 20%  2. 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 Secondary prevention ICD for syncope in the setting of severe LV dysfunction  4. Multivessel coronary artery disease with poor viability on MRI under medical management.  No angina  5. Right pleural effusion s/p thoracentesis 1.1 L this admission.    Saturations still continue to drop with ambulation. He is still not very steady on his feet.  His weight is increasing during this hospital stay.  Clinically he does appear volume overloaded with congestion in his lungs, 1+ bilateral lower  extremity edema and elevated neck veins.  He needs further augmentation of diuretic therapy.       Start lasix infusion at 7.5 mg/ hour. BMP in the evening. If creatinine is further worsening this evening, then stop lasix infusion and RHC tomorrow for accurate assessment of volume status - he is tenuous and high risk for readmissions. ALEXI may not be from diuresis as clinically he appears volume up including his weight, and may rather be from contrast from cath.     Continue metoprolol XL 25 daily  and lisinopril backed off to 5 mg daily due to soft BP.     Please have PT and OT see him to ensure safety at home to stay alone.    Outpatient cardiomems referral and very close follow-up in core clinic.      Patient lives in [place] and has significant issues traveling.  His daughter is very supportive but she has her own commitments which make it challenging for her to get patient to appointments.  The need to either consider a closer senior living facility or discussed with  to see if they can get outpatient support for transportation.    While at this time the patient does not need mechanical circulatory support (unsure if he is even a candidate for 1) he would be interested if progressive LV decline were to ensue in the future.  However his candidacy with cancer age and frailty make him a suboptimal candidate for this.  I will have him see 1 of my partners at the [facility] Dr. Guzman for an opinion.    Have changed simvastatin to Atorvastatin 40 mg daily and added clopidogrel to his current CAD regimen given NSTEMI.    I spent significant time with him and his family. Explained CAD, viability, ICD rationale and utility and living with HF and prognosis.    Lionel Calderón MD        Interval History:   No overnight issues, desaturation with walking          Medications:       aspirin  81 mg Oral Daily     atorvastatin  40 mg Oral Daily     azithromycin  500 mg Intravenous Q24H      cefTRIAXone  1 g Intravenous Q24H     clopidogrel  75 mg Oral Daily     ipratropium - albuterol 0.5 mg/2.5 mg/3 mL  3 mL Nebulization 4x daily     levothyroxine  100 mcg Oral Daily     lisinopril  5 mg Oral Daily     metoprolol succinate ER  25 mg Oral Daily     multivitamin  with lutein  1 capsule Oral Daily     sodium chloride (PF)  3 mL Intracatheter Q8H            Physical Exam:     Patient Vitals for the past 24 hrs:   BP Temp Temp src Heart Rate Resp SpO2 Weight   02/17/19 0827 103/69 -- -- 87 -- (!) 88 % --   02/17/19 0818 110/67 -- -- 85 -- 94 % --   02/17/19 0245 -- -- -- -- -- -- 72 kg (158 lb 11.2 oz)   02/17/19 0028 98/60 97.4  F (36.3  C) Oral -- 20 92 % --   02/16/19 2007 -- -- -- -- -- 93 % --   02/16/19 2000 93/58 97.4  F (36.3  C) Oral 79 20 (!) 87 % --   02/16/19 1611 92/68 97.7  F (36.5  C) Oral 82 18 92 % --   02/16/19 1129 108/61 -- -- 84 18 91 % --   02/16/19 1029 (!) 84/54 -- -- 78 18 93 % --   02/16/19 1002 (!) 82/55 -- -- -- -- -- --   02/16/19 1000 94/60 -- -- -- -- -- --   02/16/19 0930 -- -- -- -- -- 90 % --   02/16/19 0915 -- -- -- -- 20 (!) 80 % --     Vitals:    02/11/19 2235 02/11/19 2357 02/12/19 0500 02/13/19 0340   Weight: 67 kg (147 lb 11.2 oz) 67.1 kg (147 lb 14.4 oz) 66.5 kg (146 lb 9.6 oz) 67.3 kg (148 lb 4.8 oz)    02/14/19 0251 02/15/19 0637 02/16/19 0132 02/17/19 0245   Weight: 68.9 kg (151 lb 12.8 oz) 69.8 kg (153 lb 12.8 oz) 71.4 kg (157 lb 4.8 oz) 72 kg (158 lb 11.2 oz)         Intake/Output Summary (Last 24 hours) at 2/16/2019 1301  Last data filed at 2/16/2019 1047  Gross per 24 hour   Intake 830 ml   Output 350 ml   Net 480 ml       02/12 0700 - 02/17 0659  In: 4026.75 [P.O.:2160; I.V.:1866.75]  Out: 3650 [Urine:3650]  Net: 376.75    Exam:  GENERAL APPEARANCE ADULT: Alert, in no acute distress  RESP: crackles bilateral bases  CV: regular rate and rhythm, no murmur, rub, or gallop  ABDOMEN: no guarding or rebound  EXTREMITIES: Edema 1+         Data:   LABS (Last four  results)  CMP  Recent Labs   Lab 02/17/19  0523 02/15/19  0518 02/14/19  0542 02/13/19  0535  02/10/19  1100    141 140 143   < > 136   POTASSIUM 4.4 4.2 4.1 3.7   < > 3.8   CHLORIDE 105 108 106 109   < > 101   CO2 25 26 26 27   < > 26   ANIONGAP 8 7 8 7   < > 9   GLC 90 97 113* 100*   < > 205*   BUN 41* 36* 40* 40*   < > 44*   CR 1.30* 1.09 1.08 1.11   < > 1.33*   GFRESTIMATED 51* 63 64 62   < > 50*   GFRESTBLACK 59* 73 74 72   < > 58*   AMY 8.7 8.9 9.0 9.1   < > 9.4   PROTTOTAL  --   --   --   --   --  8.1   ALBUMIN  --   --   --   --   --  3.3*   BILITOTAL  --   --   --   --   --  1.1   ALKPHOS  --   --   --   --   --  121   AST  --   --   --   --   --  61*   ALT  --   --   --   --   --  41    < > = values in this interval not displayed.     CBC  Recent Labs   Lab 02/14/19  0542 02/12/19  0547 02/11/19  1657 02/11/19  1510 02/11/19  0535  02/10/19  1100   WBC 8.6 8.4  --   --  8.4  --  9.0   RBC 3.41* 3.59*  --   --  3.51*  --  3.75*   HGB 11.4* 12.0*  --   --  11.9*  --  12.8*   HCT 35.0* 36.6*  --   --  36.7*  --  39.9*   * 102*  --   --  105*  --  106*   MCH 33.4* 33.4*  --   --  33.9*  --  34.1*   MCHC 32.6 32.8  --   --  32.4  --  32.1   RDW 13.3 13.3  --   --  12.9  --  12.8    181 196 201 178   < > 185    < > = values in this interval not displayed.     INR  Recent Labs   Lab 02/11/19  1541   INR 1.59*     TROPONINS   Lab Results   Component Value Date    TROPI 0.504 (HH) 02/12/2019    TROPI 1.158 (HH) 02/11/2019    TROPI 0.066 (H) 02/10/2019                                                                                                               Lionel Calderón MD

## 2019-02-17 NOTE — PLAN OF CARE
A&Ox4. Tele SR. VSS. Pt. satting between 92-95% on RA when awake. O2 dropped to 87% while sleeping. Pt. placed on 1L NC, now satting in the mid 90's. Dyspneic on exertion. Pt. denies pain and nausea. +1 edema in BLE. Good appetite. Lasix given this evening, adequate urine output. No BM. Up with SBA with walker. Pt. ambulated x1 in the ontiveros. No acute events. Will continue POC and notify MD with changes.

## 2019-02-17 NOTE — PLAN OF CARE
Discharge Planner OT   Patient plan for discharge: home  Current status: Eval completed and treatment initiated. Pt lives in a sr apt alone and reports I with ADL/IADl'sa nd functional mobility prior to admit. Pt I with med mgmt, laundry, cleaning and cooking. Pt does not drive but takes local bus. Pt has macular degeneration. Pt admitted due to for evaluation of a syncopal spell and management of CHF exacerbation. Cardiology recommended transfer to [facility] for ongoing evaluation of his cardiomyopathy, thus he was transferred to [facility] on 2/11/19.s/p angio indicating 2 vessel CAD, declined bypass or stent and is medical mgmt and s/p ICD. Pt ambulated with ww with CGA/SBA for safety approx 120 ft with O2 sats drop to 88-91%, upon return O2 replaced 1 L and PLB cues and rest O2 sats rebounds to 92%, BP hypotensive response, however, pt denies any dizziness and reports he feels better today than yesterday and able to walk further without SOB. Pt requires SBA/CGA for LE ADL's and toileting. Pt alert and oriented x4 and educated in CHF/EF self care management with low sodium diet , exercise, weighing self daily, we safety etc. Pt reports he wants to return home to try with A with IADL's and does not want to go to TCU at this time.   Barriers to return to prior living situation: decreased activity tolerance, balance, strength, PPM precautions.   Recommendations for discharge: TCU  Rationale for recommendations: daily therapy to increase ADL and functional mobiltiy I and safety to PLOF. Pt declines TCU and wants to try going home with A with IADl's from [facility] ie laundry, cleaning, cooking, etc and recommend Home RN for med mgmt, OT for ADL/IADL's and home safety and PT for balance and strengthening. Pt lives in [place] and his dtr lives in [facility].        Entered by: Brenna Lassiter 02/17/2019 8:44 AM

## 2019-02-18 NOTE — PROGRESS NOTES
"BRIEF NUTRITION ASSESSMENT      REASON FOR ASSESSMENT:  LOS      CURRENT DIET AND INTAKE:  Diet:  2400 mg Na, LSF            Room Service with Assist              Chart reviewed  Note pt is followed by CORE clinic  Discharge planning in progress  Pt is being seen daily for meal ordering assistance  Flowsheets reflect pt has been consuming ~75% meals  Lunch today - eggs, pancake, magic cup, lemonade    ANTHROPOMETRICS:  Height: 5'7\"  Weight: (2/18) 69.3 kg  BMI: 23.9 kg/m2  IBW:  67.3 kg  Weight Status: Normal BMI  %IBW: 103%  Weight History:   Vitals:    02/11/19 2235 02/11/19 2357 02/12/19 0500 02/13/19 0340   Weight: 67 kg (147 lb 11.2 oz) 67.1 kg (147 lb 14.4 oz) 66.5 kg (146 lb 9.6 oz) 67.3 kg (148 lb 4.8 oz)    02/14/19 0251 02/15/19 0637 02/16/19 0132 02/17/19 0245   Weight: 68.9 kg (151 lb 12.8 oz) 69.8 kg (153 lb 12.8 oz) 71.4 kg (157 lb 4.8 oz) 72 kg (158 lb 11.2 oz)    02/18/19 0027 02/18/19 0652   Weight: 70.4 kg (155 lb 4.8 oz) 69.3 kg (152 lb 11.2 oz)         LABS:  Labs noted      NUTRITION INTERVENTION:  Nutrition Diagnosis:  No nutrition diagnosis at this time.    Implementation:  Nutrition Education ---> Per Provider order     FOLLOW UP/MONITORING:   Will re-evaluate in 7 - 10 days, or sooner, if re-consulted.          "

## 2019-02-18 NOTE — DISCHARGE INSTRUCTIONS
Discharge Instructions for Pacemaker/ICD Implantation  You have had a procedure to insert a pacemaker/ICD. Once inside your body, this small electronic device helps keep your heart from beating too slowly. A pacemaker can t fix existing heart problems. But it can help you feel better and have more energy. As you recover, follow all of the instructions you are given, including those below.  Activity    Follow the instructions you are given about limiting your activity.    Do not raise your arm on the LEFT side above shoulder level for 3 weeks. This gives the device lead wires time to attach securely inside your heart.    You can still exercise. It s good for your body and your heart. Talk with your doctor about an exercise plan.  Other Precautions    Follow your doctor's directions carefully for wound care. You may remove the outer dressing on Monday afternoon. Leave the steri-strips in place; these will be removed at your 1 week follow-up. Never put any creams, lotions, or products like peroxide on an incision unless your doctor tells you to.     Before you receive any treatment, tell all health care providers (including your dentist) that you have a pacemaker.    You will be sent an ID card (4-6 weeks) that contains information about your pacemaker. Always carry this card with you. You can show this card if your pacemaker/ICD sets off a metal detector. You should also show it to avoid screening with a hand-held security wand.    Keep your cell phone away from your pacemaker. Don t carry the phone in your shirt pocket, even when it s turned off.    Avoid strong electrical fields. Examples are those made by radio transmitting towers,  ham  radios, and heavy-duty electrical equipment.    Avoid leaning over the open moss of a running car. A running engine creates an electrical field. Most household and yard appliances will not cause any problems. If you use any large power tools, such as an industrial ,  talk with your doctor.   Follow-Up    Monday 2/25 at 3:15 in [place] [address], [facility]    Main clinic number / after hours on call doctor: [phone]    Device clinic number: [phone]  When to Call Your Doctor  Call your doctor immediately if you have any of the following:    Dizziness    Chest pain    Fainting spells    Twitching chest muscles    Rapid pulse or pounding heartbeat    Shortness of breath    Pain around your pacemaker    Fever above 100.4 F (38 C) or other signs of infection (redness, swelling, drainage, or warmth at the incision site)     [id number] [employer]. [address] [place] [address], [place] [id number]. All rights reserved. This information is not intended as a substitute for professional medical care. Always follow your healthcare professional's instructions.

## 2019-02-18 NOTE — PLAN OF CARE
A&Ox4. Hypotensive, much improved since stopping Lasix infusion. Other VSS on RA. Up with SB. Encouraged to walk halls with staff. Uses the urinal at the bedside. Incontinent at times. Denies pain, nausea, SOB. Occasional MILLER. Lung sounds clear, diminished in bilateral bases. ICD dressing has dried drainage, no change. Thoracenteses site has bandaid CDI. CMS intact. BLE spike. IV SL. Scheduled nebs. Tele SR with 1st degree AV block. Continue to monitor.

## 2019-02-18 NOTE — PLAN OF CARE
Discharge Planner OT   Patient plan for discharge: home  Current status: SBA for sit <> stand with FWW. Pt on 1 L O2 at start of session, removed for ambulation into bathroom. SBA with FWW for functional ambulation in hallway with emphasis on home safety with FWW. Pt steady on feet and required 1 rest break, O2 sats at 89% following activity, pt denies SOB. SBA for toilet transfer with use of grab bars. Pt stood at sink with SBA and FWW for g/h at sink. Declined dressing activities and reports no concerns. Discussed shower safety, pt has small lip to step over but has shower chair. Encouraged pt that if he does return home to  have A for IADL's and bathing.   Barriers to return to prior living situation: decreased activity tolerance, balance, strength, PPM precautions.   Recommendations for discharge: TCU  Rationale for recommendations: daily therapy to increase ADL and functional mobiltiy I and safety to PLOF. Pt declines TCU and wants to try going home with A with IADl's from A ie laundry, cleaning, cooking, etc and recommend Home RN for med mgmt, OT for ADL/IADL's and home safety and PT for balance and strengthening. Pt lives in [place] and his dtr lives in [facility].        Entered by: Thu Ruiz 02/18/2019 8:53 AM

## 2019-02-18 NOTE — PROGRESS NOTES
"SPIRITUAL HEALTH SERVICES Progress Note  [facility]    Initial visit per LOS.  Pt's dtr was with pt and both report that pt might DSC home today.  Pt and daughter said (smiling): \"keep your fingers crossed\" and we all held up our hands w crossed fingers.  Pt cites no current need or concerns, but thanked SH for stopping by.   reminded pt of  availability and wished him well.                                                                                                                                            Trevon Miles M.Div., [facility]  Staff   Pager [phone]    "

## 2019-02-18 NOTE — PLAN OF CARE
Discharge Planner PT   Patient plan for discharge: home  Current status: Pt sitting up in chair at time of arrival. Dr. Caldera present and requesting to trial amb without supplemental O2. Pt continues with soft BP (see flowsheet), however denies lightheadedness at rest or with activity. Sit to stand with SBA with FWW (demos good carryover with ICD precautions and hand placement). Amb with FWW on RA 100ft with close SBA, flat foot contact and decreased step length, cue to correct. SPO2 during ambulation mostly >90%, however 2 brief episodes of very brief 89% and 90-91% post amb. Reviewed supine to sit with increased effort and SBA from flat HOB. Stand step transfer with FWW x 2 reps with SBA.   Barriers to return to prior living situation: lives alone, impaired balance, decreased strength, PPM precautions  Recommendations for discharge: TCU  Rationale for recommendations: Pt currently below baseline level of mobility. Recommend TCU to maximize pt's level of IND and safety prior to return home. Pt currently wishes to go home. If pt continues to decline TCU, recommend home PT, OT, RN and use of walker for all mobility.         Entered by: Rosa Cruz 02/18/2019 11:51 AM

## 2019-02-18 NOTE — PLAN OF CARE
A&Ox4. VSS on 1L O2 NC except soft BP's. Tele NSR. Up SBA/A1/W. Using urinal at bedside, adequate output. IV lasix gtt at 7.5ml/hr. Denies pain, SOB, but states some MILLER. LS dim in bases. ICD dressing with dried drainage, no change. Thoracentesis site with bandaid CDI. Continue to monitor.

## 2019-02-18 NOTE — PROVIDER NOTIFICATION
Hospitalist MD Caldera paged to confirm ok to stop lasix drip d/t low BP. RN given verbal orders to stop drip.     RN also paged Livia MARTIN to notify. Will await call back.     Addendum: Livia MARTIN called back and confirmed to stop lasix infusion. She will be up to assess patient soon.

## 2019-02-18 NOTE — PROGRESS NOTES
Device Discharge     Dressing:  Clean, dry, and intact  Chest XR reviewed:  Yes  Pneumo present?:  No   Adequate placement of leads?: Yes  Lead Measurements per Device Rep:  WNL  7 day check scheduled for: 2/25 at 3:15 in [place]    Education Provided:  Avoid raising left arm above the level of the shoulder for 3 weeks.  Do not shower until outer occlusive dressing has been removed.  Remove outer dressing in 3 - 4 days.  Leave steri-strips in place, these will be removed at the 1 week check.   Call Device Clinic with increased swelling, drainage, or fever > 101 degrees.   Recommended no driving for 1 week if PPM; 6 months for ICD if for secondary prevention  Reviewed [employer] monitor and instructed to set up next to bedside - Get connected will be calling in a few days  Follow-up with the Device Clinic as scheduled.     Patient/family verbalized understanding of all instructions. BIANCA

## 2019-02-18 NOTE — PROGRESS NOTES
[facility]    Hospitalist Progress Note    Assessment & Plan   Patrick Diop is an 83 year old male with PMHx of cardiomyopathy, chronic systolic CHF with EF 20% per echo in 12/2018, hypertension, hyperlipidemia, hypothyroidism, recurrent tongue cancer and stage III CKD who was initially admitted at [facility] from 2/10/2019 - 2/11/2019 for evaluation of a syncopal spell and management of CHF exacerbation. Cardiology recommended transfer to [facility] for ongoing evaluation of his cardiomyopathy, thus he was transferred to [facility] on 2/11/19.     Ischemic Cardiomyopathy  Multivessel CAD  Hypertension, Hyperlipidemia  Noted to have EF 20% on echocardiogram in 12/2018 but unclear if cardiomyopathy was due to ischemia vs prior chemotherapy for tongue cancer. Initially presented to [facility] after a syncopal spell. Also endorsed shortness of breath and workup showed pulmonary vascular congestion with moderate bilateral pleural effusions with a proBMP of 83k. Initially diuresed with IV Lasix. As above, was transferred to [facility] on 2/11 for ongoing cardiac evaluation. Repeat echo on 2/12 showed EF 20-25% with areas of severe hypokinesis in the distribution of the LAD. Cardiac MRI on 2/13 showed a severely dilated LV with EF 15% and stress imaging showed a medium sized transmural infarct perfusion defects consistent with ischemia again in the distribution of the LAD. He ultimately underwent coronary angiography on 2/14 which showed 3V CAD with  of the RCA and LCx and subtotal occlusion of the mid LAD. He was not deemed to be a candidate for CABG given his tongue cancer and patient declined surgery. Patient stated he preferred medical therapy over PCI. Was initially continued on Toprol XL 25mg daily and lisinopril 20mg daily. Continues on statin. Aspirin added to regimen. Initially responded well to diuresis though BPs were running soft during stay. Wts remained  steadily elevated during hospitalization.  -- given he continued to desat with exertion and wts remained up 10 lbs this stay -- oral Lasix was stopped on 2/16 and he was placed on Lasix 20mg IV q6h; wts remained unchanged, Is/Os without significant change so was placed on Lasix gtt on 2/17  -- defer to cards as to plan for diuresis (?cont gtt today vs transition to bolus IV dosing vs po dosing)  -- given his hx of hypotension, decreased Lisinopril to 5mg daily to allow for room to diurese  -- cont metoprolol XL at current dose of 25mg daily  -- conts on aspirin, Plavix added today per cardiology  -- cont statin -- changed to atorvastatin this stay     Syncope, s/p ICD placement on 2/15  Suspected to have occurred secondary to his underlying severe cardiomyopathy.   Evaluated by EP this stay and underwent ICD placement on 2/15.      R Pleural Effusion s/p thoracentesis on 2/11  Secondary to CHF. Underwent thoracentesis on 2/11 with removal of 1.1L of pleural fluid.   -- cont diuresis as above  -- using 1L NC at rest -- wean off O2 as able  -- encourage OOB and use of IS      Stage III CKD  Baseline Cr seems to be around 1.3. Renal function has remained stable.  -- Cr had been around 1.1 most of this hospitalization, peaked at 1.3 on 2/17 following contrast with procedures  -- renal function now normalizing with ongoing diuresis    Recurrent Tongue Cancer  Follows with Dr. Neil of  Oncology. Initially diagnosed in 9/2015. Treated with chemotherapy and radiation. Per patient, treatments were initially successful but has had a recent recurrence in 4/2018 and was treated with Keytruda from 4/2018 - 11/2018. Had head/neck CT on 2/11 at [facility] (was scheduled to have done per his oncologist)  -- no concerns at this time, can follow up with oncology after discharge     Hypothyroidism  Chronic and stable on levothyroxine    FEN: no IVFs, lytes stable, cardiac diet  DVT Prophylaxis: PCDs  Code Status: Full  Code    Disposition: Await recs per cardiology regarding ongoing diuresis. Antipate discharge in 1-2d pending adequate diuresis. PT/OT consulted for discharge planning -- initial recommendation is for TCU (though patient prefers to discharge home). SW/CC following.    Rachele Caldera    Interval History   Seen this morning. Feeling well. No specific complaints. Breathing okay. Still using 1L NC. BPs soft but denies feeling dizzy or lightheaded. Says he's urinating frequently but in small amounts. Denies cp/sob/cough, abd pain/n/v. Eating/drinking okay. Ambulating in hallways with staff.     -Data reviewed today: I reviewed all new labs and imaging results over the last 24 hours. I personally reviewed no images or EKG's today.    Physical Exam   Temp: 97.5  F (36.4  C) Temp src: Oral BP: 94/56   Heart Rate: 72 Resp: 18 SpO2: 96 % O2 Device: Nasal cannula Oxygen Delivery: 1 LPM  Vitals:    02/17/19 0245 02/18/19 0027 02/18/19 0652   Weight: 72 kg (158 lb 11.2 oz) 70.4 kg (155 lb 4.8 oz) 69.3 kg (152 lb 11.2 oz)     Vital Signs with Ranges  Temp:  [97.3  F (36.3  C)-97.5  F (36.4  C)] 97.5  F (36.4  C)  Heart Rate:  [68-84] 72  Resp:  [16-20] 18  BP: ()/(52-64) 94/56  SpO2:  [91 %-98 %] 96 %  I/O last 3 completed shifts:  In: -   Out: 2170 [Urine:2170]    Constitutional: Resting comfortably, alert and answering questions appropriately, NAD  Respiratory: improved air mvmt at lung bases, otherwise CTAB, no wheeze/rales/rhonchi  Cardiovascular: HRRR, no MGR, +bilateral LE edema to the knees  GI: S, NT, ND, +BS  Skin/Integumen: warm/dry  Other:      Medications     furosemide 7.5 mg/hr (02/18/19 7349)     - MEDICATION INSTRUCTIONS -         aspirin  81 mg Oral Daily     atorvastatin  40 mg Oral Daily     clopidogrel  75 mg Oral Daily     ipratropium - albuterol 0.5 mg/2.5 mg/3 mL  3 mL Nebulization 4x daily     levothyroxine  100 mcg Oral Daily     lisinopril  5 mg Oral Daily     metoprolol succinate ER  25  mg Oral Daily     multivitamin  with lutein  1 capsule Oral Daily     sodium chloride (PF)  3 mL Intracatheter Q8H       Data   Recent Labs   Lab 02/18/19  0545 02/17/19  1553 02/17/19  0523  02/14/19  0542  02/12/19  0547 02/11/19  1657 02/11/19  1541   WBC  --   --   --   --  8.6  --  8.4  --   --    HGB  --   --   --   --  11.4*  --  12.0*  --   --    MCV  --   --   --   --  103*  --  102*  --   --    PLT  --   --   --   --  195  --  181 196  --    INR  --   --   --   --   --   --   --   --  1.59*    138 138   < > 140   < > 143  --   --    POTASSIUM 4.1 4.7 4.4   < > 4.1   < > 3.6  --   --    CHLORIDE 105 104 105   < > 106   < > 108  --   --    CO2 28 26 25   < > 26   < > 26  --   --    BUN 40* 39* 41*   < > 40*   < > 44*  --   --    CR 1.21 1.24 1.30*   < > 1.08   < > 1.17  --   --    ANIONGAP 7 8 8   < > 8   < > 9  --   --    AMY 9.1 9.0 8.7   < > 9.0   < > 9.0  --   --    GLC 88 98 90   < > 113*   < > 106*  --   --    TROPI  --   --   --   --   --   --  0.504*  --   --     < > = values in this interval not displayed.       No results found for this or any previous visit (from the past 24 hour(s)).

## 2019-02-18 NOTE — PROGRESS NOTES
[facility]  Cardiology Progress Note  Date of Service: 02/18/2019  Primary Cardiologist: Dr. Calderón    Assessment & Plan    Patrick Diop is a 83 year old male with past medical history significant for chronic systolic heart failure, cardiomyopathy with LVEF 20%, HTN, HLP, hypothyroidism, recurrent tongue cancer, CKD, stage III admitted on 2/11/2019 from [facility] with a syncopal spell and was transferred to [facility] for CHF management    Assessment and Plan:   1. Severe ischemic cardiomyopathy: LVEF 20% with severe hypokinesis in the LAD distribution (unclear if etiology is ischemic vs chemotherapy induced). MRI 2/13 showed severely dilated LV with LVEF 15%. Outpatient follow up with Dr. Guzman at [facility] with advanced heart failure for evaluation for a potential LVAD.     Net negative 2 L    Weight up 5 lbs from admission    Hold diuretics at this time due to hypotension and reassess tomorrow    2. Hypotension: lasix gtt discontinued and lisinopril decreased to 2.5 mg earlier today    3. Multivessel CAD: Stress images showed medium sized transmural infarct perfusion defects consistent with ischemia again in the distribution of the LAD. He ultimately underwent coronary angiography on 2/14 which showed 3V CAD with  of the RCA and LCx and subtotal occlusion of the mid LAD. He was not deemed to be a candidate for CABG given his tongue cancer and patient declined surgery.     Metoprolol XL, statin, ASA and Plavix    Changed to atorvastatin this admission    4. Recurrent tongue cancer on chemotherapy: follows with Dr. Neil and was initially diagnosed in 9/2015. Treated with chemo and radiation. Treatment was initially successful, but had a recurrence 4/2018 and treated with Keytruda.     5. Secondary prevention with ICD for syncope and severe LV dysfunction on 2/15    6. Multivessel CAD with poor viabiity on MRI: Medical management    7. Right pleural effusion: s/p thoracentesis  with 1.1 L removal this admission     8. Stage III, CKD: baseline creatine 1.3    NATALY REIS CNP  Pager:  [phone]   Text Page  (7am - 5pm, M-F)    Interval History   Hypotensive and Lasix drip discontinued. Aysmptomatic    Physical Exam   Temp: 97.5  F (36.4  C) Temp src: Oral BP: (!) 79/42   Heart Rate: 81 Resp: 18 SpO2: 90 %(very briefly 89%, however recovered within few seconds) O2 Device: None (Room air) Oxygen Delivery: 1 LPM  Vitals:    02/17/19 0245 02/18/19 0027 02/18/19 0652   Weight: 72 kg (158 lb 11.2 oz) 70.4 kg (155 lb 4.8 oz) 69.3 kg (152 lb 11.2 oz)       Constitutional:   NAD   Skin:   Warm and dry   Head:   Nontraumatic   Neck:   no JVD   Lungs:   symmetric, clear to auscultation   Cardiovascular:   regular rate and rhythm   Abdomen:   Benign   Extremities and Back:   Edema 1+   Neurological:   Grossly nonfocal       Medications     furosemide Stopped (02/18/19 1325)     - MEDICATION INSTRUCTIONS -         aspirin  81 mg Oral Daily     atorvastatin  40 mg Oral Daily     clopidogrel  75 mg Oral Daily     ipratropium - albuterol 0.5 mg/2.5 mg/3 mL  3 mL Nebulization 4x daily     levothyroxine  100 mcg Oral Daily     lisinopril  5 mg Oral Daily     metoprolol succinate ER  25 mg Oral Daily     multivitamin  with lutein  1 capsule Oral Daily     sodium chloride (PF)  3 mL Intracatheter Q8H       Data     Most Recent 3 CBC's:  Recent Labs   Lab Test 02/14/19  0542 02/12/19  0547 02/11/19  1657  02/11/19  0535   WBC 8.6 8.4  --   --  8.4   HGB 11.4* 12.0*  --   --  11.9*   * 102*  --   --  105*    181 196   < > 178    < > = values in this interval not displayed.     Most Recent 3 BMP's:  Recent Labs   Lab Test 02/18/19  0545 02/17/19  1553 02/17/19  0523    138 138   POTASSIUM 4.1 4.7 4.4   CHLORIDE 105 104 105   CO2 28 26 25   BUN 40* 39* 41*   CR 1.21 1.24 1.30*   ANIONGAP 7 8 8   AMY 9.1 9.0 8.7   GLC 88 98 90     Most Recent 3 Troponin's:  Recent Labs   Lab Test  02/12/19  0547 02/11/19  0535 02/10/19  1100   TROPI 0.504* 1.158* 0.066*

## 2019-02-18 NOTE — PLAN OF CARE
VSS, BP's soft in the 90's/50's range. Up with assist x1 MILLER. Denies pain. Lasix drip infusing pt voiding small amounts frequently.

## 2019-02-19 NOTE — PLAN OF CARE
Pt has been pain free with V/Signs stable and maintaining his O2 sats in the mid 90''s on Room air. Pt is still up in weight since admission so Lasix gtt was restarted at 5 mg at 13:00 after new IV was restarted. Today's CR 1.25. Tolerating increase in activity well. Remains in Sinus [place] with 1st A-V.

## 2019-02-19 NOTE — PLAN OF CARE
OT: pt declined all attempts to engage in OT session at this time, pt c/o fatigue after just working with PT and amb. Nursing in room and aware of pt refusal at this time.

## 2019-02-19 NOTE — PLAN OF CARE
Discharge Planner OT   Patient plan for discharge: return to ILF   Current status: SBA sit <> stand with FWW. SBA for toilet transfer with use of grab bars. discussed shower set up - has shower chair and educated on getting non skid mat- daughter present and taking notes. discussed increasing A for laundry and cleaning. Discussed FWW safety within home environement. SBA for ambulation, steady on feet.   Barriers to return to prior living situation: dec activity tolerance   Recommendations for discharge: return to ILF with increased services for laundry and cleaning and recommend using FWW for mobility. HHOT   Rationale for recommendations: pt making good progress with OT this date. SBA for mobility and toilet transfer with FWW and no LOB. Discussed increasing safety within home environment and daughter present and agreeable to suggestions. Recommend pt continue using FWW for mobility. Pt will benefit from HHOT for home safety evaluation as pt requires assistive device and another person to leave home.        Entered by: Thu Ruiz 02/19/2019 4:12 PM

## 2019-02-19 NOTE — PLAN OF CARE
AO. Hypotensive, other VSS on RA. Tele: SR with 1st degree AV block and BBB. Denies pain. LS clear. MILLER, getting scheduled nebs. ICD dressing with dried drainage. Thora site on left lower back, CDI. BLE spike. IV SL. Uses urinal at the bedside, incontinent at times. Low sodium diet, good po. Up SBA.

## 2019-02-19 NOTE — CONSULTS
Care Transition Initial Assessment -   Reason For Consult: discharge planning  Met with: Patient and Family  (daughter Florecita)  Active Problems:    CHF exacerbation (H)       DATA  Lives With: alone   Living Arrangements: independent living facility  Quality of Family Relationships: supportive, involved  Description of Support System: Supportive, Involved  Who is your support system?: Children  Support Assessment: Adequate family and caregiver support.   Identified issues/concerns regarding health management:       Quality of Family Relationships: supportive, involved  Transportation Anticipated: family or friend will provide    Per care transitions consult for discharge planning.  Patient was admitted on 2-11-19 with CHF exacerbation.  The tentative date of discharge is yet to be determined.  Reviewed chart and spoke with patient and daughter Florecita regarding discharge plans.  Per patient and daughter report, patient lives alone in an independent senior apartment at [facility].  Patient uses no assistive devices at baseline.  Patient is independent with IADL's and ADL's.  Patient is independent with med management, laundry, cleaning, and cooking.  Patient has grab bars, a shower chair, and a raised toilet seat.  Reviewed the therapy discharge recommendations of tcu placement on discharge and patient and daughter are not interested in this.  Inquired as to whether patient would be willing to have homecare services and patient is in agreement.  Explained that we would follow up with him the closer we get to discharge regarding the homecare services.  Patient's daughter has questions about transportation services as patient's insurance will cover transportation within 20 miles, but [facility] is 40 miles away.  Patient's first follow up appointment is at [facility].  Explained that we can try and change that appointment.  Explained that there are limited options for transportation up in that  area other than [facility].      ASSESSMENT  Cognitive Status:  awake and alert  Concerns to be addressed: discharge planning, tcu placement versus homecare.     PLAN  Financial costs for the patient includes N/A.  Patient given options and choices for discharge TCU versus homecare.  Patient/family is agreeable to the plan?  Yes  Patient Goals and Preferences: home with homecare.  Patient anticipates discharging to:  Home.    Will continue to follow and assist with a safe discharge plan.    YEE Kc, [facility]  [phone]

## 2019-02-19 NOTE — PLAN OF CARE
Discharge Planner PT   Patient plan for discharge: Assisted living  Current status: Pt SBA for sit to stand transfers and to maintain static standing balance. Pt amb 150' with FWW and CGA, slow duke, decreased step length. Vital signs stable, refer to vital signs flow sheet.  Barriers to return to prior living situation: Decreased strength, decreased balance, decreased activity tolerance  Recommendations for discharge: TCU  Rationale for recommendations: Pt is below baseline level of function and would benefit from continued daily therapies at TCU to further progress independence and safety. Pt is reluctant to go to TCU, is looking into assisted living. Do anticipate that assisted living would be a safer discharge option for patient than return to own home especially as pt fatigues easily with mobility tasks and will likely have difficulty managing household tasks on own. If pt declines TCU, will need at least home PT/OT/RN services.       Entered by: Ninoska Leung 02/19/2019 11:49 AM

## 2019-02-19 NOTE — PLAN OF CARE
Pt a/o, BP remains soft (90's/50's), other VSS. Tele SR w/ 1st degree AVB. Up w/ SBA. Lung sounds diminished with fine crackles throughout. Trace BLE edema. Voiding frequently per urinal at bedside, occasional incontinence. Plan for PO Lasix today. TCU at discharge.

## 2019-02-19 NOTE — PROGRESS NOTES
[facility] Cardiology Progress Note  Date of Service: 02/19/2019  Primary Cardiologist: Dr. Calderón    Patrick Diop is a 83 year old male admitted on 2/11/2019 with syncope and acute systolic / biventricular heart failure.    Interim Hx: oral Lasix was stopped on 2/16 and he was placed on Lasix 20mg IV q6h; wts remained unchanged, I/Os without significant change so was placed on Lasix gtt at 0.8/hr on 2/17. Diuretics then held on 2/18 due to hypotension with SBP in the upper 70's. He wasn't markedly symptomatic with this and still put out 1400 ccs yesterday. BP today is better, but remains marginal in the low 100's/60's (pre-meds). proBNP today 59,000.    Subjective: Feels his breathing is mildly better than at admit. He's been ambulating the halls with a walker and feels well with this. Sleeping with HOB mildly elevated. Tells me he did note marked improvement with his prior 16 lb wt loss with outpatient diuresis - wt at last clinic visit was 160 lbs. Today, he's 149 lbs.     Assessment:  1. Severe ischemic CMP, biventricular heart failure with an EF of 15% and severe HK in the LAD distribution, 3v disease on angiography including subtotal occlusion of the mLAD with non-viable myocardium on CMR. ICD in place. On RA. proBNP 80,000 --> 60,000.     Net -3L with IV Lasix as above, despite reported wt gain of 2 lbs since admit (147 --> 149)    On Toprol 12.5, lisinopril 2.5, DAPT, high-intensity statin    Plan is for outpatient f/u with advanced heart failure clinic at [facility] (Dr. Guzman) for evaluation and potential LVAD for destination therapy.     2. Hypotension, requiring brief diuretic hold   3. Cardiogenic syncope, s/p ICD placement on 2/15/19 - EP signed off  4. 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 R pleural effusion, s/p thoracentesis with 1.1L fluid removed on 2/11/19 (no fluid studies obtained)  6. CKD-III - creat up from admission, but stable over past several  days ~ 1.2-1.3    Plan:   1. Will place him back on Lasix gtt at 5/hr. Repeat proBNP tomorrow; strive for 50% reduction prior to discharge.   2. To meet with social work today to discuss discharge to assisted living facility.   3. Continue ambulation with PT/OT.   4. Has f/u arranged with Dr. Calderón on 3/4, device clinic on 2/26.       Amanda Mosquera PA-C  Pager: [phone]  Text Page  (7:30am - 4pm M-F)   __________________________________________________________________________    Physical Exam   Temp: 97.9  F (36.6  C) Temp src: Oral BP: 104/65   Heart Rate: 78 Resp: 18 SpO2: 96 % O2 Device: None (Room air)    Vitals:    02/18/19 0027 02/18/19 0652 02/19/19 0515   Weight: 70.4 kg (155 lb 4.8 oz) 69.3 kg (152 lb 11.2 oz) 67.9 kg (149 lb 9.6 oz)       GENERAL:  The patient is in no apparent distress.   NECK: CVP appears normal in the seated position, although exam is limited due to RT to neck  PULMONARY:  There is a normal respiratory effort. Crackles noted throughout the L hemithorax.  CARDIOVASCULAR:  RRR, normal S1 S2, grade 2/6 sm at the LSB with overall blunted heart tones  GI:  Non tender abdomen with normoactive bowel sounds and no hepatosplenomegaly. There are no masses palpable.   EXTREMITIES:  1+ pitting edema bilateral pretibial region  VASCULAR: 2+ Pulses bilaterally in upper and lower extremities.    Medications     - MEDICATION INSTRUCTIONS -         aspirin  81 mg Oral Daily     atorvastatin  40 mg Oral Daily     clopidogrel  75 mg Oral Daily     ipratropium - albuterol 0.5 mg/2.5 mg/3 mL  3 mL Nebulization 4x daily     levothyroxine  100 mcg Oral Daily     lisinopril  2.5 mg Oral Daily     metoprolol succinate ER  12.5 mg Oral Daily     multivitamin  with lutein  1 capsule Oral Daily     sodium chloride (PF)  3 mL Intracatheter Q8H       Data   Most Recent 3 CBC's:  Recent Labs   Lab Test 02/14/19  0542 02/12/19  0547 02/11/19  1657  02/11/19  0535   WBC 8.6 8.4  --   --  8.4   HGB 11.4* 12.0*  --    --  11.9*   * 102*  --   --  105*    181 196   < > 178    < > = values in this interval not displayed.     Most Recent 3 BMP's:  Recent Labs   Lab Test 02/19/19  0543 02/18/19  0545 02/17/19  1553    140 138   POTASSIUM 4.0 4.1 4.7   CHLORIDE 104 105 104   CO2 30 28 26   BUN 39* 40* 39*   CR 1.25 1.21 1.24   ANIONGAP 7 7 8   AMY 8.9 9.1 9.0   GLC 85 88 98     Most Recent 2 LFT's:  Recent Labs   Lab Test 02/10/19  1100 12/17/18  1637   AST 61* 16   ALT 41 16   ALKPHOS 121 74   BILITOTAL 1.1 1.0     Most Recent 3 INR's:  Recent Labs   Lab Test 02/11/19  1541   INR 1.59*     Most Recent 3 Troponin's:  Recent Labs   Lab Test 02/12/19  0547 02/11/19  0535 02/10/19  1100   TROPI 0.504* 1.158* 0.066*     Most Recent 3 BNP's:  Recent Labs   Lab Test 02/19/19  0543 02/10/19  1100 01/03/19  1036 11/21/18  1041   NTBNPI 58,987* 82,948*  --   --    NTBNP  --   --  53,820* 52,583*     Most Recent TSH and T4:  Recent Labs   Lab Test 11/15/18  0909  10/04/18  0946   TSH 2.48   < > 10.75*   T4  --   --  1.28    < > = values in this interval not displayed.     Most Recent Anemia Panel:  Recent Labs   Lab Test 02/14/19  0542  11/06/13  1422   WBC 8.6   < > 12.0*   HGB 11.4*   < > 15.1   HCT 35.0*   < > 45.6   *   < > 103*      < > 213   B12  --   --  290   FOLIC  --   --  14.1    < > = values in this interval not displayed.

## 2019-02-19 NOTE — PROGRESS NOTES
[facility]    Hospitalist Progress Note    Assessment & Plan   Patrick Diop is an 83 year old male with PMHx of cardiomyopathy, chronic systolic CHF with EF 20% per echo in 12/2018, hypertension, hyperlipidemia, hypothyroidism, recurrent tongue cancer and stage III CKD who was initially admitted at [facility] from 2/10/2019 - 2/11/2019 for evaluation of a syncopal spell and management of CHF exacerbation. Cardiology recommended transfer to [facility] for ongoing evaluation of his cardiomyopathy, thus he was transferred to [facility] on 2/11/19.     Ischemic Cardiomyopathy  Multivessel CAD  Hypertension, Hyperlipidemia  Noted to have EF 20% on echocardiogram in 12/2018 but unclear if cardiomyopathy was due to ischemia vs prior chemotherapy for tongue cancer. Initially presented to [facility] after a syncopal spell. Also endorsed shortness of breath and workup showed pulmonary vascular congestion with moderate bilateral pleural effusions with a proBMP of 83k. Initially diuresed with IV Lasix. As above, was transferred to [facility] on 2/11 for ongoing cardiac evaluation. Repeat echo on 2/12 showed EF 20-25% with areas of severe hypokinesis in the distribution of the LAD. Cardiac MRI on 2/13 showed a severely dilated LV with EF 15% and stress imaging showed a medium sized transmural infarct perfusion defects consistent with ischemia again in the distribution of the LAD. He ultimately underwent coronary angiography on 2/14 which showed 3V CAD with  of the RCA and LCx and subtotal occlusion of the mid LAD. He was not deemed to be a candidate for CABG given his tongue cancer and patient declined surgery. Patient stated he preferred medical therapy over PCI. Was initially continued on Toprol XL 25mg daily and lisinopril 20mg daily. Continues on statin. Aspirin added to regimen. Initially responded well to diuresis though BPs were running soft during stay. Wts remained  steadily elevated during hospitalization.  -- given he continued to desat with exertion and wts remained up 10 lbs this stay -- oral Lasix was stopped on 2/16 and he was placed on Lasix 20mg IV q6h; wts remained unchanged, Is/Os without significant change so was placed on Lasix gtt on 2/17 -- gtt had to be stopped on 2/18 due to hypotension but was ultimately resumed on 2/19  -- ongoing diuresis per cards -- cont Lasix gtt today  -- given his hx of hypotension, decreased Lisinopril to 2.5mg daily to allow for room to diurese  -- cont metoprolol XL but dose decreased to 12.5mg BID  -- conts on aspirin, Plavix added today per cardiology  -- cont statin -- changed to atorvastatin 40mg HS this stay     Syncope, s/p ICD placement on 2/15  Suspected to have occurred secondary to his underlying severe cardiomyopathy.   Evaluated by EP this stay and underwent ICD placement on 2/15.      R Pleural Effusion s/p thoracentesis on 2/11  Secondary to CHF. Underwent thoracentesis on 2/11 with removal of 1.1L of pleural fluid.   -- cont diuresis as above  -- weaned off O2, sats stable on RA  -- encourage OOB and use of IS      Stage III CKD  Baseline Cr seems to be around 1.3. Renal function has remained stable.  -- Cr had been around 1.1 most of this hospitalization, peaked at 1.3 on 2/17 following contrast with procedures  -- renal function now normalizing with ongoing diuresis, Cr stable at 1.2    Recurrent Tongue Cancer  Follows with Dr. Neil of  Oncology. Initially diagnosed in 9/2015. Treated with chemotherapy and radiation. Per patient, treatments were initially successful but has had a recent recurrence in 4/2018 and was treated with Keytruda from 4/2018 - 11/2018. Had head/neck CT on 2/11 at [facility] (was scheduled to have done per his oncologist)  -- no concerns at this time, can follow up with oncology after discharge     Hypothyroidism  Chronic and stable on levothyroxine    FEN: no IVFs, lytes stable, cardiac  diet  DVT Prophylaxis: PCDs  Code Status: Full Code    Disposition: Continuing diuresis with Lasix gtt today. Await recs per cardiology regarding transition to oral diuretic regimen -- anticipate discharge in 1-2d. PT/OT following -- initial recommendation was for TCU though patient and family prefer he discharge home with home care services. SW/CC following.    Rachele Caldera    Interval History   Seen this afternoon. Has been resumed on Lasix gtt. Feeling well, denies complaints including cp/sob/cough, abd pain/n/v. Breathing much improved and has been weaned off O2. Ambulating hallways.     -Data reviewed today: I reviewed all new labs and imaging results over the last 24 hours. I personally reviewed no images or EKG's today.    Physical Exam   Temp: 97.7  F (36.5  C) Temp src: Oral BP: 103/53   Heart Rate: 84 Resp: 16 SpO2: 97 % O2 Device: None (Room air)    Vitals:    02/18/19 0027 02/18/19 0652 02/19/19 0515   Weight: 70.4 kg (155 lb 4.8 oz) 69.3 kg (152 lb 11.2 oz) 67.9 kg (149 lb 9.6 oz)     Vital Signs with Ranges  Temp:  [97.5  F (36.4  C)-98  F (36.7  C)] 97.7  F (36.5  C)  Heart Rate:  [75-84] 84  Resp:  [16-18] 16  BP: ()/(53-65) 103/53  SpO2:  [92 %-97 %] 97 %  I/O last 3 completed shifts:  In: 360 [P.O.:360]  Out: 1150 [Urine:1150]    Constitutional: Resting comfortably, alert and answering questions appropriately, NAD  Respiratory: BS mildly diminished at bases but otherwise CTAB, no wheeze/rales/rhonchi  Cardiovascular: HRRR, no MGR, +bilateral LE edema to the knees  GI: S, NT, ND, +BS  Skin/Integumen: warm/dry  Other:      Medications     furosemide 5 mg/hr (02/19/19 1301)     - MEDICATION INSTRUCTIONS -         aspirin  81 mg Oral Daily     atorvastatin  40 mg Oral Daily     clopidogrel  75 mg Oral Daily     ipratropium - albuterol 0.5 mg/2.5 mg/3 mL  3 mL Nebulization 4x daily     levothyroxine  100 mcg Oral Daily     lisinopril  2.5 mg Oral Daily     metoprolol succinate ER  12.5  mg Oral Daily     multivitamin  with lutein  1 capsule Oral Daily     sodium chloride (PF)  3 mL Intracatheter Q8H       Data   Recent Labs   Lab 02/19/19  0543 02/18/19  0545 02/17/19  1553  02/14/19  0542   WBC  --   --   --   --  8.6   HGB  --   --   --   --  11.4*   MCV  --   --   --   --  103*   PLT  --   --   --   --  195    140 138   < > 140   POTASSIUM 4.0 4.1 4.7   < > 4.1   CHLORIDE 104 105 104   < > 106   CO2 30 28 26   < > 26   BUN 39* 40* 39*   < > 40*   CR 1.25 1.21 1.24   < > 1.08   ANIONGAP 7 7 8   < > 8   AMY 8.9 9.1 9.0   < > 9.0   GLC 85 88 98   < > 113*    < > = values in this interval not displayed.       No results found for this or any previous visit (from the past 24 hour(s)).

## 2019-02-20 NOTE — PLAN OF CARE
Discharge Planner OT   Patient plan for discharge: return to ILF   Current status: Pt up in chair, willing to participate.  Pt able to demonstrate bending to don clothes for LEs.  Discussed options for clothing choice, pt appears to have appropriate clothing to make it easier for dressing.  Also went over ideas about EC techniques and walker safety.  Pt had a good overall idea about these topics, thought additional suggestions were useful.  Completed transfer to toilet transfer with SBA, able to stand at sink for grooming tasks.   Barriers to return to prior living situation: dec activity tolerance   Recommendations for discharge: return to ILF with increased services for laundry and cleaning and recommend using FWW for mobility. HHOT   Rationale for recommendations: pt making good progress with OT this date. SBA for mobility and toilet transfer with FWW and no LOB. Discussed increasing safety within home environment and daughter present and agreeable to suggestions. Recommend pt continue using FWW for mobility. Pt will benefit from HHOT for home safety evaluation as pt requires assistive device and another person to leave home.        Entered by: Alejandro Rocha 02/20/2019 10:44 AM       Occupational Therapy Discharge Summary    Reason for therapy discharge:    All goals and outcomes met, no further needs identified.    Progress towards therapy goal(s). See goals on Care Plan in [facility] electronic health record for goal details.  Goals met    Therapy recommendation(s):    Continued therapy is recommended.  Rationale/Recommendations:  Recommend home OT safety eval to determine most effective setup to complete ADLS as well as any family support needed for leaving home..

## 2019-02-20 NOTE — PLAN OF CARE
Pt has been pain free with last B/P 80/40 per Lt arm and 70/35 per Rt arm @ 13:25. He is not experiencing any dizziness at this time. Lisinopril,Metoprolol and Lasix gtt was discontinued. Demadex was added this morning and pt has diuresed well. Am CR 1,23. Remains in NSR with 1st degree A-V block

## 2019-02-20 NOTE — PLAN OF CARE
Discharge Planner PT   Patient plan for discharge: Return to assisted living with home PT  Current status: sit to stand with SBA, demos good verbalization and demonstation for hand placement on walker and lining up with chair. Vitals stable pre/post amb-see flowsheet, no signs of lightheadedness noted. Gait training with FWW close SBA 150ft+50ft+100ft with decreased bilateral heel toe and foot clearance, slow steady pace, no LOB noted, seated rest break due to fatigue. Pt reports has both a 4WW and FWW at home. Trialed 4WW and gave education to pt regarding break management-amb 50ft+10ft with cues to sit on the walker for practice, requires close SBA, decreased stability noted with the 4WW vs. FWW, advised pt to continue with FWW for increased stability, pt in agreement. Also re-educated pt on how to measure his walker for proper fit with height. Overall, pt continuing to demonstrate improvements with his mobility level.   Barriers to return to prior living situation: Decreased balance, decreased strength and activity tolerance, pt lives at home alone  Recommendations for discharge: TCU  Rationale for recommendations: Pt continues to be below baseline level of function and would benefit form continued daily therapies at TCU to further progress his level of independence and safety with mobility. Pt continues to be reluctant to go to TCU, is looking into assisted living. Do anticipate that assisted living would be a safer discharge option for patient than return to own home especially as pt fatigues easily with mobility tasks and will likely have difficulty managing household tasks on own. If pt declines TCU, will need at least home PT/OT/RN services.           Entered by: Rosa Cruz 02/20/2019 3:38 PM

## 2019-02-20 NOTE — PLAN OF CARE
AOx4. Pt is hypotensive, BP dropped to 70's over 40's after I paged  Dr. Newman for BP of 80's over 50's. He recommended we monitor it further then page again if it drops further. Dr. Malik was paged, please see provider notification note. Up with SBA, uses bedside urinal, IV SL currently. Tele SR with HR of 76. Plan: Possible discharge tomorrow.

## 2019-02-20 NOTE — PROVIDER NOTIFICATION
Dr. Malik called back:    - stop lasix gtt since BP is less than 80 systolic.    Recheck BP if 15 minutes to make sure hasn't decreased more.      Check BP for next hour to make sure doesn't drop lower- if so call back and can get fluids ordered.

## 2019-02-20 NOTE — PLAN OF CARE
Pt A&O, VSS on RA. Tele SR w/ 1st degree AVB. Pt denies CP, dizziness, and SOB. LS clear, dim. Pt up using urinal at bedside x5 overnight. Plan for transition to oral diuretic, discharge to TCU today or tomorrow. Will continue to monitor.

## 2019-02-20 NOTE — PROGRESS NOTES
SW:  D:  Had a lengthy conversation with patient and daughter Florecita regarding assisted living placements and the steps you need to take to make this happen.  Explained that patient should apply for MA and then have a long term care assessment.  Explained that then would determine if patient would qualify for the elderly waiver which would assist with rent.  Explained that you apply for MA on the [place] [employer] website.  Also encourage patient and daughter to follow up with [facility].  Patient's daughter had a list of facilities near where he lives that she will look in to.  Patient's daughter does not have any further questions right now.  P:  Will continue to follow.

## 2019-02-20 NOTE — PROVIDER NOTIFICATION
Notified Person:  MD    Notified Persons Name: Dr. Malik    Notification Date/Time: 2/19 1800    Notification Interaction: Phone call       Purpose of Notification: Hypotension     Orders Received: Shut off Lasix gtt and monitor BP q15 minutes x 4 and page again if it keeps getting lower    Comments:

## 2019-02-20 NOTE — PROGRESS NOTES
[facility]    Hospitalist Progress Note    Assessment & Plan   Patrick Diop is an 83 year old male with PMHx of cardiomyopathy, chronic systolic CHF with EF 20% per echo in 12/2018, hypertension, hyperlipidemia, hypothyroidism, recurrent tongue cancer and stage III CKD who was initially admitted at [facility] from 2/10/2019 - 2/11/2019 for evaluation of a syncopal spell and management of CHF exacerbation. Cardiology recommended transfer to [facility] for ongoing evaluation of his cardiomyopathy, thus he was transferred to [facility] on 2/11/19.     Ischemic Cardiomyopathy  Multivessel CAD  Hypertension, Hyperlipidemia  Noted to have EF 20% on echocardiogram in 12/2018 but unclear if cardiomyopathy was due to ischemia vs prior chemotherapy for tongue cancer. Initially presented to [facility] after a syncopal spell. Also endorsed shortness of breath and workup showed pulmonary vascular congestion with moderate bilateral pleural effusions with a proBMP of 83k. Initially diuresed with IV Lasix. As above, was transferred to [facility] on 2/11 for ongoing cardiac evaluation. Repeat echo on 2/12 showed EF 20-25% with areas of severe hypokinesis in the distribution of the LAD. Cardiac MRI on 2/13 showed a severely dilated LV with EF 15% and stress imaging showed a medium sized transmural infarct perfusion defects consistent with ischemia again in the distribution of the LAD. He ultimately underwent coronary angiography on 2/14 which showed 3V CAD with  of the RCA and LCx and subtotal occlusion of the mid LAD. He was not deemed to be a candidate for CABG given his tongue cancer and patient declined surgery. Patient stated he preferred medical therapy over PCI. Was initially continued on Toprol XL 25mg daily and lisinopril 20mg daily. Continues on statin. Aspirin added to regimen. Initially responded well to diuresis though BPs were running soft during stay. Wts remained  steadily elevated during hospitalization.  -- given he continued to desat with exertion and wts remained up 10 lbs this stay, oral Lasix was stopped on 2/16 and he was placed on Lasix 20mg IV q6h; wts remained unchanged, Is/Os without significant change so was placed on Lasix gtt on 2/17 -- gtt had to be stopped on 2/18 due to hypotension but was ultimately resumed on 2/19 -- stopped again the evening of 2/19 dt recurrence of hypotension with SBPs <80  -- given hypotension with diuresis this stay, lisinopril and metoprolol doses were decreased (to 2.5mg daily and 12.5mg BID respectively)  -- ongoing diuresis per cards -- ?cont IV vs transition to po regimen, ?hold lisinopril and metoprolol today   -- conts on aspirin, Plavix added today per cardiology  -- cont statin -- changed to atorvastatin 40mg HS this stay     Syncope, s/p ICD placement on 2/15  Suspected to have occurred secondary to his underlying severe cardiomyopathy.   Evaluated by EP this stay and underwent ICD placement on 2/15.      R Pleural Effusion s/p thoracentesis on 2/11  Secondary to CHF. Underwent thoracentesis on 2/11 with removal of 1.1L of pleural fluid.   -- cont diuresis as above  -- weaned off O2, sats stable on RA  -- encourage OOB and use of IS      Stage III CKD  Baseline Cr seems to be around 1.3.   -- Cr had been around 1.1 most of this hospitalization, peaked at 1.3 on 2/17 following contrast with procedures  -- renal function now normalizing with ongoing diuresis, Cr stable at 1.2    Recurrent Tongue Cancer  Follows with Dr. Neil of  Oncology. Initially diagnosed in 9/2015. Treated with chemotherapy and radiation. Per patient, treatments were initially successful but has had a recent recurrence in 4/2018 and was treated with Keytruda from 4/2018 - 11/2018. Had head/neck CT on 2/11 at [facility] (was scheduled to have done per his oncologist).  -- no concerns at this time, can follow up with oncology after  discharge     Hypothyroidism  Chronic and stable on levothyroxine    FEN: no IVFs, lytes stable, cardiac diet  DVT Prophylaxis: PCDs  Code Status: Full Code    Disposition: Attempts at diuresis continue to be limited by hypotension. Await cardiology recommendations for plans for diuresis -- ?continued attempts at Lasix gtt vs IV bolus vs transition to po. Discharge date unclear -- possibly next 1-2d.  PT/OT following -- initial recommendation was for TCU though patient and family prefer he discharge home with home care services while he arranges to transition to an [facility]. Patient resides in [place]. SW/CC following.    Rachele Caldera    Interval History   Seen this morning. Feeing well. Didn't sleep much last night dt a disruptive roommate. Breathing okay. Ambulating in hallways. Denies cp/sob/cough, abd pain/n/v. Appetite good.    -Data reviewed today: I reviewed all new labs and imaging results over the last 24 hours. I personally reviewed no images or EKG's today.    Physical Exam   Temp: 97.6  F (36.4  C) Temp src: Oral BP: 91/55   Heart Rate: 86 Resp: 18 SpO2: 96 % O2 Device: None (Room air)    Vitals:    02/18/19 0652 02/19/19 0515 02/20/19 0421   Weight: 69.3 kg (152 lb 11.2 oz) 67.9 kg (149 lb 9.6 oz) 67.4 kg (148 lb 8 oz)     Vital Signs with Ranges  Temp:  [97.6  F (36.4  C)-98  F (36.7  C)] 97.6  F (36.4  C)  Heart Rate:  [74-86] 86  Resp:  [16-18] 18  BP: ()/(27-62) 91/55  SpO2:  [93 %-97 %] 96 %  I/O last 3 completed shifts:  In: 480 [P.O.:480]  Out: 1300 [Urine:1300]    Constitutional: Resting comfortably, alert and answering questions appropriately, NAD  Respiratory: BS diminished at bases with few crackles but otherwise CTAB, no wheeze/rales/rhonchi  Cardiovascular: HRRR, no MGR, +bilateral LE edema to the knees  GI: S, NT, ND, +BS  Skin/Integumen: warm/dry  Other:    Medications     furosemide Stopped (02/19/19 1800)     - MEDICATION INSTRUCTIONS -         aspirin  81 mg Oral Daily      atorvastatin  40 mg Oral Daily     clopidogrel  75 mg Oral Daily     ipratropium - albuterol 0.5 mg/2.5 mg/3 mL  3 mL Nebulization 4x daily     levothyroxine  100 mcg Oral Daily     lisinopril  2.5 mg Oral Daily     metoprolol succinate ER  12.5 mg Oral Daily     multivitamin  with lutein  1 capsule Oral Daily     sodium chloride (PF)  3 mL Intracatheter Q8H       Data   Recent Labs   Lab 02/20/19  0533 02/19/19  0543 02/18/19  0545  02/14/19  0542   WBC  --   --   --   --  8.6   HGB  --   --   --   --  11.4*   MCV  --   --   --   --  103*   PLT  --   --   --   --  195    141 140   < > 140   POTASSIUM 3.9 4.0 4.1   < > 4.1   CHLORIDE 102 104 105   < > 106   CO2 30 30 28   < > 26   BUN 37* 39* 40*   < > 40*   CR 1.23 1.25 1.21   < > 1.08   ANIONGAP 6 7 7   < > 8   AMY 9.0 8.9 9.1   < > 9.0   GLC 89 85 88   < > 113*    < > = values in this interval not displayed.       No results found for this or any previous visit (from the past 24 hour(s)).

## 2019-02-20 NOTE — PROGRESS NOTES
[facility] Cardiology Progress Note  Date of Service: 02/20/2019  Primary Cardiologist: Dr. Calderón    Patrick Diop is a 83 year old male admitted on 2/11/2019 with syncope and acute systolic / biventricular heart failure.    Interim Hx: oral Lasix was stopped on 2/16 and he was placed on Lasix 20mg IV q6h; wts remained unchanged, I/Os without significant change so was placed on Lasix gtt at 0.8/hr on 2/17. Diuretics then held on 2/18 due to hypotension with SBP in the upper 70's. He wasn't markedly symptomatic with this and still put out 1400 ccs. BP 2/19, but remained marginal in the low 100's/60's (pre-meds). proBNP 59,000. CMP meds were reduced and he was started back on lasix gtt @ 5/hr. Again became hypotensive and drip stopped after 5 hours. 1300 ccs out yesterday. BP again marginal today. Down 1 lb from yesterday. Renal fn stable.     Subjective: Feels his breathing is better than at admit. He's been ambulating the halls with a walker without feeling limited by dyspnea. States he can deep breathe more effectively today. Sleeping with HOB mildly elevated. Tells me he did note marked improvement with his prior 16 lb wt loss with outpatient diuresis - wt at last clinic visit was 160 lbs. Today, he's 148 lbs.       Assessment:  1. Severe ischemic CMP, biventricular heart failure with an EF of 15%, severely dilated LV with severe HK in the LAD distribution, 3v disease on angiography including subtotal occlusion of the mLAD with non-viable myocardium on CMR. ICD in place. On RA. proBNP 80,000 --> 60,000. Exam actually appears improved today compared with yesterday.    Net -3.8L with IV Lasix as above, despite wt reportedly up 1 lb from admit (147 --> 148). On Lasix 40 daily PTA.     On Toprol 12.5, lisinopril 2.5, DAPT, high-intensity statin    Plan is for outpatient f/u with advanced heart failure clinic at [facility] (Dr. Guzman) for evaluation and potential LVAD for destination therapy.      2. Hypotension, requiring brief diuretic hold   3. Cardiogenic syncope, s/p ICD placement on 2/15/19 - EP signed off  4. 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 R pleural effusion, s/p thoracentesis with 1.1L fluid removed on 2/11/19 (no fluid studies obtained)  6. CKD-III - creat stable ~ 1.2-1.3    Plan:   1. I think we've made some ground with symptomatic improvement at this point. Will hold lisinopril and Toprol for now in order to continue gentle diuresis. Will place him on oral Torsemide 20 BID and see how he does with this, I suspect he'll tolerate it well. However, if he again becomes hypotensive, may consider transfer to the CCU for pressors.  2. Repeat proBNP prior to discharge; strive for 50% reduction.  3. Continue ambulation with PT/OT.   4. Has f/u arranged with Dr. Calderón on 3/4, device clinic on 2/26. Will require closer f/u with [facility] clinic upon discharge - will try for TIN visit same day as device check.       Amanda Mosquera PA-C  Pager: [phone]  Text Page  (7:30am - 4pm M-F)   __________________________________________________________________________    Physical Exam   Temp: 97.6  F (36.4  C) Temp src: Oral BP: 91/55   Heart Rate: 86 Resp: 18 SpO2: 96 % O2 Device: None (Room air)    Vitals:    02/18/19 0652 02/19/19 0515 02/20/19 0421   Weight: 69.3 kg (152 lb 11.2 oz) 67.9 kg (149 lb 9.6 oz) 67.4 kg (148 lb 8 oz)       GENERAL:  The patient is in no apparent distress.   NECK: CVP appears normal in the seated position, although exam is limited due to RT to neck  CHEST: I removed the Right upper chest bandage. Chest incision closed with steri-strips in place. No erythema, edema, TTP.  PULMONARY:  There is a normal respiratory effort. Diffusely blunted BS, and crackes at the RL base, better than yesterday  CARDIOVASCULAR:  RRR, normal S1 S2, grade 2/6 sm at the LSB with overall blunted heart tones  GI:  Non tender abdomen with normoactive bowel sounds and no  hepatosplenomegaly. There are no masses palpable.   EXTREMITIES:  Tr-1+ pitting edema bilateral pretibial region, R>L  VASCULAR: 2+ Pulses bilaterally in upper and lower extremities.    Medications     furosemide Stopped (02/19/19 1800)     - MEDICATION INSTRUCTIONS -         aspirin  81 mg Oral Daily     atorvastatin  40 mg Oral Daily     clopidogrel  75 mg Oral Daily     ipratropium - albuterol 0.5 mg/2.5 mg/3 mL  3 mL Nebulization 4x daily     levothyroxine  100 mcg Oral Daily     lisinopril  2.5 mg Oral Daily     metoprolol succinate ER  12.5 mg Oral Daily     multivitamin  with lutein  1 capsule Oral Daily     sodium chloride (PF)  3 mL Intracatheter Q8H       Data   Most Recent 3 CBC's:  Recent Labs   Lab Test 02/14/19  0542 02/12/19  0547 02/11/19  1657  02/11/19  0535   WBC 8.6 8.4  --   --  8.4   HGB 11.4* 12.0*  --   --  11.9*   * 102*  --   --  105*    181 196   < > 178    < > = values in this interval not displayed.     Most Recent 3 BMP's:  Recent Labs   Lab Test 02/20/19  0533 02/19/19  0543 02/18/19  0545    141 140   POTASSIUM 3.9 4.0 4.1   CHLORIDE 102 104 105   CO2 30 30 28   BUN 37* 39* 40*   CR 1.23 1.25 1.21   ANIONGAP 6 7 7   AMY 9.0 8.9 9.1   GLC 89 85 88     Most Recent 2 LFT's:  Recent Labs   Lab Test 02/10/19  1100 12/17/18  1637   AST 61* 16   ALT 41 16   ALKPHOS 121 74   BILITOTAL 1.1 1.0     Most Recent 3 INR's:  Recent Labs   Lab Test 02/11/19  1541   INR 1.59*     Most Recent 3 Troponin's:  Recent Labs   Lab Test 02/12/19  0547 02/11/19  0535 02/10/19  1100   TROPI 0.504* 1.158* 0.066*     Most Recent 3 BNP's:  Recent Labs   Lab Test 02/19/19  0543 02/10/19  1100 01/03/19  1036 11/21/18  1041   NTBNPI 58,987* 82,948*  --   --    NTBNP  --   --  53,820* 52,583*     Most Recent TSH and T4:  Recent Labs   Lab Test 11/15/18  0909  10/04/18  0946   TSH 2.48   < > 10.75*   T4  --   --  1.28    < > = values in this interval not displayed.     Most Recent Anemia  Panel:  Recent Labs   Lab Test 02/14/19  0542  11/06/13  1422   WBC 8.6   < > 12.0*   HGB 11.4*   < > 15.1   HCT 35.0*   < > 45.6   *   < > 103*      < > 213   B12  --   --  290   FOLIC  --   --  14.1    < > = values in this interval not displayed.

## 2019-02-21 NOTE — PROGRESS NOTES
"Cardiology Progress Note          Assessment and Plan:         1) Severe ischemic CM  2) Cardiogenic syncope s/p ICD placement  3) CKD Stage III  4) Hypotension requiring discontinuation of beta blocker and ACE inhibtor therapy    PLAN  - Start low dose lisinopril today  - continue torsemide  - cardiac rehab        Interval History:     Feels well. Continues to diurese well.             Review of Systems:   As per subjective, otherwise 5 systems reviewed and negative.           Physical Exam:   Blood pressure (!) (P) 83/50, pulse 92, temperature 98.4  F (36.9  C), temperature source Oral, resp. rate (P) 16, height 1.702 m (5' 7\"), weight 66.8 kg (147 lb 3.2 oz), SpO2 94 %.      Vital Sign Ranges  Temperature Temp  Av.2  F (36.8  C)  Min: 97.9  F (36.6  C)  Max: 98.4  F (36.9  C)   Blood pressure Systolic (24hrs), Av , Min:83 , Max:103        Diastolic (24hrs), Av, Min:50, Max:65      Pulse No Data Recorded   Respirations Resp  Avg: 15.2  Min: 14  Max: 16   Pulse oximetry SpO2  Av.4 %  Min: 92 %  Max: 98 %         Intake/Output Summary (Last 24 hours) at 2019 1410  Last data filed at 2019 1300  Gross per 24 hour   Intake 810 ml   Output 2000 ml   Net -1190 ml       Constitutional: NAD  Skin: Warm and dry  Neck: No JVD  Lungs: CTA  Cardiovascular: RRR, 2/6 systolic murmur  Abdomen: Soft, non tender.  Extremities and Back: Trace to 1+ pitting edema BLE  Neurological: Nonfocal           Medications:     I have reviewed this patient's current medications.              Data:     Labs reviewed.           Ze Newman M.D.    "

## 2019-02-21 NOTE — PLAN OF CARE
Pt. A&Ox4. VSS on room air w/ SBP . Pt denies dizziness/ pain. Up w/ SBA. Tele is SR 1 AVB, HR 90's. Blanchable redness to coccyx, pt educated on the importance of repositioning in bed. Will continue to diuresis with oral demadex and monitor pressures.

## 2019-02-21 NOTE — PROGRESS NOTES
[facility]    Hospitalist Progress Note    Date of Service: 02/21/2019    Assessment & Plan      Patrick Diop is an 83 year old male with PMHx of cardiomyopathy, chronic systolic CHF with EF 20% per echo in 12/2018, hypertension, hyperlipidemia, hypothyroidism, recurrent tongue cancer and stage III CKD who was initially admitted at [facility] from 2/10/2019 - 2/11/2019 for evaluation of a syncopal spell and management of CHF exacerbation. Cardiology recommended transfer to [facility] for ongoing evaluation of his cardiomyopathy, thus he was transferred to [facility] on 2/11/19.     Ischemic Cardiomyopathy  Multivessel CAD  Hypertension, Hyperlipidemia  Noted to have EF 20% on echocardiogram in 12/2018 but unclear if cardiomyopathy was due to ischemia vs prior chemotherapy for tongue cancer. Initially presented to [facility] after a syncopal spell. Also endorsed shortness of breath and workup showed pulmonary vascular congestion with moderate bilateral pleural effusions with a proBMP of 83k. Initially diuresed with IV Lasix. As above, was transferred to [facility] on 2/11 for ongoing cardiac evaluation. Repeat echo on 2/12 showed EF 20-25% with areas of severe hypokinesis in the distribution of the LAD. Cardiac MRI on 2/13 showed a severely dilated LV with EF 15% and stress imaging showed a medium sized transmural infarct perfusion defects consistent with ischemia again in the distribution of the LAD. He ultimately underwent coronary angiography on 2/14 which showed 3V CAD with  of the RCA and LCx and subtotal occlusion of the mid LAD. He was not deemed to be a candidate for CABG given his tongue cancer and patient declined surgery. Patient stated he preferred medical therapy over PCI. Was initially continued on Toprol XL 25mg daily and lisinopril 20mg daily. Continues on statin. Aspirin added to regimen. Initially responded well to diuresis though BPs were running  soft during stay. Wts remained steadily elevated during hospitalization.  -- given he continued to desat with exertion and wts remained up 10 lbs this stay, oral Lasix was stopped on 2/16 and he was placed on Lasix 20mg IV q6h; wts remained unchanged, Is/Os without significant change so was placed on Lasix gtt on 2/17 -- gtt had to be stopped on 2/18 due to hypotension but was ultimately resumed on 2/19 -- stopped again the evening of 2/19 dt recurrence of hypotension with SBPs <80  -- given hypotension with diuresis this stay, lisinopril and metoprolol doses were decreased (to 2.5mg daily and 12.5mg BID respectively)  -- Currently on PO torsemide  -- Consider low dose ACE today? Defer to Cardiology  -- conts on aspirin, Plavix added per cardiology  -- cont statin -- changed to atorvastatin 40mg HS this stay     Syncope, s/p ICD placement on 2/15  Suspected to have occurred secondary to his underlying severe cardiomyopathy.   Evaluated by EP this stay and underwent ICD placement on 2/15.      R Pleural Effusion s/p thoracentesis on 2/11  Secondary to CHF. Underwent thoracentesis on 2/11 with removal of 1.1L of pleural fluid.   -- weaned off O2, sats stable on RA  -- encourage OOB and use of IS      Stage III CKD  Baseline Cr seems to be around 1.3.   -- Cr had been around 1.1 most of this hospitalization, peaked at 1.3 on 2/17 following contrast with procedures  -- renal function now normalizing with ongoing diuresis, Cr stable at 1.2    Recurrent Tongue Cancer  Follows with Dr. Neil of  Oncology. Initially diagnosed in 9/2015. Treated with chemotherapy and radiation. Per patient, treatments were initially successful but has had a recent recurrence in 4/2018 and was treated with Keytruda from 4/2018 - 11/2018. Had head/neck CT on 2/11 at [facility] (was scheduled to have done per his oncologist).  -- no concerns at this time, can follow up with oncology after discharge     Hypothyroidism  Chronic and stable on  levothyroxine    FEN: no IVFs, lytes stable, cardiac diet  DVT Prophylaxis: PCDs  Code Status: Full Code    Disposition: Discharge date -- possibly tomorrow  PT/OT following -- initial recommendation was for TCU though patient and family prefer he discharge home with home care services while he arranges to transition to an CADE. Patient resides in [place]. SW/CC following.    Jorje Luong    Interval History      Patient seen and examined  No acute events overnight  Seen this morning. Feeing well.  Breathing stable. Ambulating in hallways. Denies cp/sob/cough, abd pain/n/v. Appetite good. No new complaints today    -Data reviewed today: I reviewed all new labs and imaging results over the last 24 hours. I personally reviewed no images or EKG's today.    Physical Exam   Temp: 98.4  F (36.9  C) Temp src: Oral BP: 91/53   Heart Rate: 89 Resp: 16 SpO2: 94 % O2 Device: None (Room air)    Vitals:    02/19/19 0515 02/20/19 0421 02/21/19 0000   Weight: 67.9 kg (149 lb 9.6 oz) 67.4 kg (148 lb 8 oz) 66.8 kg (147 lb 3.2 oz)     Vital Signs with Ranges  Temp:  [97.9  F (36.6  C)-98.7  F (37.1  C)] 98.4  F (36.9  C)  Heart Rate:  [79-89] 89  Resp:  [14-18] 16  BP: ()/(35-65) 91/53  SpO2:  [92 %-98 %] 94 %  I/O last 3 completed shifts:  In: 850 [P.O.:850]  Out: 2250 [Urine:2250]    Constitutional: Resting comfortably, alert and answering questions appropriately, NAD  Respiratory: BS diminished at bases with few crackles but otherwise CTAB, no wheeze/rales/rhonchi  Cardiovascular: HRRR, no MGR, +bilateral LE edema to the knees  GI: S, NT, ND, +BS  Skin/Integumen: warm/dry  Other: normal mood and affect    Medications     - MEDICATION INSTRUCTIONS -         aspirin  81 mg Oral Daily     atorvastatin  40 mg Oral Daily     clopidogrel  75 mg Oral Daily     ipratropium - albuterol 0.5 mg/2.5 mg/3 mL  3 mL Nebulization 4x daily     levothyroxine  100 mcg Oral Daily     multivitamin  with lutein  1 capsule Oral Daily     sodium  chloride (PF)  3 mL Intracatheter Q8H     torsemide  20 mg Oral BID       Data   Recent Labs   Lab 02/21/19  0532 02/20/19  0533 02/19/19  0543    138 141   POTASSIUM 4.1 3.9 4.0   CHLORIDE 100 102 104   CO2 30 30 30   BUN 38* 37* 39*   CR 1.29* 1.23 1.25   ANIONGAP 8 6 7   AMY 8.9 9.0 8.9   GLC 83 89 85       No results found for this or any previous visit (from the past 24 hour(s)).

## 2019-02-21 NOTE — PLAN OF CARE
Discharge Planner PT   Patient plan for discharge: return to assisted living with home PT.  Current status: sit to stand with FWW x 4 reps during session with SBA, consistently demos good safety with hand placement and lining up with surface prior to sitting. Amb 150ft with FWW and SBA, no LOB noted, pt verbalizing that he is trying to improve his walking pattern and visibly demonstrating improved bilateral foot clearance and heel toe pattern from prior PT session. Discussed removal of throw rugs at home due to tripping hazard. Pt instructed with standing balance ex with CGA for safety and UE support on walker only for balance-cues not to push down on walker due to left ICD. Pt fatigued following ex-pt instructed not to perform standing ex alone. Pt continues to demo improved mobility and safety each PT session.  Barriers to return to prior living situation: decreased balance, decreased activity tolerance, lives alone  Recommendations for discharge: return home with home PT/OT  Rationale for recommendations: Pt continues to demonstrate below baseline level of mobility, however making daily improvements with progression towards independence with mobility. Pt demos and verbalizes good safety awareness with all mobility and a good understanding of his ICD precautions as well as demonstrating improved activity tolerance. Pt reports he is looking into assistive living options. Do anticipate that assisted living would be a safer discharge option for patient than return to own home to assist with household tasks.  Anticipate pt would qualify for home PT as pt requires assistive device and assist of person to leave the home.          Entered by: Rosa Cruz 02/21/2019 8:50 AM

## 2019-02-21 NOTE — PLAN OF CARE
Pt A&O, VSS on RA. Tele SR w/ 1st degree AVB. Pt denies CP, dizziness, and SOB. LS clear, dim. Pt up using urinal at bedside. Po diuretic, possible discharge to TCU  tomorrow. Will continue to monitor.

## 2019-02-22 NOTE — PLAN OF CARE
No chest pain or sob. Up with rehab , Up in chair. BPS systolic so77 sys to 90. Eager to be dcd today

## 2019-02-22 NOTE — PLAN OF CARE
Discharge Planner PT   Patient plan for discharge: return to assisted living with home PT.  Current status: Pt sitting up in chair at time of arrival. BP: 78/47mmHg, HR=85bpm, asymptomatic. Instructed with stand step transfers with FWW to EOB with SBA. Sit to supine x 2 reps from flat HOB and without railing min  A initially with cues, then able to progress to SBA. SBA with stand step transfer back to bedside chair. Instructed with seated ex for pt to perform IND in his room and at discharge to assist with LE strengthening. BP taken following ex: 82/45mmHg, HR=85bpm. Pt left up in chair. Informed RN of low BP. Did not perform gait training during session due to low BP. Pt encouraged amb with staff later today with staff as BP allows.   Barriers to return to prior living situation: decreased balance, decreased activity tolerance, lives alone  Recommendations for discharge: return home with home PT/OT  Rationale for recommendations: Pt continues to demonstrate below baseline level of mobility, however making daily improvements with progression towards independence with mobility. Pt demos and verbalizes good safety awareness with all mobility and a good understanding of his ICD precautions as well as demonstrating improved activity tolerance. Pt reports he is looking into assistive living options. Do anticipate that assisted living would be a safer discharge option for patient than return to own home to assist with household tasks.  Anticipate pt would qualify for home PT as pt requires assistive device and assist of person to leave the home.            Entered by: Rosa Cruz 02/22/2019 9:02 AM

## 2019-02-22 NOTE — PLAN OF CARE
Pt. A&Ox4. VSS on room air w/ SBP . Pt denies dizziness/ pain. Uses urinal at bedside. Up w/ SBA. Tele is SR 1 AVB, HR 80's. Blanchable redness to coccyx, pt educated on the importance of repositioning in bed. Plan to discharge home with PT services.

## 2019-02-22 NOTE — DISCHARGE SUMMARY
[facility]  Hospitalist Discharge Summary       Date of Admission:  2/11/2019  Date of Discharge:  2/22/2019  Discharging Provider: Jorje Luong MD      Discharge Diagnoses     Ischemic Cardiomyopathy  Multivessel CAD  Hypertension  Hyperlipidemia  Stage III CKD    Follow-ups Needed After Discharge   Follow-up Appointments     Follow-up and recommended labs and tests       1. Follow up with [facility] Cardiology in the device clinic in 10 days.  2. Follow up with [facility] Cardiology (Dr. Calderón) as scheduled on 3/4/19 at 9:00am  3. Follow up with your PCP in the next 1-2 weeks.  4. Follow up with your oncologist as scheduled.             Unresulted Labs Ordered in the Past 30 Days of this Admission     No orders found from 12/13/2018 to 2/12/2019.          Hospital Course      Patrick Diop is an 83 year old male with PMHx of cardiomyopathy, chronic systolic CHF with EF 20% per echo in 12/2018, hypertension, hyperlipidemia, hypothyroidism, recurrent tongue cancer and stage III CKD who was initially admitted at [facility] from 2/10/2019 - 2/11/2019 for evaluation of a syncopal spell and management of CHF exacerbation. Cardiology recommended transfer to [facility] for ongoing evaluation of his cardiomyopathy, thus he was transferred to [facility] on 2/11/19.      Ischemic Cardiomyopathy  Multivessel CAD  Hypertension, Hyperlipidemia  Noted to have EF 20% on echocardiogram in 12/2018 but unclear if cardiomyopathy was due to ischemia vs prior chemotherapy for tongue cancer. Initially presented to [facility] after a syncopal spell. Also endorsed shortness of breath and workup showed pulmonary vascular congestion with moderate bilateral pleural effusions with a proBMP of 83k. Initially diuresed with IV Lasix. As above, was transferred to [facility] on 2/11 for ongoing cardiac evaluation. Repeat echo on 2/12 showed EF 20-25% with areas of severe hypokinesis in the distribution of the LAD.  Cardiac MRI on 2/13 showed a severely dilated LV with EF 15% and stress imaging showed a medium sized transmural infarct perfusion defects consistent with ischemia again in the distribution of the LAD. He ultimately underwent coronary angiography on 2/14 which showed 3V CAD with  of the RCA and LCx and subtotal occlusion of the mid LAD. He was not deemed to be a candidate for CABG given his tongue cancer and patient declined surgery. Patient stated he preferred medical therapy over PCI. Was initially continued on Toprol XL 25mg daily and lisinopril 20mg daily. Continues on statin. Aspirin added to regimen. Initially responded well to diuresis though BPs were running soft during stay. Wts remained steadily elevated during hospitalization.  -- given he continued to desat with exertion and wts remained up 10 lbs this stay, oral Lasix was stopped on 2/16 and he was placed on Lasix 20mg IV q6h; wts remained unchanged, Is/Os without significant change so was placed on Lasix gtt on 2/17 -- gtt had to be stopped on 2/18 due to hypotension but was ultimately resumed on 2/19 -- stopped again the evening of 2/19 dt recurrence of hypotension with SBPs <80  -- given hypotension with diuresis this stay, lisinopril and metoprolol doses were decreased (to 2.5mg at bedtime and 12.5mg BID respectively)  -- Currently on PO torsemide  -- conts on aspirin, Plavix added per cardiology  -- cont statin -- changed to atorvastatin 40mg HS this stay     Syncope, s/p ICD placement on 2/15  Suspected to have occurred secondary to his underlying severe cardiomyopathy.   Evaluated by EP this stay and underwent ICD placement on 2/15.      R Pleural Effusion s/p thoracentesis on 2/11  Secondary to CHF. Underwent thoracentesis on 2/11 with removal of 1.1L of pleural fluid.   -- weaned off O2, sats stable on RA     Stage III CKD  Baseline Cr seems to be around 1.3.   -- Cr had been around 1.1 most of this hospitalization, peaked at 1.3 on 2/17  following contrast with procedures  -- Cr stable at 1.2     Recurrent Tongue Cancer  Follows with Dr. Neil of  Oncology. Initially diagnosed in 9/2015. Treated with chemotherapy and radiation. Per patient, treatments were initially successful but has had a recent recurrence in 4/2018 and was treated with Keytruda from 4/2018 - 11/2018. Had head/neck CT on 2/11 at [facility] (was scheduled to have done per his oncologist).  -- no concerns at this time, can follow up with oncology after discharge     Hypothyroidism  Chronic and stable on levothyroxine       Consultations This Hospital Stay   CARDIOLOGY IP CONSULT  ELECTROPHYSIOLOGY IP CONSULT  PHARMACY IP CONSULT  PHARMACY IP CONSULT  CARDIOVASCULAR SURGERY IP CONSULT  PHYSICAL THERAPY ADULT IP CONSULT  OCCUPATIONAL THERAPY ADULT IP CONSULT  CARE TRANSITION RN/SW IP CONSULT  CORE CLINIC EVALUATION IP CONSULT  SMOKING CESSATION PROGRAM IP CONSULT  PHARMACY IP CONSULT  PHARMACY IP CONSULT  SMOKING CESSATION PROGRAM IP CONSULT    Code Status   Full Code    Time Spent on this Encounter   I, Jorje Luong, personally saw the patient today and spent greater than 30 minutes discharging this patient.       Jorje Luong MD  [facility]  ______________________________________________________________________    Physical Exam   Vital Signs: Temp: 98  F (36.7  C) Temp src: Oral BP: 91/42   Heart Rate: 84 Resp: 16 SpO2: 94 % O2 Device: None (Room air)    Weight: 142 lbs 9.6 oz    Constitutional: Resting comfortably, alert and answering questions appropriately, NAD  Respiratory: BS diminished at bases with few crackles but otherwise CTAB, no wheeze/rales/rhonchi  Cardiovascular: HRRR, no MGR, +bilateral LE edema to the knees  GI: S, NT, ND, +BS  Skin/Integumen: warm/dry  Other: normal mood and affect        Primary Care Physician   Joe Mandel    Discharge Disposition   Admited to home care:   Agency: [facility]  Discharged to home  Condition at discharge:  Stable    Significant Results and Procedures   Most Recent 3 CBC's:  Recent Labs   Lab Test 19  0542 19  0547 19  1657  19  0535   WBC 8.6 8.4  --   --  8.4   HGB 11.4* 12.0*  --   --  11.9*   * 102*  --   --  105*    181 196   < > 178    < > = values in this interval not displayed.     Most Recent 3 BMP's:  Recent Labs   Lab Test 19  0532 19  0533 19  0543    138 141   POTASSIUM 4.1 3.9 4.0   CHLORIDE 100 102 104   CO2 30 30 30   BUN 38* 37* 39*   CR 1.29* 1.23 1.25   ANIONGAP 8 6 7   AMY 8.9 9.0 8.9   GLC 83 89 85     Most Recent 2 LFT's:  Recent Labs   Lab Test 02/10/19  1100 18  1637   AST 61* 16   ALT 41 16   ALKPHOS 121 74   BILITOTAL 1.1 1.0     Most Recent 3 INR's:  Recent Labs   Lab Test 19  1541   INR 1.59*     Most Recent 3 Troponin's:  Recent Labs   Lab Test 19  0547 19  0535 02/10/19  1100   TROPI 0.504* 1.158* 0.066*     Most Recent 3 BNP's:  Recent Labs   Lab Test 19  0532 19  0543 02/10/19  1100 19  1036 18  1041   NTBNPI 40,741* 58,987* 82,948*  --   --    NTBNP  --   --   --  53,820* 52,583*   ,   Results for orders placed or performed during the hospital encounter of 19   MRI Cardiac w/contrast and stress    Narrative                                                  [facility]                                                     CMR Report      MRN:                  [id number]                                  Name:          BISMARKAVILABRAULIO SANCHESNAYELI KHAN                                  :                  1935                                  Scan Date:   2019 11:40:50                                    Electronically signed by Ze Newman  14:40:16    SUMMARY   ==========================================================================================================    Clinical history: Cardiomyopathy, ischemic evaluation  Comparison CMR: None    1. The left  ventricle is severely dilated. The global systolic function is severely reduced. The LVEF is  15%. There is thinning and severe hypokinesis to akinesis of essentially all the mid to apical segments of  the left ventricle.     2. The RV is moderately dilated. The global systolic function is moderately to severely reduced. The RVEF  is 27%.     3. There is moderate left atrial enlargement.     4.  There is no evidence of myocardial iron overload.     5. There is mild to moderate mitral regurgitation and moderate pulmonic regurgitation.     6. Regadenoson stress perfusion imaging demonstrates a medium sized transmural perfusion defect involving  the mid to basal anteroseptal and inferoseptal walls and the apical septal wall, findings consistent with  ischemia in the LAD distribution.     6. Late gadolinium enhancement imaging demonstrates extensive subendocardial enhancement involving all  apical segments of the left ventricle as well as the mid anteroseptal and inferoseptal walls and the mid to  basal inferior wall. These findings are consistent with prior myocardial infarctions in the LAD and RCA  distributions.     7.  Moderate bilateral pleural effusions are noted.     CONCLUSIONS: Severely dilated left ventricle with severely reduced systolic function. Moderately dilated  right ventricle with moderately to severely reduced systolic function.   Regadenoson stress perfusion imaging demonstrates a medium sized transmural perfusion defect involving the  mid to basal anteroseptal and inferoseptal walls and the apical septal wall, findings consistent with  ischemia in the LAD distribution.   There is extensive late gadolinium enhancement involving the apical segments of the left ventricle as well  as the mid anteroseptal and inferoseptal walls and the mid to basal inferior wall.    Collectively, the stress perfusion and late enhancement findings are consistent with prior extensive  infarctions in the LAD and the RCA  distributions with moderate ernestina-infarct ischemia in the LAD  distribution.    Based on the transmural extent of late enhancement, the myocardium in the LAD distribution demonstrates a  low likelihood of functional recovery. The myocardium in the RCA distribution demonstrates a low to  moderate likelihood of functional recovery. The myocardium in the circumflex distribution appears  predominantly viable.     CORE EXAM   ==========================================================================================================    MEASUREMENTS   ----------------------------------------------------------------------------------------      VOLUMETRIC ANALYSIS       ----------------------------------------------  .----------------------------------------------------------.                    LV     Reference  RV     Reference   +------+-----------+-------+-----------+-------+-----------+   EDV   ml           389               260                     ml/m^2     218.5             146.1               ESV   ml           332               190                     ml/m^2     186.5             106.7               CO    L/min       5.42              6.65                     L/min/m^2    3.0               3.7               MASS                                                   SV    ml            57                70                     ml/m^2      32.0              39.3               EF    %             15                27              '------+-----------+-------+-----------+-------+-----------'            CARDIAC OUTPUT HR:  95 BPM      LV DIMENSIONS       ----------------------------------------------          WALL THICKNESS - ANTEROSEPTAL:  0.7 cm          WALL THICKNESS - INFEROLATERAL:  0.6 cm        IRON QUANTIFICATION       ----------------------------------------------          MYOCARDIAL T2*:  42 msec      ANATOMY    ----------------------------------------------------------------------------------------      LEFT ATRIUM       ----------------------------------------------          CAVITY SIZE:  MODERATELY ENLARGED         RIGHT ATRIUM       ----------------------------------------------          CAVITY SIZE:  Normal         PERICARDIUM       ----------------------------------------------          Normal           EFFUSION:  None       VALVES   ----------------------------------------------------------------------------------------      AORTIC VALVE       ----------------------------------------------          AORTIC VALVE LEAFLETS:  Trileaflet           AORTIC REGURGITATION:  None           AORTIC STENOSIS:  None         MITRAL VALVE       ----------------------------------------------          MITRAL REGURGITATION:  MILD-MODERATE         TRICUSPID VALVE       ----------------------------------------------          TRICUSPID REGURGITATION:  MILD         PULMONIC VALVE       ----------------------------------------------          PULMONIC REGURGITATION:  MODERATE           PULMONIC STENOSIS:  None       17 SEGMENT   ----------------------------------------------------------------------------------------  .-----------------------------------------------------------------------------------------.   Segments            Wall Motion  Hyperenhancement  Stress Perfusion  Interpretation   +--------------------+-------------+------------------+------------------+----------------+   Base Anterior                                                                          Base Anteroseptal                                                                      Base Inferoseptal                                                                      Base Inferior                    51-75%                                                Base Inferolateral                                                                      Base Anterolateral                                                                     Mid Anterior                                                                           Mid Anteroseptal                 %                                               Mid Inferoseptal                 %                                               Mid Inferior                     51-75%                                                Mid Inferolateral                                                                      Mid Anterolateral                                                                      Apical Anterior                  51-75%                                                Apical Septal                    %                                               Apical Inferior                  51-75%                                                Apical Lateral                   51-75%                                                [place]                             %                                              +--------------------+-------------+------------------+------------------+----------------+   RV Segments         Wall Motion  Hyperenhancement  Stress Perfusion  Interpretation   +--------------------+-------------+------------------+------------------+----------------+   RV Basal Anterior                                                                      RV Basal Inferior                                                                      RV Mid                                                                                 RV Apical                                                                             '--------------------+-------------+------------------+------------------+----------------'        FINDINGS       ----------------------------------------------           INFARCT/SCAR SIZE:  39 %      SCAN INFO   ==========================================================================================================    GENERAL   ----------------------------------------------------------------------------------------      CONTRAST AGENT       ----------------------------------------------          TYPE:  Gadavist           VOLUME ADMINISTERED:  18 ml          DOSAGE FOR 0.5M:  0.13 mmol/kg        VITALS       ----------------------------------------------          HEIGHT:  67.00 in          WEIGHT:  148.00 lbs          BSA:  1.78 m^2        PULSE SEQUENCES       ----------------------------------------------          Single-Shot SSFP         SETUP       ----------------------------------------------          TYPE:  Clinical           INPATIENT:  Yes           INCOMPLETE SCAN:  No           REASON(S) FOR SCAN:  Cardiomyopathy, Ischemia Evaluation           REFERRING PHYSICIAN:  ELIZABETH RODARTE           TECHNOLOGIST:  Luz Elena Morrison       Report generated by [employer], a product of [employer]   X-ray Chest 2 vws*    Narrative    XR CHEST 2 VW   2/16/2019 8:10 AM     HISTORY: post ICD    COMPARISON: Film dated 2/11/2019    FINDINGS: A left cardiac device is been inserted. The lead appears to  be in proper position. No pneumothorax is seen..  The heart is  enlarged. There are bibasilar opacities consistent with pleural  effusions and associated infiltrate or atelectasis.. Pulmonary  vascular congestion.  The bones and soft tissues are unremarkable.      Impression    IMPRESSION:   1. Left cardiac device appears to be in proper position. No  pneumothorax.  2. Probable congestive heart failure with cardiomegaly, bilateral  pleural effusions and pulmonary vascular congestion.        ARIELLA DICKINSON MD       Discharge Orders      N terminal pro BNP outpatient     Basic metabolic panel     TSH with free T4 reflex    Last Lab Result: TSH (mU/L)       Date                      Value                 11/15/2018               2.48             ----------     Cardiac Rehab Referral      Follow-Up with [facility]      Home Care PT Referral for Hospital Discharge      Home Care OT Referral for Hospital Discharge      Home care nursing referral      Discharge Instructions - Keep incision dry for 72 hours    Keep incision dry for 72 hours (unless Derma Ramirez was applied)     Discharge Instructions - Follow up with Device Check RN     Follow up with Device Check RN in 7-10 days. (SEE APPOINTMENT BELOW)     Reason for your hospital stay    Evaluation of your heart failure and syncopal spell. You underwent several tests this stay, including an angiogram. You also had an ICD placed this stay.     Activity    Your activity upon discharge: as advised following recent ICD placement     Discharge Instructions    Check your blood pressure daily. If your pressures start to run low or if you begin feeling dizzy, call your cardiology clinic to discuss adjusting your other heart medications and diuretic (Lasix).     Follow-up and recommended labs and tests     1. Follow up with [facility]iology in the device clinic in 10 days.  (SEE APPOINTMENT BELOW)  2. Follow up with [facility] Cardiology (Dr. Calderón) as scheduled on 3/4/19 at 9:00am (SEE APPOINTMENT BELOW)  3. Follow up with your PCP in the next 1-2 weeks.(SEE APPOINTMENT BELOW)  4. Follow up with your oncologist as scheduled.     MD face to face encounter    Documentation of Face to Face and Certification for Home Health Services    I certify that patient: Patrick Diop is under my care and that I, or a nurse practitioner or physician's assistant working with me, had a face-to-face encounter that meets the physician face-to-face encounter requirements with this patient on: 2/22/2019.    This encounter with the patient was in whole, or in part, for the following medical condition, which is the primary reason for home health care: congestive  heart failure.    I certify that, based on my findings, the following services are medically necessary home health services: Nursing, Occupational Therapy, Physical Therapy and home health aide.    My clinical findings support the need for the above services because: Nurse is needed: To provide assessment and oversight required in the home to assure adherence to the medical plan due to: congestive heart failure.., Occupational Therapy Services are needed to assess and treat cognitive ability and address ADL safety due to impairment in cognitive impairment., Physical Therapy Services are needed to assess and treat the following functional impairments: physical deconditioning. and home health aide to assist with ADLs.    Further, I certify that my clinical findings support that this patient is homebound (i.e. absences from home require considerable and taxing effort and are for medical reasons or [employer] services or infrequently or of short duration when for other reasons) because: Requires assistance of another person or specialized equipment to access medical services because patient:  Limited activity tolerance, balance issues, lives alone..    Based on the above findings. I certify that this patient is confined to the home and needs intermittent skilled nursing care, physical therapy and/or speech therapy.  The patient is under my care, and I have initiated the establishment of the plan of care.  This patient will be followed by a physician who will periodically review the plan of care.  Physician/Provider to provide follow up care: Joe Mandel    Attending hospital physician (the Medicare certified [place] provider): Jorje Luong  Physician Signature: See electronic signature associated with these discharge orders.  Date: 2/22/2019     Full Code     Diet    Follow this diet upon discharge: Cardiac     Discharge Medications   Current Discharge Medication List      START taking these medications    Details    aspirin (ASA) 81 MG EC tablet Take 1 tablet (81 mg) by mouth daily  Qty: 30 tablet, Refills: 0    Associated Diagnoses: Coronary artery disease involving native coronary artery of native heart without angina pectoris      atorvastatin (LIPITOR) 40 MG tablet Take 1 tablet (40 mg) by mouth daily  Qty: 30 tablet, Refills: 0    Associated Diagnoses: Acute on chronic systolic congestive heart failure (H)      clopidogrel (PLAVIX) 75 MG tablet Take 1 tablet (75 mg) by mouth daily  Qty: 90 tablet, Refills: 3    Associated Diagnoses: Coronary artery disease involving native coronary artery of native heart without angina pectoris      torsemide (DEMADEX) 20 MG tablet Take 1 tablet (20 mg) by mouth 2 times daily  Qty: 60 tablet, Refills: 0    Associated Diagnoses: Acute on chronic systolic congestive heart failure (H)         CONTINUE these medications which have CHANGED    Details   lisinopril (PRINIVIL/ZESTRIL) 2.5 MG tablet Take 1 tablet (2.5 mg) by mouth At Bedtime  Qty: 30 tablet, Refills: 0    Associated Diagnoses: Acute on chronic systolic congestive heart failure (H)         CONTINUE these medications which have NOT CHANGED    Details   Cholecalciferol (VITAMIN D) 1000 UNITS capsule Take 1 capsule by mouth daily      furosemide (LASIX) 20 MG tablet Take 2 tablets (40 mg) by mouth daily  Refills: 0    Associated Diagnoses: Recurrent tongue cancer (H)      levothyroxine (SYNTHROID/LEVOTHROID) 100 MCG tablet Take 1 tablet (100 mcg) by mouth daily Recheck Lab test in 2 months.  Qty: 90 tablet, Refills: 1    Associated Diagnoses: Acquired hypothyroidism      metoprolol succinate ER (TOPROL-XL) 25 MG 24 hr tablet Take 1 tablet (25 mg) by mouth daily  Qty: 90 tablet, Refills: 3    Associated Diagnoses: Secondary cardiomyopathy (H)      multivitamin  with lutein (OCUVITE WITH LTEIN) CAPS Take 1 capsule by mouth daily         STOP taking these medications       lovastatin (MEVACOR) 40 MG tablet Comments:   Reason for  Stopping:             Allergies   No Known Allergies

## 2019-02-22 NOTE — PROGRESS NOTES
"Cardiology Progress Note          Assessment and Plan:      1) Severe ischemic CM  2) Cardiogenic syncope s/p ICD placement  3) CKD Stage III  4) Hypotension requiring discontinuation of beta blocker and ACE inhibtor therapy     PLAN  - OK to discharge from my standpoint  - would change timing of low dose lisinopril to at bedtime   - continue torsemide        Interval History:     Feels well. ACE not given this AM due to SBP in mid 70s. No CP, LH or SOB.                Review of Systems:   As per subjective, otherwise 5 systems reviewed and negative.           Physical Exam:   Blood pressure 91/42, pulse 92, temperature 98  F (36.7  C), temperature source Oral, resp. rate 16, height 1.702 m (5' 7\"), weight 64.7 kg (142 lb 9.6 oz), SpO2 94 %.      Vital Sign Ranges  Temperature Temp  Av.7  F (36.5  C)  Min: 97.5  F (36.4  C)  Max: 98  F (36.7  C)   Blood pressure Systolic (24hrs), Av , Min:78 , Max:105        Diastolic (24hrs), Av, Min:42, Max:56      Pulse No Data Recorded   Respirations Resp  Avg: 15.7  Min: 14  Max: 16   Pulse oximetry SpO2  Av %  Min: 92 %  Max: 96 %         Intake/Output Summary (Last 24 hours) at 2019 1153  Last data filed at 2019 1152  Gross per 24 hour   Intake 480 ml   Output 1590 ml   Net -1110 ml       Constitutional: NAD  Skin: Warm and dry  Neck: No JVD  Lungs: CTA  Cardiovascular: RRR, 2/6 systolic murmur  Abdomen: Soft, non tender.  Extremities and Back: Trace to 1+ pitting edema BLE  Neurological: Nonfocal               Medications:     I have reviewed this patient's current medications.              Data:     Labs reviewed.     Tele: YASEMIN Newman M.D.    "

## 2019-02-22 NOTE — PLAN OF CARE
Pt A&O, VSS on RA. Tele SR w/ 1st degree AVB. Pt denies CP, dizziness, and SOB. LS clear, dim. Pt up using urinal at bedside. Po diuretic, possible discharge to home  tomorrow. Will continue to monitor. Incision L chest WDL

## 2019-02-22 NOTE — CONSULTS
[facility] evaluation referral received from Amanda Mosquera PA-C.     Patient is currently established in the [facility]. Patient admitted to [facility] on 2/11 after a syncopal event. CXR showed moderate bilateral pleural effusions and subsequently underwent R thoracentesis for 1.1L.  He was transferred to [facility] for eval of ICD implant. Cardiology consulted and recommended stress MRI. This showed prior infarct of the LAD/RCA distribution and low likelihood of recovery. Angiogram done on 2/14 and showed high grade LAD stenosis and occluded ramus and RCA. He is not a surgical candidate and patient decided on medical mgmt.  He was also seen by EP and underwent single chamber ICD implant on 2/15. Patients DC has been delayed d/t ongoing fluid overload. He has not tolerated IV diuretic infusion d/t low BP. His ACEi and BB's have been held to allow for diuresis. Family is declining TCU at [place] in favor of home care. Patient currently resides in [place]. As a result he is currently scheduled to see Dr Calderón on 3/4 in [place].     [facility] Clinic appointment made for: Monday 3/4/19 @ [facility], [address] [place] [address]       7:55am in the lab       9:00am with Dr Calderón    We look forward to seeing him in the clinic.   Please call with questions.     Patricia Enriquez, RN  [facility] RN Care Coordinator  [facility]  [phone]    [facility]: Cardiomyopathy, Optimization, Rehabilitation, Education   The [facility] is a heart failure specialty clinic within the [facility] where you will work with your cardiologist, nurse practitioners, physician assistants and registered nurses who specialize in heart failure care. They are dedicated to helping patients with heart failure to carefully adjust medications, receive education, and learn who and when to call if symptoms develop. They specialize in helping you better understand your condition, slow  the progression of your disease, improve the length and quality of your life, help you detect future heart problems before they become life threatening, and avoid hospitalizations.

## 2019-02-22 NOTE — PROGRESS NOTES
Request of Amanda Mosquera PA-C to schedule patient to be seen sooner than 3/4. She has an opening in [place] on Monday 2/25 at 2pm. Patient has discharged from [facility], call to offer this appt. LM for call back, appt date/time on hold for this patient.  Patricia Enriquez RN on 2/22/2019 at 3:05 PM

## 2019-02-25 NOTE — PROGRESS NOTES
Clinic Care Coordination Contact  Care Coordination Communication    Referral Source: IP Report    Clinical Data: Patient was hospitalized at [facility] from 2/11/19 to 2/22/19 with diagnosis of CHF.     Home Care Contact:              Home Care Agency: [facility]              Contact name () and phone number: Debi Salas RN  [phone]              Care Coordination contacted home care: Yes              Anticipated start of care date: 2/23/19    Patient Contact:               Introduced self and role of care coordination.               Discharge instructions were reviewed with patient/caregiver.               Do you have any questions about your medications? No              Follow up appointment is scheduled for 3/1/19.              Provided 24 Hour Nurse Line and/or 24 Hour Appointment Scheduling: Yes              Home care has contacted patient: Yes              Patient questions/concerns: No questions or concerns.    Plan: RN/SW Care Coordinator will await notification from home care staff informing RN/SW Care Coordinator of patients discharge plans/needs. RN/SW Care Coordinator will review chart and outreach to home care every 4 weeks and as needed.      Jacquelyn Arias RN, [facility]  RN Care Coordinator  [facility]  Phone # [phone]  2/25/2019 3:54 PM

## 2019-02-25 NOTE — PROGRESS NOTES
Spoke with pt, he has a very hard time getting to clinic. Reviewed importance of close follow up and recommendation from VERONICA Jennings to be seen today in clinic in [place]. Pt unable to get here. Pt states he has a home care nurse that came this weekend and is coming again today. As pt and I were talking home care nurse walked in the door.     Spoke with [facility] nurse Debi. She saw pt Saturday and Sunday, she is setting up meds. Asked Debi to call me back after assessment with update and can update Michaela, since pt unable to make it to clinic today. Debi stated understanding. Reviewed appt for 3/4 with Dr. Calderón at [facility], Debi and pt stated understanding. Debi will draw labs (BMP/BNP/TSH) on Thursday 2/28 for review at visit 3/4 with Dr. Calderón. She will also call with update after visit on Thursday 2/28. KASSIE Taylor 9:26 AM 02/25/19

## 2019-02-25 NOTE — PROGRESS NOTES
Referral- Heart Failure      [facility] Notes:       Received staff message regarding this referral from Dr. Calderón.     Was able to get him in with Dr. Guzman on 3/28/2019 at 2pm    3/27/19 - 1:45 Patrick just called and cancelled. He said he depends on his daughter to make appointments and she cannot come tomorrow. He will check with her and call me back. I said that she is really booked up so there is no guarantee that he will be able to see her and we will have to schedule with someone else. He was agreeable. Contacted care team to inform.       DEMOGRAPHICS       Demographic Information on Patrick Diop:    Patrick Diop  Gender: male  : 1935  [address]   [place] [id number] [phone] (home)     Medical Record: [id number]  Social Security Number: [id number]  Pharmacy:    [place], [place] - [address] [id number] [place], [place] - [address] [facility], [place] - [address] [place] [address]  Primary Care Provider: Joe Mandel    Parent's names are: Data Unavailable (mother) and Data Unavailable (father).    Insurance: Payor: HU[employer] / Plan: HU[employer]NA MEDICARE ADVANTAGE / Product Type: Medicare /       REFERRAL INFORMATION         Referring Provider Dr. Calderón   Referring Clinic [facility]   Referring Contact Info    Referring Diagnosis Acute on chronic systolic heart failure (H) [I50.23]   Severe ischemic CMP, biventricular heart failure, non-amenable to revascularization   Referring Urgency Routine       PROBLEM/MEDICATION LIST:       Patient Active Problem List   Diagnosis     Hyperlipidemia with target LDL less than 100     Hypertension goal BP (blood pressure) < 140/90     Colon cancer (H)     CKD (chronic kidney disease) stage 3, GFR 30-59 ml/min (H)     Recurrent tongue cancer (H)     Chemotherapy induced nausea and vomiting     Acquired hypothyroidism     Non-ischemic cardiomyopathy (H)     Acute on chronic  systolic congestive heart failure (H)     Elevated troponin     Syncope, unspecified syncope type     Acute respiratory failure with hypoxia (H)     CHF (congestive heart failure) (H)     Acute on chronic systolic heart failure (H)     CHF exacerbation (H)       REFERRAL CHECKLIST:       Records Received  Records/Images Scanned Appointment Made   y y y   Patient Regestered Insurance Entered Packet Sent   y y y

## 2019-02-25 NOTE — PROGRESS NOTES
Reviewed with VERONICA Jennings. Pt should HOLD Metoprolol and Torsemide, pt should have another home care visit tomorrow or Wednesday to get BP, weight and symptom update. Nurse to call with update, and will reassess at that time further recommendations. Returned call to Debi, reviewed to hold Metoprolol and Torsemide, nurse visit tomorrow or Wednesday, nurse to call with updates and further recommendations based on that. Debi stated understanding. Debi is off the next two days, but will have another nurse go out to see pt tomorrow. Debi will be pt's primary RN Care manager, her contact info placed on snapshot. Updated med list. Messaged [facility] RN board for tomorrow 2/26 to get update from [facility] RN and message Michaela with update. KASSIE Taylor 9:45 AM 02/25/19

## 2019-02-25 NOTE — PROGRESS NOTES
Received call back from KASSIE Carrera with [facility]. Pt's weights as follows: 2/23 - 144#, 2/24 - 142#, 2/25 - 140#. Pt has lost 2# a day since discharge. Pt's BP's as follows: 2/23 - 91/53, 2/24 - 70/50, 2/25 - 60/40's. Pt had some dizziness with standing on 2/23, but denies any dizziness or lightheadedness today, despite low BP. Pt is a little unstable on feet, but this is related to weakness per Debi. Lungs clear. O2 sats 86-90% on room air. No O2. Pt did nto drink much this weekend. He is sleeping well, other than was up about 7 times Saturday to Sunday to urinate and then up about 4-5 times last night to urinate. No increased SOB. No swelling. Pt has not taken Metoprolol or Torsemide yet this am. Pt's med list has both Lasix and Torsemide listed, as well as does discharge summary. Debi stated pt is only getting Torsemide, Lasix was crossed out on discharge med list for patient. Pt is taking Torsemide 20mg BID, Metoprolol succinate 25mg daily, and Lisinopril 2.5mg daily at night. Will review with VERONICA Jennings and call Debi back. Debi's phone number: [phone].    KASSIE Taylor 9:38 AM 02/25/19

## 2019-02-25 NOTE — PROGRESS NOTES
Per Dr. Calderón, refer to Dr. Guzman for Advanced HF Consult. Order already was placed by Amanda Mosquera PA-C for: Severe ischemic CMP, biventricular heart failure, non-amenable to revascularization. Message sent to Alejandro black/Dr. Guzman's office. Kareen Cobb RN on 2/25/2019 at 12:04 PM

## 2019-02-25 NOTE — LETTER
[facility]  Complex Care Plan  About Me  Patient Name:  Patrick Diop    YOB: 1935  Age:     83 year old   Myles MRN:   [id number] Telephone Information:  Home Phone [phone]   Mobile [phone]       Address:    [address]  [place] [address] Email address:  No e-mail address on record      Emergency Contact(s)  Name Relationship Lgl Grd Work Phone Home Phone Mobile Phone   MARGARITA BABCOCK Daughter No [phone]           Primary language:  English     needed? No   [employer]:  [phone] op. 1  Other communication barriers: Glasses  Preferred Method of Communication:  Mail  Current living arrangement: I live alone  Mobility Status/ Medical Equipment: Independent w/Device    Health Maintenance  Health Maintenance Reviewed: Due/Overdue   Health Maintenance Due   Topic Date Due     HF ACTION PLAN Q3 YR  1935     ZOSTER IMMUNIZATION (1 of 2) 12/17/1987     MEDICARE ANNUAL WELLNESS VISIT  12/17/2002     EYE EXAM Q1 YEAR  07/03/2018     A1C Q6 MO  03/19/2019     LIPID MONITORING Q1 YEAR  03/23/2019     Pt to talk with provider about what is needed or can continue wait.        My Access Plan  Medical Emergency 911   Primary Clinic Line [facility] - [phone]   24 Hour Appointment Line [phone] or  [phone]) (toll-free)   24 Hour Nurse Line [phone] (toll-free)   Preferred Urgent Care [facility], [phone]   Preferred Hospital [place], [place]  [phone]   Preferred Pharmacy [facility] [address] [facility]     Behavioral Health Crisis Line The National Suicide Prevention Lifeline at 1-921.209.8339 or 911     My Care Team Members    Patient Care Team       Relationship Specialty Notifications Start End    Joe Mandel MD PCP - General Family Practice All results,  Admissions 10/21/15     Phone: [phone] Fax: [phone]         [address] [place] [address]    Joe Mandel MD PCP - Assigned PCP   9/15/14     Phone: [phone] Fax: [phone]         [address] [place] [address]    Jj Hernandez MD MD Otolaryngology  10/19/15     Phone: [phone] Fax: [phone]          [address] [address]    Kayla Tran RN Continuity Care Coordinator Nurse Admissions 10/23/15     Phone: [phone]         Lanny Neil MD MD Hematology & Oncology  11/9/15     Phone: [phone] Fax: [phone]         [address] [facility] [address]    Caleb Charles MD MD Radiation Oncology  8/14/18     Phone: [phone] Fax: [phone]         [address] [facility] [address]    Jacquelyn Arias, RN Lead Care Coordinator Primary Care - CC Admissions 2/25/19     Phone: [phone] Fax: [phone]        Lionel Calderón MD MD Cardiology Admissions 2/25/19     Phone: [phone] Pager: [phone] Fax: [phone] [address] [facility] [address]    [facility]    2/25/19     Phone: [phone]         Celina Mcleod APRN CNP Nurse Practitioner Nurse Practitioner All results, Admissions 2/25/19     Phone: [phone] Fax: [phone] [address] [facility] [address]    Debi FERNANDO CM-[facility]  HOME HEALTH [place] ([facility] ([place])  2/25/19     Phone: [phone]         [facility]  Cardiology  2/25/19     Phone: [phone]                 My Care Plans  Self Management and Treatment Plan  Goals and (Comments)  Goals        General    #1. Improve chronic symptoms (pt-stated)     Notes - Note edited  2/25/2019  3:43 PM by Jacquelyn Arias RN    Goal Statement: I will weigh myself everyday at the same time and call the clinic for weight gain of more than 2 lbs in a day or 5 lbs in a week.  Measure of Success: Patient consistently weighs himself.  Supportive Steps  to Achieve: Patient has home care, care coordination.   Barriers: Lives alone, chronic illness  Strengths: family, home care, care coordination  Date to Achieve By: End 2019  Patient expressed understanding of goal: yes          #2. Psychosocial (pt-stated)     Notes - Note created  2/25/2019  3:49 PM by Jacquelyn Arias, KASSIE    Goal Statement: I will fill out a Health Care Directive and have a copy sent to my doctor.   Measure of Success: I have a completed Health Care Directive in my chart and my family has one too.  Supportive Steps to Achieve: Care Coordinator RN will send a Health Care Directive to patient.  Barriers: Lives alone, chronic illness.  Strengths: Family, Home health care, care coordination.  Date to Achieve By: 3/29/19  Patient expressed understanding of goal: yes                 Action Plans on File:                       Advance Care Plans/Directives Type:        My Medical and Care Information  Problem List   Patient Active Problem List   Diagnosis     Hyperlipidemia with target LDL less than 100     Hypertension goal BP (blood pressure) < 140/90     Colon cancer (H)     CKD (chronic kidney disease) stage 3, GFR 30-59 ml/min (H)     Recurrent tongue cancer (H)     Chemotherapy induced nausea and vomiting     Acquired hypothyroidism     Non-ischemic cardiomyopathy (H)     Acute on chronic systolic congestive heart failure (H)     Elevated troponin     Syncope, unspecified syncope type     Acute respiratory failure with hypoxia (H)     CHF (congestive heart failure) (H)     Acute on chronic systolic heart failure (H)     CHF exacerbation (H)      Current Medications and Allergies:  See printed Medication Report.    Care Coordination Start Date: 2/25/2019   Frequency of Care Coordination: 2 weeks   Form Last Updated: 02/25/2019

## 2019-02-25 NOTE — LETTER
[place] CARE COORDINATION  [facility]  [address] [place] [address]  [phone]    February 25, 2019    Patrick Diop  [address] [place] [id number]      Dear Patrick,    I am a clinic care coordinator who works with Joe Mandel MD at [facility]. I wanted to thank you for spending the time talking with me.  I wanted to introduce myself and provide you with my contact information so that you can call me with questions or concerns about your health care. Below is a description of clinic care coordination and how I can further assist you.     The clinic care coordinator is a registered nurse and/or  who understand the health care system. The goal of clinic care coordination is to help you manage your health and improve access to the [facility] system in the most efficient manner. The registered nurse can assist you in meeting your health care goals by providing education, coordinating services, and strengthening the communication among your providers. The  can assist you with financial, behavioral, psychosocial, chemical dependency, counseling, and/or psychiatric resources.    Please feel free to contact me at [phone], with any questions or concerns. We at [facility] are focused on providing you with the highest-quality healthcare experience possible and that all starts with you.     Sincerely,     Jacquelyn Arias    Enclosed: I have enclosed a copy of the Complex Care Plan. This has helpful information and goals that we have talked about. Please keep this in an easy to access place to use as needed. I am also enclosing a Health Care Directive as I do not see one on file. Please let me know if you have questions or need help filling this out.

## 2019-02-26 NOTE — PROGRESS NOTES
Clinic Care Coordination Contact  Care Team Conversations    Care Coordinator RN briefly spoke with patient, he voiced that he is still intending on going to this device clinic appointment today at 3:45pm as his daughter is bringing him.    Jacquelyn Arias RN, [facility]  RN Care Coordinator  [facility]  Phone # [phone]  2/26/2019 2:23 PM

## 2019-02-26 NOTE — PROGRESS NOTES
Clinic Care Coordination Contact  Care Team Conversations    Dulce device RN from [place] left Care Coordinator RN abilio  concerned that patient might not make it to his device check today due to transportation concerns because of Care Coordinator RN note, she requested a return call.    Jacquelyn Arias RN, [facility]  RN Care Coordinator  [facility]  Phone # [phone]  2/26/2019 2:22 PM

## 2019-02-26 NOTE — PROGRESS NOTES
Clinic Care Coordination Contact  Care Team Conversations    Care Coordinator RN johann Cardona RN device nurse at [place] a  stating that she had talked with patient, he agreed that he is attending his appointment today at 3:45pm and daughter is bringing.    Jacquelyn Arias RN, [facility]  RN Care Coordinator  [facility]  Phone # [phone]  2/26/2019 2:29 PM

## 2019-02-26 NOTE — PROGRESS NOTES
"Have not heard from home care nurse today with update. Called [facility], reviewed nurse was to see pt today and call with update but have not heard from them. Spoke with Val, she stated it looked like RN visit was canceled for some reason today. Val connected me with home care office in [facility] to see if more information about why it was canceled. Spoke with Bridget, they had to cancel visit as they \"did not have the staff\". Asked if someone could see pt tomorrow, reviewed pt's BP was 60/40 yesterday, so we need someone to see him. Bridget stated understanding and will make sure nurse see's pt tomorrow and calls us with update. Messaged RN board to make sure [facility] RN calls with update 2/27. KASSIE Taylor 4:39 PM 02/26/19     "

## 2019-02-27 NOTE — PROGRESS NOTES
Good. Glad he's doing better. Let's make no changes for now, but keep an eye out for BMP before the weekend. Just need to ensure he hasn't developed ALEXI with his hypotension which would necessitate also holding lisinopril for the weekend.   Let's plan to touch base with home health RN again after we see those results. Thanks.

## 2019-02-27 NOTE — PROGRESS NOTES
"Incoming call from patients home care nurse Dolores w/update.   Recent home weights/BP's: (today's weight off torsemide)   2/27 141#    2/25 140# 60/40   2/24 142# 70/50   2/23 144# 91/53  Dolores checked his orthostatics today.Despite low BP's and drop w/position change, patient does not feel dizziness/lightheadedness. Readings below taken while off metoprolol   Sitting BP 90/58   Standing BP 80/56    He denies any SOB at rest, but does get winded w/activity. His 02 sat at rest 93% on RA but drops to 91% w/activity. Several days ago he was sleeping propped up, this has resolved. He now is able to sleep flat. He denied any PND. The swelling has improved significantly. Overall patient telling Dolores that he is feeling \"a lot better.\"    Patient remains off torsemide/metoprolol. He continues taking lisinopril 2.5mg/day. Will review w/provider.  Patricia Enriquez RN on 2/27/2019 at 2:23 PM           "

## 2019-02-27 NOTE — PROGRESS NOTES
Reviewed w/Amanda Mosquera PA-C. As his BP continues to improve, weight stabilized and sx improving will maintain meds without additional changes. Also requested to have BMP done preferably tomorrow so results can be evaluated before the weekend.     Call to Dolores home care nurse w/instructions as above. Patient's , Debi scheduled to do home visit tomorrow. She will do BMP. Will send note to board to watch for results.  Patricia Enriquez RN on 2/27/2019 at 3:22 PM

## 2019-02-27 NOTE — TELEPHONE ENCOUNTER
"Reason for Call: Request for an order or referral:    Order or referral being requested:   1. Azul, Physical Therapist with  Home care saw Patrick today in his home. She says he needs a 4-Wheel Walker with seat. He is 5' 7\" tall.  He is on Medicare so they have contract with [facility]. She was not sure what they will need with this order. [facility] Fax: [phone]  [facility] phone [phone]    2.She would also need Verbal Orders for PT 2x a week for 3 weeks starting next week.  Call Verbal order to her at [phone]    Date needed: as soon as possible    Has the patient been seen by the PCP for this problem? YES    Phone number Azul can be reached at:  [phone]    Best Time:  anytime    Can we leave a detailed message on this number?  YES    Call taken on 2/27/2019 at 12:31 PM by Sowmya Rhoades    "

## 2019-02-28 NOTE — PROGRESS NOTES
[facility] now requests orders and shares plan of care/discharge summaries for some patients through [employer].  Please REPLY TO THIS MESSAGE OR ROUTE BACK TO THE AUTHOR in order to give authorization for orders when needed.  This is considered a verbal order, you will still receive a faxed copy of orders for signature.  Thank you for your assistance in improving collaboration for our patients.    ORDER    Skilled OT 2 week 4 for ADL retraining and there ex starting 3/4/2019

## 2019-02-28 NOTE — PROGRESS NOTES
Incoming call from patients home care nurse Debi. She did home visit today. Patient more energetic this afternoon, he stated that the mornings continue to be more difficult as he is more fatigued. Patients BP this am on his home device 85/50 w/pulse 73. When taken by the home care nurse 79/50. His weight 141#. Torsemide and metoprolol continue to be on hold.     Patient seeing PCP Fri 3/1 with labs. As a results BMP not done today - unfortunately lab appt scheduled for 3pm. Will notify provider as results likely will not be back by end of day Friday.  Patricia Enriquez RN on 2/28/2019 at 3:22 PM

## 2019-03-01 NOTE — PROGRESS NOTES
DARNELL Neil -  Patient has a history of tongue cancer and was receiving keytruda.  He called to report he is not going to follow up for awhile. He was recently hospitalized for heart issues. He has blocked arteries and is being treated with medication as surgery is too dangerous. He has a follow up with cardiology on 3/4/19.   Natividad Iyer RN

## 2019-03-04 NOTE — PROGRESS NOTES
"HPI and Plan:   See dictation [id number]    Orders Placed This Encounter   Procedures     N terminal pro BNP outpatient     Basic metabolic panel     Follow-Up with Cardiologist     Echocardiogram Complete     No orders of the defined types were placed in this encounter.    There are no discontinued medications.      Encounter Diagnosis   Name Primary?     Chronic systolic heart failure (H) Yes       CURRENT MEDICATIONS:  Current Outpatient Medications   Medication Sig Dispense Refill     aspirin (ASA) 81 MG EC tablet Take 1 tablet (81 mg) by mouth daily 30 tablet 0     atorvastatin (LIPITOR) 40 MG tablet Take 1 tablet (40 mg) by mouth daily 30 tablet 0     Cholecalciferol (VITAMIN D) 1000 UNITS capsule Take 1 capsule by mouth daily       clopidogrel (PLAVIX) 75 MG tablet Take 1 tablet (75 mg) by mouth daily 90 tablet 3     levothyroxine (SYNTHROID/LEVOTHROID) 100 MCG tablet Take 1 tablet (100 mcg) by mouth daily Recheck Lab test in 2 months. 90 tablet 1     lisinopril (PRINIVIL/ZESTRIL) 2.5 MG tablet Take 1 tablet (2.5 mg) by mouth At Bedtime 30 tablet 0     metoprolol succinate ER (TOPROL-XL) 25 MG 24 hr tablet ON HOLD as of 2/25/19 90 tablet 3     multivitamin  with lutein (OCUVITE WITH LTEIN) CAPS Take 1 capsule by mouth daily       order for DME Equipment being ordered: 4-Wheel Walker with seat. He is 5' 7\" tall.  [facility]: Fax: [phone]  phone [phone] 1 Device 0     torsemide (DEMADEX) 20 MG tablet ON HOLD as of 2/25/19 60 tablet 0       ALLERGIES   No Known Allergies    PAST MEDICAL HISTORY:  Past Medical History:   Diagnosis Date     Adult hypothyroidism      Colon cancer (H)      Hearing problem      Ischemic cardiomyopathy 02/10/2019    Postulated to be due to chemotherapy, though agent not specified.     Syncope      Tongue cancer (H)        PAST SURGICAL HISTORY:  Past Surgical History:   Procedure Laterality Date     APPENDECTOMY  2001     COLECTOMY  Jan 2002    malignant polyp.  " Colectomy and colostomy     COLONOSCOPY  2002    polyps     CV HEART CATHETERIZATION WITH POSSIBLE INTERVENTION N/A 2019    Procedure: Heart Catheterization with possible Intervention;  Surgeon: Julio Vera MD;  Location:  HEART CARDIAC CATH LAB     EP ICD N/A 2/15/2019    Procedure: EP ICD;  Surgeon: Cj Gutierrez MD;  Location:  HEART CARDIAC CATH LAB     EYE SURGERY      B cataract     HC UGI ENDOSCOPY W PLACEMENT GASTROSTOMY TUBE PERCUT N/A 2015    Procedure: COMBINED ESOPHAGOSCOPY, GASTROSCOPY, DUODENOSCOPY (EGD), PLACE PERCUTANEOUS ENDOSCOPIC GASTROSTOMY TUBE;  Surgeon: Sergio Buenrostro MD;  Location: [facility]     LARYNGOSCOPY WITH BIOPSY(IES) N/A 4/3/2018    Procedure: LARYNGOSCOPY WITH BIOPSY(IES);  Direct Laryngoscopy, Biopsy Base Of Tongue;  Surgeon: Jj Hernandez MD;  Location: [facility]     TAKEDOWN COLOSTOMY         FAMILY HISTORY:  Family History   Problem Relation Age of Onset     Other Cancer Mother 57        lung ca, with brain mets     Cancer Mother         Brain tumor, lung cancer     Cancer Father         Lung cancer     Cancer Brother        SOCIAL HISTORY:  Social History     Socioeconomic History     Marital status: Single     Spouse name: None     Number of children: None     Years of education: None     Highest education level: None   Occupational History     None   Social Needs     Financial resource strain: None     Food insecurity:     Worry: None     Inability: None     Transportation needs:     Medical: None     Non-medical: None   Tobacco Use     Smoking status: Former Smoker     Packs/day: 0.00     Years: 48.00     Pack years: 0.00     Types: Cigarettes     Last attempt to quit: 2001     Years since quittin.3     Smokeless tobacco: Never Used     Tobacco comment: started at age 17   Substance and Sexual Activity     Alcohol use: No     Drug use: No     Sexual activity: No     Partners: Female   Lifestyle     Physical activity:     Days per  week: None     Minutes per session: None     Stress: None   Relationships     Social connections:     Talks on phone: None     Gets together: None     Attends Yazidi service: None     Active member of club or organization: None     Attends meetings of clubs or organizations: None     Relationship status: None     Intimate partner violence:     Fear of current or ex partner: None     Emotionally abused: None     Physically abused: None     Forced sexual activity: None   Other Topics Concern     Parent/sibling w/ CABG, MI or angioplasty before 65F 55M? No   Social History Narrative     None       Review of Systems:  Skin:  Negative     Eyes:  Positive for    ENT:  Negative    Respiratory:  Negative    Cardiovascular:    dizziness;lightheadedness;Positive for  Gastroenterology: Negative    Genitourinary:  Positive for urinary frequency;urgency  Musculoskeletal:  Negative    Neurologic:  Positive for tremors  Psychiatric:  Negative    Heme/Lymph/Imm:  Negative    Endocrine:  Positive for thyroid disorder    Physical Exam:  Vitals: BP (!) 89/53   Pulse 75   Wt 64 kg (141 lb)   SpO2 97%   BMI 22.08 kg/m      Recent Lab Results:  LIPID RESULTS:  Lab Results   Component Value Date    CHOL 145 03/23/2018    HDL 56 03/23/2018    LDL 47 03/23/2018    TRIG 212 (H) 03/23/2018    CHOLHDLRATIO Canceled, Test credited   Patient not fasting   09/28/2015       LIVER ENZYME RESULTS:  Lab Results   Component Value Date    AST 38 03/04/2019    ALT 58 03/04/2019       CBC RESULTS:  Lab Results   Component Value Date    WBC 6.8 03/04/2019    RBC 3.10 (L) 03/04/2019    HGB 10.3 (L) 03/04/2019    HCT 32.6 (L) 03/04/2019     (H) 03/04/2019    MCH 33.2 (H) 03/04/2019    MCHC 31.6 03/04/2019    RDW 13.1 03/04/2019     03/04/2019       BMP RESULTS:  Lab Results   Component Value Date     03/04/2019    POTASSIUM 4.3 03/04/2019    CHLORIDE 102 03/04/2019    CO2 27 03/04/2019    ANIONGAP 6 03/04/2019    GLC 90 03/04/2019     BUN 33 (H) 03/04/2019    CR 1.33 (H) 03/04/2019    GFRESTIMATED 50 (L) 03/04/2019    GFRESTBLACK 58 (L) 03/04/2019    AMY 8.9 03/04/2019        A1C RESULTS:  Lab Results   Component Value Date    A1C 5.6 09/19/2018       INR RESULTS:  Lab Results   Component Value Date    INR 1.59 (H) 02/11/2019           CC  No referring provider defined for this encounter.

## 2019-03-04 NOTE — LETTER
3/4/2019      Joe Mandel MD  [address] [place] [id number]      RE: Patrick Diop       Dear Colleague,    I had the pleasure of seeing Patrick Diop in the [facility].    Service Date: 03/04/2019      REASON FOR FOLLOWUP:  Severe ischemic cardiomyopathy.      HISTORY OF PRESENTING ILLNESS:  Mr. 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  He had first met me in 12/2018 with the new diagnosis of severe left ventricular dysfunction and was diagnosed of severe volume overload at that time.  This was thought to be either ischemic or related to his chemotherapy.  He was diuresed, and plan was to follow him up in clinic for outpatient ischemic workup, but it looks like in the interim the patient had a syncopal event and was admitted with a non-ST elevation MI to [facility], following which he was transferred to [facility] and underwent a coronary angiography for his severe LV dysfunction which showed severe 3-vessel coronary artery disease.  A cardiac MRI was done at that visit which showed extensive scarring and low viability, and as such, medical therapy was pursued after extensive discussions with the patient about PCI versus bypass options and the best utility of each approach.  He also underwent a secondary prevention ICD implant by EP because his syncope was thought to be arrhythmogenic.  During the hospitalization, he underwent a significant amount of diuresis and attempts at up-titration of his medications for severe LV dysfunction.  His BNP levels were close to 50,000.  I was also the inpatient attending for a few days when he was on service and did happen to meet him and his family during that visit, and we had extensive discussions about his overall cardiac condition.  He was discharged from the hospital about 10 days ago and is here for followup with me today in clinic.  He says  that since discharge he has been feeling very well.  During that admission, he also had 1.1 liters of therapeutic thoracentesis, which made him feel much better.  He has not been needing any diuretics since his discharge but currently continues to take a very low dose, that is 2.5 mg once a day, of lisinopril and aspirin and statin.  Given his low blood pressures, he is not tolerating any doses of metoprolol.  So metoprolol as well as diuretics are on hold since his discharge.  He is not on spironolactone at this time.  Today, he denies angina, syncope.  Again, he is here with his daughter, who has been very kind to help the patient out with transportation issues.      PHYSICAL EXAMINATION:    VITAL SIGNS:  Blood pressure is 89/53, asymptomatic, pulse of 75, weight is 141 pounds.  This is coming down significantly over the last 4 months.  When I had first seen him, his weight was 180 pounds.   GENERAL:  Alert, oriented x3, somewhat frail-appearing elderly gentleman in no acute distress.   NECK:  JVP is about 10 cm.  Neck is supple.   CARDIAC:  Cardiac sounds have a soft S3 but otherwise normal.     LUNGS:  Clear to auscultation bilaterally with good air entry.   ABDOMEN:  Soft, nontender, nondistended.   EXTREMITIES:  Warm without edema.   SKIN:  Extensive skin bruising is noted.      ASSESSMENT:   1.  Severe ischemic cardiomyopathy with ejection fraction of 20% with extensive LV scarring and LV dilatation.  Last echocardiogram in 02/2019.   2.  Mild RV dysfunction.   3.  Severe coronary artery disease on medical therapy with aspirin, Plavix and statin.   4.  Recent NSTEMI in 01/2019.    5.  Secondary prevention ICD 01/2019.   6.  Recurrent tongue cancers.   7.  Chronic kidney disease.   8.  Frailty.     9.  Cachexia and weight loss, likely as a result of malignancy versus cardiac cachexia.      PLAN:  Mr. Patrick Diop is a pleasant 83-year-old male as mentioned with severe nearing end-stage ischemic  cardiomyopathy with inability to tolerate beta blocker therapy due to hypotension on minimal vasodilator therapy.  Fortunately, he is not needing any diuretics at this time, but he is able to tolerate only minimal doses of lisinopril.  His MRI shows extensive scarring in the LAD and RCA distributions with quite low likelihood of functional recovery if revascularization were to be pursued.  He denies any angina at this time.  He is scheduled to see his primary and his oncologist in the coming couple of weeks.  Again, I had a long discussion with the patient and his daughter about the patient's overall prognosis.  Mr. Roger seems to have a good understanding of the fact that his heart is very sick, and unfortunately given his overall comorbidities, advanced heart failure options are going to be limited.  However, I do think it is reasonable for [facility] Dr. Guzman, to talk about advanced heart failure therapy options and his potential candidacy either way, so that the family could have a better understanding of what to expect moving forward as he remains at high risk for readmissions and clinic decline.  I will continue to hold off on beta blockers and diuretics at this time.  Should he need diuretics because of worsening lower extremity edema or shortness of breath, I have instructed him to call our clinic so that we can have appropriate followup set up in that case.  I am okay with him continuing aspirin and Plavix and statin for his coronary artery disease.  I will see him back in 3 months, but we will have him follow up with a nurse practitioner and with Dr. Guzman in the interim.      cc:   Joe Mandel MD    [address]  [id number]         IOANA GLOVER MD             D: 2019   T: 2019   MT: LUKE      Name:     ADAM ROGER   MRN:      [id number]        Account:      [id number]   :      1935      "      Service Date: 03/04/2019      Document: [id number]        Outpatient Encounter Medications as of 3/4/2019   Medication Sig Dispense Refill     Cholecalciferol (VITAMIN D) 1000 UNITS capsule Take 1 capsule by mouth daily       clopidogrel (PLAVIX) 75 MG tablet Take 1 tablet (75 mg) by mouth daily 90 tablet 3     levothyroxine (SYNTHROID/LEVOTHROID) 100 MCG tablet Take 1 tablet (100 mcg) by mouth daily Recheck Lab test in 2 months. 90 tablet 1     metoprolol succinate ER (TOPROL-XL) 25 MG 24 hr tablet ON HOLD as of 2/25/19 90 tablet 3     multivitamin  with lutein (OCUVITE WITH LTEIN) CAPS Take 1 capsule by mouth daily       order for DME Equipment being ordered: 4-Wheel Walker with seat. He is 5' 7\" tall.  [facility]: Fax: [phone]  phone [phone] 1 Device 0     [DISCONTINUED] aspirin (ASA) 81 MG EC tablet Take 1 tablet (81 mg) by mouth daily 30 tablet 0     [DISCONTINUED] atorvastatin (LIPITOR) 40 MG tablet Take 1 tablet (40 mg) by mouth daily 30 tablet 0     [DISCONTINUED] lisinopril (PRINIVIL/ZESTRIL) 2.5 MG tablet Take 1 tablet (2.5 mg) by mouth At Bedtime 30 tablet 0     torsemide (DEMADEX) 20 MG tablet ON HOLD as of 2/25/19 60 tablet 0     No facility-administered encounter medications on file as of 3/4/2019.      Again, thank you for allowing me to participate in the care of your patient.      Sincerely,    Lionel Calderón MD     [facility]  "

## 2019-03-04 NOTE — LETTER
"3/4/2019    Joe Mandel MD  [address] [facility] [address]    RE: Patrick Diop       Dear Colleague,    I had the pleasure of seeing Patrick Diop in the [facility].    HPI and Plan:   See dictation [id number]    Orders Placed This Encounter   Procedures     N terminal pro BNP outpatient     Basic metabolic panel     Follow-Up with Cardiologist     Echocardiogram Complete     No orders of the defined types were placed in this encounter.    There are no discontinued medications.      Encounter Diagnosis   Name Primary?     Chronic systolic heart failure (H) Yes       CURRENT MEDICATIONS:  Current Outpatient Medications   Medication Sig Dispense Refill     aspirin (ASA) 81 MG EC tablet Take 1 tablet (81 mg) by mouth daily 30 tablet 0     atorvastatin (LIPITOR) 40 MG tablet Take 1 tablet (40 mg) by mouth daily 30 tablet 0     Cholecalciferol (VITAMIN D) 1000 UNITS capsule Take 1 capsule by mouth daily       clopidogrel (PLAVIX) 75 MG tablet Take 1 tablet (75 mg) by mouth daily 90 tablet 3     levothyroxine (SYNTHROID/LEVOTHROID) 100 MCG tablet Take 1 tablet (100 mcg) by mouth daily Recheck Lab test in 2 months. 90 tablet 1     lisinopril (PRINIVIL/ZESTRIL) 2.5 MG tablet Take 1 tablet (2.5 mg) by mouth At Bedtime 30 tablet 0     metoprolol succinate ER (TOPROL-XL) 25 MG 24 hr tablet ON HOLD as of 2/25/19 90 tablet 3     multivitamin  with lutein (OCUVITE WITH LTEIN) CAPS Take 1 capsule by mouth daily       order for DME Equipment being ordered: 4-Wheel Walker with seat. He is 5' 7\" tall.  [facility]: Fax: [phone]  phone [phone] 1 Device 0     torsemide (DEMADEX) 20 MG tablet ON HOLD as of 2/25/19 60 tablet 0       ALLERGIES   No Known Allergies    PAST MEDICAL HISTORY:  Past Medical History:   Diagnosis Date     Adult hypothyroidism      Colon cancer (H)      Hearing problem      Ischemic cardiomyopathy 02/10/2019    Postulated to be due to chemotherapy, " though agent not specified.     Syncope      Tongue cancer (H)        PAST SURGICAL HISTORY:  Past Surgical History:   Procedure Laterality Date     APPENDECTOMY       COLECTOMY  2002    malignant polyp.  Colectomy and colostomy     COLONOSCOPY  2002    polyps     CV HEART CATHETERIZATION WITH POSSIBLE INTERVENTION N/A 2019    Procedure: Heart Catheterization with possible Intervention;  Surgeon: Julio Vera MD;  Location:  HEART CARDIAC CATH LAB     EP ICD N/A 2/15/2019    Procedure: EP ICD;  Surgeon: Cj Gutierrez MD;  Location:  HEART CARDIAC CATH LAB     EYE SURGERY      B cataract     HC UGI ENDOSCOPY W PLACEMENT GASTROSTOMY TUBE PERCUT N/A 2015    Procedure: COMBINED ESOPHAGOSCOPY, GASTROSCOPY, DUODENOSCOPY (EGD), PLACE PERCUTANEOUS ENDOSCOPIC GASTROSTOMY TUBE;  Surgeon: Sergio Buenrostro MD;  Location: [facility]     LARYNGOSCOPY WITH BIOPSY(IES) N/A 4/3/2018    Procedure: LARYNGOSCOPY WITH BIOPSY(IES);  Direct Laryngoscopy, Biopsy Base Of Tongue;  Surgeon: Jj Hernandez MD;  Location: [facility]     TAKEDOWN COLOSTOMY         FAMILY HISTORY:  Family History   Problem Relation Age of Onset     Other Cancer Mother 57        lung ca, with brain mets     Cancer Mother         Brain tumor, lung cancer     Cancer Father         Lung cancer     Cancer Brother        SOCIAL HISTORY:  Social History     Socioeconomic History     Marital status: Single     Spouse name: None     Number of children: None     Years of education: None     Highest education level: None   Occupational History     None   Social Needs     Financial resource strain: None     Food insecurity:     Worry: None     Inability: None     Transportation needs:     Medical: None     Non-medical: None   Tobacco Use     Smoking status: Former Smoker     Packs/day: 0.00     Years: 48.00     Pack years: 0.00     Types: Cigarettes     Last attempt to quit: 2001     Years since quittin.3     Smokeless tobacco:  Never Used     Tobacco comment: started at age 17   Substance and Sexual Activity     Alcohol use: No     Drug use: No     Sexual activity: No     Partners: Female   Lifestyle     Physical activity:     Days per week: None     Minutes per session: None     Stress: None   Relationships     Social connections:     Talks on phone: None     Gets together: None     Attends Scientology service: None     Active member of club or organization: None     Attends meetings of clubs or organizations: None     Relationship status: None     Intimate partner violence:     Fear of current or ex partner: None     Emotionally abused: None     Physically abused: None     Forced sexual activity: None   Other Topics Concern     Parent/sibling w/ CABG, MI or angioplasty before 65F 55M? No   Social History Narrative     None       Review of Systems:  Skin:  Negative     Eyes:  Positive for    ENT:  Negative    Respiratory:  Negative    Cardiovascular:    dizziness;lightheadedness;Positive for  Gastroenterology: Negative    Genitourinary:  Positive for urinary frequency;urgency  Musculoskeletal:  Negative    Neurologic:  Positive for tremors  Psychiatric:  Negative    Heme/Lymph/Imm:  Negative    Endocrine:  Positive for thyroid disorder    Physical Exam:  Vitals: BP (!) 89/53   Pulse 75   Wt 64 kg (141 lb)   SpO2 97%   BMI 22.08 kg/m       Recent Lab Results:  LIPID RESULTS:  Lab Results   Component Value Date    CHOL 145 03/23/2018    HDL 56 03/23/2018    LDL 47 03/23/2018    TRIG 212 (H) 03/23/2018    CHOLHDLRATIO Canceled, Test credited   Patient not fasting   09/28/2015       LIVER ENZYME RESULTS:  Lab Results   Component Value Date    AST 38 03/04/2019    ALT 58 03/04/2019       CBC RESULTS:  Lab Results   Component Value Date    WBC 6.8 03/04/2019    RBC 3.10 (L) 03/04/2019    HGB 10.3 (L) 03/04/2019    HCT 32.6 (L) 03/04/2019     (H) 03/04/2019    MCH 33.2 (H) 03/04/2019    MCHC 31.6 03/04/2019    RDW 13.1 03/04/2019    PLT  192 03/04/2019       BMP RESULTS:  Lab Results   Component Value Date     03/04/2019    POTASSIUM 4.3 03/04/2019    CHLORIDE 102 03/04/2019    CO2 27 03/04/2019    ANIONGAP 6 03/04/2019    GLC 90 03/04/2019    BUN 33 (H) 03/04/2019    CR 1.33 (H) 03/04/2019    GFRESTIMATED 50 (L) 03/04/2019    GFRESTBLACK 58 (L) 03/04/2019    AMY 8.9 03/04/2019        A1C RESULTS:  Lab Results   Component Value Date    A1C 5.6 09/19/2018       INR RESULTS:  Lab Results   Component Value Date    INR 1.59 (H) 02/11/2019           CC  No referring provider defined for this encounter.                    Thank you for allowing me to participate in the care of your patient.      Sincerely,     Lionel Calderón MD     [facility]    cc:   No referring provider defined for this encounter.

## 2019-03-04 NOTE — TELEPHONE ENCOUNTER
----- Message from Lionel Calderón MD sent at 3/4/2019 10:25 AM CST -----  Please have him follow up with Megan in a month. Thank you. Call and follow up on symptoms and BP and weight in 10 days. If has worsening SOB, then get Chest Xray and have him see TIN or me. He is sick and high risk for re-admissions.

## 2019-03-04 NOTE — TELEPHONE ENCOUNTER
Messaged [facility] to help arranged Advanced HF Referral with Dr. Guzman in one month.     Messaged [facility] RN board for 3/14/19 to call pt for update on sx/BP. KASSIE Taylor 11:02 AM 03/04/19

## 2019-03-04 NOTE — PROGRESS NOTES
Service Date: 03/04/2019      REASON FOR FOLLOWUP:  Severe ischemic cardiomyopathy.      HISTORY OF PRESENTING ILLNESS:  Mr. 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  He had first met me in 12/2018 with the new diagnosis of severe left ventricular dysfunction and was diagnosed of severe volume overload at that time.  This was thought to be either ischemic or related to his chemotherapy.  He was diuresed, and plan was to follow him up in clinic for outpatient ischemic workup, but it looks like in the interim the patient had a syncopal event and was admitted with a non-ST elevation MI to [facility], following which he was transferred to [facility] and underwent a coronary angiography for his severe LV dysfunction which showed severe 3-vessel coronary artery disease.  A cardiac MRI was done at that visit which showed extensive scarring and low viability, and as such, medical therapy was pursued after extensive discussions with the patient about PCI versus bypass options and the best utility of each approach.  He also underwent a secondary prevention ICD implant by EP because his syncope was thought to be arrhythmogenic.  During the hospitalization, he underwent a significant amount of diuresis and attempts at up-titration of his medications for severe LV dysfunction.  His BNP levels were close to 50,000.  I was also the inpatient attending for a few days when he was on service and did happen to meet him and his family during that visit, and we had extensive discussions about his overall cardiac condition.  He was discharged from the hospital about 10 days ago and is here for followup with me today in clinic.  He says that since discharge he has been feeling very well.  During that admission, he also had 1.1 liters of therapeutic thoracentesis, which made him feel much better.  He has not been needing any diuretics since his discharge  but currently continues to take a very low dose, that is 2.5 mg once a day, of lisinopril and aspirin and statin.  Given his low blood pressures, he is not tolerating any doses of metoprolol.  So metoprolol as well as diuretics are on hold since his discharge.  He is not on spironolactone at this time.  Today, he denies angina, syncope.  Again, he is here with his daughter, who has been very kind to help the patient out with transportation issues.      PHYSICAL EXAMINATION:    VITAL SIGNS:  Blood pressure is 89/53, asymptomatic, pulse of 75, weight is 141 pounds.  This is coming down significantly over the last 4 months.  When I had first seen him, his weight was 180 pounds.   GENERAL:  Alert, oriented x3, somewhat frail-appearing elderly gentleman in no acute distress.   NECK:  JVP is about 10 cm.  Neck is supple.   CARDIAC:  Cardiac sounds have a soft S3 but otherwise normal.     LUNGS:  Clear to auscultation bilaterally with good air entry.   ABDOMEN:  Soft, nontender, nondistended.   EXTREMITIES:  Warm without edema.   SKIN:  Extensive skin bruising is noted.      ASSESSMENT:   1.  Severe ischemic cardiomyopathy with ejection fraction of 20% with extensive LV scarring and LV dilatation.  Last echocardiogram in 02/2019.   2.  Mild RV dysfunction.   3.  Severe coronary artery disease on medical therapy with aspirin, Plavix and statin.   4.  Recent NSTEMI in 01/2019.    5.  Secondary prevention ICD 01/2019.   6.  Recurrent tongue cancers.   7.  Chronic kidney disease.   8.  Frailty.     9.  Cachexia and weight loss, likely as a result of malignancy versus cardiac cachexia.      PLAN:  Mr. Patrick Diop is a pleasant 83-year-old male as mentioned with severe nearing end-stage ischemic cardiomyopathy with inability to tolerate beta blocker therapy due to hypotension on minimal vasodilator therapy.  Fortunately, he is not needing any diuretics at this time, but he is able to tolerate only minimal doses of lisinopril.   His MRI shows extensive scarring in the LAD and RCA distributions with quite low likelihood of functional recovery if revascularization were to be pursued.  He denies any angina at this time.  He is scheduled to see his primary and his oncologist in the coming couple of weeks.  Again, I had a long discussion with the patient and his daughter about the patient's overall prognosis.  Mr. Roger seems to have a good understanding of the fact that his heart is very sick, and unfortunately given his overall comorbidities, advanced heart failure options are going to be limited.  However, I do think it is reasonable for him to meet with [facility], Dr. Guzman, to talk about advanced heart failure therapy options and his potential candidacy either way, so that the family could have a better understanding of what to expect moving forward as he remains at high risk for readmissions and clinic decline.  I will continue to hold off on beta blockers and diuretics at this time.  Should he need diuretics because of worsening lower extremity edema or shortness of breath, I have instructed him to call our clinic so that we can have appropriate followup set up in that case.  I am okay with him continuing aspirin and Plavix and statin for his coronary artery disease.  I will see him back in 3 months, but we will have him follow up with a nurse practitioner and with Dr. Guzman in the interim.      cc:   [place] [address]         IOANA GLOVER MD             D: 2019   T: 2019   MT: LUKE      Name:     ADAM ROGER   MRN:      [id number]        Account:      [id number]   :      1935           Service Date: 2019      Document: [id number]

## 2019-03-06 NOTE — TELEPHONE ENCOUNTER
Received message from [facility], pt is scheduled to see Dr. Guzman on 3/28/19. KASSIE Taylor 1:43 PM 03/06/19

## 2019-03-08 NOTE — NURSING NOTE
"Chief Complaint   Patient presents with     Hospital F/U       Initial BP 96/50 (BP Location: Left arm, Patient Position: Sitting, Cuff Size: Adult Regular)   Pulse 80   Temp 97.4  F (36.3  C)   Resp 26   Wt 64.4 kg (142 lb)   BMI 22.24 kg/m   Estimated body mass index is 22.24 kg/m  as calculated from the following:    Height as of 2/11/19: 1.702 m (5' 7\").    Weight as of this encounter: 64.4 kg (142 lb).    Patient presents to the clinic using Wheel Chair    Health Maintenance that is potentially due pending provider review:  NONE    n/a    Is there anyone who you would like to be able to receive your results? Yes  Daughter  If yes have patient fill out NABIL    Bebe Anglin CMA    "

## 2019-03-08 NOTE — PROGRESS NOTES
SUBJECTIVE:   Patrick Diop is a 83 year old male who presents to clinic today for the following health issues:  Chief Complaint   Patient presents with     Hospital F/U      Hospital Follow-up Visit:    Hospital/Nursing Home/IP Rehab Facility: [facility] AND transfer to [facility]  Date of Admission: 2/10/19  Date of Discharge: 2/22/2019  Reason(s) for Admission:                                  Ischemic cardiomyopathy  Multivessel CAD  Hypertension  Hyperlipidemia  Stage III CKD            Problems taking medications regularly:  None       Medication changes since discharge: None       Problems adhering to non-medication therapy:  None    Summary of hospitalization:  [facility] reviewed  See abstracted notes from recent hospital stay below.   Diagnostic Tests/Treatments reviewed.  Follow up needed: none  Other Healthcare Providers Involved in Patient s Care:         cardiology and oncology  Update since discharge: improved.     Post Discharge Medication Reconciliation: discharge medications reconciled, continue medications without change.  Plan of care communicated with patient     Coding guidelines for this visit:  Type of Medical   Decision Making Face-to-Face Visit       within 7 Days of discharge Face-to-Face Visit        within 14 days of discharge   Moderate Complexity [id number]   High Complexity [id number]        In for hospital follow-up.    No specific questions.   Mr. Law was hospitalized at [facility] from February 11 - February 22.  He had syncope and CHF exacerbation.  He is he was transferred from [facility] to [facility] for treatment.  He has had a very low ejection fraction.  He underwent a coronary artery angiogram which showed three-vessel coronary artery disease.  He was not deemed to be a candidate for CABG treatment and preferred medical treatment to any stents or invasive intervention.  In addition  he has recurrent tongue cancer and has been undergoing his through oncology his last visit in December.  Further treatments have been delayed due to his severe heart disease and congestive heart failure.    See abstracted notes from recent hospital stay below.     He is feeling better than before hospital stay.  He had very low energy which has improved.  Strength better.  Appetite decreased for several years. Eats because he has to.  Eats small amounts often.  Sleeping OK.   Weight early January=160#.  142# at hospital discharge.  3/5/2018 weight=163#.  He was 180# in the fall.   Breathing is OK.  No cough.  Some shortness of breath/ dyspnea on exertion.  Can walk 100'  No exertional chest pains, no palpitations.   He was advised to take diuretic (torsemide) for 2# weight gain.     He is living in [facility] in [place].  Lives alone.    Contemplating moving to Assisted living.   He has nurse three days a week, physical therapy and Occupational Therapy.   Has not been on cardiac rehab.  Using walker at his apartment.    Not driving for 4-5 years.     No upcoming oncology appointments.  Last visit on 12/26.  Treatment on hold due to CHF  Cardiology visit on 3/25    Patient Active Problem List   Diagnosis     Hyperlipidemia with target LDL less than 100     Hypertension goal BP (blood pressure) < 140/90     Colon cancer (H)     CKD (chronic kidney disease) stage 3, GFR 30-59 ml/min (H)     Recurrent tongue cancer (H)     Chemotherapy induced nausea and vomiting     Acquired hypothyroidism     Non-ischemic cardiomyopathy (H)     Acute on chronic systolic congestive heart failure (H)     Elevated troponin     Syncope, unspecified syncope type     Acute respiratory failure with hypoxia (H)     CHF (congestive heart failure) (H)     Acute on chronic systolic heart failure (H)     CHF exacerbation (H)      ROS:GI: No nausea, vomiting,  heartburn, abdominal pain, diarrhea, constipation or change in bowel habits  :  No urinary frequency or dysuria, bladder or kidney problems  Musculoskeletal: No significant muscle or joint pains  Psychiatric: No problems with anxiety, depression or mental health  No orthostatic lightheadedness.  He does pause when getting up before walking.     OBJECTIVE:Blood pressure 96/50, pulse 80, temperature 97.4  F (36.3  C), resp. rate 26, weight 64.4 kg (142 lb), SpO2 98 %. BMI=Body mass index is 22.24 kg/m .  GENERAL APPEARANCE ADULT: Alert, no acute distress, seated in a wheelchair  NECK: No adenopathy,masses or thyromegaly  RESP: lungs clear to auscultation   CV: normal rate, regular rhythm, no murmur or gallop  ABDOMEN: soft, no organomegaly, masses or tenderness  MS:minimal to trace edema bilateral lower legs.       ASSESSMENT:   (I50.22) Chronic systolic congestive heart failure (H)  (primary encounter diagnosis)  Comment: currently stable weight and symptoms   Plan: follow-up with cardiology as planned.   Complete health care directive and bring in a copy for us.     (C02.9) Recurrent tongue cancer (H)  Comment:   Plan: follow-up with Dr. Neil    (I10) Hypertension goal BP (blood pressure) < 140/90  Comment: on the low side but no symptoms   Plan: No change in current treatment plan.     (E03.9) Acquired hypothyroidism  Comment: TSH high but Free T4 also high earlier this week.    Plan: REcheck in 2 months.  If TSH remains high may need increase in levothyroxine.  Continue at 100mcg daily for now.     (N18.3) CKD (chronic kidney disease) stage 3, GFR 30-59 ml/min (H)  Comment: stable  Plan: No change in current treatment plan.         =======================================  See abstracted notes from recent hospital stay below.   [facility]  Hospitalist Discharge Summary       Date of Admission:  2/11/2019  Date of Discharge:  2/22/2019    Discharge Diagnoses        Ischemic Cardiomyopathy  Multivessel CAD  Hypertension  Hyperlipidemia  Stage III CKD    Follow-ups Needed  After Discharge         Follow-up Appointments      Follow-up and recommended labs and tests        1. Follow up with [facility] Cardiology in the device clinic in 10 days.  2. Follow up with [facility] Cardiology (Dr. Calderón) as scheduled on 3/4/19 at 9:00am  3. Follow up with your PCP in the next 1-2 weeks.  4. Follow up with your oncologist as scheduled.          Hospital Course        Patrick Diop is an 83 year old male with PMHx of cardiomyopathy, chronic systolic CHF with EF 20% per echo in 12/2018, hypertension, hyperlipidemia, hypothyroidism, recurrent tongue cancer and stage III CKD who was initially admitted at [facility] from 2/10/2019 - 2/11/2019 for evaluation of a syncopal spell and management of CHF exacerbation. Cardiology recommended transfer to [facility] for ongoing evaluation of his cardiomyopathy, thus he was transferred to [facility] on 2/11/19.      Ischemic Cardiomyopathy  Multivessel CAD  Hypertension, Hyperlipidemia  Noted to have EF 20% on echocardiogram in 12/2018 but unclear if cardiomyopathy was due to ischemia vs prior chemotherapy for tongue cancer. Initially presented to [facility] after a syncopal spell. Also endorsed shortness of breath and workup showed pulmonary vascular congestion with moderate bilateral pleural effusions with a proBMP of 83k. Initially diuresed with IV Lasix. As above, was transferred to [facility] on 2/11 for ongoing cardiac evaluation. Repeat echo on 2/12 showed EF 20-25% with areas of severe hypokinesis in the distribution of the LAD. Cardiac MRI on 2/13 showed a severely dilated LV with EF 15% and stress imaging showed a medium sized transmural infarct perfusion defects consistent with ischemia again in the distribution of the LAD. He ultimately underwent coronary angiography on 2/14 which showed 3V CAD with  of the RCA and LCx and subtotal occlusion of the mid LAD. He was not deemed to be a candidate for CABG given his tongue cancer  and patient declined surgery. Patient stated he preferred medical therapy over PCI. Was initially continued on Toprol XL 25mg daily and lisinopril 20mg daily. Continues on statin. Aspirin added to regimen. Initially responded well to diuresis though BPs were running soft during stay. Wts remained steadily elevated during hospitalization.  -- given he continued to desat with exertion and wts remained up 10 lbs this stay, oral Lasix was stopped on 2/16 and he was placed on Lasix 20mg IV q6h; wts remained unchanged, Is/Os without significant change so was placed on Lasix gtt on 2/17 -- gtt had to be stopped on 2/18 due to hypotension but was ultimately resumed on 2/19 -- stopped again the evening of 2/19 dt recurrence of hypotension with SBPs <80  -- given hypotension with diuresis this stay, lisinopril and metoprolol doses were decreased (to 2.5mg at bedtime and 12.5mg BID respectively)  -- Currently on PO torsemide  -- conts on aspirin, Plavix added per cardiology  -- cont statin -- changed to atorvastatin 40mg HS this stay     Syncope, s/p ICD placement on 2/15  Suspected to have occurred secondary to his underlying severe cardiomyopathy.   Evaluated by EP this stay and underwent ICD placement on 2/15.      R Pleural Effusion s/p thoracentesis on 2/11  Secondary to CHF. Underwent thoracentesis on 2/11 with removal of 1.1L of pleural fluid.   -- weaned off O2, sats stable on RA     Stage III CKD  Baseline Cr seems to be around 1.3.   -- Cr had been around 1.1 most of this hospitalization, peaked at 1.3 on 2/17 following contrast with procedures  -- Cr stable at 1.2     Recurrent Tongue Cancer  Follows with Dr. Neil of  Oncology. Initially diagnosed in 9/2015. Treated with chemotherapy and radiation. Per patient, treatments were initially successful but has had a recent recurrence in 4/2018 and was treated with Keytruda from 4/2018 - 11/2018. Had head/neck CT on 2/11 at [facility] (was scheduled to have done per  his oncologist).  -- no concerns at this time, can follow up with oncology after discharge     Hypothyroidism  Chronic and stable on levothyroxine    Discharge Orders         N terminal pro BNP outpatient      Basic metabolic panel          TSH with free T4 reflex     Last Lab Result: TSH (mU/L)       Date                     Value                 11/15/2018               2.48             ----------          Cardiac Rehab Referral       Follow-Up with Advanced Heart Failure Clinic       Home Care PT Referral for Hospital Discharge       Home Care OT Referral for Hospital Discharge       Home care nursing referral       Discharge Instructions - Keep incision dry for 72 hours     Keep incision dry for 72 hours (unless Derma Ramirez was applied)          Discharge Instructions - Follow up with Device Check RN      Follow up with Device Check RN in 7-10 days. (SEE APPOINTMENT BELOW)          Reason for your hospital stay     Evaluation of your heart failure and syncopal spell. You underwent several tests this stay, including an angiogram. You also had an ICD placed this stay.          Activity     Your activity upon discharge: as advised following recent ICD placement          Discharge Instructions     Check your blood pressure daily. If your pressures start to run low or if you begin feeling dizzy, call your cardiology clinic to discuss adjusting your other heart medications and diuretic (Lasix).          Follow-up and recommended labs and tests      1. Follow up with [facility] Cardiology in the device clinic in 10 days.  (SEE APPOINTMENT BELOW)  2. Follow up with [facility] Cardiology (Dr. Calderón) as scheduled on 3/4/19 at 9:00am (SEE APPOINTMENT BELOW)  3. Follow up with your PCP in the next 1-2 weeks.(SEE APPOINTMENT BELOW)  4. Follow up with your oncologist as scheduled.

## 2019-03-08 NOTE — PATIENT INSTRUCTIONS
ASSESSMENT:   (I50.22) Chronic systolic congestive heart failure (H)  (primary encounter diagnosis)  Comment: currently stable weight and symptoms   Plan: follow-up with cardiology as planned.   Complete health care directive and bring in a copy for us.     (C02.9) Recurrent tongue cancer (H)  Comment:   Plan: follow-up with Dr. Neil    (I10) Hypertension goal BP (blood pressure) < 140/90  Comment: on the low side but no symptoms   Plan: No change in current treatment plan.     (E03.9) Acquired hypothyroidism  Comment: TSH high but Free T4 also high earlier this week.    Plan: REcheck in 2 months.  If TSH remains high may need increase in levothyroxine.  Continue at 100mcg daily for now.     (N18.3) CKD (chronic kidney disease) stage 3, GFR 30-59 ml/min (H)  Comment: stable  Plan: No change in current treatment plan.

## 2019-03-14 NOTE — TELEPHONE ENCOUNTER
I called pt for update on weight and symptoms. Pt said his weight has been stable around 141-142.0#. Weight today is 141.0#. Pt said he has felt well and has been walking more in the past week. He denied any worsening shortness of breath or swelling. Pt said home health RN has been checking his BP 2x week. On Monday around 1030 AM it was 106/60, today in the morning it was 86/56. Pt said it is typically in the 80s/50s earlier in the morning, but as the day progresses it improves. Pt said his head usually feels fuzzy with the lower BPs. PT has 3/25 CORE f/u with Amanda Mosquera, and 3/28 OV with . Will send update to .

## 2019-03-14 NOTE — TELEPHONE ENCOUNTER
Form signed and paperwork faxed and sent to scanning.  Florecita FrazierReplaced by [facility]

## 2019-03-20 NOTE — PROGRESS NOTES
The patient is asked the following questions today and these are his answers:    -Have you had a mantoux administered in the past 30 days?    No  -Have you had a previous positive Mantoux.  No  -Have you received BCG in the past.  No  -Have you had a live vaccine  (MMR, Varicella, OPV, Yellow Fever) in the last 6 weeks.  No  -Have you had and active  viral or bacterial infection in the past 6 weeks.  No  -Have you received corticosteroids or immunosuppressive agents in the past 6 weeks.  No  -Have you been diagnosed with HIV?  No  -Do you have a maglinancy?  Yes   Ezekiel Caicedo M.A.      Patient informed to return to clinic on Friday 3/22/2019 by 1:49 pm

## 2019-03-20 NOTE — PROGRESS NOTES
Patient brought in forms for [facility] that need to be filled out before admission.  Patient brought in with TB test as forgot when had physical.    Patient asked that we fax the forms back the [facility] for him.     Ezekiel Caicedo M.A.       Papers printed for what was needed and given to Dr. Mandel to sign.

## 2019-03-21 NOTE — PROGRESS NOTES
I called pt for update on weight and symptoms. Pt said his weight has remained stable around 140-141#. Pt said he feels good and was able to take the bus to a doctors appointment yesterday without any issues. Pt said he still has lower BP in the morning, but it usually goes up by afternoon. Pt said this morning it was 89/60. He is still holding his metoprolol and torsemide. Pt takes lisinopril 2.5 mg in PM. Pt has labs and OV with Amanda Mosquera on 3/25. He will bring a list of his BPs to his OV. Olivia FERNANDO March 21, 2019, 8:57 AM

## 2019-03-25 NOTE — PROGRESS NOTES
Pt scheduled for CORE visit with today with Michaela MARTIN in [place], as well as scheduled for Advanced HF visit with Dr. Guzman at [facility] on Thursday this week. Discussed with Michaela MARTIN- she stated as long as pt's wt and BP stable, as pt has difficulty getting to clinic, no need for visit today with PA, just keep visit with Dr. Guzman on Thursday and then follow up with TIN in CORE in [place] 2-3 weeks after seeing Dr. Guzman.     Called pt and discussed above, he is feeling well. Wt's have been stable between 140-141# every day. Denies increased SOB or swelling. BP's have been stable, still low in am's but improve through out the day. Pt denies any dizziness or feelings of lightheadedness. Pt agreeable to cancel visit today and reschedule for 2-3 weeks after Dr. Guzman's visit. Pt stated his calendar is open, and just to pick a date and time, and mail him a letter with the appt information.     Canceled CORE TIN visit for today, reviewed appt information/date/time/location for Thursday with Dr. Guzman. Pt stated understanding.     Scheduled pt for CORE TIN visit in [place] for next available on 4/22/19 with labs. Appt letter mailed to pt as requested. KASSIE Taylor 8:19 AM 03/25/19

## 2019-03-27 NOTE — PROGRESS NOTES
3/27/19 - 1:45 Patrick just called and cancelled. He said he depends on his daughter to make appointments and she cannot come tomorrow. He will check with her and call me back. I said that she is really booked up so there is no guarantee that he will be able to see her and we will have to schedule with someone else. He was agreeable.     Did send a staff message to care team about cancellation.     Alejandro Salas, [facility]   CORE and Heart Failure   Advanced Heart Failure Referrals

## 2019-03-28 NOTE — PROGRESS NOTES
Received update from Alejandro, scheduled for  of  advanced heart failure clinic. Pt had to cancel OV with  today because his daughter can't take him. I called pt and he said he feels good. He denied any issues with shortness of breath or edema. Pt said his weight has been stable at around 141#. I tentatively scheduled pt to follow up with  on 4/1 as there are no TIN openings in [place], and pt does not know when he will be able to see  d/t his daughter's schedule. Pt can take the bus to his appointments in [place]. Will send update to . Olivia FERNANDO March 28, 2019, 3:41 PM

## 2019-03-29 NOTE — PROGRESS NOTES
I called pt and let him know  would like to see him for follow up on 4/1 in [place]. Pt scheduled for labs @ 1:30, and OV with  @ 2:30. Olivia FERNANDO March 29, 2019, 9:08 AM

## 2019-04-01 NOTE — PROGRESS NOTES
"HPI and Plan:   See dictation [id number]    No orders of the defined types were placed in this encounter.    No orders of the defined types were placed in this encounter.    There are no discontinued medications.      No diagnosis found.    CURRENT MEDICATIONS:  Current Outpatient Medications   Medication Sig Dispense Refill     aspirin (ASA) 81 MG EC tablet Take 1 tablet (81 mg) by mouth daily 90 tablet 2     atorvastatin (LIPITOR) 40 MG tablet Take 1 tablet (40 mg) by mouth daily 90 tablet 2     Cholecalciferol (VITAMIN D) 1000 UNITS capsule Take 1 capsule by mouth daily       clopidogrel (PLAVIX) 75 MG tablet Take 1 tablet (75 mg) by mouth daily 90 tablet 3     levothyroxine (SYNTHROID/LEVOTHROID) 100 MCG tablet Take 1 tablet (100 mcg) by mouth daily Recheck Lab test in 2 months. 90 tablet 1     lisinopril (PRINIVIL/ZESTRIL) 2.5 MG tablet Take 1 tablet (2.5 mg) by mouth At Bedtime 90 tablet 0     metoprolol succinate ER (TOPROL-XL) 25 MG 24 hr tablet ON HOLD as of 2/25/19 90 tablet 3     multivitamin  with lutein (OCUVITE WITH LTEIN) CAPS Take 1 capsule by mouth daily       order for DME Equipment being ordered: 4-Wheel Walker with seat. He is 5' 7\" tall.  [facility]: Fax: [phone]  phone [phone] (Patient not taking: Reported on 4/1/2019) 1 Device 0     torsemide (DEMADEX) 20 MG tablet ON HOLD as of 2/25/19 60 tablet 0       ALLERGIES   No Known Allergies    PAST MEDICAL HISTORY:  Past Medical History:   Diagnosis Date     Acute on chronic systolic congestive heart failure (H) 2/10/2019     Acute on chronic systolic heart failure (H) 2/10/2019     Adult hypothyroidism      Colon cancer (H)      Hearing problem      Hyperlipidemia with target LDL less than 100 11/6/2013     Hypertension goal BP (blood pressure) < 140/90 11/6/2013     Ischemic cardiomyopathy 02/10/2019    Postulated to be due to chemotherapy, though agent not specified.     Non-ischemic cardiomyopathy (H) 2/10/2019    Postulated to be " due to chemotherapy, though agent not specified.     Syncope      Tongue cancer (H)        PAST SURGICAL HISTORY:  Past Surgical History:   Procedure Laterality Date     APPENDECTOMY       COLECTOMY  2002    malignant polyp.  Colectomy and colostomy     COLONOSCOPY  2002    polyps     CV HEART CATHETERIZATION WITH POSSIBLE INTERVENTION N/A 2019    Procedure: Heart Catheterization with possible Intervention;  Surgeon: Julio Vera MD;  Location:  HEART CARDIAC CATH LAB     EP ICD N/A 2/15/2019    Procedure: EP ICD;  Surgeon: Cj Gutierrez MD;  Location:  HEART CARDIAC CATH LAB     EYE SURGERY      B cataract     [facility] ENDOSCOPY W PLACEMENT GASTROSTOMY TUBE PERCUT N/A 2015    Procedure: COMBINED ESOPHAGOSCOPY, GASTROSCOPY, DUODENOSCOPY (EGD), PLACE PERCUTANEOUS ENDOSCOPIC GASTROSTOMY TUBE;  Surgeon: Sergio Buenrostro MD;  Location: [facility]     LARYNGOSCOPY WITH BIOPSY(IES) N/A 4/3/2018    Procedure: LARYNGOSCOPY WITH BIOPSY(IES);  Direct Laryngoscopy, Biopsy Base Of Tongue;  Surgeon: Jj Hernandez MD;  Location: [facility]     TAKEDOWN COLOSTOMY         FAMILY HISTORY:  Family History   Problem Relation Age of Onset     Other Cancer Mother 57        lung ca, with brain mets     Cancer Mother         Brain tumor, lung cancer     Cancer Father         Lung cancer     Cancer Brother        SOCIAL HISTORY:  Social History     Socioeconomic History     Marital status: Single     Spouse name: None     Number of children: None     Years of education: None     Highest education level: None   Occupational History     None   Social Needs     Financial resource strain: None     Food insecurity:     Worry: None     Inability: None     Transportation needs:     Medical: None     Non-medical: None   Tobacco Use     Smoking status: Former Smoker     Packs/day: 0.00     Years: 48.00     Pack years: 0.00     Types: Cigarettes     Last attempt to quit: 2001     Years since quittin.4      Smokeless tobacco: Never Used     Tobacco comment: started at age 17   Substance and Sexual Activity     Alcohol use: No     Drug use: No     Sexual activity: No     Partners: Female   Lifestyle     Physical activity:     Days per week: None     Minutes per session: None     Stress: None   Relationships     Social connections:     Talks on phone: None     Gets together: None     Attends [employer] service: None     Active member of club or organization: None     Attends meetings of clubs or organizations: None     Relationship status: None     Intimate partner violence:     Fear of current or ex partner: None     Emotionally abused: None     Physically abused: None     Forced sexual activity: None   Other Topics Concern     Parent/sibling w/ CABG, MI or angioplasty before 65F 55M? No   Social History Narrative     None       Review of Systems:  Skin:  Positive for bruising   Eyes:  Positive for    ENT:  Negative    Respiratory:  Negative    Cardiovascular:    Positive for;fatigue  Gastroenterology: Negative    Genitourinary:  Positive for urgency;urinary frequency  Musculoskeletal:  Negative    Neurologic:  Positive for tremors  Psychiatric:  Negative    Heme/Lymph/Imm:  Negative    Endocrine:    thyroid disorder    Physical Exam:  Vitals: BP 98/61 (BP Location: Right arm, Patient Position: Sitting, Cuff Size: Adult Regular)   Pulse 102   Wt 68.9 kg (152 lb)   SpO2 96%   BMI 23.81 kg/m      Recent Lab Results:  LIPID RESULTS:  Lab Results   Component Value Date    CHOL 126 04/01/2019    HDL 61 04/01/2019    LDL 49 04/01/2019    TRIG 82 04/01/2019    CHOLHDLRATIO Canceled, Test credited   Patient not fasting   09/28/2015       LIVER ENZYME RESULTS:  Lab Results   Component Value Date    AST 14 04/01/2019    ALT 17 04/01/2019       CBC RESULTS:  Lab Results   Component Value Date    WBC 6.8 03/04/2019    RBC 3.10 (L) 03/04/2019    HGB 10.1 (L) 04/01/2019    HCT 32.6 (L) 03/04/2019     (H) 03/04/2019    MCH  33.2 (H) 03/04/2019    MCHC 31.6 03/04/2019    RDW 13.1 03/04/2019     03/04/2019       BMP RESULTS:  Lab Results   Component Value Date     04/01/2019    POTASSIUM 4.4 04/01/2019    CHLORIDE 105 04/01/2019    CO2 27 04/01/2019    ANIONGAP 6 04/01/2019     (H) 04/01/2019    BUN 21 04/01/2019    CR 1.14 04/01/2019    GFRESTIMATED 60 (L) 04/01/2019    GFRESTBLACK 69 04/01/2019    AMY 8.8 04/01/2019        A1C RESULTS:  Lab Results   Component Value Date    A1C 5.6 09/19/2018       INR RESULTS:  Lab Results   Component Value Date    INR 1.59 (H) 02/11/2019           CC  No referring provider defined for this encounter.

## 2019-04-01 NOTE — LETTER
4/1/2019      Joe Mandel MD  [address] [place] [id number]      RE: Patrick Diop       Dear Colleague,    I had the pleasure of seeing Patrick Diop in the [facility].    Service Date: 04/01/2019      REASON FOR VISIT:  Follow up severe ischemic cardiomyopathy.      HISTORY OF PRESENT ILLNESS:  Mr. Patrick Diop is a pleasant 83-year-old patient who I had first met in 12/2018 with a diagnosis of severe new onset LV dysfunction.  He has a history of recurrent tongue cancers and the thought was that this was initially ischemic versus contribution from chemotherapy.  He was extensively diuresed as an outpatient and during this course had a syncopal event for which he was admitted to [facility] and then transferred to [facility], where he underwent coronary angiography that showed severe 3-vessel multivessel disease following which a cardiac MRI showed minimal viability and extensive scarring for which medical therapy has been pursued after extensive discussions with the patient over PCI versus bypass.  During that stay, he also had secondary prevention ICD and underwent further diuresis.  His BNP level as an outpatient had been close to 50,000 and has not been able to tolerate any beta blocker therapy due to hypotension.  As an outpatient, he has done well.  The last I had seen him was in 03/2019.  Today, he says his weight is stable.  He denies any edema or orthopnea.  He attributes to NYHA class IIIB symptoms.  Denies syncope, presyncope.  Home blood pressures run around  systolic, sometimes as low as 90.      CURRENT MEDICATIONS:   1.  Aspirin 81 mg daily.   2.  Atorvastatin 40 mg daily.   3.  Clopidogrel 75 mg daily.   4.  Lisinopril 2.5 mg daily.   5.  He is on p.r.n. diuretics at this time.      ASSESSMENT:   1.  Severe multivessel coronary artery disease with poor viability on MRI.   2.  Severe ischemic cardiomyopathy with an EF of 20% or less with LV  dilatation from ischemic cardiomyopathy, perhaps also some contribution from chemotherapy.   3.  NSTEMI 01/2019 under medical management with dual antiplatelet therapy and statin.   4.  Secondary prevention ICD in the setting of syncope in 01/2019.   5.  Recurrent tongue cancer.   6.  Cachexia, weight loss, frailty.      PLAN:  Mr. Patrick Diop, an 83-year-old patient, as mentioned above, with severe end-stage ischemic cardiomyopathy, inability to tolerate beta blocker therapy and on minimal vasodilator therapy.  He is not needing any diuretics and nothing much has changed in the last 1 month.  The reason he scheduled to see me much sooner rather than what was scheduled is that he could not get in to see Dr. Guzman.  Mr. Diop is not a candidate for advanced heart failure therapy given his cachexia and his body habitus and age and other comorbidities that include cancer.  I think at this point, he understands that he has a severe critical illness in regards to his cardiomyopathy and more importantly his inability to tolerate any guideline-directed therapy for heart failure worries me.  The 'good' thing is he is not needing diuretics and his weight remains stable and he does not have edema or orthopnea at this time.  I mentioned to the patient that as far as I can keep him out of the hospital that should be goals of care for him and he is very much on the same page with that thought.  I have told him to attempt getting on 12.5 mg once a day of metoprolol XL to be taken around the afternoon when his blood pressure is around 100 to see if he tolerates it.  If he does not tolerate it in a matter of 2 or 3 days, then this is the last time we are going to try him on beta blockers.  Continue dual antiplatelet therapy.  I will see him back in 6 months and I will have him see a nurse practitioner in about 2-3 months.  Given his overall comorbidities, I do not think he is a great candidate for an outpatient CardioMEMS  "either.  If he does end up getting admitted to the hospital, then we may have to have further discussions with Palliative Care on goals of care.      cc:   Joe Mandel MD   [facility]   [address], [place] [address]         IOANA CALDERÓN MD             D: 2019   T: 2019   MT: al      Name:     ADAM ROGER   MRN:      [id number]        Account:      [id number]   :      1935           Service Date: 2019      Document: [id number]           Outpatient Encounter Medications as of 2019   Medication Sig Dispense Refill     aspirin (ASA) 81 MG EC tablet Take 1 tablet (81 mg) by mouth daily 90 tablet 2     atorvastatin (LIPITOR) 40 MG tablet Take 1 tablet (40 mg) by mouth daily 90 tablet 2     Cholecalciferol (VITAMIN D) 1000 UNITS capsule Take 1 capsule by mouth daily       clopidogrel (PLAVIX) 75 MG tablet Take 1 tablet (75 mg) by mouth daily 90 tablet 3     levothyroxine (SYNTHROID/LEVOTHROID) 100 MCG tablet Take 1 tablet (100 mcg) by mouth daily Recheck Lab test in 2 months. 90 tablet 1     lisinopril (PRINIVIL/ZESTRIL) 2.5 MG tablet Take 1 tablet (2.5 mg) by mouth At Bedtime 90 tablet 0     metoprolol succinate ER (TOPROL-XL) 25 MG 24 hr tablet ON HOLD as of 19 90 tablet 3     multivitamin  with lutein (OCUVITE WITH LTEIN) CAPS Take 1 capsule by mouth daily       order for DME Equipment being ordered: 4-Wheel Walker with seat. He is 5' 7\" tall.  [facility]: Fax: [phone]  phone [phone] (Patient not taking: Reported on 2019) 1 Device 0     torsemide (DEMADEX) 20 MG tablet ON HOLD as of 19 60 tablet 0     No facility-administered encounter medications on file as of 2019.        Again, thank you for allowing me to participate in the care of your patient.      Sincerely,    Ioana Calderón MD     [facility]    "

## 2019-04-01 NOTE — PROGRESS NOTES
Service Date: 04/01/2019      REASON FOR VISIT:  Follow up severe ischemic cardiomyopathy.      HISTORY OF PRESENT ILLNESS:  Mr. Patrick Diop is a pleasant 83-year-old patient who I had first met in 12/2018 with a diagnosis of severe new onset LV dysfunction.  He has a history of recurrent tongue cancers and the thought was that this was initially ischemic versus contribution from chemotherapy.  He was extensively diuresed as an outpatient and during this course had a syncopal event for which he was admitted to [facility] and then transferred to [facility], where he underwent coronary angiography that showed severe 3-vessel multivessel disease following which a cardiac MRI showed minimal viability and extensive scarring for which medical therapy has been pursued after extensive discussions with the patient over PCI versus bypass.  During that stay, he also had secondary prevention ICD and underwent further diuresis.  His BNP level as an outpatient had been close to 50,000 and has not been able to tolerate any beta blocker therapy due to hypotension.  As an outpatient, he has done well.  The last I had seen him was in 03/2019.  Today, he says his weight is stable.  He denies any edema or orthopnea.  He attributes to NYHA class IIIB symptoms.  Denies syncope, presyncope.  Home blood pressures run around  systolic, sometimes as low as 90.      CURRENT MEDICATIONS:   1.  Aspirin 81 mg daily.   2.  Atorvastatin 40 mg daily.   3.  Clopidogrel 75 mg daily.   4.  Lisinopril 2.5 mg daily.   5.  He is on p.r.n. diuretics at this time.      ASSESSMENT:   1.  Severe multivessel coronary artery disease with poor viability on MRI.   2.  Severe ischemic cardiomyopathy with an EF of 20% or less with LV dilatation from ischemic cardiomyopathy, perhaps also some contribution from chemotherapy.   3.  NSTEMI 01/2019 under medical management with dual antiplatelet therapy and statin.   4.  Secondary prevention ICD in the  setting of syncope in 01/2019.   5.  Recurrent tongue cancer.   6.  Cachexia, weight loss, frailty.      PLAN:  Mr. Patrick Diop, an 83-year-old patient, as mentioned above, with severe end-stage ischemic cardiomyopathy, inability to tolerate beta blocker therapy and on minimal vasodilator therapy.  He is not needing any diuretics and nothing much has changed in the last 1 month.  The reason he scheduled to see me much sooner rather than what was scheduled is that he could not get in to see Dr. Guzman.  Mr. Diop is not a candidate for advanced heart failure therapy given his cachexia and his body habitus and age and other comorbidities that include cancer.  I think at this point, he understands that he has a severe critical illness in regards to his cardiomyopathy and more importantly his inability to tolerate any guideline-directed therapy for heart failure worries me.  The 'good' thing is he is not needing diuretics and his weight remains stable and he does not have edema or orthopnea at this time.  I mentioned to the patient that as far as I can keep him out of the hospital that should be goals of care for him and he is very much on the same page with that thought.  I have told him to attempt getting on 12.5 mg once a day of metoprolol XL to be taken around the afternoon when his blood pressure is around 100 to see if he tolerates it.  If he does not tolerate it in a matter of 2 or 3 days, then this is the last time we are going to try him on beta blockers.  Continue dual antiplatelet therapy.  I will see him back in 6 months and I will have him see a nurse practitioner in about 2-3 months.  Given his overall comorbidities, I do not think he is a great candidate for an outpatient CardioMEMS either.  If he does end up getting admitted to the hospital, then we may have to have further discussions with Palliative Care on goals of care.      cc:   Joe Mandel MD   [facility]   [address] [place] [address]    [place] [address]         IOANA GLOVER MD             D: 2019   T: 2019   MT: al      Name:     ADAM ROGER   MRN:      [id number]        Account:      [id number]   :      1935           Service Date: 2019      Document: [id number]

## 2019-04-05 NOTE — NURSING NOTE
"Oncology Rooming Note    April 5, 2019 11:48 AM   Patrick Diop is a 83 year old male who presents for:    Chief Complaint   Patient presents with     Oncology Clinic Visit     F/up Recurrent tongue cancer     Initial Vitals: BP 98/54 (BP Location: Left arm, Patient Position: Sitting, Cuff Size: Adult Regular)   Pulse 88   Temp 97.2  F (36.2  C) (Axillary)   Resp 18   Ht 1.702 m (5' 7\")   Wt 69.5 kg (153 lb 4.8 oz)   SpO2 95%   BMI 24.01 kg/m   Estimated body mass index is 24.01 kg/m  as calculated from the following:    Height as of this encounter: 1.702 m (5' 7\").    Weight as of this encounter: 69.5 kg (153 lb 4.8 oz). Body surface area is 1.81 meters squared.  No Pain (0) Comment: Data Unavailable   No LMP for male patient.  Allergies reviewed: Yes  Medications reviewed: Yes    Medications: Medication refills not needed today.  Pharmacy name entered into [facility]:    [facility] - [place], [place] - [address] [id number] - [place], [place] - [address] [facility] [place] - [address] [place] [address] PHARMACY MAIL DELIVERY - [facility] [address]    Clinical concerns:  F/up Recurrent tongue cancer      Priscilla Robison LPN            .  "

## 2019-04-05 NOTE — TELEPHONE ENCOUNTER
Thank you  Yudelka oChen CPht    [facility]  Phone: [phone]  Fax: [phone]

## 2019-04-05 NOTE — PATIENT INSTRUCTIONS
Arrange for CT scan of the chest with contrast in July 2019.  Follow-up following the CT scan to discuss the results

## 2019-04-05 NOTE — LETTER
4/5/2019         RE: Patrick Diop  [address]  [facility] [address]        Dear Colleague,    Thank you for referring your patient, Patrick Diop, to the [facility]. Please see a copy of my visit note below.    Hematology/ Oncology Follow-up Visit:  Apr 5, 2019    Reason for Visit:   Chief Complaint   Patient presents with     Oncology Clinic Visit     F/up Recurrent tongue cancer       Oncologic History:    Recurrent tongue cancer (H)  Patrick Diop was noted to have a mass in his right neck during routine physical exam. CT scanning was subsequently obtained which also showed a right-sided tongue base and oropharyngeal mass, which together are consistent with malignancy. The patient himself has denied any unexplained weight loss. He denies any odynophagia, dysphagia, hoarseness or otalgia. 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 PET scan done on October 21, 2015 showed a mass at the level of the tongue base located along the anterior wall of the oropharynx and extending to the right lateral wall near the tonsillar pillar. This demonstrates abnormal increased FDG activity and is consistent with known malignancy. Hypermetabolic lymph nodes in the right and left neck are also noted as described. These are consistent with metastatic lymph nodes. There is no evidence of metastatic disease in the chest, abdomen or pelvis. CT of the soft tissues of the neck on October 21, 2015 was done in conjunction with PET scanning showed a 2.8 cm mass at the base of the tongue on the right effacing the right vallecula. There is also a 2.3 cm lymph node in the right level II region that is strongly suspicious for metastatic disease. These lesions are PET positive. Additionally, there is a 1.3 cm lymph node in left level III that is PET positive. There is a lymph node in right level V, also PET positive. He started on concurrent Cisplatin with radiation  therapy with cisplatin 100 mg/m to be given intravenously on days #1, #22 #43 of radiation therapy.      Interval History:  Patient returning today for follow-up visit.  Followed mainly by cardiology because of his worsening cardiac failure.  Immune therapy has been on hold for the last couple of months.  He denies any recent history of chest pain or cough or hemoptysis.  He denies any difficulty with swallowing.  Patient denies any bruising or bleeding.    Review Of Systems:  Constitutional: Negative for fever, chills, and night sweats.  Skin: negative.  Eyes: negative.  Ears/Nose/Throat: negative.  Respiratory: No shortness of breath, dyspnea on exertion, cough, or hemoptysis.  Cardiovascular: negative.  Gastrointestinal: negative.  Genitourinary: negative.  Musculoskeletal: negative.  Neurologic: negative.  Psychiatric: negative.  Hematologic/Lymphatic/Immunologic: negative.  Endocrine: negative.    All other ROS negative unless mentioned in interval history.    Past medical, social, surgical, and family histories reviewed.    Allergies:  Allergies as of 04/05/2019     (No Known Allergies)       Current Medications:  Current Outpatient Medications   Medication Sig Dispense Refill     aspirin (ASA) 81 MG EC tablet Take 1 tablet (81 mg) by mouth daily 90 tablet 2     atorvastatin (LIPITOR) 40 MG tablet Take 1 tablet (40 mg) by mouth daily 90 tablet 2     Cholecalciferol (VITAMIN D) 1000 UNITS capsule Take 1 capsule by mouth daily       clopidogrel (PLAVIX) 75 MG tablet Take 1 tablet (75 mg) by mouth daily 90 tablet 3     levothyroxine (SYNTHROID/LEVOTHROID) 100 MCG tablet Take 1 tablet (100 mcg) by mouth daily Recheck Lab test in 2 months. 90 tablet 1     lisinopril (PRINIVIL/ZESTRIL) 2.5 MG tablet Take 1 tablet (2.5 mg) by mouth At Bedtime 90 tablet 0     multivitamin  with lutein (OCUVITE WITH LTEIN) CAPS Take 1 capsule by mouth daily       order for DME Equipment being ordered: 4-Wheel Walker with seat. He is 5'  "7\" tall.  [facility]: Fax: [phone]  phone [phone] 1 Device 0     metoprolol succinate ER (TOPROL-XL) 25 MG 24 hr tablet ON HOLD as of 2/25/19 90 tablet 3     torsemide (DEMADEX) 20 MG tablet ON HOLD as of 2/25/19 60 tablet 0        Physical Exam:  BP 98/54 (BP Location: Left arm, Patient Position: Sitting, Cuff Size: Adult Regular)   Pulse 88   Temp 97.2  F (36.2  C) (Axillary)   Resp 18   Ht 1.702 m (5' 7\")   Wt 69.5 kg (153 lb 4.8 oz)   SpO2 95%   BMI 24.01 kg/m     Wt Readings from Last 12 Encounters:   04/05/19 69.5 kg (153 lb 4.8 oz)   04/01/19 68.9 kg (152 lb)   03/08/19 64.4 kg (142 lb)   03/04/19 64 kg (141 lb)   02/22/19 64.7 kg (142 lb 9.6 oz)   02/11/19 68.6 kg (151 lb 3.8 oz)   01/03/19 72.6 kg (160 lb)   12/26/18 74.3 kg (163 lb 12.8 oz)   12/17/18 80.2 kg (176 lb 12.8 oz)   11/21/18 80.6 kg (177 lb 9.6 oz)   11/15/18 81.4 kg (179 lb 8 oz)   10/25/18 83.1 kg (183 lb 1.6 oz)     GENERAL APPEARANCE: Healthy, alert and in no acute distress.  HEENT: Sclerae anicteric. PERRLA. Oropharynx without ulcers, lesions, or thrush.  NECK: Supple. No asymmetry or masses.  LYMPHATICS: No palpable cervical, supraclavicular, axillary, or inguinal lymphadenopathy.  RESP: Lungs clear to auscultation bilaterally without rales, rhonchi or wheezes.  CARDIOVASCULAR: Regular rate and rhythm. Normal S1, S2; no S3 or S4. No murmur, gallop, or rub.  ABDOMEN: Soft, nontender. Bowel sounds normal. No palpable organomegaly or masses.  MUSCULOSKELETAL: Extremities without gross deformities noted. No edema of bilateral lower extremities.  SKIN: No suspicious lesions or rashes.  NEURO: Alert and oriented x 3. Cranial nerves II-XII grossly intact.  PSYCHIATRIC: Mentation and affect appear normal.    Laboratory/Imaging Studies:  Orders Only on 04/01/2019   Component Date Value Ref Range Status     N-Terminal Pro Bnp 04/01/2019 56,699* 0 - 450 pg/mL Final    Comment:    Reference range shown and results flagged as " abnormal are for the outpatient,   non acute settings. Establishing a baseline value for each individual patient   is useful for follow-up.  Suggested inpatient cut points for confirming diagnosis of CHF in an acute   setting are:   >450 pg/mL (age 18 to less than 50)   >900 pg/mL (age 50 to less than 75)   >1800 pg/mL (75 yrs and older)  An inpatient or emergency department NT-proPBNP <300 pg/mL effectively rules   out acute CHF, with 99% negative predictive value.        Hemoglobin 04/01/2019 10.1* 13.3 - 17.7 g/dL Final     Sodium 04/01/2019 138  133 - 144 mmol/L Final     Potassium 04/01/2019 4.4  3.4 - 5.3 mmol/L Final     Chloride 04/01/2019 105  94 - 109 mmol/L Final     Carbon Dioxide 04/01/2019 27  20 - 32 mmol/L Final     Anion Gap 04/01/2019 6  3 - 14 mmol/L Final     Glucose 04/01/2019 111* 70 - 99 mg/dL Final     Urea Nitrogen 04/01/2019 21  7 - 30 mg/dL Final     Creatinine 04/01/2019 1.14  0.66 - 1.25 mg/dL Final     GFR Estimate 04/01/2019 60* >60 mL/min/[1.73_m2] Final    Comment: Non  GFR Calc  Starting 12/18/2018, serum creatinine based estimated GFR (eGFR) will be   calculated using the Chronic Kidney Disease Epidemiology Collaboration   (CKD-EPI) equation.       GFR Estimate If Black 04/01/2019 69  >60 mL/min/[1.73_m2] Final    Comment:  GFR Calc  Starting 12/18/2018, serum creatinine based estimated GFR (eGFR) will be   calculated using the Chronic Kidney Disease Epidemiology Collaboration   (CKD-EPI) equation.       Calcium 04/01/2019 8.8  8.5 - 10.1 mg/dL Final     Bilirubin Total 04/01/2019 0.7  0.2 - 1.3 mg/dL Final     Albumin 04/01/2019 3.5  3.4 - 5.0 g/dL Final     Protein Total 04/01/2019 7.0  6.8 - 8.8 g/dL Final     Alkaline Phosphatase 04/01/2019 109  40 - 150 U/L Final     ALT 04/01/2019 17  0 - 70 U/L Final     AST 04/01/2019 14  0 - 45 U/L Final     Cholesterol 04/01/2019 126  <200 mg/dL Final     Triglycerides 04/01/2019 82  <150 mg/dL Final      HDL Cholesterol 04/01/2019 61  >39 mg/dL Final     LDL Cholesterol Calculated 04/01/2019 49  <100 mg/dL Final    Desirable:       <100 mg/dl     Non HDL Cholesterol 04/01/2019 65  <130 mg/dL Final            Assessment and plan:    (C02.9) Recurrent tongue cancer (H)  (primary encounter diagnosis)  I reviewed with the patient today most recent laboratory tests and imaging findings.  At this time there is no clear evidence of progression of metastatic disease in his lung.  Main morbidity right now is related to his cardiac failure.  We will continue to hold immune therapy.  I will see the patient again for follow-up with repeat imaging studies in 3 months time or sooner if there are new developments or concerns.    (C18.9) Malignant neoplasm of colon, unspecified part of colon (H)  There is no evidence of recurrence of colon cancer    (I10) Hypertension goal BP (blood pressure) < 140/90  Patient currently on lisinopril 2.5 mg orally daily.  Is also metoprolol ER 25 mg orally daily.    (N18.3) CKD (chronic kidney disease) stage 3, GFR 30-59 ml/min (H)  Creatinine has been stable    (I42.8) Non-ischemic cardiomyopathy (H)  Is been followed by cardiology.    The patient is ready to learn, no apparent learning barriers were identified.  Diagnosis and treatment plans were explained to the patient. The patient expressed understanding of the content. The patient asked appropriate questions. The patient questions were answered to his satisfaction.    Chart documentation with Dragon Voice recognition Software. Although reviewed after completion, some words and grammatical errors may remain.    Again, thank you for allowing me to participate in the care of your patient.        Sincerely,        Lanny Neil MD

## 2019-04-09 NOTE — PROGRESS NOTES
Hematology/ Oncology Follow-up Visit:  Apr 5, 2019    Reason for Visit:   Chief Complaint   Patient presents with     Oncology Clinic Visit     F/up Recurrent tongue cancer       Oncologic History:    Recurrent tongue cancer (H)  Patrick Diop was noted to have a mass in his right neck during routine physical exam. CT scanning was subsequently obtained which also showed a right-sided tongue base and oropharyngeal mass, which together are consistent with malignancy. The patient himself has denied any unexplained weight loss. He denies any odynophagia, dysphagia, hoarseness or otalgia. 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 PET scan done on October 21, 2015 showed a mass at the level of the tongue base located along the anterior wall of the oropharynx and extending to the right lateral wall near the tonsillar pillar. This demonstrates abnormal increased FDG activity and is consistent with known malignancy. Hypermetabolic lymph nodes in the right and left neck are also noted as described. These are consistent with metastatic lymph nodes. There is no evidence of metastatic disease in the chest, abdomen or pelvis. CT of the soft tissues of the neck on October 21, 2015 was done in conjunction with PET scanning showed a 2.8 cm mass at the base of the tongue on the right effacing the right vallecula. There is also a 2.3 cm lymph node in the right level II region that is strongly suspicious for metastatic disease. These lesions are PET positive. Additionally, there is a 1.3 cm lymph node in left level III that is PET positive. There is a lymph node in right level V, also PET positive. He started on concurrent Cisplatin with radiation therapy with cisplatin 100 mg/m to be given intravenously on days #1, #22 #43 of radiation therapy.      Interval History:  Patient returning today for follow-up visit.  Followed mainly by cardiology because of his worsening cardiac failure.   "Immune therapy has been on hold for the last couple of months.  He denies any recent history of chest pain or cough or hemoptysis.  He denies any difficulty with swallowing.  Patient denies any bruising or bleeding.    Review Of Systems:  Constitutional: Negative for fever, chills, and night sweats.  Skin: negative.  Eyes: negative.  Ears/Nose/Throat: negative.  Respiratory: No shortness of breath, dyspnea on exertion, cough, or hemoptysis.  Cardiovascular: negative.  Gastrointestinal: negative.  Genitourinary: negative.  Musculoskeletal: negative.  Neurologic: negative.  Psychiatric: negative.  Hematologic/Lymphatic/Immunologic: negative.  Endocrine: negative.    All other ROS negative unless mentioned in interval history.    Past medical, social, surgical, and family histories reviewed.    Allergies:  Allergies as of 04/05/2019     (No Known Allergies)       Current Medications:  Current Outpatient Medications   Medication Sig Dispense Refill     aspirin (ASA) 81 MG EC tablet Take 1 tablet (81 mg) by mouth daily 90 tablet 2     atorvastatin (LIPITOR) 40 MG tablet Take 1 tablet (40 mg) by mouth daily 90 tablet 2     Cholecalciferol (VITAMIN D) 1000 UNITS capsule Take 1 capsule by mouth daily       clopidogrel (PLAVIX) 75 MG tablet Take 1 tablet (75 mg) by mouth daily 90 tablet 3     levothyroxine (SYNTHROID/LEVOTHROID) 100 MCG tablet Take 1 tablet (100 mcg) by mouth daily Recheck Lab test in 2 months. 90 tablet 1     lisinopril (PRINIVIL/ZESTRIL) 2.5 MG tablet Take 1 tablet (2.5 mg) by mouth At Bedtime 90 tablet 0     multivitamin  with lutein (OCUVITE WITH LTEIN) CAPS Take 1 capsule by mouth daily       order for DME Equipment being ordered: 4-Wheel Walker with seat. He is 5' 7\" tall.  [facility]: Fax: [phone]  phone [phone] 1 Device 0     metoprolol succinate ER (TOPROL-XL) 25 MG 24 hr tablet ON HOLD as of 2/25/19 90 tablet 3     torsemide (DEMADEX) 20 MG tablet ON HOLD as of 2/25/19 60 tablet 0 " "       Physical Exam:  BP 98/54 (BP Location: Left arm, Patient Position: Sitting, Cuff Size: Adult Regular)   Pulse 88   Temp 97.2  F (36.2  C) (Axillary)   Resp 18   Ht 1.702 m (5' 7\")   Wt 69.5 kg (153 lb 4.8 oz)   SpO2 95%   BMI 24.01 kg/m    Wt Readings from Last 12 Encounters:   04/05/19 69.5 kg (153 lb 4.8 oz)   04/01/19 68.9 kg (152 lb)   03/08/19 64.4 kg (142 lb)   03/04/19 64 kg (141 lb)   02/22/19 64.7 kg (142 lb 9.6 oz)   02/11/19 68.6 kg (151 lb 3.8 oz)   01/03/19 72.6 kg (160 lb)   12/26/18 74.3 kg (163 lb 12.8 oz)   12/17/18 80.2 kg (176 lb 12.8 oz)   11/21/18 80.6 kg (177 lb 9.6 oz)   11/15/18 81.4 kg (179 lb 8 oz)   10/25/18 83.1 kg (183 lb 1.6 oz)     GENERAL APPEARANCE: Healthy, alert and in no acute distress.  HEENT: Sclerae anicteric. PERRLA. Oropharynx without ulcers, lesions, or thrush.  NECK: Supple. No asymmetry or masses.  LYMPHATICS: No palpable cervical, supraclavicular, axillary, or inguinal lymphadenopathy.  RESP: Lungs clear to auscultation bilaterally without rales, rhonchi or wheezes.  CARDIOVASCULAR: Regular rate and rhythm. Normal S1, S2; no S3 or S4. No murmur, gallop, or rub.  ABDOMEN: Soft, nontender. Bowel sounds normal. No palpable organomegaly or masses.  MUSCULOSKELETAL: Extremities without gross deformities noted. No edema of bilateral lower extremities.  SKIN: No suspicious lesions or rashes.  NEURO: Alert and oriented x 3. Cranial nerves II-XII grossly intact.  PSYCHIATRIC: Mentation and affect appear normal.    Laboratory/Imaging Studies:  Orders Only on 04/01/2019   Component Date Value Ref Range Status     N-Terminal Pro Bnp 04/01/2019 56,699* 0 - 450 pg/mL Final    Comment:    Reference range shown and results flagged as abnormal are for the outpatient,   non acute settings. Establishing a baseline value for each individual patient   is useful for follow-up.  Suggested inpatient cut points for confirming diagnosis of CHF in an acute   setting are:   >450 pg/mL " (age 18 to less than 50)   >900 pg/mL (age 50 to less than 75)   >1800 pg/mL (75 yrs and older)  An inpatient or emergency department NT-proPBNP <300 pg/mL effectively rules   out acute CHF, with 99% negative predictive value.        Hemoglobin 04/01/2019 10.1* 13.3 - 17.7 g/dL Final     Sodium 04/01/2019 138  133 - 144 mmol/L Final     Potassium 04/01/2019 4.4  3.4 - 5.3 mmol/L Final     Chloride 04/01/2019 105  94 - 109 mmol/L Final     Carbon Dioxide 04/01/2019 27  20 - 32 mmol/L Final     Anion Gap 04/01/2019 6  3 - 14 mmol/L Final     Glucose 04/01/2019 111* 70 - 99 mg/dL Final     Urea Nitrogen 04/01/2019 21  7 - 30 mg/dL Final     Creatinine 04/01/2019 1.14  0.66 - 1.25 mg/dL Final     GFR Estimate 04/01/2019 60* >60 mL/min/[1.73_m2] Final    Comment: Non  GFR Calc  Starting 12/18/2018, serum creatinine based estimated GFR (eGFR) will be   calculated using the Chronic Kidney Disease Epidemiology Collaboration   (CKD-EPI) equation.       GFR Estimate If Black 04/01/2019 69  >60 mL/min/[1.73_m2] Final    Comment:  GFR Calc  Starting 12/18/2018, serum creatinine based estimated GFR (eGFR) will be   calculated using the Chronic Kidney Disease Epidemiology Collaboration   (CKD-EPI) equation.       Calcium 04/01/2019 8.8  8.5 - 10.1 mg/dL Final     Bilirubin Total 04/01/2019 0.7  0.2 - 1.3 mg/dL Final     Albumin 04/01/2019 3.5  3.4 - 5.0 g/dL Final     Protein Total 04/01/2019 7.0  6.8 - 8.8 g/dL Final     Alkaline Phosphatase 04/01/2019 109  40 - 150 U/L Final     ALT 04/01/2019 17  0 - 70 U/L Final     AST 04/01/2019 14  0 - 45 U/L Final     Cholesterol 04/01/2019 126  <200 mg/dL Final     Triglycerides 04/01/2019 82  <150 mg/dL Final     HDL Cholesterol 04/01/2019 61  >39 mg/dL Final     LDL Cholesterol Calculated 04/01/2019 49  <100 mg/dL Final    Desirable:       <100 mg/dl     Non HDL Cholesterol 04/01/2019 65  <130 mg/dL Final            Assessment and plan:    (C02.9)  Recurrent tongue cancer (H)  (primary encounter diagnosis)  I reviewed with the patient today most recent laboratory tests and imaging findings.  At this time there is no clear evidence of progression of metastatic disease in his lung.  Main morbidity right now is related to his cardiac failure.  We will continue to hold immune therapy.  I will see the patient again for follow-up with repeat imaging studies in 3 months time or sooner if there are new developments or concerns.    (C18.9) Malignant neoplasm of colon, unspecified part of colon (H)  There is no evidence of recurrence of colon cancer    (I10) Hypertension goal BP (blood pressure) < 140/90  Patient currently on lisinopril 2.5 mg orally daily.  Is also metoprolol ER 25 mg orally daily.    (N18.3) CKD (chronic kidney disease) stage 3, GFR 30-59 ml/min (H)  Creatinine has been stable    (I42.8) Non-ischemic cardiomyopathy (H)  Is been followed by cardiology.    The patient is ready to learn, no apparent learning barriers were identified.  Diagnosis and treatment plans were explained to the patient. The patient expressed understanding of the content. The patient asked appropriate questions. The patient questions were answered to his satisfaction.    Chart documentation with Dragon Voice recognition Software. Although reviewed after completion, some words and grammatical errors may remain.

## 2019-04-18 NOTE — PROGRESS NOTES
Clinic Care Coordination Contact    Clinic Care Coordination Contact  OUTREACH    Referral Information:  Referral Source: IP Report    Primary Diagnosis: CHF    Chief Complaint   Patient presents with     Clinic Care Coordination - Follow-up     Nurse: f/u        Universal Utilization: No concerns at this time.   Clinic Utilization  Difficulty keeping appointments:: No  Compliance Concerns: No  No-Show Concerns: No  No PCP office visit in Past Year: No  Utilization    Last refreshed: 4/17/2019  2:22 PM:  Hospital Admissions 2           Last refreshed: 4/17/2019  2:22 PM:  ED Visits 0           Last refreshed: 4/17/2019  2:22 PM:  No Show Count (past year) 0              Current as of: 4/17/2019  2:22 PM              Clinical Concerns:  Care Coordinator RN spoke with patient, he states he is doing ok. He states he is maintaining his weight 141-142 lbs. He denies having any questions or concerns for Care Coordinator RN at this time. He does have a [employer] CM that comes out every two weeks to see him and if he does not have his HCD completed by 5/1/19, she will help him at that time. He states he has the forms but just needs to sit down with his daughter to fill out due to his macular degeneration. He verbalized he has C.O.R.E clinic appointment on Monday 4/22/19. He states he did receive good news that his cancer has not grown.     Current Medical Concerns:    Patient Active Problem List   Diagnosis     Hyperlipidemia with target LDL less than 100     Hypertension goal BP (blood pressure) < 140/90     Colon cancer (H)     CKD (chronic kidney disease) stage 3, GFR 30-59 ml/min (H)     Recurrent tongue cancer (H)     Chemotherapy induced nausea and vomiting     Acquired hypothyroidism     Non-ischemic cardiomyopathy (H)     Acute on chronic systolic congestive heart failure (H)     Elevated troponin     Syncope, unspecified syncope type     Acute respiratory failure with hypoxia (H)         Current Behavioral Concerns:  Denies having any concerns at this time.     Education Provided to patient: n/a   Pain  Pain (GOAL):: No  Health Maintenance Reviewed: Due/Overdue   Health Maintenance Due   Topic Date Due     HF ACTION PLAN Q3 YR  1935     ZOSTER IMMUNIZATION (1 of 2) 12/17/1987     MEDICARE ANNUAL WELLNESS VISIT  12/17/2002     EYE EXAM Q1 YEAR  07/03/2018     PHQ-2  01/01/2019     A1C Q6 MO  03/19/2019       Clinical Pathway: None    Medication Management:  Patient independent in medication management and verbalizes adherence and understanding of medication regimen.        Functional Status:  Dependent ADLs:: Ambulation-walker  Dependent IADLs:: Transportation  Bed or wheelchair confined:: No  Mobility Status: Independent w/Device  Fallen 2 or more times in the past year?: No  Any fall with injury in the past year?: No    Living Situation:  Current living arrangement:: I live alone  Type of residence:: Apartment - handicap accessible    Diet/Exercise/Sleep:  Diet:: No added salt  Inadequate nutrition (GOAL):: No  Food Insecurity: No  Tube Feeding: No  Exercise:: Currently not exercising  Inadequate activity/exercise (GOAL):: No  Significant changes in sleep pattern (GOAL): No    Transportation:  Transportation concerns (GOAL):: Yes  Transportation means:: Public transportation     Psychosocial:  Presybeterian or spiritual beliefs that impact treatment:: No  Mental health DX:: No  Mental health management concern (GOAL):: No  Informal Support system:: Children, Friends, Neighbors     Financial/Insurance:   Financial/Insurance concerns (GOAL):: No       Resources and Interventions:  Current Resources: n/a  Community Resources: [facility]  Supplies used at home:: Nutritional Supplements  Equipment Currently Used at Home: grab bar, toilet, grab bar, tub/shower, shower chair, raised toilet, walker, standard    Advance Care Plan/Directive  Advanced Care Plans/Directives on file:: In process  Advanced Care Plan/Directive Status: In  Process    Referrals Placed: None     Goals:   Goals        General    #1. Improve chronic symptoms (pt-stated)     Notes - Note edited  2/25/2019  3:43 PM by Jacquelyn Arias RN    Goal Statement: I will weigh myself everyday at the same time and call the clinic for weight gain of more than 2 lbs in a day or 5 lbs in a week.  Measure of Success: Patient consistently weighs himself.  Supportive Steps to Achieve: Patient has home care, care coordination.   Barriers: Lives alone, chronic illness  Strengths: family, home care, care coordination  Date to Achieve By: End 2019  Patient expressed understanding of goal: yes          #2. Psychosocial (pt-stated)     Notes - Note edited  4/18/2019  3:08 PM by Jacquelyn Arias, RN    Goal Statement: I will fill out a Health Care Directive and have a copy sent to my doctor.   Measure of Success: I have a completed Health Care Directive in my chart and my family has one too.  Supportive Steps to Achieve: Care Coordinator RN will send a Health Care Directive to patient.  Barriers: Lives alone, chronic illness.  Strengths: Family, Home health care, care coordination.  Date to Achieve By: 3/29/19  Patient expressed understanding of goal: yes    4/18/19: Has the forms, will review with daughter, in the process.                   Patient/Caregiver understanding: yes    Outreach Frequency: monthly  Future Appointments              In 4 days [facility]    In 4 days Amanda Mosquera PA-C [facility]    In 2 months [facility]    In 2 months Lanny Neil MD [facility], [facility]    In 3 months [facility]2 [facility]          Plan:   Patient will continue to follow treatment plan as directed and follow up with PCP with concerns ongoing.   Patient to attend his C.O.R.E clinic appointment on Monday 4/22/19.  Care  Coordinator RN will reach out in one month.    Jacquelyn RAMIREZ, RN, PHN, [facility]  Primary Care Clinic RN Care Coordinator  [facility]   [email or web address]  Office:  [phone]

## 2019-04-22 NOTE — PATIENT INSTRUCTIONS
Today, we discussed the following:   - Results: These haven't returned yet, I'll call you if I see something abnormal.   - Medication changes:   Take your Torsemide 20 mg once daily x 2 days, starting tomorrow morning. On day 3, if your night time breathing feels better, then go ahead and set the water pills aside again, and start taking the beta blocker (Toprol) at that point. You'll take one half tablet of Toprol (12.5 mg) every day with lunch.    If your breathing doesn't improve, or if you experience any lightheadedness, dizziness, etc, with this, please call me.   - Follow up: Please return to see us in 2 months.     Call me in the meantime if you gain weight on your home scale, and you also notice trouble breathing or leg swelling.     Please, remember to continue the followin.  Weigh yourself daily. Call if your weight is up > than 2 pounds in one day, or 5 pounds in one week; if you feel more short of breath or have worsening swelling in your legs or abdomen.  2.  Eat a low sodium diet (less than 2,000mg or 2g daily). If you eat less salt, you will retain less fluid.   3.  Avoid alcohol, as this can worsen heart failure.   4.  Avoid NSAIDs as able (For example, Ibuprofen / aleve / advil / naprosen / diclofenac).    Please call [facility] nurse for any questions or concerns Mon-Fri 8am-4pm:   [phone]  For concerns after hours:   [phone]   Scheduling phone number:   [phone]    Thank you for visiting with us today.   Amanda Mosquera PA-C  ______________________________________________________________

## 2019-04-22 NOTE — PROGRESS NOTES
Per last note, it was recommened by Lindsey to leave off our list for the time being. He has not called back yet and Dr. Calderón is continuing to following patient. Dr. Calderón is aware of that he was unable to get here do to transportation issues. He seems to be doing well according to last note.      Alejandro Salas, [facility]   CORE and Heart Failure   Advanced Heart Failure Referrals

## 2019-04-22 NOTE — PROGRESS NOTES
"Cardiology Clinic Progress Note    Patrick Diop MRN# [id number]   YOB: 1935 Age: 83 year old          Assessment and Plan:     In summary, Patrick Diop presents today for a CORE clinic f/u visit.     Plan:  - Take Torsemide 20 mg x 2 days, starting tomorrow morning. He will call me if his orthopnea does not resolve with this.   - On 4/25, start Toprol 12.5 mg daily with lunch.   - RTC in six weeks.         History of Presenting Illness:      Patrick Diop is a pleasant 83 year old patient of  Dr. Calderón who presents today for a CORE clinic f/u visit.    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 Calderón since his most recent discharge. At last visit three weeks ago, he asked him to start taking Toprol 12.5 mg every afternoon to see if he tolerates it x a few days. He tells me that he did that, and felt well, and his BP at home remained stable. He wasn't sure what to do with the med though after a couple of days, so he went off it.     Today,  I'm meeting Patrick in the CORE clinic. He's up 5 lbs in clinic. However his home scale has shown a stable weight and he tells me he feels no different than he did a month ago. However on further questioning he tells me he can \"breathe heavy\" for about 30 seconds when he lies down at night but then it goes away. This has been new over the past 2 - 3 weeks. Otherwise, his breathing has " been stable, as has his LE edema.   He lives in a 3-story apartment and tells me he can walk indefinitely throughout the apartment without problem, while using his walker. He avoids stairs. He tells me he's unable to climb on the exam table. Patient denies chest pain, palpitations, near syncope or syncope. He's received no shocks from his device, and no arrhythmias were noted on his last check one week ago. BP remains marginal. He avoids salt in his diet.          Review of Systems:     12-pt ROS is negative except for as noted in the HPI.          Physical Exam:     Vitals: /68   Pulse 88   Wt 71.2 kg (157 lb)   BMI 24.59 kg/m    Wt Readings from Last 10 Encounters:   04/22/19 71.2 kg (157 lb)   04/05/19 69.5 kg (153 lb 4.8 oz)   04/01/19 68.9 kg (152 lb)   03/08/19 64.4 kg (142 lb)   03/04/19 64 kg (141 lb)   02/22/19 64.7 kg (142 lb 9.6 oz)   02/11/19 68.6 kg (151 lb 3.8 oz)   01/03/19 72.6 kg (160 lb)   12/26/18 74.3 kg (163 lb 12.8 oz)   12/17/18 80.2 kg (176 lb 12.8 oz)       Constitutional:  Patient is cachectic, but pleasant, alert, cooperative, and in NAD.  HEENT:  NCAT. PERRLA. EOM's intact.   Neck:  CVP appears normal in the seated position.   Pulmonary: Normal respiratory effort. Diffusely blunted BS.   Cardiac: RRR, normal S1/S2, no S3/S4, no murmur or rub.   Abdomen:  Non-tender abdomen, no hepatosplenomegaly appreciated.   Vascular: Pulses in the upper and lower extremities are 2+ and equal bilaterally.  Extremities: 1+ pitting edema ankles to knees bilaterally.   Skin:  No rashes or lesions appreciated.   Neurological:  No gross motor or sensory deficits.   Psych: Appropriate affect.        Data:     Cardiac Diagnostics reviewed:  Type Date Result   TTE      2/12/19 The visual ejection fraction is estimated at 20-25%.  There is severe anterior, septal, and apical wall hypokinesis.  There is no thrombus seen in the left ventricle.  Mildly decreased right ventricular systolic function  There  is mild (1+) mitral regurgitation.  Mild (35-45mmHg) pulmonary hypertension is present.    12/2018 Left ventricular systolic function is severely reduced.The visual ejection  fraction is estimated at <20%.The left ventricle is mildly dilated.There is  severe global hypokinesia of the left ventricle.Diastolic Doppler findings  (E/E' ratio and/or other parameters) suggest left ventricular filling  pressures are increased.  Severely decreased right ventricular systolic functionThe right ventricle is  mildly dilated.  The left atrium is moderately dilated.  There is moderate (2+) mitral regurgitation.The mitral regurgitant jet is  eccentrically directed.Restricted posterior mitral valve leaflet.  Pulmonary hypertension- RVSP 41 mm hg +RA.  Mild aortic root and ascending aorta dilatation.     Compared to echo dated 08/21/2015 LV and RV systolic functions were both noted normal in prior study.    2015 The visual ejection fraction is estimated at 60-65%.  The left atrium is mildly dilated.  There is mild (1+) mitral regurgitation.  There is mild trileaflet aortic sclerosis.  There is mild (1+) pulmonic valvular regurgitation.  The ascending aorta is Mildly dilated.  The proximal aorta measures 4.0 cm.  There is no comparison study available.   Cath 2/14/19 SUMMARY:   Three vessel CAD with  of the RCA and LCx and subtotal occlusion of  the mid LAD.  Elevated left sided filling pressures.     PLAN:   Cardiothoracic surgical consult for consideration of CABG.  If declined by surgery, would consider intervening on mid LAD lesion  first with hemodynamic support (Impella or at least IABP) and  rotational atherectomy of the lesion.  If successful, he could potentially be a good candidate for   intervention of his RCA via a retrograde approach.   Stress CMR 2/13/19 Severely dilated left ventricle with severely reduced systolic function. Moderately dilated right ventricle with moderately to severely reduced systolic function.             Regadenoson stress perfusion imaging demonstrates a medium sized transmural perfusion defect involving the mid to basal anteroseptal and inferoseptal walls and the apical septal wall, findings consistent with ischemia in the LAD distribution.   There is extensive late gadolinium enhancement involving the apical segments of the left ventricle as well as the mid anteroseptal and inferoseptal walls and the mid to basal inferior wall.     Collectively, the stress perfusion and late enhancement findings are consistent with prior extensive infarctions in the LAD and the RCA distributions with moderate ernestina-infarct ischemia in the LAD distribution.    Based on the transmural extent of late enhancement, the myocardium in the LAD distribution demonstrates a low likelihood of functional recovery. The myocardium in the RCA distribution demonstrates a low to  moderate likelihood of functional recovery. The myocardium in the circumflex distribution appears predominantly viable.    EKG 2/14/19 SR, inferolateral TWI, QRS WNL   Medtronic ICD 2/26/19 Medtronic Visia (S) 7 Day Post ICD Device Check  :  0%  Mode: VVI         Underlying Rhythm: SR  Heart Rate: Histogram adequate  Sensing:  Stable  Pacing Threshold: Stable Impedance: WNL  Battery Status: 11.4 years estimated longevity   Incision/Complications: Steri strips removed; incision is CDI with some ecchymosis. Edges well approximated. No evidence of hematoma or infection.  Atrial Arrhythmia: NONE  Ventricular Arrhythmia: NONE  ATP: NONE Shocks: NONE  Setting Change:  NONE  Care Plan:  RTC in 6 weeks.    CT chest 2/2019 1. Patchy bronchiolitis throughout the upper lobes with more discrete  consolidation in the right middle lobe. The appearance suggests an  acute infectious process.  2. Bilateral pleural effusions, right greater than left, not  significantly changed from 11/13/2018.     Labs reviewed:  Recent Labs   Lab Test 04/01/19  1319 03/04/19  0907  01/03/19  1036  11/15/18  0909 10/25/18  0926  03/23/18  0950  10/21/16  0821   LDL 49  --   --   --   --   --   --  47  --  59   HDL 61  --   --   --   --   --   --  56  --  53   NHDL 65  --   --   --   --   --   --  89  --  91   CHOL 126  --   --   --   --   --   --  145  --  144   TRIG 82  --   --   --   --   --   --  212*  --  161*   TSH  --  5.54*  --   --  2.48 2.18   < >  --    < >  --    NTBNP 56,699* 31,318* 53,820*   < >  --   --   --   --   --   --     < > = values in this interval not displayed.       Lab Results   Component Value Date    WBC 6.8 03/04/2019    RBC 3.10 (L) 03/04/2019    HGB 10.1 (L) 04/01/2019    HCT 32.6 (L) 03/04/2019     (H) 03/04/2019    MCH 33.2 (H) 03/04/2019    MCHC 31.6 03/04/2019    RDW 13.1 03/04/2019     03/04/2019       Lab Results   Component Value Date     04/01/2019    POTASSIUM 4.4 04/01/2019    CHLORIDE 105 04/01/2019    CO2 27 04/01/2019    ANIONGAP 6 04/01/2019     (H) 04/01/2019    BUN 21 04/01/2019    CR 1.14 04/01/2019    GFRESTIMATED 60 (L) 04/01/2019    GFRESTBLACK 69 04/01/2019    AMY 8.8 04/01/2019      Lab Results   Component Value Date    AST 14 04/01/2019    ALT 17 04/01/2019       Lab Results   Component Value Date    A1C 5.6 09/19/2018       Lab Results   Component Value Date    INR 1.59 (H) 02/11/2019           Problem List:     Patient Active Problem List   Diagnosis     Hyperlipidemia with target LDL less than 100     Hypertension goal BP (blood pressure) < 140/90     Colon cancer (H)     CKD (chronic kidney disease) stage 3, GFR 30-59 ml/min (H)     Recurrent tongue cancer (H)     Chemotherapy induced nausea and vomiting     Acquired hypothyroidism     Non-ischemic cardiomyopathy (H)     Acute on chronic systolic congestive heart failure (H)     Elevated troponin     Syncope, unspecified syncope type     Acute respiratory failure with hypoxia (H)           Medications:     Current Outpatient Medications   Medication Sig  "Dispense Refill     aspirin (ASA) 81 MG EC tablet Take 1 tablet (81 mg) by mouth daily 90 tablet 2     atorvastatin (LIPITOR) 40 MG tablet Take 1 tablet (40 mg) by mouth daily 90 tablet 2     Cholecalciferol (VITAMIN D) 1000 UNITS capsule Take 1 capsule by mouth daily       clopidogrel (PLAVIX) 75 MG tablet Take 1 tablet (75 mg) by mouth daily 90 tablet 3     levothyroxine (SYNTHROID/LEVOTHROID) 100 MCG tablet Take 1 tablet (100 mcg) by mouth daily Recheck Lab test in 2 months. 90 tablet 1     lisinopril (PRINIVIL/ZESTRIL) 2.5 MG tablet Take 1 tablet (2.5 mg) by mouth At Bedtime 90 tablet 0     metoprolol succinate ER (TOPROL-XL) 25 MG 24 hr tablet ON HOLD as of 2/25/19 90 tablet 3     multivitamin  with lutein (OCUVITE WITH LTEIN) CAPS Take 1 capsule by mouth daily       order for DME Equipment being ordered: 4-Wheel Walker with seat. He is 5' 7\" tall.  [facility]: Fax: [phone]  phone [phone] 1 Device 0     torsemide (DEMADEX) 20 MG tablet ON HOLD as of 2/25/19 60 tablet 0           Past Medical History:     Past Medical History:   Diagnosis Date     Acute on chronic systolic congestive heart failure (H) 2/10/2019     Acute on chronic systolic heart failure (H) 2/10/2019     Adult hypothyroidism      Colon cancer (H)      Hearing problem      Hyperlipidemia with target LDL less than 100 11/6/2013     Hypertension goal BP (blood pressure) < 140/90 11/6/2013     Ischemic cardiomyopathy 02/10/2019    Postulated to be due to chemotherapy, though agent not specified.     Non-ischemic cardiomyopathy (H) 2/10/2019    Postulated to be due to chemotherapy, though agent not specified.     Syncope      Tongue cancer (H)      Past Surgical History:   Procedure Laterality Date     APPENDECTOMY  2001     COLECTOMY  Jan 2002    malignant polyp.  Colectomy and colostomy     COLONOSCOPY  Jan 2002    polyps     CV HEART CATHETERIZATION WITH POSSIBLE INTERVENTION N/A 2/14/2019    Procedure: Heart Catheterization " with possible Intervention;  Surgeon: Julio Vera MD;  Location:  HEART CARDIAC CATH LAB     EP ICD N/A 2/15/2019    Procedure: EP ICD;  Surgeon: Cj Gutierrez MD;  Location:  HEART CARDIAC CATH LAB     EYE SURGERY      B cataract     [facility] ENDOSCOPY W PLACEMENT GASTROSTOMY TUBE PERCUT N/A 2015    Procedure: COMBINED ESOPHAGOSCOPY, GASTROSCOPY, DUODENOSCOPY (EGD), PLACE PERCUTANEOUS ENDOSCOPIC GASTROSTOMY TUBE;  Surgeon: Sergio Buenrostro MD;  Location: [facility]     LARYNGOSCOPY WITH BIOPSY(IES) N/A 4/3/2018    Procedure: LARYNGOSCOPY WITH BIOPSY(IES);  Direct Laryngoscopy, Biopsy Base Of Tongue;  Surgeon: Jj Hernandez MD;  Location: [facility] OR     TAKEDOWN COLOSTOMY       Family History   Problem Relation Age of Onset     Other Cancer Mother 57        lung ca, with brain mets     Cancer Mother         Brain tumor, lung cancer     Cancer Father         Lung cancer     Cancer Brother      Social History     Socioeconomic History     Marital status: Single     Spouse name: Not on file     Number of children: Not on file     Years of education: Not on file     Highest education level: Not on file   Occupational History     Not on file   Social Needs     Financial resource strain: Not on file     Food insecurity:     Worry: Not on file     Inability: Not on file     Transportation needs:     Medical: Not on file     Non-medical: Not on file   Tobacco Use     Smoking status: Former Smoker     Packs/day: 0.00     Years: 48.00     Pack years: 0.00     Types: Cigarettes     Last attempt to quit: 2001     Years since quittin.4     Smokeless tobacco: Never Used     Tobacco comment: started at age 17   Substance and Sexual Activity     Alcohol use: No     Drug use: No     Sexual activity: Never     Partners: Female   Lifestyle     Physical activity:     Days per week: Not on file     Minutes per session: Not on file     Stress: Not on file   Relationships     Social connections:     Talks on  phone: Not on file     Gets together: Not on file     Attends Yazidism service: Not on file     Active member of club or organization: Not on file     Attends meetings of clubs or organizations: Not on file     Relationship status: Not on file     Intimate partner violence:     Fear of current or ex partner: Not on file     Emotionally abused: Not on file     Physically abused: Not on file     Forced sexual activity: Not on file   Other Topics Concern     Parent/sibling w/ CABG, MI or angioplasty before 65F 55M? No   Social History Narrative     Not on file           Allergies:   Patient has no known allergies.      Aamnda Mosquera PA-C  [facility]  Pager: [phone]

## 2019-04-22 NOTE — LETTER
4/22/2019    Joe Mandel MD  [address] [place] [id number]    RE: Patrick Diop       Dear Colleague,    I had the pleasure of seeing Patrick Diop in the [facility].    Cardiology Clinic Progress Note    Patrick Diop MRN# [id number]   YOB: 1935 Age: 83 year old          Assessment and Plan:     In summary, Patrick Diop presents today for a CORE clinic f/u visit.     Plan:  - Take Torsemide 20 mg x 2 days, starting tomorrow morning. He will call me if his orthopnea does not resolve with this.   - On 4/25, start Toprol 12.5 mg daily with lunch.   - RTC in six weeks.         History of Presenting Illness:      Patrick Diop is a pleasant 83 year old patient of  Dr. Calderón who presents today for a CORE clinic f/u visit.    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 Calderón since his most recent discharge. At last visit three weeks ago, he asked him to start taking Toprol 12.5 mg every afternoon to see if he tolerates it x a few days. He tells me that he did that, and felt well, and his BP at home remained stable. He wasn't sure what to do with the med though after a couple of days, so he went off it.     Today,  I'm meeting Patrick in the CORE clinic. He's up 5 lbs in clinic. However his home scale has shown a stable weight and he tells me he feels no different than he did a  "month ago. However on further questioning he tells me he can \"breathe heavy\" for about 30 seconds when he lies down at night but then it goes away. This has been new over the past 2 - 3 weeks. Otherwise, his breathing has been stable, as has his LE edema.   He lives in a 3-story apartment and tells me he can walk indefinitely throughout the apartment without problem, while using his walker. He avoids stairs. He tells me he's unable to climb on the exam table. Patient denies chest pain, palpitations, near syncope or syncope. He's received no shocks from his device, and no arrhythmias were noted on his last check one week ago. BP remains marginal. He avoids salt in his diet.          Review of Systems:     12-pt ROS is negative except for as noted in the HPI.          Physical Exam:     Vitals: /68   Pulse 88   Wt 71.2 kg (157 lb)   BMI 24.59 kg/m     Wt Readings from Last 10 Encounters:   04/22/19 71.2 kg (157 lb)   04/05/19 69.5 kg (153 lb 4.8 oz)   04/01/19 68.9 kg (152 lb)   03/08/19 64.4 kg (142 lb)   03/04/19 64 kg (141 lb)   02/22/19 64.7 kg (142 lb 9.6 oz)   02/11/19 68.6 kg (151 lb 3.8 oz)   01/03/19 72.6 kg (160 lb)   12/26/18 74.3 kg (163 lb 12.8 oz)   12/17/18 80.2 kg (176 lb 12.8 oz)       Constitutional:  Patient is cachectic, but pleasant, alert, cooperative, and in NAD.  HEENT:  NCAT. PERRLA. EOM's intact.   Neck:  CVP appears normal in the seated position.   Pulmonary: Normal respiratory effort. Diffusely blunted BS.   Cardiac: RRR, normal S1/S2, no S3/S4, no murmur or rub.   Abdomen:  Non-tender abdomen, no hepatosplenomegaly appreciated.   Vascular: Pulses in the upper and lower extremities are 2+ and equal bilaterally.  Extremities: 1+ pitting edema ankles to knees bilaterally.   Skin:  No rashes or lesions appreciated.   Neurological:  No gross motor or sensory deficits.   Psych: Appropriate affect.        Data:     Cardiac Diagnostics reviewed:  Type Date Result   TTE      2/12/19 The " visual ejection fraction is estimated at 20-25%.  There is severe anterior, septal, and apical wall hypokinesis.  There is no thrombus seen in the left ventricle.  Mildly decreased right ventricular systolic function  There is mild (1+) mitral regurgitation.  Mild (35-45mmHg) pulmonary hypertension is present.    12/2018 Left ventricular systolic function is severely reduced.The visual ejection  fraction is estimated at <20%.The left ventricle is mildly dilated.There is  severe global hypokinesia of the left ventricle.Diastolic Doppler findings  (E/E' ratio and/or other parameters) suggest left ventricular filling  pressures are increased.  Severely decreased right ventricular systolic functionThe right ventricle is  mildly dilated.  The left atrium is moderately dilated.  There is moderate (2+) mitral regurgitation.The mitral regurgitant jet is  eccentrically directed.Restricted posterior mitral valve leaflet.  Pulmonary hypertension- RVSP 41 mm hg +RA.  Mild aortic root and ascending aorta dilatation.     Compared to echo dated 08/21/2015 LV and RV systolic functions were both noted normal in prior study.    2015 The visual ejection fraction is estimated at 60-65%.  The left atrium is mildly dilated.  There is mild (1+) mitral regurgitation.  There is mild trileaflet aortic sclerosis.  There is mild (1+) pulmonic valvular regurgitation.  The ascending aorta is Mildly dilated.  The proximal aorta measures 4.0 cm.  There is no comparison study available.   Cath 2/14/19 SUMMARY:   Three vessel CAD with  of the RCA and LCx and subtotal occlusion of  the mid LAD.  Elevated left sided filling pressures.     PLAN:   Cardiothoracic surgical consult for consideration of CABG.  If declined by surgery, would consider intervening on mid LAD lesion  first with hemodynamic support (Impella or at least IABP) and  rotational atherectomy of the lesion.  If successful, he could potentially be a good candidate for    intervention of his RCA via a retrograde approach.   Stress CMR 2/13/19 Severely dilated left ventricle with severely reduced systolic function. Moderately dilated right ventricle with moderately to severely reduced systolic function.            Regadenoson stress perfusion imaging demonstrates a medium sized transmural perfusion defect involving the mid to basal anteroseptal and inferoseptal walls and the apical septal wall, findings consistent with ischemia in the LAD distribution.   There is extensive late gadolinium enhancement involving the apical segments of the left ventricle as well as the mid anteroseptal and inferoseptal walls and the mid to basal inferior wall.     Collectively, the stress perfusion and late enhancement findings are consistent with prior extensive infarctions in the LAD and the RCA distributions with moderate ernestina-infarct ischemia in the LAD distribution.    Based on the transmural extent of late enhancement, the myocardium in the LAD distribution demonstrates a low likelihood of functional recovery. The myocardium in the RCA distribution demonstrates a low to  moderate likelihood of functional recovery. The myocardium in the circumflex distribution appears predominantly viable.    EKG 2/14/19 SR, inferolateral TWI, QRS WNL   Medtronic ICD 2/26/19 Medtronic Visia (S) 7 Day Post ICD Device Check  :  0%  Mode: VVI         Underlying Rhythm: SR  Heart Rate: Histogram adequate  Sensing:  Stable  Pacing Threshold: Stable Impedance: WNL  Battery Status: 11.4 years estimated longevity   Incision/Complications: Steri strips removed; incision is CDI with some ecchymosis. Edges well approximated. No evidence of hematoma or infection.  Atrial Arrhythmia: NONE  Ventricular Arrhythmia: NONE  ATP: NONE Shocks: NONE  Setting Change:  NONE  Care Plan:  RTC in 6 weeks.    CT chest 2/2019 1. Patchy bronchiolitis throughout the upper lobes with more discrete  consolidation in the right middle lobe.  The appearance suggests an  acute infectious process.  2. Bilateral pleural effusions, right greater than left, not  significantly changed from 11/13/2018.     Labs reviewed:  Recent Labs   Lab Test 04/01/19  1319 03/04/19  0907 01/03/19  1036  11/15/18  0909 10/25/18  0926  03/23/18  0950  10/21/16  0821   LDL 49  --   --   --   --   --   --  47  --  59   HDL 61  --   --   --   --   --   --  56  --  53   NHDL 65  --   --   --   --   --   --  89  --  91   CHOL 126  --   --   --   --   --   --  145  --  144   TRIG 82  --   --   --   --   --   --  212*  --  161*   TSH  --  5.54*  --   --  2.48 2.18   < >  --    < >  --    NTBNP 56,699* 31,318* 53,820*   < >  --   --   --   --   --   --     < > = values in this interval not displayed.       Lab Results   Component Value Date    WBC 6.8 03/04/2019    RBC 3.10 (L) 03/04/2019    HGB 10.1 (L) 04/01/2019    HCT 32.6 (L) 03/04/2019     (H) 03/04/2019    MCH 33.2 (H) 03/04/2019    MCHC 31.6 03/04/2019    RDW 13.1 03/04/2019     03/04/2019       Lab Results   Component Value Date     04/01/2019    POTASSIUM 4.4 04/01/2019    CHLORIDE 105 04/01/2019    CO2 27 04/01/2019    ANIONGAP 6 04/01/2019     (H) 04/01/2019    BUN 21 04/01/2019    CR 1.14 04/01/2019    GFRESTIMATED 60 (L) 04/01/2019    GFRESTBLACK 69 04/01/2019    AMY 8.8 04/01/2019      Lab Results   Component Value Date    AST 14 04/01/2019    ALT 17 04/01/2019       Lab Results   Component Value Date    A1C 5.6 09/19/2018       Lab Results   Component Value Date    INR 1.59 (H) 02/11/2019           Problem List:     Patient Active Problem List   Diagnosis     Hyperlipidemia with target LDL less than 100     Hypertension goal BP (blood pressure) < 140/90     Colon cancer (H)     CKD (chronic kidney disease) stage 3, GFR 30-59 ml/min (H)     Recurrent tongue cancer (H)     Chemotherapy induced nausea and vomiting     Acquired hypothyroidism     Non-ischemic cardiomyopathy (H)     Acute on  "chronic systolic congestive heart failure (H)     Elevated troponin     Syncope, unspecified syncope type     Acute respiratory failure with hypoxia (H)           Medications:     Current Outpatient Medications   Medication Sig Dispense Refill     aspirin (ASA) 81 MG EC tablet Take 1 tablet (81 mg) by mouth daily 90 tablet 2     atorvastatin (LIPITOR) 40 MG tablet Take 1 tablet (40 mg) by mouth daily 90 tablet 2     Cholecalciferol (VITAMIN D) 1000 UNITS capsule Take 1 capsule by mouth daily       clopidogrel (PLAVIX) 75 MG tablet Take 1 tablet (75 mg) by mouth daily 90 tablet 3     levothyroxine (SYNTHROID/LEVOTHROID) 100 MCG tablet Take 1 tablet (100 mcg) by mouth daily Recheck Lab test in 2 months. 90 tablet 1     lisinopril (PRINIVIL/ZESTRIL) 2.5 MG tablet Take 1 tablet (2.5 mg) by mouth At Bedtime 90 tablet 0     metoprolol succinate ER (TOPROL-XL) 25 MG 24 hr tablet ON HOLD as of 2/25/19 90 tablet 3     multivitamin  with lutein (OCUVITE WITH LTEIN) CAPS Take 1 capsule by mouth daily       order for DME Equipment being ordered: 4-Wheel Walker with seat. He is 5' 7\" tall.  [facility]: Fax: [phone]  phone [phone] 1 Device 0     torsemide (DEMADEX) 20 MG tablet ON HOLD as of 2/25/19 60 tablet 0           Past Medical History:     Past Medical History:   Diagnosis Date     Acute on chronic systolic congestive heart failure (H) 2/10/2019     Acute on chronic systolic heart failure (H) 2/10/2019     Adult hypothyroidism      Colon cancer (H)      Hearing problem      Hyperlipidemia with target LDL less than 100 11/6/2013     Hypertension goal BP (blood pressure) < 140/90 11/6/2013     Ischemic cardiomyopathy 02/10/2019    Postulated to be due to chemotherapy, though agent not specified.     Non-ischemic cardiomyopathy (H) 2/10/2019    Postulated to be due to chemotherapy, though agent not specified.     Syncope      Tongue cancer (H)      Past Surgical History:   Procedure Laterality Date     " APPENDECTOMY       COLECTOMY  2002    malignant polyp.  Colectomy and colostomy     COLONOSCOPY  2002    polyps     CV HEART CATHETERIZATION WITH POSSIBLE INTERVENTION N/A 2019    Procedure: Heart Catheterization with possible Intervention;  Surgeon: Julio Vera MD;  Location:  HEART CARDIAC CATH LAB     EP ICD N/A 2/15/2019    Procedure: EP ICD;  Surgeon: Cj Gutierrez MD;  Location:  HEART CARDIAC CATH LAB     EYE SURGERY      B cataract     [facility] ENDOSCOPY W PLACEMENT GASTROSTOMY TUBE PERCUT N/A 2015    Procedure: COMBINED ESOPHAGOSCOPY, GASTROSCOPY, DUODENOSCOPY (EGD), PLACE PERCUTANEOUS ENDOSCOPIC GASTROSTOMY TUBE;  Surgeon: Sergio Buenrostro MD;  Location: [facility]     LARYNGOSCOPY WITH BIOPSY(IES) N/A 4/3/2018    Procedure: LARYNGOSCOPY WITH BIOPSY(IES);  Direct Laryngoscopy, Biopsy Base Of Tongue;  Surgeon: Jj Hernandez MD;  Location: [facility]     TAKEDOWN COLOSTOMY       Family History   Problem Relation Age of Onset     Other Cancer Mother 57        lung ca, with brain mets     Cancer Mother         Brain tumor, lung cancer     Cancer Father         Lung cancer     Cancer Brother      Social History     Socioeconomic History     Marital status: Single     Spouse name: Not on file     Number of children: Not on file     Years of education: Not on file     Highest education level: Not on file   Occupational History     Not on file   Social Needs     Financial resource strain: Not on file     Food insecurity:     Worry: Not on file     Inability: Not on file     Transportation needs:     Medical: Not on file     Non-medical: Not on file   Tobacco Use     Smoking status: Former Smoker     Packs/day: 0.00     Years: 48.00     Pack years: 0.00     Types: Cigarettes     Last attempt to quit: 2001     Years since quittin.4     Smokeless tobacco: Never Used     Tobacco comment: started at age 17   Substance and Sexual Activity     Alcohol use: No     Drug use: No      Sexual activity: Never     Partners: Female   Lifestyle     Physical activity:     Days per week: Not on file     Minutes per session: Not on file     Stress: Not on file   Relationships     Social connections:     Talks on phone: Not on file     Gets together: Not on file     Attends Christian service: Not on file     Active member of club or organization: Not on file     Attends meetings of clubs or organizations: Not on file     Relationship status: Not on file     Intimate partner violence:     Fear of current or ex partner: Not on file     Emotionally abused: Not on file     Physically abused: Not on file     Forced sexual activity: Not on file   Other Topics Concern     Parent/sibling w/ CABG, MI or angioplasty before 65F 55M? No   Social History Narrative     Not on file           Allergies:   Patient has no known allergies.      Amanda Mosquera PA-C  [facility]  Pager: [phone]      Thank you for allowing me to participate in the care of your patient.    Sincerely,     Amanda Mosquera PA-C     [facility]

## 2019-05-09 NOTE — TELEPHONE ENCOUNTER
Form signed faxed and sent to scanning.  [facility]  Clinic Station [place]

## 2019-05-14 NOTE — PROGRESS NOTES
Clinic Care Coordination Contact    Clinic Care Coordination Contact  OUTREACH    Referral Information:  Referral Source: IP Report    Primary Diagnosis: CHF    Chief Complaint   Patient presents with     Clinic Care Coordination - Follow-up     Nurse: f/u        Universal Utilization: No concerns at this time.   Clinic Utilization  Difficulty keeping appointments:: No  Compliance Concerns: No  No-Show Concerns: No  No PCP office visit in Past Year: No  Utilization    Last refreshed: 5/9/2019  8:56 PM:  Hospital Admissions 2           Last refreshed: 5/9/2019  8:56 PM:  ED Visits 0           Last refreshed: 5/9/2019  8:56 PM:  No Show Count (past year) 0              Current as of: 5/9/2019  8:56 PM              Clinical Concerns:  Care Coordinator RN spoke with patient, he states he is doing ok. He recently moved into [facility]. He states he has been there a week and its been a change but a good change. He denies having any questions or concerns at this time. He has the staff at the [facility] help him with his daily weight. He states when he was at the cardiology office 4/22/19 he weighed 167 lbs however clinic wrote down 157 lbs. Patient reports weight today 162 lbs and /62. He denies having any edema at this time. He states the [facility] does all the cooking for meals and helps him with his medications. He did state that Sharmila Arana from [facility] was out yesterday and talked with him about his health care directive,  He agreed to talk it over with his daughter this weekend and get it done.    Current Medical Concerns:    Patient Active Problem List   Diagnosis     Hyperlipidemia with target LDL less than 100     Hypertension goal BP (blood pressure) < 140/90     Colon cancer (H)     CKD (chronic kidney disease) stage 3, GFR 30-59 ml/min (H)     Recurrent tongue cancer (H)     Chemotherapy induced nausea and vomiting     Acquired hypothyroidism     Non-ischemic cardiomyopathy (H)     Acute on chronic  systolic congestive heart failure (H)     Elevated troponin     Syncope, unspecified syncope type     Acute respiratory failure with hypoxia (H)         Current Behavioral Concerns: Denies having any concerns.     Education Provided to patient: n/a   Pain  Pain (GOAL):: No  Health Maintenance Reviewed: Due/Overdue   Health Maintenance Due   Topic Date Due     HF ACTION PLAN Q3 YR  1935     ZOSTER IMMUNIZATION (1 of 2) 12/17/1987     MEDICARE ANNUAL WELLNESS VISIT  12/17/2002     EYE EXAM Q1 YEAR  07/03/2018     PHQ-2  01/01/2019     A1C Q6 MO  03/19/2019       Clinical Pathway: None    Medication Management:  Assisted living manages his medications now.     Functional Status:  Dependent ADLs:: Ambulation-walker  Dependent IADLs:: Transportation  Bed or wheelchair confined:: No  Mobility Status: Independent w/Device  Fallen 2 or more times in the past year?: No  Any fall with injury in the past year?: No    Living Situation:  Current living arrangement:: I live in assisted living  Type of residence:: Assisted living    Diet/Exercise/Sleep:  Diet:: No added salt  Inadequate nutrition (GOAL):: No  Food Insecurity: No  Tube Feeding: No  Exercise:: Currently not exercising  Inadequate activity/exercise (GOAL):: No  Significant changes in sleep pattern (GOAL): No    Transportation:  Transportation concerns (GOAL):: Yes  Transportation means:: Public transportation     Psychosocial:  Quaker or spiritual beliefs that impact treatment:: No  Mental health DX:: No  Mental health management concern (GOAL):: No  Informal Support system:: Children, Friends, Neighbors     Financial/Insurance:   Financial/Insurance concerns (GOAL):: No       Resources and Interventions:  Current Resources: n/a  Community Resources: [facility], Housekeeping/Chore Agency  Supplies used at home:: Nutritional Supplements  Equipment Currently Used at Home: grab bar, toilet, grab bar, tub/shower, shower chair, raised toilet, walker,  standard    Advance Care Plan/Directive  Advanced Care Plans/Directives on file:: In process  Advanced Care Plan/Directive Status: In Process    Referrals Placed: None     Goals:   Goals        General    #1. Improve chronic symptoms (pt-stated)     Notes - Note edited  2/25/2019  3:43 PM by Jacquelyn Arias, RN    Goal Statement: I will weigh myself everyday at the same time and call the clinic for weight gain of more than 2 lbs in a day or 5 lbs in a week.  Measure of Success: Patient consistently weighs himself.  Supportive Steps to Achieve: Patient has home care, care coordination.   Barriers: Lives alone, chronic illness  Strengths: family, home care, care coordination  Date to Achieve By: End 2019  Patient expressed understanding of goal: yes          #2. Psychosocial (pt-stated)     Notes - Note edited  4/18/2019  3:08 PM by Jacquelyn Arias, RN    Goal Statement: I will fill out a Health Care Directive and have a copy sent to my doctor.   Measure of Success: I have a completed Health Care Directive in my chart and my family has one too.  Supportive Steps to Achieve: Care Coordinator RN will send a Health Care Directive to patient.  Barriers: Lives alone, chronic illness.  Strengths: Family, Home health care, care coordination.  Date to Achieve By: 3/29/19  Patient expressed understanding of goal: yes    4/18/19: Has the forms, will review with daughter, in the process.                   Patient/Caregiver understanding: yes    Outreach Frequency: monthly  Future Appointments              In 1 month [facility] [place]    In 1 month Lionel Calderón MD [facility]    In 1 month [facility]    In 1 month Lanny Neil MD [facility]    In 2 months [facility] CL[facility]          Plan:   Patient will continue to follow treatment plan as directed  and follow up with PCP with concerns ongoing.   Care Coordinator RN to f/u with patient in one month for HCD update.    Jacquelyn RAMIREZ RN, PHN, [facility]  Primary Care Clinic RN Care Coordinator  [facility]   [email or web address]  Office:  [phone]

## 2019-05-31 NOTE — TELEPHONE ENCOUNTER
Reason for Call:  Other     Detailed comments: Kwankevin Brenda is calling - Angela - [phone] - Patient on left lower leg is swollen and weeping - please advise  -     Phone Number Patient can be reached at:     Best Time:     Can we leave a detailed message on this number? YES    Call taken on 5/31/2019 at 2:10 PM by Marilee Ugalde

## 2019-05-31 NOTE — TELEPHONE ENCOUNTER
Angela reports noting lt lower leg shin area swollen, weeping clear fluid which is new sx. States pt has bilat L/E at baseline, lt> rt.   Has increased edema lt L/E, note minor superficial bruise/ abrasion lt shin. No s/sx infection, inflammation, no redness, warmth, pain.  No blisters. States does not resemble cellulitis.Wt has been fluctuating 162-168#. Denies SOA, weakness, fatigue, cheat pain/ pressure abd bloating.   Has had 8 doses PRN torsemide 20 mg tab one tab daily since 05-12-19.  Pt admitted bought and eating potato chips.   Angela has cleansed area, applied non stick drsg/ kerlex. Has instructed pt to elevate L/E's, refrain from salt/ chips.  Advised needs to be seen to eval. Angela states will monitor over wknd, keep clean/ dry, ensure elevates L/E's. Requesting appt with Dr. Mandel Mon- she will call right away Mon AM for same day appt with Dr. Mandel or another provider. UC/ED over wknd if needed if any s/sx infection/ inflammation, increased edema, SOA, etc.  DEVORAH Ross RN

## 2019-06-01 PROBLEM — I50.23 ACUTE ON CHRONIC SYSTOLIC HEART FAILURE (H): Status: ACTIVE | Noted: 2019-01-01

## 2019-06-01 PROBLEM — I87.2 VENOUS STASIS DERMATITIS OF BOTH LOWER EXTREMITIES: Status: ACTIVE | Noted: 2019-01-01

## 2019-06-01 NOTE — ED NOTES
Left lower leg swelling with weeping around open area-pt reports swelling x 3-4 days-seen in UC in [place] and sent here-leg red-not warm-rt lower leg swollen to lesser degree -pitting edema both lower legs and feet

## 2019-06-01 NOTE — LETTER
Transition Communication Hand-off for Care Transitions to Next Level of Care Provider    Name: Patrick Diop  : 1935  MRN #: [id number]  Primary Care Provider: Joe Mandel  Primary Care MD Name: Ministerio  Primary Clinic: [address] [place] [id number]  Primary Care Clinic Name: [facility]  Reason for Hospitalization:  Acute on chronic systolic heart failure (H) [I50.23]  Bilateral lower extremity edema [R60.0]  Admit Date/Time: 2019 12:09 PM  Discharge Date: 6/3/19  Payor Source: Payor: HU[employer]NA / Plan: HU[employer]NA MEDICARE ADVANTAGE / Product Type: Medicare /     Readmission Assessment Measure (KATTY) Risk Score/category: none           Reason for Communication Hand-off Referral: Fragility    Discharge Plan:[facility] w/ [facility]       Concern for non-adherence with plan of care:   Y/N no  Discharge Needs Assessment:  Needs      Most Recent Value   Assisted Living  -- [[facility]]            Follow-up specialty is recommended: No    Follow-up plan:    Future Appointments   Date Time Provider Department Center   6/3/2019  2:00 PM [facility]US3 [facility]   2019 10:40 AM Keyana Vegas, APRN CNP [facility]   2019 12:00 PM [facility]   2019  1:00 PM Lionel Calderón MD [facility]   2019  9:45 AM [facility]CT1 [facility]   7/10/2019 11:00 AM Lanny Neil MD [facility]   2019 12:00 AM AUGUSTIN DCR2 [facility]       Any outstanding tests or procedures:        Referrals     Future Labs/Procedures    Home Care Referral     Comments:    Your provider has referred you to: [facility]: [facility] [phone]   [email or web address]    Extended Emergency Contact Information  Primary Emergency Contact: Florecita Diop   [facility]  Home Phone: [phone]  Work Phone: [phone]  Mobile Phone: [phone]  Relation: Daughter    Patient Anticipated Discharge Date: 6/3/19   RN, PT, HHA to begin 24 - 48 hours  after discharge.  PLEASE EVALUATE AND TREAT (Evaluation timeline is 24 - 48 hrs. Please call if there is need for a variance to this timeline).    REASON FOR REFERRAL: Wound Care - Specialty Treatment Orders: to be determined 6/3    ADDITIONAL SERVICES NEEDED: none    OTHER PERTINENT INFORMATION: Patient was last seen by provider on 6/2/19 for venous stasis dermatitis.    No current outpatient medications on file.    Patient Active Problem List:     Hyperlipidemia with target LDL less than 100     Hypertension goal BP (blood pressure) < 140/90     Colon cancer (H)     CKD (chronic kidney disease) stage 3, GFR 30-59 ml/min (H)     Recurrent tongue cancer (H)     Chemotherapy induced nausea and vomiting     Acquired hypothyroidism     Non-ischemic cardiomyopathy (H)     Acute on chronic systolic congestive heart failure (H)     Elevated troponin     Syncope, unspecified syncope type     Acute respiratory failure with hypoxia (H)     Acute on chronic systolic heart failure (H)     Venous stasis dermatitis of both lower extremities     Macrocytic anemia      Documentation of Face to Face and Certification for Home Health Services    I certify that patient, Patrick Diop is under my care and that I, or a Nurse Practitioner or Physician's Assistant working with me, had a face-to-face encounter that meets the physician face-to-face encounter requirements with this patient on: 6/2/2019.    This encounter with the patient was in whole, or in part, for the following medical condition, which is the primary reason for Home Health Care: venous statis dermitits.    I certify that, based on my findings, the following services are medically necessary Home Health Services: Nursing    My clinical findings support the need for the above services because: Nurse is needed: To provide assessment and oversight required in the home to assure adherence to the medical plan due to: wound care..    Further, I certify that my clinical  findings support that this patient is homebound (i.e. absences from home require considerable and taxing effort and are for medical reasons or [facility] services or infrequently or of short duration when for other reasons) because: Requires assistance of another person or specialized equipment to access medical services because patient: Requires supervision of another for safe transfer..    Based on the above findings, I certify that this patient is confined to the home and needs intermittent skilled nursing care, physical therapy and/or speech therapy.  The patient is under my care, and I have initiated the establishment of the plan of care.  This patient will be followed by a physician who will periodically review the plan of care.    Physician/Provider to provide follow up care: Joe Mandel certified Physician at time of discharge: Dr. Kayla tobar    Please be aware that coverage of these services is subject to the terms and limitations of your health insurance plan.  Call member services at your health plan with any benefit or coverage questions.            Key Recommendations:  Patient discharging home w/ new leg edema and wound to manage, added [facility] to assist [facility] with wound care needs.  Daughter and Angela at [facility] very helpful with discharge and needs.      Dalila Harmon    AVS/Discharge Summary is the source of truth; this is a helpful guide for improved communication of patient story

## 2019-06-01 NOTE — ED PROVIDER NOTES
History     Chief Complaint   Patient presents with     Leg Swelling     lower leg swelling.  Coming from [facility].       HPI  Patrick Diop is a 83 year old male who presents for bilateral lower extremity swelling.  States is been getting worse over the past several days.  He has developed bleeding from the bilateral lower deformities, left worse than right.  He denies any pain.  He has some fullness in the feet but otherwise feels well.  He has had some increasing dyspnea on exertion and has been sleeping upright in a chair which is different than his norm.  He denies fever, chills, chest pain, body aches, abdominal pain, vomiting, diarrhea, dysuria, or rash.  He is on torsemide but only takes it intermittently.    Allergies:  No Known Allergies    Problem List:    Patient Active Problem List    Diagnosis Date Noted     Non-ischemic cardiomyopathy (H) 02/10/2019     Priority: Medium     Postulated to be due to chemotherapy, though agent not specified.       Acute on chronic systolic congestive heart failure (H) 02/10/2019     Priority: Medium     Elevated troponin 02/10/2019     Priority: Medium     Syncope, unspecified syncope type 02/10/2019     Priority: Medium     Acute respiratory failure with hypoxia (H) 02/10/2019     Priority: Medium     Acquired hypothyroidism 08/26/2018     Priority: Medium     Chemotherapy induced nausea and vomiting 05/31/2018     Priority: Medium     Recurrent tongue cancer (H) 10/28/2015     Priority: Medium     Neck mass found on 9/28/2015.  He had cancer at the base of his tongue.  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 He initially presented with a right-sided neck mass; CT showed a 3.6cm mass in the right base f tongue as well as bilateral cervical lymphadenopathy.  Treated with radiation for 7 weeks and three chemo treatments.   He had concomitant chemotherapy to a dose of 7,000 cGy in 35 fractions, which  he received along with high-dose cisplatin as detailed above.  He tolerated therapy quite well. His toxicities included grade 1 radiation dermatitis, grade 3 dysphagia requiring G-tube feedings, grade 2 dry mouth, grade 1 esophagitis, moderate-severe changes in taste, and grade 1 odynophagia.  ENT notes 4/4/2016:now three months out from completion of chemoradiation therapy for a T3, N2c right-sided tongue base tumor.  He had a PET scan done over the weekend, and it was essentially negative so he has had a complete response.    5/27/2016:Specialists treating his cancer; Dr. Hernandez, ENT.    Dr. Neil, Hematology/Oncology.   Dr. Paul, Oncology radiation.   He had lymphedema in neck as complication.  4/3/2018:NAME OF PROCEDURE:1. Direct laryngoscopy.  2. Biopsy of right tongue base.  Pathology returned with recurrent cancer.   April, 2018:Started on Kaytruda for his recurrent tongue cancer.   11/18/2018:Chemo:Treated initially with Cisplatin and later Kaytruda.        CKD (chronic kidney disease) stage 3, GFR 30-59 ml/min (H) 09/05/2014     Priority: Medium     9/5/2014:MAC positive twice.   11/18/2018:GFR <60 and has decreased in the past year.        Colon cancer (H) 03/14/2014     Priority: Medium     9/5/2014:2001 HAD SURGERY AND CURE-treated in [place].   Transverse colectomy.  He had chemo for 6 months.  Cancer treated in 2001.  Last colonoscopy 3 years ago.  He was told to recheck in 4 years-2015.         Hyperlipidemia with target LDL less than 100 11/06/2013     Priority: Medium     Hypertension goal BP (blood pressure) < 140/90 11/06/2013     Priority: Medium        Past Medical History:    Past Medical History:   Diagnosis Date     Acute on chronic systolic congestive heart failure (H) 2/10/2019     Acute on chronic systolic heart failure (H) 2/10/2019     Adult hypothyroidism      Colon cancer (H)      Hearing problem      Hyperlipidemia with target LDL less than 100 11/6/2013     Hypertension goal BP  (blood pressure) < 140/90 2013     Ischemic cardiomyopathy 02/10/2019     Non-ischemic cardiomyopathy (H) 2/10/2019     Syncope      Tongue cancer (H)        Past Surgical History:    Past Surgical History:   Procedure Laterality Date     APPENDECTOMY       COLECTOMY  2002    malignant polyp.  Colectomy and colostomy     COLONOSCOPY  2002    polyps     CV HEART CATHETERIZATION WITH POSSIBLE INTERVENTION N/A 2019    Procedure: Heart Catheterization with possible Intervention;  Surgeon: Julio Vera MD;  Location:  HEART CARDIAC CATH LAB     EP ICD N/A 2/15/2019    Procedure: EP ICD;  Surgeon: Cj [facility] MD;  Location:  HEART CARDIAC CATH LAB     EYE SURGERY      B cataract     HC UGI ENDOSCOPY W PLACEMENT GASTROSTOMY TUBE PERCUT N/A 2015    Procedure: COMBINED ESOPHAGOSCOPY, GASTROSCOPY, DUODENOSCOPY (EGD), PLACE PERCUTANEOUS ENDOSCOPIC GASTROSTOMY TUBE;  Surgeon: Sergio Buenrostro MD;  Location: [facility]     LARYNGOSCOPY WITH BIOPSY(IES) N/A 4/3/2018    Procedure: LARYNGOSCOPY WITH BIOPSY(IES);  Direct Laryngoscopy, Biopsy Base Of Tongue;  Surgeon: Jj Hernandez MD;  Location: [facility] OR     TAKEDOWN COLOSTOMY         Family History:    Family History   Problem Relation Age of Onset     Other Cancer Mother 57        lung ca, with brain mets     Cancer Mother         Brain tumor, lung cancer     Cancer Father         Lung cancer     Cancer Brother        Social History:  Marital Status:  Single [1]  Social History     Tobacco Use     Smoking status: Former Smoker     Packs/day: 0.00     Years: 48.00     Pack years: 0.00     Types: Cigarettes     Last attempt to quit: 2001     Years since quittin.5     Smokeless tobacco: Never Used     Tobacco comment: started at age 17   Substance Use Topics     Alcohol use: No     Drug use: No        Medications:      aspirin (ASA) 81 MG EC tablet   atorvastatin (LIPITOR) 40 MG tablet   Cholecalciferol (VITAMIN D) 1000 UNITS  capsule   clopidogrel (PLAVIX) 75 MG tablet   levothyroxine (SYNTHROID/LEVOTHROID) 100 MCG tablet   lisinopril (PRINIVIL/ZESTRIL) 2.5 MG tablet   metoprolol succinate ER (TOPROL-XL) 25 MG 24 hr tablet   multivitamin  with lutein (OCUVITE WITH LTEIN) CAPS   order for DME   torsemide (DEMADEX) 20 MG tablet         Review of Systems  Pertinent positives and negatives listed in the HPI, all other systems reviewed and are negative.    Physical Exam   BP: (!) 73/43  Pulse: 89  Temp: 98.6  F (37  C)  Resp: 20  SpO2: 95 %      Physical Exam   Constitutional: He is oriented to person, place, and time. He appears well-developed and well-nourished. He appears distressed.   HENT:   Head: Normocephalic and atraumatic.   Right Ear: External ear normal.   Left Ear: External ear normal.   Nose: Nose normal.   Eyes: Conjunctivae are normal. No scleral icterus.   Neck: Normal range of motion.   Cardiovascular: Normal rate and regular rhythm.   Pulmonary/Chest: Effort normal. No stridor. No respiratory distress.   Abdominal: Soft. He exhibits no distension. There is no tenderness. There is no guarding.   Musculoskeletal: He exhibits edema (Bilateral lower extremity edema).   Neurological: He is alert and oriented to person, place, and time.   Skin: Skin is warm and dry. He is not diaphoretic.   Weeping from the anterior shins bilaterally, left worse than right with some weeping sores    Psychiatric: He has a normal mood and affect. His behavior is normal.   Nursing note and vitals reviewed.      ED Course        Procedures               EKG Interpretation:      Interpreted by Victor Manuel Joshi  Time reviewed: 1240  Symptoms at time of EKG: Lower extremity edema, dyspnea   Rhythm: sinus   Rate: 83  Axis: Normal  Ectopy: none  Conduction: normal  ST Segments/ T Waves: No acute ischemic changes  Q Waves: III  Comparison to prior: Unchanged    Clinical Impression: no acute changes    Critical Care time:  none               Results for  orders placed or performed during the hospital encounter of 06/01/19 (from the past 24 hour(s))   CBC with platelets differential   Result Value Ref Range    WBC 6.9 4.0 - 11.0 10e9/L    RBC Count 2.92 (L) 4.4 - 5.9 10e12/L    Hemoglobin 9.8 (L) 13.3 - 17.7 g/dL    Hematocrit 30.9 (L) 40.0 - 53.0 %     (H) 78 - 100 fl    MCH 33.6 (H) 26.5 - 33.0 pg    MCHC 31.7 31.5 - 36.5 g/dL    RDW 14.1 10.0 - 15.0 %    Platelet Count 165 150 - 450 10e9/L    Diff Method Automated Method     % Neutrophils 84.4 %    % Lymphocytes 5.2 %    % Monocytes 7.9 %    % Eosinophils 1.5 %    % Basophils 0.6 %    % Immature Granulocytes 0.4 %    Nucleated RBCs 0 0 /100    Absolute Neutrophil 5.8 1.6 - 8.3 10e9/L    Absolute Lymphocytes 0.4 (L) 0.8 - 5.3 10e9/L    Absolute Monocytes 0.5 0.0 - 1.3 10e9/L    Absolute Eosinophils 0.1 0.0 - 0.7 10e9/L    Absolute Basophils 0.0 0.0 - 0.2 10e9/L    Abs Immature Granulocytes 0.0 0 - 0.4 10e9/L    Absolute Nucleated RBC 0.0    Comprehensive metabolic panel   Result Value Ref Range    Sodium 136 133 - 144 mmol/L    Potassium 5.0 3.4 - 5.3 mmol/L    Chloride 101 94 - 109 mmol/L    Carbon Dioxide 27 20 - 32 mmol/L    Anion Gap 8 3 - 14 mmol/L    Glucose 74 70 - 99 mg/dL    Urea Nitrogen 47 (H) 7 - 30 mg/dL    Creatinine 1.55 (H) 0.66 - 1.25 mg/dL    GFR Estimate 41 (L) >60 mL/min/[1.73_m2]    GFR Estimate If Black 48 (L) >60 mL/min/[1.73_m2]    Calcium 8.6 8.5 - 10.1 mg/dL    Bilirubin Total 0.5 0.2 - 1.3 mg/dL    Albumin 3.1 (L) 3.4 - 5.0 g/dL    Protein Total 6.6 (L) 6.8 - 8.8 g/dL    Alkaline Phosphatase 76 40 - 150 U/L    ALT 26 0 - 70 U/L    AST 18 0 - 45 U/L   Nt probnp inpatient   Result Value Ref Range    N-Terminal Pro BNP Inpatient 56,701 (H) 0 - 1,800 pg/mL   UA reflex to Microscopic   Result Value Ref Range    Color Urine Yellow     Appearance Urine Slightly Cloudy     Glucose Urine Negative NEG^Negative mg/dL    Bilirubin Urine Negative NEG^Negative    Ketones Urine Negative  NEG^Negative mg/dL    Specific Gravity Urine 1.009 1.003 - 1.035    Blood Urine Negative NEG^Negative    pH Urine 5.0 5.0 - 7.0 pH    Protein Albumin Urine Negative NEG^Negative mg/dL    Urobilinogen mg/dL 0.0 0.0 - 2.0 mg/dL    Nitrite Urine Negative NEG^Negative    Leukocyte Esterase Urine Negative NEG^Negative    Source Midstream Urine     RBC Urine <1 0 - 2 /HPF    WBC Urine <1 0 - 5 /HPF    Mucous Urine Present (A) NEG^Negative /LPF    Hyaline Casts 12 (H) 0 - 2 /LPF   XR Chest 2 Views    Narrative    XR CHEST 2 VW 6/1/2019 1:25 PM    COMPARISON: 2/16/2019    HISTORY: Dyspnea, bilateral lower extremity edema.      Impression    IMPRESSION: Small to moderate bilateral pleural effusions with  associated atelectasis, not significantly changed since 2/16/2019. No  pneumothorax on either side. Implanted cardiac pacer/defibrillator  over the LEFT chest in unchanged position.    MARIELY BEST MD       Medications   furosemide (LASIX) injection 20 mg (20 mg Intravenous Given 6/1/19 1250)       Assessments & Plan (with Medical Decision Making)   83-year-old male who presents for bilateral lower extremity swelling and dyspnea on exertion.  Heart rate 89, temperature is 98.6  F, SPO2 is 95% on room air.  BNP is elevated.  He is given a dose of furosemide here.  The lower cavity appears to be related to venous stasis and edema, less likely cellulitis, I do not believe that antibiotics are warranted at this time.  White blood cell count is 6.9, hemoglobin 9 point, stable from prior.  Urinalysis negative for signs of infection or protein.  Chest x-ray shows bilateral pleural effusions.  Symptoms are likely related to exacerbation of CHF and he is admitted to the medicine service for further treatment.  I discussed the case with the on-call hospitalist, Dr. Camara who agrees to admit the patient to her service.    I have reviewed the nursing notes.    I have reviewed the findings, diagnosis, plan and need for follow up with  the patient.          Medication List      There are no discharge medications for this visit.         Final diagnoses:   Bilateral lower extremity edema   Acute on chronic systolic heart failure (H)       6/1/2019   [facility] EMERGENCY DEPARTMENT     Victor Manuel Joshi MD  06/01/19 2663

## 2019-06-01 NOTE — ED NOTES
"Patient has  [place] to Observation  order. Patient has been given the Observation brochure -  What does Observation mean to me.\"  Patient has been given the opportunity to ask questions about observation status and their plan of care.      Yandy Eastman    "

## 2019-06-01 NOTE — PROGRESS NOTES
"[facility] ADMISSION NOTE    Patient admitted to room 2305 at approximately 1700 via wheel chair from emergency room. Patient was accompanied by transport tech and nurse.     Verbal SBAR report received from Meg prior to patient arrival.     Patient trasferred to bed via assist of two  Patient alert and oriented X 3. The patient is not having any pain.  . Admission vital signs: Blood pressure 91/50, pulse 80, temperature 98  F (36.7  C), temperature source Oral, resp. rate 18, height 1.702 m (5' 7\"), weight 75.9 kg (167 lb 5.3 oz), SpO2 97 %. Patient was oriented to plan of care, call light, bed controls, tv, telephone, bathroom and visiting hours.     Risk Assessment    The following safety risks were identified during admission: fall and skin. Yellow risk band applied: YES.     Skin Initial Assessment    This writer admitted this patient and completed a full skin assessment and Ken score in the Adult PCS flowsheet. Appropriate interventions initiated as needed.     Secondary skin check completed by Gerard Palaciosen Risk Assessment  Sensory Perception: 3-->slightly limited  Moisture: 3-->occasionally moist  Activity: 3-->walks occasionally  Mobility: 3-->slightly limited  Nutrition: 3-->adequate  Friction and Shear: 3-->no apparent problem  Ken Score: 18  Bed Support Surface: Atmos Air mattress  Reassessed using Bed Algorithm: Yes(pulsatte ordered)    Debi Lion    "

## 2019-06-01 NOTE — PROGRESS NOTES
Skin affirmation note    Admitting nurse completed full skin assessment, Ken score and Ken interventions. This writer agrees with the initial skin assessment findings.

## 2019-06-01 NOTE — H&P
"ADMISSION HISTORY AND PHYSICAL  Patient Name: Patrick Diop  Address: [address] [place] [id number]  Age:83 year old  Sex: male  Admission Date/Time: 6/1/2019 12:09 PM  Admitting provider: No admitting provider for patient encounter.  Hospital Attending Physician: Victor Manuel Joshi MD   Primary Care Provider: Joe Mandel    CHIEF COMPLAINT: Pedal edema with weeping    HPI:   Patient with a history of severe ischemic cardiomyopathy with an ejection fraction of approximately 20% with mild right ventricular dysfunction and history of chemotherapy thought to be contributory.  He also has coronary artery disease, chronic kidney disease and recurrent tongue cancer.  He tells me that he moved into an assisted living facility May 2019 and that he had been doing well until a couple of days ago when he noticed increased in the lower extremity pedal edema in it seems to be \"weeping andleaking\".  He denies orthopnea or PND though he chronically sleeps in his recliner.  He denies any cough.  He admits to eating some chips 2 days ago. He otherwise had been taking all his medications as prescribed.      REVIEW OF SYSTEMS  No fevers, chills, weight loss, weight gain  No headaches, acute change in vision or hearing though he has impaired vision at baseline due to macular degeneration,  no URI symptoms  No chest pain, palpitations, headaches, dyspnea on exertion.  No shortness of breath, cough, wheezing or chest pain   No nausea, vomiting, constipation, diarrhea or abdominal pain  No urinary pain, change in color, frequency or nocturia  No musculoskeletal complaints of muscle pain or joint swelling  No new rashes  No alterations in thinking, paresthesias, weakness, migraine headache or seizures  No significant change in mood, anxiety level          PAST MEDICAL HISTORY  Past Medical History:   Diagnosis Date     Acute on chronic systolic congestive heart failure (H) 2/10/2019     Acute on chronic " systolic heart failure (H) 2/10/2019     Adult hypothyroidism      Colon cancer (H)      Hearing problem      Hyperlipidemia with target LDL less than 100 11/6/2013     Hypertension goal BP (blood pressure) < 140/90 11/6/2013     Ischemic cardiomyopathy 02/10/2019    Postulated to be due to chemotherapy, though agent not specified.     Non-ischemic cardiomyopathy (H) 2/10/2019    Postulated to be due to chemotherapy, though agent not specified.     Syncope      Tongue cancer (H)           PAST SURGICAL HISTORY  Past Surgical History:   Procedure Laterality Date     APPENDECTOMY  2001     COLECTOMY  Jan 2002    malignant polyp.  Colectomy and colostomy     COLONOSCOPY  Jan 2002    polyps     CV HEART CATHETERIZATION WITH POSSIBLE INTERVENTION N/A 2/14/2019    Procedure: Heart Catheterization with possible Intervention;  Surgeon: Julio Vera MD;  Location:  [facility]ARD[facility]ATH LAB     EP ICD N/A 2/15/2019    Procedure: EP ICD;  Surgeon: Cj Gutierrez MD;  Location:  [facility]ATH [facility]     EYE SURGERY      B cataract     [facility] ENDOSCOPY W PLACEMENT GASTROSTOMY TUBE PERCUT N/A 11/19/2015    Procedure: COMBINED ESOPHAGOSCOPY, GASTROSCOPY, DUODENOSCOPY (EGD), PLACE PERCUTANEOUS ENDOSCOPIC GASTROSTOMY TUBE;  Surgeon: Sergio Buenrostro MD;  Location: [facility]     LARYNGOSCOPY WITH BIOPSY(IES) N/A 4/3/2018    Procedure: LARYNGOSCOPY WITH BIOPSY(IES);  Direct Laryngoscopy, Biopsy Base Of Tongue;  Surgeon: Jj Hernandez MD;  Location: [facility]     TAKEDOWN COLOSTOMY            MEDICATIONS  Current Outpatient Medications   Medication Sig Dispense Refill     aspirin (ASA) 81 MG EC tablet Take 1 tablet (81 mg) by mouth daily 90 tablet 2     atorvastatin (LIPITOR) 40 MG tablet Take 1 tablet (40 mg) by mouth daily 90 tablet 2     Cholecalciferol (VITAMIN D) 1000 UNITS capsule Take 1 capsule by mouth daily       clopidogrel (PLAVIX) 75 MG tablet Take 1 tablet (75 mg) by mouth daily 90 tablet 3     levothyroxine  "(SYNTHROID/LEVOTHROID) 100 MCG tablet Take 1 tablet (100 mcg) by mouth daily Recheck Lab test in 2 months. 90 tablet 1     lisinopril (PRINIVIL/ZESTRIL) 2.5 MG tablet Take 1 tablet (2.5 mg) by mouth At Bedtime 90 tablet 0     metoprolol succinate ER (TOPROL-XL) 25 MG 24 hr tablet Take 12.5 mg by mouth daily  45 tablet 3     multivitamin  with lutein (OCUVITE WITH LTEIN) CAPS Take 1 capsule by mouth daily       order for DME Equipment being ordered: 4-Wheel Walker with seat. He is 5' 7\" tall.  [facility]: Fax: [phone]  phone [phone] Device 0     torsemide (DEMADEX) 20 MG tablet Take 20 mg by mouth daily as needed For 2 lb weight gain in a day or legs are swollen. 60 tablet 0          ALLERGIES  Patient has no known allergies.        FAMILY HISTORY  Family History   Problem Relation Age of Onset     Other Cancer Mother 57        lung ca, with brain mets     Cancer Mother         Brain tumor, lung cancer     Cancer Father         Lung cancer     Cancer Brother           SOCIAL HISTORY  Social History     Socioeconomic History     Marital status: Single     Spouse name: Not on file     Number of children: Not on file     Years of education: Not on file     Highest education level: Not on file   Occupational History     Not on file   Social Needs     Financial resource strain: Not on file     Food insecurity:     Worry: Not on file     Inability: Not on file     Transportation needs:     Medical: Not on file     Non-medical: Not on file   Tobacco Use     Smoking status: Former Smoker     Packs/day: 0.00     Years: 48.00     Pack years: 0.00     Types: Cigarettes     Last attempt to quit: 2001     Years since quittin.5     Smokeless tobacco: Never Used     Tobacco comment: started at age 17   Substance and Sexual Activity     Alcohol use: No     Drug use: No     Sexual activity: Never     Partners: Female   Lifestyle     Physical activity:     Days per week: Not on file     Minutes per " "session: Not on file     Stress: Not on file   Relationships     Social connections:     Talks on phone: Not on file     Gets together: Not on file     Attends Ca[employer] service: Not on file     Active member of club or organization: Not on file     Attends meetings of clubs or organizations: Not on file     Relationship status: Not on file     Intimate partner violence:     Fear of current or ex partner: Not on file     Emotionally abused: Not on file     Physically abused: Not on file     Forced sexual activity: Not on file   Other Topics Concern     Parent/sibling w/ CABG, MI or angioplasty before 65F 55M? No   Social History Narrative     Not on file          PHYSICAL EXAM  Vitals:    06/01/19 1650 06/01/19 1700 06/01/19 1928 06/01/19 2233   BP: 92/56 91/50 90/52 95/55   BP Location:   Left arm Left arm   Pulse: 88 80 91 81   Resp: 20 18 18 18   Temp: 97.5  F (36.4  C) 98  F (36.7  C) 97.4  F (36.3  C)    TempSrc: Oral Oral Oral Oral   SpO2: 97% 97% 97% 97%   Weight:  75.9 kg (167 lb 5.3 oz)     Height:  1.702 m (5' 7\")       General - Awake and alert, not in any obvious distress. Afebrile, not pale, well hydrated.  Head - Normocephalic, atraumatic.  Eyes - Extraocular movements are intact bilaterally. Sclera and conjunctiva clear. Lids without lesions  Ears - External canals without lesion.  Lungs - Clear to auscultation bilaterally, no wheezes, rales or rhonchi.  CV - Regular rate and rhythm, no murmurs, rubs or gallops.  Abdomen - Non-tender, non-distended, positive bowel sounds, no masses, no hepatosplenomegaly. No rebound or guarding.   Upper Extremities - No edema or deformities. Radial pulses strong bilaterally.  Lower Extremities -bilateral pitting pedal edema with bronzing in the lower third of the legs.  There is superficial ulceration with associated to that we pain over the left shin  Skin - Skin exam is non focal but grossly warm, dry, intact except as indicated above. No rashes or " erythema.  Neurologic - Cranial nerves 2-12 intact. Muscle tone, bulk and strength within normal limits throughout.  Psych - Judgment and mental status are clear, patient has reasonable insight. Mood is stable.      LABORATORY  Results for orders placed or performed during the hospital encounter of 06/01/19   XR Chest 2 Views    Narrative    XR CHEST 2 VW 6/1/2019 1:25 PM    COMPARISON: 2/16/2019    HISTORY: Dyspnea, bilateral lower extremity edema.      Impression    IMPRESSION: Small to moderate bilateral pleural effusions with  associated atelectasis, not significantly changed since 2/16/2019. No  pneumothorax on either side. Implanted cardiac pacer/defibrillator  over the LEFT chest in unchanged position.    MARIELY BEST MD   CBC with platelets differential   Result Value Ref Range    WBC 6.9 4.0 - 11.0 10e9/L    RBC Count 2.92 (L) 4.4 - 5.9 10e12/L    Hemoglobin 9.8 (L) 13.3 - 17.7 g/dL    Hematocrit 30.9 (L) 40.0 - 53.0 %     (H) 78 - 100 fl    MCH 33.6 (H) 26.5 - 33.0 pg    MCHC 31.7 31.5 - 36.5 g/dL    RDW 14.1 10.0 - 15.0 %    Platelet Count 165 150 - 450 10e9/L    Diff Method Automated Method     % Neutrophils 84.4 %    % Lymphocytes 5.2 %    % Monocytes 7.9 %    % Eosinophils 1.5 %    % Basophils 0.6 %    % Immature Granulocytes 0.4 %    Nucleated RBCs 0 0 /100    Absolute Neutrophil 5.8 1.6 - 8.3 10e9/L    Absolute Lymphocytes 0.4 (L) 0.8 - 5.3 10e9/L    Absolute Monocytes 0.5 0.0 - 1.3 10e9/L    Absolute Eosinophils 0.1 0.0 - 0.7 10e9/L    Absolute Basophils 0.0 0.0 - 0.2 10e9/L    Abs Immature Granulocytes 0.0 0 - 0.4 10e9/L    Absolute Nucleated RBC 0.0    Comprehensive metabolic panel   Result Value Ref Range    Sodium 136 133 - 144 mmol/L    Potassium 5.0 3.4 - 5.3 mmol/L    Chloride 101 94 - 109 mmol/L    Carbon Dioxide 27 20 - 32 mmol/L    Anion Gap 8 3 - 14 mmol/L    Glucose 74 70 - 99 mg/dL    Urea Nitrogen 47 (H) 7 - 30 mg/dL    Creatinine 1.55 (H) 0.66 - 1.25 mg/dL    GFR Estimate 41  (L) >60 mL/min/[1.73_m2]    GFR Estimate If Black 48 (L) >60 mL/min/[1.73_m2]    Calcium 8.6 8.5 - 10.1 mg/dL    Bilirubin Total 0.5 0.2 - 1.3 mg/dL    Albumin 3.1 (L) 3.4 - 5.0 g/dL    Protein Total 6.6 (L) 6.8 - 8.8 g/dL    Alkaline Phosphatase 76 40 - 150 U/L    ALT 26 0 - 70 U/L    AST 18 0 - 45 U/L   UA reflex to Microscopic   Result Value Ref Range    Color Urine Yellow     Appearance Urine Slightly Cloudy     Glucose Urine Negative NEG^Negative mg/dL    Bilirubin Urine Negative NEG^Negative    Ketones Urine Negative NEG^Negative mg/dL    Specific Gravity Urine 1.009 1.003 - 1.035    Blood Urine Negative NEG^Negative    pH Urine 5.0 5.0 - 7.0 pH    Protein Albumin Urine Negative NEG^Negative mg/dL    Urobilinogen mg/dL 0.0 0.0 - 2.0 mg/dL    Nitrite Urine Negative NEG^Negative    Leukocyte Esterase Urine Negative NEG^Negative    Source Midstream Urine     RBC Urine <1 0 - 2 /HPF    WBC Urine <1 0 - 5 /HPF    Mucous Urine Present (A) NEG^Negative /LPF    Hyaline Casts 12 (H) 0 - 2 /LPF   Nt probnp inpatient   Result Value Ref Range    N-Terminal Pro BNP Inpatient 56,701 (H) 0 - 1,800 pg/mL       EKG: Sinus Rhythm.        ASSESSMENT/PLAN    Principal Problem:    Venous stasis dermatitis of both lower extremities: Patient with ischemic cardiomyopathy and right-sided heart failure.  Now presenting with worsening pedal edema he seems to have coincided with increased salt intake recently and recent decrease in torsemide.  -Admit to inpatient status.  -Await wound cultures before deciding on whether or not to use antibiotics  -Wound care nurse consulted.  In the meantime, keep elevated and covered.  -Compression stockings  -Continue lasix, received IV 40mg total in the ED. Ordered for 20mg tablets tomorrow morning. I/o monitoring  -Monitor kidney function.      Active Problems:    Hypertension goal BP (blood pressure) < 140/90/   Non-ischemic cardiomyopathy (H)/   Acute on chronic systolic heart failure (H): Blood  pressure low normal this admission.  N-terminal proBNP noted to be elevated.  -Continue PTA lisinopril, metoprolol and Plavix as able to tolerate.      CKD (chronic kidney disease) stage 3, GFR 30-59 ml/min (H):  -Monitor kidney function      Acquired hypothyroidism: Continue PTA Synthroid.      Anemia: Macrocytic. Will check B12 levels with am blood draw.      Tongue cancer: Sees oncology.    Prophylaxis: Lovenox as he has cancer and has not been moving much.  Disposition: Pending improvement in clinical condition.    Attestation:   I have reviewed today's vital signs, notes, medications, labs and imaging.   Amount of time spent on this H&P note: 45 minutes.   Autumn Camara MD

## 2019-06-02 PROBLEM — D53.9 MACROCYTIC ANEMIA: Status: ACTIVE | Noted: 2019-01-01

## 2019-06-02 NOTE — PROGRESS NOTES
Explained PLAN OF CARE with patient and patient in agreement  Wound culture/Gram Stain obtained from left anterior LE & sent to lab, LEFT LOWER EXTREMITY re-wrapped.

## 2019-06-02 NOTE — PLAN OF CARE
Voiding small amounts, urine measured.  Received lasix this afternoon when pressure was more stable to withstand the diuretic.  Up with stand by assist, lower extremity edema improved.  Alert and orientated, pleasant and cooperative.  Has buttocks wounds, barrier cream applied today.  Left leg dressing remains C/D/I.

## 2019-06-02 NOTE — CONSULTS
Care Transition Initial Assessment - RN      Met with: Patient.    DATA  Principal Problem:    Venous stasis dermatitis of both lower extremities  Active Problems:    Hypertension goal BP (blood pressure) < 140/90    CKD (chronic kidney disease) stage 3, GFR 30-59 ml/min (H)    Acquired hypothyroidism    Non-ischemic cardiomyopathy (H)    Acute on chronic systolic heart failure (H)    Macrocytic anemia       Cognitive Status: awake, alert and oriented.  [facility]: [facility]  Primary Care MD Name: Ministerio  Contact information and PCP information verified: Yes  Lives With: alone         Description of Support System: Supportive, Involved   Who is your support system?: Children   Support Assessment: Adequate family and caregiver support   Insurance concerns: No Insurance issues identified        This writer met with pt, I introduced self and role.  Patient currently lives at [facility]  ([phone]).  He receives help with med set-up and minimal assist for hygiene.  He is mostly independent with mobility, using his walker around the house.  Spoke with Angela, at [facility].  Patient has daughter that lives in [place] but is supportive.   Discussed discharge planning and Medicare guidelines in regards to home care.  Patient's goal is to return to [facility].  Discussed home care need for wound care.  Patient is agreeable to home care services as he has had them in the past.  Pt was provided with Medicare certified home care list. Pt chooses to use [facility] ([phone] Fax: [phone]) for wound care needs.  Referral/order placed.  Family to transport to [facility] upon discharge.    PLAN    [facility] w/ [facility]     Dalila Harmon RN  Inpatient Care Coordinator  [facility] [phone]  [facility] [phone]      HOME CARE HAND OFF  Patient Name: Patrick Diop    MRN: [id number]    : 1935    Patient Zip Code: [id number]    Admit Diagnosis: Acute on chronic systolic heart  failure (H) [I50.23]  Bilateral lower extremity edema [R60.0]      Services Pt Needs at Home: RN    Discharge Support: Independent-Alone    Living Arrangements: Assisted living     or Address Other Than Pt: No    Wound Care: Yes: TBD, wound care to see 6/3    Anticipate DC Date:6/3/19

## 2019-06-02 NOTE — PROGRESS NOTES
Noted BP 76/40 at 1600 on shift chart review.  Rechecked now, currently 88/46.  He is asymptomatic.  Will update on-call per order parameters.

## 2019-06-02 NOTE — PROGRESS NOTES
[facility]    Medicine Progress Note - Hospitalist Service       Date of Admission:  6/1/2019  Assessment & Plan        Principal Problem:    Venous stasis dermatitis of both lower extremities  edema  Patient with ischemic cardiomyopathy and right-sided heart failure.  Now presenting with worsening pedal edema he seems to have coincided with increased salt intake recently and recent decrease in torsemide.  -Admit to inpatient status.  -Await wound cultures before deciding on whether or not to use antibiotics  -Wound care nurse consulted.  In the meantime, keep elevated and covered.  -Compression stockings  -Continue lasix, received IV 40mg total in the ED. Ordered for 20mg tablets tomorrow morning. I/o monitoring  -Monitor kidney function.    6/2/2019 weight down 7 lb since admission. Edema slightly improved per patient  WOC to see tomorrow.   He's not on a diuretic as an outpatient and this will likely be necessary.   Monitor renal function and blood pressure   He is also hypoalbuminemic and has low protein which will contribute to the swelling.     Relative hypotension  Tends to run a low blood pressure at assisted living (systolics in the 80s are common). He reports feeling lightheaded in the am.   Spacing out his lasix and his metoprolol this am  Continue to monitor       Active Problems:    Hypertension goal BP (blood pressure) < 140/90/   Non-ischemic cardiomyopathy (H)/   Acute on chronic systolic heart failure (H): Blood pressure low normal this admission.  N-terminal proBNP noted to be elevated.  -Continue PTA lisinopril, metoprolol and Plavix as able to tolerate.       CKD (chronic kidney disease) stage 3, GFR 30-59 ml/min (H):  -Monitor kidney function  6/2/2019 slightly improved Cr this am, continue to monitor       Acquired hypothyroidism: Continue PTA Synthroid.       Anemia: Macrocytic. Will check B12 levels with am blood draw.       Tongue cancer: Sees  oncology.             Diet: Combination Diet Low Saturated Fat Na <2400mg Diet, No Caffeine Diet    DVT Prophylaxis: Enoxaparin (Lovenox) SQ  Zamora Catheter: not present  Code Status: Full Code      Disposition Plan   Expected discharge: Tomorrow, recommended to prior living arrangement once wound care can see, diuretic dose determined.  Entered: Kayla Alan MD 06/02/2019, 11:40 AM       The patient's care was discussed with the Patient.    Kayla Alan MD  Hospitalist Service  [facility]    ______________________________________________________________________    Interval History   Feeling okay this morning.  Denies feeling significantly lightheaded or dizzy today.  Blood pressure is near his baseline.   He reports having gained weight since moving into the assisted living last month, but says they are feeding him well.  Maybe more salt intake.    Denies feeling short of breath, no chest pain.     Data reviewed today: I reviewed all medications, new labs and imaging results over the last 24 hours. I personally reviewed no images or EKG's today.    Physical Exam   Vital Signs: Temp: 97.3  F (36.3  C) Temp src: Oral BP: 98/57 Pulse: 81   Resp: 18 SpO2: 95 % O2 Device: None (Room air)    Weight: 160 lbs 11.45 oz  Constitutional: awake, alert, cooperative, no apparent distress, and appears stated age  Respiratory: No increased work of breathing, good air exchange, clear to auscultation bilaterally, no crackles or wheezing  Cardiovascular: Normal apical impulse, regular rate and rhythm, normal S1 and S2, no S3 or S4, and no murmur noted  GI: soft/nt/nd  Skin: bandage to left lower extremity covering the weeping areas.  Right lower extremity with slight weeping, no significant erythema.    MS: edema is 2-3+ pitting bilaterally  Neurologic: Awake, alert, oriented to name, place and time.  Cranial nerves II-XII are grossly intact.  Motor is 5 out of 5 bilaterally.  Cerebellar finger to  nose, heel to shin intact.  Sensory is intact.  Babinski down going, Romberg negative, and gait is normal.  Neuropsychiatric: General: normal, calm and normal eye contact  Level of consciousness: alert / normal  Memory and insight: normal, memory for past and recent events intact and thought process normal    Data   Recent Labs   Lab 06/02/19  0509 06/01/19  1248   WBC  --  6.9   HGB  --  9.8*   MCV  --  106*   PLT  --  165    136   POTASSIUM 4.6 5.0   CHLORIDE 101 101   CO2 31 27   BUN 48* 47*   CR 1.44* 1.55*   ANIONGAP 6 8   AMY 8.7 8.6   GLC 86 74   ALBUMIN  --  3.1*   PROTTOTAL  --  6.6*   BILITOTAL  --  0.5   ALKPHOS  --  76   ALT  --  26   AST  --  18     Recent Results (from the past 24 hour(s))   XR Chest 2 Views    Narrative    XR CHEST 2 VW 6/1/2019 1:25 PM    COMPARISON: 2/16/2019    HISTORY: Dyspnea, bilateral lower extremity edema.      Impression    IMPRESSION: Small to moderate bilateral pleural effusions with  associated atelectasis, not significantly changed since 2/16/2019. No  pneumothorax on either side. Implanted cardiac pacer/defibrillator  over the LEFT chest in unchanged position.    MARIELY BEST MD

## 2019-06-02 NOTE — PROGRESS NOTES
BP 84/43, HR 85, which is consistent with previous readings.  Has scheduled metoprolol and lasix this morning.  Weight is down 6.6 pounds from admit.  Page sent to Dr. Alan at this time about plan for these morning medications.  Assisted living owner/nurse reported on admission that patient's baseline BP is low and paper records were provided and placed in his chart.

## 2019-06-03 PROBLEM — J96.01 ACUTE RESPIRATORY FAILURE WITH HYPOXIA (H): Status: RESOLVED | Noted: 2019-01-01 | Resolved: 2019-01-01

## 2019-06-03 PROBLEM — I50.23 ACUTE ON CHRONIC SYSTOLIC CONGESTIVE HEART FAILURE (H): Status: RESOLVED | Noted: 2019-01-01 | Resolved: 2019-01-01

## 2019-06-03 PROBLEM — I25.5 ISCHEMIC CARDIOMYOPATHY: Chronic | Status: ACTIVE | Noted: 2019-01-01

## 2019-06-03 PROBLEM — R55 SYNCOPE, UNSPECIFIED SYNCOPE TYPE: Status: RESOLVED | Noted: 2019-01-01 | Resolved: 2019-01-01

## 2019-06-03 PROBLEM — R79.89 ELEVATED TROPONIN: Status: RESOLVED | Noted: 2019-01-01 | Resolved: 2019-01-01

## 2019-06-03 NOTE — DISCHARGE SUMMARY
[facility]  Discharge Summary  Hospital Medicine       Date of Admission:  6/1/2019  Date of Discharge:  6/3/2019  Discharging Provider: Yogi Mcfarlane MD      Identification and Chief Compaint: Patrick Diop is a 83 year old male who presented on 6/1/2019 with complaint of left lower extremity swelling, redness and weeping fluid.    Discharge Diagnoses     Venous stasis dermatitis, left lower extremity with venous stasis ulcer    Acute on chronic systolic heart failure due to ischemic cardiomyopathy    Severe lower extremity edema due to heart failure    Chronic kidney disease     Ischemic cardiomyopathy, severe, end-stage    Coronary artery disease, severe, 3 vessel    Tongue cancer    Severe left lower extremity PAD, likely due to left common iliac artery stenosis and left external iliac artery occlusion    Moderate right lower extremity PAD     Follow-ups Needed After Discharge   Follow-up Appointments     Follow-up and recommended labs and tests       Follow up with primary care provider, Joe Mandel, within 7 days for   hospital follow- up.  The following labs/tests are recommended: BMP when   you see Dr. Mandel. Also follow up with cardiology on June 24 as scheduled.               Unresulted Labs Ordered in the Past 30 Days of this Admission     Date and Time Order Name Status Description    6/1/2019 8100 Wound Culture Aerobic Bacterial Preliminary       These results will be followed up by Dr. Mcfarlane - doubt infection, cultures are from superficial swab of wound so not treating    Hospital Course        Venous stasis dermatitis of left lower extremity with venous stasis ulceration    Acute on chronic heart failure due to ischemic cardiomyopathy    Severe lower extremity edema   Patient with ischemic cardiomyopathy and right-sided heart failure.  Now presenting with worsening pedal edema he seems to have coincided with increased salt intake recently. Patient takes  torsemide as needed and had been taking about a week once daily PTA.    Had initial diuresis with IV furosemide but not once changed to oral furosemide but patient was on torsemide at home so switching back to that on day of discharge. Needs ongoing diuresis as is up from weight joann of 141 lbs 3 months ago (patient feels that was too low) and was 152 lbs 2 months ago in cardiology clinic (though reported home scale still showed 141 lbs at that time). Currently 161 lbs and should target diuresis of at least another 10 lbs if not more, depending on how well it is tolerated. Goal weight at this time about 150 lbs. Home on increased torsemide to twice daily until weight about 152 and then daily. Follow up with PCP and cardiology this month.    Does have venous stasis dermatitis of left lower extremity with venous ulcers. Edema is extensive bilaterally but worse on left. This does not appear infected on my exam. Has not been treated with antibiotics this admission. A superficial swab was sent for culture and is growing non-lactose fermenting GNR but doubt infection so no treatment planned. Wound care to see before discharge and will follow with home care. Lymphedema to also see with home care.     ADDENDUM: Findings consistent with PAD    Peripheral artery disease    Severe PAD right lower extremity likely due to known left common iliac artery stenosis and left external artery occlusion    Moderate right lower extremity PAD with exercise   Patient seen by wound care RN who noted no DP pulse on US. Patient sent for bilateral lower extremity JUAN which showed resting JUAN on left consistent with severe PAD, and exercise JUAN on right consistent with moderate PAD. Patient previously seen to have bilateral common iliac artery stenosis and left external iliac artery occlusion on pelvic CT November 2018. Patient does not have symptoms of claudication, but he is not a candidate for compression. Fluid management with diuretics  and elevation when in bed as tolerated. Discussed with patient.        CKD (chronic kidney disease) stage 3, GFR 30-59 ml/min (H)   Admission creatinine 1.55 and BUN 47. This is up from 1.14 and 21, respectively, 2 months ago, and was 1.33 and 33 3 months ago when weight was lowest. With diuresis this admission, creatinine improved to 1.36 though BUN up to 67. Suspect some worsening due to volume overload and decreased cardiac output. Resuming torsemide and will need to follow renal function as outpatient though would favor optimizing diuresis over kidney function.        Relative hypotension    Acute on chronic systolic heart failure (H)   Tends to run a low blood pressure at assisted living (systolics in the 80s are common). He reports feeling lightheaded in the am. Usually takes his metoprolol XL at noon at home.    SBP largely 90's. Tolerating diuresis.    - continue lisinopril 2.5 mg daily and metoprolol XL 12.5 at noon at home       Coronary artery disease, 3 vessel    Ischemic cardiomyopathy, severe, LVEF 15%, end-stage    S/p ICD placement   Last angiogram in December 2018. Severe 3 vessel CAD not a CAB candidate, medical management. End-stage ischemic cardiomyopathy as a result. Follow by [facility] in [place].        Acquired hypothyroidism:   - Continue PTA Synthroid.       Anemia   Macrocytic. B12 level normal.        Tongue cancer   Diagnosed 2015. S/p chemoradiation therapy. Had been on immune therapy but this has been held since December due to his severe heart failure.     Consultations This Hospital Stay   WOUND OSTOMY CONTINENCE NURSE  IP CONSULT  CARE TRANSITION RN/SW IP CONSULT  LYMPHEDEMA THERAPY IP CONSULT    Code Status   Full Code    The discharge plan was discussed with the patient in detail including following weights and increasing torsemide, and he expressed understanding.     Time Spent on this Encounter   Total time on this discharge was > 60 minutes over two visits.       Yogi Kim  MD Den[facility]  [facility]  ______________________________________________________________________    Physical Exam   Vital Signs: Temp: 97.5  F (36.4  C) Temp src: Oral BP: 95/49 Pulse: 82   Resp: 18 SpO2: 98 % O2 Device: None (Room air)    Weight: 160 lbs 14.97 oz  Constitutional: alert and oriented, NAD, nontoxic   CV: Regular, 3-4+ bilateral lower extremity edema left > right   Respiratory: CTA bilaterally  GI: Soft, non-tender, active bowel sounds  Skin: left lower extremity with some mild erythema mid lower leg without tenderness except open area of superficial ulceration that is weeping serous fluid, does not appear cellulitic on my exam. Right lower extremity without erythema.        Primary Care Physician   Joe Mandel  [address] [place] [id number]     Discharge Disposition   Discharged to home with home care to follow  Condition at discharge: Stable    Significant Results and Procedures   Results for orders placed or performed during the hospital encounter of 06/01/19   XR Chest 2 Views    Narrative    XR CHEST 2 VW 6/1/2019 1:25 PM    COMPARISON: 2/16/2019    HISTORY: Dyspnea, bilateral lower extremity edema.      Impression    IMPRESSION: Small to moderate bilateral pleural effusions with  associated atelectasis, not significantly changed since 2/16/2019. No  pneumothorax on either side. Implanted cardiac pacer/defibrillator  over the LEFT chest in unchanged position.    MARIELY BEST MD     Procedures    None    Discharge Orders      Home Care Referral      Reason for your hospital stay    Increased edema due to heart failure with skin breakdown due to too much stretch on the skin from the edema/swelling.     Follow-up and recommended labs and tests     Follow up with primary care provider, Joe Mandel, within 7 days for hospital follow- up.  The following labs/tests are recommended: BMP when you see Dr. Mandel. Also follow up with cardiology on June 24 as  scheduled.     Activity    Your activity upon discharge: activity as tolerated     Monitor and record    weight every day. Target weight around 150 pounds.     Discharge Instructions    Take your torsemide twice daily until your weight is down to 152 pounds then take once daily. Hold the torsemide a day if you start having more dizziness.     Diet    Follow this diet upon discharge: Low salt diet     Discharge Medications   Current Discharge Medication List      CONTINUE these medications which have NOT CHANGED    Details   aspirin (ASA) 81 MG EC tablet Take 1 tablet (81 mg) by mouth daily  Qty: 90 tablet, Refills: 2    Associated Diagnoses: Coronary artery disease involving native coronary artery of native heart without angina pectoris      atorvastatin (LIPITOR) 40 MG tablet Take 1 tablet (40 mg) by mouth daily  Qty: 90 tablet, Refills: 2    Associated Diagnoses: Acute on chronic systolic congestive heart failure (H)      Cholecalciferol (VITAMIN D) 1000 UNITS capsule Take 1 capsule by mouth daily      clopidogrel (PLAVIX) 75 MG tablet Take 1 tablet (75 mg) by mouth daily  Qty: 90 tablet, Refills: 3    Associated Diagnoses: Coronary artery disease involving native coronary artery of native heart without angina pectoris      levothyroxine (SYNTHROID/LEVOTHROID) 100 MCG tablet Take 1 tablet (100 mcg) by mouth daily Recheck Lab test in 2 months.  Qty: 90 tablet, Refills: 1    Associated Diagnoses: Acquired hypothyroidism      lisinopril (PRINIVIL/ZESTRIL) 2.5 MG tablet Take 1 tablet (2.5 mg) by mouth At Bedtime  Qty: 90 tablet, Refills: 0    Associated Diagnoses: Acute on chronic systolic congestive heart failure (H)      metoprolol succinate ER (TOPROL-XL) 25 MG 24 hr tablet Take 12.5 mg by mouth daily   Qty: 45 tablet, Refills: 3    Associated Diagnoses: Secondary cardiomyopathy (H)      multivitamin  with lutein (OCUVITE WITH LTEIN) CAPS Take 1 capsule by mouth daily      order for DME Equipment being ordered:  "4-Wheel Walker with seat. He is 5' 7\" tall.  [facility]: Fax: [phone]  phone [phone]  Qty: 1 Device, Refills: 0    Associated Diagnoses: Chronic congestive heart failure, unspecified heart failure type (H); CKD (chronic kidney disease) stage 3, GFR 30-59 ml/min (H); Unsteady gait      torsemide (DEMADEX) 20 MG tablet Take 20 mg by mouth daily as needed For 2 lb weight gain in a day or legs are swollen.  Qty: 60 tablet, Refills: 0    Associated Diagnoses: Acute on chronic systolic congestive heart failure (H)           Allergies   No Known Allergies   "

## 2019-06-03 NOTE — PROGRESS NOTES
[facility] Medicine Progress Note  Date of Service: 06/03/2019    Assessment & Plan   Patrick Diop is a 83 year old male who presented on 6/1/2019 with left lower extremity weeping and edema.        Acute on chronic heart failure due to ischemic cardiomyopathy    Severe lower extremity edema    Venous stasis dermatitis of both lower extremities   Patient with ischemic cardiomyopathy and right-sided heart failure.  Now presenting with worsening pedal edema he seems to have coincided with increased salt intake recently. Patient takes torsemide as needed and had been taking about a week once daily PTA.    Had initial diuresis with IV furosemide but not once changed to oral. Needs ongoing diuresis as is up from weight joann of 141 lbs 3 months ago and was 152 lbs 2 months ago in cardiology clinic (though reported home scale still showed 141 lbs at that time). Currently 161 lbs and should target diuresis of at least another 10 lbs if not more, depending on how well it is tolerated.    - change oral diuretic back to home torsemide, may need BID or increased dose if not diuresing with this   - Wound care nurse to see   - lymphedema consult      CKD (chronic kidney disease) stage 3, GFR 30-59 ml/min (H)   Admission creatinine 1.55 and BUN 47. This is up from 1.14 and 21, respectively, 2 months ago, and was 1.33 and 33 3 months ago when weight was lowest. With diuresis this admission, creatinine improved to 1.36 though BUN up to 67. Suspect some worsening due to volume overload and decreased cardiac output.    - resuming torsemide and will need to follow renal function as outpatient      Relative hypotension    Acute on chronic systolic heart failure (H)   Tends to run a low blood pressure at assisted living (systolics in the 80s are common). He reports feeling lightheaded in the am. Usually takes his metoprolol XL at noon at home.    SBP largely 90's. Tolerating diuresis.    -  continue lisinopril 2.5 mg daily and metoprolol XL 12.5 at noon at home       Coronary artery disease, 3 vessel    Ischemic cardiomyopathy, severe, LVEF 15%, end-stage    S/p ICD placement   Last angiogram in December 2018. Severe 3 vessel CAD not a CAB candidate, medical management. End-stage ischemic cardiomyopathy as a result. Follow by [facility] in [place].       Acquired hypothyroidism:   - Continue PTA Synthroid.       Anemia   Macrocytic. B12 level normal.        Tongue cancer   Diagnosed 2015. S/p chemoradiation therapy. Had been on immune therapy but this has been held since December due to his severe heart failure.     Diet: Combination Diet Low Saturated Fat Na <2400mg Diet, No Caffeine Diet    DVT Prophylaxis: Enoxaparin (Lovenox) SQ  Zamora Catheter: not present  Code Status: Full Code      Discussion: Doing fairly well other than edema. Working on home care plan.     Disposition: Anticipate discharge today with home care to follow for wound care and lymphedema treatment.      Attestation:  Total time: 50 minutes this visit.    Yogi Mcfarlane MD  Hospital Medicine        Interval History   No shortness of breath, orthopnea, chest pain, abdominal pain. No leg pain. Feels fine.     States he was off diuretics until about a week before admission he restarted torsemide once a day.    Physical Exam   Temp:  [97.3  F (36.3  C)-98.7  F (37.1  C)] 97.5  F (36.4  C)  Pulse:  [78-90] 82  Resp:  [16-18] 18  BP: (76-98)/(40-57) 95/49  SpO2:  [93 %-98 %] 98 %    Weights:   Vitals:    06/01/19 1700 06/02/19 0500 06/03/19 0807   Weight: 75.9 kg (167 lb 5.3 oz) 72.9 kg (160 lb 11.5 oz) 73 kg (160 lb 15 oz)    Body mass index is 25.21 kg/m .    Constitutional: alert and oriented, NAD  CV: Regular, 3-4+ bilateral lower extremity edema left > right   Respiratory: CTA bilaterally  GI: Soft, non-tender, bowel sounds very active  Skin: Warm and dry, right lower extremity without erythema, left lower extremity wrapped with gauze  with an area of serous appearing fluid seeping through (will examine with wound care later today). Area is non-tender.     Data   Recent Labs   Lab 06/03/19  0522 06/02/19  1218 06/02/19  0509 06/01/19  1248   WBC 6.1  --   --  6.9   HGB 8.4*  --   --  9.8*   *  --   --  106*    176  --  165     --  138 136   POTASSIUM 4.8  --  4.6 5.0   CHLORIDE 103  --  101 101   CO2 29  --  31 27   BUN 67*  --  48* 47*   CR 1.36* 1.37* 1.44* 1.55*   ANIONGAP 6  --  6 8   AMY 8.5  --  8.7 8.6   GLC 80  --  86 74   ALBUMIN 2.7*  --   --  3.1*   PROTTOTAL 5.8*  --   --  6.6*   BILITOTAL 0.5  --   --  0.5   ALKPHOS 59  --   --  76   ALT 20  --   --  26   AST 14  --   --  18       Recent Labs   Lab 06/03/19  0522 06/02/19  0509 06/01/19  1248   GLC 80 86 74        Unresulted Labs Ordered in the Past 30 Days of this Admission     Date and Time Order Name Status Description    6/1/2019 2350 Wound Culture Aerobic Bacterial Preliminary            Imaging: No results found for this or any previous visit (from the past 24 hour(s)).     I reviewed all new labs and imaging results over the last 24 hours. I personally reviewed no images or EKG's today.    Medications       aspirin  81 mg Oral Daily     atorvastatin  40 mg Oral QPM     clopidogrel  75 mg Oral Daily     enoxaparin  40 mg Subcutaneous Q24H     levothyroxine  100 mcg Oral Daily     lisinopril  2.5 mg Oral At Bedtime     metoprolol succinate ER  12.5 mg Oral Daily     multivitamin  1 tablet Oral Daily     nystatin   Topical BID     senna-docusate  1 tablet Oral BID    Or     senna-docusate  2 tablet Oral BID     torsemide  20 mg Oral Daily     vitamin D3  1,000 Units Oral Daily   Reviewed kenyons    Yogi Mcfarlane MD  [facility]

## 2019-06-03 NOTE — PLAN OF CARE
[place] NS[facility] DISCHARGE NOT    Patient is alert and oriented. Up in chair most of the day with legs dependent. Educated on importance of elevating legs and shifting his weight frequently. Also instructed to lay on his sides in bed at night. Patient discharged to assisted living at 3:35 PM via wheel chair. Accompanied by daughter and staff. Discharge instructions reviewed with patient, daughter and caregiver, opportunity offered to ask questions. Prescriptions sent with patient to fill, RX for nystatin cream and miconazole powder faxed to Assisted living facility . All belongings sent with patient.  Report called to [facility] in SBAR format.   Pam Iverson

## 2019-06-03 NOTE — PLAN OF CARE
Lymphedema Therapy  Orders received and chart reviewed. Patient is under Observation Status so typically do not provide lymphedema treatment unless inpatient status.     Did meet with patient who refuses leg/edema cares during hospital stay anyway and just wants to go home with home care (WOC and lymphedema) which is already the plan in place.  Also did provide patient with OP lymphedema clinic handout/resource incase ongoing services are needed after home care which pt was thankful for.
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
## 2019-06-03 NOTE — DISCHARGE INSTRUCTIONS
"Left lower leg-   1.) Cleanse open areas with wound cleanser, dry  2.) Cover open areas with either Mepilex transfer OR oil emulsion/non-stick bandages (provided today)  3.) Cover with abd pads AND/OR may purchase infant diapers and cut oft the elastic and tabs then wrap around weeping areas, this will absorb drainage and prevent it from leaking though to your clothing.    4.) Secure with rolled gauze  Change the cover bandages, (abd pad/diaper and rolled gauze) as often as needed for drainage, change the Mepilex transfer or oil emulsion/non-stick bandages once daily.    Your leg will likely continue to weep fluid and have open areas until the swelling goes down.  Compression therapy, (for example support stockings, ACE wraps or multilayer compression wrap such as an unna boot,) can help with this however the ultrasound done in the hospital shows some concern that there is impaired blood flow coming into your leg such that it may be too risky to do this.  Please discuss this with your cardiologist and Dr. Mandel and together you may decide if you would like to see a vascular doctor who can look into this in more detail.  For now, elevate your leg above the level of your heart as much as you are able to and take \"water pill\" as prescribed.    Buttocks -  Continue to apply the cream (triad paste) to open areas at least twice daily to keep covered, (after washing and drying).  Use chair cushion to sit on.  Try to limit sitting to no more than an hour at a time before you get up and stand or move around a bit.  Try to lay propped slightly on your side at night (use a pillow tucked under one side of your butt).  If you elevate your head to sleep try to limit it as much as possible as the higher you raise your head the more pressure your butt gets.      "

## 2019-06-03 NOTE — PROGRESS NOTES
Name: Patrick Diop    MRN#: [id number]    Reason for Hospitalization: Acute on chronic systolic heart failure (H) [I50.23]  Bilateral lower extremity edema [R60.0]    Discharge Date: 6/3/2019    Patient / Family response to discharge plan: Patient and family in agreement to return to [facility] w/ added home care to assist with wound care needs.  Spoke with Angela at [facility].  Confirmed plans.  She requested some extra supplies be sent home to help with any wound care needed.  Daughter plans to transport when patient ready.    Other Providers (Care Coordinator, County Services, PCA services etc): No    CTS Hand Off Completed: Yes: completed    PAS #: NA    KATTY Score: none    Future Appointments:   Future Appointments   Date Time Provider Department Center   6/24/2019 12:00 PM [place] LAB [facility]   6/24/2019  1:00 PM Lionel Calderón MD [facility] CLIN   7/8/2019  9:45 AM [address] [place] [address]   7/10/2019 11:00 AM Lanny Neil MD [facility] [place]   7/22/2019 12:00 AM [facility]       Discharge Disposition: assisted living    Discharge Planner   Discharge Plans in progress: Completed: [facility] w/   Barriers to discharge plan: medical stability  Follow up plan: Clinic care coordination, Home care       Entered by: Dalila Harmon 06/03/2019 11:24 AM

## 2019-06-03 NOTE — PROGRESS NOTES
"Reason For Visit: Patrick Diop, 83 year old male, seen for a [facility] consultation to evaluate and treat buttock pressure injuries and left lower leg weeping. Patient was admitted on 6/1/19 for pedal edema with weeping, this is felt to be related to venous stasis and acute on chronic heart failure due to end stage ischemic cardiomyopathy, in addition pt also has chronic kidney disease.  Daughter is also present with patient in the room.  Patient is A&Ox4, pleasant upon interaction. He is being discharged to assisted living facility today with homecare order.    States he is aware that he has sores on his buttocks but \"they don't bother me\".  Does not think they are a problem and does not want [facility] nurse to check, \"the only thing I'm concerned about is my leg\".      History: See H&P.  Pt denies prior episodes of this with legs.  Pt has metastatic CA of tongue, not actively being treated due to cardiac issues - prior chemo and radiation, has been stable.        Personal/social history: to be discharged to assisted living setting    Objective:   Physical appearance: elderly  Mobility: ambulatory with wheeled walker, states he walks a lot  Current treatment plan: oil emulsion gauze and abd pads secured with rolled gauze  Last changed:   Drainage: heavy serous        Wound #1 left lower leg  Removal of dressing reveals weeping areas from mid lower leg to dorsal foot with a few clearly demarcated superficial ulcerations on anterior lower leg and several fluid vesicles that are <5mm in size.     Stage/tissue depth: partial thickness  Largest ulceration is located over anterior lower leg with in 13cm long band of weeping and vesicles and measures about 4 cm L x 4 cm W x 0 cm D, there are smaller ulcerations surrounding  Tunneling: no  Undermining: no  Wound bed type/amount: clean and pink; non- fluctuant  Wound Edges: attached  Periwound: weeping, vesicles  Odor: no  Pain: no  Toes noted to have interdigital maceration with " some denudement between 3rd and 4th toes.  Web spaces of toes are tight and moist due to edema and weeping.    Wound #2 Buttocks  Stage/tissue depth: partial thickness  Bilateral buttocks with open areas along cleft proximal to coccyx.    Left side - one clear ulceration that is about [id number] and pink based, right side with slightly raised irregular skin and scant superficial open areas suggesting chronic irritation, all areas are dry and periwound skin is intact with no signs of infection.  Pt has a raised soft rounded area over right IT, this has been present for some time and pt states oncologist is aware of it and advised him it was nothing to be concerned with      Dorsalis Pedal Pulse: palpable: no doppler: unable to locate with doppler   Posterior Tibial Pulse:  palpable: no doppler: monophasic with poor quality sound  Hair growth: scant upper portion of lower leg  Capillary Refill: <3 seconds  Feet/toes color: pink/pale  R Leg: Edema 2-3+.   L Leg: Edema 2-3+.    JUAN-   1.  Normal resting ABIs on the right that worsens with mild exercise  becoming compatible with moderate arterial insufficiency. Tibial  waveforms are dampened and monophasic.  2.  Resting JUAN on the left compatible with severe arterial  insufficiency.  3.  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  Pressure injury treatment, offloading and topical cares.     Education: as above      Assessment:  Left lower extremity edema with resultant blistering and weeping with superficial ulceration, in need of elevation and compression therapy though concern for causing harm with this due to possible PAD based on reduced pulses in feet and JUAN  "results.    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  This was discussed with pt, daughter and Dr. Landeros.  Plan is to try to control edema with oral diuretics and elevation.  Pt advised that further assessment and treatment of this may be complicated and he may discuss at upcoming visits with cardiology and primary care provided depending on status of leg and how aggressive he would like to be with cares.  He does relay at this time \"not very\" (aggressive).  Topically will cover open areas with adaptic or mepilex transfer, abd pads or diapers, rolled gauze.  Change contact layer daily, cover bandages as often as needed for leaking.      Will treat interdigital maceration and denudement with Miconazole powder.    Pt is sitting in chair cushion, staff has initiated Triad paste for buttocks- to continue with this at home in addition to turning in bed and frequent position changes.      Topical care to Left lower leg: Cleanse with Microklenz and gauze, pat dry.  Weeping areas and ulcerations covered with Mepilex Transfer followed by     Offloading: Pt to bring chair cushion home.  Discussed need to limit time intervals spent sitting trying to move around one hourly if able as well as lay down and elevate legs above heart several times daily.      Discussed plan of care with patient and daughter. Also spoke with Bridget, intake nurse from [facility].    Supplies:    WOC Return visit: N/A, pt to discharge home with homecare today    Nursing to notify Provider and WOC Nurse if wound deteriorates.    Verbal, written, & demonstrative education provided.  Face to face time (excluding procedure): approximately " 45 minutes.      Bharti Eduardo RN, CWOCN     [phone]

## 2019-06-03 NOTE — PROGRESS NOTES
Discharge orders written, WOC unable to see before 1 PM, daughter here to transport after WOC cares.

## 2019-06-03 NOTE — PLAN OF CARE
"Pt alert, oriented, SBA. Rested comfortably overnight. BLLE +2. Dressing to L leg is CDI. Held lisinopril at hs due to low b/p. Denies pain, nausea, SOB. Up to void. LS clear diminshed.  BP 98/56 (BP Location: Right arm)   Pulse 78   Temp 98.7  F (37.1  C) (Oral)   Resp 16   Ht 1.702 m (5' 7\")   Wt 72.9 kg (160 lb 11.5 oz)   SpO2 96%   BMI 25.17 kg/m      "

## 2019-06-03 NOTE — PROGRESS NOTES
Letter by Dalila Harmon RN on 6/3/2019     Transition Communication Hand-off for Care Transitions to Next Level of Care Provider     Name: Patrick Diop  : 1935  MRN #: [id number]  Primary Care Provider: Joe Mandel  Primary Care MD Name: Ministerio  Primary Clinic: [address] [address]  Primary Care Clinic Name: [facility]  Reason for Hospitalization:  Acute on chronic systolic heart failure (H) [I50.23]  Bilateral lower extremity edema [R60.0]  Admit Date/Time: 2019 12:09 PM  Discharge Date: 6/3/19  Payor Source: Payor: HU[employer]NA / Plan: HU[employer]NA MEDICARE ADVANTAGE / Product Type: Medicare /      Readmission Assessment Measure (KATTY) Risk Score/category: none                          Reason for Communication Hand-off Referral: Fragility     Discharge Plan:[facility] w/ fvl        Concern for non-adherence with plan of care:                 Y/N no  Discharge Needs Assessment:      Needs      Most Recent Value   Assisted Living  -- [[facility]]                Follow-up specialty is recommended: No    Follow-up plan:           Future Appointments   Date Time Provider Department Center   6/3/2019  2:00 PM WYUS3 W[facility]US [facility]   2019 10:40 AM Keyana Vegas APRN CNP [facility]   2019 12:00 PM [facility]   2019  1:00 [facility]   2019  9:45 AM WYCT1 W[facility]CT [facility]   7/10/2019 11:00 AM Lanny Neil MD [facility]   2019 12:00 AM [facility]         Any outstanding tests or procedures:            Referrals      Future Labs/Procedures     Home Care Referral      Comments:     Your provider has referred you to: FM[facility]: [facility] [place] [phone]   [email or web address]     Extended Emergency Contact Information  Primary Emergency Contact: FernandaMisti murdockhy   [facility]  Home Phone: [phone]  Work Phone: [phone]  Mobile Phone:  [phone]  Relation: Daughter     Patient Anticipated Discharge Date: 6/3/19   RN, PT, HHA to begin 24 - 48 hours after discharge.  PLEASE EVALUATE AND TREAT (Evaluation timeline is 24 - 48 hrs. Please call if there is need for a variance to this timeline).     REASON FOR REFERRAL: Wound Care - Specialty Treatment Orders: to be determined 6/3     ADDITIONAL SERVICES NEEDED: none     OTHER PERTINENT INFORMATION: Patient was last seen by provider on 6/2/19 for venous stasis dermatitis.     No current outpatient medications on file.     Patient Active Problem List:     Hyperlipidemia with target LDL less than 100     Hypertension goal BP (blood pressure) < 140/90     Colon cancer (H)     CKD (chronic kidney disease) stage 3, GFR 30-59 ml/min (H)     Recurrent tongue cancer (H)     Chemotherapy induced nausea and vomiting     Acquired hypothyroidism     Non-ischemic cardiomyopathy (H)     Acute on chronic systolic congestive heart failure (H)     Elevated troponin     Syncope, unspecified syncope type     Acute respiratory failure with hypoxia (H)     Acute on chronic systolic heart failure (H)     Venous stasis dermatitis of both lower extremities     Macrocytic anemia        Documentation of Face to Face and Certification for Home Health Services     I certify that patient, Patrick Diop is under my care and that I, or a Nurse Practitioner or Physician's Assistant working with me, had a face-to-face encounter that meets the physician face-to-face encounter requirements with this patient on: 6/2/2019.     This encounter with the patient was in whole, or in part, for the following medical condition, which is the primary reason for Home Health Care: venous statis dermitits.     I certify that, based on my findings, the following services are medically necessary Home Health Services: Nursing     My clinical findings support the need for the above services because: Nurse is needed: To provide assessment and oversight  required in the home to assure adherence to the medical plan due to: wound care..     Further, I certify that my clinical findings support that this patient is homebound (i.e. absences from home require considerable and taxing effort and are for medical reasons or [employer] services or infrequently or of short duration when for other reasons) because: Requires assistance of another person or specialized equipment to access medical services because patient: Requires supervision of another for safe transfer..     Based on the above findings, I certify that this patient is confined to the home and needs intermittent skilled nursing care, physical therapy and/or speech therapy.  The patient is under my care, and I have initiated the establishment of the plan of care.  This patient will be followed by a physician who will periodically review the plan of care.     Physician/Provider to provide follow up care: Joe Mandel certified Physician at time of discharge: Dr. Kayla tobar     Please be aware that coverage of these services is subject to the terms and limitations of your health insurance plan.  Call member services at your health plan with any benefit or coverage questions.              Key Recommendations:  Patient discharging home w/ new leg edema and wound to manage, added [facility] to assist [facility] with wound care needs.  Daughter and Angela at [facility] very helpful with discharge and needs.       Dalila Harmon     AVS/Discharge Summary is the source of truth; this is a helpful guide for improved communication of patient story

## 2019-06-04 NOTE — PROGRESS NOTES
"[facility] now requests orders and shares plan of care/discharge summaries for some patients through [facility].  Please REPLY TO THIS MESSAGE OR ROUTE BACK TO THE AUTHOR in order to give authorization for orders when needed.  This is considered a verbal order, you will still receive a faxed copy of orders for signature.  Thank you for your assistance in improving collaboration for our patients.    ORDER    1. Skilled Nursing Visits 2w1, 3w2, 2w2, 1w1, 3 prn  2. PT eval and treat  3. OT eval and treat  4. ST eval and treat  5. Wound care   Bilateral lower legs:   1.) Cleanse open areas with wound cleanser, dry  2.) Cover open areas with either Mepilex transfer OR oil emulsion/non-stick bandages (provided today)  3.) Cover with abd pads AND/OR may purchase infant diapers and cut oft the elastic and tabs then wrap around  weeping areas, this will absorb drainage and prevent it from leaking though to your clothing.  4.) Secure with rolled gauze  Change the cover bandages, (abd pad/diaper and rolled gauze) as often as needed for drainage, change the Mepilex  transfer or oil emulsion/non-stick bandages once daily.  Your leg will likely continue to weep fluid and have open areas until the swelling goes down. Compression therapy, (for  example support stockings, ACE wraps or multilayer compression wrap such as an unna boot,) can help with this  however the ultrasound done in the hospital shows some concern that there is impaired blood flow coming into your  leg such that it may be too risky to do this. Please discuss this with your cardiologist and Dr. Mandel and together you may  decide if you would like to see a vascular doctor who can look into this in more detail. For now, elevate your leg above  the level of your heart as much as you are able to and take \"water pill\" as prescribed.    Buttocks - stage 2 pressure ulcers to bilateral buttocks  Continue to apply the cream (triad paste) to open areas at least " twice daily to keep covered, (after washing and drying).  Use chair cushion to sit on. Try to limit sitting to no more than an hour at a time before you get up and stand or move  around a bit. Try to lay propped slightly on your side at night (use a pillow tucked under one side of your butt). If you  elevate your head to sleep try to limit it as much as possible as the higher you raise your head the more pressure your  butt gets.    VS: T 97.5, P 70, R 19 SOB with minimal to moderate exertion, BP 90/60, LS clear to LEFT and diminished to RIGHT field. Weight 164.2 lbs. Plus 3 pitting edema to BLE. Bilateral legs weeping clear drainage. Small blister to right shin, large open blisters to left shin/calf/pedal.    Thank You,  Danii Judge RN  [phone]

## 2019-06-04 NOTE — TELEPHONE ENCOUNTER
"Requested Prescriptions   Pending Prescriptions Disp Refills     levothyroxine (SYNTHROID/LEVOTHROID) 100 MCG tablet [Pharmacy Med Name: LEVOTHYROXINE 100MCG TAB] 73 tablet 0     Sig: TAKE ONE TABLET BY MOUTH ONCE DAILY       Thyroid Protocol Failed - 6/4/2019  7:56 AM        Failed - Normal TSH on file in past 12 months     Recent Labs   Lab Test 03/04/19  0907   TSH 5.54*              Passed - Patient is 12 years or older        Passed - Recent (12 mo) or future (30 days) visit within the authorizing provider's specialty     Patient had office visit in the last 12 months or has a visit in the next 30 days with authorizing provider or within the authorizing provider's specialty.  See \"Patient Info\" tab in inbasket, or \"Choose Columns\" in Meds & Orders section of the refill encounter.              Passed - Medication is active on med list        Last Written Prescription Date:  11/21/18  Last Fill Quantity: 90,  # refills: 1   Last office visit: 3/22/2019 with prescribing provider:     Future Office Visit:   Next 5 appointments (look out 90 days)    Jun 07, 2019 10:40 AM CDT  [facility]AL[facility] [address] [place] [address]  [phone]   Jun 24, 2019  1:00 PM CDT  Return Visit with Lionel Calderón MD  [facility] ([facility]) [address] [facility] [address]  [phone]   Jul 10, 2019 11:00 AM CDT  Return Visit with Lanny Neil MD  [facility] ([facility]) [facility]  [address] [facility] [address]  [facility] [address]  [phone]           "

## 2019-06-04 NOTE — PROGRESS NOTES
Clinic Care Coordination Contact  Care Coordination Communication    Referral Source: IP Handoff    Clinical Data: [place] [facility] [place]19 with diagnosis of venous stasis dermatitis of both lower extremities.     Home Care Contact:              Home Care Agency: [facility]              Contact name () and phone number: Debi Salas RN CM-ph [phone].              Care Coordination contacted home care: Yes              Anticipated start of care date: TBD    Patient Contact:               Introduced self and role of care coordination.               Discharge instructions were reviewed with patient/caregiver.               Do you have any questions about your medications? Patient denies having any concerns, [facility] manages his medications.              Follow up appointment is scheduled for Friday 6/7/19 with Keyana Vegas CNP.              Provided 24 Hour Nurse Line and/or 24 Hour Appointment Scheduling: Yes              Home care has contacted patient: Per notes, they left a message for patient to return call.              Patient questions/concerns: Patient denies having any questions or concerns.    Plan: RN/SW Care Coordinator will await notification from home care staff informing RN/SW Care Coordinator of patients discharge plans/needs. RN/SW Care Coordinator will review chart and outreach to home care every 4 weeks and as needed.      Jacquelyn RAMIREZ, RN, PHN, [facility]  Primary Care Clinic RN Care Coordinator  [facility]   [email or web address]  Office:  [phone]

## 2019-06-07 PROBLEM — K92.2 GI BLEED: Status: ACTIVE | Noted: 2019-01-01

## 2019-06-07 NOTE — PROGRESS NOTES
Subjective     Patrick Diop is a 83 year old male who presents to clinic today for the following health issues:    [facility]       Hospital Follow-up Visit:    Hospital/Nursing Home/IP Rehab Facility: [facility]  Date of Admission: 06/01/19  Date of Discharge: 06/03/19  Reason(s) for Admission: Venous stasis dermatitis, left lower extremity with venous stasis ulcer                Problems taking medications regularly:  None       Medication changes since discharge: Demadex 20 MG bid daily till 152lbs then once daily. Hold for one day with increased dizziness.       Problems adhering to non-medication therapy:  None    Summary of hospitalization:  [facility] discharge summary reviewed  Diagnostic Tests/Treatments reviewed.  Follow up needed: Patient's hemoglobin noted to be 6.92 g drop over the last 2 days patient sent to emergency room for further evaluation.  Patient is stable patient does have a  patient will be sent via car   other Healthcare Providers Involved in Patient s Care:         general surgery   Update since discharge: worsened.     Post Discharge Medication Reconciliation: discharge medications reconciled, continue medications without change.  Plan of care communicated with patient and caregiver     Coding guidelines for this visit:  Type of Medical   Decision Making Face-to-Face Visit       within 7 Days of discharge Face-to-Face Visit        within 14 days of discharge   Moderate Complexity [id number]   High Complexity [id number]              Patient Active Problem List   Diagnosis     Hyperlipidemia with target LDL less than 100     Hypertension goal BP (blood pressure) < 140/90     Colon cancer (H)     CKD (chronic kidney disease) stage 3, GFR 30-59 ml/min (H)     Recurrent tongue cancer (H)     Chemotherapy induced nausea and vomiting     Acquired hypothyroidism     Ischemic cardiomyopathy     Acute on chronic systolic heart failure (H)     Venous stasis dermatitis of  both lower extremities     Macrocytic anemia     Past Surgical History:   Procedure Laterality Date     APPENDECTOMY       COLECTOMY  2002    malignant polyp.  Colectomy and colostomy     COLONOSCOPY  2002    polyps     CV HEART CATHETERIZATION WITH POSSIBLE INTERVENTION N/A 2019    Procedure: Heart Catheterization with possible Intervention;  Surgeon: Julio Vera MD;  Location:  HEART CARDIAC CATH LAB     EP ICD N/A 2/15/2019    Procedure: EP ICD;  Surgeon: Cj Gutierrez MD;  Location:  HEART CARDIAC CATH LAB     EYE SURGERY      B cataract     HC [facility] ENDOSCOPY W PLACEMENT GASTROSTOMY TUBE PERCUT N/A 2015    Procedure: COMBINED ESOPHAGOSCOPY, GASTROSCOPY, DUODENOSCOPY (EGD), PLACE PERCUTANEOUS ENDOSCOPIC GASTROSTOMY TUBE;  Surgeon: Sergio Buenrostro MD;  Location: [facility]     LARYNGOSCOPY WITH BIOPSY(IES) N/A 4/3/2018    Procedure: LARYNGOSCOPY WITH BIOPSY(IES);  Direct Laryngoscopy, Biopsy Base Of Tongue;  Surgeon: Jj Hernandez MD;  Location: [facility]     TAKEDOWN COLOSTOMY         Social History     Tobacco Use     Smoking status: Former Smoker     Packs/day: 0.00     Years: 48.00     Pack years: 0.00     Types: Cigarettes     Last attempt to quit: 2001     Years since quittin.5     Smokeless tobacco: Never Used     Tobacco comment: started at age 17   Substance Use Topics     Alcohol use: No     Family History   Problem Relation Age of Onset     Other Cancer Mother 57        lung ca, with brain mets     Cancer Mother         Brain tumor, lung cancer     Cancer Father         Lung cancer     Cancer Brother          Current Outpatient Medications   Medication Sig Dispense Refill     aspirin (ASA) 81 MG EC tablet Take 1 tablet (81 mg) by mouth daily 90 tablet 2     atorvastatin (LIPITOR) 40 MG tablet Take 1 tablet (40 mg) by mouth daily 90 tablet 2     Cholecalciferol (VITAMIN D) 1000 UNITS capsule Take 1 capsule by mouth daily       clopidogrel (PLAVIX) 75 MG  "tablet Take 1 tablet (75 mg) by mouth daily 90 tablet 3     levothyroxine (SYNTHROID/LEVOTHROID) 100 MCG tablet Take 1 tablet (100 mcg) by mouth daily 90 tablet 0     lisinopril (PRINIVIL/ZESTRIL) 2.5 MG tablet Take 1 tablet (2.5 mg) by mouth At Bedtime 90 tablet 0     metoprolol succinate ER (TOPROL-XL) 25 MG 24 hr tablet Take 12.5 mg by mouth daily  45 tablet 3     miconazole (MICATIN) 2 % AERP powder Apply topically 2 times daily 130 g 0     multivitamin  with lutein (OCUVITE WITH LTEIN) CAPS Take 1 capsule by mouth daily       nystatin (MYCOSTATIN) 088468 UNIT/GM external cream Apply topically 2 times daily 85 g 1     order for DME Equipment being ordered: Hospital Bed from [place] [address] each 0     order for DME Equipment being ordered: 4-Wheel Walker with seat. He is 5' 7\" tall.  [facility]: Fax: [phone]  phone [phone] 1 Device 0     torsemide (DEMADEX) 20 MG tablet Take 1 tablet (20 mg) by mouth daily as needed (edema) For 2 lb weight gain in a day or legs are swollen. 60 tablet 0     No Known Allergies  Recent Labs   Lab Test 06/03/19  0522 06/02/19  1218 06/02/19  0509 06/01/19  1248  04/01/19  1319 03/04/19  0907  11/15/18  0909  09/19/18  0839  03/23/18  0950  11/29/17  0847  10/21/16  0821   A1C  --   --   --   --   --   --   --   --   --   --  5.6  --  5.2  --  5.4   < >  --    LDL  --   --   --   --   --  49  --   --   --   --   --   --  47  --   --   --  59   HDL  --   --   --   --   --  61  --   --   --   --   --   --  56  --   --   --  53   TRIG  --   --   --   --   --  82  --   --   --   --   --   --  212*  --   --   --  161*   ALT 20  --   --  26  --  17 58   < > 21   < >  --    < >  --   --   --    < > 18   CR 1.36* 1.37* 1.44* 1.55*   < > 1.14 1.33*   < > 1.42*   < >  --    < > 1.30*  --   --    < > 0.87   GFRESTIMATED 48* 48* 45* 41*   < > 60* 50*   < > 48*   < >  --    < > 53*   < >  --    < > 85   GFRESTBLACK 56* 55* 52* 48*   < > 69 58*   < > 58*   < >  --    < > 64   < >  --    " "< > >90   GFR Calc     POTASSIUM 4.8  --  4.6 5.0   < > 4.4 4.3   < > 3.8   < >  --    < > 4.9  --   --    < > 4.0   TSH  --   --   --   --   --   --  5.54*  --  2.48   < >  --    < >  --   --   --    < >  --     < > = values in this interval not displayed.      BP Readings from Last 3 Encounters:   06/07/19 (!) 80/60   06/03/19 (!) 81/41   06/01/19 (!) 80/46    Wt Readings from Last 3 Encounters:   06/07/19 72.1 kg (159 lb)   06/03/19 73 kg (160 lb 15 oz)   06/01/19 77.3 kg (170 lb 6.4 oz)            Reviewed and updated as needed this visit by Provider         Review of Systems   ROS COMP: Constitutional, HEENT, cardiovascular, pulmonary, gi and gu systems are negative, except as otherwise noted.      Objective    BP (!) 80/60 (BP Location: Left arm)   Pulse 75   Temp 97.5  F (36.4  C) (Tympanic)   Resp 18   Ht 1.702 m (5' 7\")   Wt 72.1 kg (159 lb)   SpO2 97%   BMI 24.90 kg/m    Body mass index is 24.9 kg/m .  Physical Exam   GENERAL: healthy, alert and no distress  EYES: Eyes grossly normal to inspection, PERRL and conjunctivae and sclerae normal  HENT: ear canals and TM's normal, nose and mouth without ulcers or lesions  NECK: no adenopathy, no asymmetry, masses, or scars and thyroid normal to palpation  RESP: lungs clear to auscultation - no rales, rhonchi or wheezes  CV: regular rate and rhythm, normal S1 S2, no S3 or S4, no murmur, click or rub, no peripheral edema and peripheral pulses strong  ABDOMEN: soft, nontender, no hepatosplenomegaly, no masses and bowel sounds normal  MS: no gross musculoskeletal defects noted, no edema  SKIN: no suspicious lesions or rashes  NEURO: Normal strength and tone, mentation intact and speech normal  PSYCH: mentation appears normal, affect normal/bright    Results for orders placed or performed in visit on 06/07/19   Basic metabolic panel   Result Value Ref Range    Sodium 131 (L) 133 - 144 mmol/L    Potassium 4.8 3.4 - 5.3 mmol/L    Chloride 98 94 - " 109 mmol/L    Carbon Dioxide 25 20 - 32 mmol/L    Anion Gap 8 3 - 14 mmol/L    Glucose 105 (H) 70 - 99 mg/dL    Urea Nitrogen 116 (H) 7 - 30 mg/dL    Creatinine 1.98 (H) 0.66 - 1.25 mg/dL    GFR Estimate 31 (L) >60 mL/min/[1.73_m2]    GFR Estimate If Black 36 (L) >60 mL/min/[1.73_m2]    Calcium 8.8 8.5 - 10.1 mg/dL   CBC with platelets differential   Result Value Ref Range    WBC 6.4 4.0 - 11.0 10e9/L    RBC Count 1.83 (L) 4.4 - 5.9 10e12/L    Hemoglobin 6.3 (LL) 13.3 - 17.7 g/dL    Hematocrit 19.6 (L) 40.0 - 53.0 %     (H) 78 - 100 fl    MCH 34.4 (H) 26.5 - 33.0 pg    MCHC 32.1 31.5 - 36.5 g/dL    RDW 14.6 10.0 - 15.0 %    Platelet Count 208 150 - 450 10e9/L    % Neutrophils 89.4 %    % Lymphocytes 3.4 %    % Monocytes 6.7 %    % Eosinophils 0.3 %    % Basophils 0.2 %    Absolute Neutrophil 5.8 1.6 - 8.3 10e9/L    Absolute Lymphocytes 0.2 (L) 0.8 - 5.3 10e9/L    Absolute Monocytes 0.4 0.0 - 1.3 10e9/L    Absolute Eosinophils 0.0 0.0 - 0.7 10e9/L    Absolute Basophils 0.0 0.0 - 0.2 10e9/L    Diff Method Automated Method        Need for Hospital Bed    Due to current condition patient is required to lay in bed with feet above heart due to current CHF and venous statious patient is unable to compile with treatment plan and requires a hospital bed had difficulty getting out of current bed is on a diuretic for long term therapy with current bed he is unable to compline with treatment recommendations         Assessment & Plan   (K92.2) Acute upper GI bleed  (primary encounter diagnosis)  Comment: Patient hemoglobin noted to be 6.9.  Last hemoglobin was 3 days ago at 8.4.  2 g drop in the last 72 hours.  Concerning for upper GI bleed.  Patient will be re-seen in the emergency room.  Patient is stable patient's blood pressure in the 80s this is baseline blood pressure.  Reviewed with patient taking an ambulance to the emergency room.  Patient's caregiver will drive the patient patient's stable and will go right to  "the emergency room  Plan: Further evaluation in the emergency room room    (I87.2) Venous stasis dermatitis of both lower extremities  Comment: Improving will reorder medications  Plan: Basic metabolic panel, CBC with platelets         differential, nystatin (MYCOSTATIN) [id number]         UNIT/GM external cream, miconazole (MICATIN) 2         % AERP powder, VASCULAR MEDICINE REFERRAL      (E03.9) Acquired hypothyroidism  Comment: Controlled renewed levothyroxine  Plan: levothyroxine (SYNTHROID/LEVOTHROID) 100 MCG         tablet          (I50.23) Acute on chronic systolic congestive heart failure (H)  Comment: Patient is diuresed for his congestive heart failure.  Baseline blood pressure is in the 90s to 80s.  Patient on low-dose lisinopril for kidney protection.  Did review with patient at length and caregiver at length monitoring blood pressure with diuresis and with the use of Lasix.  Plan: order for DME, lisinopril (PRINIVIL/ZESTRIL)         2.5 MG tablet, DISCONTINUED: torsemide         (DEMADEX) 20 MG tablet      (I73.9) PAD (peripheral artery disease) (H)  Comment: Patient noted to have peripheral vascular disease will have patient follow-up with vascular referral has been made  Plan: VASCULAR MEDICINE REFERRAL           BMI:   Estimated body mass index is 24.9 kg/m  as calculated from the following:    Height as of this encounter: 1.702 m (5' 7\").    Weight as of this encounter: 72.1 kg (159 lb).     Patient sent to the emergency room for further evaluation of his upper GI bleed.  Patient will be re seen after his hospitalization.      NATALY Castillo [facility]      "

## 2019-06-07 NOTE — LETTER
Transition Communication Hand-off for Care Transitions to Next Level of Care Provider  Name: Patrick Diop  : 1935  MRN #: [id number]  Primary Care Provider: Joe Mandel  Primary Clinic: [address] [place] [address]   Reason for Hospitalization:  Anemia, GI Bleed  Admit Date/Time: 2019  7:39 PM  Discharge Date: 6/10/19  Payor Source: Payor: [employer] / Plan: [employer] MEDICARE ADVANTAGE / Product Type: Medicare /   Readmission Assessment Measure (KATTY) Risk Score/category: 83% Risk, High KATTY  Reason for Communication Hand-off Referral: Fragility  Other Known patient  Discharge Plan: Return to [facility] with resumption of home care with [facility] (RN/OT/PT/SLP)  Concern for non-adherence with plan of care: No  Discharge Needs Assessment:  Needs      Most Recent Value   Assisted Living  -- [[facility]]   Home Care  [facility] [phone], Fax: [phone]      Follow-up specialty is recommended: Yes    Follow-up plan:    Future Appointments   Date Time Provider Department Center   2019 12:00 PM [facility]   [facility]00 [facility]   2019  9:45 AM [facility]   7/10/2019 11:00 AM Lanny Neil MD [facility]   2019 12:00 AM [facility]2 [facility]          Referrals     Future Labs/Procedures    Home care nursing referral     Comments:    Home Care:  Agency:  [facility]  Phone # [phone]  Fax # [phone]    To RESUME providing: Skilled nurse visits - frequency per home care evaluation or previous orders    RN to assess --- hydration, nutrition and bowel status, signs/symptoms of infection, neurologic status, skin integrity, respiratory and cardiac status, pain level and activity tolerance, vital signs and weight, lab draws if ordered, home safety.     RN to teach/reinforce teaching ---medication, disease, and symptom management    RN to assist with communication to --- Primary  Care Provider    ______________________________________________________      Your provider has ordered home care nursing services. If you have not been contacted within 2 days of your discharge please call the home care agency number listed above.    Home Care OT Referral for Hospital Discharge     Comments:    OT to [facility]  Agency:  [facility]  Phone # [phone]  Fax # [phone]  Your provider has ordered home care - occupational therapy. If you have not been contacted within 2 days of your discharge please call the department phone number listed above.    Home Care PT Referral for Hospital Discharge     Comments:    PT to [facility]  Agency:  [facility]  Phone # [phone]  Fax # [phone]  Your provider has ordered home care - physical therapy. If you have not been contacted within 2 days of your discharge please call the department phone number listed above.    Home Care SLP Referral for Hospital Discharge     Comments:    SLP to [facility]  Agency:  [facility]  Phone # [phone]  Fax # [phone]  Your provider has ordered home care - speech therapy. If you have not been contacted within 2 days of your discharge please call the department phone number listed above.          Key Recommendations:    Patrick Diop is an 83 year old male with past medical history significant for severe ischemic cardiomyopathy, syncope s/p ICD (2/2019), chronic hypotension, CKD stage III, CAD, NSTEMI, tongue cancer s/p chemoradiation and immune therapy, colon cancer s/p surgical resection, hypothyroidism, severe PAD, venous stasis dermatitis w/ chronic lower extremity edema, and HLD initially presented to [facility] ED on 6/7 with weakness, lightheadedness, and hypotension, melena, and anemia and transferred to [facility] on 6/7 for further management and concern for GI bleed.   EGD 6/8 with multiple non-bleeding duodenal ulcers each with a clean ulcer base (Rashawn Class III) not  requiring therapy, and one single red pigmented spot thought to possibly represent a superficial vessel managed by hemostatic clip.No further bleeding noted . Hgb stable  [facility] RN saw-Buttock wound due to Pressure Injury stage 2, present on admission  Status: initial assessment, chronic and stable. bilateral legs,   Also B LE wounds. Treated with adaptic, ABD pads, and kerlix. This follows the recommended treatment at previous admission to [facility]  Patients daughter had questions about transportation resources. He does not have ride benefits with his [employer] policy. Offered [employer] as a resource. He reports that he already uses. Discussed that clinic CC may be able to help if further resources are needed.       Pieter Morton RN CC

## 2019-06-07 NOTE — PATIENT INSTRUCTIONS
Patient Education     Understanding Chronic Venous Insufficiency     Elevate your legs to increase blood flow back to your heart.   Problems with the veins in the legs may lead to chronic venous insufficiency (CVI). CVI means that there is a long-term problem with the veins not being able to pump blood back to your heart. When this happens, blood stays in the legs and causes swelling and aching.   Two problems that may lead to chronic venous insufficiency are:    Damaged valves. Valves keep blood flowing from the legs through the blood vessels and back to the heart. When the valves are damaged, blood does not flow as well.     Deep vein thrombosis (DVT). Blood clots may form in the deep veins of the legs. This may cause pain, redness, and swelling in the legs. It may also block the flow of blood back to the heart. Seek medical care right away if you have these symptoms.    A blood clot in the leg can also break off and travel to the lungs. This is called pulmonary embolism (PE). In the lungs, the clot can cut off the flow of blood. This may cause chest pain, trouble breathing, sweating, a fast heartbeat, coughing (may cough up blood), and fainting. It is a medical emergency and may cause death. Call 911 if you have these symptoms.    Healthcare providers call the two conditions, DVT and PE, venous thromboembolism (VTE).  CVI can t be cured, but you can control leg swelling to reduce the likelihood of ulcers (sores).  Recognizing the symptoms  Be aware of the following:    If you stand or sit with your feet down for long periods, your legs may ache or feel heavy.    Swollen ankles are possibly the most common symptom of CVI.    As swelling increases, the skin over your ankles may show red spots or a brownish tinge. The skin may feel leathery or scaly, and may start to itch.    If swelling is not controlled, an ulcer (open wound) may form.  What you can do  Reduce your risk of developing ulcers by doing the  following:    Increase blood flow back to your heart by elevating your legs, exercising daily, and wearing elastic stockings.    Boost blood flow in your legs by losing excess weight.    If you must stand or sit in one place for a period of time, keep your blood moving by wiggling your toes, shifting your body position, and rising up on the balls of your feet.  Date Last Reviewed: 5/1/2016 2000-2018 [employer]. [address] [place] [address], [place] [address]. All rights reserved. This information is not intended as a substitute for professional medical care. Always follow your healthcare professional's instructions.           Patient Education   Peripheral Artery Disease (PAD) Rehabilitation  Supervised Exercise Therapy (SET)  PAD (peripheral artery disease) blocks the blood vessels in the leg. It may cause tiredness, cramps, pain or numbness in your leg muscles when you walk or exercise. These symptoms almost always go away after a few minutes of rest. This is known as claudication.   Why do I need exercise therapy?  PAD rehab, commonly known as supervised exercise therapy (SET), is known to improve claudication in most people. People who take part in SET:    are usually able to walk farther on a treadmill (by about 2 city blocks) after 3 months than people who exercise on their own    can walk farther without pain or needing to stop to rest (by about half a block)    often have better physical health and quality of life than people who use a stent (a wire mesh tube) to open blockages in the large arteries in the pelvis  What can I expect?  The PAD rehab program provides supervised exercise in a rehabilitation setting. We will:    Assess your needs: record your medical history, give you a six-minute walk test, and take your vital signs.    Show you how to begin your own exercise program on the treadmill.    Track your heart rate, blood pressure, onset of leg muscle pain and rating of pain in your legs.     Help you reach your goals for improving your symptoms.  As you use the treadmill, we will monitor the duration (length of time), speed and incline. We then adjust these factors based on the guidelines of the PAD program. This approach usually improves your ability to walk with less pain within 2 to 3 months.  We may refer you to physical therapy if you have concerns about balance or pain not related to PAD.  Insurance  This program is a covered benefit for Medicare patients. If you have secondary or primary insurance other than Medicare, please check your coverage and out of pocket costs for the program. Use code [id number] when talking to your insurer.  More information  You will need a referral from your doctor. For more information, call one of these locations:  [facility]   [address] [place], [place] [id number] [phone]  [facility]   [address] [place], [place] [address] [phone]  [facility]   [address] [address] [phone] or [phone]  [facility]   [address], [address], [place] [address] [phone]  [facility]  [address], [address], [place] [address] [phone]  [place] [address] [place], [place] [id number] [place] [address], [address]   [phone]  For informational purposes only. Not to replace the advice of your health care provider.   Copyright   2014 [employer]. All rights reserved. [employer] [id number]   REV 02/18.       Patient Education     Discharge Instructions for Heart Failure  The heart is a muscle that pumps oxygen-rich blood to all parts of the body. When you have heart failure, the heart is not able to pump as well as it should. Blood and fluid may back up into the lungs (congestive heart failure), and some parts of the body don t get  enough oxygen-rich blood to work normally. These problems lead to the symptoms of heart failure. Heart failure can occur due to an injury to the heart or from natural processes.  You can control symptoms of heart failure with some lifestyle changes and by following your doctor's advice.  Activity  Ask your healthcare provider about an exercise program. You can benefit from simple activities such as walking or gardening. Exercising most days of the week can make you feel better. Don't be discouraged if your progress is slow at first. Rest as needed. Stop activity if you develop symptoms such as chest pain, lightheadedness, or significant shortness of breath. Find activities that you enjoy, such as brisk walking, dancing, swimming, or gardening. These will help you stay active and strengthen your heart.  Diet  Follow a heart healthy diet. And make sure to limit the salt (sodium) in your diet. Salt causes your body to hold water. This makes your heart work harder as there is more fluid for the heart to pump. Limit your salt by doing the following:    Limit canned, dried, packaged, and fast foods.    Don't add salt to your food.    Season foods with herbs instead of salt.    Watch how much liquids you drink. Drinking too much can make heart failure worse. Talk with your health care provider about how much you should drink each day.    Limit the amount of alcohol you drink. It may harm your heart. Women should have no more than 1 drink a day and men should have no more than 2.    When you eat out, request that your meals have no added salt.  Tobacco  If you smoke, it's very important to quit. Smoking increases your chances of having a heart attack by harming the blood vessels that provide oxygen to your heart. This makes heart failure worse. Quitting smoking is the number one thing you can do to improve your health. Enroll in a stop-smoking program to improve your chances of success. Talk with your healthcare  provider about medicines or nicotine replacement therapy to help you quit smoking. Ask your healthcare provider about smoking cessation support groups.  Medicine  Take your medicines exactly as prescribed. Learn the names and purpose of each of your medicines. Keep an accurate medicine list and current dosages with you at all times. Don't skip doses. If you miss a dose of your medicine, take it as soon as you remember. If you miss a dose and it's almost time for your next dose, just wait and take your next dose at the normal time. Don't take a double dose. If you are unsure, call your doctor's office. Make sure not to mix up your medicines or forget what you've taken the same day.  Weight monitoring  Weigh yourself every day. A sudden weight gain can mean your heart failure is getting worse. Weigh yourself at the same time of day and in the same kind of clothes. Ideally, weigh yourself first thing in the morning after you empty your bladder, but before you eat breakfast. Your healthcare provider will show you how to track your weight. He or she will also discuss with you when you should call if you have a sudden, unexpected increase in your weight.  In general, your healthcare provider may ask you to report if your weight goes up by more than 2 pounds in 1 day,  5 pounds in 1 week, or whatever weight gain you were told by your doctor. This is a sign that you are retaining more fluid than you should be. Clues to weight gain include checking your ankles for swelling, or noticing you are short of breath when you lie down.  Follow-up care  Make a follow-up appointment as directed. Depending on the type and severity of heart failure you have, you may need follow-up as early as 7 days from hospital discharge. Keep appointments for checkups and lab tests that are needed to check your medicines and condition.  Recognize that your health and even survival depend on your following medical recommendations.  Symptoms  Heart  failure can cause a variety of symptoms, including:    Shortness of breath    Trouble breathing at night, especially when you lie down    Swelling in the legs and feet or in the belly (abdomen)    Becoming easily fatigued    Irregular or rapid heartbeat    Weakness or lightheadedness    Swelling of the neck veins  It is important to know what to do if symptoms get worse or if you develop signs of worsening heart failure.     When to call your healthcare provider  Call your healthcare provider right away if you have any of these signs of worsening heart failure:    Sudden weight gain (more than 2 pounds in 1 day or 5 pounds in 1 week, or whatever weight gain you were told to report by your doctor)    Trouble breathing not related to being active    New or increased swelling of your legs or ankles    Swelling or pain in your abdomen    Breathing trouble at night (waking up short of breath, needing more pillows to breathe)    Frequent coughing that doesn't go away    Feeling much more tired than usual  Call 911  Call 911 right away if you have:    Severe shortness of breath, such that you can't catch your breath even while resting    Severe chest pain that does not resolve with rest or nitroglycerin    Pink, foamy mucus with cough and shortness of breath    A continuous rapid or irregular heartbeat    Passing out or fainting    Stroke symptoms such as sudden numbness or weakness on one side of your face, arm, or leg or sudden confusion, trouble speaking or vision changes   Date Last Reviewed: 3/21/2016    0416-6500 [employer]. [address] [place] [address], [place] [address]. All rights reserved. This information is not intended as a substitute for professional medical care. Always follow your healthcare professional's instructions.

## 2019-06-07 NOTE — ED PROVIDER NOTES
History     Chief Complaint   Patient presents with     Anemia     Coming to ED for eval from [facility] for concern of ansemia, weakness and dizziness, 2GM hgb from in 3 days. 6.9hgb with unk cause, weak and dizzy, no hx of GI bleed, taking plavix     HPI   Hx per patient,one of his home RNs and review of EMR.  Patrick Diop is a 83 year old male with history of tongue cancer and colon cancer who presents from clinic where he was seen in routine follow-up this am after recent hospitalization and was found to be hypotensive and had labs showing hemoglobin 6.3.  He had recently been hospitalized 6/1- 6/3/19 with left lower extremity venous stasis ulcer and venous stasis dermatitis. Clinic notes report he has generalized weakness and lightheadedness today, but he states that he feels fine. He denies syncope, chest pain, palpitations, or shortness of breath.. No abdominal pain, nausea, vomiting, gross blood in the stools or black tarry stools. Hemoglobin 6.3 in clinic today, 8.4 when last checked 4 days ago on day of hospital discharge 6/3/2019. Hemoglobin had been 9.8 two days prior and 10.1 two months prior, and appears to have been falling since December 2018 when Hgb was normal at 13.4. He is on Plavix and a baby aspirin daily, has a history of chronic ischemic cardiomyopathy with EF 15% and chronic hypotension.  I reviewed a full printed page of blood pressures from his home RN that show baseline blood pressure 68/48 (on 6/4/19) to 120/68. Often has hypotension at baseline with frequent hypotensive SBP. He states his SBP is normally in the 70s when he wakes in the mornings and gradually increases during the day and states that his Cardiologist has told him that a systolic blood pressure of 90 is acceptable and would be 'good for him'.      Hemoglobin   Date Value Ref Range Status   06/07/2019 6.9 (LL) 13.3 - 17.7 g/dL Final     Comment:     This result has been called to MADELEINE FERRARO RN ED by Araceli Ragland on 06  07 2019   at 1305, and has been read back.      06/07/2019 6.3 (LL) 13.3 - 17.7 g/dL Final     Comment:     Results confirmed by repeat test  Critical Value called to and read back by  COLBY CHANEL, CNP AT 1143 ON [id number], MJD     06/03/2019 8.4 (L) 13.3 - 17.7 g/dL Final   06/01/2019 9.8 (L) 13.3 - 17.7 g/dL Final   04/01/2019 10.1 (L) 13.3 - 17.7 g/dL Final   03/04/2019 10.3 (L) 13.3 - 17.7 g/dL Final   02/14/2019 11.4 (L) 13.3 - 17.7 g/dL Final   02/12/2019 12.0 (L) 13.3 - 17.7 g/dL Final   02/11/2019 11.9 (L) 13.3 - 17.7 g/dL Final   02/10/2019 12.8 (L) 13.3 - 17.7 g/dL Final   12/17/2018 13.4 13.3 - 17.7 g/dL Final   04/25/2017 14.7 13.3 - 17.7 g/dL Final     BP Readings from Last 6 Encounters:   06/07/19 (!) 84/63   06/07/19 (!) 80/60   06/03/19 (!) 81/41   06/01/19 (!) 80/46   04/22/19 101/68   04/05/19 98/54       Allergies:  No Known Allergies    Problem List:    Patient Active Problem List    Diagnosis Date Noted     Macrocytic anemia 06/02/2019     Priority: Medium     Acute on chronic systolic heart failure (H) 06/01/2019     Priority: Medium     Venous stasis dermatitis of both lower extremities 06/01/2019     Priority: Medium     Left> Right.       Ischemic cardiomyopathy 02/10/2019     Priority: Medium     End-stage, EF 15% by MRI in December 2018. Severe 3 vessel CAD, not a CAB candidate due to cancer and debility, medical management. Followed by [facility].        Acquired hypothyroidism 08/26/2018     Priority: Medium     Chemotherapy induced nausea and vomiting 05/31/2018     Priority: Medium     Recurrent tongue cancer (H) 10/28/2015     Priority: Medium     Neck mass found on 9/28/2015.  He had cancer at the base of his tongue.  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 He initially presented with a right-sided neck mass; CT showed a 3.6cm mass in the right base f tongue as well as bilateral cervical lymphadenopathy.  Treated with radiation  for 7 weeks and three chemo treatments.   He had concomitant chemotherapy to a dose of 7,000 cGy in 35 fractions, which he received along with high-dose cisplatin as detailed above.  He tolerated therapy quite well. His toxicities included grade 1 radiation dermatitis, grade 3 dysphagia requiring G-tube feedings, grade 2 dry mouth, grade 1 esophagitis, moderate-severe changes in taste, and grade 1 odynophagia.  ENT notes 4/4/2016:now three months out from completion of chemoradiation therapy for a T3, N2c right-sided tongue base tumor.  He had a PET scan done over the weekend, and it was essentially negative so he has had a complete response.    5/27/2016:Specialists treating his cancer; Dr. Hernandez, ENT.    Dr. Neil, Hematology/Oncology.   Dr. Paul, Oncology radiation.   He had lymphedema in neck as complication.  4/3/2018:NAME OF PROCEDURE:1. Direct laryngoscopy.  2. Biopsy of right tongue base.  Pathology returned with recurrent cancer.   April, 2018:Started on Kaytruda for his recurrent tongue cancer.   11/18/2018:Chemo:Treated initially with Cisplatin and later Kaytruda.        CKD (chronic kidney disease) stage 3, GFR 30-59 ml/min (H) 09/05/2014     Priority: Medium     9/5/2014:MAC positive twice.   11/18/2018:GFR <60 and has decreased in the past year.        Colon cancer (H) 03/14/2014     Priority: Medium     9/5/2014:2001 HAD SURGERY AND CURE-treated in [place].   Transverse colectomy.  He had chemo for 6 months.  Cancer treated in 2001.  Last colonoscopy 3 years ago.  He was told to recheck in 4 years-2015.         Hyperlipidemia with target LDL less than 100 11/06/2013     Priority: Medium     Hypertension goal BP (blood pressure) < 140/90 11/06/2013     Priority: Medium        Past Medical History:    Past Medical History:   Diagnosis Date     Acute on chronic systolic congestive heart failure (H) 2/10/2019     Acute on chronic systolic heart failure (H) 2/10/2019     Adult hypothyroidism      Colon  cancer (H)      Hearing problem      Hyperlipidemia with target LDL less than 100 2013     Hypertension goal BP (blood pressure) < 140/90 2013     Ischemic cardiomyopathy 02/10/2019     Non-ischemic cardiomyopathy (H) 2/10/2019     Syncope      Tongue cancer (H)        Past Surgical History:    Past Surgical History:   Procedure Laterality Date     APPENDECTOMY       COLECTOMY  2002    malignant polyp.  Colectomy and colostomy     COLONOSCOPY  2002    polyps     CV HEART CATHETERIZATION WITH POSSIBLE INTERVENTION N/A 2019    Procedure: Heart Catheterization with possible Intervention;  Surgeon: Julio Vera MD;  Location:  HEART CARDIAC CATH LAB     EP ICD N/A 2/15/2019    Procedure: EP ICD;  Surgeon: Cj Gutierrez MD;  Location:  HEART CARDIAC CATH LAB     EYE SURGERY      B cataract     HC UGI ENDOSCOPY W PLACEMENT GASTROSTOMY TUBE PERCUT N/A 2015    Procedure: COMBINED ESOPHAGOSCOPY, GASTROSCOPY, DUODENOSCOPY (EGD), PLACE PERCUTANEOUS ENDOSCOPIC GASTROSTOMY TUBE;  Surgeon: Sergio Buenrostro MD;  Location: [facility]     LARYNGOSCOPY WITH BIOPSY(IES) N/A 4/3/2018    Procedure: LARYNGOSCOPY WITH BIOPSY(IES);  Direct Laryngoscopy, Biopsy Base Of Tongue;  Surgeon: Jj Hernandez MD;  Location: [facility]     TAKEDOWN COLOSTOMY         Family History:    Family History   Problem Relation Age of Onset     Other Cancer Mother 57        lung ca, with brain mets     Cancer Mother         Brain tumor, lung cancer     Cancer Father         Lung cancer     Cancer Brother        Social History:  Marital Status:  Single [1]  Social History     Tobacco Use     Smoking status: Former Smoker     Packs/day: 0.00     Years: 48.00     Pack years: 0.00     Types: Cigarettes     Last attempt to quit: 2001     Years since quittin.5     Smokeless tobacco: Never Used     Tobacco comment: started at age 17   Substance Use Topics     Alcohol use: No     Drug use: No        Medications:       aspirin (ASA) 81 MG EC tablet   atorvastatin (LIPITOR) 40 MG tablet   Cholecalciferol (VITAMIN D) 1000 UNITS capsule   clopidogrel (PLAVIX) 75 MG tablet   levothyroxine (SYNTHROID/LEVOTHROID) 100 MCG tablet   lisinopril (PRINIVIL/ZESTRIL) 2.5 MG tablet   metoprolol succinate ER (TOPROL-XL) 25 MG 24 hr tablet   miconazole (MICATIN) 2 % AERP powder   multivitamin  with lutein (OCUVITE WITH LTEIN) CAPS   torsemide (DEMADEX) 20 MG tablet   UNABLE TO FIND   nystatin (MYCOSTATIN) 064315 UNIT/GM external cream   order for DME   order for DME       Review of Systems  As mentioned above in the history present illness.  All other systems were reviewed and are negative for any acute problems or concerns.    Physical Exam   BP: (!) 67/37  Pulse: 76  Heart Rate: 79  Temp: 97.7  F (36.5  C)  Resp: 16  SpO2: 96 % on RA      Physical Exam   Constitutional: He is oriented to person, place, and time. He appears well-developed and well-nourished. No distress.   HENT:   Head: Normocephalic and atraumatic.   Mouth/Throat: Oropharynx is clear and moist.   Eyes: Conjunctivae and EOM are normal. No scleral icterus.   Neck: Normal range of motion. Neck supple. No tracheal deviation present.   Cardiovascular: Normal rate, regular rhythm and normal heart sounds. Exam reveals no gallop and no friction rub.   No murmur heard.  Pulmonary/Chest: Effort normal. No respiratory distress. He has no wheezes. He has rales.   Abdominal: Soft. He exhibits no distension. There is no tenderness.   Genitourinary:   Genitourinary Comments: Rectal examination: Soft and formed black stool, strongly Hemoccult positive.  No gross blood.   Musculoskeletal: Normal range of motion. He exhibits edema. He exhibits no tenderness.   Neurological: He is alert and oriented to person, place, and time. Coordination normal.   Skin: Skin is warm and dry. No rash noted. He is not diaphoretic. No erythema. No pallor.   Psychiatric: He has a normal mood and affect. His  behavior is normal.   Nursing note and vitals reviewed.      ED Course        Procedures               EKG Interpretation:      Interpreted by Sergey Okeefe MD  Time reviewed: upon completion  Symptoms at time of EKG: None, asymptomatic hypotension  Rhythm: sinus rhythm with first degree AV block  Rate: Normal  Axis: Normal  Ectopy: None  Conduction: first degree AV block, otherwise normal  ST Segments/ T Waves: Non-specific ST-T wave changes, prob LVH with strain pattern  Q Waves: old anterior infarct  Comparison to prior: Unchanged from 6/1/19  Clinical Impression: no acute changes         Results for orders placed or performed during the hospital encounter of 06/07/19 (from the past 24 hour(s))   Occult blood stool POCT   Result Value Ref Range    Occult Blood positive neg    Internal QC OK Yes     Test Card Lot Number [id number]    ABO/Rh type and screen   Result Value Ref Range    Units Ordered 1     ABO A     RH(D) Pos     Antibody Screen Neg     Test Valid Only At [facility]        Specimen Expires 06/10/2019     Crossmatch Red Blood Cells    INR   Result Value Ref Range    INR 1.32 (H) 0.86 - 1.14   CBC with platelets, differential   Result Value Ref Range    WBC 7.2 4.0 - 11.0 10e9/L    RBC Count 2.01 (L) 4.4 - 5.9 10e12/L    Hemoglobin 6.9 (LL) 13.3 - 17.7 g/dL    Hematocrit 21.7 (L) 40.0 - 53.0 %     (H) 78 - 100 fl    MCH 34.3 (H) 26.5 - 33.0 pg    MCHC 31.8 31.5 - 36.5 g/dL    RDW 14.8 10.0 - 15.0 %    Platelet Count 206 150 - 450 10e9/L    Diff Method Automated Method     % Neutrophils 86.6 %    % Lymphocytes 6.0 %    % Monocytes 6.6 %    % Eosinophils 0.3 %    % Basophils 0.1 %    % Immature Granulocytes 0.4 %    Nucleated RBCs 0 0 /100    Absolute Neutrophil 6.2 1.6 - 8.3 10e9/L    Absolute Lymphocytes 0.4 (L) 0.8 - 5.3 10e9/L    Absolute Monocytes 0.5 0.0 - 1.3 10e9/L    Absolute Eosinophils 0.0 0.0 - 0.7 10e9/L    Absolute Basophils 0.0 0.0 - 0.2 10e9/L    Abs Immature  Granulocytes 0.0 0 - 0.4 10e9/L    Absolute Nucleated RBC 0.0    Comprehensive metabolic panel   Result Value Ref Range    Sodium 136 133 - 144 mmol/L    Potassium 5.1 3.4 - 5.3 mmol/L    Chloride 99 94 - 109 mmol/L    Carbon Dioxide 25 20 - 32 mmol/L    Anion Gap 12 3 - 14 mmol/L    Glucose 93 70 - 99 mg/dL    Urea Nitrogen 114 (H) 7 - 30 mg/dL    Creatinine 1.88 (H) 0.66 - 1.25 mg/dL    GFR Estimate 33 (L) >60 mL/min/[1.73_m2]    GFR Estimate If Black 38 (L) >60 mL/min/[1.73_m2]    Calcium 8.8 8.5 - 10.1 mg/dL    Bilirubin Total 0.3 0.2 - 1.3 mg/dL    Albumin 3.1 (L) 3.4 - 5.0 g/dL    Protein Total 6.5 (L) 6.8 - 8.8 g/dL    Alkaline Phosphatase 64 40 - 150 U/L    ALT 72 (H) 0 - 70 U/L    AST 30 0 - 45 U/L   Partial thromboplastin time   Result Value Ref Range    PTT 31 22 - 37 sec   Blood component   Result Value Ref Range    Unit Number [id number]     Blood Component Type Red Blood Cells Leukocyte Reduced     Division Number 00     Status of Unit Released to care unit 06/07/2019 1354     Blood Product Code [id number]     Unit Status ISS    Blood component   Result Value Ref Range    Unit Number [id number]     Blood Component Type Red Blood Cells Leukocyte Reduced     Division Number 00     Status of Unit Released to care unit 06/07/2019 1717     Blood Product Code [id number]     Unit Status ISS        Medications   sodium chloride 0.9% infusion (1,000 mLs Intravenous New Bag 6/7/19 1323)   pantoprazole (PROTONIX) 40 mg IV push injection (40 mg Intravenous Given 6/7/19 1313)   0.9% sodium chloride BOLUS (0 mLs Intravenous Stopped 6/7/19 1625)     Reviewed hypotension with the patient and his nurse who reports that he is most frequently hypotensive with systolic BP in the 70s in the morning, sometimes in the 60s.  Patient reports his Cardiologist has told him that the systolic blood pressure of around 90 would be good for him.  BP 68/48 while asymptomatic 6/4/2019.  The printed report that they have with them  shows BP ranges from 68/48 - 120/68.    1:25 PM - Reviewed case with hospitalist on-call, Dr. Penn.  Hospitalist does not feel the patient should be admitted to our facility due to his hypotension. Patient states BPs are at baseline and is asymptomatic (states he feels fine). Awaiting consultation with the surgeon on-call. No change in BP with initial normal saline fluid bolus of 500 mL. Will repeat fluid bolus. He has no acute complaints or concerns. Patient is reluctant to be admitted to the hospital as he was just discharged 4 days ago.    1:29 PM - Reviewed case with Dr. Hollis of the hospitalist service who requests patient be transferred due to hypotension.    1:33 PM - Reviewed case with Dr. Alberto, Surgeon on-call. He reports he could perform an emergent upper endoscopy this afternoon if the patient were to be admitted here.      2:00 PM - Systolic BP increased to the 80s after 1 L normal saline and the patient reports he is asymptomatic lying at rest in bed.  Blood has arrived for transfusion. Plan to initiate transfusion, continue to monitor BP and transfer to [facility].      4:16 PM - SBP remains in the 80s, he requested transfer to [facility] (instead of [facility]) after consultation with his daughter who used to work there ([facility] [employer] and who lives in the [facility]. Reviewed case with the Hospitalist on call at [facility], Dr. Dong. He requested a central line be placed, but pressors not be initiated.    Patient does not want central line placed and shortly after this conversation the Medicine service attending returned our call and responded to our initial page to [facility]. Patient does not want to be transferred to another facility.  I reviewed the case and consulted with the Hospitalist on-call for the Medicine service, Dr. Allison at [facility]. She accepted care of the patient in transfer there.  We discussed diuresis between transfusion of 2 units PRBC and elected to defer  this given his hypotension and no overt evidence of acute CHF exacerbation at the present time.    Systolic BP 93/57, reports he remains asymptomatic and will initiate transfusion of second unit PRBC and transfer to [facility] as he appears medically stable for transfer at the present time.      Critical Care time was 65 minutes for this patient excluding procedures.    Assessments & Plan (with Medical Decision Making)   83-year-old male on Plavix and aspirin with a history of ischemic cardiomyopathy and EF 15% with chronic hypotension who was found to have acutely decreased hemoglobin of 6.3 in posthospitalization clinic follow-up today, after recent hospitalization 6/1-6/3/2019 for venous stasis ulcer and venous stasis dermatitis. Repeat hemoglobin 6.9. Initial blood pressure 67/37, not tachycardic.  He is on Metoprolol.  He reports that he feels fine, asymptomatic.  History of low BPs and had a blood pressure of 68/48 while asymptomatic several days ago on 6/4/2019. Review of records provided by his home health nurse and EMR show chronic intermittent hypotension, asymptomatic, with systolic blood pressure in the 60s, 70s, 80s, 90s.  Review of previous records show gradually decreasing hemoglobin since February of this year, and prior to this a normal hemoglobin in December 2018.  Rectal exam showed soft formed dark brown-black stool which was strongly Hemoccult positive.  He denies gross evidence of blood in the stools, has no abdominal pain and has no vomiting or hematemesis.  No BP response to initial fluid bolus of 500 mL, but increase in SBP to the 80s after second fluid bolus of 500 mL, which he states is within normal for him.  He was given IV Protonix for presumed upper GI bleeding. Transfusion of PRBC was initiated and he remained with systolic blood pressures in the 80s to low 90s, while asymptomatic in the ED. He has 2 large bore  IVs and declined central line placement.  He will be transferred to  [facility] where GI services are available. See details above for further details of his ED care today.    I have reviewed the nursing notes.    I have reviewed the findings, diagnosis, plan and need for follow up with the patient.       Medication List      There are no discharge medications for this visit.         Final diagnoses:   Acute upper GI bleeding   Anemia due to blood loss, acute   Hypotension, unspecified hypotension type - Chronic, acutely exacerbated from GI bleeding       6/7/2019   [facility] EMERGENCY DEPARTMENT            Sergey Okeefe MD  06/08/19 1007

## 2019-06-07 NOTE — PROGRESS NOTES
Pt reports baseline B/P to run I the 80'S systolic. Resting comfortably. Denies discomfort. Stable. PRBC infusing..

## 2019-06-07 NOTE — TELEPHONE ENCOUNTER
Left message on home/mobile number for patient to call back to schedule consult appointment with Vascular Surgery.

## 2019-06-07 NOTE — TELEPHONE ENCOUNTER
"Pt referred to [facility] by Keyana Vegas, APRN CNP for PAD, Venous stasis dermatitis of both lower extremities. \"opening weeping wounds on legs\".      6-1-19 JUAN in epic.       Pt needs to be scheduled for consult with vascular surgery. Will route to scheduling to coordinate an appointment sooner rather than later.     GRAY WhippleN, RN    "

## 2019-06-08 NOTE — ANESTHESIA CARE TRANSFER NOTE
Patient: Patrick Diop    Procedure(s):  UPPER ENDOSCOPY with hemostasis    Diagnosis: Upper GI bleed  Diagnosis Additional Information: No value filed.    Anesthesia Type:   No value filed.     Note:  Airway :Nasal Cannula  Patient transferred to:PACU  Handoff Report: Identifed the Patient, Identified the Reponsible Provider, Reviewed the pertinent medical history, Discussed the surgical course, Reviewed Intra-OP anesthesia mangement and issues during anesthesia, Set expectations for post-procedure period and Allowed opportunity for questions and acknowledgement of understanding      Vitals: (Last set prior to Anesthesia Care Transfer)    CRNA VITALS  6/8/2019 1003 - 6/8/2019 1042      6/8/2019             Pulse:  82    SpO2:  98 %                Electronically Signed By: NATALY Spring CRNA  June 8, 2019  10:42 AM

## 2019-06-08 NOTE — PROGRESS NOTES
Admission          6/7/2019 1945  -----------------------------------------------------------  Reason for admission: GI bleed  Primary team notified of pt arrival.  Admitted from: [facility] ED  Via: stretcher  Accompanied by:Self  Belongings: Placed in closet  Admission Profile: complete  Teaching: orientation to unit and call light- call light within reach, call don't fall, use of console, meal times, when to call for the RN, and enforced importance of safety   Access: PIV x2  Telemetry:Placed on pt  Ht./Wt.: complete  2 RN Skin Assessment Completed By: Kusum Chun RN, Rozina Gonsalez RN  Pt status: Alert and oriented x4. VSS on room air. Per pt BP is normal    Temp:  [97.2  F (36.2  C)-98.5  F (36.9  C)] 98.5  F (36.9  C)  Pulse:  [74-90] 85  Heart Rate:  [74-94] 83  Resp:  [11-26] 20  BP: (62-95)/(37-63) 85/55  SpO2:  [94 %-100 %] 98 %

## 2019-06-08 NOTE — OR NURSING
Spoke with [employer] about ICD/Pacer. Single chamber. Patient currently not paced per monitor. [employer] did not have current settings. Can use magnet if needed and when removed, settings will resume.

## 2019-06-08 NOTE — PROGRESS NOTES
"Patient arrived to unit 6B from PACU, MD notified. Handoff completed.    BP (!) 85/56 (BP Location: Left arm)   Pulse 81   Temp 97.5  F (36.4  C) (Oral)   Resp 18   Ht 1.702 m (5' 7\")   Wt 72.3 kg (159 lb 8 oz)   SpO2 98%   BMI 24.98 kg/m      "

## 2019-06-08 NOTE — ANESTHESIA PREPROCEDURE EVALUATION
Anesthesia Pre-Procedure Evaluation    Patient: Patrick Diop   MRN:     [id number] Gender:   male   Age:    83 year old :      1935        Preoperative Diagnosis: Upper GI bleed   Procedure(s):  UPPER ENDOSCOPY     Past Medical History:   Diagnosis Date     Acute on chronic systolic congestive heart failure (H) 2/10/2019     Acute on chronic systolic heart failure (H) 2/10/2019     Adult hypothyroidism      Colon cancer (H)      Hearing problem      Hyperlipidemia with target LDL less than 100 2013     Hypertension goal BP (blood pressure) < 140/90 2013     Ischemic cardiomyopathy 02/10/2019    Postulated to be due to chemotherapy, though agent not specified.     Non-ischemic cardiomyopathy (H) 2/10/2019    Postulated to be due to chemotherapy, though agent not specified.     Syncope      Tongue cancer (H)       Past Surgical History:   Procedure Laterality Date     APPENDECTOMY       COLECTOMY  2002    malignant polyp.  Colectomy and colostomy     COLONOSCOPY  2002    polyps     CV HEART CATHETERIZATION WITH POSSIBLE INTERVENTION N/A 2019    Procedure: Heart Catheterization with possible Intervention;  Surgeon: Julio Vera MD;  Location:  HE[facility] CARDI[facility] CATH LAB     EP ICD N/A 2/15/2019    Procedure: EP ICD;  Surgeon: Cj Gutierrez MD;  Location:  [facility] CATH LAB     EYE SURGERY      B cataract     HC UGI ENDOSCOPY W PLACEMENT GASTROSTOMY TUBE PERCUT N/A 2015    Procedure: COMBINED ESOPHAGOSCOPY, GASTROSCOPY, DUODENOSCOPY (EGD), PLACE PERCUTANEOUS ENDOSCOPIC GASTROSTOMY TUBE;  Surgeon: Sergio Buenrostro MD;  Location: [facility]     LARYNGOSCOPY WITH BIOPSY(IES) N/A 4/3/2018    Procedure: LARYNGOSCOPY WITH BIOPSY(IES);  Direct Laryngoscopy, Biopsy Base Of Tongue;  Surgeon: Jj Hernandez MD;  Location: [facility]     TAKEDOWN COLOSTOMY            Anesthesia Evaluation     . Pt has had prior anesthetic. Type: General and MAC           ROS/MED  HX    ENT/Pulmonary:     (+)tobacco use, Past use , . .    Neurologic:  - neg neurologic ROS     Cardiovascular:     (+) -Peripheral Vascular Disease-- Other and Symptomatic, CAD, -past MI,-stent,Drug Eluting Stent .. Taking blood thinners Pt has received instructions: . . . :ICD . dysrhythmias . Previous cardiac testing       METS/Exercise Tolerance:     Hematologic:     (+) Anemia, History of Transfusion no previous transfusion reaction -      Musculoskeletal:         GI/Hepatic:     (+) GERD Asymptomatic on medication, hiatal hernia, Other GI/Hepatic colon north      Renal/Genitourinary:     (+) Pt has no history of transplant,       Endo:         Psychiatric:  - neg psychiatric ROS       Infectious Disease:  - neg infectious disease ROS       Malignancy:   (+) Malignancy History of Other  Other CA Active status post Surgery, Chemo and Radiation         Other:                         PHYSICAL EXAM:   Mental Status/Neuro: A/A/O   Airway: Facies: Feasible  Mallampati: II  Mouth/Opening: Full  TM distance: > 6 cm  Neck ROM: Full   Respiratory: Auscultation: Rhonchi; Decreased BS      CV: Rhythm: Irregular   Comments:      Dental:  Dentures: Upper; Lower                  Lab Results   Component Value Date    WBC 5.8 06/08/2019    HGB 7.6 (L) 06/08/2019    HGB 7.6 (L) 06/08/2019    HCT 24.7 (L) 06/08/2019     06/08/2019     06/08/2019    POTASSIUM 4.6 06/08/2019    CHLORIDE 102 06/08/2019    CO2 24 06/08/2019     (H) 06/08/2019    CR 1.87 (H) 06/08/2019    GLC 84 06/08/2019    AMY 8.6 06/08/2019    MAG 1.6 04/25/2017    ALBUMIN 3.1 (L) 06/07/2019    PROTTOTAL 6.5 (L) 06/07/2019    ALT 72 (H) 06/07/2019    AST 30 06/07/2019    ALKPHOS 64 06/07/2019    BILITOTAL 0.3 06/07/2019    PTT 31 06/07/2019    INR 1.32 (H) 06/07/2019    TSH 5.54 (H) 03/04/2019    T4 1.41 03/04/2019       Preop Vitals  BP Readings from Last 3 Encounters:   06/08/19 90/52   06/07/19 93/56   06/07/19 (!) 80/60    Pulse Readings  "from Last 3 Encounters:   06/08/19 82   06/07/19 90   06/07/19 75      Resp Readings from Last 3 Encounters:   06/08/19 16   06/07/19 22   06/07/19 18    SpO2 Readings from Last 3 Encounters:   06/08/19 99%   06/07/19 97%   06/07/19 97%      Temp Readings from Last 1 Encounters:   06/08/19 36.3  C (97.4  F) (Oral)    Ht Readings from Last 1 Encounters:   06/07/19 1.702 m (5' 7\")      Wt Readings from Last 1 Encounters:   06/08/19 72.3 kg (159 lb 8 oz)    Estimated body mass index is 24.98 kg/m  as calculated from the following:    Height as of this encounter: 1.702 m (5' 7\").    Weight as of this encounter: 72.3 kg (159 lb 8 oz).     LDA:  Peripheral IV 06/07/19 Left Lower forearm (Active)   Site Assessment [facility] 6/8/2019  8:31 AM   Line Status Checked every 1 hour;Saline locked 6/8/2019  8:31 AM   Phlebitis Scale 0-->no symptoms 6/8/2019  8:31 AM   Infiltration Scale 0 6/8/2019  8:31 AM   Infiltration Site Treatment Method  None 6/8/2019  8:31 AM   Number of days: 1       Peripheral IV 06/07/19 Distal;Right Lower forearm (Active)   Site Assessment [facility] 6/8/2019  8:31 AM   Line Status Saline locked;Checked every 1 hour 6/8/2019  8:31 AM   Phlebitis Scale 0-->no symptoms 6/8/2019  8:31 AM   Infiltration Scale 0 6/8/2019  8:31 AM   Infiltration Site Treatment Method  None 6/8/2019  8:31 AM   Number of days: 1            Assessment:   ASA SCORE: 4 emergent     NPO Status: > 6 hours since completed Solid Foods   Documentation: H&P complete; Consents complete   Proceeding: Proceed without further delay     Plan:   Anes. Type:  MAC   Pre-Induction: Midazolam IV   Induction:  Not applicable   Airway: Native Airway   Access/Monitoring: PIV   Maintenance: Propofol   Emergence: Procedure Site   Logistics: Same Day Surgery     Postop Pain/Sedation Strategy:  Standard-Options: Opioids PRN     PONV Management:  Adult Risk Factors:, Non-Smoker, Postop Opioids  Prevention: Propofol Infusion     CONSENT: Direct conversation   Plan and " risks discussed with: Patient   Blood Products: Consented (ALL Blood Products)                         Norris Lopes MD

## 2019-06-08 NOTE — PROGRESS NOTES
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 He presented to [facility] ED on 6/7/19 with weakness, lightheadedness and hypotension, melena x1 and hgb of 6.3. He received 2 units pRBC and was transferred to [facility] on 6/7 for further management.      Acute blood loss anemia 2/2 GI bleed, duodenal ulcers   Hx of colon cancer  Presented w/melena x1, hgb of 6.3. Hx colon cancer 2001 s/p surgical resection (done in [place], [place]) and most recent colonoscopy was 6-7 years ago (also in [facility]) that were reportedly \"normal\". EGD today with multiple non-bleeding duodenal ulcers, bleeding superficial vessel which was clipped. Received 2 units PRBC at OSH, hgb improved and stable at ~7.6. No e/o ongoing bleeding.    - GI consulted, appreciate recommendations   - Transition from IV to PO Protonix 40 mg BID to complete a 30-day course   - Resume regular diet   - Trend hgb q8h, transfuse for hgb <7 (consented)   - Contact GI if ongoing bleeding or becomes hemodynamically unstable   - He will need repeat EGD in 2 months    Chronic hypotension 2/2 acute on chronic systolic heart failure: Baseline BPs ~80s-90s/50s-60s. HF suspected to be 2/2 underlying severe cardiomyopathy. Had ICD placed 2/15/19 - since no further syncopal events. PTA meds as below. BP down to ~60/40 upon admission in setting of hypovolemia and GI bleed, now stable at his baseline. He is asymptomatic.    - Consider resuming home lisinopril, Toprol tomorrow   - Discontinue continuous IVF   - IV fluid bolus " 250 mL PRN for MAP <60 and/or SBP <85 and symptomatic    - Vitals q4h   - Telemetry     3 vessel CAD, hx NSTEMI  Severe ischemic cardiomyopathy (LVEF 15%)  S/p ICD (2/15)  Hyperlipidemia   Diagnosed 12/2018 with LVEF 20-25% unclear if due to ischemia vs prior chemotherapy. Cardiac MRI 2/13/19 w/severely dilated LVEF (15%) and stress imaging showed a medium sized transmural infarct perfusion defects c/w ischemia again in distribution of LAD. Angiogram 2/14 with 3v CAD ( of RCA and L Cx, subtotal occlusion of mid-LAD) - not CABG candidate. Follows as OP with cardiology, last seen 4/2019 by Dr. Calderón and 4/22 by [facility] w/ Amanda Mosquera PA-C. Medical management of his ICM limited 2/2 his hypotension with frequent medication titrations. PTA on Toprol XL 12.5 mg daily, Lisinopril 2.5 mg daily, Torsemide 20 mg daily (but taking BID x4-5 days), atorvastatin 40 mg daily, ASA 81 mg daily and Plavix 75 mg daily. Dry weight of ~152 pounds, currently 159.    - Resume ASA 81, Plavix - discussed with GI, ok to resume   - Continue PTA atorvastatin   - Continue to hold lisinopril, Toprol   - Resume Torsemide at 20 mg daily   - Daily weights   - Strict intake and output   - EKG, troponin STAT if he develops chest pain.    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 Superficial wound swabs polymicrobial and likely contaminant, no e/o infection.    - WOCN, lymphedema consults   - Torsemide as above   - Daily weights   - Strict intake and output    ALEXI on CKD stage III: Baseline Cr appears to be ~1.1-1.2 but ~1.3-1.5 since recent admission. Cr 1.87 here, most likely pre-renal due to  hypovolemia with GI bleed, recent increased torsemide dosing.    - Trend Cr   - Holding off on IVF resuscitation given low EF   - Encourage PO fluid intake   - Torsemide as above    Other Chronic Medical Issues:  Hypothyroidism: Most recent TSH 5.54, FT4 1.41 (3/4/19) managed on Levothyroxine 100 mcg daily. Continue PTA Synthroid.   Recurrent tongue cancer: biopsy w/[facility], s/p PET (10/2015) w/ mass at level of tongue base. S/p chemoradiation therapy. Recurrence 11/2018 and had been on Keytruda immune therapy but this has been held since 12/2018 2/2 severe heart failure.        Diet: Combination Diet Regular Diet Adult; 2 gm NA Diet    DVT Prophylaxis: Pneumatic Compression Devices  Zamora Catheter: not present  Code Status: Full Code      Disposition Plan   Expected discharge: 1-2 days, recommended to prior living arrangement once hemoglobin stable and kidney function improved.  Entered: Samara De La O PA-C 06/08/2019, 2:41 PM       The patient's care was discussed with the Attending Physician, Dr. Acosta, Bedside Nurse, Patient and Patient's Family.    Samara De La O PA-C  Hospitalist Service, [address], [place]  Pager: [phone]  Please see sticky note for cross cover information  ______________________________________________________________________    Interval History   Patrick reports doing well overall and is eager to go home. He underwent EGD this morning and tolerated this well with no apparent complications. He is feeling ready to eat, denies any abdominal pain, nausea, or vomiting. He has dyspnea per his baseline. No chest pain. No dizziness or lightheadedness. Has not had a BM today. No recent or current hematuria, epistaxis, abnormal bruising, or other symptoms of blood loss.     He reports his blood pressure is at his baseline. He has been taking torsemide 20 mg BID over the last 4-5 days because his weight has been up. He has been dealing with weZenefits  of his LE edema over the last month or so and notes he was told he needs to see vascular.     Data reviewed today: I reviewed all medications, new labs and imaging results over the last 24 hours.     Physical Exam   Vital Signs: Temp: 97.5  F (36.4  C) Temp src: Oral BP: (!) 85/56 Pulse: 81 Heart Rate: 73 Resp: 18 SpO2: 98 % O2 Device: Nasal cannula Oxygen Delivery: 2 LPM  Weight: 159 lbs 8 oz  Constitutional: Awake and alert, sitting up comfortably in bed, in no apparent distress.   Eyes: Sclera clear, anicteric  Respiratory: Breathing comfortably on 2 LPM of O2.   Cardiovascular: RRR,  Normal S1/S2. No rubs or murmurs. Bilateral pedal pulses intact. 2+ bilateral pitting edema of LE extending to mid-thigh.   GI: Bowel sounds present. Soft, non-tender, non-distended.   Skin: Warm and dry. Good color. No jaundice. No visible rashes or lesions of concern.  Musculoskeletal: Moving all extremities comfortably.   Neurologic: Alert and oriented.     Data   ROUTINE IP LABS (Last four results)  Recent Labs   Lab 06/08/19  0613 06/07/19  1242 06/07/19  1133 06/03/19  0522    136 131* 138   POTASSIUM 4.6 5.1 4.8 4.8   CHLORIDE 102 99 98 103   CO2 24 25 25 29   ANIONGAP 10 12 8 6   GLC 84 93 105* 80   * 114* 116* 67*   CR 1.87* 1.88* 1.98* 1.36*   AMY 8.6 8.8 8.8 8.5   PROTTOTAL  --  6.5*  --  5.8*   ALBUMIN  --  3.1*  --  2.7*   BILITOTAL  --  0.3  --  0.5   ALKPHOS  --  64  --  59   AST  --  30  --  14   ALT  --  72*  --  20     Recent Labs   Lab 06/08/19  1316 06/08/19  0613 06/08/19  0111 06/07/19  2103 06/07/19  1242 06/07/19  1133   WBC  --  5.8  --  5.7 7.2 6.4   RBC  --  2.39*  --  2.44* 2.01* 1.83*   HGB 7.6* 7.6*  7.6* 7.7* 7.8* 6.9* 6.3*   HCT  --  24.7*  --  25.3* 21.7* 19.6*   MCV  --  103*  --  104* 108* 107*   MCH  --  31.8  --  32.0 34.3* 34.4*   MCHC  --  30.8*  --  30.8* 31.8 32.1   RDW  --  18.9*  --  18.1* 14.8 14.6   PLT  --  188  --  177 206 208     Recent Labs   Lab 06/07/19  1242   INR  1.32*        Glucose Values Latest Ref Rng & Units 6/2/2019 6/3/2019 6/7/2019 6/7/2019 6/7/2019 6/7/2019 6/8/2019   Bedside Glucose (mg/dl )  - -- -- -- -- -- 85 --   GLUCOSE 70 - 99 mg/dL 86 80 105(H) 93 85 -- 84   Some recent data might be hidden

## 2019-06-08 NOTE — PLAN OF CARE
ST 6B: Cx- Orders received for swallow evaluation. Pt is currently off unit to OR for upper endoscopy due to concern for upper GI bleed. Will reschedule as medically appropriate

## 2019-06-08 NOTE — ANESTHESIA POSTPROCEDURE EVALUATION
Anesthesia POST Procedure Evaluation    Patient: Patrick Diop   MRN:     [id number] Gender:   male   Age:    83 year old :      1935        Preoperative Diagnosis: Upper GI bleed   Procedure(s):  UPPER ENDOSCOPY with hemostasis   Postop Comments: No value filed.       Anesthesia Type:  MAC  No value filed.    Reportable Event: NO     PAIN: Uncomplicated   Sign Out status: Comfortable, Well controlled pain     PONV: No PONV   Sign Out status:  No Nausea or Vomiting     Neuro/Psych: Uneventful perioperative course   Sign Out Status: Preoperative baseline; Age appropriate mentation     Airway/Resp.: Uneventful perioperative course   Sign Out Status: Non labored breathing, age appropriate RR; Resp. Status within EXPECTED Parameters     CV: Uneventful perioperative course   Sign Out status: Appropriate BP and perfusion indices; Appropriate HR/Rhythm     Disposition:   Sign Out in:  PACU  Disposition:  Floor  Recovery Course: Uneventful  Follow-Up: Not required           Last Anesthesia Record Vitals:  CRNA VITALS  2019 1002 - 2019 1032      2019             Pulse:  102    Ht Rate:  81    SpO2:  100 %          Last PACU Vitals:  Vitals Value Taken Time   BP     Temp     Pulse     Resp     SpO2     Temp src     NIBP 92/53 2019 10:27 AM   Pulse 102 2019 10:30 AM   SpO2 100 % 2019 10:30 AM   Resp     Temp     Ht Rate 81 2019 10:30 AM   Temp 2 36.3  C (97.3  F) 2019 10:20 AM         Electronically Signed By: Norris Lopes MD, 2019, 10:32 AM

## 2019-06-08 NOTE — OR NURSING
Dr. Carson at bedside. Speaking with patient and family. Questions answered. Tylenol for pain. Avoid NSAIDS and ETOH.

## 2019-06-08 NOTE — CONSULTS
Brief GI consult note     82 yo M with PMH of ischemic cardiomyopathy (LVEF 15%), s/p ICD (2/2019), chronic hypotension, CKD stage III, 3-vessel CAD, NSTEMI (on ASA, Plavix, statin), tongue cancer s/p chemoradiation and immune therapy and colon cancer s/p surgical resection multiple years ago (2001), hypothyroidism, who presented with worsening acute on chronic anemia, with reported black stool in ED, raising concern for UGIB (differential includes PUD, AVM, gastritis/esophagitis, Dieulafoy, malignancy), especially in setting of antiplatelet use and elevated BUN. Hgb improved with 2 units PRBCs. Last EGD in chart in 2015 was reportedly normal. No record of last colonoscopy.     Recommendations:   - NPO  - Give 250 mg IV erythromycin or 10 mg IV Reglan if erythromycin unavailable   - IV PPI  - Two large bore IVs  - Transfuse for Hgb <7 from GI standpoint, higher if indicated from a cardiac standpoint  - Plan for EGD in OR today given co-morbidities, further recommendations per procedure note    Discussed with Dr. Yamileth Wright MD  GI Fellow  p [phone]

## 2019-06-08 NOTE — H&P
"[facility]    History and Physical - Hospitalist Service, Gold Night       Date of Admission:  6/7/2019    Assessment & Plan   Patrick Diop is a 83 year old male admitted on 6/7/2019. 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  Transfused 2 units pRBC and transferred to [facility] for further care.     #Acute blood loss anemia 2/2 presumed GIB  #Hx colon cancer  Presented for routine, post-hospital PCP appt today w/ labs showing Hgb 6.3 (8.4 6/3/19) and black stool x 1, hemoccult positive without gross blood.  Hx colon cancer 2001 s/p surgical resection (done in [place], [place]) and most recent colonoscopy was 6-7 years ago (also in [facility]) that were reportedly \"normal\".  Pt declines any change in BM at home, has not noted any blood.  DId have one episodes of fatigue earlier today.  S/p 2 units pRBC at OSH and en route to [facility] with most recent Hgb 7.8.  Chronically hypotensive, 80s/50s and BPs currently at baseline.   - Hgb q 4 hours, transfuse for Hgb <7   - Blood consent completed, signed and in chart   - GI consult in AM, will call overnight for ongoing bleeding and/or HD compromise   - NPO overnight, ok for ice chips and meds   - Ensure 2 large bore PIVs at all times     #Chronic hypotension 2/2 acute on chronic systolic heart failure  Baseline BPs ~80s-90s/50s-60s.  Suspected to have occurred secondary to his underlying severe cardiomyopathy.  Evalauted by EP during hospitalization 2/2019 for syncope and " ultimately underwent ICD placement 2/15/19.  Per patient, he only synopsized once prior to ICD placement.  Managed as OP on beta blocker, ACE-I and diuretic therapy for severe ischemic  cardiomyopathy (see below).  ICD placed 2/2019 without any further syncopal events.  BPs low in ED 60s/40s in setting of GIB and hypovolemia.   - Will not order mIVF in setting of severe HD and blood transfusions   - IVFB 250 mL as needed for MAP <60 and/or SBP <85 and symptomatic   - VS q 4 hours overnight, telemetry     #3 vessel CAD, hx NSTEMI  #Severe ischemic cardiomyopathy (LVEF 15%)  #S/p ICD (2/15)  #Hyperlipidemia   Diagnosed 12/2018 with LVEF 20-25% unclear if due to ischemia vs prior chemotherapy.  Admitted 2/2019 for SOB and pleural effusions, TTE with similar LVEF and BNP ~83k.  Cardiac MRI 2/13/19 showed severely dilated LVEF (15%) and stress imaging showed a medium sized transmural infarct perfusion defects c/w ischemia again in distribution of LAD.  S/p coronary angiogram 2/14/19 which showed 3V CAD with  of the RCA and L Cx and subtotal occlusion of mid LAD.  Not deemed to be candidate for CABG given tongue ca history and pt declined surgery.  Follows as OP with cardiology, last seen 4/2019 by Dr. Calderón and 4/22 by [facility] w/ Amanda Mosquera PA-C.  Medical management of his ICM limited 2/2 his hypotension with frequent medication titrations.  Most recently managed on Toprol XL 12.5 mg daily, Lisinopril 2.5 mg daily, Torsemide 20 mg daily (for LE swelling), Atorvastatin 40 mg daily, ASA 81 mg daily and Plavix 75 mg daily.  Currently on RA, BL hypotension noted.  No CP, orthopnea.  Appears euvolemic at present, dry weight appears to be ~ 152 lbs.  Current weight 159 lbs.   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  Torsemide was briefly increased to BID from daily for treatment.  Wounds did not appear infected, superficial swab polymicrobial including pseudomonas, acinetobacter, enterococcus faecalis, coag negative staph, corynebacterium.  Likely contaminant and not representative of true infection.   - WOCN consult   - May benefit from lymphedema consult this admission   - Holding diuresis   - No role for antibx at this time    #CKD stage III  Baseline Cr appears to be ~1.1-1.2 but ~1.3-1.5 since recent admission.  Currently 1.88 in setting of GIB which is likely 2/2 hypovolemia.    - BMP in AM   - Judicious IVF as above     #Hypothyroidism  Most recent TSH 5.54, FT$ 1.41 (3/4/19) managed on Levothyroxine 100 mcg daily.  - Continue PTA Levothyroxine     #Recurrent tongue cancer  S/p FNA showing SCC, s/p PET (10/2015) w/ mass at level of tongue base.  S/p chemoradiation therapy.  Recurrence 11/2018 and had been on Keytruda immune therapy but this has been held since 12/2018 2/2 severe heart failure.   - Follow up as OP      Diet: NPO overnight   DVT Prophylaxis: Pneumatic Compression Devices  Zamora Catheter: not present  Code Status: Full Code, discussed in great detail with patient tonight.     Disposition Plan   Expected discharge: 2 - 3 days, recommended to prior living arrangement once hemoglobin stable and safe disposition plan/ TCU bed available.  Entered: Elma Brown NP 06/07/2019, 8:30 PM     The patient's care was discussed with the Attending Physician, Dr. Dr. Allison, Bedside Nurse and Patient.    Elma Brown NP  [facility]  Pager: [phone]  Please see sticky note for cross cover  "information  ______________________________________________________________________    Chief Complaint   Weakness, lightheadedness, low hemoglobin     History is obtained from the patient and electronic health record    History of Present Illness   Patrick Diop is a 83 year old male who has a past medical history  severe ischemic cardiomyopathy (diagnosed 12/2018 w/ LVEF 15%), syncope s/p ICD (2/2019), chronic hypotension, CKD stage III, 3-vessel CAD, NSTEMI (on ASA, Plavix, statin), tongue cancer s/p chemoradiation and immune therapy, colon cancer s/p surgical resection, hypothyroidism, severe PAD, 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    The patient states he was in his usual state of health at his assisted living today and only noted mild weakness/fatgue when walking back from breakfast for which he had to briefly sit down and recover.  Did not lose consciousness and did not note any other changes.  He then went to a post-hospitalization PCP appt today for a routine exam and was about to leave when his labs resulted with a Hgb of 6.3.  They instructed Patrick to present to the the local ED.  In the ED, he was given one unit of pRBC and a repeat Hgb after transfusion was only 6/7 therefore another unit was given en route to [facility].  He denies any blood in his urine or stools and denies any black stools at home.  While in the ED, the providers did note a soft, formed black stool.  Last colonoscopy was 6-7 years ago in [place], [place] for colon ca screening.  He states this was \"normal\".  He also strates he had a recent PET scan which did not show recurrence of his colon cancer.     Currently he feels \"OK\".  He did note that he felt somewhat better after 1 L of IVF and 2 units of blood with more energy.  His current BPs is 80s/50s and states \"it is always that way at home\".  No current fevers, chills, " nausea, vomiting, chest pain, shortness of breath.  He denies hematemesis, hematochezia, melena, hematuria.  He does endorse bilateral LE swelling and some weeping from his LE.      Review of Systems    The 10 point Review of Systems is negative other than noted in the HPI or here.     Past Medical History    I have reviewed this patient's medical history and updated it with pertinent information if needed.   Past Medical History:   Diagnosis Date     Acute on chronic systolic congestive heart failure (H) 2/10/2019     Acute on chronic systolic heart failure (H) 2/10/2019     Adult hypothyroidism      Colon cancer (H)      Hearing problem      Hyperlipidemia with target LDL less than 100 11/6/2013     Hypertension goal BP (blood pressure) < 140/90 11/6/2013     Ischemic cardiomyopathy 02/10/2019    Postulated to be due to chemotherapy, though agent not specified.     Non-ischemic cardiomyopathy (H) 2/10/2019    Postulated to be due to chemotherapy, though agent not specified.     Syncope      Tongue cancer (H)        Past Surgical History   I have reviewed this patient's surgical history and updated it with pertinent information if needed.  Past Surgical History:   Procedure Laterality Date     APPENDECTOMY  2001     COLECTOMY  Jan 2002    malignant polyp.  Colectomy and colostomy     COLONOSCOPY  Jan 2002    polyps     CV HEART CATHETERIZATION WITH POSSIBLE INTERVENTION N/A 2/14/2019    Procedure: Heart Catheterization with possible Intervention;  Surgeon: Julio Vera MD;  Location:  HEART CARDIAC CATH LAB     EP ICD N/A 2/15/2019    Procedure: EP ICD;  Surgeon: Cj Gutierrez MD;  Location:  HEART CARDIAC CATH LAB     EYE SURGERY      B cataract     [facility] ENDOSCOPY W PLACEMENT GASTROSTOMY TUBE PERCUT N/A 11/19/2015    Procedure: COMBINED ESOPHAGOSCOPY, GASTROSCOPY, DUODENOSCOPY (EGD), PLACE PERCUTANEOUS ENDOSCOPIC GASTROSTOMY TUBE;  Surgeon: Sergio Buenrostro MD;  Location: [facility]     LARYNGOSCOPY  WITH BIOPSY(IES) N/A 4/3/2018    Procedure: LARYNGOSCOPY WITH BIOPSY(IES);  Direct Laryngoscopy, Biopsy Base Of Tongue;  Surgeon: Jj Hernandez MD;  Location: [facility] OR     TAKEDOWN COLOSTOMY         Social History   I have reviewed this patient's social history and updated it with pertinent information if needed.  Social History     Tobacco Use     Smoking status: Former Smoker     Packs/day: 0.00     Years: 48.00     Pack years: 0.00     Types: Cigarettes     Last attempt to quit: 2001     Years since quittin.5     Smokeless tobacco: Never Used     Tobacco comment: started at age 17   Substance Use Topics     Alcohol use: No     Drug use: No       Family History   I have reviewed this patient's family history and updated it with pertinent information if needed.   Family History   Problem Relation Age of Onset     Other Cancer Mother 57        lung ca, with brain mets     Cancer Mother         Brain tumor, lung cancer     Cancer Father         Lung cancer     Cancer Brother      Prior to Admission Medications   Prior to Admission Medications   Prescriptions Last Dose Informant Patient Reported? Taking?   Cholecalciferol (VITAMIN D) 1000 UNITS capsule  Care Giver Yes No   Sig: Take 1 capsule by mouth daily   UNABLE TO FIND  Care Giver Yes No   Sig: Aplex Cream(? Spelling) for sores near coccyx   aspirin (ASA) 81 MG EC tablet  Care Giver No No   Sig: Take 1 tablet (81 mg) by mouth daily   atorvastatin (LIPITOR) 40 MG tablet  Care Giver No No   Sig: Take 1 tablet (40 mg) by mouth daily   clopidogrel (PLAVIX) 75 MG tablet  Care Giver No No   Sig: Take 1 tablet (75 mg) by mouth daily   levothyroxine (SYNTHROID/LEVOTHROID) 100 MCG tablet  Care Giver No No   Sig: Take 1 tablet (100 mcg) by mouth daily   lisinopril (PRINIVIL/ZESTRIL) 2.5 MG tablet  Care Giver No No   Sig: Take 1 tablet (2.5 mg) by mouth At Bedtime   metoprolol succinate ER (TOPROL-XL) 25 MG 24 hr tablet  Care Giver Yes No   Sig: Take 12.5 mg by  "mouth daily    miconazole (MICATIN) 2 % AERP powder  Care Giver No No   Sig: Apply topically 2 times daily   multivitamin  with lutein (OCUVITE WITH LTEIN) CAPS  Care Giver Yes No   Sig: Take 1 capsule by mouth daily   nystatin (MYCOSTATIN) 166987 UNIT/GM external cream  Care Giver No No   Sig: Apply topically 2 times daily   order for DME  Care Giver No No   Sig: Equipment being ordered: 4-Wheel Walker with seat. He is 5' 7\" tall.  [facility]: Fax: [phone]  phone [phone]   order for DME  Care Giver No No   Sig: Equipment being ordered: Hospital Bed from [facility]   torsemide (DEMADEX) 20 MG tablet  Care Giver No No   Sig: Take 1 tablet (20 mg) by mouth daily as needed (edema) For 2 lb weight gain in a day or legs are swollen.      Facility-Administered Medications: None     Allergies   No Known Allergies    Physical Exam   Vital Signs: Temp: 98.5  F (36.9  C) Temp src: Oral BP: (!) 85/51 Pulse: 85 Heart Rate: 84 Resp: 20 SpO2: 98 % O2 Device: None (Room air)    Weight: 159 lbs 14.4 oz    General Appearance: NAD, sitting up in bed.  Pleasant and interactive.   Eyes: Anicteric sclera.  PERRLA.   HEENT: No lymphadenopathy, MMM.    Respiratory: Lungs CTAB, good airflow bilaterally.  No wheezes, rales or rhonchi.   Cardiovascular: RRR, S1S2.  No murmurs appreciated.   GI: Abdomen soft, NTND.  Bowel sounds hypoactive x 4 quadrants.   Lymph/Hematologic: No abnormal bruising.   Skin: Bilateral LE wounds with dressings intact.  Wounds not visualized on exam.   Extremities: 2+ pitting edema bilateral LE. Distal pulses intact bilaterally.   Musculoskeletal: No joint swelling or tenderness.   Neurologic: A+O x 3, no focal deficits.  CHRISTY.    Psychiatric: Mood stable.     Data   Data reviewed today: I reviewed all medications, new labs and imaging results over the last 24 hours.     Most Recent 3 CBC's:  Recent Labs   Lab Test 06/07/19  2103 06/07/19  1242 06/07/19  1133   WBC 5.7 7.2 6.4   HGB 7.8* 6.9* 6.3* "   * 108* 107*    206 208     Most Recent 3 BMP's:  Recent Labs   Lab Test 06/07/19  1242 06/07/19  1133 06/03/19  0522    131* 138   POTASSIUM 5.1 4.8 4.8   CHLORIDE 99 98 103   CO2 25 25 29   * 116* 67*   CR 1.88* 1.98* 1.36*   ANIONGAP 12 8 6   AMY 8.8 8.8 8.5   GLC 93 105* 80     Most Recent 2 LFT's:  Recent Labs   Lab Test 06/07/19  1242 06/03/19  0522   AST 30 14   ALT 72* 20   ALKPHOS 64 59   BILITOTAL 0.3 0.5     Most Recent 3 INR's:  Recent Labs   Lab Test 06/07/19  1242 02/11/19  1541   INR 1.32* 1.59*

## 2019-06-08 NOTE — OP NOTE
Upper GI Endoscopy 06/08/2019  9:52 AM [facility], [address] [place] [address] [phone]     Endoscopy Department   _______________________________________________________________________________   Patient Name: Patrick Diop          Procedure Date: 6/8/2019 9:52 AM   MRN: [id number]                       Account Number: [id number]   YOB: 1935             Admit Type: Inpatient   Age: 83                                Gender: Male   Note Status: Finalized                Attending MD: Nicholas Carson MD   Total Sedation Time:                     _______________________________________________________________________________       Procedure:           Upper GI endoscopy   Indications:         Melena   Providers:           Nicholas Carson MD   Patient Profile:     Mr Diop is an 84yo vasculopath with melena and                        decreased hemoglobin who proceeds to evaluation by upper                        endsocopy.   Referring MD:        Joe Mandel MD   Medicines:           Deep sedation was administered   Complications:       No immediate complications.   _______________________________________________________________________________   Procedure:           Pre-Anesthesia Assessment:                        - Prior to the procedure, a History and Physical was                        performed, and patient medications and allergies were                        reviewed. The patient is competent. The risks and                        benefits of the procedure and the sedation options and                        risks were discussed with the patient. All questions                        were answered and informed consent was obtained. Patient                        identification and proposed procedure were verified by                        the nurse in the pre-procedure area. Mental Status                        Examination: alert and  oriented. Airway Examination:                        Mallampati Class II (the uvula but not tonsillar pillars                        visualized). Respiratory Examination: poor air movement.                        CV Examination: systolic murmur. ASA Grade Assessment:                        III - A patient with severe systemic disease. After                        reviewing the risks and benefits, the patient was deemed                        in satisfactory condition to undergo the procedure. The                        anesthesia plan was to use deep sedation / analgesia.                        Immediately prior to administration of medications, the                        patient was re-assessed for adequacy to receive                        sedatives. The heart rate, respiratory rate, oxygen                        saturations, blood pressure, adequacy of pulmonary                        ventilation, and response to care were monitored                        throughout the procedure. The physical status of the                        patient was re-assessed after the procedure. After                        obtaining informed consent, the endoscope was passed                        under direct vision. Throughout the procedure, the                        patient's blood pressure, pulse, and oxygen saturations                        were monitored continuously. The gastroscope was                        introduced through the mouth, and advanced to the second                        part of duodenum. The upper GI endoscopy was                        accomplished without difficulty. The patient tolerated                        the procedure well.                                                                                     Findings:        The proximal and mid esophagus were unremarkable. A nonobstructing        distal esophageal Schatzki ring was found. The squamocolumnar line was        found at the  gastroesophageal junction without significant irregularity.        The stomach was unremarkable save for mild patchy erythema of the        funuds. Within the bulb and proximal second portion, two non-bleeding        cratered duodenal ulcers with a clean ulcer base (Rashawn Class III)        were found. The largest lesion was 4 mm in largest dimension. A red        pigmented spot that did not rinse was found in the second portion. This        was suspected to be a superficial vessel and was managed by single        hemostatic clip. No varices were demonstrated.                                                                                     Impression:          - Multiple non-bleeding duodenal ulcers each with a                        clean ulcer base (Rashawn Class III) not requiring                        therapy                        - Singel red pigmented spot thought to possibly                        represent a superficial vessel managed by hemostatic clip   Recommendation:      - Deep sedation recovery with return to the floor when                        appropriate                        - Convert to oral twice daily high dose PPI for 30 days                        and then once daily until time of interval evaluation by                        upper endsocopy                        - Repeat upper endoscopy on a MAC day in Unit [facility] by one                        of my partners in 60 days                        - The findings and recommendations were discussed with                        the patient                                                                                       electronically signed by TEDDY Carson

## 2019-06-08 NOTE — PLAN OF CARE
Neuro: A&Ox4.   Cardiac: SR with 1st Degree AV block. ICD. VSS.   Respiratory: Sating above 92% on RA.   GI/: Adequate urine output. No BM this shift.   Diet/appetite: Tolerating 2g Na diet. Eating well. Denies nausea.   Activity:  Assist of 1 up to chair and ambulating to BR.  Pain: Denies  Skin: Lymphedema and WOC consults for BLE edema and weeping. Lymph wrapped pt this am. Bruising noted on extremeties and blanchable redness on coccyx and L buttock.   LDA's:  PIV X2    Plan: Upper endoscopy completed today. Continue with POC. Notify primary team with changes.

## 2019-06-08 NOTE — PLAN OF CARE
"Neuro: A&Ox4.   Cardiac: ICD in place, Paced Rhythm, BP (!) 81/50 (BP Location: Left arm)   Pulse 85   Temp 98.2  F (36.8  C) (Oral)   Resp 18   Ht 1.702 m (5' 7\")   Wt 72.3 kg (159 lb 8 oz)   SpO2 93%   BMI 24.98 kg/m    Respiratory: Sating 98% on RA.  GI/: Adequate urine output. No BM during my shift.  Diet/appetite: Tolerating NPO diet. Ice chips only.  Activity:  Assist of 1, up to chair and in halls.  Pain: At acceptable level on current regimen.   Skin: No new deficits noted.  LDA's: L PIV, R PIV    Plan: Continue with POC. Notify primary team with changes.   "

## 2019-06-09 NOTE — PLAN OF CARE
Neuro: A&Ox4.   Cardiac: SR with 1st Degree AV block. ICD. VSS. BP soft 80s/50s (MD aware- notify provider if pt becomes symptomatic)    Respiratory: Sating above 92% on RA. On 2L NC at night.   GI/: Adequate urine output. Black BM this shift, MD aware.   Diet/appetite: Tolerating 2g Na diet. Eating well. Denies nausea.   Activity:  Assist of 1 up to chair and ambulating to BR.  Pain: Denies  Skin: Wound cares and wraps done on BLE, see flowsheets. Lymph did not see pt today. WOC consulted for bilateral sores on buttocks, WOC has not seen patient yet. Sacral dressing changed today, barrier cream applied.   LDA's:  PIV X2     Plan: Hgb recheck at 2100. Notify cross cover if pt becomes symptomatic, will draw sooner if needed. Pt will be seen by palliative in the morning to discuss further plan of care.   Continue with POC. Notify primary team with changes.

## 2019-06-09 NOTE — PROVIDER NOTIFICATION
Notified MD of patient stool being black in color.     -MD stopped aspirin and plavix.   -Hgb recheck will be at 2100.   -RN to notify MD if patient state worsens or becomes symptomatic, and will check hgb sooner.

## 2019-06-09 NOTE — DISCHARGE SUMMARY
[facility], [place]  Hospitalist Discharge Summary       Date of Admission:  6/7/2019  Date of Discharge:  6/10/2019  3:57 PM  Discharging Provider: Susanne Silva CNP; Maricarmen Staton MD   Discharge Team: Hospitalist Service, Gold 7    Discharge Diagnoses   # Acute blood loss anemia 2/2 GI bleed; duodenal ulcers   # Hx colon cancer (2001) s/p surgical resection  # Chronic hypotension 2/2 acute on chronic systolic heart failure  # 3 vessel CAD, hx of NSTEMI  # Severe ischemic cardiomyopathy with EF 15%  # S/p ICD placement (2/2019)  # HLD   # Venous stasis dermatitis   # Venous stasis wounds; chronic bilateral lower extremity edema  # Severe PAD 2/2 known common iliac artery stenosis and L external artery occlusion  # ALEXI in setting of CKD stage III  # Hypothyroidism  # Dispo/goals of care  # Recurrent tongue cancer       Follow-ups Needed After Discharge   Follow up with PCP in 1 week  Follow up with Cardiology as scheduled   Follow up with Palliative Care clinic     Unresulted Labs Ordered in the Past 30 Days of this Admission     No orders found from 4/8/2019 to 6/8/2019.          Discharge Disposition   Discharged to prior living arrangement   Condition at discharge: Stable    Hospital Course   Patrick Diop is a 83 year old male with a history of severe ischemic cardiomyopathy (LVEF 15%), syncope s/p ICD (2/2019), chronic hypotension, CKD stage III, 3-vessel CAD, NSTEMI, tongue cancer s/p chemoradiation and immune therapy, colon cancer s/p surgical resection, hypothyroidism, severe PAD, venous statis dermatitis w/ chronic LE edema and HLD. He presented to [facility] ED on 6/7/19 with weakness, lightheadedness and hypotension, melena x1 and hgb of 6.3. He received 2 units pRBC and was transferred to [facility] on 6/7 for further management.      Acute blood loss anemia 2/2 GI bleed, duodenal ulcers   Hx of colon cancer  Presented w/melena x1, hgb of 6.3. Hx colon cancer 2001 s/p  "surgical resection (done in [place], [place]) and most recent colonoscopy was 6-7 years ago (also in [facility]) that were reportedly \"normal\". EGD on 6/8 with multiple non-bleeding duodenal ulcers, bleeding superficial vessel which was clipped. Received 2 units PRBC at OSH, hgb improved and stable at ~7.2. No e/o ongoing bleeding.    - Continue Protonix 40 mg BID to complete a 30-day course    - He will need repeat EGD in 2 months   - Repeat CBC within 1 week      Chronic hypotension 2/2 acute on chronic systolic heart failure: Baseline BPs ~80s-90s/50s-60s. HF suspected to be 2/2 underlying severe cardiomyopathy. Had ICD placed 2/15/19 - since no further syncopal events. PTA meds as below. BP down to ~60/40 upon admission in setting of hypovolemia and GI bleed, now stable at his baseline. He is asymptomatic.      3 vessel CAD, hx NSTEMI  Severe ischemic cardiomyopathy (LVEF 15%)  S/p ICD (2/15)  Hyperlipidemia   Diagnosed 12/2018 with LVEF 20-25% unclear if due to ischemia vs prior chemotherapy. Cardiac MRI 2/13/19 w/severely dilated LVEF (15%) and stress imaging showed a medium sized transmural infarct perfusion defects c/w ischemia again in distribution of LAD. Angiogram 2/14 with 3v CAD ( of RCA and L Cx, subtotal occlusion of mid-LAD) - not CABG candidate. Follows as OP with cardiology, last seen 4/2019 by Dr. Calderón and 4/22 by [facility] w/ Amanda Mosquera PA-C. Medical management of his ICM limited 2/2 his hypotension with frequent medication titrations. PTA on Toprol XL 12.5 mg daily, Lisinopril 2.5 mg daily, Torsemide 20 mg daily (but taking BID x4-5 days), atorvastatin 40 mg daily, ASA 81 mg daily and Plavix 75 mg daily. Dry weight of ~152 pounds, currently 159.    - Continued on    - He is scheduled to follow-up with Cardiology on 6/24/19     Venous statis dermatitis   Venous statsis wounds, bilateral LE  Severe PAD 2/2 known L common iliac artery stenosis and L external artery occlusion  Recently admitted to " [facility] 6/1-6/3 for LLE edema, redness and weeping fluid, since on Torsemide 20 mg BID due to worsening edema. Superficial wound swabs polymicrobial and likely contaminant, no e/o infection. Torsemide was initially held on admission and resumed at 20 mg once daily.    - He will need to follow-up with Vascular Surgery   - Continue torsemide 20 mg daily     ALEXI on CKD stage III: Baseline Cr appears to be ~1.1-1.2 but ~1.3-1.5 since recent admission. Cr ~1.8-1.9, most likely pre-renal due to hypovolemia with GI bleed, recent increased torsemide dosing. Unable to resuscitate with IVF given poor EF.     - Repeat BMP in 1 week       Goals of care: Patient expressed desire to limit hospitalizations. Palliative care consulted, met with patient and family.  Pt wants to remain full code and continue his current regimen with ASA, plavix, and wishes to keep his ICD active.  However he wants to follow with Palliative Care in outpatient clinic.       Hypothyroidism: Most recent TSH 5.54, FT4 1.41 (3/4/19) he was continued on home Levothyroxine 100 mcg daily.     Recurrent tongue cancer: Biopsy w/[facility], s/p PET (10/2015) w/ mass at level of tongue base. S/p chemoradiation therapy. Recurrence 11/2018 and had been on Keytruda immune therapy but this has been held since 12/2018 2/2 severe heart failure. No active issues here.           Consultations This Hospital Stay   GI LUMINAL ADULT IP CONSULT  WOUND OSTOMY CONTINENCE NURSE  IP CONSULT  SPEECH LANGUAGE PATH ADULT IP CONSULT  LYMPHEDEMA THERAPY IP CONSULT  MEDICATION HISTORY IP PHARMACY CONSULT  WOUND OSTOMY CONTINENCE NURSE  IP CONSULT  CARDIOLOGY GENERAL ADULT IP CONSULT  PALLIATIVE CARE ADULT IP CONSULT  PHYSICAL THERAPY ADULT IP CONSULT  OCCUPATIONAL THERAPY ADULT IP CONSULT    Code Status   Full Code    Time Spent on this Encounter   I, Susanne Silva, personally saw the patient today and spent greater than 30 minutes discharging this patient.       Susanne Silva, APRN  CNP  Hospitalist Service, Gold 7  General acute hospital, [place]  Pager: [phone]  Please see sticky note for cross cover information    ______________________________________________________________________    Physical Exam   Vital Signs: Temp: 97.5  F (36.4  C) Temp src: Oral BP: (!) 74/44 Pulse: 79 Heart Rate: 81 Resp: 16 SpO2: 95 % O2 Device: None (Room air)    Weight: 159 lbs 4.8 oz    GENERAL: Alert and oriented x 3. Well nourished, well developed.  No acute distress.    HEENT: Normocephalic, atraumatic. Anicteric sclera. Mucous membranes moist.   CV: RRR. S1, S2. No murmurs appreciated.   RESPIRATORY: Effort normal on room air. Lungs CTAB with no wheezing, rales, or rhonchi.   GI: Abdomen soft and non distended, bowel sounds present x all 4 quadrants. No tenderness, rebound, or guarding.   NEUROLOGICAL: No focal deficits. Follows commands.  Strength equal in upper extremities.   MUSCULOSKELETAL: No joint swelling or tenderness. Moves all extremities.   EXTREMITIES: No gross deformities. Dependent peripheral edema of bilateral lower extremities.   SKIN: Grossly warm, dry, and intact. No jaundice. No rashes.          Primary Care Physician   Joe Mandel    Discharge Orders      Home care nursing referral      Home Care PT Referral for Hospital Discharge      Home Care OT Referral for Hospital Discharge      Home Care SLP Referral for Hospital Discharge      Reason for your hospital stay    GI bleed     Adult [facility] Follow-up and recommended labs and tests    Follow up with primary care provider, Joe Mandel, within 7 days to evaluate medication change.  The following labs/tests are recommended: BMP/CBC    Follow up with cardiology on 6/24/19 at 1PM with     Appointments on [place] and/or [facility] (with [facility] or [facility] provider or service). Call [phone] if you haven't heard regarding these appointments within 7 days of discharge.     Activity    Your activity  upon discharge: activity as tolerated     MD face to face encounter    Documentation of Face to Face and Certification for Home Health Services    I certify that patient: Patrick Diop is under my care and that I, or a nurse practitioner or physician's assistant working with me, had a face-to-face encounter that meets the physician face-to-face encounter requirements with this patient on: 6/10/2019.    This encounter with the patient was in whole, or in part, for the following medical condition, which is the primary reason for home health care: Anemia, GI Bleed.    I certify that, based on my findings, the following services are medically necessary home health services: Nursing, Occupational Therapy, Physical Therapy and Speech Language Therapy.    My clinical findings support the need for the above services because: Nurse is needed: To assess status after changes in medications or other medical regimen, Occupational Therapy Services are needed to assess and treat cognitive ability and address ADL safety due to impairment in function, Physical Therapy Services are needed to assess and treat the following functional impairments: strength, mobilitry. and Speech Therapy Services are needed to assess and treat impairments in language and/or swallow functions due to swallow.    Further, I certify that my clinical findings support that this patient is homebound (i.e. absences from home require considerable and taxing effort and are for medical reasons or [facility] services or infrequently or of short duration when for other reasons) because: Requires assistance of another person or specialized equipment to access medical services because patient: Is unable to exit home safely on own due to: weakness. and Requires supervision of another for safe transfer.    Based on the above findings. I certify that this patient is confined to the home and needs intermittent skilled nursing care, physical therapy and/or speech  therapy.  The patient is under my care, and I have initiated the establishment of the plan of care.  This patient will be followed by a physician who will periodically review the plan of care.  Physician/Provider to provide follow up care: Joe Mandel    Attending hospital physician (the Medicare certified [place] provider): Nirmal Acosta*  Physician Signature: See electronic signature associated with these discharge orders.  Date: 6/10/2019     Full Code     Diet    Follow this diet upon discharge: Orders Placed This Encounter      Combination Diet Regular Diet Adult; 2 gm NA Diet       Significant Results and Procedures   Most Recent 3 CBC's:  Recent Labs   Lab Test 06/10/19  0458 06/09/19  2106 06/09/19  0501  06/08/19  0613   WBC 6.2  --  7.7  --  5.8   HGB 7.3* 7.3* 7.2*   < > 7.6*  7.6*   *  --  103*  --  103*     --  176  --  188    < > = values in this interval not displayed.     Most Recent 3 BMP's:  Recent Labs   Lab Test 06/10/19  0458 06/09/19  0501 06/08/19  0613    136 136   POTASSIUM 4.2 4.3 4.6   CHLORIDE 102 101 102   CO2 24 24 24   * 114* 125*   CR 1.74* 1.83* 1.87*   ANIONGAP 11 10 10   AMY 8.5 8.5 8.6   GLC 91 81 84     Most Recent 2 LFT's:  Recent Labs   Lab Test 06/10/19  0458 06/09/19  0501   AST 12 17   ALT 35 44   ALKPHOS 49 52   BILITOTAL 0.4 0.4     Most Recent 3 INR's:  Recent Labs   Lab Test 06/07/19  1242 02/11/19  1541   INR 1.32* 1.59*   ,   Results for orders placed or performed during the hospital encounter of 06/01/19   XR Chest 2 Views    Narrative    XR CHEST 2 VW 6/1/2019 1:25 PM    COMPARISON: 2/16/2019    HISTORY: Dyspnea, bilateral lower extremity edema.      Impression    IMPRESSION: Small to moderate bilateral pleural effusions with  associated atelectasis, not significantly changed since 2/16/2019. No  pneumothorax on either side. Implanted cardiac pacer/defibrillator  over the LEFT chest in unchanged position.    MARIELY BEST MD    US JUAN Doppler with Exercise    Narrative    PROCEDURE:   JUAN with Doppler Waveforms, segmental pressures, and JUAN exercise of  the bilateral lower extremities    DATE OF PROCEDURE:   6/3/2019 2:39 PM     CLINICAL HISTORY/INDICATION:  open weeping wounds on leg, unable to obtain DP pulse by hand held  doppler, PT pulse monophasic and poor quality via hand held doppler    COMPARISON:  CT chest abdomen pelvis 11/13/2018    TECHNIQUE:  Resting and exercise ankle branchial indices and waveform analysis of  the bilateral lower extremities    FINDINGS:  The patient performed a pump is in bed for 2 minutes before his legs  became too tired.    The resting and exercise ABIs on the right are 0.98 and 0.80   respectively    The resting and exercise ABIs on the left are 0.48 and 0.51   respectively    Right:  Resting JUAN 0.48 with severely dampened/monophasic waveforms.    Left:  Resting JUAN 0.98 with monophasic waveforms.      Impression    IMPRESSION:  1.  Normal resting ABIs on the right that worsens with mild exercise  becoming compatible with moderate arterial insufficiency. Tibial  waveforms are dampened and monophasic.  2.  Resting JUAN on the left compatible with severe arterial  insufficiency.  3.  JUAN findings are congruent with CT chest abdomen pelvis November 2018 that showed bilateral common iliac artery stenosis and left  external iliac artery occlusion.    JUAN Diagnostic Criteria      >/= 1.3          Non compressible   0.95 - 1.29     Normal   0.90 - 0.94     Mild PAD   0.50 - 0.89     Moderate PAD   0.20 - 0.49     Severe PAD   < 0.20             Critical PAD    URSULA ODELL MD       Discharge Medications   Discharge Medication List as of 6/10/2019  3:22 PM      START taking these medications    Details   pantoprazole (PROTONIX) 40 MG EC tablet Take 1 tablet (40 mg) by mouth 2 times daily (before meals), Disp-90 tablet, R-0, E-Prescribe         CONTINUE these medications which have CHANGED    Details  "  torsemide (DEMADEX) 20 MG tablet Take 1 tablet (20 mg) by mouth daily Take  once daily. Hold for 1 day if you start having more dizziness. If daily weight increases by 2 lb or if legs are swollen, give an additional 20mg., Transitional         CONTINUE these medications which have NOT CHANGED    Details   aspirin (ASA) 81 MG EC tablet Take 1 tablet (81 mg) by mouth daily, Disp-90 tablet, R-2, E-Prescribe      atorvastatin (LIPITOR) 40 MG tablet Take 1 tablet (40 mg) by mouth daily, Disp-90 tablet, R-2, E-Prescribe      Cholecalciferol (VITAMIN D) 1000 UNITS capsule Take 1 capsule by mouth every morning , Historical      clopidogrel (PLAVIX) 75 MG tablet Take 1 tablet (75 mg) by mouth daily, Disp-90 tablet, R-3, E-Prescribe      levothyroxine (SYNTHROID/LEVOTHROID) 100 MCG tablet Take 1 tablet (100 mcg) by mouth daily, Disp-90 tablet, R-0, E-Prescribe      lisinopril (PRINIVIL/ZESTRIL) 2.5 MG tablet Take 1 tablet (2.5 mg) by mouth At Bedtime, Disp-90 tablet, R-0, E-Prescribe      metoprolol succinate ER (TOPROL-XL) 25 MG 24 hr tablet Take 12.5 mg by mouth every morning , Disp-45 tablet, R-3, Historical      miconazole (MICATIN) 2 % AERP powder Apply topically 2 times dailyDisp-130 g, [id number]      multivitamin  with lutein (OCUVITE WITH LTEIN) CAPS Take 1 capsule by mouth daily, Historical      nystatin (MYCOSTATIN) 624443 UNIT/GM external cream Apply topically 2 times dailyDisp-85 g, [id number]      !! order for DME Equipment being ordered: Hospital Bed from [facility]-1 each, R-0, Local Print      !! order for DME Equipment being ordered: 4-Wheel Walker with seat. He is 5' 7\" tall.  [facility]: Fax: [phone]  phone [phone] Device, R-0, Local Print       !! - Potential duplicate medications found. Please discuss with provider.        Allergies   No Known Allergies     Addendum to correct date of service  "

## 2019-06-09 NOTE — PROGRESS NOTES
Brief GI note    No further bleeding noted be chart review. Hgb stable.     EGD 6/8 with multiple non-bleeding duodenal ulcers each with a clean ulcer base (Rashawn Class III) not requiring therapy, and one single red pigmented spot thought to possibly represent a superficial vessel managed by hemostatic clip.    Recommendations:   - PO PPI BID x 1 month, then daily   - Ok to continue aspirin/Plavix if indicated from a risk/benefit standpoint for cardiac disease  - No need for repeat EGD given duodenal location of ulcers and overall health status and comorbidities     GI will sign off    Discussed with Dr. Yamileth Wright MD  GI Fellow  p [phone]

## 2019-06-09 NOTE — PLAN OF CARE
Neuro: A&O x 4; able to make needs known.   Cardiac: SR with HR in 70-80s. BPs 70-90s/40-60s. NP if SBP < 70 or MAP < 60. Afebrile.   Respiratory: RA. Denies SOB and/or cough. LS clear with fine crackles in bases.   GI/: Last BM 6/7, +BS and passing gas. Frequent small voids but adequate output.   Diet/Appetite: 2gm Na diet with good appetite.   Activity: Ax1 for standing for urinal.   Pain: Denies.   Skin: Open sores on buttocks bilaterally. Non-sting barrier and sacral mepilex applied. Turn/weight shift q2hour. WOC consult placed. Lymphedema consulted for lower extremities swelling and weeping. Abdominal pads with kerilex wrap in place.   LDAs: R and L PIVs saline locked. ICD.     Continue with POC. Notify primary team with changes.   Maria Esther Zazueta RN

## 2019-06-09 NOTE — PLAN OF CARE
Discharge Planner SLP   Patient plan for discharge: unknown  Current status: Clinical swallow evaluation completed per MD order. Pt presents with functional oral-pharyngeal swallow on exam. Mastication/bolus manipulation adequate, despite edentulous status, and no s/sx of aspiration observed across textures. Recommend continue regular texture diet with thin liquids as tolerated. Pt to continue to self select softer foods as needed, sit upright for all PO intake, and take small bites/sips. Pt is demonstrating baseline swallow function; no ongoing ST needs at this time. Will sign off  Barriers to return to prior living situation: none from ST standpoint  Recommendations for discharge: Will defer to medical team  Rationale for recommendations: No ongoing ST needs at this time.       Entered by: Nusrat Shields 06/09/2019 8:48 AM

## 2019-06-09 NOTE — PROGRESS NOTES
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 He presented to [facility] ED on 6/7/19 with weakness, lightheadedness and hypotension, melena x1 and hgb of 6.3. He received 2 units pRBC and was transferred to [facility] on 6/7 for further management.      Acute blood loss anemia 2/2 GI bleed, duodenal ulcers   Hx of colon cancer  Presented w/melena x1, hgb of 6.3. Hx colon cancer 2001 s/p surgical resection (done in [place], [place]) and most recent colonoscopy was 6-7 years ago (also in [facility]) that were reportedly \"normal\". EGD on 6/8 with multiple non-bleeding duodenal ulcers, bleeding superficial vessel which was clipped. Received 2 units PRBC at OSH, hgb improved and stable at ~7.2. No e/o ongoing bleeding.    - GI consulted, appreciate recommendations   - Continue Protonix 40 mg BID to complete a 30-day course    - Continue regular diet   - Trend hgb q8h, transfuse for hgb <7 (consented)   - Contact GI if ongoing bleeding or becomes hemodynamically unstable   - He will need repeat EGD in 2 months     Chronic hypotension 2/2 acute on chronic systolic heart failure: Baseline BPs ~80s-90s/50s-60s. HF suspected to be 2/2 underlying severe cardiomyopathy. Had ICD placed 2/15/19 - since no further syncopal events. PTA meds as below. BP down to ~60/40 upon admission in setting of hypovolemia and GI bleed, now stable at his baseline. He is asymptomatic.    - Consider resuming home lisinopril, Toprol tomorrow   - IV fluid bolus 250 mL PRN for MAP <60 and/or SBP <85 if " he is symptomatic    - Vitals q4h   - Tele monitoring     3 vessel CAD, hx NSTEMI  Severe ischemic cardiomyopathy (LVEF 15%)  S/p ICD (2/15)  Hyperlipidemia   Diagnosed 12/2018 with LVEF 20-25% unclear if due to ischemia vs prior chemotherapy. Cardiac MRI 2/13/19 w/severely dilated LVEF (15%) and stress imaging showed a medium sized transmural infarct perfusion defects c/w ischemia again in distribution of LAD. Angiogram 2/14 with 3v CAD ( of RCA and L Cx, subtotal occlusion of mid-LAD) - not CABG candidate. Follows as OP with cardiology, last seen 4/2019 by Dr. Calderón and 4/22 by [facility] w/ Amanda Mosquera PA-C. Medical management of his ICM limited 2/2 his hypotension with frequent medication titrations. PTA on Toprol XL 12.5 mg daily, Lisinopril 2.5 mg daily, Torsemide 20 mg daily (but taking BID x4-5 days), atorvastatin 40 mg daily, ASA 81 mg daily and Plavix 75 mg daily. Dry weight of ~152 pounds, currently 159.    - Continue ASA 81, Plavix 75. Discussed with Cardiology today, could discontinue Plavix to reduce bleeding risk or discontinue both medications depending on what the patient's goals of care are.    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 Superficial wound swabs polymicrobial and likely contaminant, no e/o infection.    - WOCN, lymphedema consults   - Continue torsemide 20 mg daily   - Daily weights   - Strict intake and output     ALEXI on CKD  "stage III: Baseline Cr appears to be ~1.1-1.2 but ~1.3-1.5 since recent admission. Cr 1.87 here, most likely pre-renal due to hypovolemia with GI bleed, recent increased torsemide dosing.    - Trend Cr   - Holding off on IVF resuscitation given low EF   - Encourage PO fluid intake   - Torsemide as above    Goals of care: Patient expressing desire to limit hospitalizations with palliative focus, has not made a final decision regarding changing his code status at this point.    - Will need to discuss ICD settings with Cardiology if he wishes to be DNR/DNI   - Palliative care consult      Other Chronic Medical Issues:  Hypothyroidism: Most recent TSH 5.54, FT4 1.41 (3/4/19) managed on Levothyroxine 100 mcg daily. Continue PTA Synthroid.   Recurrent tongue cancer: biopsy w/SCC, s/p PET (10/2015) w/ mass at level of tongue base. S/p chemoradiation therapy. Recurrence 11/2018 and had been on Keytruda immune therapy but this has been held since 12/2018 2/2 severe heart failure.         Diet: Combination Diet Regular Diet Adult; 2 gm NA Diet    DVT Prophylaxis: Pneumatic Compression Devices  Zamora Catheter: not present  Code Status: Full Code      Disposition Plan   Expected discharge: Tomorrow, recommended to prior living arrangement once hemoglobin stable.  Entered: Samara De La O PA-C 06/09/2019, 2:26 PM       The patient's care was discussed with the Attending Physician, Dr. Acosta, Bedside Nurse, Patient and Patient's Family.    Samara De La O PA-C  Hospitalist Service, [address], [place]  Pager: [phone]  Please see sticky note for cross cover information  ______________________________________________________________________    Interval History   Patrick reports doing well overall. Denies any dyspnea, lightheadedness, dizziness, or chest pain. His leg edema is improved overall over the past 1 week. He reports his blood pressures are typically around \"80\" systolic " at home. His SBP has been in the 80s here and he is asymptomatic.     Had discussion with patient and daughter regarding goals of care going forward. Patient expressed understanding regarding the complicated nature of treating his multiple medical problems and the clear negative impact treating one system has one another (I.e. His aspirin and plavix currently contributing to GI bleed). Code status, palliative care, and hospice were discussed. He would like to think more about what he would like his medical care to look like going forward. He would like to stay tonight and meet with palliative care before discharge tomorrow. He otherwise is adamant that he is feeling well and strongly wishes to go home.     Data reviewed today: I reviewed all medications, new labs and imaging results over the last 24 hours.     Physical Exam   Vital Signs: Temp: 97.7  F (36.5  C) Temp src: Oral BP: (!) 87/49 Pulse: 79 Heart Rate: 73 Resp: 18 SpO2: 97 % O2 Device: None (Room air) Oxygen Delivery: 2 LPM  Weight: 160 lbs 4.8 oz  Constitutional: Awake and alert, lying comfortably in bed, in no apparent distress.   Eyes: Sclera clear, anicteric  Respiratory: Breathing comfortably. CTA bilaterally with no crackles or wheezing.   Cardiovascular: RRR, normal S1/S2. No rubs or murmurs. Bilateral pedal pulses intact. 2+ bilateral pitting edema of LE extending to mid-thigh.   GI: Bowel sounds present. Soft, non-tender, non-distended.   Skin: Warm and dry. Good color. No jaundice.   Neurologic: Alert and oriented. No focal deficits.     Data   ROUTINE IP LABS (Last four results)  Recent Labs   Lab 06/09/19  0501 06/08/19  0613 06/07/19  1242 06/07/19  1133 06/03/19  0522    136 136 131* 138   POTASSIUM 4.3 4.6 5.1 4.8 4.8   CHLORIDE 101 102 99 98 103   CO2 24 24 25 25 29   ANIONGAP 10 10 12 8 6   GLC 81 84 93 105* 80   * 125* 114* 116* 67*   CR 1.83* 1.87* 1.88* 1.98* 1.36*   AMY 8.5 8.6 8.8 8.8 8.5   PROTTOTAL 5.5*  --  6.5*  --   5.8*   ALBUMIN 2.7*  --  3.1*  --  2.7*   BILITOTAL 0.4  --  0.3  --  0.5   ALKPHOS 52  --  64  --  59   AST 17  --  30  --  14   ALT 44  --  72*  --  20     Recent Labs   Lab 06/09/19  0501 06/08/19  2112 06/08/19  1425 06/08/19  1316 06/08/19  0613  06/07/19  2103 06/07/19  1242   WBC 7.7  --   --   --  5.8  --  5.7 7.2   RBC 2.23*  --   --   --  2.39*  --  2.44* 2.01*   HGB 7.2* 7.1* 7.6* 7.6* 7.6*  7.6*   < > 7.8* 6.9*   HCT 23.0*  --   --   --  24.7*  --  25.3* 21.7*   *  --   --   --  103*  --  104* 108*   MCH 32.3  --   --   --  31.8  --  32.0 34.3*   MCHC 31.3*  --   --   --  30.8*  --  30.8* 31.8   RDW 19.0*  --   --   --  18.9*  --  18.1* 14.8     --   --   --  188  --  177 206    < > = values in this interval not displayed.     Recent Labs   Lab 06/07/19  1242   INR 1.32*        Glucose Values Latest Ref Rng & Units 6/3/2019 6/7/2019 6/7/2019 6/7/2019 6/7/2019 6/8/2019 6/9/2019   Bedside Glucose (mg/dl )  - -- -- -- -- 85 -- --   GLUCOSE 70 - 99 mg/dL 80 105(H) 93 85 -- 84 81   Some recent data might be hidden

## 2019-06-09 NOTE — PHARMACY-ADMISSION MEDICATION HISTORY
Admission medication history interview status for the 6/7/2019 admission is complete. See Epic admission navigator for allergy information, pharmacy, prior to admission medications and immunization status.     Medication history interview sources:  Jamila Gutierrez medication list    Changes made to [facility] medication list (reason)  Added: None    Deleted: None    Changed:   -- updated torsemide directions    Additional medication history information (including reliability of information, actions taken by pharmacist):      Prior to Admission medications    Medication Sig Last Dose Taking? Auth Provider   aspirin (ASA) 81 MG EC tablet Take 1 tablet (81 mg) by mouth daily  Patient taking differently: Take 81 mg by mouth every morning  6/8/2019 Yes Lionel Calderón MD   Cholecalciferol (VITAMIN D) 1000 UNITS capsule Take 1 capsule by mouth every morning  Past Week at Unknown time Yes Reported, Patient   clopidogrel (PLAVIX) 75 MG tablet Take 1 tablet (75 mg) by mouth daily  Patient taking differently: Take 75 mg by mouth every morning  6/8/2019 at Unknown time Yes Jorje Luong MD   levothyroxine (SYNTHROID/LEVOTHROID) 100 MCG tablet Take 1 tablet (100 mcg) by mouth daily  Patient taking differently: Take 100 mcg by mouth every morning  6/8/2019 at Unknown time Yes Keyana Vegas APRN CNP   nystatin (MYCOSTATIN) 981246 UNIT/GM external cream Apply topically 2 times daily Past Week at Unknown time Yes Keyana Vegas APRN CNP   torsemide (DEMADEX) 20 MG tablet Take 1 tablet (20 mg) by mouth daily as needed (edema) For 2 lb weight gain in a day or legs are swollen.  Patient taking differently: Take 20 mg by mouth daily Take 2x a day until weight is down to 152lbs, then take once daily. Hold for 1 day if you start having more dizziness. If daily weight increases by 2 lb or if legs are swollen, give an additional 20mg. 6/8/2019 at Unknown time Yes Keyana Vegas APRN CNP   atorvastatin (LIPITOR) 40 MG tablet Take 1 tablet (40  "mg) by mouth daily  Patient taking differently: Take 40 mg by mouth every evening  Unknown at Unknown time  Lionel Calderón MD   lisinopril (PRINIVIL/ZESTRIL) 2.5 MG tablet Take 1 tablet (2.5 mg) by mouth At Bedtime Unknown at Unknown time  Keyana Vegas APRN CNP   metoprolol succinate ER (TOPROL-XL) 25 MG 24 hr tablet Take 12.5 mg by mouth every morning  Unknown at Unknown time  Amanda Mosquera PA-C   miconazole (MICATIN) 2 % AERP powder Apply topically 2 times daily Unknown at Unknown time  Keyana Vegas APRN CNP   multivitamin  with lutein (OCUVITE WITH LTEIN) CAPS Take 1 capsule by mouth daily Unknown at Unknown time  Reported, Patient   order for DME Equipment being ordered: Hospital Bed from Keyana Kerr APRN CNP   order for DME Equipment being ordered: 4-Wheel Walker with seat. He is 5' 7\" tall.  [facility]: Fax: [phone]  phone [phone]   Joe Mandel MD         Medication history completed by:   Herminio Luong, Pharm.D, BCPS      "

## 2019-06-09 NOTE — PROGRESS NOTES
"   06/09/19 0839   General Information   Onset Date 06/07/19   Start of Care Date 06/09/19   Referring Physician Elma Brown NP   Patient Profile Review/OT: Additional Occupational Profile Info See Profile for full history and prior level of function   Patient/Family Goals Statement Pt reports no change in swallow function with admission   Swallowing Evaluation Bedside swallow evaluation   Behaviorial Observations WFL (within functional limits)  (pleasant/cooperative)   Mode of current nutrition Oral diet   Type of oral diet Regular;Thin liquid   Respiratory Status Room air   Comments Orders received for swallow evaluation. 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 He presented to [facility] ED on 6/7/19 with weakness, lightheadedness and hypotension, melena x1 and hgb of 6.3. He received 2 units pRBC and was transferred to [facility] on 6/7 for further management. Pt reports h/o difficulty swallowing [id number] following chemoradiation for lingual CA. Now he just knows \"what works and what doesn't\".    Clinical Swallow Evaluation   Oral Musculature generally intact   Structural Abnormalities none present   Dentition edentulous, does not have dentures   Mucosal Quality good   Laryngeal Function Cough;Throat clear;Swallow;Voicing initiated;Dry swallow palpated   Oral Musculature Comments Pt with baseline deficits related to h/o lingual cancer treated with chemoradiation. reduced ROM and strength noted throughout, but overall functional   Additional Documentation Yes   Swallow Eval   Feeding Assistance no assistance needed   Clinical Swallow Eval: Thin Liquid Texture Trial   Mode of Presentation, Thin Liquids straw;self-fed   Volume of Liquid " or Food Presented 5oz   Oral Phase of Swallow WFL   Pharyngeal Phase of Swallow intact  (no s/sx of aspiration observed)   Diagnostic Statement swallow functional for thin liquids on exam   Clinical Swallow Eval: Puree Solid Texture Trial   Mode of Presentation, Puree spoon;self-fed   Volume of Puree Presented tsp bites x5   Oral Phase, Puree WFL   Pharyngeal Phase, Puree intact  (no s/sx of aspiration observed)   Diagnostic Statement swallow functional for puree textures   Clinical Swallow Eval: Solid Food Texture Trial   Mode of Presentation, Solid self-fed   Volume of Solid Food Presented 2 nino crackers   Oral Phase, Solid WFL  (mastication/bolus manipulation adequate despite no dentition)   Pharyngeal Phase, Solid intact  (no s/sx of aspiration observed)   Diagnostic Statement swallow functional for solid textures on exam   Swallow Compensations   Swallow Compensations No compensations were used   Esophageal Phase of Swallow   Patient reports or presents with symptoms of esophageal dysphagia No   Swallow Eval: Clinical Impressions   Skilled Criteria for Therapy Intervention No problems identified which require skilled intervention   Functional Assessment Scale (FAS) 7   Treatment Diagnosis functional oral-pharyngeal swallow   Diet texture recommendations Regular diet;Thin liquids   Recommended Feeding/Eating Techniques alternate between small bites and sips of food/liquid;maintain upright posture during/after eating for 30 mins;small sips/bites   Predicted Duration of Therapy Intervention (days/wks) evaluation only   Anticipated Discharge Disposition   (will defer to medical team)   Risks and Benefits of Treatment have been explained. Yes   Patient, family and/or staff in agreement with Plan of Care Yes   Clinical Impression Comments Clinical swallow evaluation completed per MD order. Pt presents with functional oral-pharyngeal swallow on exam. Mastication/bolus manipulation adequate, despite edentulous  status, and no s/sx of aspiration observed across textures. Recommend continue regular texture diet with thin liquids as tolerated. Pt to continue to self select softer foods as needed, sit upright for all PO intake, and take small bites/sips. Pt is demonstrating baseline swallow function; no ongoing ST needs at this time. Will sign off   Total Evaluation Time   Total Evaluation Time (Minutes) 16

## 2019-06-10 NOTE — PLAN OF CARE
Discharge Planner PT   Patient plan for discharge: Back to [facility]  Current status: Requires min A for supine<>sit. Sit<>stand CGA 4WW. Amb 150' with CGA/4WW. No concerns to discharge home to [facility]. VSS on RA.  Barriers to return to prior living situation: None  Recommendations for discharge: shelter with home PT  Rationale for recommendations: Current level of function       Entered by: Jose Gray 06/10/2019 4:19 PM

## 2019-06-10 NOTE — CONSULTS
[facility]  Palliative Care Consultation Note    Patient: Patrick Diop  Date of Admission:  6/7/2019    Requesting Clinician / Team: TIN Quintero  Reason for consult: introduction to palliative care, review goals of care    Recommendations:    Continued conversations re. goals of care to insure that they are aligned with Mr. Diop's treatment plan. He is discharging today and so we have recommended outpatient f/u with palliative at [facility] and will assist with making the referral    Currently remains full code, restorative goals of care, eager for discharge.       These recommendations have been discussed with Susanne Silva.      Thank you for the opportunity to participate in the care of this patient and family. Our team: does not plan on following further, however do not hesitate to call or re-consult if we can be of further assistance to the patient/family.     During regular M-F work hours--if you are not sure who specifically to contact--please contact us by sending a text page to our team consult pager at [phone].    After regular work hours and on weekends/holidays, you can call our answering service at [phone]. Also, who's on call for us is available in [facility] Smart Web.     Sharmila Ortega M.D.  Palliative Medicine Fellow    Patient seen and evaluated with Dr. Ortega.   Agree with assessment and recommendations.    Total time spent was 65 minutes,  >50% of time was spent counseling and/or coordination of care regarding goals of care.    Natividad Currie  Palliative Care   Pager [phone]      Assessments:  Patrick Diop is a 83 year old male admitted on 6/7/2019. 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 ED earlier today with weakness, lightheadedness and hypotension (60s/40s) and found to have Hgb 6.3 with one black stool concerning for GIB. Transfused 2 units pRBC and transferred to [facility] for further care.     Today, the patient was seen for introduction to palliative care and review of goals of care.      Prognosis, Goals, & Planning:      Functional Status just prior to hospitalization: 2 (Ambulatory and capable of all selfcare but unable to carry out any work activities; may need help with IADLs up and about > 50% of waking hours)      Prognosis, Goals, and/or Advance Care Planning were addressed today: Yes  Mr. Diop understands that he has no surgical options.  He understands also that the treatment of one system is having ill-effects on another.  He is willing to keep things \"status quo\" aware of the risks, for example, of GI bleeding if he remains on ASA and clopidogrel or ALEXI if he remains on torsemide.  While his goal is to minimize hospitalizations and stay at the assisted living facility where he has lived the past month, he is not ready to forego further treatment when \"I feel so good\".  We discussed palliative care at length.  He would be willing to follow-up as an outpatient if there was a place somewhere near [facility].  While Mr. Diop does not have advance care planning paperwork completed, he looks to his daughter, Florecita, to make decisions in his best interests.  We reviewed and completed a POLST form this afternoon.  He does want chest compressions should his heart stop.  He does NOT want his ICD turned off.  He does want to be intubated and supported by mechanical ventilation should he experience respiratory failure.        Patient's decision making preferences: with input from medical clinicians and loved ones      Patient has decision-making capacity today for complex decisions: Yes      I have " "concerns about the patient/family's health literacy today: No      Patient has a completed Health Care Directive: No.       Code status: full code    Coping, Meaning, & Spirituality:   Mood, coping, and/or meaning in the context of serious illness were addressed today: Yes     Mr. Diop states that he is neither Faith nor spiritual.  He does not \"look for meaning\"    Social:     Living situation: Small assisted living home.  LPN on site.  RN available 24/7.  Many CPA.      Key family / caregivers: Daughter, son-in-law    Occupational history: retired;  Sold commercial insurance    Substance use history: none    Financial concerns: none    Current in-home services: home care services through  [facility]:  RN, OT/PT/Speech    History of Present Illness:  History gathered today from: patient, family/loved ones, medical chart, medical team members     Patrick Diop is a 83 year old male admitted on 6/7/2019. 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 Diop was admitted with worsening anemia and melena in setting of aspirin and clopidogrel.  He reports that he \"barely felt bad\".  His daughter  interjects that he was weaker and that AL staff had reported dark colored stool.  He was seen at PCP's office and were called back to office when his lab work returned abnormal.  He had had no blood-tinged sputum, hemoptysis.  No abdominal pain.  No vomiting, diarrhea.  Normal, regular elimination of urine and stools.  Dizzy, lightheadedness on day of admission.  No syncope.  No headache.  No visual changes.  No sore throat.  No chest pain.  No shortness of breath.  No urgency, frequency, dysuria.  No " rashes.  +bruising especially over forearms.  +pressure ulcers.    Mr. Diop is able to feed himself but has a poor appetite which he says has never recovered since the radiation for his tongue cancer.  He has some   dysgeusia and dysphagia, both of which have improved over time.  He has never been treated for aspiration pneumonia.   He is not incontinent of bowel or bladder.  He experiences some shortness of breath with activity but rarely at rest.    He is fatigued even after sleeping at night.  He dozes during the day.     He feels like he is coping with his illness well.  He does not have specific concerns or worries.  He would prefer to die outside of the hospital.  He enjoys most days.      Key Palliative Symptom Data:  # Pain severity the last 12 hours: none  # Dyspnea severity the last 12 hours: low  # Nausea severity the last 12 hours: none  # Anxiety severity the last 12 hours: none        ROS:  Comprehensive ROS is reviewed and is negative except as here & per HPI.     Past Medical History:  Past Medical History:   Diagnosis Date     Acute on chronic systolic congestive heart failure (H) 2/10/2019     Acute on chronic systolic heart failure (H) 2/10/2019     Adult hypothyroidism      Colon cancer (H)      Hearing problem      Hyperlipidemia with target LDL less than 100 11/6/2013     Hypertension goal BP (blood pressure) < 140/90 11/6/2013     Ischemic cardiomyopathy 02/10/2019    Postulated to be due to chemotherapy, though agent not specified.     Non-ischemic cardiomyopathy (H) 2/10/2019    Postulated to be due to chemotherapy, though agent not specified.     Syncope      Tongue cancer (H)         Past Surgical History:  Past Surgical History:   Procedure Laterality Date     APPENDECTOMY  2001     COLECTOMY  Jan 2002    malignant polyp.  Colectomy and colostomy     COLONOSCOPY  Jan 2002    polyps     CV HEART CATHETERIZATION WITH POSSIBLE INTERVENTION N/A 2/14/2019    Procedure: Heart Catheterization  with possible Intervention;  Surgeon: Julio Vera MD;  Location:  HEART CARDIAC CATH LAB     ENDOSCOPY UPPER, COLONOSCOPY, COMBINED N/A 6/8/2019    Procedure: UPPER ENDOSCOPY with hemostasis;  Surgeon: Nicholas Carson MD;  Location: [facility] OR     EP ICD N/A 2/15/2019    Procedure: EP ICD;  Surgeon: Cj Gutierrez MD;  Location:  HEART CARDIAC CATH LAB     EYE SURGERY      B cataract     HC UGI ENDOSCOPY W PLACEMENT GASTROSTOMY TUBE PERCUT N/A 11/19/2015    Procedure: COMBINED ESOPHAGOSCOPY, GASTROSCOPY, DUODENOSCOPY (EGD), PLACE PERCUTANEOUS ENDOSCOPIC GASTROSTOMY TUBE;  Surgeon: Sergio Buenrostro MD;  Location: [facility]     LARYNGOSCOPY WITH BIOPSY(IES) N/A 4/3/2018    Procedure: LARYNGOSCOPY WITH BIOPSY(IES);  Direct Laryngoscopy, Biopsy Base Of Tongue;  Surgeon: Jj Hernandez MD;  Location: [facility] OR     TAKEDOWN COLOSTOMY           Family History:  Family History   Problem Relation Age of Onset     Other Cancer Mother 57        lung ca, with brain mets     Cancer Mother         Brain tumor, lung cancer     Cancer Father         Lung cancer     Cancer Brother         Allergies:  No Known Allergies     Medications:  I have reviewed this patient's medication profile and medications from this hospitalization.   Noted scheduled/PRN meds are:    APAP   Bisacodyl  Magnesium Citrate  Senna  Melatonin  Ondansetron  Prochlorperazine  Pantoprazole      Physical Exam:  Vital Signs: Temp: 97.5  F (36.4  C) Temp src: Oral BP: (!) 89/60(MD saw pt, no interventions ) Pulse: 81 Heart Rate: 81 Resp: 16 SpO2: 96 % O2 Device: None (Room air)    Weight: 159 lbs 4.8 oz     General: Thin, alert; oriented to person, place, time  Head: NCAT    Eyes:  PERRL EOMI, No scleral icterus  Ears: No otorrhea  Nose: Nares patent.  No rhinorrhea.  Mouth/Throat: No oral lesions.  MMM.  Good dentition.  Tongue midline.  Neck: Supple.  No masses.  Chest/Pulmonary: Symmetric chest wall excursion.  Easy, unlabored breathing.  Clear,  well-aerated lungs.  No adventitious breath sounds.  No accessory muscle use.    Cardiovascular: Normal heart sounds.  HR=80  No murmurs.  Symmetric peripheral pulses.   Abdomen: firm, NT/ND.  +bowel sounds.  No organomegaly, masses.  : deferred  Musculoskel/Extremities: Bilateral edema    Skin:  Mild pallor.  Pressure ulcers.  By report, oozing leg lesions, presently covered with bandages.  Neuro:  No focal deficits. Strength 5/5 x 4 extremities. Sensation intact.  Cooperative.  Calm.  Normal judgement/insight. Memory intact    Data reviewed:  Recent imaging reviewed    Recent lab data reviewed

## 2019-06-10 NOTE — PROGRESS NOTES
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 Remove only soiled paste, then reapply thin layer of critic aid barrier paste.  If complete removal is needed use baby/mineral oil. Avoid pre moisten wipes. Avoid use of diaper    Orders Written  Recommended provider order: NA  WOC Nurse follow-up plan:weekly  Nursing to notify the Provider(s) and re-consult the WOC Nurse if wound(s) deteriorates or new skin concern.    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 He presented to [facility] ED on 6/7/19 with weakness, lightheadedness and hypotension, melena x1 and hgb of 6.3. He received 2 units pRBC and was transferred to [facility] on 6/7 for further management.       Objective Data  Containment of urine/stool: Continent of bowel and Continent of bladder, but occasionally incontinent so wears pull up    Active Diet Order  Orders Placed This Encounter      Combination Diet Regular Diet Adult; 2 gm NA Diet      Diet      Output:   I/O last 3 completed shifts:  In: 670 [P.O.:420; I.V.:250]  Out: 1350 [Urine:1350]    Risk Assessment:   Sensory Perception: 3-->slightly limited  Moisture: 4-->rarely moist  Activity: 3-->walks occasionally  Mobility: 4-->no limitation  Nutrition: 3-->adequate  Friction and Shear: 2-->potential problem  Ken Score: 19                           Labs:   Recent Labs   Lab 06/10/19  0458  06/07/19  1242   ALBUMIN 2.6*   < > 3.1*   HGB 7.3*   < > 6.9*   INR  --   --  1.32*   WBC 6.2   < > 7.2    < > = values in this interval not displayed.       Physical Exam  Skin inspection: focused   Bilateral buttock. Was not consulted on bilateral legs, RN completed dressing change prior to visit with adaptic, ABD pads, and kerlix. This follows the recommended treatment at previous admission to [facility]    Wound Location:  Right coccyx  Wound History: chronic, per patient has no discomfort to this area, is happy with plan to apply barrier paste  Measurements (length x width x depth, in cm) 0.3 cm x 0.5 cm  x  0.1 cm   Wound Base: 100 % dermis  Tunneling N/A  Undermining N/A  Palpation of the wound bed: normal   Periwound skin: Appears to have chronic venous congestion  Color: purple, red and blanchable  Temperature: normal   Drainage:, none  Description of drainage: none  Odor: none  Pain: denies     Interventions  Current support surface: Standard  Atmos Air mattress  Current off-loading measures: Pillows under calves  Visual inspection of wound(s) completed  Wound Care: done per plan of care  Supplies: at bedside and floor stock  Education provided: importance of repositioning, plan of care and wound progress  Discussed plan of care with Patient and Nurse    Amanda Zamorano, RN, CWOCN

## 2019-06-10 NOTE — PROGRESS NOTES
Care Coordinator - Discharge Planning    Admission Date/Time:  6/7/2019  Attending MD:  Nirmal Acosta*     Data  Date of initial CC assessment: 6/10/19  Chart reviewed, discussed with interdisciplinary team.   Patient was admitted for:   1. Gastrointestinal hemorrhage associated with duodenal ulcer    2. Acute on chronic systolic congestive heart failure (H)      Assessment   Concerns with insurance coverage for discharge needs: None.  Current Living Situation: Patient lives in an assisted living facility.  Support System: Supportive and Involved  Services Involved: Home Care  Transportation at Discharge: Car and Family or friend will provide  Transportation to Medical Appointments:    - Name of caregiver: daughterflorecita  Barriers to Discharge:medical stability and a safe discharge plan    Coordination of Care and Referrals:  Per MD team, patient is ready for discharge today. Met with he and his daughter to discuss current services and potential discharge needs. Patient resides at an [facility], [facility]. He reports that he receives full services (meals, cleaning, ADL assistance and medication management. He was open to home care prior to admission([facility]-RN/PT/OT/SLP). Resumption orders have been placed. His daughter Florecita, is here and will transport him back to the [facility]. [facility] provider indicated that there was a problem with transportation. Discussed this with them. Patient reports that he is able to get in and out of a vehicle independently. His daughter transports him when she is available. Advised that there may be some services in his area. Offered [facility] as a resource. He reports that he already uses this serrvice, mainly for shopping and that it works well for him.     Plan  Anticipated Discharge Date:  today  Anticipated Discharge Plan:  Return to [facility] with home care from [facility] (RN/PT/OT/SLP). Daughter will transport.    CTS Handoff completed:  YES    Pieter Morton RN Care Coordinator  [phone]

## 2019-06-10 NOTE — PLAN OF CARE
Neuro: A&O x 4; able to make needs known.   Cardiac: SR with HR in 70-80s. BPs 70-90s/40-60s. NP if SBP < 70 or MAP < 60. Afebrile. 250 ml bolus of NS given for BPs 70s/40s.   Respiratory: RA. Denies SOB and/or cough. LS clear with fine crackles in bases.   GI/: Last BM 6/9, +BS and passing gas. Frequent small voids but adequate output.   Diet/Appetite: 2gm Na diet with good appetite.   Activity: Ax1 for standing for urinal.   Pain: Denies.   Skin: Open sores on buttocks bilaterally. Non-sting barrier and sacral mepilex applied. Turn/weight shift q2hour. ymphedema consulted for lower extremities swelling and weeping. Abdominal pads with kerilex wrap in place.   LDAs: R PIV saline locked. ICD.      Continue with POC. Notify primary team with changes.   Maria Esther Zazueta RN

## 2019-06-10 NOTE — PROGRESS NOTES
[facility]  Patient is currently open to home care services with [facility].  The patient is currently receiving RN,PT,OT and ST services.  [facility]  and team have been notified of patient admission.  [facility] liaison will continue to follow patient during stay.  If appropriate provide orders to resume home care at time of discharge.    Nadege Alan RN Liaison   [facility]

## 2019-06-10 NOTE — PROGRESS NOTES
"   06/10/19 1600   Quick Adds   Type of Visit Initial PT Evaluation   Living Environment   Lives With facility resident   Living Arrangements assisted living   Home Accessibility no concerns   Transportation Anticipated family or friend will provide;health plan transportation   Living Environment Comment Pt lives in small CADE. They can assist PRN.   Self-Care   Usual Activity Tolerance moderate   Current Activity Tolerance moderate   Regular Exercise No   Equipment Currently Used at Home walker, rolling   Activity/Exercise/Self-Care Comment Enjoys watching TV. Doesn't do much outside of this.   Functional Level Prior   Ambulation 1-->assistive equipment   Transferring 1-->assistive equipment   Fall history within last six months yes   Number of times patient has fallen within last six months 1  (\"passed out\")   Which of the above functional risks had a recent onset or change? ambulation   Prior Functional Level Comment Indep with mobility prior to admission with 4WW. Does need A with IADLs and some ADLs   General Information   Onset of Illness/Injury or Date of Surgery - Date 06/07/19   Patient/Family Goals Statement Wants to go home today.   Pertinent History of Current Problem (include personal factors and/or comorbidities that impact the POC) Patrick Diop is a 83 year old male admitted on 6/7/2019. 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 Transfused 2 units pRBC and transferred to [facility] for further care   Precautions/Limitations fall " "precautions   General Observations Supine in bed   Cognitive Status Examination   Orientation orientation to person, place and time   Level of Consciousness alert   Follows Commands and Answers Questions 100% of the time   Personal Safety and Judgment intact   Memory intact   Pain Assessment   Patient Currently in Pain No   Integumentary/Edema   Integumentary/Edema no deficits were identifed   Posture    Posture Forward head position;Protracted shoulders;Kyphosis   Range of Motion (ROM)   ROM Comment WFL   Strength   Strength Comments WFL   Bed Mobility   Bed Mobility Comments Supine>sit min A   Transfer Skills   Transfer Comments Sit>stand; SBA/4WW   Gait   Gait Comments Amb 15' with 4WW/SBA   Balance   Balance Comments SBA for standing dynamic/static balance with 4WW   Sensory Examination   Sensory Perception no deficits were identified   General Therapy Interventions   Planned Therapy Interventions balance training;neuromuscular re-education;gait training;home program guidelines;progressive activity/exercise   Clinical Impression   Criteria for Skilled Therapeutic Intervention yes, treatment indicated   PT Diagnosis Impaired functional mobility   Influenced by the following impairments Deconditioning/intolerance to prolonged upright mobility   Functional limitations due to impairments Bed mboility, transfers, gait balance endurance   Clinical Presentation Stable/Uncomplicated   Clinical Presentation Rationale Clinical judgement   Clinical Decision Making (Complexity) Low complexity   Therapy Frequency` 5 times/week   Predicted Duration of Therapy Intervention (days/wks) 2 weeks   Anticipated Discharge Disposition Home with Home Therapy;Home with Assist   Risk & Benefits of therapy have been explained Yes   Patient, Family & other staff in agreement with plan of care Yes   [facility] AM-PAC  \"6 Clicks\" V.2 Basic Mobility Inpatient Short Form   1. Turning from your back to your side while in a flat bed " without using bedrails? 3 - A Little   2. Moving from lying on your back to sitting on the side of a flat bed without using bedrails? 3 - A Little   3. Moving to and from a bed to a chair (including a wheelchair)? 3 - A Little   4. Standing up from a chair using your arms (e.g., wheelchair, or bedside chair)? 3 - A Little   5. To walk in hospital room? 3 - A Little   6. Climbing 3-5 steps with a railing? 2 - A Lot   Basic Mobility Raw Score (Score out of 24.Lower scores equate to lower levels of function) 17   Total Evaluation Time   Total Evaluation Time (Minutes) 10

## 2019-06-11 NOTE — PROGRESS NOTES
Clinic Care Coordination Contact  Care Coordination Communication    Referral Source: IP Handoff    Clinical Data: Patient was hospitalized at [facility] from 6/7/19 to 6/10/19 with diagnosis of GI bleed.     Home Care Contact:              Home Care Agency: [facility]              Contact name () and phone number: Debi Donnellmanuel FERNANDO [phone] but transitioning to [facility] with home Palliative Care.              Care Coordination contacted home care: Yes              Anticipated start of care date: 6/11/19 or 6/12/19    Patient Contact:                         Discharge instructions were reviewed with patient/caregiver.               Do you have any questions about your medications? Patient denies having any concerns, CADE Gutierrez manages patient's medications.              Follow up appointment is scheduled for TBD.              Provided 24 Hour Nurse Line and/or 24 Hour Appointment Scheduling: Yes              Home care has contacted patient: will be.              Patient questions/concerns: Patient requested in the hospital to limit his hospitalizations, he requested to meet with Palliative Care outpatient, Care Coordinator RN informed him they do have in home palliative care with [facility], he is interested in this. Care Coordinator RN agreed to let Debi FERNANDO CM with [facility] that patient is back at home and is wanting palliative care. He verbalized he is doing little better. Care Coordinator RN sending out complex patient care plan along with POLST and Health Care Directive. Care Coordinator RN also updated Debi that there is no Health Care Directive on file either, she verbalized she will have SW get involved to help with getting a Health Care Directive on file.    Plan: RN/SW Care Coordinator will await notification from home care staff informing RN/SW Care Coordinator of patients discharge plans/needs. RN/SW Care Coordinator will review chart and outreach to home care every 4 weeks and as needed.       Jacquelyn RAMIREZ, RN, PHN, [facility]  Primary Care Clinic RN Care Coordinator  [facility]   [email or web address]  Office:  [phone]

## 2019-06-11 NOTE — LETTER
[place] CARE COORDINATION  [facility]  [address] [id number] [phone]    June 11, 2019    Patrick Diop  [address] [facility] [address]      Dear Patrick,    I just wanted to update your information in your chart. I put a new Health Care Home Care plan with your current information. If you have any questions or concerns or something looks incorrect, please give me a call.     Otherwise, I will plan to continue to outreach to you as we have previously planned.     Please feel free to contact me at [phone] with any further questions or concerns that may arise. We at [facility] are focused on providing you with the highest-quality healthcare experience possible and that all starts with you.       Sincerely,     Jacquelyn RAMIREZ, RN, PHN, [facility]  Primary Care Clinic RN Care Coordinator  [facility]   [email or web address]  Office:  [phone]      Enclosed: I have enclosed a copy of the Complex Care Plan. This has helpful information and goals that we have talked about. Please keep this in an easy to access place to use as needed.

## 2019-06-11 NOTE — LETTER
Health Care Home - Access Care Plan    About Me:    Patient Name:  Patrick Diop    YOB: 1935  Age:                      83 year old   Myles MRN:     [id number] Telephone Information:   Home Phone [phone]   Mobile [phone]       Address:  [address] [facility] [address] Email address:  No e-mail address on record      Emergency Contact(s)   Name Relationship Lgl Grd Work Phone Home Phone Mobile Phone   MARGARITA BABCOCK Daughter No [phone]             Health Maintenance: Routine Health maintenance Reviewed: Due/Overdue   Health Maintenance Due   Topic Date Due     HF ACTION PLAN  1935     ZOSTER IMMUNIZATION (1 of 2) 12/17/1987     MEDICARE ANNUAL WELLNESS VISIT  12/17/2002     EYE EXAM  07/16/2017     PHQ-2  01/01/2019     A1C  03/19/2019     Pt to talk with provider about what is needed or can continue wait.        My Access Plan  Medical Emergency 031   Questions or concerns during clinic hours Primary Clinic Line, I will call the clinic directly: [facility] - [phone]   24 Hour Appointment Line [phone] or  [phone]) (toll free)   24 Hour Nurse Line [phone] (toll free)   Questions or concerns outside clinic hours 24 Hour Appointment Line, I will call the after-hours on-call line:   [facility] [phone] or [phone] ([phone]) (toll-free)   Preferred Urgent Care [facility], [phone]   Preferred Hospital [place] [place]  [phone]   Preferred Pharmacy [facility] [address]      Behavioral Health Crisis Line The National Suicide Prevention Lifeline at 1-483.469.3923 or 919                     My Care Team Members  Patient Care Team       Relationship Specialty Notifications Start End    Joe Mandel MD PCP - General Family Practice All results, Admissions 10/21/15      Phone: [phone] Fax: [phone] [address] [place] [address]    Jj Hernandez MD MD Otolaryngology  10/19/15     Phone: [phone] Fax: [phone]         [address] [address]    Kayla Tran, RN Continuity Care Coordinator Nurse Admissions 10/23/15     Phone: [phone]         Lanny Neil MD MD Hematology & Oncology  11/9/15     Phone: [phone] Fax: [phone]         [address] [facility] [address]    Caleb Charles MD MD Radiation Oncology  8/14/18     Phone: [phone] Fax: [phone] [address] [facility] [address]    Jacquelyn Arais, RN Lead Care Coordinator Primary Care - CC Admissions 2/25/19     Phone: [phone] Fax: [phone]        Lionel Calderón MD MD Cardiology  2/25/19     Phone: [phone] Pager: [phone] Fax: [phone] [address] [facility] [address]    Celina Mcleod APRN CNP Nurse Practitioner Nurse Practitioner  2/25/19     Phone: [phone] Fax: [phone] [address] [facility] [address]    [facility]  Cardiology  2/25/19     Phone: [phone]         Joe Mandel MD Assigned PCP   4/26/19     Phone: [phone] Fax: [phone] [address] [place] [address]           My Medical and Care Information  Problem List   Patient Active Problem List   Diagnosis     Hyperlipidemia with target LDL less than 100     Hypertension goal BP (blood pressure) < 140/90     Colon cancer (H)     CKD (chronic kidney disease) stage 3, GFR 30-59 ml/min (H)     Recurrent tongue cancer (H)     Chemotherapy induced nausea and vomiting     Acquired hypothyroidism     Ischemic cardiomyopathy     Acute on chronic systolic heart failure (H)     Venous stasis dermatitis of both lower extremities     Macrocytic anemia     GI bleed      Current Medications and Allergies:  See printed Medication Report

## 2019-06-17 NOTE — PROGRESS NOTES
Update from patients home care nurse, Debi. She stated patient taking torsemide 20mg/d. When he takes the 2nd torsemide of the day he is symptomatic - dizzy/lightheaded and too fatigued to be up. The current torsemide is to give bid IF weight up 2# and/or SOB. Will update Amanda Mosquera.  Patricia Enriquez RN on 6/17/2019 at 1:56 PM

## 2019-06-17 NOTE — PROGRESS NOTES
Call from patients home care nurse requesting order for lymphedema therapy. Patient last seen by Amanda Mosquera PA-C on 4/22 for CORE f/u. He has severe ischemic CM w/EF ~20%. Plan per this visit per weight increase per clinic scale w/some SOB sx:  - Take Torsemide 20 mg x 2 days, starting tomorrow morning. He will call me if his orthopnea does not resolve with this.   - On 4/25, start Toprol 12.5 mg daily with lunch.   - RTC in six weeks.     Patient then admitted to [facility] 6/1 - 6/3 w/venous stasis dermatitis and a/c sHF. He was diuresed and instructed to take torsemide 20mg bid until weight 152#. He was readmitted to [facility] 6/7 - 6/10 w/GIB.     Home care RN stating patients weight 158-159#. He has no SOB/MILLER, his lungs are clear. He has LE swelling w/superficial wounds she described as pinhole sized spots that are continuously weeping. He requires dressing changes 3X/day. She is not seeing any evidence of infection and he is afebrile.  She is requesting order for lymphedema clinic. His BP continues to be marginal = 70/30's at baseline and he is without sx(no dizziness/lightheadedness)     Future Appointments   Date Time Provider Department Center   6/24/2019 12:00 PM [place] [facility]   6/24/2019  1:00 PM Lionel Calderón MD [facility] PSA CLIN   7/8/2019  9:45 AM [facility]CT1 [facility]   7/10/2019 11:00 AM Lanny Neil MD [facility]   7/22/2019 12:00 AM AUGUSTIN DCR2 [facility] PSA CLIN     F/u as above - will review w/Amanda Mosquera. .  Patricia Enriquez, RN on 6/17/2019 at 11:04 AM

## 2019-06-18 NOTE — TELEPHONE ENCOUNTER
OK for lymphedema therapy.  Legs weeping copiously.  This can be done at home by homecare.  I will call homecare on 6/19/19  France Mock RN

## 2019-06-18 NOTE — PROGRESS NOTES
Hard for me to give orders when I haven't seen the patient following two admissions. When I saw him in April, he had gained a little weight with mild orthopnea which it appears resolved with short-term diuretic increase. This sounds like a marked change in his status. I see he has f/u arranged with Dr. Calderón on 6/24, but it sounds like he needs to be seen by us or PCP this week due to evidence of intolerance to current diuretic regimen. Is he bleeding again? Looks like Hgb was 7.3 on discharge one week ago. May need pRBC's. Please let me know. Thanks.

## 2019-06-18 NOTE — PROGRESS NOTES
[facility] now requests orders and shares plan of care/discharge summaries for some patients through "[employer].  Please REPLY TO THIS MESSAGE OR ROUTE BACK TO THE AUTHOR in order to give authorization for orders when needed.  This is considered a verbal order, you will still receive a faxed copy of orders for signature.  Thank you for your assistance in improving collaboration for our patients.    ORDER  Skilled Lymphedema 2 week1 ; 3 week 3 For gradient multi layer compression bandaging, MLD, node clearing, wound care per Nursing protocol and fitted into compression garments:    1. Patient will be able to tolerate gradient compression bandaging 23 hrs/day or wearing  of compression  garment during waking hours to prevent reaccumulation of extracellular fluid and promote wound healing/increased skin integrity.  2. Patient will perform fluid mobilization HEP with minimal assistance to improve lymphatic flow and venous return.    GOALS TO BE MET BY DISCHARGE    1. Patient and cargiver will verbalize understanding of techniques to independently manage their edema at home.  2. Patient/caregiver will be independent in donning/doffing, wearing schedule, and care of compression garment to maintain edema long term.  3. Circumferential measurement will be reduced by 10 percent in domingo LE to promote wound healing/improved skin integrity and enable patient to perform safe transfers and improved functional mobility.

## 2019-06-18 NOTE — PROGRESS NOTES
Reviewed w/Amanda Mosquera PA-C as below. Called [facility] KASSIE Carrera. She will talk with patients PCP today and arrange appt.  Patricia Enriquez RN on 6/18/2019 at 4:15 PM

## 2019-06-20 NOTE — TELEPHONE ENCOUNTER
[place] order change request, sent to PCP.    Delilah Gonsalez  [facility]

## 2019-06-20 NOTE — TELEPHONE ENCOUNTER
Form signed, faxed to [email or web address] at [phone] and sent to scanning.    Delilah Gonsalez  [facility]

## 2019-06-21 NOTE — TELEPHONE ENCOUNTER
[facility] now requests orders and shares plan of care/discharge summaries for some patients through [employer].  Please REPLY TO THIS MESSAGE OR ROUTE BACK TO THE AUTHOR in order to give authorization for orders when needed.  This is considered a verbal order, you will still receive a faxed copy of orders for signature.  Thank you for your assistance in improving collaboration for our patients.    CLt has worsening stage 2 pressure ulcers on buttocks, 2 declining areas and 1 new one, requesting OK to change treatment plan     ORDER  Cleanse area with normal saline or water, pat dry, apply skin prep to surrounding skin, use foam padded dressing, mepilix or equivalent change 2-3 times per week and as needed, Ok to use triad paste or critic aide ointment to surrounding red areas 2-3  Times per day and as needed for comfort     Jerri Darling Palliative RN  [phone]  [facility]

## 2019-06-24 PROBLEM — I50.9 HEART FAILURE (H): Status: ACTIVE | Noted: 2019-01-01

## 2019-06-24 PROBLEM — I95.9 HYPOTENSION: Status: ACTIVE | Noted: 2019-01-01

## 2019-06-24 NOTE — ED NOTES
Patient sent down from cardiac clinic for low BP.  Patient states has no chest pain or SOA.  Bilateral lower leg edema which patient states has improved.  Had legs recently wrapped for lymphedema.  States has no weakness, dizziness.  NSR on monitor.,  BP is low

## 2019-06-24 NOTE — PROGRESS NOTES
HPI and Plan:   This is an 83-year-old patient with history of severe ischemic cardiomyopathy.  He has end-stage heart failure.  Inability to tolerate any heart failure therapy.  Non-ST elevation MI recently. Medical Rx. Has an ICD for syncope earlier this year.  He was recently admitted to [facility] with GI bleeding.  Essentially his hemoglobin has halved over the last 3 months.  He is here for routine clinic visit.  He says that at home his blood pressure runs in the high 70s to low 80s on most days.  Sometimes it is as high as 100.  Today in clinic his blood pressure 60/40 despite rechecking 5 times in both hands.  He says he feels fine and is asymptomatic.  Complains of 2+ lower extremity edema.  He is in a wheelchair.  In the past he is contemplated hospice and potentially turning off his ICD but has never ended up making that decision. Continues to remain full code.  He has also seen palliative care for this.  Today he is again here with his daughter Florecita and at this time he seems a little more inclined towards palliative care.  Although he still thinks he wants to think about all of this.      His creatinine has gone from 1.7 up to 2.6 and his potassium is gone from 4.2-5.4.  Although he feels asymptomatic I do not think he is stable enough to go home with a blood pressure of 60/40. He is on Metoprolol 12.5 XL daily and lisinopril 2.5 mg daily.  I recommend transfer to the emergency department.  I have spoken to the ER nurse.  I do not think he needs fluids.  I would recommend stopping his lisinopril and metoprolol completely and I do not think we should rechallenge this patient for the moment.      If blood pressure gets worse or he gets symptomatic I would suggest adding low-dose dopamine. He is interested in meeting the palliative care team this hospitalization and is even contemplating on turning off his ICD.  I am okay with him stopping his Plavix given ongoing anemia and bleeding issues.  "It is reasonable to transfer him to the [facility] to see if outpatient inotropic therapy for palliative use can be considered for him.    Encounter Diagnosis   Name Primary?     Acute on chronic systolic congestive heart failure (H)        CURRENT MEDICATIONS:  Current Outpatient Medications   Medication Sig Dispense Refill     aspirin (ASA) 81 MG EC tablet Take 1 tablet (81 mg) by mouth daily (Patient taking differently: Take 81 mg by mouth every morning ) 90 tablet 2     atorvastatin (LIPITOR) 40 MG tablet Take 1 tablet (40 mg) by mouth daily (Patient taking differently: Take 40 mg by mouth every evening ) 90 tablet 2     Cholecalciferol (VITAMIN D) 1000 UNITS capsule Take 1 capsule by mouth every morning        clopidogrel (PLAVIX) 75 MG tablet Take 1 tablet (75 mg) by mouth daily (Patient taking differently: Take 75 mg by mouth every morning ) 90 tablet 3     levothyroxine (SYNTHROID/LEVOTHROID) 100 MCG tablet Take 1 tablet (100 mcg) by mouth daily (Patient taking differently: Take 100 mcg by mouth every morning ) 90 tablet 0     lisinopril (PRINIVIL/ZESTRIL) 2.5 MG tablet Take 1 tablet (2.5 mg) by mouth At Bedtime 90 tablet 0     metoprolol succinate ER (TOPROL-XL) 25 MG 24 hr tablet Take 12.5 mg by mouth every morning  45 tablet 3     miconazole (MICATIN) 2 % AERP powder Apply topically 2 times daily 130 g 0     multivitamin  with lutein (OCUVITE WITH LTEIN) CAPS Take 1 capsule by mouth daily       nystatin (MYCOSTATIN) 002041 UNIT/GM external cream Apply topically 2 times daily 85 g 1     order for DME Equipment being ordered: bilateral lower extremities compression garments 30-40 mm Hg 1 Package 0     order for DME Equipment being ordered: Hospital Bed from [facility] 1 each 0     order for DME Equipment being ordered: 4-Wheel Walker with seat. He is 5' 7\" tall.  [facility]: Fax: [phone]  phone [phone] 1 Device 0     pantoprazole (PROTONIX) 40 MG EC tablet Take 1 tablet (40 mg) by mouth " 2 times daily (before meals) 90 tablet 0     torsemide (DEMADEX) 20 MG tablet Take 1 tablet (20 mg) by mouth daily Take  once daily. Hold for 1 day if you start having more dizziness. If daily weight increases by 2 lb or if legs are swollen, give an additional 20mg.         ALLERGIES   No Known Allergies    PAST MEDICAL HISTORY:  Past Medical History:   Diagnosis Date     Acute on chronic systolic congestive heart failure (H) 2/10/2019     Acute on chronic systolic heart failure (H) 2/10/2019     Adult hypothyroidism      Colon cancer (H)      Hearing problem      Hyperlipidemia with target LDL less than 100 11/6/2013     Hypertension goal BP (blood pressure) < 140/90 11/6/2013     Ischemic cardiomyopathy 02/10/2019    Postulated to be due to chemotherapy, though agent not specified.     Non-ischemic cardiomyopathy (H) 2/10/2019    Postulated to be due to chemotherapy, though agent not specified.     Syncope      Tongue cancer (H)        PAST SURGICAL HISTORY:  Past Surgical History:   Procedure Laterality Date     APPENDECTOMY  2001     COLECTOMY  Jan 2002    malignant polyp.  Colectomy and colostomy     COLONOSCOPY  Jan 2002    polyps     CV HEART CATHETERIZATION WITH POSSIBLE INTERVENTION N/A 2/14/2019    Procedure: Heart Catheterization with possible Intervention;  Surgeon: Julio Vera MD;  Location: [facility] CARDIAC CATH LAB     ENDOSCOPY UPPER, COLONOSCOPY, COMBINED N/A 6/8/2019    Procedure: UPPER ENDOSCOPY with hemostasis;  Surgeon: Nicholas Carson MD;  Location: [facility] OR     EP ICD N/A 2/15/2019    Procedure: EP ICD;  Surgeon: Cj Gutierrez MD;  Location: [facility] CARDIAC CATH LAB     EYE SURGERY      B cataract     [facility] ENDOSCOPY W PLACEMENT GASTROSTOMY TUBE PERCUT N/A 11/19/2015    Procedure: COMBINED ESOPHAGOSCOPY, GASTROSCOPY, DUODENOSCOPY (EGD), PLACE PERCUTANEOUS ENDOSCOPIC GASTROSTOMY TUBE;  Surgeon: Sergio Buenrostro MD;  Location: [facility]     LARYNGOSCOPY WITH BIOPSY(IES) N/A  4/3/2018    Procedure: LARYNGOSCOPY WITH BIOPSY(IES);  Direct Laryngoscopy, Biopsy Base Of Tongue;  Surgeon: Jj Hernandez MD;  Location: [facility] OR     TAKEDOWN COLOSTOMY         FAMILY HISTORY:  Family History   Problem Relation Age of Onset     Other Cancer Mother 57        lung ca, with brain mets     Cancer Mother         Brain tumor, lung cancer     Cancer Father         Lung cancer     Cancer Brother        SOCIAL HISTORY:  Social History     Socioeconomic History     Marital status: Single     Spouse name: None     Number of children: None     Years of education: None     Highest education level: None   Occupational History     None   Social Needs     Financial resource strain: None     Food insecurity:     Worry: None     Inability: None     Transportation needs:     Medical: None     Non-medical: None   Tobacco Use     Smoking status: Former Smoker     Packs/day: 0.00     Years: 48.00     Pack years: 0.00     Types: Cigarettes     Last attempt to quit: 2001     Years since quittin.6     Smokeless tobacco: Never Used     Tobacco comment: started at age 17   Substance and Sexual Activity     Alcohol use: No     Drug use: No     Sexual activity: Never     Partners: Female   Lifestyle     Physical activity:     Days per week: None     Minutes per session: None     Stress: None   Relationships     Social connections:     Talks on phone: None     Gets together: None     Attends Religion service: None     Active member of club or organization: None     Attends meetings of clubs or organizations: None     Relationship status: None     Intimate partner violence:     Fear of current or ex partner: None     Emotionally abused: None     Physically abused: None     Forced sexual activity: None   Other Topics Concern     Parent/sibling w/ CABG, MI or angioplasty before 65F 55M? No   Social History Narrative     None       Review of Systems:  Skin:  Positive for bruising   Eyes:  Positive for    ENT:   Negative    Respiratory:  Negative    Cardiovascular:    Positive for;lower extremity symptoms;edema  Gastroenterology: Negative    Genitourinary:  Positive for urgency;urinary frequency  Musculoskeletal:  Positive for muscular weakness  Neurologic:  Positive for tremors;numbness or tingling of feet  Psychiatric:  Negative    Heme/Lymph/Imm:  Negative    Endocrine:  Positive for thyroid disorder    Physical Exam:  Vitals: BP (!) 66/39 (BP Location: Left arm, Patient Position: Sitting, Cuff Size: Adult Regular)   Pulse 78   Wt 74.8 kg (164 lb 12.8 oz)   SpO2 100%   BMI 25.81 kg/m      Recent Lab Results:  LIPID RESULTS:  Lab Results   Component Value Date    CHOL 126 04/01/2019    HDL 61 04/01/2019    LDL 49 04/01/2019    TRIG 82 04/01/2019    CHOLHDLRATIO Canceled, Test credited   Patient not fasting   09/28/2015       LIVER ENZYME RESULTS:  Lab Results   Component Value Date    AST 12 06/10/2019    ALT 35 06/10/2019       CBC RESULTS:  Lab Results   Component Value Date    WBC 6.2 06/10/2019    RBC 2.21 (L) 06/10/2019    HGB 7.8 (LL) 06/24/2019    HCT 23.7 (L) 06/10/2019     (H) 06/10/2019    MCH 33.0 06/10/2019    MCHC 30.8 (L) 06/10/2019    RDW 18.5 (H) 06/10/2019     06/10/2019       BMP RESULTS:  Lab Results   Component Value Date     06/24/2019    POTASSIUM 5.4 (H) 06/24/2019    CHLORIDE 107 06/24/2019    CO2 23 06/24/2019    ANIONGAP 7 06/24/2019    GLC 89 06/24/2019    BUN 98 (H) 06/24/2019    CR 2.58 (H) 06/24/2019    GFRESTIMATED 22 (L) 06/24/2019    GFRESTBLACK 26 (L) 06/24/2019    AMY 8.5 06/24/2019        A1C RESULTS:  Lab Results   Component Value Date    A1C 5.6 09/19/2018       INR RESULTS:  Lab Results   Component Value Date    INR 1.32 (H) 06/07/2019    INR 1.59 (H) 02/11/2019     Exam  Gen NAD   Lungs basal crackles  Cardiac PSM, S3  2+ edema  Abdo soft NT    CC  Amanda Mosquera, PA-C  [address]  [place] [address]

## 2019-06-24 NOTE — ED PROVIDER NOTES
History     Chief Complaint   Patient presents with     Heart Failure     pallative care vs medical mgmt sent from cards clinic.      HPI  Patrick Diop is a 83 year old male w/hx of ischemic cardiomyopathy, acute on chronic systolic congestive heart failure, hyperlipidemia, hypertension, colon cancer, chronic kidney disease, recurrent tongue cancer, chemotherapy induced N/V, acquired hyperthyroidism, acute on chronic systolic heart failure, venous stasis dermatitis of both lower extremities, GI bleed, and macrocytic anemia who presents to the ED complaining of heart failure and hypotension.     Patient has recent hospitalization at the [facility] where he was treated for upper GI bleed, for 3-day hospitalization from June 7 through [facility] 10.  I had reviewed his medical record from that visit.  Patient has been on metoprolol, in addition to lisinopril, and torsemide.  Patient was being seen in the Cardiology clinic this morning and his vitals showed that his blood pressure was below baseline, and was at 60/40. He was sent to the ED and his blood pressure has improved slightly. Patient discussed preference for receiving palliative care here close to home but understands the need to be seen down a the [facility]. He has low potassium levels, hemoglobin issues, and is s/p upper GI endoscopy with intent to treat his stomach bleeding (6/7). He denies SOB, chest pain, and cough. He ambulates with use of a rolling walker.  Patient denies any current complaints.  No abdominal pain.  Initial discussion cardiology clinic was regarding blood pressure support if he gets worse, or adding low-dose dopamine.  Could consider stopping Plavix given his anemia and bleeding issues.    SHx: [place] lives [place] in [place]. Patient arrived by personal vehicle accompanied by his daughter. Former-smoker, quit 11/6/2001- 0 packs/day for 48 years      Patient Active Problem List   Diagnosis     Hyperlipidemia with target LDL  "less than 100     Hypertension goal BP (blood pressure) < 140/90     Colon cancer (H)     CKD (chronic kidney disease) stage 3, GFR 30-59 ml/min (H)     Recurrent tongue cancer (H)     Chemotherapy induced nausea and vomiting     Acquired hypothyroidism     Ischemic cardiomyopathy     Acute on chronic systolic heart failure (H)     Venous stasis dermatitis of both lower extremities     Macrocytic anemia     GI bleed     Current Outpatient Medications   Medication Sig Dispense Refill     aspirin (ASA) 81 MG EC tablet Take 1 tablet (81 mg) by mouth daily (Patient taking differently: Take 81 mg by mouth every morning ) 90 tablet 2     atorvastatin (LIPITOR) 40 MG tablet Take 1 tablet (40 mg) by mouth daily (Patient taking differently: Take 40 mg by mouth every evening ) 90 tablet 2     Cholecalciferol (VITAMIN D) 1000 UNITS capsule Take 1 capsule by mouth every morning        levothyroxine (SYNTHROID/LEVOTHROID) 100 MCG tablet Take 1 tablet (100 mcg) by mouth daily (Patient taking differently: Take 100 mcg by mouth every morning ) 90 tablet 0     multivitamin  with lutein (OCUVITE WITH LTEIN) CAPS Take 1 capsule by mouth daily       nystatin (MYCOSTATIN) 649230 UNIT/GM external cream Apply topically 2 times daily 85 g 1     pantoprazole (PROTONIX) 40 MG EC tablet Take 1 tablet (40 mg) by mouth 2 times daily (before meals) 90 tablet 0     torsemide (DEMADEX) 20 MG tablet Take 1 tablet (20 mg) by mouth daily Take  once daily. Hold for 1 day if you start having more dizziness. If daily weight increases by 2 lb or if legs are swollen, give an additional 20mg.       miconazole (MICATIN) 2 % AERP powder Apply topically 2 times daily 130 g 0     order for DME Equipment being ordered: bilateral lower extremities compression garments 30-40 mm Hg 1 Package 0     order for DME Equipment being ordered: Hospital Bed from [facility] 1 each 0     order for DME Equipment being ordered: 4-Wheel Walker with seat. He is 5' 7\" tall.  [employer] [employer]: Fax: [phone]  phone [phone] 1 Device 0     Allergies:  No Known Allergies    Problem List:    Patient Active Problem List    Diagnosis Date Noted     GI bleed 06/07/2019     Priority: Medium     Macrocytic anemia 06/02/2019     Priority: Medium     Acute on chronic systolic heart failure (H) 06/01/2019     Priority: Medium     Venous stasis dermatitis of both lower extremities 06/01/2019     Priority: Medium     Left> Right.       Ischemic cardiomyopathy 02/10/2019     Priority: Medium     End-stage, EF 15% by MRI in December 2018. Severe 3 vessel CAD, not a CAB candidate due to cancer and debility, medical management. Followed by [facility].        Acquired hypothyroidism 08/26/2018     Priority: Medium     Chemotherapy induced nausea and vomiting 05/31/2018     Priority: Medium     Recurrent tongue cancer (H) 10/28/2015     Priority: Medium     Neck mass found on 9/28/2015.  He had cancer at the base of his tongue.  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 He initially presented with a right-sided neck mass; CT showed a 3.6cm mass in the right base f tongue as well as bilateral cervical lymphadenopathy.  Treated with radiation for 7 weeks and three chemo treatments.   He had concomitant chemotherapy to a dose of 7,000 cGy in 35 fractions, which he received along with high-dose cisplatin as detailed above.  He tolerated therapy quite well. His toxicities included grade 1 radiation dermatitis, grade 3 dysphagia requiring G-tube feedings, grade 2 dry mouth, grade 1 esophagitis, moderate-severe changes in taste, and grade 1 odynophagia.  ENT notes 4/4/2016:now three months out from completion of chemoradiation therapy for a T3, N2c right-sided tongue base tumor.  He had a PET scan done over the weekend, and it was essentially negative so he has had a complete response.    5/27/2016:Specialists treating his cancer; Dr. Hernandez, ENT.     Dr. Neil, Hematology/Oncology.   Dr. Paul, Oncology radiation.   He had lymphedema in neck as complication.  4/3/2018:NAME OF PROCEDURE:1. Direct laryngoscopy.  2. Biopsy of right tongue base.  Pathology returned with recurrent cancer.   April, 2018:Started on Kaytruda for his recurrent tongue cancer.   11/18/2018:Chemo:Treated initially with Cisplatin and later Kaytruda.        CKD (chronic kidney disease) stage 3, GFR 30-59 ml/min (H) 09/05/2014     Priority: Medium     9/5/2014:MAC positive twice.   11/18/2018:GFR <60 and has decreased in the past year.        Colon cancer (H) 03/14/2014     Priority: Medium     9/5/2014:2001 HAD SURGERY AND CURE-treated in [place].   Transverse colectomy.  He had chemo for 6 months.  Cancer treated in 2001.  Last colonoscopy 3 years ago.  He was told to recheck in 4 years-2015.         Hyperlipidemia with target LDL less than 100 11/06/2013     Priority: Medium     Hypertension goal BP (blood pressure) < 140/90 11/06/2013     Priority: Medium        Past Medical History:    Past Medical History:   Diagnosis Date     Acute on chronic systolic congestive heart failure (H) 2/10/2019     Acute on chronic systolic heart failure (H) 2/10/2019     Adult hypothyroidism      Colon cancer (H)      Hearing problem      Hyperlipidemia with target LDL less than 100 11/6/2013     Hypertension goal BP (blood pressure) < 140/90 11/6/2013     Ischemic cardiomyopathy 02/10/2019     Non-ischemic cardiomyopathy (H) 2/10/2019     Syncope      Tongue cancer (H)        Past Surgical History:    Past Surgical History:   Procedure Laterality Date     APPENDECTOMY  2001     COLECTOMY  Jan 2002    malignant polyp.  Colectomy and colostomy     COLONOSCOPY  Jan 2002    polyps     CV HEART CATHETERIZATION WITH POSSIBLE INTERVENTION N/A 2/14/2019    Procedure: Heart Catheterization with possible Intervention;  Surgeon: Julio Vera MD;  Location:  HEART CARDIAC CATH LAB     ENDOSCOPY UPPER,  COLONOSCOPY, COMBINED N/A 2019    Procedure: UPPER ENDOSCOPY with hemostasis;  Surgeon: Nicholas Carson MD;  Location: [facility]     EP ICD N/A 2/15/2019    Procedure: EP ICD;  Surgeon: Cj Gutierrez MD;  Location:  HEART CARDIAC CATH LAB     EYE SURGERY      B cataract     HC UGI ENDOSCOPY W PLACEMENT GASTROSTOMY TUBE PERCUT N/A 2015    Procedure: COMBINED ESOPHAGOSCOPY, GASTROSCOPY, DUODENOSCOPY (EGD), PLACE PERCUTANEOUS ENDOSCOPIC GASTROSTOMY TUBE;  Surgeon: Sergio Buenrostro MD;  Location: [facility]     LARYNGOSCOPY WITH BIOPSY(IES) N/A 4/3/2018    Procedure: LARYNGOSCOPY WITH BIOPSY(IES);  Direct Laryngoscopy, Biopsy Base Of Tongue;  Surgeon: Jj Hernandez MD;  Location: [facility] OR     TAKEDOWN COLOSTOMY         Family History:    Family History   Problem Relation Age of Onset     Other Cancer Mother 57        lung ca, with brain mets     Cancer Mother         Brain tumor, lung cancer     Cancer Father         Lung cancer     Cancer Brother        Social History:  Marital Status:  Single [1]  Social History     Tobacco Use     Smoking status: Former Smoker     Packs/day: 0.00     Years: 48.00     Pack years: 0.00     Types: Cigarettes     Last attempt to quit: 2001     Years since quittin.6     Smokeless tobacco: Never Used     Tobacco comment: started at age 17   Substance Use Topics     Alcohol use: No     Drug use: No        Medications:      aspirin (ASA) 81 MG EC tablet   atorvastatin (LIPITOR) 40 MG tablet   Cholecalciferol (VITAMIN D) 1000 UNITS capsule   levothyroxine (SYNTHROID/LEVOTHROID) 100 MCG tablet   multivitamin  with lutein (OCUVITE WITH LTEIN) CAPS   nystatin (MYCOSTATIN) 057613 UNIT/GM external cream   pantoprazole (PROTONIX) 40 MG EC tablet   torsemide (DEMADEX) 20 MG tablet   miconazole (MICATIN) 2 % AERP powder   order for DME   order for DME   order for DME         Review of Systems   Respiratory: Negative for shortness of breath.    Cardiovascular: Positive for  "leg swelling. Negative for chest pain.   All other systems reviewed and are negative.      Physical Exam   BP: (!) 89/55  Pulse: 79  Heart Rate: 81  Temp: 97.5  F (36.4  C)  Resp: 16  Height: 170.2 cm (5' 7\")  Weight: 74.3 kg (163 lb 12.8 oz)  SpO2: 100 %      Physical Exam  BP 90/55   Pulse 78   Temp 97.5  F (36.4  C) (Oral)   Resp 16   Ht 1.702 m (5' 7\")   Wt 74.3 kg (163 lb 12.8 oz)   SpO2 99%   BMI 25.65 kg/m    General: alert, interactive, in no apparent distress  Head: atraumatic  Nose: no rhinorrhea or epistaxis  Ears: no external auditory canal discharge or bleeding.    Eyes: Sclera nonicteric.    Mouth: no tonsillar erythema, edema, or exudate  Neck: supple, no palp LAD  Lungs: CTAB  CV: RRR, distant heart sounds  Abdomen: soft, nt, nd, no guarding or rebound. Positive bowel sounds  Extremities: Bilateral lower extremity pitting edema is present.  Skin: no rash or diaphoresis  Neuro: Alert, oriented    ED Course     Results for orders placed or performed in visit on 06/24/19 (from the past 24 hour(s))   Hemoglobin   Result Value Ref Range    Hemoglobin 7.8 (LL) 13.3 - 17.7 g/dL   N terminal pro BNP outpatient   Result Value Ref Range    N-Terminal Pro Bnp 63,381 (H) 0 - 450 pg/mL   Basic metabolic panel   Result Value Ref Range    Sodium 137 133 - 144 mmol/L    Potassium 5.4 (H) 3.4 - 5.3 mmol/L    Chloride 107 94 - 109 mmol/L    Carbon Dioxide 23 20 - 32 mmol/L    Anion Gap 7 3 - 14 mmol/L    Glucose 89 70 - 99 mg/dL    Urea Nitrogen 98 (H) 7 - 30 mg/dL    Creatinine 2.58 (H) 0.66 - 1.25 mg/dL    GFR Estimate 22 (L) >60 mL/min/[1.73_m2]    GFR Estimate If Black 26 (L) >60 mL/min/[1.73_m2]    Calcium 8.5 8.5 - 10.1 mg/dL       Medications - No data to display    1:39 PM     Procedures               Critical Care time:  none               Results for orders placed or performed in visit on 06/24/19 (from the past 24 hour(s))   Hemoglobin   Result Value Ref Range    Hemoglobin 7.8 (LL) 13.3 - 17.7 g/dL "   N terminal pro BNP outpatient   Result Value Ref Range    N-Terminal Pro Bnp 63,381 (H) 0 - 450 pg/mL   Basic metabolic panel   Result Value Ref Range    Sodium 137 133 - 144 mmol/L    Potassium 5.4 (H) 3.4 - 5.3 mmol/L    Chloride 107 94 - 109 mmol/L    Carbon Dioxide 23 20 - 32 mmol/L    Anion Gap 7 3 - 14 mmol/L    Glucose 89 70 - 99 mg/dL    Urea Nitrogen 98 (H) 7 - 30 mg/dL    Creatinine 2.58 (H) 0.66 - 1.25 mg/dL    GFR Estimate 22 (L) >60 mL/min/[1.73_m2]    GFR Estimate If Black 26 (L) >60 mL/min/[1.73_m2]    Calcium 8.5 8.5 - 10.1 mg/dL       Medications - No data to display    Assessments & Plan (with Medical Decision Making)  83 year old male with past medical history significant for severe ischemic cardiomyopathy with end-stage heart failure, recent hospitalization for upper GI bleed at the [facility], history of colon cancer, and history of ICD placement in February, 2019, who presents from cardiology clinic after patient was noted to have multiple rechecks of his blood pressure 5 different times and noted to be low at 60/40.  Patient also with increased amounts of lower extremity edema.  Patient continues to be full code, however has had discussions regarding palliative care, and hospice therapy previously.  Patient continues to wish to think about hospice, with no definitive plan.    Patient was seen in cardiology clinic, and notable for increased creatinine of 1.7 up to 2.6.  Patient with also increase in potassium from 4.2 to a current value of 5.4.  Patient denies any current chest pain.  No significant shortness of breath.  BNP returns significantly elevated, and given lower extremity edema, concern is for CHF, with cardiorenal syndrome.  Cardiology clinic notes from earlier today were reviewed, and recommended suggesting low-dose dopamine if patient were to get symptomatic or blood pressure worsens.  Also interested in meeting with palliative care team during this hospitalization  and turning off ICD potentially as well.  It was discussed that it is reasonable for transfer to the [facility] to see if outpatient inotropic therapy for palliative use can be considered for him.    I called and had discussion with hospitalist at the [facility], Dr. Gómez.  We also had consultation with cardiologist, Dr. Guzman, who has agreed to accept patient to the heart failure service at the [facility].  Likely will require further discussions regarding palliative/hospice care.  Patient has remained asymptomatic throughout his ED course at [facility].  His blood pressures have typically been in the 80s over 50s during his ED course.  No additional medications given during ED stay.     I have reviewed the nursing notes.    I have reviewed the findings, diagnosis, plan and need for follow up with the patient.          Medication List      There are no discharge medications for this visit.         Final diagnoses:   CKD (chronic kidney disease) stage 3, GFR 30-59 ml/min (H)   Acute on chronic systolic heart failure (H)   Ischemic cardiomyopathy   Hypotension, unspecified hypotension type   This document serves as a record of services personally performed by Trevon Allison MD. It was created on their behalf by Santos Hernandez, a trained medical scribe. The creation of this record is based on the provider's personal observations and the statements of the patient. This document has been checked and approved by the attending provider.    Note: Chart documentation done in part with Dragon Voice Recognition software. Although reviewed after completion, some word and grammatical errors may remain.      6/24/2019   [facility] EMERGENCY DEPARTMENT     Trevon Allison MD  06/24/19 [id number]

## 2019-06-24 NOTE — LETTER
6/24/2019    Joe Mandel MD  [address] [place] [id number]    RE: Patrick Diop       Dear Colleague,    I had the pleasure of seeing Patrick Diop in the [facility].    HPI and Plan:   This is an 83-year-old patient with history of severe ischemic cardiomyopathy.  He has end-stage heart failure.  Inability to tolerate any heart failure therapy.  Non-ST elevation MI recently. Medical Rx. Has an ICD for syncope earlier this year.  He was recently admitted to [facility] with GI bleeding.  Essentially his hemoglobin has halved over the last 3 months.  He is here for routine clinic visit.  He says that at home his blood pressure runs in the high 70s to low 80s on most days.  Sometimes it is as high as 100.  Today in clinic his blood pressure 60/40 despite rechecking 5 times in both hands.  He says he feels fine and is asymptomatic.  Complains of 2+ lower extremity edema.  He is in a wheelchair.  In the past he is contemplated hospice and potentially turning off his ICD but has never ended up making that decision. Continues to remain full code.  He has also seen palliative care for this.  Today he is again here with his daughter Florecita and at this time he seems a little more inclined towards palliative care.  Although he still thinks he wants to think about all of this.      His creatinine has gone from 1.7 up to 2.6 and his potassium is gone from 4.2-5.4.  Although he feels asymptomatic I do not think he is stable enough to go home with a blood pressure of 60/40. He is on Metoprolol 12.5 XL daily and lisinopril 2.5 mg daily.  I recommend transfer to the emergency department.  I have spoken to the ER nurse.  I do not think he needs fluids.  I would recommend stopping his lisinopril and metoprolol completely and I do not think we should rechallenge this patient for the moment.      If blood pressure gets worse or he gets symptomatic I would suggest adding  low-dose dopamine. He is interested in meeting the palliative care team this hospitalization and is even contemplating on turning off his ICD.  I am okay with him stopping his Plavix given ongoing anemia and bleeding issues. It is reasonable to transfer him to the [facility] to see if outpatient inotropic therapy for palliative use can be considered for him.    Encounter Diagnosis   Name Primary?     Acute on chronic systolic congestive heart failure (H)        CURRENT MEDICATIONS:  Current Outpatient Medications   Medication Sig Dispense Refill     aspirin (ASA) 81 MG EC tablet Take 1 tablet (81 mg) by mouth daily (Patient taking differently: Take 81 mg by mouth every morning ) 90 tablet 2     atorvastatin (LIPITOR) 40 MG tablet Take 1 tablet (40 mg) by mouth daily (Patient taking differently: Take 40 mg by mouth every evening ) 90 tablet 2     Cholecalciferol (VITAMIN D) 1000 UNITS capsule Take 1 capsule by mouth every morning        clopidogrel (PLAVIX) 75 MG tablet Take 1 tablet (75 mg) by mouth daily (Patient taking differently: Take 75 mg by mouth every morning ) 90 tablet 3     levothyroxine (SYNTHROID/LEVOTHROID) 100 MCG tablet Take 1 tablet (100 mcg) by mouth daily (Patient taking differently: Take 100 mcg by mouth every morning ) 90 tablet 0     lisinopril (PRINIVIL/ZESTRIL) 2.5 MG tablet Take 1 tablet (2.5 mg) by mouth At Bedtime 90 tablet 0     metoprolol succinate ER (TOPROL-XL) 25 MG 24 hr tablet Take 12.5 mg by mouth every morning  45 tablet 3     miconazole (MICATIN) 2 % AERP powder Apply topically 2 times daily 130 g 0     multivitamin  with lutein (OCUVITE WITH LTEIN) CAPS Take 1 capsule by mouth daily       nystatin (MYCOSTATIN) 469550 UNIT/GM external cream Apply topically 2 times daily 85 g 1     order for DME Equipment being ordered: bilateral lower extremities compression garments 30-40 mm Hg 1 Package 0     order for DME Equipment being ordered: Hospital Bed from [facility] 1 each 0      "order for DME Equipment being ordered: 4-Wheel Walker with seat. He is 5' 7\" tall.  [facility]: Fax: [phone]  phone [phone] 1 Device 0     pantoprazole (PROTONIX) 40 MG EC tablet Take 1 tablet (40 mg) by mouth 2 times daily (before meals) 90 tablet 0     torsemide (DEMADEX) 20 MG tablet Take 1 tablet (20 mg) by mouth daily Take  once daily. Hold for 1 day if you start having more dizziness. If daily weight increases by 2 lb or if legs are swollen, give an additional 20mg.         ALLERGIES   No Known Allergies    PAST MEDICAL HISTORY:  Past Medical History:   Diagnosis Date     Acute on chronic systolic congestive heart failure (H) 2/10/2019     Acute on chronic systolic heart failure (H) 2/10/2019     Adult hypothyroidism      Colon cancer (H)      Hearing problem      Hyperlipidemia with target LDL less than 100 11/6/2013     Hypertension goal BP (blood pressure) < 140/90 11/6/2013     Ischemic cardiomyopathy 02/10/2019    Postulated to be due to chemotherapy, though agent not specified.     Non-ischemic cardiomyopathy (H) 2/10/2019    Postulated to be due to chemotherapy, though agent not specified.     Syncope      Tongue cancer (H)        PAST SURGICAL HISTORY:  Past Surgical History:   Procedure Laterality Date     APPENDECTOMY  2001     COLECTOMY  Jan 2002    malignant polyp.  Colectomy and colostomy     COLONOSCOPY  Jan 2002    polyps     CV HEART CATHETERIZATION WITH POSSIBLE INTERVENTION N/A 2/14/2019    Procedure: Heart Catheterization with possible Intervention;  Surgeon: Julio Vera MD;  Location: [facility] CARDIAC CATH LAB     ENDOSCOPY UPPER, COLONOSCOPY, COMBINED N/A 6/8/2019    Procedure: UPPER ENDOSCOPY with hemostasis;  Surgeon: Nicholas Carson MD;  [facility]     EP ICD N/A 2/15/2019    Procedure: EP ICD;  Surgeon: Cj Gutierrez MD;  Location: [facility] CARDIAC CATH LAB     EYE SURGERY      B cataract     HC UGI ENDOSCOPY W PLACEMENT GASTROSTOMY TUBE PERCUT N/A " 2015    Procedure: COMBINED ESOPHAGOSCOPY, GASTROSCOPY, DUODENOSCOPY (EGD), PLACE PERCUTANEOUS ENDOSCOPIC GASTROSTOMY TUBE;  Surgeon: Sergio Buenrostro MD;  Location: [facility]     LARYNGOSCOPY WITH BIOPSY(IES) N/A 4/3/2018    Procedure: LARYNGOSCOPY WITH BIOPSY(IES);  Direct Laryngoscopy, Biopsy Base Of Tongue;  Surgeon: Jj Hernandez MD;  Location: [facility]     TAKEDOWN COLOSTOMY         FAMILY HISTORY:  Family History   Problem Relation Age of Onset     Other Cancer Mother 57        lung ca, with brain mets     Cancer Mother         Brain tumor, lung cancer     Cancer Father         Lung cancer     Cancer Brother        SOCIAL HISTORY:  Social History     Socioeconomic History     Marital status: Single     Spouse name: None     Number of children: None     Years of education: None     Highest education level: None   Occupational History     None   Social Needs     Financial resource strain: None     Food insecurity:     Worry: None     Inability: None     Transportation needs:     Medical: None     Non-medical: None   Tobacco Use     Smoking status: Former Smoker     Packs/day: 0.00     Years: 48.00     Pack years: 0.00     Types: Cigarettes     Last attempt to quit: 2001     Years since quittin.6     Smokeless tobacco: Never Used     Tobacco comment: started at age 17   Substance and Sexual Activity     Alcohol use: No     Drug use: No     Sexual activity: Never     Partners: Female   Lifestyle     Physical activity:     Days per week: None     Minutes per session: None     Stress: None   Relationships     Social connections:     Talks on phone: None     Gets together: None     Attends Mandaen service: None     Active member of club or organization: None     Attends meetings of clubs or organizations: None     Relationship status: None     Intimate partner violence:     Fear of current or ex partner: None     Emotionally abused: None     Physically abused: None     Forced sexual activity:  None   Other Topics Concern     Parent/sibling w/ CABG, MI or angioplasty before 65F 55M? No   Social History Narrative     None       Review of Systems:  Skin:  Positive for bruising   Eyes:  Positive for    ENT:  Negative    Respiratory:  Negative    Cardiovascular:    Positive for;lower extremity symptoms;edema  Gastroenterology: Negative    Genitourinary:  Positive for urgency;urinary frequency  Musculoskeletal:  Positive for muscular weakness  Neurologic:  Positive for tremors;numbness or tingling of feet  Psychiatric:  Negative    Heme/Lymph/Imm:  Negative    Endocrine:  Positive for thyroid disorder    Physical Exam:  Vitals: BP (!) 66/39 (BP Location: Left arm, Patient Position: Sitting, Cuff Size: Adult Regular)   Pulse 78   Wt 74.8 kg (164 lb 12.8 oz)   SpO2 100%   BMI 25.81 kg/m       Recent Lab Results:  LIPID RESULTS:  Lab Results   Component Value Date    CHOL 126 04/01/2019    HDL 61 04/01/2019    LDL 49 04/01/2019    TRIG 82 04/01/2019    CHOLHDLRATIO Canceled, Test credited   Patient not fasting   09/28/2015       LIVER ENZYME RESULTS:  Lab Results   Component Value Date    AST 12 06/10/2019    ALT 35 06/10/2019       CBC RESULTS:  Lab Results   Component Value Date    WBC 6.2 06/10/2019    RBC 2.21 (L) 06/10/2019    HGB 7.8 (LL) 06/24/2019    HCT 23.7 (L) 06/10/2019     (H) 06/10/2019    MCH 33.0 06/10/2019    MCHC 30.8 (L) 06/10/2019    RDW 18.5 (H) 06/10/2019     06/10/2019       BMP RESULTS:  Lab Results   Component Value Date     06/24/2019    POTASSIUM 5.4 (H) 06/24/2019    CHLORIDE 107 06/24/2019    CO2 23 06/24/2019    ANIONGAP 7 06/24/2019    GLC 89 06/24/2019    BUN 98 (H) 06/24/2019    CR 2.58 (H) 06/24/2019    GFRESTIMATED 22 (L) 06/24/2019    GFRESTBLACK 26 (L) 06/24/2019    AMY 8.5 06/24/2019        A1C RESULTS:  Lab Results   Component Value Date    A1C 5.6 09/19/2018       INR RESULTS:  Lab Results   Component Value Date    INR 1.32 (H) 06/07/2019    INR 1.59  (H) 02/11/2019     Exam  Gen NAD   Lungs basal crackles  Cardiac PSM, S3  2+ edema  Abdo soft NT        Thank you for allowing me to participate in the care of your patient.    Sincerely,     Lionel Calderón MD     [facility]

## 2019-06-24 NOTE — ED NOTES
MD in talking to pt and daughter, IV was flushed and is patent, small amount of blood has seeped out under tegaderm.

## 2019-06-24 NOTE — TELEPHONE ENCOUNTER
Reason for Call: Request for an order or referral:    Order or referral being requested: Bilateral lower extremities compression garments 30-40 mm Hg     Date needed: as soon as possible    Has the patient been seen by the PCP for this problem?     Additional comments: Veinous Status Ulcers    Phone number Patient can be reached at:  [place]  - [phone]    Fax order to [phone] [facility]    Best Time:  any    Can we leave a detailed message on this number?      Call taken on 6/24/2019 at 9:22 AM by Radha Small

## 2019-06-24 NOTE — TELEPHONE ENCOUNTER
Order written and will be signed by Spring Shook PA-C.  Dr Mandel is out of the office.  Will fax after signed.  France Mock RN

## 2019-06-24 NOTE — LETTER
6/24/2019    Joe Mandel MD  [address] [place] [id number]    RE: Patrick Diop       Dear Colleague,    I had the pleasure of seeing Patrick Diop in the [facility].    HPI and Plan:   This is an 83-year-old patient with history of severe ischemic cardiomyopathy.  He has end-stage heart failure.  Inability to tolerate any heart failure therapy.  Non-ST elevation MI recently. Medical Rx. Has an ICD for syncope earlier this year.  He was recently admitted to [facility] with GI bleeding.  Essentially his hemoglobin has halved over the last 3 months.  He is here for routine clinic visit.  He says that at home his blood pressure runs in the high 70s to low 80s on most days.  Sometimes it is as high as 100.  Today in clinic his blood pressure 60/40 despite rechecking 5 times in both hands.  He says he feels fine and is asymptomatic.  Complains of 2+ lower extremity edema.  He is in a wheelchair.  In the past he is contemplated hospice and potentially turning off his ICD but has never ended up making that decision. Continues to remain full code.  He has also seen palliative care for this.  Today he is again here with his daughter Florecita and at this time he seems a little more inclined towards palliative care.  Although he still thinks he wants to think about all of this.      His creatinine has gone from 1.7 up to 2.6 and his potassium is gone from 4.2-5.4.  Although he feels asymptomatic I do not think he is stable enough to go home with a blood pressure of 60/40. He is on Metoprolol 12.5 XL daily and lisinopril 2.5 mg daily.  I recommend transfer to the emergency department.  I have spoken to the ER nurse.  I do not think he needs fluids.  I would recommend stopping his lisinopril and metoprolol completely and I do not think we should rechallenge this patient for the moment.      If blood pressure gets worse or he gets symptomatic I would suggest adding  low-dose dopamine. He is interested in meeting the palliative care team this hospitalization and is even contemplating on turning off his ICD.  I am okay with him stopping his Plavix given ongoing anemia and bleeding issues. It is reasonable to transfer him to the [facility] to see if outpatient inotropic therapy for palliative use can be considered for him.    Encounter Diagnosis   Name Primary?     Acute on chronic systolic congestive heart failure (H)        CURRENT MEDICATIONS:  Current Outpatient Medications   Medication Sig Dispense Refill     aspirin (ASA) 81 MG EC tablet Take 1 tablet (81 mg) by mouth daily (Patient taking differently: Take 81 mg by mouth every morning ) 90 tablet 2     atorvastatin (LIPITOR) 40 MG tablet Take 1 tablet (40 mg) by mouth daily (Patient taking differently: Take 40 mg by mouth every evening ) 90 tablet 2     Cholecalciferol (VITAMIN D) 1000 UNITS capsule Take 1 capsule by mouth every morning        clopidogrel (PLAVIX) 75 MG tablet Take 1 tablet (75 mg) by mouth daily (Patient taking differently: Take 75 mg by mouth every morning ) 90 tablet 3     levothyroxine (SYNTHROID/LEVOTHROID) 100 MCG tablet Take 1 tablet (100 mcg) by mouth daily (Patient taking differently: Take 100 mcg by mouth every morning ) 90 tablet 0     lisinopril (PRINIVIL/ZESTRIL) 2.5 MG tablet Take 1 tablet (2.5 mg) by mouth At Bedtime 90 tablet 0     metoprolol succinate ER (TOPROL-XL) 25 MG 24 hr tablet Take 12.5 mg by mouth every morning  45 tablet 3     miconazole (MICATIN) 2 % AERP powder Apply topically 2 times daily 130 g 0     multivitamin  with lutein (OCUVITE WITH LTEIN) CAPS Take 1 capsule by mouth daily       nystatin (MYCOSTATIN) 179596 UNIT/GM external cream Apply topically 2 times daily 85 g 1     order for DME Equipment being ordered: bilateral lower extremities compression garments 30-40 mm Hg 1 Package 0     order for DME Equipment being ordered: Hospital Bed from [facility] 1 each 0      "order for DME Equipment being ordered: 4-Wheel Walker with seat. He is 5' 7\" tall.  [facility]: Fax: [phone]  phone [phone] 1 Device 0     pantoprazole (PROTONIX) 40 MG EC tablet Take 1 tablet (40 mg) by mouth 2 times daily (before meals) 90 tablet 0     torsemide (DEMADEX) 20 MG tablet Take 1 tablet (20 mg) by mouth daily Take  once daily. Hold for 1 day if you start having more dizziness. If daily weight increases by 2 lb or if legs are swollen, give an additional 20mg.         ALLERGIES   No Known Allergies    PAST MEDICAL HISTORY:  Past Medical History:   Diagnosis Date     Acute on chronic systolic congestive heart failure (H) 2/10/2019     Acute on chronic systolic heart failure (H) 2/10/2019     Adult hypothyroidism      Colon cancer (H)      Hearing problem      Hyperlipidemia with target LDL less than 100 11/6/2013     Hypertension goal BP (blood pressure) < 140/90 11/6/2013     Ischemic cardiomyopathy 02/10/2019    Postulated to be due to chemotherapy, though agent not specified.     Non-ischemic cardiomyopathy (H) 2/10/2019    Postulated to be due to chemotherapy, though agent not specified.     Syncope      Tongue cancer (H)        PAST SURGICAL HISTORY:  Past Surgical History:   Procedure Laterality Date     APPENDECTOMY  2001     COLECTOMY  Jan 2002    malignant polyp.  Colectomy and colostomy     COLONOSCOPY  Jan 2002    polyps     CV HEART CATHETERIZATION WITH POSSIBLE INTERVENTION N/A 2/14/2019    Procedure: Heart Catheterization with possible Intervention;  Surgeon: Julio Vera MD;  Location: [facility] CARDIAC CATH LAB     ENDOSCOPY UPPER, COLONOSCOPY, COMBINED N/A 6/8/2019    Procedure: UPPER ENDOSCOPY with hemostasis;  Surgeon: Nicholas Carson MD;  [facility]     EP ICD N/A 2/15/2019    Procedure: EP ICD;  Surgeon: Cj Gutierrez MD;  Location: [facility] CARDIAC CATH LAB     EYE SURGERY      B cataract     HC UGI ENDOSCOPY W PLACEMENT GASTROSTOMY TUBE PERCUT N/A " 2015    Procedure: COMBINED ESOPHAGOSCOPY, GASTROSCOPY, DUODENOSCOPY (EGD), PLACE PERCUTANEOUS ENDOSCOPIC GASTROSTOMY TUBE;  Surgeon: Sergio Buenrostro MD;  Location: [facility]     LARYNGOSCOPY WITH BIOPSY(IES) N/A 4/3/2018    Procedure: LARYNGOSCOPY WITH BIOPSY(IES);  Direct Laryngoscopy, Biopsy Base Of Tongue;  Surgeon: Jj Hernandez MD;  Location: [facility]     TAKEDOWN COLOSTOMY         FAMILY HISTORY:  Family History   Problem Relation Age of Onset     Other Cancer Mother 57        lung ca, with brain mets     Cancer Mother         Brain tumor, lung cancer     Cancer Father         Lung cancer     Cancer Brother        SOCIAL HISTORY:  Social History     Socioeconomic History     Marital status: Single     Spouse name: None     Number of children: None     Years of education: None     Highest education level: None   Occupational History     None   Social Needs     Financial resource strain: None     Food insecurity:     Worry: None     Inability: None     Transportation needs:     Medical: None     Non-medical: None   Tobacco Use     Smoking status: Former Smoker     Packs/day: 0.00     Years: 48.00     Pack years: 0.00     Types: Cigarettes     Last attempt to quit: 2001     Years since quittin.6     Smokeless tobacco: Never Used     Tobacco comment: started at age 17   Substance and Sexual Activity     Alcohol use: No     Drug use: No     Sexual activity: Never     Partners: Female   Lifestyle     Physical activity:     Days per week: None     Minutes per session: None     Stress: None   Relationships     Social connections:     Talks on phone: None     Gets together: None     Attends Evangelical service: None     Active member of club or organization: None     Attends meetings of clubs or organizations: None     Relationship status: None     Intimate partner violence:     Fear of current or ex partner: None     Emotionally abused: None     Physically abused: None     Forced sexual activity:  None   Other Topics Concern     Parent/sibling w/ CABG, MI or angioplasty before 65F 55M? No   Social History Narrative     None       Review of Systems:  Skin:  Positive for bruising   Eyes:  Positive for    ENT:  Negative    Respiratory:  Negative    Cardiovascular:    Positive for;lower extremity symptoms;edema  Gastroenterology: Negative    Genitourinary:  Positive for urgency;urinary frequency  Musculoskeletal:  Positive for muscular weakness  Neurologic:  Positive for tremors;numbness or tingling of feet  Psychiatric:  Negative    Heme/Lymph/Imm:  Negative    Endocrine:  Positive for thyroid disorder    Physical Exam:  Vitals: BP (!) 66/39 (BP Location: Left arm, Patient Position: Sitting, Cuff Size: Adult Regular)   Pulse 78   Wt 74.8 kg (164 lb 12.8 oz)   SpO2 100%   BMI 25.81 kg/m       Recent Lab Results:  LIPID RESULTS:  Lab Results   Component Value Date    CHOL 126 04/01/2019    HDL 61 04/01/2019    LDL 49 04/01/2019    TRIG 82 04/01/2019    CHOLHDLRATIO Canceled, Test credited   Patient not fasting   09/28/2015       LIVER ENZYME RESULTS:  Lab Results   Component Value Date    AST 12 06/10/2019    ALT 35 06/10/2019       CBC RESULTS:  Lab Results   Component Value Date    WBC 6.2 06/10/2019    RBC 2.21 (L) 06/10/2019    HGB 7.8 (LL) 06/24/2019    HCT 23.7 (L) 06/10/2019     (H) 06/10/2019    MCH 33.0 06/10/2019    MCHC 30.8 (L) 06/10/2019    RDW 18.5 (H) 06/10/2019     06/10/2019       BMP RESULTS:  Lab Results   Component Value Date     06/24/2019    POTASSIUM 5.4 (H) 06/24/2019    CHLORIDE 107 06/24/2019    CO2 23 06/24/2019    ANIONGAP 7 06/24/2019    GLC 89 06/24/2019    BUN 98 (H) 06/24/2019    CR 2.58 (H) 06/24/2019    GFRESTIMATED 22 (L) 06/24/2019    GFRESTBLACK 26 (L) 06/24/2019    AMY 8.5 06/24/2019        A1C RESULTS:  Lab Results   Component Value Date    A1C 5.6 09/19/2018       INR RESULTS:  Lab Results   Component Value Date    INR 1.32 (H) 06/07/2019    INR 1.59  (H) 02/11/2019     Exam  Gen NAD   Lungs basal crackles  Cardiac PSM, S3  2+ edema  Abdo soft NT    CC  Amanda Mosquera PA-C  [address]  [place] [address]                    Thank you for allowing me to participate in the care of your patient.      Sincerely,     Lionel Calderón MD     [facility]    cc:   Amanda Mosquera PA-C  [address]  [place] [address]

## 2019-06-25 NOTE — PROGRESS NOTES
[facility] Nurse Inpatient Adult Pressure Injury (PI) Assessment     Initial Assessment of PI(s) on pt's:   Gluteal cleft    Data:   Patient History:      per MD note(s): 83-year-old male with history of severe ischemic cardiomyopathy with last EF of 15%-20%, status post ICD placement, chronic hypertension, chronic kidney disease stage III, 3-vessel coronary artery disease, tongue cancer, status post chemoradiation any immunotherapy and colon cancer, status post-surgical resection, hypothyroidism, severe peripheral arterial disease, chronic lower extremity edema, hyperlipidemia, who presented to outpatient Cardiology Clinic today for routine evaluation and was found to be hypotensive with blood pressure as low as 60/40.  The patient appeared to be asymptomatic from it without any lightheadedness or dizziness.  However, given his significant hypotension more so compared to his baseline, he was advised to go to the emergency room.  On questioning further, the patient denies any worsening shortness of breath.  The patient does have advanced ischemic cardiomyopathy and does have some chronic cough with clear sputum production which has not changed.  Denies any change in his dyspnea.  He does have chronic lower extremity edema and actually reports that it is much better with lymphedema treatment.  No fever or chills.  No abdominal pain, no nausea, vomiting or diarrhea reported by the patient.  Denies dysuria, urinary urgency or frequency.  No headache, no focal extremity weakness, tingling or numbness.  He was evaluated at [facility] and initially an attempt was made to transfer to the [facility]; however, due to lack of bed availability, he was subsequently admitted to [facility].  During the stay in the emergency room, his blood pressure improved somewhat without any intervention or treatment.      Ken Risk Assessment  Sensory Perception:  4-->no impairment    Moisture: 4-->rarely moist   Activity: 3-->walks occasionally     Mobility: 3-->slightly limited   Nutrition: 3-->adequate   Ken Score: 20        Positioning: Mepilex dressing and Pillows,     Mattress:  Standard , Atmos Air mattress    Moisture Management:  Diaper    Catheter secured? Not applicable    Current Diet / Nutrition:       Orders Placed This Encounter        Combination Diet Regular Diet Adult        Labs:   Recent Labs   Lab Test 06/25/19  0527  06/10/19  0458  06/07/19  1242  09/19/18  0839   ALBUMIN  --   --  2.6*   < > 3.1*   < >  --    HGB 8.3*   < > 7.3*   < > 6.9*   < >  --    INR  --   --   --   --  1.32*   < >  --    WBC 5.5  --  6.2   < > 7.2   < >  --    A1C  --   --   --   --   --   --  5.6    < > = values in this interval not displayed.                                                                                                                          Pressure Injury Assessment  (location):   Sacral/ coccyx/ buttocks    Wound History:   Wounds present on admission.  Pt incontinent of urine, wears a brief.  Was able to easily stand on his own, using walker for balance  Pt reports only a small spot on his butt is bothering him but otherwise not complaints.  Gluteal cleft with blanchable red to pink areas with moist, macerated tissue.   06-25-19 sacral/ coccyx/ buttocks      Superior sacral, midline- intact epidermis with nonblanchable, dark maroon to purple erythema, 0.9cm x 1.4cm  Superior right sacral-coccyx area- 1.1cm x 1.8cm x 0.3cm+, wound bed 100% moist, white, fibrinous tissue, minimal pain with assessment, no odor, minimal blanchable periwound erythema, scant serous drainage  Distal right fleshy buttock just out of gluteal cleft- similar wound as superior wound, moist white fibrinous tissue in the base with white, macerated periwound tissue, 0.6cm x 0.8cm x 0.3cm+  Left lateral sacral-coccyx area- similar wound to the other two wounds, moist, fibrinous gray  tissue 0.4cm x 1.1cm x 0.3cm, a little painful with assessment.  Right IT- large nodular area easily palpable and can be visualized on the photo, firm feeling, no fluctuance, no drainage, no opening in the epidermis.  There is light pink, blanchable erythema to this area, no pain.  Pt states that his oncologist has been following this site.          Intervention:     Patient's chart evaluated.      Ken Interventions:  Current Ken Interventions and Care Plan reviewed and updated, appropriate at this time.    Assessed buttocks with pt standing, holding onto a walker for support and with the help of NA for support too    Orders  Written    Supplies  Gathered, placed at bedside, discussed with Rn and pt    Discussed plan of care with Patient, Nurse and Elsa De La Garza- discussed obtaining a lymphedema consult for bilateral LE- pt just started wearing compression wraps with goal being fitted for velcro wraps in the future    All patient / family questions answered:  YES           Assessment:     Pressure Injury (PI) located on sacral to coccyx to fleshy buttocks: the most superior midline sacral wound is a DTI with the other three wounds unstageable, will use Iodosorb gel and dressing and PIP measures for wound management.    Pt had been refusing to take of his bilateral LE compression wraps for assessment.  Hospitalist to order lymphedema consult so when wraps are removed and replace the skin assessment should be done at that time.  I discussed with nursing that I will attempt to assess legs tomorrow, to not hold up replacing the wraps for me.  Pt is concerned about not continuing to compression because he may lose ground with legs being compressed nicely, pt to be fitted for velcro leg wraps in the near future.            Plan:     Nursing to notify the Provider(s) and re-consult the [facility] Nurse if wound(s) deteriorate(s)or if the wound care plan needs reevaluation.    If pt is refusing to turn or reposition they must be  "educated on the  potential injury from not off loading pressure.  Then this \"educated refusal\" needs to be documented as an \"educated refusal to turn/ reposition\" and document if alert, etc.    Plan for wound care to wound located on coccyx/ sacral wounds: every other day and prn  1. Clean entire area with saline and dust with antifungal powder x 5 days, stopping on June 30, 2019.  Tap the powder with No Sting Skin Prep, let it dry and crust is created that the dressings can stick to.    2. Apply small dab of Iodosorb Gel (#[id number]) to each wound bed only  3. Press a Mepilex  Sacral Dressing (PS#[id number])  to the area, making sure to conform nicely to skin curvatures   (begin placing the Mepilex at the most distal aspect first, smooth into place in an upward direction, then smooth side to side)   5. Time and date dressing change and teresa with a \"T\" for treatment of a wound  6. Reposition pt every 1 to 2 hours when in bed, trying to keep side positioned vs any supine positioning,  and hourly when up to the chair to relieve pressure and promote perfusion to tissue  NOTE:  Iodosorb Gel should not be used longer than 14 days. After 14 days the gel should be stopped and a new plan established, this may mean only a simple, gauze dressing.  The Iodosorb Gel should be stopped after use on July 7, 2019.       Will assess sacral/ buttocks weekly and prn  Will return tomorrow to assess bilateral LE      "

## 2019-06-25 NOTE — PROGRESS NOTES
[facility]    Medicine Progress Note - Hospitalist Service       Date of Admission:  6/24/2019  Date of Service: 06/25/2019    Assessment & Plan     Mr. Patrick Diop is an 83-year-old male with complex cardiac history as mentioned above who initially presented to outpatient Cardiology Clinic and was found to be hypotensive with blood pressure in the 60s systolic and was sent to the emergency room.     1.  Acute on chronic hypotension.         Severe ischemic cardiomyopathy.   The patient does have a baseline blood pressure in the 80s-90s systolic; however, his blood pressure earlier at Cardiology office was 60/40.  He was largely asymptomatic, though. His EF appears to be between 15%-20% and appears to have end-stage heart failure. Moreover, he has not been able to tolerate any significant heart failure therapy.  On torsemide at home; now with worsening renal function. He appears inclined toward palliative care and potentially de-escalation of his medical care.  Plan:  - hold his metoprolol and lisinopril and reassess in the morning.   - Cardiology recommends not restarting cardiomyopathy medication given his blood pressure is low borderline.  No indication for inotropic support at this time.  - Palliative Care consulted    2. Acute kidney injury on chronic kidney disease, stage III  Assessment:   His creatinine has bumped up from a baseline of around 1.8 to 2.6 today.  More than likely this is cardiorenal with worsening heart failure and persistent hypotension.   Plan:  - BMP in AM  - Avoid nephrotoxins    3.  Recent acute blood loss anemia secondary to gastrointestinal bleed from duodenal ulcers.  The patient denies any recent ongoing bleed.  His hemoglobin today is stable at 7.8.   Plan:  - Continue PTA Protonix     4.  History of coronary artery disease with a 3-vessel coronary artery disease. The patient denies any anginal symptoms at this time.  He had a coronary angiogram in February which  showed 3-vessel coronary artery disease with a  of RCA and left circumflex and subtotal occlusion of mid LAD.  He was not deemed to be a candidate for CABG and it appeared like moreover, the patient declined surgery.    Plan:  - Continue PTA aspirin 81 mg daily    5.  Chronic venous stasis with severe bilateral lower extremity edema.  He has Ace wraps on right now and appears to be getting lymphedema therapy. Will consult PT lymphedema this admission.     6.  Hypothyroidism:  Continue prior to admission levothyroxine.       Diet: Combination Diet Regular Diet Adult    DVT Prophylaxis: Low Risk/Ambulatory with no VTE prophylaxis  Zamora Catheter: not present  Code Status: Full Code      Disposition Plan   Expected discharge: 1-2 Days, recommended to prior living arrangement once safe disposition plan/ TCU bed available.  Entered: Jorje Luong MD 06/25/2019, 3:28 PM       The patient's care was discussed with the Bedside Nurse and Patient's Family.    Jorje Luong MD  Hospitalist Service  [facility]    ______________________________________________________________________    Interval History     No new complaints today  No CP/SOB. Asymptomatic overall  No fevers, no cough  Would like to see palliative care    Data reviewed today: I reviewed all medications, new labs and imaging results over the last 24 hours. I personally reviewed no images or EKG's today.    Physical Exam   Vital Signs: Temp: 97.3  F (36.3  C) Temp src: Oral BP: 93/57 Pulse: 80 Heart Rate: 86 Resp: 24 SpO2: 97 % O2 Device: None (Room air)    Weight: 164 lbs 12.8 oz     GENERAL: no apparent distress  CARDIOVASCULAR:  S1, S2 normal.  No murmur, rub or gallop.   RESPIRATORY:  Clear to auscultation bilaterally.   GASTROINTESTINAL:  Abdomen soft, nontender.   EXTREMITIES:  Wrapped in Ace wraps.  The patient has chronic leg swelling, which he blames also on lymphedema.   PSYCHIATRIC:  Normal affect.     Data   Recent Labs   Lab  06/25/19  0527 06/24/19  1206   WBC 5.5  --    HGB 8.3* 7.8*   *  --      --     137   POTASSIUM 5.1 5.4*   CHLORIDE 108 107   CO2 24 23   BUN 94* 98*   CR 2.37* 2.58*   ANIONGAP 8 7   AMY 8.6 8.5   GLC 86 89     Recent Results (from the past 24 hour(s))   XR Chest Port 1 View    Narrative    CHEST ONE VIEW PORTABLE   6/24/2019 10:48 PM     HISTORY: CHF.    COMPARISON: 6/1/2019      Impression    IMPRESSION: No significant change. Lungs remain hypoinflated with  small bilateral pleural effusions and bibasilar consolidation. Heart  size is unchanged.    ERICK POLLARD MD     Medications       aspirin  81 mg Oral Daily     atorvastatin  40 mg Oral Daily     levothyroxine  100 mcg Oral Daily     pantoprazole  40 mg Oral BID AC

## 2019-06-25 NOTE — UTILIZATION REVIEW
"Admission Status; Secondary Review Determination     Under the authority of the Utilization Management Committee, the utilization review process indicated a secondary review on the above patient.  The review outcome is based on review of the medical records, discussions with staff, and applying clinical experience noted on the date of the review.       (x) Observation Status Appropriate - This patient does not meet hospital inpatient criteria and is placed in observation status. If this patient's primary payer is Medicare and was admitted as an inpatient, Condition Code 44 should be used and patient status changed to \"observation\".     RATIONALE FOR DETERMINATION: 83-year-old male with severe ischemic cardiomyopathy, ejection fraction 15-20% with end-stage heart failure presented to his cardiology clinic with asymptomatic systolic blood pressure of 60.  Unfortunately patient has not been able to tolerate recent attempts to treat heart failure.  Patient admitted to the hospital with consideration of intravenous dopamine treatment versus palliative care.  Patient's blood pressure at the time of admission had stabilize in observation care is appropriate to review palliative care options and arrange outpatient follow-up.  If patient elects to proceed with aggressive management of his cardiomyopathy then at that time inpatient services would be appropriate.  Case discussed with attending physician.       The severity of illness, intensity of service provided, expected LOS and risk for adverse outcome make the care appropriate for further observation; however, doesn't meet criteria for hospital inpatient admission. This was discussed with attending physician who concurred with this determination.    The information on this document is developed by the utilization review team in order for the business office to ensure compliance.  This only denotes the appropriateness of proper admission status and does not reflect the " quality of care rendered.         The definitions of Inpatient Status and Observation Status used in making the determination above are those provided in the CMS Coverage Manual, Chapter 1 and Chapter 6, section 70.4.      Sincerely,     Talib Lu MD    Physician Advisor  Utilization Review/ Case Management  [facility].

## 2019-06-25 NOTE — CONSULTS
[facility]    Palliative Care Consultation     Patrick Diop  MRN# [id number]  Date of Admission:  6/24/2019  Date of Service (when I saw the patient): 06/25/19  Reason for consult: Consulted by Dr. Herman for Goals of care, Patient and family support    Assessment & Plan   Patrick Diop is a 83 year old male with PMH significant for severe ischemic cardiomyopathy with EF 15-20%, syncope s/p ICD placement, HTN with chronic hypotension, CKD stage III, severe 2 vessel CAD, NSTEMI, tongue cancer s/p chemoradiation, colon CA s/p surgical resection, hypothyroidism, severe PAD, HLD, and chronic BLE edema, who presents with weakness and hypotension. We are consulted for goals of care and pt and family support. Pt was evaluated by my colleague, Dr. Currie, at [facility] on 6/10 during a hospitalization for hypotension and GIB.     Symptoms/Recommendations   -Pt is interested in returning to his assisted living facility with [facility] hospice, ideally tomorrow. SW consult for hospice ordered   -Pt requesting DNR/DNI and ICD to be turned off, orders in place    Support/Coping  -Well supported by daughter Florecita and her sig other Isaac   -Pt is not spiritual or Oriental orthodox     Decisional Support, Goals of Care, Counseling & Coordination  Decisional Capacity Intact?  -Yes   Health Care Directive on File?  -Yes, POLST reviewed and updated today to reflect current wishes of DNR/DNI and comfort cares   Code Status/Resuscitation Preferences?  -Changed to DNR/DNI subsequent to today's visit     Discussion  Visited with Patrick, along with chuck Bernabe and her sig other Isaac. Introduced the scope of our practice to pt and family. Discussed our potential roles for symptom management, support/coping, and decisional support (aka goals of care).     Pt is very clear in stating that he has reached a point in his life where he wants to prioritize staying home (in his small assisted living facility) and focus on comfort.  "    Patrick continues to feel really good, and he hopes that can continue for as long as possible.     We thus discuss hospice support. Educated patient and family regarding hospice philosophy and prognostic criteria. Dispelled common myths. Discussed what hospice is (and is not), what services are usually provided (and those that are not, ex \"[facility] care\"), under what circumstances people tend to enroll, and the variety of places people can get hospice care. Discussed typical anticipated timing of discharge. Based on this discussion, Patrick would like to proceed with hospice planning and discharge for tomorrow.    We review code status. Patrick requests DNR/DNI and ICD to be turned off.     Emotional support provided.    Case reviewed with Dr. Luong, bedside RN, and unit [facility] Scales.     Thank you for involving us in the care of this patient and family. We will sign off at this time. Please do not hesitate to contact me with questions or concerns or the on-call provider for our team if evening or weekend.    Ana INGRAM, [facility]  Palliative Medicine   Pager [phone]    Attestation:  Total time on the floor involved in the patient's care: 70 minutes  Total time spent in counseling/care coordination: >50%    Chief Complaint   Weakness and hypotension     History is obtained from the patient, staff, family and extensive chart review.     Past Medical History    I have reviewed this patient's medical history and updated it with pertinent information if needed.   Past Medical History:   Diagnosis Date     Acute on chronic systolic congestive heart failure (H) 2/10/2019     Acute on chronic systolic heart failure (H) 2/10/2019     Adult hypothyroidism      Colon cancer (H)      Hearing problem      Hyperlipidemia with target LDL less than 100 11/6/2013     Hypertension goal BP (blood pressure) < 140/90 11/6/2013     Ischemic cardiomyopathy 02/10/2019    Postulated to be due to chemotherapy, though agent not specified. "     Non-ischemic cardiomyopathy (H) 2/10/2019    Postulated to be due to chemotherapy, though agent not specified.     Syncope      Tongue cancer (H)        Past Surgical History   I have reviewed this patient's surgical history and updated it with pertinent information if needed.  Past Surgical History:   Procedure Laterality Date     APPENDECTOMY  2001     COLECTOMY  Jan 2002    malignant polyp.  Colectomy and colostomy     COLONOSCOPY  Jan 2002    polyps     CV HEART CATHETERIZATION WITH POSSIBLE INTERVENTION N/A 2/14/2019    Procedure: Heart Catheterization with possible Intervention;  Surgeon: Julio Vera MD;  Location: [facility] CARDIAC CATH LAB     ENDOSCOPY UPPER, COLONOSCOPY, COMBINED N/A 6/8/2019    Procedure: UPPER ENDOSCOPY with hemostasis;  Surgeon: Nicholas Carson MD;  Location: [facility] OR     EP ICD N/A 2/15/2019    Procedure: EP ICD;  Surgeon: Cj Gutierrez MD;  Location:  HEART CARDIAC CATH LAB     EYE SURGERY      B cataract     HC UGI ENDOSCOPY W PLACEMENT GASTROSTOMY TUBE PERCUT N/A 11/19/2015    Procedure: COMBINED ESOPHAGOSCOPY, GASTROSCOPY, DUODENOSCOPY (EGD), PLACE PERCUTANEOUS ENDOSCOPIC GASTROSTOMY TUBE;  Surgeon: Sergio Buenrostro MD;  Location: [facility]     LARYNGOSCOPY WITH BIOPSY(IES) N/A 4/3/2018    Procedure: LARYNGOSCOPY WITH BIOPSY(IES);  Direct Laryngoscopy, Biopsy Base Of Tongue;  Surgeon: Jj Hernandez MD;  Location: [facility] OR     TAKEDOWN COLOSTOMY         Social History   Living situation: Small assisted living residence     Family system: Daughter Florecita and her sig other Isaac     Self-identified support system: As above     Employment/education: Retired, sold commercial insurance     Activities/interests: Pt overall feels well and hopes to feel as good as he can for as long as he can     Use of community resources: [facility] home care services     Cheondoism affiliation: Neither spiritual or Pentecostal     Involvement in [place] community: None     Impact of illness  on patient: Pt is ready to focus on comfort at end of life, will move toward hospice     Family History   I have reviewed this patient's family history and updated it with pertinent information if needed.   Family History   Problem Relation Age of Onset     Other Cancer Mother 57        lung ca, with brain mets     Cancer Mother         Brain tumor, lung cancer     Cancer Father         Lung cancer     Cancer Brother        Allergies   No Known Allergies    Medications   Current Facility-Administered Medications Ordered in Epic   Medication Dose Route Frequency Last Rate Last Dose     acetaminophen (TYLENOL) tablet 650 mg  650 mg Oral Q4H PRN   650 mg at 06/25/19 1418     aspirin EC tablet 81 mg  81 mg Oral Daily   81 mg at 06/25/19 0840     atorvastatin (LIPITOR) tablet 40 mg  40 mg Oral Daily   40 mg at 06/24/19 2252     levothyroxine (SYNTHROID/LEVOTHROID) tablet 100 mcg  100 mcg Oral Daily   100 mcg at 06/25/19 0648     melatonin tablet 1 mg  1 mg Oral At Bedtime PRN         miconazole (MICATIN/MICRO GUARD) 2 % powder   Topical Q1H PRN         naloxone (NARCAN) injection 0.1-0.4 mg  0.1-0.4 mg Intravenous Q2 Min PRN         ondansetron (ZOFRAN-ODT) ODT tab 4 mg  4 mg Oral Q6H PRN        Or     ondansetron (ZOFRAN) injection 4 mg  4 mg Intravenous Q6H PRN         pantoprazole (PROTONIX) EC tablet 40 mg  40 mg Oral BID AC   40 mg at 06/25/19 0648     senna-docusate (SENOKOT-S/PERICOLACE) 8.6-50 MG per tablet 1 tablet  1 tablet Oral BID PRN        Or     senna-docusate (SENOKOT-S/PERICOLACE) 8.6-50 MG per tablet 2 tablet  2 tablet Oral BID PRN         No current [facility]-ordered outpatient medications on file.       Review of Systems   The comprehensive review of systems is not completed at this time, pt reports feeling good.     Physical Exam   Temp: 97.3  F (36.3  C) Temp src: Oral BP: 93/57 Pulse: 80 Heart Rate: 86 Resp: 24 SpO2: 97 % O2 Device: None (Room air)    Vitals:    06/24/19 2200 06/25/19 0500   Weight:  74.7 kg (164 lb 11.2 oz) 74.8 kg (164 lb 12.8 oz)     CONSTITUTIONAL: Chronically ill elderly man seen sitting up in the chair in NAD, A&Ox3. Calm and cooperative. Family present   HEENT: NCAT  RESPIRATORY: NL respiratory effort on RA  NEUROLOGIC: Appropriately responsive during interview  PSYCH: Affect congruent, engaged, appropriately tearful at times during discussion     Data   Results for orders placed or performed during the hospital encounter of 06/24/19 (from the past 24 hour(s))   Cardiology IP Consult: Patient to be seen: Routine - within 24 hours; advanced HF; Consultant may enter orders: Yes; Requesting provider? Hospitalist (if different from attending physician)    Narrative    Gerry Orr MD     6/25/2019  9:10 AM  Cardiology consult dictated (#[id number])   XR Chest Port 1 View    Narrative    CHEST ONE VIEW PORTABLE   6/24/2019 10:48 PM     HISTORY: CHF.    COMPARISON: 6/1/2019      Impression    IMPRESSION: No significant change. Lungs remain hypoinflated with  small bilateral pleural effusions and bibasilar consolidation. Heart  size is unchanged.    ERICK POLLARD MD   Lactic acid level STAT for sepsis protocol   Result Value Ref Range    Lactate for Sepsis Protocol 0.9 0.7 - 2.0 mmol/L   Basic metabolic panel   Result Value Ref Range    Sodium 140 133 - 144 mmol/L    Potassium 5.1 3.4 - 5.3 mmol/L    Chloride 108 94 - 109 mmol/L    Carbon Dioxide 24 20 - 32 mmol/L    Anion Gap 8 3 - 14 mmol/L    Glucose 86 70 - 99 mg/dL    Urea Nitrogen 94 (H) 7 - 30 mg/dL    Creatinine 2.37 (H) 0.66 - 1.25 mg/dL    GFR Estimate 25 (L) >60 mL/min/[1.73_m2]    GFR Estimate If Black 29 (L) >60 mL/min/[1.73_m2]    Calcium 8.6 8.5 - 10.1 mg/dL   CBC with platelets   Result Value Ref Range    WBC 5.5 4.0 - 11.0 10e9/L    RBC Count 2.56 (L) 4.4 - 5.9 10e12/L    Hemoglobin 8.3 (L) 13.3 - 17.7 g/dL    Hematocrit 25.9 (L) 40.0 - 53.0 %     (H) 78 - 100 fl    MCH 32.4 26.5 - 33.0 pg    MCHC 32.0 31.5 - 36.5 g/dL     RDW 17.0 (H) 10.0 - 15.0 %    Platelet Count 182 150 - 450 10e9/L

## 2019-06-25 NOTE — PLAN OF CARE
Patient A/O x4.SBA with walker, uses urinal standing at bedside.BP 80-90 systolic, asymptomatic. No resp issues, 97% on RA, LS dim. Creatinine and hgb slightly improved this am. Pt declined to remove compression wraps to LE, states they were just put on and he does not want to take off. Pt has 2 open areas on buttocks area, mepilex on, WOC consult ordered. Turn every 2 hours. Palliative care consult.

## 2019-06-25 NOTE — PLAN OF CARE
A&O. Tele SR with 1st degree AVB. Soft BP's in the 90's, baseline per pt. Pt met with palliative today, switched code to DNR/DNI and decided to have ICD turned off. [employer] will come tomorrow between 830-0900 to turn off ICD, forms faxed. Voiding, frequency. BM today x2. Plan to discharge tomorrow on hospice

## 2019-06-25 NOTE — CONSULTS
Consult Date:  06/25/2019      CARDIOLOGY CONSULTATION      REASON FOR CARDIOLOGY CONSULTATION:  Advanced heart failure.      REQUESTING PHYSICIAN:  Dr. Herman      CHIEF COMPLAINT:  Weakness, hypertension.      HISTORY OF PRESENT ILLNESS:  Mr. Diop is a pleasant but frail-looking 83-year-old gentleman with severe ischemic cardiomyopathy with LVEF of 20% with 3-vessel/multivessel coronary artery disease with poor LAD and RCA viability and MRI and not a candidate for surgical revascularization and on medical management with history of non-STEMI in 01/2019 on medical management with history of recent GI bleed off Plavix with history of syncope in the setting of severe cardiomyopathy, status post ICD for secondary prevention, history of tongue cancer, hypothyroidism, chronic kidney disease stage III, peripheral artery disease, who was admitted from [facility] yesterday when he showed up for routine followup and was noted to have significant hypotension with systolic blood pressure 60-70.  He did not feel particularly dizzy but he was weak.  To be noted, he was only on 2.5 mg  daily of lisinopril and 12.5 mg daily of Toprol-XL.  Appropriately, both these medications were discontinued.  There was also a discussion that took place with his primary cardiologist, Dr. Calderón, about palliative care.  At the time of this review, patient is lying down comfortably in the bed.  He lives in an assisted living with 7 residents.  He has a daughter, who lives in [place].  The patient tells me he used to be quite active previously, but now he just walks a few steps.  He is mostly affected by some weakness.  He denies any chest discomfort, no shortness of breath, particularly with exertion, although he does not exert much.  When his blood pressure is low, he does not feel any particular lightheadedness, but he does feel tired.  In the hospital, his systolic blood pressure had been running initially in  low 80s.  Right now, the latest systolic blood pressure is 95, diastolic blood pressure 65.  He is not a candidate for advanced heart failure therapy because of his age and other comorbidities.  Echocardiogram in 02/2019 showed LVEF around 20% to 35% with severe anteroseptal apical wall hypokinesia, mildly decreased RV systolic function.  Coronary angiogram done in 02/2019 showed severe 3-vessel coronary artery disease with CT of the RCA, circumflex and septal occlusion of the mid LAD.  Cardiac MRI done around the same time showed LVEF of 15% with thinning and severe hypokinesia to akinesia of mid to apical segment of the left ventricle.  RV systolic function was also moderate to severely reduced with RVEF of 27%.  Late gadolinium enhancement showed extensive subepicardial enhancement involving all apical segments of the left ventricle wall, as well as mid anteroseptal and inferoseptal wall and mid to basal inferior wall.  Overall, this was suggestive of low lateral function recovery in the LAD distribution and low to moderate lateral function recovery in the RCA distribution.  As noted above, the patient was not found to be a surgical candidate.  Also, it was felt due to extensive scarring and lack of reasonable viability.  Medical management was recommended for coronary artery disease.  In the interim, he also had GI bleed and was admitted at the [facility].  His Plavix has been discontinued.  The patient tells me that he is very interested in palliative.  In fact, he tells me he meet Palliative briefly during his stay in the [facility].  He is also interested in turning the ICD therapy off.      REVIEW OF SYSTEMS:  A complete review of systems was performed and was negative, except in HPI.      PAST MEDICAL HISTORY:   1.  Severe 3-vessel coronary artery disease as noted above.   2.  Severe ischemic cardiomyopathy with LVEF around 20% to 35%.  LVEF around 15%  on cardiac MRI.   3.  Chronic renal disease, stage III.   4.  Status post ICD placement for syncope in the setting of cardiomyopathy/secondary prevention.  Recent device check showed 90% atrial pacing, 0% ventricular pacing.  No shock or ATP therapy.  No ventricular arrhythmia.   5.  History of recurrent tongue cancer.   6.  Hypothyroidism.   7.  Recent history of GI bleed.   8.  History of colon cancer.      SOCIAL HISTORY:  He was a former smoker, but not smoking anymore.      FAMILY HISTORY:  Reviewed and noncontributory.      HOME MEDICATIONS PRIOR TO ADMISSION:   1.  Aspirin 81 mg daily.   2.  Lipitor 40 mg daily.   3.  Lisinopril 2.5 mg daily.   4.  Metoprolol succinate 12.5 mg daily.   5.  Torsemide 20 mg daily.      ALLERGIES:  NO KNOWN DRUG ALLERGIES.      PHYSICAL EXAMINATION:   VITAL SIGNS:  Blood pressure 87/54, latest one is 95/65.  Heart rate 79, 97% on room air.   GENERAL:  The patient appears frail, otherwise not in distress.   NECK:  JVP appears normal without any hepatojugular reflux.   CARDIOVASCULAR:  S1, S2 normal.  No murmur, rub or gallop.   RESPIRATORY:  Clear to auscultation bilaterally.   GASTROINTESTINAL:  Abdomen soft, nontender.   EXTREMITIES:  Wrapped in Ace wraps.  The patient has chronic leg swelling, which he blames also on lymphedema.   PSYCHIATRIC:  Normal affect.   SKIN:  No obvious rash.   HEENT:  Pallor noted.      LABORATORY:  Creatinine 2.5, now down to 2.37.  NT proBNP 63,381.  Hemoglobin 8.3, platelets 182, WBC 5.5.      IMAGING:  Chest x-ray:  Small bilateral pleural effusions and bibasilar consolidation.      ASSESSMENT AND PLAN:  A pleasant but very frail 83-year-old gentleman with end-stage heart failure/severe ischemic cardiomyopathy/severe 3-vessel coronary artery disease, not a candidate for revascularization, and with low viability in LAD and RCA territory on medical management for same.  Other comorbidities of history of syncope, status post ICD for secondary  prevention, recent history of gastrointestinal bleed off Plavix, chronic kidney disease III with now worsening of creatinine, who was admitted from Cardiology Clinic because of significant hypotension while he was only on very low-dose cardiomyopathy medications as noted above.  He is not a candidate for advanced heart failure therapy.  Palliative Care discussion has already taken place between his primary cardiologist, and I agree with that.  The patient is also very interested in a meeting with Palliative Care.  At this time, I recommend not initiating any cardiomyopathy medication, as his blood pressure is only borderline normal at this time.  The patient is also interested in discussing about code status and changing it to DNR/DNI and also is interested in talking with Palliative and eventually turning off the ICD therapy.  At this time, I see no reason of putting him on inotropes as his blood pressure has come up without cardiomyopathy medications.      1.  End-stage refractory congestive heart failure due to severe ischemic cardiomyopathy, NYHA class 3-4.  Unable to tolerate even low-dose cardiomyopathy medication because of hypotension.  Blood pressure borderline normal to low normal off these medications.  The patient is not a candidate for advanced heart failure therapies.   2.  Severe 3-vessel coronary artery disease on medical management as noted above.   3.  History of syncope, status post ICD for secondary prevention.  Latest device check showing 90% atrial pacing, no ventricular pacing, no ATP or shock, no ventricular arrhythmia.   4.  Acute on chronic renal failure, likely cardiorenal because of hypotension and low cardiac output.   5.  Anemia.   6.  Frail status.      RECOMMENDATIONS:   1.  Agree with involving Palliative.     2.  I would recommend at this time not restarting cardiomyopathy medication given his blood pressure is low borderline.  No indication for inotropic support at this time as  blood pressure is borderline low to low normal.   3.  The patient would like to discuss with Palliative regarding code status, as well as turning ICD therapy off, which I think is very reasonable.      Thank you for involving me in the care of Mr. Roger.  Cardiology will sign off at this time, but please feel free to call with any questions.         THIERNO ALVARENGA MD             D: 2019   T: 2019   MT: MARISSA      Name:     ADAM ROGER   MRN:      [id number]        Account:       [id number]   :      1935           Consult Date:  2019      Document: [id number]

## 2019-06-25 NOTE — H&P
Admitted:     06/24/2019      PRIMARY CARE PROVIDER:  Joe Mandel MD      CHIEF COMPLAINT:  Hypotension.      HISTORY OF PRESENT ILLNESS:  Mr. Patrick Diop is an 83-year-old male with history of severe ischemic cardiomyopathy with last EF of 15%-20%, status post ICD placement, chronic hypertension, chronic kidney disease stage III, 3-vessel coronary artery disease, tongue cancer, status post chemoradiation any immunotherapy and colon cancer, status post-surgical resection, hypothyroidism, severe peripheral arterial disease, chronic lower extremity edema, hyperlipidemia, who presented to outpatient Cardiology Clinic today for routine evaluation and was found to be hypotensive with blood pressure as low as 60/40.  The patient appeared to be asymptomatic from it without any lightheadedness or dizziness.  However, given his significant hypotension more so compared to his baseline, he was advised to go to the emergency room.  On questioning further, the patient denies any worsening shortness of breath.  The patient does have advanced ischemic cardiomyopathy and does have some chronic cough with clear sputum production which has not changed.  Denies any change in his dyspnea.  He does have chronic lower extremity edema and actually reports that it is much better with lymphedema treatment.  No fever or chills.  No abdominal pain, no nausea, vomiting or diarrhea reported by the patient.  Denies dysuria, urinary urgency or frequency.  No headache, no focal extremity weakness, tingling or numbness.  He was evaluated at [facility] and initially an attempt was made to transfer to the [facility]; however, due to lack of bed availability, he was subsequently admitted to [facility].  During the stay in the emergency room, his blood pressure improved somewhat without any intervention or treatment.      PAST MEDICAL HISTORY:   1.  History of colon cancer.   2.  Recent  "gastrointestinal bleed.   3.  Chronic hypertension.   4.  Severe ischemic cardiomyopathy.   5.  Chronic systolic congestive heart failure.   6.  Coronary artery disease.   7.  Status post ICD placement.   8.  Hyperlipidemia.   9.  Chronic lower extremity edema with venous stasis.   10.  Severe peripheral arterial disease.   11.  Chronic kidney disease, stage III.   12.  Hypothyroidism.   13.  Recurrent tongue cancer.      SOCIAL AND PERSONAL HISTORY:  Former smoker, denies alcohol use.  He currently resides in an assisted living.      FAMILY HISTORY:  Reviewed and is noncontributory.      HOME MEDICATIONS:    Prior to Admission Medications   Prescriptions Last Dose Informant Patient Reported? Taking?   Cholecalciferol (VITAMIN D) 1000 UNITS capsule 6/24/2019 at AM Care Giver Yes Yes   Sig: Take 1 capsule by mouth every morning    aspirin (ASA) 81 MG EC tablet 6/24/2019 at AM Care Giver No No   Sig: Take 1 tablet (81 mg) by mouth daily   Patient taking differently: Take 81 mg by mouth every morning    atorvastatin (LIPITOR) 40 MG tablet 6/23/2019 at PM Care Giver No No   Sig: Take 1 tablet (40 mg) by mouth daily   Patient taking differently: Take 40 mg by mouth every evening    levothyroxine (SYNTHROID/LEVOTHROID) 100 MCG tablet 6/24/2019 at AM Care Giver No No   Sig: Take 1 tablet (100 mcg) by mouth daily   Patient taking differently: Take 100 mcg by mouth every morning    miconazole (MICATIN) 2 % AERP powder  at On HOLD Care Giver No No   Sig: Apply topically 2 times daily   multivitamin  with lutein (OCUVITE WITH LTEIN) CAPS 6/24/2019 at AM Care Giver Yes Yes   Sig: Take 1 capsule by mouth daily   nystatin (MYCOSTATIN) 591987 UNIT/GM external cream 6/24/2019 at AM Care Giver No Yes   Sig: Apply topically 2 times daily   order for DME  Care Giver No No   Sig: Equipment being ordered: 4-Wheel Walker with seat. He is 5' 7\" tall.  [facility]: Fax: [phone]  phone [phone]   order for DME  Care Giver No " No   Sig: Equipment being ordered: Hospital Bed from [facility]   order for DME   No No   Sig: Equipment being ordered: bilateral lower extremities compression garments 30-40 mm Hg   pantoprazole (PROTONIX) 40 MG EC tablet 6/24/2019 at AM  No Yes   Sig: Take 1 tablet (40 mg) by mouth 2 times daily (before meals)   torsemide (DEMADEX) 20 MG tablet 6/24/2019 at AM  No Yes   Sig: Take 1 tablet (20 mg) by mouth daily Take  once daily. Hold for 1 day if you start having more dizziness. If daily weight increases by 2 lb or if legs are swollen, give an additional 20mg.      Facility-Administered Medications: None        REVIEW OF SYSTEMS:  A complete review of system was done and was negative for anything else other than that mentioned in the HPI.      PHYSICAL EXAMINATION:   GENERAL:  Mr. Patrick Diop is an 83-year-old male.  He is not in any overt distress and appears comfortable.   VITAL SIGNS:  Temperature 97.6, blood pressure 86/52, heart rate 85, respiration rate 15, O2 sat 99% on room air.   HEENT:  Atraumatic, normocephalic, no pallor or icterus.   NECK:  Supple, with good range of motion.   RESPIRATORY:  Few bibasilar crackles, normal effort of breathing.   CARDIOVASCULAR:  Regular rate and rhythm.  There is no murmur.   ABDOMEN:  Soft, nontender, nondistended, bowel sounds normoactive.   EXTREMITIES:  He does have Ace wraps up to his knees; does appear to have 2-3+ pitting edema bilaterally.   SKIN:  Warm and dry.   NEUROLOGIC:  He is awake, alert, oriented x3, answering my questions appropriately and moving all 4 extremities.      LABORATORY AND DIAGNOSTIC DATA:  Hemoglobin is 7.8, which appears to be slightly better than his recent hemoglobin around the low 7s.  Creatinine is elevated to 2.58 from his recent baseline of around 1.8, potassium is also elevated at 5.4.  BNP is 63,381.  Blood glucose is 89.      ASSESSMENT AND PLAN:  Mr. Patrick Diop is an 83-year-old male with complex cardiac history as mentioned  sola who initially presented to outpatient Cardiology Clinic and was found to be hypotensive with blood pressure in the 60s systolic and was sent to the emergency room.   1.  Acute on chronic hypotension.  The patient does have a baseline blood pressure in the 80s-90s systolic; however, his blood pressure earlier at Cardiology office was 60/40.  He was largely asymptomatic, though.  Unclear as far as his initial etiology for hypotension, he obviously is on metoprolol and lisinopril and that could have contributed to his hypotension.  In any case, his blood pressures have now improved and are closer to his baseline.  He did not require any intervention.  In the emergency room, I will continue to monitor off any medication at this time.  I did review Cardiology note from the office.  If he becomes symptomatic then low dose dopamine might be recommended.   2.  Severe ischemic cardiomyopathy.  His EF appears to be between 15%-20% and appears to have end-stage heart failure.  Moreover, he has not been able to tolerate any significant heart failure therapy.  He had an ICD for syncope earlier this year and recently was admitted to [facility], with GI bleeding.  As far as his heart failure is concerned, he is on torsemide at home; however, now has a worsening renal function.  At this time, I will hold his torsemide and will discuss with Cardiology tomorrow morning, about the definitive plan for his end-stage congestive heart failure.  The patient is interested in turning off his ICD and also talking to Palliative Care Team.  He does appear inclined toward palliative care and potentially de-escalation of his medical care, but will need to have further more discussions in the subsequent days.  Due to hypotension, we will hold his metoprolol and lisinopril and reassess in the morning.   3.  Acute kidney injury on chronic kidney disease, stage III:  His creatinine has bumped up from a baseline of around 1.8 to 2.6  today.  More than likely this is cardiorenal with worsening heart failure and persistent hypotension.  Again, depending on which way he wants to proceed further, further decision will be regarding potentially initiation of find atopic therapy.  Again, will defer that to Cardiology in the morning.  He is asymptomatic at this time, so we will just watch him overnight.   4.  Recent acute blood loss anemia secondary to gastrointestinal bleed from duodenal ulcers.  The patient denies any recent ongoing bleed.  His hemoglobin today is stable at 7.8.  We will continue his prior to admission Protonix and monitor his CBC.   5.  History of coronary artery disease with a 3-vessel coronary artery disease.  The patient denies any anginal symptoms at this time.  He had a coronary angiogram in February which showed 3-vessel coronary artery disease with a  of RCA and left circumflex and subtotal occlusion of mid LAD.  He was not deemed to be a candidate for CABG and it appeared like moreover, the patient declined surgery.  It appears like his Plavix was recently discontinued.  We will continue on his aspirin 81 mg daily and again await further recommendations from Cardiology, but he is largely asymptomatic from this standpoint at the moment.   6.  Chronic venous stasis with severe bilateral lower extremity edema.  He has Ace wraps on right now and appears to be getting lymphedema therapy.  We will consult physical therapy for that.  I will hold his diuretics as mentioned above for now.   7.  Hypothyroidism:  Continue prior to admission levothyroxine.      Anticipated length of stay more than 2 midnights.      CODE STATUS:  He remains full code at the moment, but is considering DNR/DNI, will need further discussion regarding this.         ANI SAUNDERS MD             D: 2019   T: 2019   MT: SHAWNA      Name:     ADAM ROGER   MRN:      [id number]        Account:      [id number]   :      1935         Admitted:     06/24/2019                   Document: [id number]       cc: Joe Mandel MD

## 2019-06-25 NOTE — PROVIDER NOTIFICATION
Paged Dr. Luong regarding [employer] stating they need and MD order to turn off ICD and inquiring if he could place that order

## 2019-06-25 NOTE — PROGRESS NOTES
Clinic Care Coordination Contact    Situation: Patient chart reviewed by care coordinator.    Background: Patient hospitalized at [facility] on 6/24/19 for hypotension.    Assessment: Per notes, patient is interested in talking with palliative care and possibly turning his ICD off. Cardiology consulted and agreed with patient to involving palliative care to discuss code status to DNR/DNI and turing ICD therapy off.     Plan/Recommendations: Care Coordinator RN to monitor chart every 2-3 days for discharge plan and will be available if needed.    Jacquelyn RAMIREZ RN, PHN, [facility]  Primary Care Clinic RN Care Coordinator  [facility]   [email or web address]  Office:  [phone]

## 2019-06-25 NOTE — PHARMACY-ADMISSION MEDICATION HISTORY
Admission Medication History    Admission medication history interview status for the 6/24/2019 admission is complete.     Med history compiled 100% from [facility] and notes.    Prior to Admission medications    Medication Sig Last Dose Taking? Auth Provider   aspirin (ASA) 81 MG EC tablet Take 1 tablet (81 mg) by mouth daily  Patient taking differently: Take 81 mg by mouth every morning  6/24/2019 at AM Yes Lionel Calderón MD   atorvastatin (LIPITOR) 40 MG tablet Take 1 tablet (40 mg) by mouth daily  Patient taking differently: Take 40 mg by mouth every evening  6/23/2019 at PM Yes Loinel Calderón MD   Cholecalciferol (VITAMIN D) 1000 UNITS capsule Take 1 capsule by mouth every morning  6/24/2019 at AM Yes Reported, Patient   levothyroxine (SYNTHROID/LEVOTHROID) 100 MCG tablet Take 1 tablet (100 mcg) by mouth daily  Patient taking differently: Take 100 mcg by mouth every morning  6/24/2019 at AM Yes Keyana Vegas APRN CNP   multivitamin  with lutein (OCUVITE WITH LTEIN) CAPS Take 1 capsule by mouth daily 6/24/2019 at AM Yes Reported, Patient   nystatin (MYCOSTATIN) 117243 UNIT/GM external cream Apply topically 2 times daily 6/24/2019 at AM Yes Keyana Vegas APRN CNP   pantoprazole (PROTONIX) 40 MG EC tablet Take 1 tablet (40 mg) by mouth 2 times daily (before meals) 6/24/2019 at AM Yes Nirmal Acosta MD   torsemide (DEMADEX) 20 MG tablet Take 1 tablet (20 mg) by mouth daily Take  once daily. Hold for 1 day if you start having more dizziness. If daily weight increases by 2 lb or if legs are swollen, give an additional 20mg. 6/24/2019 at AM Yes Nirmal Acosta MD   miconazole (MICATIN) 2 % AERP powder Apply topically 2 times daily  at On HOLD  Keyana Vegas APRN CNP   order for DME Equipment being ordered: bilateral lower extremities compression garments 30-40 mm Hg   Joe Mandel MD   order for DME Equipment being ordered: Hospital Bed from Keyana Kerr  "APRN CNP   order for DME Equipment being ordered: 4-Wheel Walker with seat. He is 5' 7\" tall.  [facility]: Fax: [phone]  phone [phone]   Joe Mandel MD David S. Cline, PharmD      "

## 2019-06-26 PROBLEM — I50.9 CHF (CONGESTIVE HEART FAILURE) (H): Status: ACTIVE | Noted: 2019-01-01

## 2019-06-26 NOTE — PROGRESS NOTES
[facility]  Patient is currently open to home care services with [facility]. The patient is currently receiving Skilled Nursing and OT services. [facility]  and team have been notified of patient admission and of plan for discharge today with admission to  Hospice scheduled for Thursday 6/27/19. [facility] liaison will continue to follow patient during stay.

## 2019-06-26 NOTE — DISCHARGE INSTRUCTIONS
"WO RN- recommendations  Every 3 day dressing changes to coccyx  1. Clean entire area with saline and dust with antifungal powder x 5 days, stopping on June 30, 2019.  Tap the powder with No Sting Skin Prep, let it dry and crust is created that the dressings can stick to.    2. Apply small dab of Iodosorb Gel (#[id number]) to each wound bed only  3. Press a Mepilex  Sacral Dressing (PS#[id number])  to the area, making sure to conform nicely to skin curvatures   (begin placing the Mepilex at the most distal aspect first, smooth into place in an upward direction, then smooth side to side)   5. Time and date dressing change and teresa with a \"T\" for treatment of a wound  6. Reposition pt every 1 to 2 hours when in bed, trying to keep side positioned vs any supine positioning,  and hourly when up to the chair to relieve pressure and promote perfusion to tissue  NOTE:  Iodosorb Gel should not be used longer than 14 days. After 14 days the gel should be stopped and a new plan established, this may mean only a simple, gauze dressing.  The Iodosorb Gel should be stopped   - would recommend continuing to do dressing changes with the Iodosorb gel as this will keep wounds clean and dry them out, therefor keeping pt more comfortable.    Bilateral LE cares- follow recommendations established by home care RN's prior to admission.  They had a good plan in place with cleaning legs and feet with gentle soap and water, lotion. Then Mepilex Lite dressings to non draining to scant draining wounds, compression wraps          Patient will discharge to home with [facility].  [facility] will meet patient at home tomorrow, Thursday, June 27 at 1 pm.  [facility] phone number is [phone].  "

## 2019-06-26 NOTE — PLAN OF CARE
A&Ox4. VSS ex BP soft, asymptomatic. RA. Tele SR 1st degree AV block. BLE edema. Lymph wraps in place. Wound on coccyx, moist. Lung sounds diminished. C/O pain in left leg worse than right 3/10, prn tylenol given x2. SBA.

## 2019-06-26 NOTE — PLAN OF CARE
Lymph:  Discharge Planner PT   Patient plan for discharge: Return home  Current status: Orders received, eval completed, treatment initiated. Pt admitted as a transfer from [facility] ED with hypotension. Prior to admit pt was living alone in an CADE apartment in [place], independent with mobility. Currently requires SBA with all mobility. Pt has been having home care lymph services 3x/wk at home and is currently wearing his wraps and did not let RNs take them off at admit. PT removed wraps, RN completed skin assessment and dressed wounds, PT provided skin cares, applied new TG soft and wraps toes to knees with appropriate stretch and spacing to allow for gradient compression. Pt demonstrates LE edema and difficulty ambulating and would benefit from skilled PT services in order to improve this.  Barriers to return to prior living situation: None  Recommendations for discharge: Return home with home lymphedema as prior  Rationale for recommendations: Pt to discharge this afternoon. Pt has no further skilled IP PT needs at this time.       Entered by: Hawa Jensen 06/26/2019 8:25 AM     PT goals met.    Physical Therapy Discharge Summary    Reason for therapy discharge:    Discharged to home with home therapy.  All goals and outcomes met, no further needs identified.    Progress towards therapy goal(s). See goals on Care Plan in [facility] electronic health record for goal details.  Goals met    Therapy recommendation(s):    No further therapy is recommended.

## 2019-06-26 NOTE — CONSULTS
Care Transition Initial Assessment -      Met with: Patient and Family  (dasughter Florecita and son-in-law)  Active Problems:    Hypotension    CHF (congestive heart failure) (H)       DATA  Lives With: alone      Quality of Family Relationships: supportive, involved  Description of Support System: Supportive, Involved  Who is your support system?: Children  Support Assessment: Adequate family and caregiver support.   Identified issues/concerns regarding health management:   Resources List: Hospice     Quality of Family Relationships: supportive, involved     Per social work consult for discharge planning.  Patient was admitted on 6-24-19 with hypotension.  The tentative date of discharge is 6-26-19.  Reviewed chart and spoke with patient, daughter Florecita, and son-in-law regarding discharge plans.  Per patient and family report, patient lives alone in an apartment.  Patient is fairly independent at baseline.  Patient is currently receiving home lymph services 3 times a week.  Patient was also receiving palliative homecare from the [facility].  Spoke with patient and family regarding the discharge recommendation of hospice care on discharge.  Patient states that he is planning on returning home upon discharge with hospice care.  Patient states he would like to use [facility].  Patient states he would like to discharge home today.  Referral made to [facility], [phone].  Spoke with Yuko.  Yuko states that they are unable to see patient until Thursday at 13:00, however patient can still discharge today if he has the proper meds in place so he is not short of breath.  They would need to know what kind of equipment patient needs so they can get the equipment delivered.  Updated patient and family.  Patient states that he wants to be discharged today and does not want to remain in the hospital.  Patient states he has some meds and will speak with his MD to make sure he has the proper meds to  get him through tomorrow.  Patient states he does not need any DME as his room is small ad there is not room for anything.  Patient's daughter states if patient changes rooms and moves across the ontiveros then possibly he would want a hospital bed, but for now, he is not interested in any equipment.  Explained that typically they can order equipment with a 4 hour turnaround if something should change.  Patient and daughter feel comfortable with patient being discharged today and patient's daughter will transport at 14:00 today.  [facility] will be out to patient's house at 13:00 to sign consents and complete the intake process.  Call placed to update [facility] as to this information.      ASSESSMENT  Cognitive Status:  awake and alert  Concerns to be addressed: discharge planning, patient will return home with [facility]. .     PLAN  Financial costs for the patient includes N/A.  Patient given options and choices for discharge hospice agencies.  Patient/family is agreeable to the plan?  Yes  Transportation/person available to transport on day of discharge  is family transport at 14:00 today and have they been notified/set up yes  Patient Goals and Preferences: return home with [facility].  Patient anticipates discharging to:  Home.    Will continue to follow and assist with a safe discharge plan.    YEE Kc, [facility]  [phone]

## 2019-06-26 NOTE — PROGRESS NOTES
Per Dr. Carson on 6/8/19: Repeat upper endoscopy on a MAC day in Unit [address] by one                        of my partners in 60 days     Order placed and routed to endoscopy.     MIRANDA Lerner Dr., Dr. Carson, & Dr. James  Advanced Endoscopy  [phone]

## 2019-06-26 NOTE — PROGRESS NOTES
"D:  At 0630 this morning I discuss wound care with night nurse, Amanda, she said she put a new dressing on pt, did not put Iodosorb Gel on and did not time and date dressing.  I re did full dressing change and discussed with Amanda that I would return later this morning to check on when lymphedema would return so I could see legs at that time, she did not know when lymphedema would be here this morning.     Just now, 0840, I returned to the unit, spoke with KASSIE Bentley who said the wraps were just done.  He described a superficial wound on left shin and right ankle areas, no drainage, odor or pain.  P:  Pt being followed by homecare RN's who have been following wounds/ wraps to bilateral LE.  So at this time I will not remove wraps to see legs.  Pt to be discharge to home/ hospice later today, with, what sounds like, an already effective plan of care in place.  Follow home care plan for legs    Every 3 day dressing changes to coccyx  1. Clean entire area with saline and dust with antifungal powder x 5 days, stopping on June 30, 2019.  Tap the powder with No Sting Skin Prep, let it dry and crust is created that the dressings can stick to.    2. Apply small dab of Iodosorb Gel (#[id number]) to each wound bed only  3. Press a Mepilex  Sacral Dressing (PS#[id number])  to the area, making sure to conform nicely to skin curvatures   (begin placing the Mepilex at the most distal aspect first, smooth into place in an upward direction, then smooth side to side)   5. Time and date dressing change and teresa with a \"T\" for treatment of a wound  6. Reposition pt every 1 to 2 hours when in bed, trying to keep side positioned vs any supine positioning,  and hourly when up to the chair to relieve pressure and promote perfusion to tissue  NOTE:  Iodosorb Gel should not be used longer than 14 days. After 14 days the gel should be stopped and a new plan established, this may mean only a simple, gauze dressing.  The Iodosorb Gel should be stopped "

## 2019-06-26 NOTE — PROGRESS NOTES
06/26/19 0730   General Information   Discipline PT   Onset of Edema   (Feb 2019)   Affected Body Part(s) Left LE;Right LE   Etiology Comments severe CHF   Pertinent history of current problem (PT: include personal factors and/or comorbidities that impact the POC; OT: include additional occupational profile info) Pt admitted from [facility] ED with hypotension. PMH: severe ICM with EF 15-20%, ICD, HTN, CKD3, CAD, history of cancer, severe PAD, LE edema. Pt had ICD turned off 6/25 and will discharge on hospice. Pt has home lymphedema care 3x/wk.   Edema Precautions Cardiac Edema/CHF   Pain   Patient currently in pain No   Edema Examination / Assessment   Skin Condition Pitting;Dryness   Scar No   Ulcerations Yes   Description R: very small wound on mid-shin, L: larger wound on  mid-shin and smaller one farther down the shin that is smaller.   Drainage Yes   Stemmer Sign Positive   ROM   Range of Motion (WFL) no deficits were identified   Strength   Strength (WFL) no deficits were identified   Sensation   Sensation (WFL) no deficits were identified   Assessment/Plan   Patient presents with Edema   Assessment Pt demonstrates 2+ edema in dorsum of the feet and around the upper shins. Middle of the shins are well reduced.   Clinical Presentation Evolving/Changing   Clinical Presentation Rationale Reducing in size   Clinical Decision Making (Complexity) Low complexity   Planned Edema Interventions Gradient compression bandaging;Education;Manual therapy;Precautions to prevent infection/exacerbation   Treatment Frequency Daily   Treatment Duration 1 day   Patient, Family and/or Staff in agreement with plan of care. Yes   Risks and benefits of treatment have been explained. Yes   Total Evaluation Time   Total Evaluation Time (Minutes) 10

## 2019-06-26 NOTE — DISCHARGE SUMMARY
[facility]  Hospitalist Discharge Summary       Date of Admission:  6/24/2019  Date of Discharge:  6/26/2019  Discharging Provider: Jorje Luong MD      Discharge Diagnoses     Acute on chronic hypotension.    Severe ischemic cardiomyopathy.     Follow-ups Needed After Discharge   Follow-up Appointments     Follow-up and recommended labs and tests       Follow up with primary care provider, Joe Mandel, within 7 days for   hospital follow- up. No labs needed           Unresulted Labs Ordered in the Past 30 Days of this Admission     No orders found from 4/25/2019 to 6/25/2019.        Discharge Disposition   Admited to hospice care.   Agency: [facility].  Discharged to home  Condition at discharge: Stable    Hospital Course        Mr. Patrick Diop is an 83-year-old male with complex cardiac history as mentioned above who initially presented to outpatient Cardiology Clinic and was found to be hypotensive with blood pressure in the 60s systolic.    1.  Acute on chronic hypotension.         Severe ischemic cardiomyopathy.   The patient does have a baseline blood pressure in the 80s-90s systolic; however, his blood pressure earlier at Cardiology office was 60/40.  He was largely asymptomatic, though. His EF appears to be between 15%-20% and appears to have end-stage heart failure. Moreover, he has not been able to tolerate any significant heart failure therapy.  On torsemide at home. He has opted for hospice approach.   Plan:  - No further escalation of cardiac medications  - Can continue his Torsemide daily    2. Acute kidney injury on chronic kidney disease, stage III  Assessment:   His creatinine has bumped up from a baseline of around 1.8 to 2.6 today.  More than likely this is cardiorenal with worsening heart failure and persistent hypotension.     3.  Recent acute blood loss anemia secondary to gastrointestinal bleed from duodenal ulcers.  The patient denies any recent ongoing bleed.  His  hemoglobin today is stable at 7.8.   Plan:  - Resume PTA Protonix     4.  History of coronary artery disease with a 3-vessel coronary artery disease. The patient denies any anginal symptoms at this time.  He had a coronary angiogram in February which showed 3-vessel coronary artery disease with a  of RCA and left circumflex and subtotal occlusion of mid LAD.  He was not deemed to be a candidate for CABG and it appeared like moreover, the patient declined surgery.    Plan:  - Stop Statin  - Stop PTA aspirin 81 mg daily    5.  Chronic venous stasis with severe bilateral lower extremity edema.  He has Ace wraps.    6.  Hypothyroidism: Stop prior to admission levothyroxine.      Consultations This Hospital Stay   CARDIOLOGY IP CONSULT  CORE CLINIC EVALUATION IP CONSULT  WOUND OSTOMY CONTINENCE NURSE  IP CONSULT  PALLIATIVE CARE ADULT IP CONSULT  PALLIATIVE CARE ADULT IP CONSULT  WOUND OSTOMY CONTINENCE NURSE  IP CONSULT  LYMPHEDEMA THERAPY IP CONSULT  SOCIAL WORK IP CONSULT    Code Status   DNR/DNI    Time Spent on this Encounter   I, Jorje Luong, personally saw the patient today and spent greater than 30 minutes discharging this patient.       Jorje Luong MD  [facility]  ______________________________________________________________________    Physical Exam   Vital Signs: Temp: 97.8  F (36.6  C) Temp src: Oral BP: (!) 82/48 Pulse: 85 Heart Rate: 85 Resp: 16 SpO2: 100 % O2 Device: None (Room air)    Weight: 165 lbs 6.4 oz    Primary Care Physician   Joe Mandel    Discharge Orders      Reason for your hospital stay    You were admitted for low blood pressure. Ultimately decided to pursue hospice. Thus will admit to hospice after your discharge.     Follow-up and recommended labs and tests     Follow up with primary care provider, Joe Mandel, within 7 days for hospital follow- up. No labs needed     Activity    Your activity upon discharge: activity as tolerated     Discharge Instructions     Admit to home hospice     DNR/DNI     Diet    Follow this diet upon discharge: Orders Placed This Encounter      Combination Diet Regular Diet Adult       Significant Results and Procedures   Most Recent 3 CBC's:  Recent Labs   Lab Test 06/25/19  0527 06/24/19  1206 06/10/19  0458  06/09/19  0501   WBC 5.5  --  6.2  --  7.7   HGB 8.3* 7.8* 7.3*   < > 7.2*   *  --  107*  --  103*     --  170  --  176    < > = values in this interval not displayed.     Most Recent 3 BMP's:  Recent Labs   Lab Test 06/25/19  0527 06/24/19  1206 06/10/19  0458    137 137   POTASSIUM 5.1 5.4* 4.2   CHLORIDE 108 107 102   CO2 24 23 24   BUN 94* 98* 105*   CR 2.37* 2.58* 1.74*   ANIONGAP 8 7 11   AMY 8.6 8.5 8.5   GLC 86 89 91     Most Recent 3 BNP's:  Recent Labs   Lab Test 06/24/19  1206 06/01/19  1248 04/22/19  0952 04/01/19  1319  02/21/19  0532 02/19/19  0543   NTBNPI  --  56,701*  --   --   --  40,741* 58,987*   NTBNP 63,381*  --  50,013* 56,699*   < >  --   --     < > = values in this interval not displayed.   ,   Results for orders placed or performed during the hospital encounter of 06/24/19   XR Chest Port 1 View    Narrative    CHEST ONE VIEW PORTABLE   6/24/2019 10:48 PM     HISTORY: CHF.    COMPARISON: 6/1/2019      Impression    IMPRESSION: No significant change. Lungs remain hypoinflated with  small bilateral pleural effusions and bibasilar consolidation. Heart  size is unchanged.    ERICK POLLARD MD       Discharge Medications   Current Discharge Medication List      CONTINUE these medications which have NOT CHANGED    Details   Cholecalciferol (VITAMIN D) 1000 UNITS capsule Take 1 capsule by mouth every morning       multivitamin  with lutein (OCUVITE WITH LTEIN) CAPS Take 1 capsule by mouth daily      nystatin (MYCOSTATIN) 662946 UNIT/GM external cream Apply topically 2 times daily  Qty: 85 g, Refills: 1    Associated Diagnoses: Venous stasis dermatitis of both lower extremities      pantoprazole  "(PROTONIX) 40 MG EC tablet Take 1 tablet (40 mg) by mouth 2 times daily (before meals)  Qty: 90 tablet, Refills: 0    Associated Diagnoses: Gastrointestinal hemorrhage associated with duodenal ulcer      torsemide (DEMADEX) 20 MG tablet Take 1 tablet (20 mg) by mouth daily Take  once daily. Hold for 1 day if you start having more dizziness. If daily weight increases by 2 lb or if legs are swollen, give an additional 20mg.    Associated Diagnoses: Acute on chronic systolic congestive heart failure (H)      miconazole (MICATIN) 2 % AERP powder Apply topically 2 times daily  Qty: 130 g, Refills: 0    Comments: Profile  Associated Diagnoses: Venous stasis dermatitis of both lower extremities      !! order for DME Equipment being ordered: bilateral lower extremities compression garments 30-40 mm Hg  Qty: 1 Package, Refills: 0    Associated Diagnoses: Venous stasis dermatitis of both lower extremities      !! order for DME Equipment being ordered: Hospital Bed from [facility]  Qty: 1 each, Refills: 0    Associated Diagnoses: Acute on chronic systolic congestive heart failure (H)      !! order for DME Equipment being ordered: 4-Wheel Walker with seat. He is 5' 7\" tall.  [facility]: Fax: [phone]  phone [phone]  Qty: 1 Device, Refills: 0    Associated Diagnoses: Chronic congestive heart failure, unspecified heart failure type (H); CKD (chronic kidney disease) stage 3, GFR 30-59 ml/min (H); Unsteady gait       !! - Potential duplicate medications found. Please discuss with provider.      STOP taking these medications       aspirin (ASA) 81 MG EC tablet Comments:   Reason for Stopping:         atorvastatin (LIPITOR) 40 MG tablet Comments:   Reason for Stopping:         levothyroxine (SYNTHROID/LEVOTHROID) 100 MCG tablet Comments:   Reason for Stopping:             Allergies   No Known Allergies  "

## 2019-06-27 NOTE — PROGRESS NOTES
Clinic Care Coordination Contact    Situation: Patient chart reviewed by care coordinator.    Background: Patient hospitalized at [facility] on 6/24/19 for hypotension.    Assessment: Patient discharged back to home at [facility] 6/26/19 with plan of enrolling in [facility] 6/27/19 @ 1pm.     Plan/Recommendations: No further follow up needed as patient is enrolling in  Hospice today.     Jacquelyn RAMIREZ RN, PHN, [facility]  Primary Care Clinic RN Care Coordinator  [facility]   [email or web address]  Office:  [phone]

## 2019-06-28 NOTE — PROGRESS NOTES
CORE consult order while inpatient at [facility]. Per Epic review, patient has decided to pursue hospice. Will cancel consult.  Patricia Enriquez RN on 6/28/2019 at 3:35 PM

## 2023-11-09 NOTE — PROGRESS NOTES
Please call.  TSH is high.  This indicates that thyroid level is low.   Chemistries OK except mildly increased glucose and borderline kidney function.   PLAN: Change levothyroxine from current 100mcg to 125mcg daily.  Recheck TSH in 2 months.   Please arrange for prescriptions and orders.   Rx levothyroxine #30 125mcg with 2 refills and future TSH. no

## 2023-11-27 NOTE — LETTER
3/5/2018      RE: Patrick Diop  [address]   [place] [facility] [address]       RADIATION ONCOLOGY FOLLOW-UP VISIT  DATE: Mar 5, 2018    NAME: Patrick Diop  MRN: [id number]      DISEASE TREATED: Squamous Cell carcinoma of the base of tongue, [id number]    RADIATION THERAPY DELIVERED: 7000 cGy in 35 fractions    INTERVAL SINCE COMPLETION OF RT:  2+years since completion on 12/31/2015    SUBJECTIVE:  Mr. 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 He initially presented with a right-sided neck mass; CT showed a 3.6cm mass in the right base f tongue as well as bilateral cervical lymphadenopathy. He saw Dr. Hernandez, and biopsy of the right neck mass showed a p16 positive SCCa, consistent with base of tongue. PET/CT scan 10/21/15 showed SUVMax 23.4 in the base of tongue, and SUVMax of 11-23 in the bilateral cervical lymph nodes (3 right-sided, 1 left-sided).    He was referred for consideration of definitive chemoradiation, and we saw him on 11/3/15. We recommended concomitant chemotherapy to a dose of 7,000 cGy in 35 fractions, which he received along with high-dose cisplatin as detailed above.    He tolerated therapy quite well. His toxicities included grade 1 radiation dermatitis, grade 3 dysphagia requiring G-tube feedings, grade 2 dry mouth, grade 1 esophagitis, moderate-severe changes in taste, and grade 1 odynophagia. He reports that he never required any pain medication.    He now presents for routine followup exam. Since his last visit with us over 1 year ago (9/2/2016), he reports that he has continued to do quite well. His taste has slowly returned and he is able to swallow fairly well. His feeding tube has been removed. His energy level has improved as well. He still has some weight loss and therefore supplements with ensure. Denies any odynophagia/dysphagia although he can not eat some meats. He does note residual  Food & Nutrition  Education    Diet Education: Stroke Pathway/ DM education  Time Spent: 20 minutes  Learners: Patient       Nutrition Education provided with handouts: Stroke Pathway      Comments: RD spoke with patient regarding CHO meal planning for people with diabetes. RD explained carbohydrates are grains, starchy vegetables, milk, yogurt, fruit, and sweets.  Foods with carbohydrates cause your blood sugar to rise, it is important to choose healthy carbohydrates and to control the amount of carbohydrates eaten at each meal for blood sugar management. Don't skip meals. Choose fresh, unprocessed foods as often as possible. Drink water. Limit sugary beverages such as penny, juice and punch. Fill half your plate with non-starchy vegetables. Choose lean proteins (seafood, beef/pork loin, chicken/turkey, eggs.) Eat more whole grain breads and cereals. Choose 3-4 servings of carbohydrates per meal, 1-2 servings with snacks. Appropriate portion sizes. RD discussed importance of eating a balanced diet with whole grains, fruits and vegetables, and lean protein sources. Encouraged heart-healthy unsaturated fats while limiting saturated fats, trans fats, and cholesterol intake. Including more Omega- 3's in their diet such as canola oil, flaxseed, sardines, anchovies, salmon etc. RD discussed reading food labels. Reviewed other options to season food, such as salt-free seasonings, spices/herbs. Avoid processed foods. Reviewed high sodium foods that should be avoided. Patient is aware of A1C 9.6 % and what that the lab value means. Patient agrees to make changes to comply with diabetic/cardiac diet. All questions/concerns were addressed.  RD recommends outpatient diabetic management clinic referral for further education. Recommendations provided below.        All questions and concerns answered. Dietitian's contact information provided.       Follow-Up: Yes    Please Re-consult as needed    Recommendations    1. Continue  "xerostomia, which is manageable with hydration throughout the day. He has not seen Dr. Hernandez in ENT in over a year and does not want to go back to see him as he doesn't like to drive downtown and \"they don't do anything anyway\".  He is agreeable to see ENT here although we discussed that ENT here is not a cancer specialist.  He also is not agreeable to see Dr. Neil in medical oncology and doesn't want to see anyone in medical oncology at this point.  He is agreeable to follow up here and will do scans.  Last restaging CT and PET were negative for evidence of residual disease but he has not had a scan for sometime.  His last scan was a chest x ray 4/2017 and was clear. He did see lymphedema therapy and has continued issues with lymphedema.  He has compression garments, but does not wear and he has also stopped doing any lymphedema massage therapy at home. He will be seeing a dentist at some point in the future for dentures.   He has not had a colonoscopy for sometime  We did discuss that a colonoscopy would be recommended and he will think about that.  He has had a previous colon cancer.  He doesn't like the prep that was used the last time as it was too expensive and more difficult to take.    His remaining ROS are otherwise unremarkable.     PHYSICAL EXAM:  VITALS: /79 (Cuff Size: Adult Large)  Pulse 91  Resp 16  Wt 74.3 kg (163 lb 12.8 oz)  BMI 25.28 kg/m2  GEN: Appears well, alert, oriented, and in NAD  HEENT: NCAT, EOMI, normal conjunctiva, tacky membranes, no thrush or mucositis, no lesions or masses visualized  NECK: Supple, full ROM, no cervical or clavicular lymphadenopathy, mild dewlap from lymphedema.  Mild fibrosis.   CV: RRR, no murmurs/rubs/gallops, warm and well-perfused  RESP: CTAB, no wheezes/rales/rhonchi, breathing comfortably on room air  SKIN: Normal color and turgor  NEURO: No focal deficits, CN 3-12 grossly intact, normal and symmetric motor and sensory exam in bilateral upper " 2000 Calorie Diabetic/ Cardiac diet.  2.  If PO intake <50% at meals, recommend Boost Glucose Control BID (flavor per patient).  3. Monitor I/O's, weight, and labs.  4. RD to monitor and follow-up.    Goals: Meet % EEN/EPN by follow-up date.  Nutrition Goal Status: new  Communication of RD Recs: other (comment) (POC)    Thanks!    Alisa Garcia Registration Eligible, Provisional LDN     and lower extremities, normal gait  PSYCH: Appropriate mood and affect    LABS AND IMAGING: Reviewed    Neck CT 03/30/2016  IMPRESSION:    1. Marked decrease in the size of the previously described right  tongue base mass. The mass is no longer identified.  2. Significant decrease in the cervical adenopathy. No enlarged  cervical lymph nodes are seen on the current study.  3. Post radiation changes.    PET 03/30/2016  IMPRESSION:  1. Resolution of the hypermetabolic mass at the base of the tongue and  hypermetabolic lymph nodes 10/21/2015.  2. Currently there is no evidence for hypermetabolic metastatic  disease in the chest, abdomen or pelvis.  3. There is a new very subtle groundglass nodule in the lateral right  apex of the lung approximately 0.4 cm in size. Recommend short-term  followup CT in 3-6 months with thin section imaging through this  nodule if it persists.    4. Extensive atherosclerotic vascular calcifications.  5. Since the prior CT, there are findings suggesting interval  percutaneous gastrostomy tube that has been removed. A couple surgical  clips in the left upper quadrant of the abdomen are redemonstrated.    CT CHEST W CONTRAST 7/25/2016  IMPRESSION:    1. No new or enlarging pulmonary nodules. Previously seen right apical  groundglass nodularity is smaller.  2. Sequela of chronic granulomatous disease.    IMPRESSION:   Mr. 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 He continues to have some symptom mild long term radiation side effects and has no evidence of recurrence of disease.     PLAN:  1. RTC in 6 months for routine follow up.  I will order CT scans of neck and chest for surveillance and call him with results.  He can also discuss results at ENT follow up.  2. He is refusing to return to medical oncology.    3. Would recommend f/u with   Mary, but he refuses to make the drive.  He will see ENT here.  4. Resume lymphedema treatments at home.  5.  Colonoscopy also recommended as he has had previous colon cancer.      Bharti Vogel NP  Radiation Oncology  [facility]   Radiation Therapy Center   Phone: [phone]

## 2024-04-18 NOTE — PROGRESS NOTES
Caller: Corinna Hooks    Relationship: Self    Best call back number: [phone]    What medication are you requesting: SEMAGLUTIDE    Have you had these symptoms before:    [x] Yes  [] No    Have you been treated for these symptoms before:   [x] Yes  [] No    If a prescription is needed, what is your preferred pharmacy and phone number: [id number] - [place], [place] - [address] - [phone]  - [phone] FX     Additional notes:    PLEASE CALL TO CONFIRM      Patient is an 83 yr old male here for left lower extremity pain and swelling and redness. Has other co morbidities such as CHF. Symptoms have progressed since yesterday. He was brought in by one of his caretakers .Given the extent of the leg I recommended ER he may need IV antibiotics.

## 2024-05-08 NOTE — PATIENT INSTRUCTIONS
We would like you to have chemotherapy per treatment plan. Dr. Neil would like to see you back in 3 weeks for a follow up appointment.      When you are in need of a refill, please call your pharmacy and they will send us a request.      Copy of appointments, and after visit summary (AVS) given to patient.      If you have any questions during business hours (M-F 8 AM- 4PM), please call Natividad Iyer RN Oncology Hematology  at [facility] [phone].       For questions after business hours, or on holidays/weekends, please call our after hours Nurse Triage line [phone]. Thank you.    
unknown

## 2025-03-13 NOTE — LETTER
COMP EXAM, FMX, PROBE EXAM   REVIEWED MED HX: meds, allergies, health changes reviewed in EPIC  - BP very high today. 2nd reading 175/106. Pt states bp was not high at home. Pt reports not taking BP meds. Stressed importance of taking HBP medication. Pt to consult MD to monitor. Informed pt that if BP too high,  may not be able to treat at [place].   CHIEF CONCERN:     -Last dental visit was limited 2/12/25 here. Pt reports UR canine tooth tooth pain. Level 4 pain today. 10 pain level when eating. Last dental visit few years ago. Pt reports jaw broken back in 1991. Plates left side.   PAIN SCALE:  4  ASA CLASS:  ASA 1 - Normal health patient  PLAQUE:  heavy  CALCULUS:  Moderate  BLEEDING:  light  STAIN :  Light  PERIO: 3C    Visual and Tactile Intraoral/ Extraoral evaluation: Oral and Oropharyngeal cancer evaluation. No findings     Dr. Forrest Gordon -  Reviewed with patient clinical and radiographic findings and patient verbalized understanding. All questions and concerns addressed.     REFERRALS: none    CARIES FINDINGS: see tooth chart       NEXT VISIT:   1)start with #6 asap ( tooth pain)  #7 caries control. Filling vs RCT  2) ext #27  3) restorative  4) sc/rp ? Dr to discuss. Review tx plan    Last FMX : 3/14/25   "4/1/2019    Joe Mandel MD  [id number] [facility] [id number]    RE: Patrick Diop       Dear Colleague,    I had the pleasure of seeing Patrick Diop in the [facility].    HPI and Plan:   See dictation [id number]    No orders of the defined types were placed in this encounter.    No orders of the defined types were placed in this encounter.    There are no discontinued medications.      No diagnosis found.    CURRENT MEDICATIONS:  Current Outpatient Medications   Medication Sig Dispense Refill     aspirin (ASA) 81 MG EC tablet Take 1 tablet (81 mg) by mouth daily 90 tablet 2     atorvastatin (LIPITOR) 40 MG tablet Take 1 tablet (40 mg) by mouth daily 90 tablet 2     Cholecalciferol (VITAMIN D) 1000 UNITS capsule Take 1 capsule by mouth daily       clopidogrel (PLAVIX) 75 MG tablet Take 1 tablet (75 mg) by mouth daily 90 tablet 3     levothyroxine (SYNTHROID/LEVOTHROID) 100 MCG tablet Take 1 tablet (100 mcg) by mouth daily Recheck Lab test in 2 months. 90 tablet 1     lisinopril (PRINIVIL/ZESTRIL) 2.5 MG tablet Take 1 tablet (2.5 mg) by mouth At Bedtime 90 tablet 0     metoprolol succinate ER (TOPROL-XL) 25 MG 24 hr tablet ON HOLD as of 2/25/19 90 tablet 3     multivitamin  with lutein (OCUVITE WITH LTEIN) CAPS Take 1 capsule by mouth daily       order for DME Equipment being ordered: 4-Wheel Walker with seat. He is 5' 7\" tall.  [facility]: Fax: [phone]  phone [phone] (Patient not taking: Reported on 4/1/2019) 1 Device 0     torsemide (DEMADEX) 20 MG tablet ON HOLD as of 2/25/19 60 tablet 0       ALLERGIES   No Known Allergies    PAST MEDICAL HISTORY:  Past Medical History:   Diagnosis Date     Acute on chronic systolic congestive heart failure (H) 2/10/2019     Acute on chronic systolic heart failure (H) 2/10/2019     Adult hypothyroidism      Colon cancer (H)      Hearing problem      Hyperlipidemia with target LDL less than 100 11/6/2013     Hypertension " goal BP (blood pressure) < 140/90 11/6/2013     Ischemic cardiomyopathy 02/10/2019    Postulated to be due to chemotherapy, though agent not specified.     Non-ischemic cardiomyopathy (H) 2/10/2019    Postulated to be due to chemotherapy, though agent not specified.     Syncope      Tongue cancer (H)        PAST SURGICAL HISTORY:  Past Surgical History:   Procedure Laterality Date     APPENDECTOMY  2001     COLECTOMY  Jan 2002    malignant polyp.  Colectomy and colostomy     COLONOSCOPY  Jan 2002    polyps     CV HEART CATHETERIZATION WITH POSSIBLE INTERVENTION N/A 2/14/2019    Procedure: Heart Catheterization with possible Intervention;  Surgeon: Julio Vera MD;  Location:  HEART CARDIAC CATH LAB     EP ICD N/A 2/15/2019    Procedure: EP ICD;  Surgeon: Cj Gutierrez MD;  Location:  HEART CARDIAC CATH LAB     EYE SURGERY      B cataract     [facility] ENDOSCOPY W PLACEMENT GASTROSTOMY TUBE PERCUT N/A 11/19/2015    Procedure: COMBINED ESOPHAGOSCOPY, GASTROSCOPY, DUODENOSCOPY (EGD), PLACE PERCUTANEOUS ENDOSCOPIC GASTROSTOMY TUBE;  Surgeon: Sergio Buenrostro MD;  Location: [facility]     LARYNGOSCOPY WITH BIOPSY(IES) N/A 4/3/2018    Procedure: LARYNGOSCOPY WITH BIOPSY(IES);  Direct Laryngoscopy, Biopsy Base Of Tongue;  Surgeon: Jj Hernandez MD;  Location: [facility]     TAKEDOWN COLOSTOMY         FAMILY HISTORY:  Family History   Problem Relation Age of Onset     Other Cancer Mother 57        lung ca, with brain mets     Cancer Mother         Brain tumor, lung cancer     Cancer Father         Lung cancer     Cancer Brother        SOCIAL HISTORY:  Social History     Socioeconomic History     Marital status: Single     Spouse name: None     Number of children: None     Years of education: None     Highest education level: None   Occupational History     None   Social Needs     Financial resource strain: None     Food insecurity:     Worry: None     Inability: None     Transportation needs:     Medical: None      Non-medical: None   Tobacco Use     Smoking status: Former Smoker     Packs/day: 0.00     Years: 48.00     Pack years: 0.00     Types: Cigarettes     Last attempt to quit: 2001     Years since quittin.4     Smokeless tobacco: Never Used     Tobacco comment: started at age 17   Substance and Sexual Activity     Alcohol use: No     Drug use: No     Sexual activity: No     Partners: Female   Lifestyle     Physical activity:     Days per week: None     Minutes per session: None     Stress: None   Relationships     Social connections:     Talks on phone: None     Gets together: None     Attends Spiritism service: None     Active member of club or organization: None     Attends meetings of clubs or organizations: None     Relationship status: None     Intimate partner violence:     Fear of current or ex partner: None     Emotionally abused: None     Physically abused: None     Forced sexual activity: None   Other Topics Concern     Parent/sibling w/ CABG, MI or angioplasty before 65F 55M? No   Social History Narrative     None       Review of Systems:  Skin:  Positive for bruising   Eyes:  Positive for    ENT:  Negative    Respiratory:  Negative    Cardiovascular:    Positive for;fatigue  Gastroenterology: Negative    Genitourinary:  Positive for urgency;urinary frequency  Musculoskeletal:  Negative    Neurologic:  Positive for tremors  Psychiatric:  Negative    Heme/Lymph/Imm:  Negative    Endocrine:    thyroid disorder    Physical Exam:  Vitals: BP 98/61 (BP Location: Right arm, Patient Position: Sitting, Cuff Size: Adult Regular)   Pulse 102   Wt 68.9 kg (152 lb)   SpO2 96%   BMI 23.81 kg/m       Recent Lab Results:  LIPID RESULTS:  Lab Results   Component Value Date    CHOL 126 2019    HDL 61 2019    LDL 49 2019    TRIG 82 2019    CHOLHDLRATIO Canceled, Test credited   Patient not fasting   2015       LIVER ENZYME RESULTS:  Lab Results   Component Value Date    AST 14  04/01/2019    ALT 17 04/01/2019       CBC RESULTS:  Lab Results   Component Value Date    WBC 6.8 03/04/2019    RBC 3.10 (L) 03/04/2019    HGB 10.1 (L) 04/01/2019    HCT 32.6 (L) 03/04/2019     (H) 03/04/2019    MCH 33.2 (H) 03/04/2019    MCHC 31.6 03/04/2019    RDW 13.1 03/04/2019     03/04/2019       BMP RESULTS:  Lab Results   Component Value Date     04/01/2019    POTASSIUM 4.4 04/01/2019    CHLORIDE 105 04/01/2019    CO2 27 04/01/2019    ANIONGAP 6 04/01/2019     (H) 04/01/2019    BUN 21 04/01/2019    CR 1.14 04/01/2019    GFRESTIMATED 60 (L) 04/01/2019    GFRESTBLACK 69 04/01/2019    AMY 8.8 04/01/2019        A1C RESULTS:  Lab Results   Component Value Date    A1C 5.6 09/19/2018       INR RESULTS:  Lab Results   Component Value Date    INR 1.59 (H) 02/11/2019           CC  No referring provider defined for this encounter.                    Thank you for allowing me to participate in the care of your patient.      Sincerely,     Lionel Calderón MD     [facility]    cc:   No referring provider defined for this encounter.

## (undated) DEVICE — PACK NEURO MINOR UMMC SNE32MNMU4

## (undated) DEVICE — JELLY LUBRICATING SURGILUBE 2OZ TUBE

## (undated) DEVICE — SUCTION MANIFOLD DORNOCH ULTRA CART UL-CL500

## (undated) DEVICE — GLOVE PROTEXIS MICRO 7.0  2D73PM70

## (undated) DEVICE — PACK ENDOSCOPY GI CUSTOM UMMC

## (undated) DEVICE — SOL WATER IRRIG 1000ML BOTTLE 2F7114

## (undated) DEVICE — WIPE PREMOIST CLEANSING WASHCLOTHS 7988

## (undated) DEVICE — ENDO DEVICE LOCKING AND BIOPSY CAP M00545261

## (undated) DEVICE — Device

## (undated) DEVICE — PAD CHUX UNDERPAD 23X24" 7136

## (undated) DEVICE — ENDO TOOTH GUARD SAC2001

## (undated) DEVICE — ENDO CAP AND TUBING STERILE FOR ENDOGATOR  100130

## (undated) DEVICE — GW VASC 260CM .035IN SS PTFE THSF-35-260-AES

## (undated) DEVICE — CLIP HEMOCLIP ENDOSCOPIC INSTINCT 2.8X230CM INSC-7-230-SS

## (undated) DEVICE — GUIDEWIRE VASC 0.014INX180CM RUNTHROUGH 25-1011

## (undated) DEVICE — WIRE GLIDE 0.035"X150CM VASC GR3506

## (undated) DEVICE — GLOVE PROTEXIS MICRO 7.5  2D73PM75

## (undated) DEVICE — SLEEVE TR BAND RADIAL COMPRESSION DEVICE 24CM TRB24-REG

## (undated) DEVICE — KIT ENDO FIRST STEP DISINFECTANT 200ML W/POUCH EP-4

## (undated) DEVICE — SOL NACL 0.9% IRRIG 1000ML BOTTLE 2F7124

## (undated) DEVICE — ENDO TUBING CO2 SMARTCAP STERILE DISP 100145CO2EXT

## (undated) DEVICE — INTRO GLIDESHEATH SLENDER 6FR 10X45CM 60-1060

## (undated) DEVICE — ESU SUCTION CAUTERY 10FR FOOT CONTROL E2505-10FR

## (undated) DEVICE — PACK PCMKR PERM SRG PROC LF SAN32PC573

## (undated) DEVICE — ANTIFOG SOLUTION W/FOAM PAD CF-1001

## (undated) DEVICE — SPECIMEN CONTAINER 3OZ W/FORMALIN 59901

## (undated) DEVICE — MANIFOLD KIT ANGIO AUTOMATED 014613

## (undated) DEVICE — DEFIB PRO-PADZ LVP LQD GEL ADULT 8900-2105-01

## (undated) DEVICE — TOTE ANGIO CORP PC15AT SAN32CC83O

## (undated) DEVICE — LINEN TOWEL PACK X6 WHITE 5487

## (undated) DEVICE — KIT CONNECTOR FOR OLYMPUS ENDOSCOPES DEFENDO 100310

## (undated) DEVICE — SHEATH PRELUDE SNAP 13CM 9FR

## (undated) DEVICE — LINEN TOWEL PACK X5 5464

## (undated) DEVICE — ESU GROUND PAD ADULT W/CORD E7507

## (undated) DEVICE — ESU ELEC BLADE 2.75" COATED/INSULATED E1455

## (undated) DEVICE — KIT HAND CONTROL ANGIOTOUCH ACIST 65CM AT-P65

## (undated) DEVICE — CATH JACKY 5FR 3.5 CURVE 40-5023

## (undated) DEVICE — CABLE PACING ALLIGATOR CLIP 12FT 5833SL

## (undated) DEVICE — DRSG TELFA 3X8" 1238

## (undated) DEVICE — SU SILK 2-0 SH CR 5X18" C0125

## (undated) RX ORDER — LIDOCAINE HYDROCHLORIDE 40 MG/ML
INJECTION, SOLUTION RETROBULBAR
Status: DISPENSED
Start: 2018-04-03

## (undated) RX ORDER — BUPIVACAINE HYDROCHLORIDE 2.5 MG/ML
INJECTION, SOLUTION EPIDURAL; INFILTRATION; INTRACAUDAL
Status: DISPENSED
Start: 2019-01-01

## (undated) RX ORDER — PROPOFOL 10 MG/ML
INJECTION, EMULSION INTRAVENOUS
Status: DISPENSED
Start: 2018-04-03

## (undated) RX ORDER — OXYMETAZOLINE HYDROCHLORIDE 0.05 G/100ML
SPRAY NASAL
Status: DISPENSED
Start: 2018-04-03

## (undated) RX ORDER — LIDOCAINE HYDROCHLORIDE 20 MG/ML
INJECTION, SOLUTION EPIDURAL; INFILTRATION; INTRACAUDAL; PERINEURAL
Status: DISPENSED
Start: 2018-04-03

## (undated) RX ORDER — DEXAMETHASONE SODIUM PHOSPHATE 4 MG/ML
INJECTION, SOLUTION INTRA-ARTICULAR; INTRALESIONAL; INTRAMUSCULAR; INTRAVENOUS; SOFT TISSUE
Status: DISPENSED
Start: 2018-04-03

## (undated) RX ORDER — AMINOPHYLLINE 25 MG/ML
INJECTION, SOLUTION INTRAVENOUS
Status: DISPENSED
Start: 2019-01-01

## (undated) RX ORDER — FENTANYL CITRATE 50 UG/ML
INJECTION, SOLUTION INTRAMUSCULAR; INTRAVENOUS
Status: DISPENSED
Start: 2019-01-01

## (undated) RX ORDER — HEPARIN SODIUM 1000 [USP'U]/ML
INJECTION, SOLUTION INTRAVENOUS; SUBCUTANEOUS
Status: DISPENSED
Start: 2019-01-01

## (undated) RX ORDER — VERAPAMIL HYDROCHLORIDE 2.5 MG/ML
INJECTION, SOLUTION INTRAVENOUS
Status: DISPENSED
Start: 2019-01-01

## (undated) RX ORDER — FENTANYL CITRATE 50 UG/ML
INJECTION, SOLUTION INTRAMUSCULAR; INTRAVENOUS
Status: DISPENSED
Start: 2018-04-03

## (undated) RX ORDER — ONDANSETRON 2 MG/ML
INJECTION INTRAMUSCULAR; INTRAVENOUS
Status: DISPENSED
Start: 2018-04-03

## (undated) RX ORDER — LIDOCAINE HYDROCHLORIDE 10 MG/ML
INJECTION, SOLUTION EPIDURAL; INFILTRATION; INTRACAUDAL; PERINEURAL
Status: DISPENSED
Start: 2019-01-01

## (undated) RX ORDER — CEFAZOLIN SODIUM 2 G/100ML
INJECTION, SOLUTION INTRAVENOUS
Status: DISPENSED
Start: 2019-01-01

## (undated) RX ORDER — REGADENOSON 0.08 MG/ML
INJECTION, SOLUTION INTRAVENOUS
Status: DISPENSED
Start: 2019-01-01

## (undated) RX ORDER — PHENYLEPHRINE HCL IN 0.9% NACL 1 MG/10 ML
SYRINGE (ML) INTRAVENOUS
Status: DISPENSED
Start: 2018-04-03

## (undated) RX ORDER — PHENYLEPHRINE HCL IN 0.9% NACL 1 MG/10 ML
SYRINGE (ML) INTRAVENOUS
Status: DISPENSED
Start: 2019-01-01